# Patient Record
Sex: MALE | Race: WHITE | NOT HISPANIC OR LATINO | Employment: OTHER | URBAN - METROPOLITAN AREA
[De-identification: names, ages, dates, MRNs, and addresses within clinical notes are randomized per-mention and may not be internally consistent; named-entity substitution may affect disease eponyms.]

---

## 2017-02-06 ENCOUNTER — ALLSCRIPTS OFFICE VISIT (OUTPATIENT)
Dept: OTHER | Facility: OTHER | Age: 72
End: 2017-02-06

## 2017-04-12 ENCOUNTER — GENERIC CONVERSION - ENCOUNTER (OUTPATIENT)
Dept: OTHER | Facility: OTHER | Age: 72
End: 2017-04-12

## 2017-05-11 ENCOUNTER — ALLSCRIPTS OFFICE VISIT (OUTPATIENT)
Dept: OTHER | Facility: OTHER | Age: 72
End: 2017-05-11

## 2017-05-11 DIAGNOSIS — R97.20 ELEVATED PROSTATE SPECIFIC ANTIGEN (PSA): ICD-10-CM

## 2017-05-19 RX ORDER — ASPIRIN 81 MG/1
81 TABLET ORAL DAILY
COMMUNITY

## 2017-05-19 RX ORDER — NITROGLYCERIN 0.4 MG/1
0.4 TABLET SUBLINGUAL
COMMUNITY
End: 2021-09-21 | Stop reason: SDUPTHER

## 2017-05-19 RX ORDER — AMLODIPINE BESYLATE 10 MG/1
10 TABLET ORAL EVERY EVENING
COMMUNITY

## 2017-05-19 RX ORDER — METOPROLOL TARTRATE 50 MG/1
50 TABLET, FILM COATED ORAL 2 TIMES DAILY
COMMUNITY

## 2017-05-19 RX ORDER — MULTIVITAMIN
1 CAPSULE ORAL DAILY
COMMUNITY

## 2017-05-19 RX ORDER — ATORVASTATIN CALCIUM 80 MG/1
80 TABLET, FILM COATED ORAL DAILY
COMMUNITY
End: 2021-04-19 | Stop reason: SDUPTHER

## 2017-05-22 ENCOUNTER — ANESTHESIA (OUTPATIENT)
Dept: GASTROENTEROLOGY | Facility: HOSPITAL | Age: 72
End: 2017-05-22
Payer: COMMERCIAL

## 2017-05-22 ENCOUNTER — ANESTHESIA EVENT (OUTPATIENT)
Dept: GASTROENTEROLOGY | Facility: HOSPITAL | Age: 72
End: 2017-05-22
Payer: COMMERCIAL

## 2017-05-22 ENCOUNTER — HOSPITAL ENCOUNTER (OUTPATIENT)
Facility: HOSPITAL | Age: 72
Setting detail: OUTPATIENT SURGERY
Discharge: HOME | End: 2017-05-22
Attending: INTERNAL MEDICINE | Admitting: INTERNAL MEDICINE
Payer: COMMERCIAL

## 2017-05-22 ENCOUNTER — GENERIC CONVERSION - ENCOUNTER (OUTPATIENT)
Dept: OTHER | Facility: OTHER | Age: 72
End: 2017-05-22

## 2017-05-22 VITALS
SYSTOLIC BLOOD PRESSURE: 101 MMHG | HEIGHT: 68 IN | DIASTOLIC BLOOD PRESSURE: 63 MMHG | HEART RATE: 54 BPM | RESPIRATION RATE: 18 BRPM | OXYGEN SATURATION: 95 % | WEIGHT: 170 LBS | BODY MASS INDEX: 25.76 KG/M2 | TEMPERATURE: 97.6 F

## 2017-05-22 DIAGNOSIS — Z12.11 ENCOUNTER FOR SCREENING FOR MALIGNANT NEOPLASM OF COLON: ICD-10-CM

## 2017-05-22 PROCEDURE — 88305 TISSUE EXAM BY PATHOLOGIST: CPT | Performed by: INTERNAL MEDICINE

## 2017-05-22 RX ORDER — PROPOFOL 10 MG/ML
INJECTION, EMULSION INTRAVENOUS AS NEEDED
Status: DISCONTINUED | OUTPATIENT
Start: 2017-05-22 | End: 2017-05-22 | Stop reason: SURG

## 2017-05-22 RX ORDER — LIDOCAINE HYDROCHLORIDE 10 MG/ML
INJECTION, SOLUTION INFILTRATION; PERINEURAL AS NEEDED
Status: DISCONTINUED | OUTPATIENT
Start: 2017-05-22 | End: 2017-05-22 | Stop reason: SURG

## 2017-05-22 RX ORDER — PROPOFOL 10 MG/ML
INJECTION, EMULSION INTRAVENOUS CONTINUOUS PRN
Status: DISCONTINUED | OUTPATIENT
Start: 2017-05-22 | End: 2017-05-22 | Stop reason: SURG

## 2017-05-22 RX ORDER — SODIUM CHLORIDE 9 MG/ML
100 INJECTION, SOLUTION INTRAVENOUS CONTINUOUS
Status: DISCONTINUED | OUTPATIENT
Start: 2017-05-22 | End: 2017-05-22 | Stop reason: HOSPADM

## 2017-05-22 RX ADMIN — SODIUM CHLORIDE 100 ML/HR: 0.9 INJECTION, SOLUTION INTRAVENOUS at 12:51

## 2017-05-22 RX ADMIN — PROPOFOL 50 MG: 10 INJECTION, EMULSION INTRAVENOUS at 13:07

## 2017-05-22 RX ADMIN — PROPOFOL 50 MG: 10 INJECTION, EMULSION INTRAVENOUS at 13:02

## 2017-05-22 RX ADMIN — PROPOFOL 160 MCG/KG/MIN: 10 INJECTION, EMULSION INTRAVENOUS at 12:56

## 2017-05-22 RX ADMIN — PROPOFOL 100 MG: 10 INJECTION, EMULSION INTRAVENOUS at 12:56

## 2017-05-22 RX ADMIN — LIDOCAINE HYDROCHLORIDE 50 MG: 10 INJECTION, SOLUTION INFILTRATION; PERINEURAL at 12:56

## 2017-06-02 ENCOUNTER — GENERIC CONVERSION - ENCOUNTER (OUTPATIENT)
Dept: OTHER | Facility: OTHER | Age: 72
End: 2017-06-02

## 2017-06-15 ENCOUNTER — ALLSCRIPTS OFFICE VISIT (OUTPATIENT)
Dept: OTHER | Facility: OTHER | Age: 72
End: 2017-06-15

## 2017-06-15 DIAGNOSIS — R97.20 ELEVATED PROSTATE SPECIFIC ANTIGEN (PSA): ICD-10-CM

## 2017-08-01 ENCOUNTER — ALLSCRIPTS OFFICE VISIT (OUTPATIENT)
Dept: OTHER | Facility: OTHER | Age: 72
End: 2017-08-01

## 2017-08-04 ENCOUNTER — LAB CONVERSION - ENCOUNTER (OUTPATIENT)
Dept: OTHER | Facility: OTHER | Age: 72
End: 2017-08-04

## 2017-08-04 LAB
A/G RATIO (HISTORICAL): 1.6 (CALC) (ref 1–2.5)
ALBUMIN SERPL BCP-MCNC: 4.2 G/DL (ref 3.6–5.1)
ALP SERPL-CCNC: 141 U/L (ref 40–115)
ALT SERPL W P-5'-P-CCNC: 22 U/L (ref 9–46)
AST SERPL W P-5'-P-CCNC: 20 U/L (ref 10–35)
BASOPHILS # BLD AUTO: 0.5 %
BASOPHILS # BLD AUTO: 73 CELLS/UL (ref 0–200)
BILIRUB SERPL-MCNC: 1.1 MG/DL (ref 0.2–1.2)
BUN SERPL-MCNC: 12 MG/DL (ref 7–25)
BUN/CREA RATIO (HISTORICAL): ABNORMAL (CALC) (ref 6–22)
CALCIUM SERPL-MCNC: 9.4 MG/DL (ref 8.6–10.3)
CHLORIDE SERPL-SCNC: 105 MMOL/L (ref 98–110)
CHOLEST SERPL-MCNC: 113 MG/DL (ref 125–200)
CHOLEST/HDLC SERPL: 4.5 (CALC)
CO2 SERPL-SCNC: 27 MMOL/L (ref 20–31)
CREAT SERPL-MCNC: 1.18 MG/DL (ref 0.7–1.18)
DEPRECATED RDW RBC AUTO: 14.7 % (ref 11–15)
EGFR AFRICAN AMERICAN (HISTORICAL): 72 ML/MIN/1.73M2
EGFR-AMERICAN CALC (HISTORICAL): 62 ML/MIN/1.73M2
EOSINOPHIL # BLD AUTO: 4.7 %
EOSINOPHIL # BLD AUTO: 682 CELLS/UL (ref 15–500)
GAMMA GLOBULIN (HISTORICAL): 2.6 G/DL (CALC) (ref 1.9–3.7)
GLUCOSE (HISTORICAL): 105 MG/DL (ref 65–99)
HBA1C MFR BLD HPLC: 5.2 % OF TOTAL HGB
HCT VFR BLD AUTO: 55.7 % (ref 38.5–50)
HDLC SERPL-MCNC: 25 MG/DL
HGB BLD-MCNC: 18.9 G/DL (ref 13.2–17.1)
LDL CHOLESTEROL (HISTORICAL): 67 MG/DL (CALC)
LYMPHOCYTES # BLD AUTO: 15 %
LYMPHOCYTES # BLD AUTO: 2175 CELLS/UL (ref 850–3900)
MCH RBC QN AUTO: 31.9 PG (ref 27–33)
MCHC RBC AUTO-ENTMCNC: 33.9 G/DL (ref 32–36)
MCV RBC AUTO: 94.1 FL (ref 80–100)
MONOCYTES # BLD AUTO: 1015 CELLS/UL (ref 200–950)
MONOCYTES (HISTORICAL): 7 %
NEUTROPHILS # BLD AUTO: 72.8 %
NEUTROPHILS # BLD AUTO: ABNORMAL CELLS/UL (ref 1500–7800)
NON-HDL-CHOL (CHOL-HDL) (HISTORICAL): 88 MG/DL (CALC)
PLATELET # BLD AUTO: 227 THOUSAND/UL (ref 140–400)
PMV BLD AUTO: 10.6 FL (ref 7.5–12.5)
POTASSIUM SERPL-SCNC: 3.8 MMOL/L (ref 3.5–5.3)
RBC # BLD AUTO: 5.92 MILLION/UL (ref 4.2–5.8)
SODIUM SERPL-SCNC: 141 MMOL/L (ref 135–146)
TOTAL PROTEIN (HISTORICAL): 6.8 G/DL (ref 6.1–8.1)
TRIGL SERPL-MCNC: 104 MG/DL
TSH SERPL DL<=0.05 MIU/L-ACNC: 0.84 MIU/L (ref 0.4–4.5)
WBC # BLD AUTO: 14.5 THOUSAND/UL (ref 3.8–10.8)

## 2017-08-07 ENCOUNTER — ALLSCRIPTS OFFICE VISIT (OUTPATIENT)
Dept: OTHER | Facility: OTHER | Age: 72
End: 2017-08-07

## 2017-08-09 ENCOUNTER — ALLSCRIPTS OFFICE VISIT (OUTPATIENT)
Dept: OTHER | Facility: OTHER | Age: 72
End: 2017-08-09

## 2017-08-09 ENCOUNTER — TRANSCRIBE ORDERS (OUTPATIENT)
Dept: ADMINISTRATIVE | Facility: HOSPITAL | Age: 72
End: 2017-08-09

## 2017-08-09 ENCOUNTER — APPOINTMENT (OUTPATIENT)
Dept: LAB | Facility: MEDICAL CENTER | Age: 72
End: 2017-08-09
Payer: COMMERCIAL

## 2017-08-09 DIAGNOSIS — Z72.0 TOBACCO USE: ICD-10-CM

## 2017-08-09 DIAGNOSIS — L02.214 CUTANEOUS ABSCESS OF GROIN: ICD-10-CM

## 2017-08-09 DIAGNOSIS — L02.214 ABSCESS OF GROIN: Primary | ICD-10-CM

## 2017-08-09 LAB
ANION GAP SERPL CALCULATED.3IONS-SCNC: 5 MMOL/L (ref 4–13)
BUN SERPL-MCNC: 12 MG/DL (ref 5–25)
CALCIUM SERPL-MCNC: 9.3 MG/DL (ref 8.3–10.1)
CHLORIDE SERPL-SCNC: 104 MMOL/L (ref 100–108)
CO2 SERPL-SCNC: 31 MMOL/L (ref 21–32)
CREAT SERPL-MCNC: 1.21 MG/DL (ref 0.6–1.3)
GFR SERPL CREATININE-BSD FRML MDRD: 60 ML/MIN/1.73SQ M
GLUCOSE P FAST SERPL-MCNC: 63 MG/DL (ref 65–99)
POTASSIUM SERPL-SCNC: 3.7 MMOL/L (ref 3.5–5.3)
SODIUM SERPL-SCNC: 140 MMOL/L (ref 136–145)

## 2017-08-09 PROCEDURE — 36415 COLL VENOUS BLD VENIPUNCTURE: CPT

## 2017-08-09 PROCEDURE — 80048 BASIC METABOLIC PNL TOTAL CA: CPT

## 2017-08-10 ENCOUNTER — HOSPITAL ENCOUNTER (OUTPATIENT)
Dept: RADIOLOGY | Facility: HOSPITAL | Age: 72
Discharge: HOME | End: 2017-08-10
Payer: COMMERCIAL

## 2017-08-10 DIAGNOSIS — L02.214 CUTANEOUS ABSCESS OF GROIN: ICD-10-CM

## 2017-08-10 PROCEDURE — 74177 CT ABD & PELVIS W/CONTRAST: CPT

## 2017-08-10 RX ADMIN — IOHEXOL 100 ML: 350 INJECTION, SOLUTION INTRAVENOUS at 10:47

## 2017-08-11 ENCOUNTER — ALLSCRIPTS OFFICE VISIT (OUTPATIENT)
Dept: OTHER | Facility: OTHER | Age: 72
End: 2017-08-11

## 2017-08-24 ENCOUNTER — ALLSCRIPTS OFFICE VISIT (OUTPATIENT)
Dept: OTHER | Facility: OTHER | Age: 72
End: 2017-08-24

## 2017-08-25 ENCOUNTER — ALLSCRIPTS OFFICE VISIT (OUTPATIENT)
Dept: OTHER | Facility: OTHER | Age: 72
End: 2017-08-25

## 2017-09-20 ENCOUNTER — ALLSCRIPTS OFFICE VISIT (OUTPATIENT)
Dept: OTHER | Facility: OTHER | Age: 72
End: 2017-09-20

## 2017-09-20 ENCOUNTER — TRANSCRIBE ORDERS (OUTPATIENT)
Dept: ADMINISTRATIVE | Facility: HOSPITAL | Age: 72
End: 2017-09-20

## 2017-09-20 DIAGNOSIS — D72.829 ELEVATED WHITE BLOOD CELL COUNT: ICD-10-CM

## 2017-09-20 DIAGNOSIS — R05.9 COUGH: ICD-10-CM

## 2017-09-20 DIAGNOSIS — R05.9 COUGH: Primary | ICD-10-CM

## 2017-09-20 DIAGNOSIS — R19.7 DIARRHEA: ICD-10-CM

## 2017-09-20 DIAGNOSIS — D75.1 SECONDARY POLYCYTHEMIA: ICD-10-CM

## 2017-09-20 DIAGNOSIS — F17.290 NICOTINE DEPENDENCE, OTHER TOBACCO PRODUCT, UNCOMPLICATED: ICD-10-CM

## 2017-10-05 ENCOUNTER — HOSPITAL ENCOUNTER (OUTPATIENT)
Dept: CT IMAGING | Facility: HOSPITAL | Age: 72
Discharge: HOME | End: 2017-10-05
Attending: INTERNAL MEDICINE
Payer: COMMERCIAL

## 2017-10-05 DIAGNOSIS — R05.9 COUGH: ICD-10-CM

## 2017-10-05 DIAGNOSIS — D75.1 SECONDARY POLYCYTHEMIA: ICD-10-CM

## 2017-10-05 DIAGNOSIS — F17.290 NICOTINE DEPENDENCE, OTHER TOBACCO PRODUCT, UNCOMPLICATED: ICD-10-CM

## 2017-10-05 DIAGNOSIS — D72.829 ELEVATED WHITE BLOOD CELL COUNT: ICD-10-CM

## 2017-10-05 PROCEDURE — 71250 CT THORAX DX C-: CPT

## 2017-10-12 ENCOUNTER — GENERIC CONVERSION - ENCOUNTER (OUTPATIENT)
Dept: OTHER | Facility: OTHER | Age: 72
End: 2017-10-12

## 2017-10-20 ENCOUNTER — GENERIC CONVERSION - ENCOUNTER (OUTPATIENT)
Dept: OTHER | Facility: OTHER | Age: 72
End: 2017-10-20

## 2017-10-23 DIAGNOSIS — D72.829 ELEVATED WHITE BLOOD CELL COUNT: ICD-10-CM

## 2017-10-23 DIAGNOSIS — D45 POLYCYTHEMIA VERA (HCC): ICD-10-CM

## 2017-10-25 ENCOUNTER — HOSPITAL ENCOUNTER (OUTPATIENT)
Dept: INFUSION CENTER | Facility: CLINIC | Age: 72
Discharge: HOME | End: 2017-10-25
Payer: COMMERCIAL

## 2017-10-25 VITALS
DIASTOLIC BLOOD PRESSURE: 85 MMHG | TEMPERATURE: 96.4 F | HEART RATE: 50 BPM | SYSTOLIC BLOOD PRESSURE: 126 MMHG | RESPIRATION RATE: 16 BRPM

## 2017-10-25 PROCEDURE — 99195 PHLEBOTOMY: CPT

## 2017-10-25 RX ORDER — AMOXICILLIN 500 MG
1200 CAPSULE ORAL 2 TIMES DAILY
COMMUNITY

## 2017-10-25 NOTE — PROGRESS NOTES
Pt here for first phlebotomy  400ml of blood taken from right AC  patient tolerated well   vss stable post phlebotomy  patient observed for 15 minutes post phlebotomy  Pt instructed not to smoke, drink alcohol, or do strenuous lifting/exercise  Instructed to drink lots of water  Discharged with AVS in stable condition

## 2017-11-14 ENCOUNTER — LAB CONVERSION - ENCOUNTER (OUTPATIENT)
Dept: OTHER | Facility: OTHER | Age: 72
End: 2017-11-14

## 2017-11-14 LAB
A/G RATIO (HISTORICAL): 1.5 (CALC) (ref 1–2.5)
ALBUMIN SERPL BCP-MCNC: 4.3 G/DL (ref 3.6–5.1)
ALP SERPL-CCNC: 155 U/L (ref 40–115)
ALT SERPL W P-5'-P-CCNC: 23 U/L (ref 9–46)
AST SERPL W P-5'-P-CCNC: 23 U/L (ref 10–35)
BASOPHILS # BLD AUTO: 0.5 %
BASOPHILS # BLD AUTO: 69 CELLS/UL (ref 0–200)
BILIRUB SERPL-MCNC: 1.1 MG/DL (ref 0.2–1.2)
BUN SERPL-MCNC: 16 MG/DL (ref 7–25)
BUN/CREA RATIO (HISTORICAL): ABNORMAL (CALC) (ref 6–22)
CALCIUM SERPL-MCNC: 9.5 MG/DL (ref 8.6–10.3)
CHLORIDE SERPL-SCNC: 104 MMOL/L (ref 98–110)
CO2 SERPL-SCNC: 32 MMOL/L (ref 20–31)
CREAT SERPL-MCNC: 1.14 MG/DL (ref 0.7–1.18)
DEPRECATED RDW RBC AUTO: 13.9 % (ref 11–15)
EGFR AFRICAN AMERICAN (HISTORICAL): 75 ML/MIN/1.73M2
EGFR-AMERICAN CALC (HISTORICAL): 64 ML/MIN/1.73M2
EOSINOPHIL # BLD AUTO: 6.5 %
EOSINOPHIL # BLD AUTO: 891 CELLS/UL (ref 15–500)
GAMMA GLOBULIN (HISTORICAL): 2.8 G/DL (CALC) (ref 1.9–3.7)
GLUCOSE (HISTORICAL): 85 MG/DL (ref 65–99)
HBA1C MFR BLD HPLC: 4.9 % OF TOTAL HGB
HCT VFR BLD AUTO: 56.3 % (ref 38.5–50)
HGB BLD-MCNC: 19.1 G/DL (ref 13.2–17.1)
LYMPHOCYTES # BLD AUTO: 12.2 %
LYMPHOCYTES # BLD AUTO: 1671 CELLS/UL (ref 850–3900)
MCH RBC QN AUTO: 31.8 PG (ref 27–33)
MCHC RBC AUTO-ENTMCNC: 33.9 G/DL (ref 32–36)
MCV RBC AUTO: 93.8 FL (ref 80–100)
MONOCYTES # BLD AUTO: 904 CELLS/UL (ref 200–950)
MONOCYTES (HISTORICAL): 6.6 %
NEUTROPHILS # BLD AUTO: 74.2 %
NEUTROPHILS # BLD AUTO: ABNORMAL CELLS/UL (ref 1500–7800)
PLATELET # BLD AUTO: 218 THOUSAND/UL (ref 140–400)
PMV BLD AUTO: 10.9 FL (ref 7.5–12.5)
POTASSIUM SERPL-SCNC: 4.2 MMOL/L (ref 3.5–5.3)
RBC # BLD AUTO: 6 MILLION/UL (ref 4.2–5.8)
SODIUM SERPL-SCNC: 142 MMOL/L (ref 135–146)
TOTAL PROTEIN (HISTORICAL): 7.1 G/DL (ref 6.1–8.1)
WBC # BLD AUTO: 13.7 THOUSAND/UL (ref 3.8–10.8)

## 2017-11-22 ENCOUNTER — HOSPITAL ENCOUNTER (OUTPATIENT)
Dept: INFUSION CENTER | Facility: CLINIC | Age: 72
Discharge: HOME | End: 2017-11-22
Payer: COMMERCIAL

## 2017-11-22 VITALS
RESPIRATION RATE: 18 BRPM | TEMPERATURE: 96.5 F | DIASTOLIC BLOOD PRESSURE: 90 MMHG | SYSTOLIC BLOOD PRESSURE: 145 MMHG | HEART RATE: 55 BPM

## 2017-11-22 PROCEDURE — 99195 PHLEBOTOMY: CPT

## 2017-11-22 NOTE — PROGRESS NOTES
Patient's Hematocrit from 11/13 is 56 3, double checked with Chandrakant Belle RN; patient met parameters for phlebotomy today  Patient tolerated 400mL phlebotomy drawn from [place] [facility] without complications  Patient drank water and was monitored for 15 minutes post procedure  Patient's vitals remained stable and patient felt fine prior to d/c  Patient informed to stay hydrated today and refrain from heavy lifting and strenuous activity  Patient provided with AVS and aware of future appointments

## 2017-11-24 ENCOUNTER — ALLSCRIPTS OFFICE VISIT (OUTPATIENT)
Dept: OTHER | Facility: OTHER | Age: 72
End: 2017-11-24

## 2017-11-25 NOTE — PROGRESS NOTES
Assessment    1  Benign essential hypertension (401 1) (I10)   2  Hyperlipidemia (272 4) (E78 5)   3  Prediabetes (790 29) (R73 03)   4  Current tobacco use (305 1) (Z72 0)   5  Elevated hemoglobin (282 7) (D58 2)   6  Polycythemia vera (238 4) (D45)   7  Elevated prostate specific antigen (PSA) (790 93) (R97 20)    Plan  Benign essential hypertension, Blood chemistry abnormality, SocHx: Current tobaccouse, Hyperlipidemia, Polycythemia vera    · (1) COMPREHENSIVE METABOLIC PANEL; Status:Hold For - Exact Date; Requestedfor:After 35XKS7373;    · (1) HEMOGLOBIN A1C; Status:Hold For - Exact Date; Requested for:After [id number];    · (1) LIPID PANEL FASTING W DIRECT LDL REFLEX; Status:Hold For - Exact Date; Requested for:After 55MMZ7807;    · (1) TSH WITH FT4 REFLEX; Status:Hold For - Exact Date; Requested for:[id number]; Discussion/Summary    --polycythemia vera: Most recent hemoglobin 19 1  Consultations from a hematologist were reviewed with patient today  Patient has decided on periodically therapeutic phlebotomy  Continue follow-up with specialist as directeduse: Patient still smokes approximately 1/2 pack per day  He was counseled again today  CT of the lungs from October 5th was negative  Recommend repeat again in 1 year  I advised patient to consider a spirometry test  Patient declines today  He denies any shortness of breath  Status post PCI  Patient without chest pain or shortness of breath  Letter from cardiologist (Dr Sherry Corea) from October 5th was reviewed today  Stress test was performed  I do not have the results of this, but will obtain for patient (who still has not received a call from his cardiologist regarding this)  Patient is considering changing to a new cardiologist  I offered to refer him to a Waylon Yu cardiologist, patient states he will consider and let me knowdoing well on atorvastatin 80 mg daily   This is being followed by his cardiologistcontrolled on amlodipine 10 mg dailydiabetes: Blood sugar 85, A1c 4 9%  Will continue to followof elevated PSA: Patient has follow-up appointment with his urologist, Dr Errol Serrano, on December 21st   continues to refuse flu shot, Pneumovax, and Zostavax  refuses colonoscopymonths (Medicare wellness), fasting blood work prior (Rx given for [facility])  Possible side effects of new medications were reviewed with the patient/guardian today  The treatment plan was reviewed with the patient/guardian  The patient/guardian understands and agrees with the treatment plan      Chief Complaint  pt here for his 3 month check up   Patient is here today for follow up of chronic conditions described in HPI  History of Present Illness  Patient presents for recheck of chronic medical problems today  He was recently diagnosed with polycythemia vera, and has started therapeutic phlebotomy  Patient is still also seeing Urology for elevated PSA  Otherwise patient is doing well  Still smokes approximately 1/2 pack per day  He is sleeping better since starting melatonin, but does not take this regularly  Doing well on cholesterol medication, atorvastatin 80  Doing well on blood pressure medication, amlodipine  Patient had labs done on November 13th      Review of Systems   Constitutional: as noted in HPI  Cardiovascular: No complaints of slow heart rate, no fast heart rate, no chest pain, no palpitations, no leg claudication, no lower extremity  Respiratory: No complaints of shortness of breath, no wheezing, no cough, no SOB on exertion, no orthopnea or PND  Gastrointestinal: No complaints of abdominal pain, no constipation, no nausea or vomiting, no diarrhea or bloody stools  Genitourinary: No complaints of dysuria, no incontinence, no hesitancy, no nocturia, no genital lesion, no testicular pain  Active Problems    1  Abscess of left groin (682 2) (L02 214)   2  Benign essential hypertension (401 1) (I10)   3  Benign paroxysmal positional vertigo (386 11) (H81 10)   4  Blood chemistry abnormality (790 6) (R79 9)   5  Cervical radiculopathy (723 4) (M54 12)   6  Chronic cough (786 2) (R05)   7  Coronary artery disease (414 00) (I25 10)   8  Current tobacco use (305 1) (Z72 0)   9  Eczema (692 9) (L30 9)   10  Elevated hemoglobin (282 7) (D58 2)   11  Elevated prostate specific antigen (PSA) (790 93) (R97 20)   12  Erythrocytosis (289 0) (D75 1)   13  Hyperlipidemia (272 4) (E78 5)   14  Insomnia (780 52) (G47 00)   15  Leukocytosis (288 60) (D72 829)   16  Loose bowel movements (787 91) (R19 7)   17  Myelodysplastic syndrome (238 75) (D46 9)   18  History of Pain in joint of left shoulder (719 41) (M25 512)   19  Polycythemia vera (238 4) (D45)   20  Prediabetes (790 29) (R73 03)    Past Medical History    1  Benign essential hypertension (401 1) (I10)   2  Benign paroxysmal positional vertigo (386 11) (H81 10)   3  Cervical radiculopathy (723 4) (M54 12)   4  Coronary artery disease (414 00) (I25 10)   5  Eczema (692 9) (L30 9)   6  Elevated prostate specific antigen (PSA) (790 93) (R97 20)   7  Hyperlipidemia (272 4) (E78 5)   8  History of Nasal congestion (478 19) (R09 81)   9  History of Need for pneumococcal vaccination (V03 82) (Z23)   10  No pertinent past medical history   11  History of Pain in joint of left shoulder (719 41) (M25 512)   12  History of Post-nasal drip (784 91) (R09 82)   13  History of Prostate cancer screening (V76 44) (Z12 5)   14  History of Screening for colon cancer (V76 51) (Z12 11)   15  History of Screening for prostate cancer (V76 44) (Z12 5)   16  History of Tick bite, infected (919 5,E906 4) (W57 XXXA)    The active problems and past medical history were reviewed and updated today  Surgical History  1  History of Needle Biopsy Of Prostate    Family History  Mother    1  No pertinent family history  Father    2   No pertinent family history    Social History     · Cigar smoker (305 1) (F17 290)   · Current tobacco use (305 1) (Z72 0)   · Uses safety equipment  The social history was reviewed and updated today  The social history was reviewed and is unchanged  Current Meds   1  AmLODIPine Besylate 10 MG Oral Tablet; Therapy: 98Jmc4154 to Recorded   2  Aspirin 81 MG TABS; TAKE 1 TABLET DAILY; Therapy: 23MTA8315 to (Evaluate:21Oct2012); Last Rx:22Rok8748 Ordered   3  Atorvastatin Calcium 80 MG Oral Tablet; Therapy: 88Ozo1135 to Recorded   4  Fish Oil CAPS Recorded   5  Metoprolol Tartrate 50 MG Oral Tablet; TAKE 1 TABLET DAILY; Therapy: 12MVL4224 to 0496 27 16 26)  Requested for: 72Jmx6430; Last Rx:95Reh1059 Ordered   6  Multi Vitamin Mens Oral Tablet; Therapy: (Recorded:26Jun2014) to Recorded   7  Nitrostat 0 4 MG Sublingual Tablet Sublingual; PLACE 1 TABLET UNDER THE TONGUE EVERY 5 MINUTES UP TO 3 DOSES AS NEEDED FOR CHEST PAIN; Therapy: 95Xym8141 to (Evaluate:16Oct2012)  Requested for: 57Jdm9665; Last Rx:32Qel3621 Ordered    The medication list was reviewed and updated today  Allergies  1  No Known Drug Allergies    Vitals  Vital Signs    Recorded: 24Nov2017 08:43AM   Temperature 96 9 F, Tympanic   Heart Rate 55   Pulse Quality Normal   Respiration Quality Normal   Respiration 18   Systolic 640, LUE, Sitting   Diastolic 80, LUE, Sitting   Height 5 ft 6 3 in   Weight 168 lb 9 oz   BMI Calculated 26 96   BSA Calculated 1 87   O2 Saturation 99   Pain Scale 0       Physical Exam   Constitutional  General appearance: No acute distress, well appearing and well nourished  Pulmonary  Respiratory effort: No increased work of breathing or signs of respiratory distress  Auscultation of lungs: Clear to auscultation, equal breath sounds bilaterally, no wheezes, no rales, no rhonci  Cardiovascular  Palpation of heart: Normal PMI, no thrills  Auscultation of heart: Normal rate and rhythm, normal S1 and S2, without murmurs     Examination of extremities for edema and/or varicosities: Normal    Carotid pulses: Normal    Lymphatic  Palpation of lymph nodes in neck: No lymphadenopathy  Psychiatric  Orientation to person, place and time: Normal    Mood and affect: Normal          Health Management  History of Need for pneumococcal vaccination   Pneumo (Pneumovax); every 10 years; Last Approx 95TJS7301; Next Due: ; Active  History of Screening for colon cancer   COLONOSCOPY (GI, SURG); every 3 years; Last 94WKR5427; Next Due: 15PVM5901; Active  History of Screening for prostate cancer   (1) PSA (SCREEN) (Dx V76 44 Screen for Prostate Cancer); every 1 year; Last 33TXK0883; NextDue: 61FEY9867; Active  Health Maintenance   Medicare Annual Wellness Visit; every 1 year; Next Due: 73ZSR0274; Overdue    Future Appointments    Date/Time Provider Specialty Site   01/19/2018 01:40 PM FLORA Canela  Hematology Oncology [facility]   12/21/2017 08:00 AM FL[facility]A Soliz  [facility]       Signatures   Electronically signed by :  General Kapil DO; Nov 24 2017  9:24AM EST                       (Author)

## 2017-12-18 DIAGNOSIS — D45 POLYCYTHEMIA VERA (HCC): ICD-10-CM

## 2017-12-18 DIAGNOSIS — R97.20 ELEVATED PROSTATE SPECIFIC ANTIGEN (PSA): ICD-10-CM

## 2017-12-18 DIAGNOSIS — Z12.5 ENCOUNTER FOR SCREENING FOR MALIGNANT NEOPLASM OF PROSTATE: ICD-10-CM

## 2017-12-18 DIAGNOSIS — D72.829 ELEVATED WHITE BLOOD CELL COUNT: ICD-10-CM

## 2017-12-20 ENCOUNTER — HOSPITAL ENCOUNTER (OUTPATIENT)
Dept: INFUSION CENTER | Facility: CLINIC | Age: 72
Discharge: HOME | End: 2017-12-22
Payer: COMMERCIAL

## 2017-12-20 VITALS
RESPIRATION RATE: 18 BRPM | SYSTOLIC BLOOD PRESSURE: 126 MMHG | TEMPERATURE: 97.6 F | DIASTOLIC BLOOD PRESSURE: 72 MMHG | HEART RATE: 76 BPM

## 2017-12-20 PROCEDURE — 99195 PHLEBOTOMY: CPT

## 2017-12-20 NOTE — PROGRESS NOTES
Pt arrived to infusion center for therapeutic phlebotomy  Parameters checked with [facility] Colon RN  Hematocrit 54 6 on 12/13/17  Pt given water to drive pre and post-phlebotomy  400mL blood removed from [facility] without complications (began 5228 and ended 0842)  VSS and pt discharged in stable condition  Pt aware of next appointment  AVS provided

## 2017-12-21 ENCOUNTER — ALLSCRIPTS OFFICE VISIT (OUTPATIENT)
Dept: OTHER | Facility: OTHER | Age: 72
End: 2017-12-21

## 2017-12-22 NOTE — PROGRESS NOTES
Assessment   1  Elevated prostate specific antigen (PSA) (790 93) (R97 20)    Plan    Elevated prostate specific antigen (PSA), Prostate cancer screening    · (1) PSA (SCREEN) (Dx V76 44 Screen for Prostate Cancer); Status:Active; Requested    for:35Cng0244;    Perform:[facility]; WWW:77IUI0997; Ordered;For:Elevated prostate specific antigen (PSA), Prostate cancer screening; Ordered By:Lisbeth Vega Slight; Follow-up visit in 1 year Evaluation and Treatment  Follow-up  Status: Hold For - Scheduling  Requested for: 25Afu3822     Ordered; For: Elevated prostate specific antigen (PSA); Ordered By: Veronica Campbell  Performed:   Due: 83ETS2709       Discussion/Summary   Discussion Summary:    PSA remains stable  Recommend continued annual evaluation  We discussed behavior modification for urinary symptoms  He should cut back on smoking obviously, as well as his soda intake and significantly increase his water intake  This should provide significant relief  He may also consider treatment for enlarged prostate although is not interested at this time  his changing his insurance and unclear that we are participating with his plan  He may need to find a new urologist     Patient's Capacity to Self-Care: Patient is able to Self-Care  Chief Complaint   Chief Complaint Free Text Note Form: Patient presents with elevated PSA = 10 4 (12/13/17)      History of Present Illness   HPI: Routine follow-up for prostate cancer screening  Patient chronically elevated PSA  Status post 2-prostate biopsies  Current PSA 10 4, stable from previous two evaluations  He has mild urinary complaints but are not terribly bothersome  He continues to smoke and drinks a lot of soda  He does not drink any water  AUA symptom score 13  Review of Systems   Complete-Male Urology:      Constitutional: No fever or chills, feels well, no tiredness, no recent weight gain or weight loss        Respiratory: No complaints of shortness of breath, no wheezing, no cough, no SOB on exertion, no orthopnea or PND  Cardiovascular: No complaints of slow heart rate, no fast heart rate, no chest pain, no palpitations, no leg claudication, no lower extremity  Gastrointestinal: No complaints of abdominal pain, no constipation, no nausea or vomiting, no diarrhea or bloody stools  Genitourinary: Empty sensation-- (occasionally no ),-- feelings of urinary urgency-- and-- stream quality fair, but-- No complaints of dysuria, no incontinence, no hesitancy, no nocturia, no genital lesion, no testicular pain,-- no dysuria,-- no urinary hesitancy,-- no hematuria-- and-- no incontinence--       The patient presents with complaints of nocturia (1-2 times per night )  Musculoskeletal: No complaints of arthralgia, no myalgias, no joint swelling or stiffness, no limb pain or swelling  Integumentary: No complaints of skin rash or skin lesions, no itching, no skin wound, no dry skin  Hematologic/Lymphatic: No complaints of swollen glands, no swollen glands in the neck, does not bleed easily, no easy bruising  Neurological: No compliants of headache, no confusion, no convulsions, no numbness or tingling, no dizziness or fainting, no limb weakness, no difficulty walking  Active Problems   1  Abscess of left groin (682 2) (L02 214)   2  Benign essential hypertension (401 1) (I10)   3  Benign paroxysmal positional vertigo (386 11) (H81 10)   4  Blood chemistry abnormality (790 6) (R79 9)   5  Cervical radiculopathy (723 4) (M54 12)   6  Chronic cough (786 2) (R05)   7  Coronary artery disease (414 00) (I25 10)   8  Current tobacco use (305 1) (Z72 0)   9  Eczema (692 9) (L30 9)   10  Elevated hemoglobin (282 7) (D58 2)   11  Elevated prostate specific antigen (PSA) (790 93) (R97 20)   12  Erythrocytosis (289 0) (D75 1)   13  Hyperlipidemia (272 4) (E78 5)   14  Insomnia (780 52) (G47 00)   15   Leukocytosis (288 60) (D72 829) 16  Loose bowel movements (787 91) (R19 7)   17  Myelodysplastic syndrome (238 75) (D46 9)   18  History of Pain in joint of left shoulder (719 41) (M25 512)   19  Polycythemia vera (238 4) (D45)   20  Prediabetes (790 29) (R73 03)    Past Medical History   1  Benign essential hypertension (401 1) (I10)   2  Benign paroxysmal positional vertigo (386 11) (H81 10)   3  Cervical radiculopathy (723 4) (M54 12)   4  Coronary artery disease (414 00) (I25 10)   5  Eczema (692 9) (L30 9)   6  Elevated prostate specific antigen (PSA) (790 93) (R97 20)   7  Hyperlipidemia (272 4) (E78 5)   8  History of Nasal congestion (478 19) (R09 81)   9  History of Need for pneumococcal vaccination (V03 82) (Z23)   10  No pertinent past medical history   11  History of Pain in joint of left shoulder (719 41) (M25 512)   12  History of Post-nasal drip (784 91) (R09 82)   13  History of Prostate cancer screening (V76 44) (Z12 5)   14  History of Screening for colon cancer (V76 51) (Z12 11)   15  History of Screening for prostate cancer (V76 44) (Z12 5)   16  History of Tick bite, infected (919 5,E906 4) (W57 XXXA)    Surgical History   1  History of Needle Biopsy Of Prostate    Family History   Mother    1  No pertinent family history  Father    2  No pertinent family history    Social History    · Always uses seat belt   · Cigar smoker (305 1) (F17 290)   · Current tobacco use (305 1) (Z72 0)   · Feels safe at home   · Uses safety equipment    Current Meds    1  AmLODIPine Besylate 10 MG Oral Tablet; Therapy: 16Apr2015 to Recorded   2  Aspirin 81 MG TABS; TAKE 1 TABLET DAILY; Therapy: 49DNM2777 to (Evaluate:21Oct2012); Last Rx:21Sep2012 Ordered   3  Atorvastatin Calcium 80 MG Oral Tablet; Therapy: 49Ron5593 to Recorded   4  Fish Oil CAPS Recorded   5  Metoprolol Tartrate 50 MG Oral Tablet; TAKE 1 TABLET DAILY; Therapy: 68KRQ7744 to [id number])  Requested for: 21Sep2012; Last     Rx:21Sep2012 Ordered   6   Multi Vitamin Mens Oral Tablet; Therapy: (Recorded:61Skw2440) to Recorded   7  Nitrostat 0 4 MG Sublingual Tablet Sublingual; PLACE 1 TABLET UNDER THE TONGUE     EVERY 5 MINUTES UP TO 3 DOSES AS NEEDED FOR CHEST PAIN;     Therapy: 21Sep2012 to (Evaluate:16Oct2012)  Requested for: 21Sep2012; Last     Rx:21Sep2012 Ordered    Allergies   1  No Known Drug Allergies    Vitals   Vital Signs    Recorded: 50Anc7352 08:00AM   Heart Rate 62   Systolic 383   Diastolic 84   Height 5 ft 8 in   Weight 169 lb    BMI Calculated 25 7   BSA Calculated 1 9     Physical Exam        Constitutional      General appearance: No acute distress, well appearing and well nourished  Abdomen      Abdomen: Non-tender, no masses  Genitourinary      Digital rectal exam of prostate: Normal size, no masses  -- 60+ g prostate, smooth, no obvious nodule  Musculoskeletal      Gait and station: Normal        Results/Data   AUA Symptom Score 60Oqw7325 08:04AM User, Ahs      Test Name Result Flag Reference   AUA Symptom Score (for prostate disease) 13     Incomplete emptying: Not at all (0)     Frequency: Less than half the time (2)     Intermittency: More than half the time (4)     Urgency: Almost always (5)     Weak-stream: Not at all (0)     Straining: Not at all (0)     Nocturia: Less than half the time (2)   AUA Symptom Score (for prostate disease) - Quality of Life Due to Urinary Symptoms Mixed     AUA Symptom Score (for prostate disease) - Score Category Moderate          Future Appointments      Date/Time Provider Specialty Site   01/19/2018 01:40 PM FLORA Harris  Hematology Oncology [facility]   05/30/2018 08:45 AM Gigi Gray [facility] [address]   12/27/2018 08:30 AM FLORA Em   [facility]     Signatures    Electronically signed by : FLORA Trujillo ; Dec 21 2017 10:00AM EST                       (Author)

## 2018-01-12 VITALS
OXYGEN SATURATION: 98 % | HEART RATE: 53 BPM | DIASTOLIC BLOOD PRESSURE: 82 MMHG | SYSTOLIC BLOOD PRESSURE: 122 MMHG | WEIGHT: 154.5 LBS | BODY MASS INDEX: 23.42 KG/M2 | HEIGHT: 68 IN | RESPIRATION RATE: 16 BRPM | TEMPERATURE: 97 F

## 2018-01-12 NOTE — RESULT NOTES
Verified Results  (1) TISSUE EXAM [id number] 01:04PM Pineda Morillo     Test Name Result Flag Reference   LAB AP CASE REPORT (Report)     Surgical Pathology Report             Case: [id number]                   Authorizing Provider: Sharri Chery DO    Collected:      05/22/2017 1304        Ordering Location:   [address] [place] [address]   Received:      05/23/2017 [facility]                               Pathologist:      Jonathan Glaser MD                             Specimens:  A) - Polyp, Colorectal, descending colon polyp #1-cold bx                       B) - Polyp, Colorectal, descending colon polyp #2-cold bx   LAB AP FINAL DIAGNOSIS (Report)     A  Colon, descending polyp #1, biopsy:  - Tubular adenoma, negative for high-grade dysplasia  B  Colon, descending polyp #2, biopsy:  - Tubular adenoma, negative for high-grade dysplasia  Electronically signed by Jonathan Glaser MD on 5/25/2017 at 12:10 PM   LAB AP SURGICAL ADDITIONAL INFORMATION (Report)     These tests were developed and their performance characteristics   determined by [facility] or [facility]  They may not be cleared or approved by the [place]  Food and   Drug Administration  The FDA has determined that such clearance or   approval is not necessary  These tests are used for clinical purposes  They should not be regarded as investigational or for research  This   laboratory has been approved by [place] [id number], designated as a high-complexity   laboratory and is qualified to perform these tests  Interpretation performed at [facility], [address] [place] [address]   LAB AP GROSS DESCRIPTION (Report)     A  The specimen is received in formalin, labeled with the patient's name   and hospital number, and is designated descending colon polyp #1, are   two irregularly shaped fragments of tan-brown soft tissue each measuring   0 2 cm in greatest dimension   Entirely submitted  One cassette  B  The specimen is received in formalin, labeled with the patient's name   and hospital number, and is designated descending colon polyp #2, is a   tan and glistening polypoid structure measuring 0 7 cm in greatest   dimension  Entirely submitted  One cassette  Note: The estimated total formalin fixation time based upon information   provided by the submitting clinician and the standard processing schedule   is 37 25 hours        RLR   LAB AP CLINICAL INFORMATION Polyp x2     Polyp x2

## 2018-01-13 VITALS
OXYGEN SATURATION: 98 % | DIASTOLIC BLOOD PRESSURE: 80 MMHG | SYSTOLIC BLOOD PRESSURE: 108 MMHG | WEIGHT: 165.19 LBS | BODY MASS INDEX: 25.04 KG/M2 | TEMPERATURE: 97.3 F | HEART RATE: 55 BPM | HEIGHT: 68 IN | RESPIRATION RATE: 16 BRPM

## 2018-01-13 VITALS
WEIGHT: 168.44 LBS | SYSTOLIC BLOOD PRESSURE: 146 MMHG | OXYGEN SATURATION: 96 % | BODY MASS INDEX: 25.53 KG/M2 | HEIGHT: 68 IN | RESPIRATION RATE: 16 BRPM | HEART RATE: 62 BPM | TEMPERATURE: 97.2 F | DIASTOLIC BLOOD PRESSURE: 92 MMHG

## 2018-01-13 VITALS
DIASTOLIC BLOOD PRESSURE: 80 MMHG | WEIGHT: 166 LBS | SYSTOLIC BLOOD PRESSURE: 128 MMHG | HEART RATE: 58 BPM | BODY MASS INDEX: 25.24 KG/M2

## 2018-01-13 VITALS
DIASTOLIC BLOOD PRESSURE: 90 MMHG | HEART RATE: 56 BPM | BODY MASS INDEX: 25.65 KG/M2 | HEIGHT: 68 IN | SYSTOLIC BLOOD PRESSURE: 136 MMHG | WEIGHT: 169.25 LBS

## 2018-01-14 VITALS
RESPIRATION RATE: 18 BRPM | SYSTOLIC BLOOD PRESSURE: 130 MMHG | DIASTOLIC BLOOD PRESSURE: 80 MMHG | OXYGEN SATURATION: 99 % | HEART RATE: 55 BPM | BODY MASS INDEX: 27.09 KG/M2 | WEIGHT: 168.56 LBS | TEMPERATURE: 96.9 F | HEIGHT: 66 IN

## 2018-01-14 VITALS
RESPIRATION RATE: 18 BRPM | OXYGEN SATURATION: 99 % | WEIGHT: 172 LBS | DIASTOLIC BLOOD PRESSURE: 68 MMHG | SYSTOLIC BLOOD PRESSURE: 106 MMHG | HEIGHT: 68 IN | HEART RATE: 56 BPM | TEMPERATURE: 97.4 F | BODY MASS INDEX: 26.07 KG/M2

## 2018-01-14 VITALS
TEMPERATURE: 97.5 F | BODY MASS INDEX: 25.05 KG/M2 | HEART RATE: 55 BPM | HEIGHT: 68 IN | OXYGEN SATURATION: 97 % | SYSTOLIC BLOOD PRESSURE: 112 MMHG | WEIGHT: 165.25 LBS | DIASTOLIC BLOOD PRESSURE: 82 MMHG | RESPIRATION RATE: 20 BRPM

## 2018-01-14 VITALS
BODY MASS INDEX: 25.39 KG/M2 | DIASTOLIC BLOOD PRESSURE: 80 MMHG | HEART RATE: 58 BPM | WEIGHT: 167 LBS | SYSTOLIC BLOOD PRESSURE: 120 MMHG

## 2018-01-14 VITALS
DIASTOLIC BLOOD PRESSURE: 84 MMHG | BODY MASS INDEX: 25.65 KG/M2 | WEIGHT: 169.25 LBS | SYSTOLIC BLOOD PRESSURE: 122 MMHG | HEIGHT: 68 IN | HEART RATE: 60 BPM

## 2018-01-16 NOTE — MISCELLANEOUS
Dear Dr Dorita Miles,    The polyps I removed from Mr Haroon Simon colon were precancerous  Based on this, I recommend a repeat colonoscopy in 3 years        Sincerely,        Maury Kimball DO      Electronically signed by:Josseline Zhou DO  Jun 2 2017  3:00PM EST

## 2018-01-17 ENCOUNTER — HOSPITAL ENCOUNTER (OUTPATIENT)
Dept: INFUSION CENTER | Facility: CLINIC | Age: 73
Discharge: HOME | End: 2018-01-17
Payer: COMMERCIAL

## 2018-01-17 VITALS
DIASTOLIC BLOOD PRESSURE: 79 MMHG | TEMPERATURE: 97.3 F | HEART RATE: 57 BPM | RESPIRATION RATE: 20 BRPM | SYSTOLIC BLOOD PRESSURE: 114 MMHG

## 2018-01-17 PROCEDURE — 99195 PHLEBOTOMY: CPT

## 2018-01-17 NOTE — PROGRESS NOTES
Therap  Phlebotomy performed for 450 ml of blood via the right antecubital vein  Pt tolerated procedure without incident  Pt observed for 15 minutes post phlebotomy

## 2018-01-17 NOTE — PLAN OF CARE
Problem: Potential for Falls  Goal: Patient will remain free of falls  INTERVENTIONS:  - Assess patient frequently for physical needs  -  Identify cognitive and physical deficits and behaviors that affect risk of falls    -  [place] fall precautions as indicated by assessment   - Educate patient/family on patient safety including physical limitations  - Instruct patient to call for assistance with activity based on assessment  - Modify environment to reduce risk of injury  - Consider OT/PT consult to assist with strengthening/mobility   Outcome: Progressing

## 2018-01-19 ENCOUNTER — ALLSCRIPTS OFFICE VISIT (OUTPATIENT)
Dept: OTHER | Facility: OTHER | Age: 73
End: 2018-01-19

## 2018-01-22 VITALS
HEIGHT: 68 IN | WEIGHT: 169 LBS | BODY MASS INDEX: 25.61 KG/M2 | HEART RATE: 62 BPM | SYSTOLIC BLOOD PRESSURE: 140 MMHG | DIASTOLIC BLOOD PRESSURE: 84 MMHG

## 2018-01-22 VITALS
BODY MASS INDEX: 25.16 KG/M2 | OXYGEN SATURATION: 98 % | HEIGHT: 68 IN | DIASTOLIC BLOOD PRESSURE: 80 MMHG | WEIGHT: 166 LBS | SYSTOLIC BLOOD PRESSURE: 114 MMHG | TEMPERATURE: 97 F | HEART RATE: 57 BPM | RESPIRATION RATE: 18 BRPM

## 2018-01-22 NOTE — PROGRESS NOTES
Assessment   1  Polycythemia vera (238 4) (D45)   2  Leukocytosis (288 60) (D72 829)    Plan   Leukocytosis, Polycythemia vera    · (1) CBC/PLT/DIFF; Status:Active; Requested for:2018; Perform:[facility]; [id number]; For:Leukocytosis, Polycythemia vera; Ordered By:Ayo Valentin;   · (1) CBC/PLT/DIFF; Status:Active; Requested for:2018; Perform:[facility]; [id number]; For:Leukocytosis, Polycythemia vera; Ordered By:Ayo Valentin;   · (1) CBC/PLT/DIFF; Status:Active; Requested for:2018; Perform:[facility]; [id number]; For:Leukocytosis, Polycythemia vera; Ordered By:Ayo Valentin;   · (1) CBC/PLT/DIFF; Status:Active; Requested for:2018; Perform:[facility]; [id number];[id number]; For:Leukocytosis, Polycythemia vera; Ordered By:Ayo Valentin;   · (1) COMPREHENSIVE METABOLIC PANEL; Status:Active; Requested for:2018; Perform:[facility]; [id number]:[id number];VEDNRIZ; For:Leukocytosis, Polycythemia vera; Ordered By:Vishnu Valentin;   · (1) LD (LDH); Status:Active; Requested for:2018; Perform:[facility]; [id number]:[id number]; For:Leukocytosis, Polycythemia vera; Ordered By:Ayo Valentin;   · Follow-up visit in 4 Months Evaluation and Treatment  Follow-up  Status: Complete     Done: 24IBH7185 08:40AM   Ordered; For: Leukocytosis, Polycythemia vera; Ordered By: Jarrett Skelton Performed:  Due: 16CTA3652; Last Updated By: Ron Diez; 2018 3:19:28 PM    Discussion/Summary   Discussion Summary:    Joe Zepeda was noted to have erythrocytosis and mild leukocytosis on laboratory testing August 3, 2017 i e  hemoglobin 18 9 and hematocrit 55 7% and WBC 14 5  In retrospect he has had erythrocytosis and mild leukocytosis dating back to at least May 1, 2013 i e  hemoglobin 19 0 and hematocrit 58 1% and WBC 12 2   The diagnosis of polycythemia vera is confirmed based upon the DEBORAH-2 V617F mutation  Furthermore, there is lack of elevated erythropoietin level which is not consistent with a secondary erythrocytosis  Accordingly, therapeutic phlebotomy every 4 weeks was started on October 25, 2017, 4 sessions thus far, and is to be continued every 4 weeks with the goal of maintaining hematocrit less than 45% to reduce risk of stroke and other thrombotic events  The patient is in agreement with therapeutic phlebotomy  In addition based upon elevated WBC and age > 72 hydroxyurea is recommended  The purpose, means administration potential risks of hydroxyurea were reviewed  The patient prefers to avoid taking another medication at this time  patient is aware seek medical attention for persistent or progressive cough, headache, pain or swelling in the legs, chest pain, shortness of breath, excessive fatigue, or if other new problems arise  Otherwise, I plan to see him again in 4 months  Counseling Documentation With Imm: The patient was counseled regarding diagnostic results,-- instructions for management,-- impressions,-- risks and benefits of treatment options  total time of encounter was 25 minutes-- and-- 12 5 minutes was spent counseling  Chief Complaint   Chief Complaint Free Text Note Form: Perry Prince was seen in follow up today in regards to erythrocytosis  History of Present Illness   HPI: He has been feeling well  He has undergone therapeutic phlebotomy x4 most recently on January 17, 2018  He notes chronic cough with thin mucus production multiple times per day  He denies dyspnea with walking at a moderate pace and is able to walk up one flight of steps without difficulty  He has been taking aspirin 81 mg daily      Previous Therapy:    2 sessile polyps in the descending colon were removed by cold biopsy polypectomy on colonoscopy May 22, 2017, consistent with tubular adenoma    Interval History:  medical history: Coronary artery disease post 2 coronary stents  Hypertension  Hyperlipidemia  Prostate enlargement  Left groin abscess early in 2017    surgical history: None  history: Mother  at age 80 with dementia  Father  at 68 with cancer  2 sisters  with cancer  4 other sisters are in good health  His brother has undergone open-heart surgery and cardiac stenting  He has 2 daughters one has thyroid disorder he other is in good health  history: He is  and lives with his wife  He has smoked one half pack per day of cigarettes for 55 years  He is not an alcohol drinker  He retired 73 as a   Review of Systems   Complete-Male:      Constitutional: He has been feeling well  No chills or sweats  Eyes: no eyesight problems  ENT: no nosebleeds-- and-- no hearing loss  Cardiovascular: no chest pain-- and-- no extremity edema  Respiratory: He has chronic cough with thin mucus production  , but-- no shortness of breath during exertion  Gastrointestinal: no abdominal pain-- and-- no constipation  Musculoskeletal: He has chronic posterior neck pain  He denies back pain or joint pain otherwise  Neurological: no headache-- and-- no difficulty walking  Hematologic/Lymphatic: no tendency for easy bruising  Active Problems   1  Abscess of left groin (682 2) (L02 214)   2  Benign essential hypertension (401 1) (I10)   3  Benign paroxysmal positional vertigo (386 11) (H81 10)   4  Blood chemistry abnormality (790 6) (R79 9)   5  Cervical radiculopathy (723 4) (M54 12)   6  Chronic cough (786 2) (R05)   7  Coronary artery disease (414 00) (I25 10)   8  Current tobacco use (305 1) (Z72 0)   9  Eczema (692 9) (L30 9)   10  Elevated hemoglobin (282 7) (D58 2)   11  Elevated prostate specific antigen (PSA) (790 93) (R97 20)   12  Erythrocytosis (289 0) (D75 1)   13  Hyperlipidemia (272 4) (E78 5)   14  Insomnia (780 52) (G47 00)   15  Leukocytosis (288 60) (D72 829)   16   Loose bowel movements (787 91) (R19 7)   17  Myelodysplastic syndrome (238 75) (D46 9)   18  History of Pain in joint of left shoulder (719 41) (M25 512)   19  Polycythemia vera (238 4) (D45)   20  Prediabetes (790 29) (R73 03)   21  Prostate cancer screening (V76 44) (Z12 5)    Past Medical History   1  Benign essential hypertension (401 1) (I10)   2  Benign paroxysmal positional vertigo (386 11) (H81 10)   3  Cervical radiculopathy (723 4) (M54 12)   4  Coronary artery disease (414 00) (I25 10)   5  Eczema (692 9) (L30 9)   6  Elevated prostate specific antigen (PSA) (790 93) (R97 20)   7  Hyperlipidemia (272 4) (E78 5)   8  History of Nasal congestion (478 19) (R09 81)   9  History of Need for pneumococcal vaccination (V03 82) (Z23)   10  No pertinent past medical history   11  History of Pain in joint of left shoulder (719 41) (M25 512)   12  History of Post-nasal drip (784 91) (R09 82)   13  History of Prostate cancer screening (V76 44) (Z12 5)   14  History of Screening for colon cancer (V76 51) (Z12 11)   15  History of Screening for prostate cancer (V76 44) (Z12 5)   16  History of Tick bite, infected (919 5,E906 4) (W57 XXXA)    Surgical History   1  History of Needle Biopsy Of Prostate    Family History   Mother    1  No pertinent family history  Father    2  No pertinent family history    Social History    · Always uses seat belt   · Cigar smoker (305 1) (F17 290)   · Current tobacco use (305 1) (Z72 0)   · Feels safe at home   · Uses safety equipment    Current Meds    1  AmLODIPine Besylate 10 MG Oral Tablet; Therapy: 43Awy1093 to Recorded   2  Aspirin 81 MG TABS; TAKE 1 TABLET DAILY; Therapy: 69IKF7202 to (Evaluate:21Oct2012); Last Rx:33Vqp1883 Ordered   3  Atorvastatin Calcium 80 MG Oral Tablet; Therapy: 16Hvp8073 to Recorded   4  Fish Oil CAPS Recorded   5  Metoprolol Tartrate 50 MG Oral Tablet; TAKE 1 TABLET DAILY;      Therapy: 92XTU5796 to 30-17-42-66)  Requested for: 06UGD1036; Matthew Cedeno Ordered   6  Multi Vitamin Mens Oral Tablet; Therapy: (Recorded:26Jun2014) to Recorded   7  Nitrostat 0 4 MG Sublingual Tablet Sublingual; PLACE 1 TABLET UNDER THE TONGUE     EVERY 5 MINUTES UP TO 3 DOSES AS NEEDED FOR CHEST PAIN;     Therapy: 21Sep2012 to (Evaluate:16Oct2012)  Requested for: 21Sep2012; Last     Rx:21Sep2012 Ordered    Allergies   1  No Known Drug Allergies    Physical Exam        Constitutional He appears well  Eyes EOMI  Ears, Nose, Mouth, and Throat Oropharynx is clear  Pulmonary      Auscultation of lungs: Clear to auscultation, equal breath sounds bilaterally, no wheezes, no rales, no rhonci  Cardiovascular Heart has regular rate and rhythm  -- No lower extremity edema  Abdomen      Abdomen: Non-tender, no masses  Liver and spleen: No hepatomegaly or splenomegaly  Lymphatic      Palpation of lymph nodes in neck: No lymphadenopathy  -- No axillary or inguinal lymphadenopathy  Musculoskeletal      Gait and station: Normal        Skin No ecchymoses  Neurologic Neurological is grossly intact  Psychiatric      Orientation to person, place and time: Normal        Mood and affect: Normal            ECOG 0       Results/Data   Results Form:    Results      Radiology CT scan of the abdomen and pelvis with contrast from August 10, 2017: There is a subcentimeter hypodense nodule in the dome of the right hepatic lobe too small to characterize, spleen and pancreas are unremarkable, there is bilateral thickening of the adrenal limbs without suspicious mass, there is exophytic left kidney lateral cortical 11 mm cyst, nonspecific hazy density within the root of the mesentery without adenopathy, subcutaneous inflammatory changes along the left base of the penis and inguinal crease, markedly enlarged prostate gland  of the chest, high-resolution from October 5, 2017: There is no dominant pulmonary nodule or mass, mediastinum and hilar are unremarkable  Lab Results From June 7, 2017: PSA is 10 4  September 22, 2017: JAK2 V617F mutation is detected, uric acid 3 4, erythropoietin is 18 0 (2 6-18 5)  December 13, 2017: PSA is 10 4 January 10, 2018: Creatinine is 1 28, alk phos 152 (), AST 24, ALT 25,  (120-250), WBC 15 7, hemoglobin 18 1, hematocrit 52 4 %, platelets 616  Health Management   History of Need for pneumococcal vaccination   Pneumo (Pneumovax); every 10 years; Last Approx 26WAA7577; Next Due: ; Active  History of Screening for colon cancer   COLONOSCOPY (GI, SURG); every 3 years; Last 03VQF7982; Next Due: 93FYR5816; Active  History of Screening for prostate cancer   (1) PSA (SCREEN) (Dx V76 44 Screen for Prostate Cancer); every 1 year; Last 49VVA5693; Next    Due: 17JMN2407; Active  Health Maintenance   Medicare Annual Wellness Visit; every 1 year; Next Due: 67RDD1433; Overdue    Future Appointments      Date/Time Provider Specialty Site   05/30/2018 08:45 AM Madalyn Franz   12/27/2018 08:30 AM FLORA Wesley   U[facility]  [facility]     Signatures    Electronically signed by : FLORA Church ; Jan 21 2018 10:37AM EST                       (Author)

## 2018-02-07 DIAGNOSIS — D45 POLYCYTHEMIA VERA (HCC): ICD-10-CM

## 2018-02-07 DIAGNOSIS — D72.829 ELEVATED WHITE BLOOD CELL COUNT: ICD-10-CM

## 2018-02-09 LAB
BASOPHILS # BLD AUTO: 81 CELLS/UL (ref 0–200)
BASOPHILS NFR BLD AUTO: 0.5 %
EOSINOPHIL # BLD AUTO: 1085 CELLS/UL (ref 15–500)
EOSINOPHIL NFR BLD AUTO: 6.7 %
ERYTHROCYTE [DISTWIDTH] IN BLOOD BY AUTOMATED COUNT: 13.8 % (ref 11–15)
HCT VFR BLD AUTO: 54.2 % (ref 38.5–50)
HGB BLD-MCNC: 18.9 G/DL (ref 13.2–17.1)
LYMPHOCYTES # BLD AUTO: 2219 CELLS/UL (ref 850–3900)
LYMPHOCYTES NFR BLD AUTO: 13.7 %
MCH RBC QN AUTO: 32.7 PG (ref 27–33)
MCHC RBC AUTO-ENTMCNC: 34.9 G/DL (ref 32–36)
MCV RBC AUTO: 93.8 FL (ref 80–100)
MONOCYTES # BLD AUTO: 1118 CELLS/UL (ref 200–950)
MONOCYTES NFR BLD AUTO: 6.9 %
NEUTROPHILS # BLD AUTO: ABNORMAL CELLS/UL (ref 1500–7800)
NEUTROPHILS NFR BLD AUTO: 72.2 %
PLATELET # BLD AUTO: 226 THOUSAND/UL (ref 140–400)
PMV BLD REES-ECKER: 10.7 FL (ref 7.5–12.5)
RBC # BLD AUTO: 5.78 MILLION/UL (ref 4.2–5.8)
WBC # BLD AUTO: 16.2 THOUSAND/UL (ref 3.8–10.8)

## 2018-02-14 ENCOUNTER — HOSPITAL ENCOUNTER (OUTPATIENT)
Dept: INFUSION CENTER | Facility: CLINIC | Age: 73
Discharge: HOME | End: 2018-02-14
Payer: COMMERCIAL

## 2018-02-14 VITALS
DIASTOLIC BLOOD PRESSURE: 83 MMHG | SYSTOLIC BLOOD PRESSURE: 119 MMHG | HEART RATE: 56 BPM | RESPIRATION RATE: 18 BRPM | TEMPERATURE: 96.8 F

## 2018-02-14 PROCEDURE — 99195 PHLEBOTOMY: CPT

## 2018-02-14 NOTE — PROGRESS NOTES
Pt arrived to infusion center for therapeutic phlebotomy  Hematocrit 54 2% on 2/8/18  Blood work parameters checked with Loi Irene RN  500mL blood taken from right [facility] without complications  VSS pre and post-procedure  Pt given water to drink pre and post procedure per order  Pt discharged in stable condition and is aware of next appointment  Reminded pt to have blood work drawn prior to next appointment  AVS provided

## 2018-02-22 ENCOUNTER — OFFICE VISIT (OUTPATIENT)
Dept: FAMILY MEDICINE CLINIC | Facility: CLINIC | Age: 73
End: 2018-02-22
Payer: COMMERCIAL

## 2018-02-22 VITALS
HEIGHT: 68 IN | RESPIRATION RATE: 16 BRPM | OXYGEN SATURATION: 98 % | BODY MASS INDEX: 25.46 KG/M2 | SYSTOLIC BLOOD PRESSURE: 102 MMHG | DIASTOLIC BLOOD PRESSURE: 62 MMHG | TEMPERATURE: 100.3 F | HEART RATE: 61 BPM | WEIGHT: 168 LBS

## 2018-02-22 DIAGNOSIS — K29.70 GASTRITIS WITHOUT BLEEDING, UNSPECIFIED CHRONICITY, UNSPECIFIED GASTRITIS TYPE: Primary | ICD-10-CM

## 2018-02-22 PROBLEM — D45 POLYCYTHEMIA VERA (HCC): Status: ACTIVE | Noted: 2017-10-20

## 2018-02-22 PROBLEM — R73.03 PREDIABETES: Status: ACTIVE | Noted: 2017-08-24

## 2018-02-22 PROCEDURE — 99213 OFFICE O/P EST LOW 20 MIN: CPT | Performed by: FAMILY MEDICINE

## 2018-02-22 PROCEDURE — 96372 THER/PROPH/DIAG INJ SC/IM: CPT | Performed by: FAMILY MEDICINE

## 2018-02-22 RX ORDER — PROMETHAZINE HYDROCHLORIDE 50 MG/ML
50 INJECTION, SOLUTION INTRAMUSCULAR; INTRAVENOUS ONCE
Status: COMPLETED | OUTPATIENT
Start: 2018-02-22 | End: 2018-02-22

## 2018-02-22 RX ADMIN — PROMETHAZINE HYDROCHLORIDE 50 MG: 50 INJECTION, SOLUTION INTRAMUSCULAR; INTRAVENOUS at 14:26

## 2018-02-22 NOTE — ASSESSMENT & PLAN NOTE
Samples given of Nexium 40 mg x 5 days  Will give promethazine 50 mg IM today    Patient instructed to drink plenty of fluids and call if symptoms do not continue to improve

## 2018-02-22 NOTE — PROGRESS NOTES
Assessment/Plan:    Gastritis  Samples given of Nexium 40 mg x 5 days  Will give promethazine 50 mg IM today  Patient instructed to drink plenty of fluids and call if symptoms do not continue to improve       Diagnoses and all orders for this visit:    Gastritis without bleeding, unspecified chronicity, unspecified gastritis type  -     promethazine (PHENERGAN) injection 50 mg; Inject 1 mL (50 mg total) into the shoulder, thigh, or buttocks once           Subjective:      Patient ID: Izzy Jacobsen is a 71 y o  male  5 day hx of headache, upset stomach, cough (occ  Prod)  Nasal congestion  tmax 100 3 pt did not get a flu shot this season  Pt is a smoker  The following portions of the patient's history were reviewed and updated as appropriate: allergies, current medications, past family history, past medical history, past social history, past surgical history and problem list     Review of Systems   Constitutional: Negative for chills and fever  HENT: Positive for congestion and postnasal drip  Respiratory: Positive for cough  Negative for shortness of breath  Cardiovascular: Negative  Gastrointestinal: Positive for nausea  Objective:      /62 (BP Location: Right arm, Patient Position: Sitting, Cuff Size: Standard)   Pulse 61   Temp 100 3 °F (37 9 °C) (Tympanic)   Resp 16   Ht 5' 7 91" (1 725 m)   Wt 76 2 kg (168 lb)   SpO2 98%   BMI 25 61 kg/m²          Physical Exam   Constitutional: He appears well-developed and well-nourished  HENT:   Turbinates inflamed   Neck: Normal range of motion  Neck supple  Pulmonary/Chest: Effort normal and breath sounds normal    Abdominal: Soft   Bowel sounds are normal

## 2018-03-06 ENCOUNTER — OFFICE VISIT (OUTPATIENT)
Dept: FAMILY MEDICINE CLINIC | Facility: CLINIC | Age: 73
End: 2018-03-06
Payer: COMMERCIAL

## 2018-03-06 VITALS
WEIGHT: 167.9 LBS | HEIGHT: 68 IN | SYSTOLIC BLOOD PRESSURE: 116 MMHG | BODY MASS INDEX: 25.45 KG/M2 | RESPIRATION RATE: 17 BRPM | HEART RATE: 69 BPM | TEMPERATURE: 98.6 F | DIASTOLIC BLOOD PRESSURE: 68 MMHG | OXYGEN SATURATION: 97 %

## 2018-03-06 DIAGNOSIS — J20.9 ACUTE BRONCHITIS, UNSPECIFIED ORGANISM: Primary | ICD-10-CM

## 2018-03-06 PROCEDURE — 99214 OFFICE O/P EST MOD 30 MIN: CPT | Performed by: FAMILY MEDICINE

## 2018-03-06 RX ORDER — LEVOFLOXACIN 500 MG/1
500 TABLET, FILM COATED ORAL EVERY 24 HOURS
Qty: 7 TABLET | Refills: 0 | Status: SHIPPED | OUTPATIENT
Start: 2018-03-06 | End: 2018-03-13

## 2018-03-06 NOTE — PROGRESS NOTES
Assessment/Plan:   1  Acute bronchitis, unspecified organism  Start supportive care  Maintain hydration  Take over-the-counter Mucinex for symptom relief  He was given a sample of Breo to use for the next 2 weeks  Start treatment with Levaquin 5 milligrams daily for 7 days  He was instructed on the importance of quitting his tobacco use  He states that he will continue to try  - levofloxacin (LEVAQUIN) 500 mg tablet; Take 1 tablet (500 mg total) by mouth every 24 hours for 7 days  Dispense: 7 tablet; Refill: 0     Diagnoses and all orders for this visit:    Acute bronchitis, unspecified organism  -     levofloxacin (LEVAQUIN) 500 mg tablet; Take 1 tablet (500 mg total) by mouth every 24 hours for 7 days          Subjective:  Chief Complaint   Patient presents with    Follow-up     2wk f/u sinus sxs      Patient ID: Sheridan Cain is a 71 y o  male  URI    This is a new problem  The current episode started 1 to 4 weeks ago  The problem has been unchanged  There has been no fever  Associated symptoms include congestion, coughing, rhinorrhea, sinus pain and wheezing  Pertinent negatives include no headaches, joint pain or sore throat  He has tried decongestant for the symptoms  The treatment provided no relief  Review of Systems   HENT: Positive for congestion, rhinorrhea and sinus pain  Negative for sore throat  Respiratory: Positive for cough and wheezing  Musculoskeletal: Negative for joint pain  Neurological: Negative for headaches  The following portions of the patient's history were reviewed and updated as appropriate : past family history, past medical history, past social history and past surgical history    Objective:  Vitals:    03/06/18 1345   BP: 116/68   BP Location: Left arm   Patient Position: Sitting   Cuff Size: Large   Pulse: 69   Resp: 17   Temp: 98 6 °F (37 °C)   TempSrc: Tympanic   SpO2: 97%   Weight: 76 2 kg (167 lb 14 4 oz)   Height: 5' 8" (1 727 m)      Physical Exam Constitutional: He is oriented to person, place, and time  He appears well-developed and well-nourished  HENT:   Head: Normocephalic and atraumatic  Nose: Nose normal    Mouth/Throat: No oropharyngeal exudate  Eyes: Pupils are equal, round, and reactive to light  Right eye exhibits no discharge  Left eye exhibits no discharge  Neck: Normal range of motion  Neck supple  No tracheal deviation present  Cardiovascular: Normal rate, regular rhythm and intact distal pulses  Exam reveals no gallop and no friction rub  No murmur heard  Pulses:       Dorsalis pedis pulses are 2+ on the right side, and 2+ on the left side  Posterior tibial pulses are 2+ on the right side, and 2+ on the left side  Pulmonary/Chest: Effort normal and breath sounds normal  No respiratory distress  He has no wheezes  He has no rales  Abdominal: Soft  Bowel sounds are normal  He exhibits no distension  There is no tenderness  There is no rebound and no guarding  Musculoskeletal: Normal range of motion  He exhibits no edema  Lymphadenopathy:        Head (right side): No submental and no submandibular adenopathy present  Head (left side): No submental and no submandibular adenopathy present  He has no cervical adenopathy  Right cervical: No superficial cervical, no deep cervical and no posterior cervical adenopathy present  Left cervical: No superficial cervical, no deep cervical and no posterior cervical adenopathy present  Neurological: He is alert and oriented to person, place, and time  No cranial nerve deficit or sensory deficit  Skin: Skin is warm, dry and intact  Psychiatric: His speech is normal and behavior is normal  Judgment normal  His mood appears not anxious  Cognition and memory are normal  He does not exhibit a depressed mood  Vitals reviewed

## 2018-03-14 ENCOUNTER — HOSPITAL ENCOUNTER (OUTPATIENT)
Dept: INFUSION CENTER | Facility: CLINIC | Age: 73
Discharge: HOME | End: 2018-03-14
Payer: COMMERCIAL

## 2018-03-14 VITALS
SYSTOLIC BLOOD PRESSURE: 125 MMHG | HEART RATE: 54 BPM | DIASTOLIC BLOOD PRESSURE: 85 MMHG | RESPIRATION RATE: 20 BRPM | TEMPERATURE: 97.6 F

## 2018-03-14 PROCEDURE — 99195 PHLEBOTOMY: CPT

## 2018-03-14 NOTE — PROGRESS NOTES
Therapuetic phlebotomy performed via right antecubital vein without incident  450 ml removed  Pt tolerated procedure and will be observed for 10-15 minutes post phlebotomy  Pt was given water to drink and presently denies complaints  Hematocrit = 48 5 which was double checked by Carmen Nathan RN  Pt to return in 4 weeks    Pt provided with AVS

## 2018-03-14 NOTE — PLAN OF CARE
Problem: Potential for Falls  Goal: Patient will remain free of falls  INTERVENTIONS:  - Assess patient frequently for physical needs  -  Identify cognitive and physical deficits and behaviors that affect risk of falls    -  [place] fall precautions as indicated by assessment   - Educate patient/family on patient safety including physical limitations  - Instruct patient to call for assistance with activity based on assessment  - Modify environment to reduce risk of injury  - Consider OT/PT consult to assist with strengthening/mobility   Outcome: Progressing

## 2018-04-04 DIAGNOSIS — D72.829 ELEVATED WHITE BLOOD CELL COUNT: ICD-10-CM

## 2018-04-04 DIAGNOSIS — D45 POLYCYTHEMIA VERA (HCC): ICD-10-CM

## 2018-04-05 LAB
BASOPHILS # BLD AUTO: 60 CELLS/UL (ref 0–200)
BASOPHILS NFR BLD AUTO: 0.5 %
EOSINOPHIL # BLD AUTO: 1080 CELLS/UL (ref 15–500)
EOSINOPHIL NFR BLD AUTO: 9 %
ERYTHROCYTE [DISTWIDTH] IN BLOOD BY AUTOMATED COUNT: 13.8 % (ref 11–15)
HCT VFR BLD AUTO: 48.7 % (ref 38.5–50)
HGB BLD-MCNC: 16.1 G/DL (ref 13.2–17.1)
LYMPHOCYTES # BLD AUTO: 2328 CELLS/UL (ref 850–3900)
LYMPHOCYTES NFR BLD AUTO: 19.4 %
MCH RBC QN AUTO: 29.2 PG (ref 27–33)
MCHC RBC AUTO-ENTMCNC: 33.1 G/DL (ref 32–36)
MCV RBC AUTO: 88.2 FL (ref 80–100)
MONOCYTES # BLD AUTO: 792 CELLS/UL (ref 200–950)
MONOCYTES NFR BLD AUTO: 6.6 %
NEUTROPHILS # BLD AUTO: 7740 CELLS/UL (ref 1500–7800)
NEUTROPHILS NFR BLD AUTO: 64.5 %
PLATELET # BLD AUTO: 261 THOUSAND/UL (ref 140–400)
PMV BLD REES-ECKER: 10.9 FL (ref 7.5–12.5)
RBC # BLD AUTO: 5.52 MILLION/UL (ref 4.2–5.8)
WBC # BLD AUTO: 12 THOUSAND/UL (ref 3.8–10.8)

## 2018-04-11 ENCOUNTER — HOSPITAL ENCOUNTER (OUTPATIENT)
Dept: INFUSION CENTER | Facility: CLINIC | Age: 73
Discharge: HOME | End: 2018-04-11
Payer: COMMERCIAL

## 2018-04-11 VITALS
SYSTOLIC BLOOD PRESSURE: 137 MMHG | HEART RATE: 52 BPM | TEMPERATURE: 97.1 F | RESPIRATION RATE: 20 BRPM | DIASTOLIC BLOOD PRESSURE: 81 MMHG

## 2018-04-11 PROCEDURE — 99195 PHLEBOTOMY: CPT

## 2018-04-11 NOTE — PROGRESS NOTES
Therapeutic phlebotomy was performed via the right antecubital vein for 450 ml without difficulty  Hematocrit = 48 7  Parameters double checked with Reba Bentley RN  Pt was given water pre and post and vital signs remained stable  AVS was provided to pt

## 2018-05-01 LAB — LDH SERPL-CCNC: 170 U/L (ref 120–250)

## 2018-05-02 LAB
ALBUMIN SERPL-MCNC: 4.2 G/DL (ref 3.6–5.1)
ALBUMIN/GLOB SERPL: 1.5 (CALC) (ref 1–2.5)
ALP SERPL-CCNC: 149 U/L (ref 40–115)
ALT SERPL-CCNC: 23 U/L (ref 9–46)
AST SERPL-CCNC: 23 U/L (ref 10–35)
BILIRUB SERPL-MCNC: 0.7 MG/DL (ref 0.2–1.2)
BUN SERPL-MCNC: 19 MG/DL (ref 7–25)
BUN/CREAT SERPL: 15 (CALC) (ref 6–22)
CALCIUM SERPL-MCNC: 8.9 MG/DL (ref 8.6–10.3)
CHLORIDE SERPL-SCNC: 108 MMOL/L (ref 98–110)
CHOLEST SERPL-MCNC: 115 MG/DL
CHOLEST/HDLC SERPL: 4.6 (CALC)
CO2 SERPL-SCNC: 26 MMOL/L (ref 20–31)
CREAT SERPL-MCNC: 1.26 MG/DL (ref 0.7–1.18)
GLOBULIN SER CALC-MCNC: 2.8 G/DL (CALC) (ref 1.9–3.7)
GLUCOSE SERPL-MCNC: 93 MG/DL (ref 65–99)
HBA1C MFR BLD: 5 % OF TOTAL HGB
HDLC SERPL-MCNC: 25 MG/DL
LDLC SERPL CALC-MCNC: 73 MG/DL (CALC)
NONHDLC SERPL-MCNC: 90 MG/DL (CALC)
POTASSIUM SERPL-SCNC: 4 MMOL/L (ref 3.5–5.3)
PROT SERPL-MCNC: 7 G/DL (ref 6.1–8.1)
SL AMB EGFR AFRICAN AMERICAN: 66 ML/MIN/1.73M2
SL AMB EGFR NON AFRICAN AMERICAN: 57 ML/MIN/1.73M2
SODIUM SERPL-SCNC: 141 MMOL/L (ref 135–146)
TRIGL SERPL-MCNC: 86 MG/DL
TSH SERPL-ACNC: 1.41 MIU/L (ref 0.4–4.5)

## 2018-05-07 DIAGNOSIS — D45 POLYCYTHEMIA VERA (HCC): ICD-10-CM

## 2018-05-07 DIAGNOSIS — D75.1 POLYCYTHEMIA: Primary | ICD-10-CM

## 2018-05-09 ENCOUNTER — HOSPITAL ENCOUNTER (OUTPATIENT)
Dept: INFUSION CENTER | Facility: CLINIC | Age: 73
Discharge: HOME | End: 2018-05-09
Payer: COMMERCIAL

## 2018-05-09 ENCOUNTER — APPOINTMENT (OUTPATIENT)
Dept: LAB | Facility: CLINIC | Age: 73
End: 2018-05-09
Payer: COMMERCIAL

## 2018-05-09 VITALS
RESPIRATION RATE: 18 BRPM | TEMPERATURE: 98 F | DIASTOLIC BLOOD PRESSURE: 69 MMHG | HEART RATE: 51 BPM | SYSTOLIC BLOOD PRESSURE: 129 MMHG

## 2018-05-09 DIAGNOSIS — D45 POLYCYTHEMIA VERA (HCC): ICD-10-CM

## 2018-05-09 DIAGNOSIS — D72.829 ELEVATED WHITE BLOOD CELL COUNT: ICD-10-CM

## 2018-05-09 LAB
ERYTHROCYTE [DISTWIDTH] IN BLOOD BY AUTOMATED COUNT: 16.1 % (ref 11.6–15.1)
GRANULOCYTES NFR BLD AUTO: 78.2 % (ref 47–80)
GRANULOCYTES NFR BLD: 10.1 THOUSAND/ΜL (ref 1.85–7.82)
HCT VFR BLD AUTO: 50 % (ref 36.5–49.3)
HGB BLD-MCNC: 15.6 G/DL (ref 12–17)
LYMPHOCYTES # BLD AUTO: 2.3 THOUSANDS/ΜL (ref 0.6–4.47)
LYMPHOCYTES NFR BLD AUTO: 18 % (ref 14–44)
MCH RBC QN AUTO: 26.7 PG (ref 26.8–34.3)
MCHC RBC AUTO-ENTMCNC: 31.2 G/DL (ref 31.4–37.4)
MCV RBC AUTO: 86 FL (ref 82–98)
MONOCYTES # BLD AUTO: 0.5 THOUSAND/ΜL (ref 0.17–1.22)
MONOCYTES NFR BLD AUTO: 4 % (ref 4–12)
PLATELET # BLD AUTO: 240 THOUSANDS/UL (ref 149–390)
PMV BLD AUTO: 8.4 FL (ref 8.9–12.7)
RBC # BLD AUTO: 5.83 MILLION/UL (ref 3.88–5.62)
WBC # BLD AUTO: 12.9 THOUSAND/UL (ref 4.31–10.16)
WBC NRBC COR # BLD: 12.9 THOUSAND/UL (ref 4.31–10.16)

## 2018-05-09 PROCEDURE — 85025 COMPLETE CBC W/AUTO DIFF WBC: CPT

## 2018-05-09 PROCEDURE — 36415 COLL VENOUS BLD VENIPUNCTURE: CPT

## 2018-05-09 PROCEDURE — 99195 PHLEBOTOMY: CPT

## 2018-05-09 NOTE — PLAN OF CARE
Problem: Potential for Falls  Goal: Patient will remain free of falls  INTERVENTIONS:  - Assess patient frequently for physical needs  -  Identify cognitive and physical deficits and behaviors that affect risk of falls    -  [place] fall precautions as indicated by assessment   - Educate patient/family on patient safety including physical limitations  - Instruct patient to call for assistance with activity based on assessment  - Modify environment to reduce risk of injury  - Consider OT/PT consult to assist with strengthening/mobility   Outcome: Progressing

## 2018-05-09 NOTE — PROGRESS NOTES
Therapeutic phlebotomy was performed via the right antecubital vein for 400 ml without difficulty  Hematocrit =50 0  Parameters double checked with Jaison Del Valle RN  Pt was given water pre and post and vital signs remained stable  AVS was provided to pt

## 2018-05-18 ENCOUNTER — OFFICE VISIT (OUTPATIENT)
Dept: HEMATOLOGY ONCOLOGY | Facility: CLINIC | Age: 73
End: 2018-05-18
Payer: COMMERCIAL

## 2018-05-18 VITALS
TEMPERATURE: 97.4 F | OXYGEN SATURATION: 99 % | BODY MASS INDEX: 25.91 KG/M2 | HEART RATE: 51 BPM | RESPIRATION RATE: 16 BRPM | WEIGHT: 171 LBS | HEIGHT: 68 IN | SYSTOLIC BLOOD PRESSURE: 110 MMHG | DIASTOLIC BLOOD PRESSURE: 70 MMHG

## 2018-05-18 DIAGNOSIS — D45 POLYCYTHEMIA VERA (HCC): Primary | ICD-10-CM

## 2018-05-18 PROCEDURE — 99214 OFFICE O/P EST MOD 30 MIN: CPT | Performed by: INTERNAL MEDICINE

## 2018-05-18 NOTE — PATIENT INSTRUCTIONS
The patient is aware seek medical attention for persistent or progressive cough, headache, pain or swelling in the legs, chest pain, shortness of breath, back pain, excessive fatigue, or if other new problems arise

## 2018-05-18 NOTE — PROGRESS NOTES
May 18, 2018    Kim Hernandez     He has been feeling well  He has undergone therapeutic phlebotomy x8 most recently on May 9, 2018  He notes chronic cough with thin mucus production multiple times per day  He denies dyspnea with walking at a moderate pace and is able to walk up one flight of steps without difficulty  He has been taking aspirin 81 mg daily  ROS:  He denies chills or sweats  His appetite has been good  No abdominal pain  Bowel habits have been formed and regular  He has chronic posterior neck pain  He has had new onset of low back pain this past month after splitting wood  He denies headache  He denies easy bruising  Hematology/Oncology History:    1  Polycythemia vera with DEBORAH-2 V617F mutation and mild leukocytosis  At presentation on  August 3, 2017 hemoglobin 18 9 and hematocrit 55 7%  (In retrospect he has had erythrocytosis and mild leukocytosis dating back to at least May 1, 2013 i e  hemoglobin 19 0 and hematocrit 58 1% and WBC 12 2) Therapeutic phlebotomy initiated October 25, 2017, 8 units thus far, most recently on May 9, 2018    2  Two sessile polyps in the descending colon were removed by cold biopsy polypectomy on colonoscopy May 22, 2017, consistent with tubular adenoma      Physical Examination:    Blood pressure 110/70, pulse (!) 51, temperature (!) 97 4 °F (36 3 °C), resp  rate 16, height 5' 8" (1 727 m), weight 77 6 kg (171 lb), SpO2 99 %  Body surface area is 1 91 meters squared  He appears well  EOMI  Oropharynx clear  No lymphadenopathy of the neck or axilla  Lungs are clear bilaterally  Heart has regular rate and rhythm  No hepatic or splenic enlargement  No inguinal lymphadenopathy  No lower extremity edema  Gait and station are normal  Neurological is grossly intact  Oriented x3, normal mood and affect  ECOG 0    Laboratory:    From May 1, 2018:  TSH 1 41, creatinine 1 26, bili 0 7, alk-phos 149, AST 23, ALT 23,   From May 9, 2018:   WBC 12 90, hemoglobin 15 6, hematocrit 50 0 percent, platelets 232  Assessment/Plan:    Erythrocytosis continues to be suboptimally controlled  Therapeutic phlebotomy every 4 weeks is to be continued with the goal of maintaining hematocrit less than 45% to reduce risk of stroke and other thrombotic events  The patient is in agreement with therapeutic phlebotomy  In addition based upon elevated WBC and age > 72 hydroxyurea is recommended  The purpose, means administration potential risks of hydroxyurea were reviewed  The patient prefers to avoid taking another medication at this time  The patient is aware seek medical attention for persistent or progressive cough, headache, pain or swelling in the legs, chest pain, shortness of breath, back pain, excessive fatigue, or if other new problems arise Otherwise, I plan to see him again in 6 months

## 2018-05-30 ENCOUNTER — OFFICE VISIT (OUTPATIENT)
Dept: FAMILY MEDICINE CLINIC | Facility: CLINIC | Age: 73
End: 2018-05-30
Payer: COMMERCIAL

## 2018-05-30 VITALS
OXYGEN SATURATION: 97 % | TEMPERATURE: 97.2 F | HEIGHT: 68 IN | RESPIRATION RATE: 17 BRPM | WEIGHT: 170.5 LBS | HEART RATE: 60 BPM | DIASTOLIC BLOOD PRESSURE: 70 MMHG | SYSTOLIC BLOOD PRESSURE: 110 MMHG | BODY MASS INDEX: 25.84 KG/M2

## 2018-05-30 DIAGNOSIS — G89.29 CHRONIC LEFT SHOULDER PAIN: ICD-10-CM

## 2018-05-30 DIAGNOSIS — I25.10 CORONARY ARTERY DISEASE WITHOUT ANGINA PECTORIS, UNSPECIFIED VESSEL OR LESION TYPE, UNSPECIFIED WHETHER NATIVE OR TRANSPLANTED HEART: ICD-10-CM

## 2018-05-30 DIAGNOSIS — M25.512 CHRONIC LEFT SHOULDER PAIN: ICD-10-CM

## 2018-05-30 DIAGNOSIS — Z72.0 TOBACCO ABUSE: ICD-10-CM

## 2018-05-30 DIAGNOSIS — D45 POLYCYTHEMIA VERA (HCC): ICD-10-CM

## 2018-05-30 DIAGNOSIS — R73.03 PREDIABETES: ICD-10-CM

## 2018-05-30 DIAGNOSIS — E78.2 MIXED HYPERLIPIDEMIA: ICD-10-CM

## 2018-05-30 DIAGNOSIS — I10 BENIGN ESSENTIAL HYPERTENSION: ICD-10-CM

## 2018-05-30 DIAGNOSIS — R97.20 ELEVATED PROSTATE SPECIFIC ANTIGEN (PSA): ICD-10-CM

## 2018-05-30 DIAGNOSIS — Z00.00 MEDICARE ANNUAL WELLNESS VISIT, INITIAL: Primary | ICD-10-CM

## 2018-05-30 PROCEDURE — 99214 OFFICE O/P EST MOD 30 MIN: CPT | Performed by: FAMILY MEDICINE

## 2018-05-30 PROCEDURE — G0438 PPPS, INITIAL VISIT: HCPCS | Performed by: FAMILY MEDICINE

## 2018-05-30 NOTE — ASSESSMENT & PLAN NOTE
Patient with ongoing left scapular /shoulder/ neck pain  This is been present for years    Will refer to Dr Martin Barrera for evaluation

## 2018-05-30 NOTE — PROGRESS NOTES
Assessment/Plan:    Benign essential hypertension   Controlled on amlodipine 10 mg daily and metoprolol 25 b I d  Coronary artery disease   Status post PCI  Patient is doing well without chest pain or shortness of breath  He still sees cardiologist regularly, currently Dr Anabel Avila  Patient had recent echocardiogram done  Results still pending  Continue follow-up with Cardiology as directed    Elevated prostate specific antigen (PSA)   Being followed by Urology, Dr Ebony Tilley   Patient was recently told that he may need to find a new urologist due to insurance issues  Hyperlipidemia    Cholesterol 115/75  Doing well on atorvastatin 80 mg daily  Polycythemia vera ([facility] [address] )    Stable with monthly phlebotomy  Continue regular follow-up with hematologist, Dr Kaleigh Kinney    Prediabetes   A1c 5 0%  Continue reduced carb diet  Will continue to follow    Tobacco abuse   Patient still smoking 1/2 pack per day  Counseled again  Discussed low radiation CT scan of the lungs  Patient declines at this time    Chronic left shoulder pain   Patient with ongoing left scapular /shoulder/ neck pain  This is been present for years  Will refer to Dr Kiah Wolfe for evaluation       Diagnoses and all orders for this visit:    Medicare annual wellness visit, initial    Polycythemia vera ([facility] [address] )    Coronary artery disease without angina pectoris, unspecified vessel or lesion type, unspecified whether native or transplanted heart    Prediabetes  -     Comprehensive metabolic panel; Future  -     HEMOGLOBIN A1C W/ EAG ESTIMATION; Future    Benign essential hypertension    Mixed hyperlipidemia  -     Lipid Panel with Direct LDL reflex; Future    Elevated prostate specific antigen (PSA)    Chronic left shoulder pain  -     Ambulatory referral to Orthopedic Surgery; Future    Tobacco abuse       6 MONTHS, FASTING BLOOD WORK AT [facility]    Subjective:      Patient ID: Ankur Ibarra is a 71 y o  male      Pt presents for recheck of chronic medical probs  Pt c/o ongoing left shoulder / scapular pain  This is been present for years  He had been seen by an orthopedic specialist years ago for his right shoulder  Otherwise patient feels well  Still sees hematologist for polycythemia  Still sees cardiologist regularly for CA D  He is compliant with all prescribed medications without side effects    Patient had labs drawn a few weeks ago at [facility]        The following portions of the patient's history were reviewed and updated as appropriate: allergies, current medications, past family history, past medical history, past social history, past surgical history and problem list     Review of Systems      Objective:      /70 (BP Location: Left arm, Patient Position: Sitting, Cuff Size: Large)   Pulse 60   Temp (!) 97 2 °F (36 2 °C) (Tympanic)   Resp 17   Ht 5' 8" (1 727 m)   Wt 77 3 kg (170 lb 8 oz)   SpO2 97%   BMI 25 92 kg/m²          Physical Exam

## 2018-05-30 NOTE — ASSESSMENT & PLAN NOTE
Being followed by Urology, Dr Yokasta Bullard   Patient was recently told that he may need to find a new urologist due to insurance issues

## 2018-05-30 NOTE — ASSESSMENT & PLAN NOTE
Patient still smoking 1/2 pack per day  Counseled again  Discussed low radiation CT scan of the lungs    Patient declines at this time

## 2018-05-30 NOTE — ASSESSMENT & PLAN NOTE
Status post PCI  Patient is doing well without chest pain or shortness of breath  He still sees cardiologist regularly, currently Dr Emelia Cuadra  Patient had recent echocardiogram done  Results still pending    Continue follow-up with Cardiology as directed

## 2018-05-30 NOTE — PROGRESS NOTES
HPI:  Ino Silver is a 71 y o  male here for his Initial Wellness Visit  Patient Active Problem List   Diagnosis    Gastritis    Benign essential hypertension    Blood chemistry abnormality    Cervical radiculopathy    Coronary artery disease    Elevated prostate specific antigen (PSA)    Hyperlipidemia    Polycythemia vera ([address] )    Prediabetes     Past Medical History:   Diagnosis Date    CAD (coronary artery disease)     Hyperlipidemia     Hypertension      Past Surgical History:   Procedure Laterality Date    APPENDECTOMY      VT COLONOSCOPY FLX DX W/COLLJ SPEC WHEN PFRMD N/A 5/22/2017    Procedure: COLONOSCOPY;  Surgeon: Rolando Jackson DO;  Location: [facility]; Service: Gastroenterology     History reviewed  No pertinent family history  History   Smoking Status    Current Every Day Smoker    Packs/day: 0 50   Smokeless Tobacco    Never Used     History   Alcohol Use No      History   Drug Use No     /70 (BP Location: Left arm, Patient Position: Sitting, Cuff Size: Large)   Pulse 60   Temp (!) 97 2 °F (36 2 °C) (Tympanic)   Resp 17   Ht 5' 8" (1 727 m)   Wt 77 3 kg (170 lb 8 oz)   SpO2 97%   BMI 25 92 kg/m²       Current Outpatient Prescriptions   Medication Sig Dispense Refill    amLODIPine (NORVASC) 10 mg tablet Take 10 mg by mouth daily      aspirin (ECOTRIN LOW STRENGTH) 81 mg EC tablet Take 81 mg by mouth daily      atorvastatin (LIPITOR) 80 mg tablet Take 80 mg by mouth daily      metoprolol tartrate (LOPRESSOR) 25 mg tablet Take 25 mg by mouth 2 (two) times a day      Multiple Vitamin (MULTIVITAMIN) capsule Take 1 capsule by mouth daily      nitroglycerin (NITROSTAT) 0 4 mg SL tablet Place 0 4 mg under the tongue every 5 (five) minutes as needed for chest pain      Omega-3 Fatty Acids (FISH OIL) 1,000 mg Take 1,200 mg by mouth daily         No current facility-administered medications for this visit        No Known Allergies  Immunization History Administered Date(s) Administered    Pneumococcal Polysaccharide PPV23 1945       Patient Care Team:  Carey Tabares DO as PCP - General  MD Hernesto Duckworth DO Darus Breaker, MD Julie Dus, DO    Medicare Screening Tests and Risk Assessments:  AWV Clinical     ISAR:   Previous hospitalizations?:  No       Once in a Lifetime Medicare Screening:       Medicare Screening Tests and Risk Assessment:   AAA Risk Assessment     Tobacco use (males only): Yes   Age over 72 (males only):  Yes    Osteoporosis Risk Assessment     Female:  No   :  Yes :  No   Age over 48:  Yes Low body weight (<127lbs):  No   Tobacco use:   Yes Alcohol use:  No   Low calcium diet:  No PMHX of fractures:  No   FHX of fractures:  No    HIV Risk Assessment    None indicated:  Yes        Drug and Alcohol Use:   Tobacco use    Cigarettes:  current smoker    Tobacco use duration    Years:  64    Packs per day:  2    Tobacco Cessation Readiness    Alcohol use    Alcohol use:  never    Alcohol Treatment Readiness   Illicit Drug Use    Drug use:  never        Diet & Exercise:   Diet   How many servings a day of the following:   Fruits and Vegetables:  1-2    Whole Grains:  0    Coffee:  0, 1 Tea:  0   Exercise    Do you currently exercise?:  currently not exercising       Cognitive Impairment Screening:   Depression screening preformed:  Yes Depression screen score:  1   Depression screening results:  negative for symptoms   Cognitive Impairment Screening    Do you have difficulty learning or retaining new information?:  No Do you have difficulty handling new tasks?:  No   Do you have difficulty with reasoning?:  No Do you have difficulty with spatial ability and orientation?:  No   Do you have difficulty with language?:  No Do you have difficulty with behavior?:  No       Functional Ability/Level of Safety:   Hearing    Hearing difficulties:  No Bilateral:  normal   Left:  normal Right:  normal Hearing aid:  No    Hearing Impairment Assessment    Hearing status:  No impairment   Current Activities    Help needed with the folllowing:    Using the phone:  No Transportation:  No   Shopping:  Yes Preparing Meals:  No   Doing Housework:  Yes Doing Laundry:  No   Managing Medications:  No Managing Money:  No   ADL    Fall Risk   Have you fallen in the last 12 months?:  No Are you unsteady on your feet?:  No   Injury History       Home Safety:   Do you feel unsteady when walking?:  No    Home Safety Risk Factors   Unfamilar with surroundings:  No Uneven floors:  No   Stairs or handrail saftey risk:  No Loose rugs:  Yes   Household clutter:  Yes Poor household lighting:  No   No grab bars in bathroom:  No Further evaluation needed:  No       Advanced Directives:   Advanced Directives    Living Will:  No    Patient's End of Life Decisions        Urinary Incontinence:       Glaucoma:            Provider Screening     Preventative Screening/Counseling:   Cardiovascular Screening/Counseling:   (Labs Q5 years, EKG optional one-time)   General:  Risks and Benefits Discussed           Diabetes Screening/Counseling:   (2 tests/year if Pre-Diabetes or 1 test/year if no Diabetes)   General:  Risks and Benefits Discussed           Colorectal Cancer Screening/Counseling:   (FOBT Q1 yr; Flex Sig Q4 yrs or Q10 yrs after Screening Colonoscopy; Screening Colonoscpy Q2 yrs High Risk or Q10 yrs Low Risk; Barium Enema Q2 yrs High Risk or Q4 yrs Low Risk)   General:  Risks and Benefits Discussed           Prostate Cancer Screening/Counseling:   (Annual)    General:  Risks and Benefits Discussed          Breast Cancer Screening/Counseling:   (Baseline Age 28 - 43; Annual Age 36+)         Cervical Cancer Screening/Counseling:   (Annual for High Risk or Childbearing Age with Abnormal Pap in Last 3 yrs;  Every 2 all others)         Osteoporosis Screening/Counseling:   (Every 2 Yrs if at risk or more if medically necessary)   General: Risks and Benefits Discussed           AAA Screening/Counseling:   (Once per Lifetime with risk factors)     Age over 72 (males only):  Yes Tobacco use (males only):  Yes   General:  Risks and Benefits Discussed           Glaucoma Screening/Counseling:   (Annual)   General:  Risks and Benefits Discussed          HIV Screening/Counseling:   (Voluntary; Once annually for high risk OR 3 times for Pregnancy at diagnosis of IUP; 3rd trimester; and at Labor   General:  Risks and Benefits Discussed           Hepatitis C Screening:             Immunizations:   Influenza (annual):  Risks & Benefits Discussed   Pneumococcal (Once in a Lifetime):  Risks & Benefits Discussed   Zostavax (Medicare D Coverage, Pt >70 yo):  Risks & Benefits Discussed   TD (Non-Medicare Wellness  Visit required):  Risks & Benefits Discussed       Other Preventative Couseling (Non-Medicare Wellness Visit Required):   nutrition counseling performed       Referrals (Non-Medicare Wellness Visit Required):       Medical Equipment/Suppliers:           No exam data present    Physical Exam :  Physical Exam   Constitutional: He is oriented to person, place, and time  He appears well-developed and well-nourished  HENT:   Head: Normocephalic and atraumatic  Eyes: Pupils are equal, round, and reactive to light  Neck: Neck supple  Cardiovascular: Normal rate, regular rhythm, normal heart sounds and intact distal pulses  Pulmonary/Chest: Effort normal and breath sounds normal    Abdominal: Soft  Bowel sounds are normal    Neurological: He is alert and oriented to person, place, and time  Psychiatric: He has a normal mood and affect   His behavior is normal  Judgment and thought content normal        Reviewed Updated [facility] Prior Wellness Visits:   Last Medicare wellness visit information was reviewed, patient interviewed , no change since last AWVyes  Last Medicare wellness visit information was reviewed, patient interviewed and updates made to the record today yes    Assessment and Plan:  1  Medicare annual wellness visit, initial         Health Maintenance Due   Topic Date Due    Hepatitis C Screening  1945    SLP PLAN OF CARE  1945    DTaP,Tdap,and Td Vaccines (1 - Tdap) 09/18/1967    ABDOMINAL AORTIC ANEURYSM (AAA) SCREEN  09/18/2011    GLAUCOMA SCREENING 67+ YR  09/18/2013    initial medical wellness visit  Overall patient is doing well  He failed his eye exam we discussed the results today  Depression screen and fall risk were performed today    Discussed age appropriate vaccinations, colonoscopy, and PSA screening

## 2018-06-05 LAB
BASOPHILS # BLD AUTO: 87 CELLS/UL (ref 0–200)
BASOPHILS NFR BLD AUTO: 0.6 %
EOSINOPHIL # BLD AUTO: 1247 CELLS/UL (ref 15–500)
EOSINOPHIL NFR BLD AUTO: 8.6 %
ERYTHROCYTE [DISTWIDTH] IN BLOOD BY AUTOMATED COUNT: 15.1 % (ref 11–15)
HCT VFR BLD AUTO: 49 % (ref 38.5–50)
HGB BLD-MCNC: 15.4 G/DL (ref 13.2–17.1)
LYMPHOCYTES # BLD AUTO: 2132 CELLS/UL (ref 850–3900)
LYMPHOCYTES NFR BLD AUTO: 14.7 %
MCH RBC QN AUTO: 26.1 PG (ref 27–33)
MCHC RBC AUTO-ENTMCNC: 31.4 G/DL (ref 32–36)
MCV RBC AUTO: 82.9 FL (ref 80–100)
MONOCYTES # BLD AUTO: 986 CELLS/UL (ref 200–950)
MONOCYTES NFR BLD AUTO: 6.8 %
NEUTROPHILS # BLD AUTO: ABNORMAL CELLS/UL (ref 1500–7800)
NEUTROPHILS NFR BLD AUTO: 69.3 %
PLATELET # BLD AUTO: 265 THOUSAND/UL (ref 140–400)
PMV BLD REES-ECKER: 11.1 FL (ref 7.5–12.5)
RBC # BLD AUTO: 5.91 MILLION/UL (ref 4.2–5.8)
WBC # BLD AUTO: 14.5 THOUSAND/UL (ref 3.8–10.8)

## 2018-06-06 ENCOUNTER — HOSPITAL ENCOUNTER (OUTPATIENT)
Dept: INFUSION CENTER | Facility: CLINIC | Age: 73
Discharge: HOME | End: 2018-06-06
Payer: COMMERCIAL

## 2018-06-06 VITALS
TEMPERATURE: 96.1 F | SYSTOLIC BLOOD PRESSURE: 136 MMHG | HEART RATE: 49 BPM | DIASTOLIC BLOOD PRESSURE: 81 MMHG | RESPIRATION RATE: 18 BRPM

## 2018-06-06 PROCEDURE — 99195 PHLEBOTOMY: CPT

## 2018-06-06 NOTE — PLAN OF CARE
Problem: Potential for Falls  Goal: Patient will remain free of falls  INTERVENTIONS:  - Assess patient frequently for physical needs  -  Identify cognitive and physical deficits and behaviors that affect risk of falls    -  [place]rea fall precautions as indicated by assessment   - Educate patient/family on patient safety including physical limitations  - Instruct patient to call for assistance with activity based on assessment  - Modify environment to reduce risk of injury  - Consider OT/PT consult to assist with strengthening/mobility   Outcome: Progressing

## 2018-06-06 NOTE — PROGRESS NOTES
Therapeutic phlebotomy was performed via the right antecubital vein for 400 ml without difficulty  Started 2165, ended [phone]    Hematocrit =49   Parameters double checked with Federica Phelps RN   Pt was given water pre and post and vital signs remained stable   AVS was provided to pt

## 2018-06-26 ENCOUNTER — APPOINTMENT (OUTPATIENT)
Dept: LAB | Facility: CLINIC | Age: 73
End: 2018-06-26
Payer: COMMERCIAL

## 2018-07-10 ENCOUNTER — APPOINTMENT (OUTPATIENT)
Dept: RADIOLOGY | Facility: CLINIC | Age: 73
End: 2018-07-10
Payer: COMMERCIAL

## 2018-07-10 ENCOUNTER — OFFICE VISIT (OUTPATIENT)
Dept: OBGYN CLINIC | Facility: MEDICAL CENTER | Age: 73
End: 2018-07-10
Payer: COMMERCIAL

## 2018-07-10 VITALS
HEART RATE: 50 BPM | BODY MASS INDEX: 25.76 KG/M2 | WEIGHT: 170 LBS | SYSTOLIC BLOOD PRESSURE: 134 MMHG | HEIGHT: 68 IN | DIASTOLIC BLOOD PRESSURE: 73 MMHG

## 2018-07-10 DIAGNOSIS — M25.512 CHRONIC LEFT SHOULDER PAIN: ICD-10-CM

## 2018-07-10 DIAGNOSIS — G89.29 CHRONIC LEFT SHOULDER PAIN: ICD-10-CM

## 2018-07-10 PROCEDURE — 99204 OFFICE O/P NEW MOD 45 MIN: CPT | Performed by: ORTHOPAEDIC SURGERY

## 2018-07-10 PROCEDURE — 73030 X-RAY EXAM OF SHOULDER: CPT

## 2018-07-10 NOTE — PROGRESS NOTES
Orthopaedic Surgery - Office Note  Larissa Alexandre (4 y o  male)   : 1945   MRN: [id number]  Encounter Date: 7/10/2018    Chief Complaint   Patient presents with    Left Shoulder - Pain       Assessment / Plan  With left shoulder pain and benefit and trapezial trigger point    · Begin outpatient PT for left shoulder   · Weight bearing as tolerated  · Follow up in 4-6 weeks for repeat exam      History of Present Illness  Larissa Alexandre is a 71 y o  male who presents with insidious atraumatic onset of posterior left shoulder pain  Pain is localized to the posterior aspect shoulder occasionally radiates to the back of his neck  This occurred over the past 9 years this exacerbated by lying on the left side and with overhead activity and relieved by rest   Denies any weakness or catching and locking in shoulder no numbness and tingling    Review of Systems  Pertinent items are noted in HPI  All other systems were reviewed and are negative  Physical Exam  /73   Pulse (!) 50   Ht 5' 8" (1 727 m)   Wt 77 1 kg (170 lb)   BMI 25 85 kg/m²   Cons: Appears well  No apparent distress  Psych: Alert  Oriented x3  Mood and affect normal   Eyes: PERRLA, EOMI  Resp: Normal effort  No audible wheezing or stridor  CV: Palpable pulse  No discernable arrhythmia  No LE edema  Lymph:  No palpable cervical, axillary, or inguinal lymphadenopathy  Skin: Warm  No palpable masses  No visible lesions  Neuro: Normal muscle tone  Normal and symmetric DTR's  Left Shoulder Exam  Alignment / Posture:  Normal shoulder posture  Inspection:  No swelling  No edema  No erythema  No ecchymosis  No muscle atrophy  Palpation:  Point tenderness at Trapezius muscle  ROM:  Shoulder   Shoulder ER 60  Shoulder IR T10  Strength:  Supraspinatus 5-/5  Infraspinatus 5/5  Subscapularis 5/5  Teres minor 5/5  Stability:  No objective shoulder instability  Tests: (+) Neer  (-) Drop arm  (-) Hornblower sign  (-) Belly press  (-) Hoven  Neurovascular:  Sensation intact in Ax/R/M/U nerve distributions  Palpable radial pulse  Studies Reviewed  XR of left shoulder - shows mild glenohumeral arthritis  No fracture    Procedures  No procedures today  Medical, Surgical, Family, and Social History  The patient's medical history, family history, and social history, were reviewed and updated as appropriate  Past Medical History:   Diagnosis Date    CAD (coronary artery disease)     Last Assessed:  11/4/13    Elevated prostate specific antigen (PSA)     Last Assessed:  12/21/17    Hyperlipidemia     Last Assessed:  11/24/17    Hypertension     Benign essential, Last Assessed:  11/24/17       Past Surgical History:   Procedure Laterality Date    APPENDECTOMY      MA COLONOSCOPY FLX DX W/COLLJ SPEC WHEN PFRMD N/A 5/22/2017    Procedure: COLONOSCOPY;  Surgeon: Ramon Lowry DO;  Location: [facility]; Service: Gastroenterology    PROSTATE BIOPSY         Family History   Problem Relation Age of Onset    No Known Problems Mother        Social History     Occupational History    Not on file       Social History Main Topics    Smoking status: Current Every Day Smoker     Packs/day: 0 50     Types: Cigars    Smokeless tobacco: Never Used    Alcohol use No    Drug use: No    Sexual activity: Not on file       No Known Allergies      Current Outpatient Prescriptions:     amLODIPine (NORVASC) 10 mg tablet, Take 10 mg by mouth daily, Disp: , Rfl:     aspirin (ECOTRIN LOW STRENGTH) 81 mg EC tablet, Take 81 mg by mouth daily, Disp: , Rfl:     atorvastatin (LIPITOR) 80 mg tablet, Take 80 mg by mouth daily, Disp: , Rfl:     metoprolol tartrate (LOPRESSOR) 25 mg tablet, Take 25 mg by mouth 2 (two) times a day, Disp: , Rfl:     Multiple Vitamin (MULTIVITAMIN) capsule, Take 1 capsule by mouth daily, Disp: , Rfl:     nitroglycerin (NITROSTAT) 0 4 mg SL tablet, Place 0 4 mg under the tongue every 5 (five) minutes as needed for chest pain, Disp: , Rfl:     Omega-3 Fatty Acids (FISH OIL) 1,000 mg, Take 1,200 mg by mouth daily  , Disp: , Rfl:       Faina Jorgensen MD    Scribe Attestation    I,:    am acting as a scribe while in the presence of the attending physician :        I,:    personally performed the services described in this documentation    as scribed in my presence :

## 2018-07-11 ENCOUNTER — HOSPITAL ENCOUNTER (OUTPATIENT)
Dept: INFUSION CENTER | Facility: CLINIC | Age: 73
Discharge: HOME | End: 2018-07-11
Payer: COMMERCIAL

## 2018-07-11 VITALS
SYSTOLIC BLOOD PRESSURE: 125 MMHG | DIASTOLIC BLOOD PRESSURE: 77 MMHG | HEART RATE: 50 BPM | RESPIRATION RATE: 16 BRPM | TEMPERATURE: 97.7 F

## 2018-07-11 PROCEDURE — 99195 PHLEBOTOMY: CPT

## 2018-07-11 NOTE — PROGRESS NOTES
Pt  Denies new symptoms or concerns  Hct 47 2  Confirmed by Kristine Knowles RN  Phlebotomized 400ml of blood as ordered and per parameters beginning at 0825 and completed at 0835  Will monitor pt  Over next 30 minutes

## 2018-07-11 NOTE — PLAN OF CARE
Problem: PAIN - ADULT  Goal: Verbalizes/displays adequate comfort level or baseline comfort level  Interventions:  - Encourage patient to monitor pain and request assistance  - Assess pain using appropriate pain scale  - Administer analgesics based on type and severity of pain and evaluate response  - Implement non-pharmacological measures as appropriate and evaluate response  - Consider cultural and social influences on pain and pain management  - Notify physician/advanced practitioner if interventions unsuccessful or patient reports new pain  Outcome: Progressing      Problem: INFECTION - ADULT  Goal: Absence or prevention of progression during hospitalization  INTERVENTIONS:  - Assess and monitor for signs and symptoms of infection  - Monitor lab/diagnostic results  - Monitor all insertion sites, i e  indwelling lines, tubes, and drains  - Monitor endotracheal (as able) and nasal secretions for changes in amount and color  - [place] appropriate cooling/warming therapies per order  - Administer medications as ordered  - Instruct and encourage patient and family to use good hand hygiene technique  - Identify and instruct in appropriate isolation precautions for identified infection/condition  Outcome: Progressing    Goal: Absence of fever/infection during neutropenic period  INTERVENTIONS:  - Monitor WBC  - Implement neutropenic guidelines  Outcome: Progressing      Problem: Knowledge Deficit  Goal: Patient/family/caregiver demonstrates understanding of disease process, treatment plan, medications, and discharge instructions  Complete learning assessment and assess knowledge base    Interventions:  - Provide teaching at level of understanding  - Provide teaching via preferred learning methods  Outcome: Progressing

## 2018-07-11 NOTE — PROGRESS NOTES
Pt  Stable without dizziness at this time  /77  Pt  Discharged without adverse event  Future appointment reviewed   Declined AVS

## 2018-07-18 ENCOUNTER — EVALUATION (OUTPATIENT)
Dept: PHYSICAL THERAPY | Facility: CLINIC | Age: 73
End: 2018-07-18
Payer: COMMERCIAL

## 2018-07-18 DIAGNOSIS — G89.29 CHRONIC LEFT SHOULDER PAIN: Primary | ICD-10-CM

## 2018-07-18 DIAGNOSIS — M25.512 CHRONIC LEFT SHOULDER PAIN: Primary | ICD-10-CM

## 2018-07-18 PROCEDURE — G8991 OTHER PT/OT GOAL STATUS: HCPCS

## 2018-07-18 PROCEDURE — G8990 OTHER PT/OT CURRENT STATUS: HCPCS

## 2018-07-18 PROCEDURE — 97161 PT EVAL LOW COMPLEX 20 MIN: CPT

## 2018-07-18 NOTE — PROGRESS NOTES
PT Evaluation     Today's date: 2018  Patient name: Silas Oseguera  :   MRN: [id number]  Referring provider: Gene Mata MD  Dx:   Encounter Diagnosis     ICD-10-CM    1  Chronic left shoulder pain M25 512     G89 29                 Assessment  Impairments: abnormal or restricted ROM, impaired physical strength, lacks appropriate home exercise program and pain with function  Functional limitations: difficulty completing household chores  Assessment details: Silas Oseguera is a 71 y o  male presenting to PT with pain, decreased shoulder range of motion, decreased strength, and decreased tolerance to activity secondary to impingement and associated tendonitis/bursitis  Patient would benefit from skilled PT services to address these impairments and to maximize function in order to improve quality of life  Thank you for the referral   Understanding of Dx/Px/POC: good   Prognosis: good    Goals  STG  1) L shoulder ROM will improve 50% in 3 weeks  2) L shoulder strength will improve 1/2 grade in 3 weeks  LTG  1) Patient is independent in HEP within 6 weeks  2) Patient completes overhead activity with 0-1/10 pain within 6 weeks  Plan  Patient would benefit from: skilled physical therapy  Planned modality interventions: cryotherapy  Planned therapy interventions: joint mobilization, manual therapy, home exercise program, therapeutic exercise, therapeutic activities, stretching, strengthening, postural training, patient education and neuromuscular re-education  Frequency: 1x week  Duration in weeks: 6  Treatment plan discussed with: patient        Subjective Evaluation    History of Present Illness  Mechanism of injury: Patient presents with L posterior shoulder pain which radiates up toward his neck which occasionally gives him headaches  He has a history of arthroscopic surgery in the R shoulder after a fall about 8-9 years ago    He states the L shoulder has been giving him pain for "the last couple years" and continues to get worse  Regarding his headaches, he feels them in occipital region and they come on "every so often"  Quality of life: good    Pain  Current pain rating: 3  At best pain rating: 3  At worst pain ratin  Quality: dull ache and radiating  Relieving factors: medications and rest  Aggravating factors: overhead activity and lifting (carrying anything on L side)  Progression: worsening    Patient Goals  Patient goals for therapy: decreased pain          Objective     Postural Observations  Seated posture: fair  Standing posture: fair  Correction of posture: makes symptoms better        Palpation   Left   No palpable tenderness to the posterior deltoid  Tenderness of the infraspinatus, levator scapulae, teres minor and upper trapezius  Tenderness     Left Shoulder   Tenderness in the [facility] joint, infraspinatus tendon, medial scapula and scapular spine  No tenderness in the acromion  Cervical/Thoracic Screen   Cervical range of motion within normal limits  Thoracic range of motion within normal limits    Neurological Testing     Additional Neurological Details  Normal UE neuro screen    Active Range of Motion   Left Shoulder   Flexion: WFL  Extension: WFL  Abduction: WFL and with pain  External rotation BTH: C7 with pain  Internal rotation BTB: L2 with pain    Passive Range of Motion   Left Shoulder   External rotation 90°: 75 degrees   Internal rotation 90°: 25 degrees with pain    Joint Play   Left Shoulder  Joints within functional limits are the anterior capsule  Hypomobile in the posterior capsule and inferior capsule      Strength/Myotome Testing     Left Shoulder     Planes of Motion   Flexion: 4-   Extension: 4-   Abduction: 3+   External rotation at 0°: 3+   Internal rotation at 0°: 4-   Horizontal abduction: 3+     Isolated Muscles   Biceps: 4-   Infraspinatus: 3+   Lower trapezius: 3+   Middle trapezius: 3+   Rhomboids: 3+   Subscapularis: 4-   Supraspinatus: 3+   Triceps: 4- Tests     Left Shoulder   Positive empty can and lift-off  Negative crossover, Hawkin's and painful arc         Precautions: coronary artery disease, HTN, hypercholesterolemia    Daily Treatment Diary       Manuals             PROM adduction, IR/ER             GHJ AP/Inf mobs                                       Exercise Diary              UBE             Pulleys             Shoulder isometrics             Wall ball circles             Scapular retractions             IR strap stretch             TB rows/ext             TB robbers             TB lawn mowers             Serratus punch             Prone rows/ext             Prone Y             Prone T                                                                                                                                  Modalities

## 2018-07-25 ENCOUNTER — OFFICE VISIT (OUTPATIENT)
Dept: PHYSICAL THERAPY | Facility: CLINIC | Age: 73
End: 2018-07-25
Payer: COMMERCIAL

## 2018-07-25 DIAGNOSIS — M25.512 CHRONIC LEFT SHOULDER PAIN: Primary | ICD-10-CM

## 2018-07-25 DIAGNOSIS — G89.29 CHRONIC LEFT SHOULDER PAIN: Primary | ICD-10-CM

## 2018-07-25 PROCEDURE — 97140 MANUAL THERAPY 1/> REGIONS: CPT

## 2018-07-25 PROCEDURE — 97112 NEUROMUSCULAR REEDUCATION: CPT

## 2018-07-25 PROCEDURE — 97110 THERAPEUTIC EXERCISES: CPT

## 2018-07-25 NOTE — PROGRESS NOTES
Daily Note     Today's date: 2018  Patient name: Ankur Sprain  :   MRN: [id number]  Referring provider: Nabil Scott MD  Dx:   Encounter Diagnosis     ICD-10-CM    1  Chronic left shoulder pain M25 512     G89 29                   Subjective: Patient had no new symptoms to report in L shoulder  Objective: See treatment diary below  Updated home program       Assessment: Fatigued with progressions of scapular strengthening / stabilization program, requiring cueing to ensure proper form  PROM used to maximize functional motion in ER / IR, having no reported increase in symptoms at end ranges  Educated on use of CP at home if needed to reduce muscle soreness  Plan: Continue per plan of care  Progress treatment as tolerated           Precautions: coronary artery disease, HTN, hypercholesterolemia    Daily Treatment Diary   Manuals             PROM adduction, IR/ER EV            GHJ AP/Inf mobs NV                                      Exercise Diary              UBE fwd / bwd 3 min ea            Pulleys 5" hold, 5 min            Shoulder isometrics 5"x  10 ea            Wall ball circles cw/ccwx20 ea            Scapular retractions 3"x20            IR strap stretch 10"x5            TB rows/ext Green 2x10 ea            TB robbers NV            TB lawn mowers NV            Serratus punch 5"x10            Prone rows/ext 5"x10 ea            Prone Y NV            Prone T NV                                                                                                                                 Modalities                                         HEP: scap retractions, IR strap stretch, TB ext / rows (green TB)

## 2018-08-01 ENCOUNTER — OFFICE VISIT (OUTPATIENT)
Dept: PHYSICAL THERAPY | Facility: CLINIC | Age: 73
End: 2018-08-01
Payer: COMMERCIAL

## 2018-08-01 DIAGNOSIS — M25.512 CHRONIC LEFT SHOULDER PAIN: Primary | ICD-10-CM

## 2018-08-01 DIAGNOSIS — G89.29 CHRONIC LEFT SHOULDER PAIN: Primary | ICD-10-CM

## 2018-08-01 PROCEDURE — 97140 MANUAL THERAPY 1/> REGIONS: CPT

## 2018-08-01 PROCEDURE — 97112 NEUROMUSCULAR REEDUCATION: CPT

## 2018-08-01 PROCEDURE — 97110 THERAPEUTIC EXERCISES: CPT

## 2018-08-01 NOTE — PROGRESS NOTES
Daily Note     Today's date: 2018  Patient name: Jennifer Reed  :   MRN: [id number]  Referring provider: Cecille Palacios MD  Dx:   Encounter Diagnosis     ICD-10-CM    1  Chronic left shoulder pain M25 512     G89 29                   Subjective: Patient reported symptoms in L shoulder have been "on and off"  Objective: See treatment diary below  Progressed shoulder and scapular strengthening as documented below  Updated home program       Assessment: Provided with cueing to reduce UT compensations due to scapular weakness  He noted having some discomfort during completion of program  PROM demonstrated functional mobility into OH range, tightness into end range IR  Plan: Continue per plan of care  Progress treatment as tolerated           Precautions: coronary artery disease, HTN, hypercholesterolemia    Daily Treatment Diary   Manuals            PROM adduction, IR/ER EV EV           GHJ AP/Inf mobs NV NV                                     Exercise Diary              UBE fwd / bwd 3 min ea 3 min ea           Pulleys 5" hold, 5 min 5" hold,   5 min           Shoulder isometrics 5"x  10 ea 5"x10 ea           Wall ball circles cw/ccw  x20 ea cw/ccw x20 ea           Scapular retractions 3"x20 HEP           IR strap stretch 10"x5 10"x10           TB rows/ext Green 2x10 ea Green 2x15 ea           TB robbers NV [place] x10           TB lawn mowers NV [place] x10           Serratus punch 5"x10 5"x10           Prone rows/ext 5"x10 ea x10 ea           Prone Y NV x10           Prone T NV x10                                                                                                                                Modalities                                         HEP: scap retractions, IR strap stretch, TB ext / rows (green TB), prone ext / rows, prone Y and T

## 2018-08-03 ENCOUNTER — APPOINTMENT (OUTPATIENT)
Dept: LAB | Facility: CLINIC | Age: 73
End: 2018-08-03
Payer: COMMERCIAL

## 2018-08-07 ENCOUNTER — TELEPHONE (OUTPATIENT)
Dept: HEMATOLOGY ONCOLOGY | Facility: CLINIC | Age: 73
End: 2018-08-07

## 2018-08-08 ENCOUNTER — OFFICE VISIT (OUTPATIENT)
Dept: PHYSICAL THERAPY | Facility: CLINIC | Age: 73
End: 2018-08-08
Payer: COMMERCIAL

## 2018-08-08 ENCOUNTER — HOSPITAL ENCOUNTER (OUTPATIENT)
Dept: INFUSION CENTER | Facility: CLINIC | Age: 73
Discharge: HOME | End: 2018-08-08

## 2018-08-08 DIAGNOSIS — G89.29 CHRONIC LEFT SHOULDER PAIN: Primary | ICD-10-CM

## 2018-08-08 DIAGNOSIS — M25.512 CHRONIC LEFT SHOULDER PAIN: Primary | ICD-10-CM

## 2018-08-08 PROCEDURE — 97140 MANUAL THERAPY 1/> REGIONS: CPT

## 2018-08-08 PROCEDURE — 97110 THERAPEUTIC EXERCISES: CPT

## 2018-08-08 NOTE — PROGRESS NOTES
Daily Note     Today's date: 2018  Patient name: Nataliya Rodriguez  : [birth year]  MRN: [id number]  Referring provider: Romelia Weber MD  Dx:   Encounter Diagnosis     ICD-10-CM    1  Chronic left shoulder pain M25 512     G89 29                   Subjective: Patient reported he continues to have "good and bad days", stating occasional compliance with home program       Objective: See treatment diary below      Assessment: Displays continued scapular compensations with strengthening program, indicating continued weakness  Improved tolerance to PROM, responding well to addition of joint mobilizations  Plan: Continue per plan of care  Progress treatment as tolerated           Precautions: coronary artery disease, HTN, hypercholesterolemia    Daily Treatment Diary   Manuals           PROM adduction, IR/ER EV EV EV          GHJ AP/Inf mobs NV NV AN                                    Exercise Diary              UBE fwd / bwd 3 min ea 3 min ea 3 min ea          Pulleys 5" hold, 5 min 5" hold,   5 min 5" hold, 5 min          Shoulder isometrics 5"x  10 ea 5"x10 ea 5"x10 ea          Wall ball circles cw/ccw  x20 ea cw/ccw x20 ea cw/ccw x20 ea          Scapular retractions 3"x20 HEP --          IR strap stretch 10"x5 10"x10 10"x10          TB rows/ext Green 2x10 ea Green 2x15 ea Green 2x15 ea          TB robbers NV Pink x10 Green 2x10          TB lawn mowers NV Pink x10 Green 2x10          Serratus punch 5"x10 5"x10 5"x10          Prone rows/ext 5"x10 ea x10 ea x10 ea          Prone Y NV x10 x10          Prone T NV x10 x10                                                                                                                               Modalities                                         HEP: scap retractions, IR strap stretch, TB ext / rows (green TB), prone ext / rows, prone Y and T

## 2018-08-15 ENCOUNTER — OFFICE VISIT (OUTPATIENT)
Dept: PHYSICAL THERAPY | Facility: CLINIC | Age: 73
End: 2018-08-15
Payer: COMMERCIAL

## 2018-08-15 DIAGNOSIS — G89.29 CHRONIC LEFT SHOULDER PAIN: Primary | ICD-10-CM

## 2018-08-15 DIAGNOSIS — M25.512 CHRONIC LEFT SHOULDER PAIN: Primary | ICD-10-CM

## 2018-08-15 PROCEDURE — 97110 THERAPEUTIC EXERCISES: CPT

## 2018-08-15 PROCEDURE — 97140 MANUAL THERAPY 1/> REGIONS: CPT

## 2018-08-15 PROCEDURE — G8991 OTHER PT/OT GOAL STATUS: HCPCS

## 2018-08-15 PROCEDURE — G8990 OTHER PT/OT CURRENT STATUS: HCPCS

## 2018-08-15 NOTE — PROGRESS NOTES
PT Re-Evaluation     Today's date: 8/15/2018  Patient name: Amanwalma Sprain  :   MRN: [id number]  Referring provider: Nabil Scott MD  Dx:   Encounter Diagnosis     ICD-10-CM    1  Chronic left shoulder pain M25 512     G89 29                 Assessment  Impairments: abnormal or restricted ROM, impaired physical strength, lacks appropriate home exercise program and pain with function  Functional limitations: difficulty completing household chores  Assessment details: Elwyn Sprain has been compliant with attending PT and HEP since initial [place] Leti has made improvements in objective data since IE but is still limited compared to normal  Sukhdeep layne with above listed impairments and would benefit from additional skilled PT to address these deficits to return to PLOF  Understanding of Dx/Px/POC: good   Prognosis: good    Goals  STG  1) L shoulder ROM will improve 50% in 3 weeks  -Met  2) L shoulder strength will improve 1/2 grade in 3 weeks  -Met  LTG  1) Patient is independent in HEP within 6 weeks  2) Patient completes overhead activity with 0-1/10 pain within 6 weeks  Plan  Patient would benefit from: skilled physical therapy  Planned modality interventions: cryotherapy  Planned therapy interventions: joint mobilization, manual therapy, home exercise program, therapeutic exercise, therapeutic activities, stretching, strengthening, postural training, patient education and neuromuscular re-education  Frequency: 1x week  Duration in weeks: 3  Treatment plan discussed with: patient        Subjective Evaluation    History of Present Illness  Mechanism of injury: Patient reports pain as 6/10 this morning, with discomfort into L UT toward his neck   He denies a headache this morning, and states the pain gets worse when rotating his head to the L   Quality of life: good    Pain  Current pain rating: 3  At best pain ratin  At worst pain ratin  Quality: dull ache and radiating  Relieving factors: medications and rest  Aggravating factors: overhead activity and lifting (carrying anything on L side)  Progression: improved    Patient Goals  Patient goals for therapy: decreased pain          Objective     Postural Observations  Seated posture: fair  Standing posture: fair  Correction of posture: makes symptoms better        Palpation   Left   No palpable tenderness to the infraspinatus, posterior deltoid and teres minor  Tenderness of the levator scapulae and upper trapezius  Tenderness     Left Shoulder   Tenderness in the [facility] joint and infraspinatus tendon  No tenderness in the acromion, medial scapula and scapular spine  Cervical/Thoracic Screen   Cervical range of motion within normal limits  Thoracic range of motion within normal limits    Neurological Testing     Additional Neurological Details  Normal UE neuro screen    Active Range of Motion   Left Shoulder   Flexion: WFL  Extension: WFL  Abduction: WFL and with pain  External rotation BTH: T3   Internal rotation BTB: T9 with pain    Passive Range of Motion   Left Shoulder   External rotation 90°: WFL  Internal rotation 90°: 35 degrees with pain    Joint Play   Left Shoulder  Joints within functional limits are the anterior capsule  Hypomobile in the posterior capsule and inferior capsule  Strength/Myotome Testing     Left Shoulder     Planes of Motion   Flexion: 4+   Extension: 4   Abduction: 4   External rotation at 0°: 4   Internal rotation at 0°: 4+   Horizontal abduction: 4-     Isolated Muscles   Biceps: 4+   Infraspinatus: 4-   Lower trapezius: 4-   Middle trapezius: 4-   Rhomboids: 4-   Subscapularis: 4+   Supraspinatus: 4-   Triceps: 4     Tests     Left Shoulder   Positive empty can  Negative crossover, Hawkin's and painful arc       Precautions: coronary artery disease, HTN, hypercholesterolemia     Daily Treatment Diary   Manuals  7/25  8/1  8/8  8/15               PROM adduction, IR/ER EV EV EV AN               GHJ AP/Inf mobs NV NV AN AN                                                               Exercise Diary                        UBE fwd / bwd 3 min ea 3 min ea 3 min ea 3 min ea               Pulleys 5" hold, 5 min 5" hold,   5 min 5" hold, 5 min 10" hold, 5 min               Shoulder isometrics 5"x  10 ea 5"x10 ea 5"x10 ea 5"x10 ea               Wall ball circles cw/ccw  x20 ea cw/ccw x20 ea cw/ccw x20 ea 2x20 each               Scapular retractions 3"x20 HEP --                IR strap stretch 10"x5 10"x10 10"x10 10"x10               TB rows/ext Green 2x10 ea Green 2x15 ea Green 2x15 ea GTB 2x15               TB robbers NV Pink x10 Green 2x10 GTB 2x10               TB lawn mowers NV Pink x10 Green 2x10 GTB 2x10               Serratus punch 5"x10 5"x10 5"x10 3"x30               Prone rows/ext 5"x10 ea x10 ea x10 ea NP               Prone Y NV x10 x10 NP               Prone T NV x10 x10 NP               S/L ER       10                                                                                                                                                                                                               Modalities

## 2018-08-22 ENCOUNTER — OFFICE VISIT (OUTPATIENT)
Dept: PHYSICAL THERAPY | Facility: CLINIC | Age: 73
End: 2018-08-22
Payer: COMMERCIAL

## 2018-08-22 DIAGNOSIS — M25.512 CHRONIC LEFT SHOULDER PAIN: Primary | ICD-10-CM

## 2018-08-22 DIAGNOSIS — G89.29 CHRONIC LEFT SHOULDER PAIN: Primary | ICD-10-CM

## 2018-08-22 PROCEDURE — 97110 THERAPEUTIC EXERCISES: CPT

## 2018-08-22 PROCEDURE — 97140 MANUAL THERAPY 1/> REGIONS: CPT

## 2018-08-22 NOTE — PROGRESS NOTES
Daily Note     Today's date: 2018  Patient name: Bella Roach  :   MRN: [id number]  Referring provider: Jb Ha MD  Dx:   Encounter Diagnosis     ICD-10-CM    1  Chronic left shoulder pain M25 512     G89 29                 Subjective: Patient reports shoulder is "feeling pretty good" this morning, however notes some days it feels better than others  Next follow up with MD is on   Objective: See treatment diary below  Added UT stretch to HEP  Assessment: Tolerated treatment well  Patient with L UT tightness/tenderness this session, which was addressed with stretching/PROM and reported improved pain level at end of sesion  Patient demonstrated fatigue post treatment and exhibited good technique with therapeutic exercises      Plan: Continue per plan of care  Progress treatment as tolerated        Precautions: coronary artery disease, HTN, hypercholesterolemia     Daily Treatment Diary   Manuals  7/25  8/1  8/8  8/15  8/22             PROM adduction, IR/ER EV EV EV AN AN             GHJ AP/Inf mobs NV NV AN AN AN                                                             Exercise Diary                        UBE fwd / bwd 3 min ea 3 min ea 3 min ea 3 min ea 3 min ea             Pulleys 5" hold, 5 min 5" hold,   5 min 5" hold, 5 min 10" hold, 5 min 5 min, 10" hold             Shoulder isometrics 5"x  10 ea 5"x10 ea 5"x10 ea 5"x10 ea 5"x10 ea             Wall ball circles cw/ccw  x20 ea cw/ccw x20 ea cw/ccw x20 ea 2x20 each 2x20 each             Scapular retractions 3"x20 HEP --                IR strap stretch 10"x5 10"x10 10"x10 10"x10 10"x10             TB rows/ext Green 2x10 ea Green 2x15 ea Green 2x15 ea GTB 2x15 GTB 2x15             TB robbers NV Pink x10 Green 2x10 GTB 2x10 GTB 2x10             TB lawn mowers NV Pink x10 Green 2x10 GTB 2x10 GTB 2x10             Serratus punch 5"x10 5"x10 5"x10 3"x30 1# 2x10             Prone rows/ext 5"x10 ea x10 ea x10 ea NP NP           Prone Y NV x10 x10 NP NP             Prone T NV x10 x10 NP NP             S/L ER       10 15x             UT stretch         30"x3                                                                                                                                                                                     Modalities

## 2018-08-24 ENCOUNTER — OFFICE VISIT (OUTPATIENT)
Dept: OBGYN CLINIC | Facility: MEDICAL CENTER | Age: 73
End: 2018-08-24
Payer: COMMERCIAL

## 2018-08-24 ENCOUNTER — APPOINTMENT (OUTPATIENT)
Dept: RADIOLOGY | Facility: CLINIC | Age: 73
End: 2018-08-24
Payer: COMMERCIAL

## 2018-08-24 VITALS
BODY MASS INDEX: 26.52 KG/M2 | WEIGHT: 175 LBS | HEART RATE: 48 BPM | SYSTOLIC BLOOD PRESSURE: 122 MMHG | HEIGHT: 68 IN | DIASTOLIC BLOOD PRESSURE: 74 MMHG

## 2018-08-24 DIAGNOSIS — M54.2 NECK PAIN: ICD-10-CM

## 2018-08-24 DIAGNOSIS — M54.12 CERVICAL RADICULOPATHY: ICD-10-CM

## 2018-08-24 DIAGNOSIS — M54.2 NECK PAIN: Primary | ICD-10-CM

## 2018-08-24 PROCEDURE — 99213 OFFICE O/P EST LOW 20 MIN: CPT | Performed by: ORTHOPAEDIC SURGERY

## 2018-08-24 PROCEDURE — 72040 X-RAY EXAM NECK SPINE 2-3 VW: CPT

## 2018-08-24 NOTE — PROGRESS NOTES
Orthopaedic Surgery - Office Note  Phill Abel (15 y o  male)   : 1945   MRN: [id number]  Encounter Date: 2018    Chief Complaint   Patient presents with    Left Shoulder - Follow-up       Assessment / Plan  Cervical radiculopathy    · Continue with range-of-motion exercises for his cervical spine and traps  He can also continue with his shoulder strengthening and scapular strengthening exercises  · At this time we did refer him to pain management for an opinion on his cervical spine, as this is where his pain seems to be localized to at this time and into his traps  · Continue with activity as tolerated  · Continue with ice and analgesics as needed  · Follow up as needed with us at this time  History of Present Illness  Phill Abel is a 71 y o  male follow-up for left shoulder and trapezius pain  Pain is localized to the posterior aspect shoulder/traps area occasionally radiates to the back of his neck  This has been occurring over the past 9 years  He has been doing physical therapy since last visit and feels he has progressed slightly but is still very bothered by this pain  Denies any weakness or catching and locking in shoulder no numbness and tingling    Review of Systems  Pertinent items are noted in HPI  All other systems were reviewed and are negative  Physical Exam  /74   Pulse (!) 48   Ht 5' 8" (1 727 m)   Wt 79 4 kg (175 lb)   BMI 26 61 kg/m²   Cons: Appears well  No apparent distress  Psych: Alert  Oriented x3  Mood and affect normal   Eyes: PERRLA, EOMI  Resp: Normal effort  No audible wheezing or stridor  CV: Palpable pulse  No discernable arrhythmia  No LE edema  Lymph:  No palpable cervical, axillary, or inguinal lymphadenopathy  Skin: Warm  No palpable masses  No visible lesions  Neuro: Normal muscle tone  Normal and symmetric DTR's  Left Shoulder Exam  Alignment / Posture:  Normal shoulder posture  Inspection:  No swelling  No edema   No erythema  No ecchymosis  No muscle atrophy  Palpation:  Point tenderness at Trapezius muscle  ROM:  Shoulder   Shoulder ER 60  Shoulder IR T10  Strength:  Supraspinatus Five/5  Infraspinatus 5/5  Subscapularis 5/5  Teres minor 5/5  Stability:  No objective shoulder instability  Tests: (+) Spurling  (-) Drop arm  (-) Hornblower sign  (-) Belly press  (-) [place]  Neurovascular:  Sensation intact in Ax/R/M/U nerve distributions  Palpable radial pulse  Studies Reviewed  XR of left shoulder - shows mild glenohumeral arthritis  No fracture   X-ray of the cervical spine from 08/24/2018-shows the multiple levels of degenerative disc disease most prominent at C5-C6 and C6-C7  Also multiple level of degeneration at the facet joints  Procedures  No procedures today  Medical, Surgical, Family, and Social History  The patient's medical history, family history, and social history, were reviewed and updated as appropriate  Past Medical History:   Diagnosis Date    CAD (coronary artery disease)     Last Assessed:  11/4/13    Elevated prostate specific antigen (PSA)     Last Assessed:  12/21/17    Hyperlipidemia     Last Assessed:  11/24/17    Hypertension     Benign essential, Last Assessed:  11/24/17       Past Surgical History:   Procedure Laterality Date    APPENDECTOMY      WI COLONOSCOPY FLX DX W/COLLJ SPEC WHEN PFRMD N/A 5/22/2017    Procedure: COLONOSCOPY;  Surgeon: Gilma Carlisle DO;  Location: [facility]; Service: Gastroenterology    PROSTATE BIOPSY         Family History   Problem Relation Age of Onset    No Known Problems Mother        Social History     Occupational History    Not on file       Social History Main Topics    Smoking status: Current Every Day Smoker     Packs/day: 0 50     Types: Cigars    Smokeless tobacco: Never Used    Alcohol use No    Drug use: No    Sexual activity: Not on file       No Known Allergies      Current Outpatient Prescriptions:     amLODIPine (NORVASC) 10 mg tablet, Take 10 mg by mouth daily, Disp: , Rfl:     aspirin (ECOTRIN LOW STRENGTH) 81 mg EC tablet, Take 81 mg by mouth daily, Disp: , Rfl:     atorvastatin (LIPITOR) 80 mg tablet, Take 80 mg by mouth daily, Disp: , Rfl:     metoprolol tartrate (LOPRESSOR) 25 mg tablet, Take 25 mg by mouth 2 (two) times a day, Disp: , Rfl:     Multiple Vitamin (MULTIVITAMIN) capsule, Take 1 capsule by mouth daily, Disp: , Rfl:     nitroglycerin (NITROSTAT) 0 4 mg SL tablet, Place 0 4 mg under the tongue every 5 (five) minutes as needed for chest pain, Disp: , Rfl:     Omega-3 Fatty Acids (FISH OIL) 1,000 mg, Take 1,200 mg by mouth daily  , Disp: , Rfl:       [facility]    Scribe Attestation    I,:    am acting as a scribe while in the presence of the attending physician :        I,:    personally performed the services described in this documentation    as scribed in my presence :

## 2018-09-05 ENCOUNTER — APPOINTMENT (OUTPATIENT)
Dept: LAB | Facility: CLINIC | Age: 73
End: 2018-09-05
Payer: COMMERCIAL

## 2018-09-05 ENCOUNTER — HOSPITAL ENCOUNTER (OUTPATIENT)
Dept: INFUSION CENTER | Facility: CLINIC | Age: 73
Discharge: HOME | End: 2018-09-05
Payer: COMMERCIAL

## 2018-09-05 VITALS
HEART RATE: 47 BPM | RESPIRATION RATE: 16 BRPM | SYSTOLIC BLOOD PRESSURE: 124 MMHG | DIASTOLIC BLOOD PRESSURE: 72 MMHG | TEMPERATURE: 98.4 F

## 2018-09-05 PROCEDURE — 99195 PHLEBOTOMY: CPT

## 2018-09-05 NOTE — PROGRESS NOTES
Hematocrit is 49 7 today, meets parameter for order, double checked with Suzie Ceballos RN  Patient tolerated full phlebotomy from right ac  Patient was provided with water, monitored for 30 minutes post procedure, vitals remained stable  Patient provided with AVS and left unit in stable condition

## 2018-09-27 ENCOUNTER — CONSULT (OUTPATIENT)
Dept: PAIN MEDICINE | Facility: MEDICAL CENTER | Age: 73
End: 2018-09-27
Payer: COMMERCIAL

## 2018-09-27 VITALS
WEIGHT: 174 LBS | HEART RATE: 52 BPM | HEIGHT: 68 IN | SYSTOLIC BLOOD PRESSURE: 112 MMHG | RESPIRATION RATE: 20 BRPM | BODY MASS INDEX: 26.37 KG/M2 | DIASTOLIC BLOOD PRESSURE: 70 MMHG

## 2018-09-27 DIAGNOSIS — M54.2 NECK PAIN: ICD-10-CM

## 2018-09-27 DIAGNOSIS — M47.812 SPONDYLOSIS OF CERVICAL REGION WITHOUT MYELOPATHY OR RADICULOPATHY: Primary | ICD-10-CM

## 2018-09-27 PROCEDURE — 99204 OFFICE O/P NEW MOD 45 MIN: CPT | Performed by: PHYSICAL MEDICINE & REHABILITATION

## 2018-09-27 NOTE — PROGRESS NOTES
Pt c/o neck and shoulder pain    Assessment:  1  Spondylosis of cervical region without myelopathy or radiculopathy    2  Neck pain        Plan:  1  We will schedule the patient for left C4-6 medial branch nerve blocks with intention of moving forward towards radiofrequency ablation if there is an appropriate diagnostic response  The initial blocks will be performed with 2% lidocaine and if an appropriate response is obtained upon review of the patient's pain diary, a confirmatory block will be scheduled with 0 5% bupivacaine  In the office today, we reviewed the nature of facet joint pathology in depth using a spine model  We discussed the approach we would use for the injections and provided literature for home review  The patient understands the risks associated with the procedure including bleeding, infection, tissue injury, and allergic reaction and provided verbal informed consent in the office today  2   If no relief would consider cervical spine MRI    My impressions and treatment recommendations were discussed in detail with the patient who verbalized understanding and had no further questions  Discharge instructions were provided  I personally saw and examined the patient and I agree with the above discussed plan of care  Orders Placed This Encounter   Procedures    FL spine and pain procedure     Standing Status:   Future     Standing Expiration Date:   9/27/2022     Order Specific Question:   Reason for Exam:     Answer:   (L) C4-6 MBB#1     Order Specific Question:   Anticoagulant hold needed? Answer:   no     No orders of the defined types were placed in this encounter  History of Present Illness:    Celestino Doe is a 68 y o  male Sent from Dr Dianne Sarkar for consultation regarding chronic axial neck pain  The patient has been experiencing the symptoms for about 2 years without any inciting event or trauma    He describes moderate to severe intensity pain rated as an 8/10 which is constant without any typical pattern  This is characterized as a shooting type pain in the left side of his neck towards the top of the shoulder  This is in a C4-5 and C5-6 facet referral pattern  He is unable to determine any significant aggravating or alleviating factors  Diagnostic studies include x-ray of the cervical spine  This does reveal some degenerative changes throughout the neck  He has not had any relief with current treatments  He did try physical therapy for the shoulder  He does smoke  I have personally reviewed and/or updated the patient's past medical history, past surgical history, family history, social history, current medications, allergies, and vital signs today  Review of Systems:    Review of Systems   Constitutional: Negative for fever and unexpected weight change  HENT: Negative for trouble swallowing  Eyes: Negative for visual disturbance  Respiratory: Negative for shortness of breath and wheezing  Cardiovascular: Negative for chest pain and palpitations  Gastrointestinal: Negative for constipation, diarrhea, nausea and vomiting  Endocrine: Negative for cold intolerance, heat intolerance and polydipsia  Genitourinary: Positive for difficulty urinating  Negative for frequency  Musculoskeletal: Negative for arthralgias, gait problem, joint swelling and myalgias  Skin: Negative for rash  Neurological: Negative for dizziness, seizures, syncope, weakness and headaches  Hematological: Bruises/bleeds easily  Psychiatric/Behavioral: Negative for dysphoric mood  All other systems reviewed and are negative        Patient Active Problem List   Diagnosis    Gastritis    Benign essential hypertension    Cervical radiculopathy    Coronary artery disease    Elevated prostate specific antigen (PSA)    Hyperlipidemia    Polycythemia vera ([facility] [address] )    Prediabetes    Tobacco abuse    Chronic left shoulder pain       Past Medical History: Diagnosis Date    CAD (coronary artery disease)     Last Assessed:  11/4/13    Elevated prostate specific antigen (PSA)     Last Assessed:  12/21/17    Hyperlipidemia     Last Assessed:  11/24/17    Hypertension     Benign essential, Last Assessed:  11/24/17       Past Surgical History:   Procedure Laterality Date    APPENDECTOMY      IN COLONOSCOPY FLX DX W/COLLJ SPEC WHEN PFRMD N/A 5/22/2017    Procedure: COLONOSCOPY;  Surgeon: Marbella Harrington DO;  [facility]; Service: Gastroenterology    PROSTATE BIOPSY         Family History   Problem Relation Age of Onset    No Known Problems Mother        Social History     Occupational History    Not on file  Social History Main Topics    Smoking status: Current Every Day Smoker     Packs/day: 0 50     Types: Cigars    Smokeless tobacco: Never Used    Alcohol use No    Drug use: No    Sexual activity: Not on file       Current Outpatient Prescriptions on File Prior to Visit   Medication Sig    amLODIPine (NORVASC) 10 mg tablet Take 10 mg by mouth daily    aspirin (ECOTRIN LOW STRENGTH) 81 mg EC tablet Take 81 mg by mouth daily    atorvastatin (LIPITOR) 80 mg tablet Take 80 mg by mouth daily    metoprolol tartrate (LOPRESSOR) 25 mg tablet Take 25 mg by mouth 2 (two) times a day    Multiple Vitamin (MULTIVITAMIN) capsule Take 1 capsule by mouth daily    nitroglycerin (NITROSTAT) 0 4 mg SL tablet Place 0 4 mg under the tongue every 5 (five) minutes as needed for chest pain    Omega-3 Fatty Acids (FISH OIL) 1,000 mg Take 1,200 mg by mouth daily       No current facility-administered medications on file prior to visit  No Known Allergies    Physical Exam:    /70   Pulse (!) 52   Resp 20   Ht 5' 8" (1 727 m)   Wt 78 9 kg (174 lb)   BMI 26 46 kg/m²       CERVICAL  General: Well-developed, well-nourished individual in no acute distress  Mental: Appropriate mood and affect   Grossly oriented with coherent speech and thought processing   Neuro:  Cranial nerves: Cranial nerve function is grossly intact bilaterally   Strength: Bilateral upper extremity strength is normal and symmetric   No atrophy or tone abnormalities noted   Reflexes: Bilateral upper extremity muscle stretch reflexes are physiologic and symmetric   No Robbins sign   Sensation: No loss of sensation is noted   Foraminal Compression Maneuvers:  Spurling sign is absent   Gait:  Gait/gross motor: Gait is normal  Station is normal      Musculoskeletal:  Palpation: Inspection and palpation of the spine and extremities are unremarkable   Spine: Normal pain-free range of motion except for cervical extension and rotation to the left which reproduces his neck pain  No gross axial skeletal deformities   Skin: Skin inspection grossly negative for erythema, breakdown, or concerning lesions in affected area   Lymph: No lymphadenopathy is appreciated in the involved extremity   Vessels: No lower extremity edema   Lungs: Breathing is comfortable and regular  No dyspnea noted during examination   Eyes: Visual field grossly intact to confrontation  No redness appreciated  ENT: No craniofacial deformities or asymmetry  No neck masses appreciated            Imaging      CERVICAL SPINE     INDICATION:   M54 2: Cervicalgia      COMPARISON:  None     VIEWS:  XR SPINE CERVICAL 2 OR 3 VW INJURY   Images: 5     FINDINGS:     No evidence of fracture or subluxation       There is mild to moderate multilevel degenerative disc disease with disc space loss and spondylosis most extensive at C6-C7       The prevertebral soft tissues are within normal limits        The lung apices are intact      IMPRESSION:     No acute osseous abnormality      Degenerative changes as above         Workstation performed: [id number]

## 2018-09-27 NOTE — LETTER
September 27, 2018     Maddison Noble PA-C  [place] [address] [facility]    Patient: Eduardo Orozco   YOB: 1945   Date of Visit: 9/27/2018       Dear Dr Juan Philip:    Thank you for referring Chayo Page to me for evaluation  Below are my notes for this consultation  If you have questions, please do not hesitate to call me  I look forward to following your patient along with you  Sincerely,        Carlos Manuel Vora DO        CC: MD Eufemia Baxter, DO Carlos Manuel Vora DO  9/27/2018  8:42 AM  Sign at close encounter  Pt c/o neck and shoulder pain    Assessment:  1  Spondylosis of cervical region without myelopathy or radiculopathy    2  Neck pain        Plan:  1  We will schedule the patient for left C4-6 medial branch nerve blocks with intention of moving forward towards radiofrequency ablation if there is an appropriate diagnostic response  The initial blocks will be performed with 2% lidocaine and if an appropriate response is obtained upon review of the patient's pain diary, a confirmatory block will be scheduled with 0 5% bupivacaine  In the office today, we reviewed the nature of facet joint pathology in depth using a spine model  We discussed the approach we would use for the injections and provided literature for home review  The patient understands the risks associated with the procedure including bleeding, infection, tissue injury, and allergic reaction and provided verbal informed consent in the office today  2   If no relief would consider cervical spine MRI    My impressions and treatment recommendations were discussed in detail with the patient who verbalized understanding and had no further questions  Discharge instructions were provided  I personally saw and examined the patient and I agree with the above discussed plan of care      Orders Placed This Encounter   Procedures    FL spine and pain procedure     Standing Status:   Future     Standing Expiration Date:   9/27/2022     Order Specific Question:   Reason for Exam:     Answer:   (L) C4-6 MBB#1     Order Specific Question:   Anticoagulant hold needed? Answer:   no     No orders of the defined types were placed in this encounter  History of Present Illness:    Lubna Byrne is a 68 y o  male Sent from Dr Claudetta Jersey for consultation regarding chronic axial neck pain  The patient has been experiencing the symptoms for about 2 years without any inciting event or trauma  He describes moderate to severe intensity pain rated as an 8/10 which is constant without any typical pattern  This is characterized as a shooting type pain in the left side of his neck towards the top of the shoulder  This is in a C4-5 and C5-6 facet referral pattern  He is unable to determine any significant aggravating or alleviating factors  Diagnostic studies include x-ray of the cervical spine  This does reveal some degenerative changes throughout the neck  He has not had any relief with current treatments  He did try physical therapy for the shoulder  He does smoke  I have personally reviewed and/or updated the patient's past medical history, past surgical history, family history, social history, current medications, allergies, and vital signs today  Review of Systems:    Review of Systems   Constitutional: Negative for fever and unexpected weight change  HENT: Negative for trouble swallowing  Eyes: Negative for visual disturbance  Respiratory: Negative for shortness of breath and wheezing  Cardiovascular: Negative for chest pain and palpitations  Gastrointestinal: Negative for constipation, diarrhea, nausea and vomiting  Endocrine: Negative for cold intolerance, heat intolerance and polydipsia  Genitourinary: Positive for difficulty urinating  Negative for frequency  Musculoskeletal: Negative for arthralgias, gait problem, joint swelling and myalgias  Skin: Negative for rash  Neurological: Negative for dizziness, seizures, syncope, weakness and headaches  Hematological: Bruises/bleeds easily  Psychiatric/Behavioral: Negative for dysphoric mood  All other systems reviewed and are negative  Patient Active Problem List   Diagnosis    Gastritis    Benign essential hypertension    Cervical radiculopathy    Coronary artery disease    Elevated prostate specific antigen (PSA)    Hyperlipidemia    [facility]cythemia vera ([facility] [address] )    Prediabetes    Tobacco abuse    Chronic left shoulder pain       Past Medical History:   Diagnosis Date    CAD (coronary artery disease)     Last Assessed:  11/4/13    Elevated prostate specific antigen (PSA)     Last Assessed:  12/21/17    Hyperlipidemia     Last Assessed:  11/24/17    Hypertension     Benign essential, Last Assessed:  11/24/17       Past Surgical History:   Procedure Laterality Date    APPENDECTOMY      [place] COLONOSCOPY FLX DX W/COLLJ SPEC WHEN PFRMD N/A 5/22/2017    Procedure: COLONOSCOPY;  Surgeon: Silas Carbone DO;  [facility]: [facility]; Service: Gastroenterology    PROSTATE BIOPSY         Family History   Problem Relation Age of Onset    No Known Problems Mother        Social History     Occupational History    Not on file       Social History Main Topics    Smoking status: Current Every Day Smoker     Packs/day: 0 50     Types: Cigars    Smokeless tobacco: Never Used    Alcohol use No    Drug use: No    Sexual activity: Not on file       Current Outpatient Prescriptions on File Prior to Visit   Medication Sig    amLODIPine (NORVASC) 10 mg tablet Take 10 mg by mouth daily    aspirin (ECOTRIN LOW STRENGTH) 81 mg EC tablet Take 81 mg by mouth daily    atorvastatin (LIPITOR) 80 mg tablet Take 80 mg by mouth daily    metoprolol tartrate (LOPRESSOR) 25 mg tablet Take 25 mg by mouth 2 (two) times a day    Multiple Vitamin (MULTIVITAMIN) capsule Take 1 capsule by mouth daily    nitroglycerin (NITROSTAT) 0 4 mg SL tablet Place 0 4 mg under the tongue every 5 (five) minutes as needed for chest pain    Omega-3 Fatty Acids (FISH OIL) 1,000 mg Take 1,200 mg by mouth daily       No current facility-administered medications on file prior to visit  No Known Allergies    Physical Exam:    /70   Pulse (!) 52   Resp 20   Ht 5' 8" (1 727 m)   Wt 78 9 kg (174 lb)   BMI 26 46 kg/m²        CERVICAL  General: Well-developed, well-nourished individual in no acute distress  Mental: Appropriate mood and affect  Grossly oriented with coherent speech and thought processing   Neuro:  Cranial nerves: Cranial nerve function is grossly intact bilaterally   Strength: Bilateral upper extremity strength is normal and symmetric   No atrophy or tone abnormalities noted   Reflexes: Bilateral upper extremity muscle stretch reflexes are physiologic and symmetric   No Robbins sign   Sensation: No loss of sensation is noted   Foraminal Compression Maneuvers:  Spurling sign is absent   Gait:  Gait/gross motor: Gait is normal  Station is normal      Musculoskeletal:  Palpation: Inspection and palpation of the spine and extremities are unremarkable   Spine: Normal pain-free range of motion except for cervical extension and rotation to the left which reproduces his neck pain  No gross axial skeletal deformities   Skin: Skin inspection grossly negative for erythema, breakdown, or concerning lesions in affected area   Lymph: No lymphadenopathy is appreciated in the involved extremity   Vessels: No lower extremity edema   Lungs: Breathing is comfortable and regular  No dyspnea noted during examination   Eyes: Visual field grossly intact to confrontation  No redness appreciated  ENT: No craniofacial deformities or asymmetry  No neck masses appreciated            Imaging      CERVICAL SPINE     INDICATION:   M54 2: Cervicalgia      COMPARISON:  None     VIEWS:  XR SPINE CERVICAL 2 OR 3 VW INJURY   Images: 5     FINDINGS:     No evidence of fracture or subluxation       There is mild to moderate multilevel degenerative disc disease with disc space loss and spondylosis most extensive at C6-C7       The prevertebral soft tissues are within normal limits        The lung apices are intact      IMPRESSION:     No acute osseous abnormality      Degenerative changes as above         Workstation performed: [id number]

## 2018-10-03 ENCOUNTER — HOSPITAL ENCOUNTER (OUTPATIENT)
Dept: INFUSION CENTER | Facility: CLINIC | Age: 73
Discharge: HOME | End: 2018-10-03
Payer: COMMERCIAL

## 2018-10-03 ENCOUNTER — APPOINTMENT (OUTPATIENT)
Dept: LAB | Facility: CLINIC | Age: 73
End: 2018-10-03
Payer: COMMERCIAL

## 2018-10-03 VITALS
HEART RATE: 50 BPM | SYSTOLIC BLOOD PRESSURE: 135 MMHG | RESPIRATION RATE: 18 BRPM | TEMPERATURE: 97.5 F | DIASTOLIC BLOOD PRESSURE: 82 MMHG

## 2018-10-03 DIAGNOSIS — D45 CHRONIC ERYTHREMIA IN REMISSION (HCC): Primary | ICD-10-CM

## 2018-10-03 LAB
BASOPHILS # BLD AUTO: 0.05 THOUSANDS/ΜL (ref 0–0.1)
BASOPHILS NFR BLD AUTO: 0 % (ref 0–1)
EOSINOPHIL # BLD AUTO: 0.88 THOUSAND/ΜL (ref 0–0.61)
EOSINOPHIL NFR BLD AUTO: 7 % (ref 0–6)
ERYTHROCYTE [DISTWIDTH] IN BLOOD BY AUTOMATED COUNT: 19.2 % (ref 11.6–15.1)
HCT VFR BLD AUTO: 46 % (ref 36.5–49.3)
HGB BLD-MCNC: 14.1 G/DL (ref 12–17)
IMM GRANULOCYTES # BLD AUTO: 0.06 THOUSAND/UL (ref 0–0.2)
IMM GRANULOCYTES NFR BLD AUTO: 1 % (ref 0–2)
LYMPHOCYTES # BLD AUTO: 1.62 THOUSANDS/ΜL (ref 0.6–4.47)
LYMPHOCYTES NFR BLD AUTO: 13 % (ref 14–44)
MCH RBC QN AUTO: 24.1 PG (ref 26.8–34.3)
MCHC RBC AUTO-ENTMCNC: 30.7 G/DL (ref 31.4–37.4)
MCV RBC AUTO: 79 FL (ref 82–98)
MONOCYTES # BLD AUTO: 0.84 THOUSAND/ΜL (ref 0.17–1.22)
MONOCYTES NFR BLD AUTO: 7 % (ref 4–12)
NEUTROPHILS # BLD AUTO: 9.03 THOUSANDS/ΜL (ref 1.85–7.62)
NEUTS SEG NFR BLD AUTO: 72 % (ref 43–75)
NRBC BLD AUTO-RTO: 0 /100 WBCS
PLATELET # BLD AUTO: 262 THOUSANDS/UL (ref 149–390)
PMV BLD AUTO: 10.4 FL (ref 8.9–12.7)
RBC # BLD AUTO: 5.86 MILLION/UL (ref 3.88–5.62)
WBC # BLD AUTO: 12.48 THOUSAND/UL (ref 4.31–10.16)

## 2018-10-03 PROCEDURE — 85025 COMPLETE CBC W/AUTO DIFF WBC: CPT

## 2018-10-03 PROCEDURE — 36415 COLL VENOUS BLD VENIPUNCTURE: CPT

## 2018-10-03 NOTE — PROGRESS NOTES
CBC machine is down [facility], has things he needs to get done at home and is unable to come back later today  Patient reports he will skip this month and return for his next month's appointment  America Raman RN made aware and will inform Dr Christen Toure

## 2018-10-10 ENCOUNTER — HOSPITAL ENCOUNTER (OUTPATIENT)
Dept: RADIOLOGY | Facility: MEDICAL CENTER | Age: 73
Discharge: HOME | End: 2018-10-10
Admitting: PHYSICAL MEDICINE & REHABILITATION
Payer: COMMERCIAL

## 2018-10-10 VITALS
RESPIRATION RATE: 20 BRPM | TEMPERATURE: 97.8 F | OXYGEN SATURATION: 99 % | HEART RATE: 55 BPM | DIASTOLIC BLOOD PRESSURE: 78 MMHG | SYSTOLIC BLOOD PRESSURE: 130 MMHG

## 2018-10-10 DIAGNOSIS — M54.2 NECK PAIN: ICD-10-CM

## 2018-10-10 DIAGNOSIS — M47.812 SPONDYLOSIS OF CERVICAL REGION WITHOUT MYELOPATHY OR RADICULOPATHY: ICD-10-CM

## 2018-10-10 PROCEDURE — 64490 INJ PARAVERT F JNT C/T 1 LEV: CPT | Performed by: PHYSICAL MEDICINE & REHABILITATION

## 2018-10-10 PROCEDURE — 64491 INJ PARAVERT F JNT C/T 2 LEV: CPT | Performed by: PHYSICAL MEDICINE & REHABILITATION

## 2018-10-10 RX ADMIN — IOHEXOL 0.75 ML: 300 INJECTION, SOLUTION INTRAVENOUS at 10:02

## 2018-10-10 RX ADMIN — Medication 1.5 ML: at 10:02

## 2018-10-10 NOTE — DISCHARGE INSTRUCTIONS

## 2018-10-10 NOTE — H&P
History of Present Illness: The patient is a 68 y o  male who presents with complaints of  Left-sided neck pain    Patient Active Problem List   Diagnosis    Gastritis    Benign essential hypertension    Cervical radiculopathy    Coronary artery disease    Elevated prostate specific antigen (PSA)    Hyperlipidemia    Polycythemia vera ([address] )    Prediabetes    Tobacco abuse    Chronic left shoulder pain       Past Medical History:   Diagnosis Date    CAD (coronary artery disease)     Last Assessed:  11/4/13    Elevated prostate specific antigen (PSA)     Last Assessed:  12/21/17    Hyperlipidemia     Last Assessed:  11/24/17    Hypertension     Benign essential, Last Assessed:  11/24/17       Past Surgical History:   Procedure Laterality Date    APPENDECTOMY      [place] COLONOSCOPY FLX DX W/COLLJ SPEC WHEN PFRMD N/A 5/22/2017    Procedure: COLONOSCOPY;  Surgeon: Vicente Sloan DO;  [facility];   Service: Gastroenterology    PROSTATE BIOPSY           Current Outpatient Prescriptions:     amLODIPine (NORVASC) 10 mg tablet, Take 10 mg by mouth daily, Disp: , Rfl:     aspirin (ECOTRIN LOW STRENGTH) 81 mg EC tablet, Take 81 mg by mouth daily, Disp: , Rfl:     atorvastatin (LIPITOR) 80 mg tablet, Take 80 mg by mouth daily, Disp: , Rfl:     metoprolol tartrate (LOPRESSOR) 25 mg tablet, Take 50 mg by mouth 2 (two) times a day  , Disp: , Rfl:     Multiple Vitamin (MULTIVITAMIN) capsule, Take 1 capsule by mouth daily, Disp: , Rfl:     nitroglycerin (NITROSTAT) 0 4 mg SL tablet, Place 0 4 mg under the tongue every 5 (five) minutes as needed for chest pain, Disp: , Rfl:     Omega-3 Fatty Acids (FISH OIL) 1,000 mg, Take 1,200 mg by mouth daily  , Disp: , Rfl:     No Known Allergies    Physical Exam:   Vitals:    10/10/18 0944   BP: 119/74   Pulse: 57   Resp: 20   Temp: 97 8 °F (36 6 °C)   SpO2: 97%     General: Awake, Alert, Oriented x 3, Mood and affect appropriate  Respiratory: Respirations even and unlabored  Cardiovascular: Peripheral pulses intact; no edema  Musculoskeletal Exam:  Left-sided neck pain    ASA Score:  2  Patient/Chart Verification  Patient ID Verified: Verbal  ID Band Applied: No  Consents Confirmed: Procedural, To be obtained in the Pre-Procedure area  H&P( within 30 days) Verified: To be obtained in the Pre-Procedure area  Interval H&P(within 24 hr) Complete (required for Outpatients and Surgery Admit only): To be obtained in the Pre-Procedure area  Allergies Reviewed: Yes  Anticoag/NSAID held?: NA  Currently on antibiotics?: No    Assessment:   1  Neck pain    2   Spondylosis of cervical region without myelopathy or radiculopathy        Plan: (L) C4-6 MBB#1

## 2018-10-13 ENCOUNTER — TRANSCRIBE ORDERS (OUTPATIENT)
Dept: PAIN MEDICINE | Facility: MEDICAL CENTER | Age: 73
End: 2018-10-13

## 2018-10-13 DIAGNOSIS — M54.2 NECK PAIN: Primary | ICD-10-CM

## 2018-10-13 DIAGNOSIS — M47.812 SPONDYLOSIS OF CERVICAL REGION WITHOUT MYELOPATHY OR RADICULOPATHY: ICD-10-CM

## 2018-10-31 ENCOUNTER — HOSPITAL ENCOUNTER (OUTPATIENT)
Dept: INFUSION CENTER | Facility: CLINIC | Age: 73
Discharge: HOME | End: 2018-10-31
Payer: COMMERCIAL

## 2018-10-31 VITALS
SYSTOLIC BLOOD PRESSURE: 135 MMHG | HEART RATE: 52 BPM | DIASTOLIC BLOOD PRESSURE: 81 MMHG | TEMPERATURE: 98.1 F | RESPIRATION RATE: 18 BRPM

## 2018-10-31 DIAGNOSIS — D45 POLYCYTHEMIA VERA (HCC): ICD-10-CM

## 2018-10-31 LAB
ANISOCYTOSIS BLD QL SMEAR: PRESENT
BASOPHILS # BLD AUTO: 0 THOUSAND/UL (ref 0–0.1)
BASOPHILS NFR MAR MANUAL: 0 % (ref 0–1)
EOSINOPHIL # BLD AUTO: 0.66 THOUSAND/UL (ref 0–0.61)
EOSINOPHIL NFR BLD MANUAL: 4 % (ref 0–6)
ERYTHROCYTE [DISTWIDTH] IN BLOOD BY AUTOMATED COUNT: 19.7 % (ref 11.6–15.1)
HCT VFR BLD AUTO: 43.7 % (ref 36.5–49.3)
HGB BLD-MCNC: 14.5 G/DL (ref 12–17)
LYMPHOCYTES # BLD AUTO: 1.48 THOUSAND/UL (ref 0.6–4.47)
LYMPHOCYTES # BLD AUTO: 9 % (ref 14–44)
MCH RBC QN AUTO: 25.4 PG (ref 26.8–34.3)
MCHC RBC AUTO-ENTMCNC: 33.1 G/DL (ref 31.4–37.4)
MCV RBC AUTO: 77 FL (ref 82–98)
MONOCYTES # BLD AUTO: 0.49 THOUSAND/UL (ref 0–1.22)
MONOCYTES NFR BLD AUTO: 3 % (ref 4–12)
NEUTS BAND NFR BLD MANUAL: 0 % (ref 0–8)
NEUTS SEG # BLD: 13.78 THOUSAND/UL (ref 1.81–6.82)
NEUTS SEG NFR BLD AUTO: 84 % (ref 43–75)
OVALOCYTES BLD QL SMEAR: PRESENT
PLATELET # BLD AUTO: 279 THOUSANDS/UL (ref 149–390)
PLATELET BLD QL SMEAR: ADEQUATE
PMV BLD AUTO: 8.3 FL (ref 8.9–12.7)
RBC # BLD AUTO: 5.69 MILLION/UL (ref 3.88–5.62)
TOTAL CELLS COUNTED SPEC: 100
WBC # BLD AUTO: 16.4 THOUSAND/UL (ref 4.31–10.16)
WBC NRBC COR # BLD: 16.4 THOUSAND/UL (ref 4.31–10.16)

## 2018-10-31 PROCEDURE — 85027 COMPLETE CBC AUTOMATED: CPT

## 2018-10-31 PROCEDURE — 99195 PHLEBOTOMY: CPT

## 2018-10-31 PROCEDURE — 85007 BL SMEAR W/DIFF WBC COUNT: CPT

## 2018-10-31 NOTE — PROGRESS NOTES
Patient's Hematocrit today is 43 7, does not require phlebotomy per order parameters, double checked with Humaira Harp, RN  Patient provided with AVS and left unit in stable condition

## 2018-11-07 LAB — HBA1C MFR BLD HPLC: 5.9 %

## 2018-11-13 ENCOUNTER — RX ONLY (RX ONLY)
Age: 73
End: 2018-11-13

## 2018-11-13 ENCOUNTER — DOCTOR'S OFFICE (OUTPATIENT)
Dept: URBAN - METROPOLITAN AREA CLINIC 136 | Facility: CLINIC | Age: 73
Setting detail: OPHTHALMOLOGY
End: 2018-11-13
Payer: COMMERCIAL

## 2018-11-13 ENCOUNTER — TELEPHONE (OUTPATIENT)
Dept: FAMILY MEDICINE CLINIC | Facility: CLINIC | Age: 73
End: 2018-11-13

## 2018-11-13 DIAGNOSIS — H25.13: ICD-10-CM

## 2018-11-13 DIAGNOSIS — H43.812: ICD-10-CM

## 2018-11-13 PROCEDURE — 99202 OFFICE O/P NEW SF 15 MIN: CPT | Performed by: OPTOMETRIST

## 2018-11-13 ASSESSMENT — REFRACTION_MANIFEST
OD_VA3: 20/
OD_VA3: 20/
OD_VA2: 20/
OD_VA1: 20/
OS_VA3: 20/
OS_VA2: 20/
OS_VA3: 20/
OU_VA: 20/
OS_VA2: 20/
OU_VA: 20/
OS_VA1: 20/
OD_VA1: 20/
OS_VA1: 20/
OD_VA2: 20/

## 2018-11-13 ASSESSMENT — VISUAL ACUITY
OS_BCVA: 20/40-1
OD_BCVA: 20/40-1

## 2018-11-13 ASSESSMENT — REFRACTION_AUTOREFRACTION
OS_CYLINDER: -0.75
OS_AXIS: 67
OD_AXIS: 141
OD_CYLINDER: -0.50
OD_SPHERE: +1.25
OS_SPHERE: +1.00

## 2018-11-13 ASSESSMENT — REFRACTION_CURRENTRX
OD_OVR_VA: 20/
OS_OVR_VA: 20/
OD_OVR_VA: 20/
OS_OVR_VA: 20/
OS_OVR_VA: 20/
OD_OVR_VA: 20/

## 2018-11-13 ASSESSMENT — CONFRONTATIONAL VISUAL FIELD TEST (CVF)
OS_FINDINGS: FULL
OD_FINDINGS: FULL

## 2018-11-13 ASSESSMENT — SPHEQUIV_DERIVED
OS_SPHEQUIV: 0.625
OD_SPHEQUIV: 1

## 2018-11-14 ENCOUNTER — HOSPITAL ENCOUNTER (OUTPATIENT)
Dept: RADIOLOGY | Facility: MEDICAL CENTER | Age: 73
Discharge: HOME | End: 2018-11-14
Admitting: PHYSICAL MEDICINE & REHABILITATION
Payer: COMMERCIAL

## 2018-11-14 ENCOUNTER — OFFICE VISIT (OUTPATIENT)
Dept: FAMILY MEDICINE CLINIC | Facility: CLINIC | Age: 73
End: 2018-11-14
Payer: COMMERCIAL

## 2018-11-14 VITALS
BODY MASS INDEX: 27.08 KG/M2 | DIASTOLIC BLOOD PRESSURE: 80 MMHG | RESPIRATION RATE: 18 BRPM | WEIGHT: 178.1 LBS | TEMPERATURE: 97.6 F | SYSTOLIC BLOOD PRESSURE: 122 MMHG | OXYGEN SATURATION: 98 % | HEART RATE: 53 BPM

## 2018-11-14 VITALS
HEART RATE: 51 BPM | TEMPERATURE: 97.5 F | RESPIRATION RATE: 18 BRPM | SYSTOLIC BLOOD PRESSURE: 137 MMHG | OXYGEN SATURATION: 98 % | DIASTOLIC BLOOD PRESSURE: 85 MMHG

## 2018-11-14 DIAGNOSIS — Z72.0 TOBACCO ABUSE: Primary | ICD-10-CM

## 2018-11-14 DIAGNOSIS — R97.20 ELEVATED PROSTATE SPECIFIC ANTIGEN (PSA): ICD-10-CM

## 2018-11-14 DIAGNOSIS — M54.2 NECK PAIN: ICD-10-CM

## 2018-11-14 DIAGNOSIS — I25.10 CORONARY ARTERY DISEASE WITHOUT ANGINA PECTORIS, UNSPECIFIED VESSEL OR LESION TYPE, UNSPECIFIED WHETHER NATIVE OR TRANSPLANTED HEART: ICD-10-CM

## 2018-11-14 DIAGNOSIS — I10 BENIGN ESSENTIAL HYPERTENSION: ICD-10-CM

## 2018-11-14 DIAGNOSIS — Z12.5 PROSTATE CANCER SCREENING: ICD-10-CM

## 2018-11-14 DIAGNOSIS — E78.2 MIXED HYPERLIPIDEMIA: ICD-10-CM

## 2018-11-14 DIAGNOSIS — R73.03 PREDIABETES: ICD-10-CM

## 2018-11-14 DIAGNOSIS — M47.812 SPONDYLOSIS OF CERVICAL REGION WITHOUT MYELOPATHY OR RADICULOPATHY: ICD-10-CM

## 2018-11-14 DIAGNOSIS — D45 POLYCYTHEMIA VERA (HCC): ICD-10-CM

## 2018-11-14 PROCEDURE — 64491 INJ PARAVERT F JNT C/T 2 LEV: CPT | Performed by: PHYSICAL MEDICINE & REHABILITATION

## 2018-11-14 PROCEDURE — 99214 OFFICE O/P EST MOD 30 MIN: CPT | Performed by: FAMILY MEDICINE

## 2018-11-14 PROCEDURE — 64490 INJ PARAVERT F JNT C/T 1 LEV: CPT | Performed by: PHYSICAL MEDICINE & REHABILITATION

## 2018-11-14 RX ORDER — BUPIVACAINE HYDROCHLORIDE 5 MG/ML
10 INJECTION, SOLUTION EPIDURAL; INTRACAUDAL ONCE
Status: COMPLETED | OUTPATIENT
Start: 2018-11-14 | End: 2018-11-14

## 2018-11-14 RX ADMIN — BUPIVACAINE HYDROCHLORIDE 1.5 ML: 5 INJECTION, SOLUTION EPIDURAL; INTRACAUDAL at 15:24

## 2018-11-14 NOTE — ASSESSMENT & PLAN NOTE
Patient still smoking approximately 1/2 pack per day  Counseled again today    Rx given for repeat low radiation CT scan of the chest

## 2018-11-14 NOTE — ASSESSMENT & PLAN NOTE
This was being followed by his urologist Dr Marcy Conroy   Patient recently told me he needed to find a new urologist due to insurance reasons    Will check PSA prior to next appointment and decide on further referral at that time

## 2018-11-14 NOTE — ASSESSMENT & PLAN NOTE
Status post PCI  Patient denies any chest pain shortness of breath    Continue regular follow-up with his cardiologist, Dr Eleanor Johns

## 2018-11-14 NOTE — PROGRESS NOTES
Assessment/Plan:    Benign essential hypertension    Reasonably controlled on amlodipine 10 and metoprolol 25 b i d  Coronary artery disease   Status post PCI  Patient denies any chest pain shortness of breath  Continue regular follow-up with his cardiologist, Dr Cassandra Byers    Elevated prostate specific antigen (PSA)     This was being followed by his urologist Dr Sean Jorgensen   Patient recently told me he needed to find a new urologist due to insurance reasons  Will check PSA prior to next appointment and decide on further referral at that time    Hyperlipidemia   Cholesterol 111/63  Doing well on atorvastatin 80    Polycythemia vera (HCC)   Stable hemoglobin and hematocrit  Patient with regular phlebotomy  Continue regular follow-up with his  hematologist    Prediabetes   Blood sugar 89  Continue reduced carb diet  Will continue to monitor    Tobacco abuse   Patient still smoking approximately 1/2 pack per day  Counseled again today  Rx given for repeat low radiation CT scan of the chest     patient continues to refuse all age appropriate vaccinations   6 months, fasting blood work prior at [facility] [place]      Subjective:      Patient ID: Suzie Levy is a 68 y o  male  Recheck of chronic medical problems today  Patient had labs drawn at [facility] labs on November 7th  Doing well on blood pressure medications, amlodipine and metoprolol  Doing well on atorvastatin for high cholesterol  Still sees hematologist regularly for his polycythemia  Patient is still smoking  The following portions of the patient's history were reviewed and updated as appropriate: allergies, current medications, past family history, past medical history, past social history, past surgical history and problem list     Review of Systems   Respiratory: Negative  Cardiovascular: Negative  Gastrointestinal: Negative  Genitourinary: Negative            Objective:      /80 (BP Location: Left arm, Patient Position: Sitting, Cuff Size: Large)   Pulse (!) 53   Temp 97 6 °F (36 4 °C) (Tympanic)   Resp 18   Wt 80 8 kg (178 lb 1 6 oz)   SpO2 98%   BMI 27 08 kg/m²          Physical Exam   Cardiovascular: Normal rate, regular rhythm, normal heart sounds and intact distal pulses  Carotids: no bruits  Ext: no edema   Pulmonary/Chest: Effort normal  No respiratory distress  He has no wheezes  He has no rales  Psychiatric: He has a normal mood and affect   His behavior is normal  Thought content normal

## 2018-11-14 NOTE — DISCHARGE INSTRUCTIONS

## 2018-11-14 NOTE — H&P
History of Present Illness: The patient is a 68 y o  male who presents with complaints of  Left-sided neck pain    Patient Active Problem List   Diagnosis    Gastritis    Benign essential hypertension    Cervical radiculopathy    Coronary artery disease    Elevated prostate specific antigen (PSA)    Hyperlipidemia    Polycythemia vera ([address] )    Prediabetes    Tobacco abuse    Chronic left shoulder pain    Neck pain    Spondylosis of cervical region without myelopathy or radiculopathy       Past Medical History:   Diagnosis Date    CAD (coronary artery disease)     Last Assessed:  11/4/13    Elevated prostate specific antigen (PSA)     Last Assessed:  12/21/17    Hyperlipidemia     Last Assessed:  11/24/17    Hypertension     Benign essential, Last Assessed:  11/24/17       Past Surgical History:   Procedure Laterality Date    APPENDECTOMY      MT COLONOSCOPY FLX DX W/COLLJ SPEC WHEN PFRMD N/A 5/22/2017    Procedure: COLONOSCOPY;  Surgeon: Sanjay Membreno DO;  Location: [facility];   Service: Gastroenterology    PROSTATE BIOPSY           Current Outpatient Prescriptions:     amLODIPine (NORVASC) 10 mg tablet, Take 10 mg by mouth daily, Disp: , Rfl:     aspirin (ECOTRIN LOW STRENGTH) 81 mg EC tablet, Take 81 mg by mouth daily, Disp: , Rfl:     atorvastatin (LIPITOR) 80 mg tablet, Take 80 mg by mouth daily, Disp: , Rfl:     metoprolol tartrate (LOPRESSOR) 25 mg tablet, Take 50 mg by mouth 2 (two) times a day  , Disp: , Rfl:     Multiple Vitamin (MULTIVITAMIN) capsule, Take 1 capsule by mouth daily, Disp: , Rfl:     nitroglycerin (NITROSTAT) 0 4 mg SL tablet, Place 0 4 mg under the tongue every 5 (five) minutes as needed for chest pain, Disp: , Rfl:     Omega-3 Fatty Acids (FISH OIL) 1200 MG CAPS, Take 1,200 mg by mouth 2 (two) times a day  , Disp: , Rfl:     Current Facility-Administered Medications:     bupivacaine (PF) (MARCAINE) 0 5 % injection 10 mL, 10 mL, Injection, Once, Adis Diggs DO    No Known Allergies    Physical Exam:   Vitals:    11/14/18 1504   BP: 148/87   Pulse: (!) 52   Resp: 20   Temp: 97 5 °F (36 4 °C)   SpO2: 99%     General: Awake, Alert, Oriented x 3, Mood and affect appropriate  Respiratory: Respirations even and unlabored  Cardiovascular: Peripheral pulses intact; no edema  Musculoskeletal Exam:  Left-sided neck pain    ASA Score:  2  Patient/Chart Verification  Patient ID Verified: Verbal  Consents Confirmed: Procedural, To be obtained in the Pre-Procedure area  H&P( within 30 days) Verified: To be obtained in the Pre-Procedure area  Interval H&P(within 24 hr) Complete (required for Outpatients and Surgery Admit only): To be obtained in the Pre-Procedure area  Allergies Reviewed: Yes  Anticoag/NSAID held?: No  Currently on antibiotics?: No  Pregnancy denied?: NA    Assessment:   1  Neck pain    2  Spondylosis of cervical region without myelopathy or radiculopathy        Plan: neck pain    left C4-6 medial branch block #2

## 2018-11-14 NOTE — ASSESSMENT & PLAN NOTE
Stable hemoglobin and hematocrit  Patient with regular phlebotomy    Continue regular follow-up with his  hematologist

## 2018-11-21 ENCOUNTER — APPOINTMENT (OUTPATIENT)
Dept: LAB | Facility: CLINIC | Age: 73
End: 2018-11-21
Payer: COMMERCIAL

## 2018-11-21 DIAGNOSIS — D72.829 ELEVATED WHITE BLOOD CELL COUNT: ICD-10-CM

## 2018-11-21 DIAGNOSIS — D45 POLYCYTHEMIA VERA (HCC): ICD-10-CM

## 2018-11-21 LAB
ALBUMIN SERPL BCP-MCNC: 3.9 G/DL (ref 3.5–5.7)
ALP SERPL-CCNC: 146 U/L (ref 46–116)
ALT SERPL W P-5'-P-CCNC: 29 U/L (ref 12–78)
ANION GAP SERPL CALCULATED.3IONS-SCNC: 9 MMOL/L (ref 4–13)
AST SERPL W P-5'-P-CCNC: 40 U/L (ref 5–45)
BILIRUB SERPL-MCNC: 0.8 MG/DL (ref 0.2–1)
BUN SERPL-MCNC: 14 MG/DL (ref 5–25)
CALCIUM SERPL-MCNC: 9.6 MG/DL (ref 8.3–10.1)
CHLORIDE SERPL-SCNC: 106 MMOL/L (ref 98–108)
CO2 SERPL-SCNC: 28 MMOL/L (ref 21–32)
CREAT SERPL-MCNC: 1.4 MG/DL (ref 0.6–1.3)
GFR SERPL CREATININE-BSD FRML MDRD: 50 ML/MIN/1.73SQ M
GLUCOSE P FAST SERPL-MCNC: 104 MG/DL (ref 65–99)
LDH SERPL-CCNC: 228 U/L (ref 81–234)
POTASSIUM SERPL-SCNC: 4.3 MMOL/L (ref 3.5–5.3)
PROT SERPL-MCNC: 7.9 G/DL (ref 6.4–8.2)
SODIUM SERPL-SCNC: 143 MMOL/L (ref 136–145)

## 2018-11-21 PROCEDURE — 36415 COLL VENOUS BLD VENIPUNCTURE: CPT

## 2018-11-21 PROCEDURE — 83615 LACTATE (LD) (LDH) ENZYME: CPT

## 2018-11-21 PROCEDURE — 80053 COMPREHEN METABOLIC PANEL: CPT

## 2018-11-23 ENCOUNTER — OFFICE VISIT (OUTPATIENT)
Dept: HEMATOLOGY ONCOLOGY | Facility: CLINIC | Age: 73
End: 2018-11-23
Payer: COMMERCIAL

## 2018-11-23 VITALS
SYSTOLIC BLOOD PRESSURE: 124 MMHG | OXYGEN SATURATION: 98 % | WEIGHT: 179 LBS | RESPIRATION RATE: 18 BRPM | HEART RATE: 49 BPM | DIASTOLIC BLOOD PRESSURE: 76 MMHG | HEIGHT: 68 IN | TEMPERATURE: 97.3 F | BODY MASS INDEX: 27.13 KG/M2

## 2018-11-23 DIAGNOSIS — D45 POLYCYTHEMIA VERA (HCC): Primary | ICD-10-CM

## 2018-11-23 DIAGNOSIS — R97.20 ELEVATED PROSTATE SPECIFIC ANTIGEN (PSA): ICD-10-CM

## 2018-11-23 PROCEDURE — 4040F PNEUMOC VAC/ADMIN/RCVD: CPT | Performed by: INTERNAL MEDICINE

## 2018-11-23 PROCEDURE — 99214 OFFICE O/P EST MOD 30 MIN: CPT | Performed by: INTERNAL MEDICINE

## 2018-11-23 NOTE — PROGRESS NOTES
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 and mild leukocytosis  At presentation on  August 3, 2017 hemoglobin 18 9 and hematocrit 55 7%  (In retrospect he has had erythrocytosis and mild leukocytosis dating back to at least May 1, 2013 i e  hemoglobin 19 0 and hematocrit 58 1% and WBC 12 2) Therapeutic phlebotomy initiated October 25, 2017, 11 units thus far, most recently on October 3, 2018     2  Two sessile polyps in the descending colon were removed by cold biopsy polypectomy on colonoscopy May 22, 2017, consistent with tubular adenoma      Review of systems:      Constitutional:  He has been feeling well, he denies chills or sweats  HEENT:  He denies nose bleeds  He denies oral cavity or throat soreness  Cardiovascular:  He denies chest pain, there has been no edema  Respiratory:  See HPI  GI:  His appetite has been good  No abdominal pain  Bowel habits have been formed and regular  :  He denies urinary frequency  Musculoskeletal:   See HPI  He denies back pain or joint pain otherwise  Skin:  He denies rash  Neurologic:  He denies headache or paresthesias  He denies difficulty walking  Psychiatric:  He denies emotional problems  Hematologic:  He denies easy bruising  Physical Examination:    Blood pressure 124/76, pulse (!) 49, temperature (!) 97 3 °F (36 3 °C), resp  rate 18, height 5' 7 5" (1 715 m), weight 81 2 kg (179 lb), SpO2 98 %  Body surface area is 1 94 meters squared      He appears well    EOMI  Oropharynx clear  No lymphadenopathy of the neck  No axillary lymphadenopathy  Lungs are clear bilaterally  Heart has regular rate and rhythm  No hepatic or splenic enlargement  No inguinal lymphadenopathy  No lower extremity edema  Gait and station are normal    Neurological is grossly intact  Oriented x3, normal mood and affect      ECOG 0     Laboratory:    From October 31, 2018:  WBC is 16 40, hemoglobin 14 5, hematocrit 43 7% with MCV 77, platelets 259, on WBC differential neutrophils 84%, lymphs 9%, monos 3%, eos 4%    From November 21, 2018:  Creatinine is 1 40, AST 40, ALT 29, alk phos 146 (), bili 0 8,  ()  Assessment/Plan:    Erythrocytosis related to polycythemia vera is now optimally controlled  Therapeutic phlebotomy every 8 weeks is to be continued with the goal of maintaining hematocrit less than 45% to reduce risk of stroke and other thrombotic events  The patient is in agreement with therapeutic phlebotomy  In addition based upon elevated WBC and age > 68 hydroxyurea is recommended     Again, the patient prefers to avoid taking another medication at this time        He is aware seek medical attention for persistent or progressive cough, headache, pain or swelling in the legs, chest pain, shortness of breath, back pain, excessive fatigue, or if other new problems arise Otherwise, I plan to see him again in 6 months

## 2018-11-28 ENCOUNTER — HOSPITAL ENCOUNTER (OUTPATIENT)
Dept: INFUSION CENTER | Facility: CLINIC | Age: 73
Discharge: HOME | End: 2018-11-28
Payer: COMMERCIAL

## 2018-11-28 ENCOUNTER — APPOINTMENT (OUTPATIENT)
Dept: LAB | Facility: CLINIC | Age: 73
End: 2018-11-28
Payer: COMMERCIAL

## 2018-11-28 ENCOUNTER — TELEPHONE (OUTPATIENT)
Dept: RADIOLOGY | Facility: MEDICAL CENTER | Age: 73
End: 2018-11-28

## 2018-11-28 VITALS
DIASTOLIC BLOOD PRESSURE: 79 MMHG | HEART RATE: 55 BPM | TEMPERATURE: 97.4 F | SYSTOLIC BLOOD PRESSURE: 128 MMHG | RESPIRATION RATE: 18 BRPM

## 2018-11-28 LAB
ANISOCYTOSIS BLD QL SMEAR: PRESENT
BASOPHILS # BLD AUTO: 0 THOUSAND/UL (ref 0–0.1)
BASOPHILS NFR MAR MANUAL: 0 % (ref 0–1)
EOSINOPHIL # BLD AUTO: 0.81 THOUSAND/UL (ref 0–0.61)
EOSINOPHIL NFR BLD MANUAL: 7 % (ref 0–6)
ERYTHROCYTE [DISTWIDTH] IN BLOOD BY AUTOMATED COUNT: 19.7 % (ref 11.6–15.1)
HCT VFR BLD AUTO: 47 % (ref 36.5–49.3)
HGB BLD-MCNC: 15.4 G/DL (ref 12–17)
LG PLATELETS BLD QL SMEAR: PRESENT
LYMPHOCYTES # BLD AUTO: 1.5 THOUSAND/UL (ref 0.6–4.47)
LYMPHOCYTES # BLD AUTO: 13 % (ref 14–44)
MCH RBC QN AUTO: 25.5 PG (ref 26.8–34.3)
MCHC RBC AUTO-ENTMCNC: 32.8 G/DL (ref 31.4–37.4)
MCV RBC AUTO: 78 FL (ref 82–98)
MONOCYTES # BLD AUTO: 0.69 THOUSAND/UL (ref 0–1.22)
MONOCYTES NFR BLD AUTO: 6 % (ref 4–12)
NEUTS BAND NFR BLD MANUAL: 0 % (ref 0–8)
NEUTS SEG # BLD: 8.51 THOUSAND/UL (ref 1.81–6.82)
NEUTS SEG NFR BLD AUTO: 74 % (ref 43–75)
OVALOCYTES BLD QL SMEAR: PRESENT
PLATELET # BLD AUTO: 261 THOUSANDS/UL (ref 149–390)
PLATELET BLD QL SMEAR: ADEQUATE
PMV BLD AUTO: 8.1 FL (ref 8.9–12.7)
RBC # BLD AUTO: 6.04 MILLION/UL (ref 3.88–5.62)
TOTAL CELLS COUNTED SPEC: 100
WBC # BLD AUTO: 11.5 THOUSAND/UL (ref 4.31–10.16)
WBC NRBC COR # BLD: 11.5 THOUSAND/UL (ref 4.31–10.16)

## 2018-11-28 PROCEDURE — 99195 PHLEBOTOMY: CPT

## 2018-11-28 PROCEDURE — 36415 COLL VENOUS BLD VENIPUNCTURE: CPT | Performed by: INTERNAL MEDICINE

## 2018-11-28 PROCEDURE — 85007 BL SMEAR W/DIFF WBC COUNT: CPT | Performed by: INTERNAL MEDICINE

## 2018-11-28 PROCEDURE — 85027 COMPLETE CBC AUTOMATED: CPT | Performed by: INTERNAL MEDICINE

## 2018-11-28 NOTE — PROGRESS NOTES
Pt here for therapuetic phlebotomy  Hematocrit 47  Double checked with Ramana Mcclendon RN  Phlebotomy performed via the right antecubital vein without difficulty  450 ml removed during phlebotomy  Pt was given water to drink  Pt to be observed 15 minutes post phlebotomy

## 2018-11-28 NOTE — PROGRESS NOTES
Pt states he is to come for phlebotomy every 8 weeks which does match the office note written by Dr Adia Bond on 11/23/18

## 2018-12-12 ENCOUNTER — HOSPITAL ENCOUNTER (OUTPATIENT)
Dept: CT IMAGING | Facility: HOSPITAL | Age: 73
Discharge: HOME | End: 2018-12-12
Payer: COMMERCIAL

## 2018-12-12 DIAGNOSIS — Z72.0 TOBACCO ABUSE: ICD-10-CM

## 2018-12-18 NOTE — TELEPHONE ENCOUNTER
Left message #2 for patient to call me back directly to schedule 
Left message #3 for patient to call me back directly with his decision either to schedule or postpone 
Left message for patient to call me back directly to schedule 
Pain diary reviewed by Dr Farrukh Bonds; proceed with RFA and f/u; I will call and coordinate         Left C4-6 RFA
Patient returned call and scheduled:     Left C4-6 RFA on 2/6  6w f/u with Reggie Cabrera on 3/20    Reviewed instructions: , NPO 1 hour prior, loose-fitting/comfortable clothing, if ill/fever/infx/abx to call and reschedule  No need to hold any meds prior  Patient stated verbal understanding 
Strong peripheral pulses

## 2018-12-27 ENCOUNTER — TRANSCRIBE ORDERS (OUTPATIENT)
Dept: ADMINISTRATIVE | Facility: HOSPITAL | Age: 73
End: 2018-12-27

## 2018-12-27 ENCOUNTER — APPOINTMENT (OUTPATIENT)
Dept: LAB | Facility: MEDICAL CENTER | Age: 73
End: 2018-12-27
Payer: COMMERCIAL

## 2018-12-27 DIAGNOSIS — R73.03 PREDIABETES: ICD-10-CM

## 2018-12-27 DIAGNOSIS — R97.20 ELEVATED PSA: Primary | ICD-10-CM

## 2018-12-27 DIAGNOSIS — D72.829 ELEVATED WHITE BLOOD CELL COUNT: ICD-10-CM

## 2018-12-27 DIAGNOSIS — D45 POLYCYTHEMIA VERA (HCC): ICD-10-CM

## 2018-12-27 DIAGNOSIS — R97.20 ELEVATED PSA: ICD-10-CM

## 2018-12-27 LAB
ALBUMIN SERPL BCP-MCNC: 3.6 G/DL (ref 3.5–5)
ALP SERPL-CCNC: 163 U/L (ref 46–116)
ALT SERPL W P-5'-P-CCNC: 37 U/L (ref 12–78)
ANION GAP SERPL CALCULATED.3IONS-SCNC: 6 MMOL/L (ref 4–13)
AST SERPL W P-5'-P-CCNC: 25 U/L (ref 5–45)
BASOPHILS # BLD AUTO: 0.08 THOUSANDS/ΜL (ref 0–0.1)
BASOPHILS NFR BLD AUTO: 1 % (ref 0–1)
BILIRUB SERPL-MCNC: 0.56 MG/DL (ref 0.2–1)
BUN SERPL-MCNC: 17 MG/DL (ref 5–25)
CALCIUM SERPL-MCNC: 9 MG/DL (ref 8.3–10.1)
CHLORIDE SERPL-SCNC: 107 MMOL/L (ref 100–108)
CO2 SERPL-SCNC: 28 MMOL/L (ref 21–32)
CREAT SERPL-MCNC: 1.32 MG/DL (ref 0.6–1.3)
EOSINOPHIL # BLD AUTO: 1.02 THOUSAND/ΜL (ref 0–0.61)
EOSINOPHIL NFR BLD AUTO: 8 % (ref 0–6)
ERYTHROCYTE [DISTWIDTH] IN BLOOD BY AUTOMATED COUNT: 19.9 % (ref 11.6–15.1)
EST. AVERAGE GLUCOSE BLD GHB EST-MCNC: 126 MG/DL
GFR SERPL CREATININE-BSD FRML MDRD: 54 ML/MIN/1.73SQ M
GLUCOSE SERPL-MCNC: 108 MG/DL (ref 65–140)
HBA1C MFR BLD: 6 % (ref 4.2–6.3)
HCT VFR BLD AUTO: 53 % (ref 36.5–49.3)
HGB BLD-MCNC: 15.6 G/DL (ref 12–17)
IMM GRANULOCYTES # BLD AUTO: 0.04 THOUSAND/UL (ref 0–0.2)
IMM GRANULOCYTES NFR BLD AUTO: 0 % (ref 0–2)
LDH SERPL-CCNC: 179 U/L (ref 81–234)
LYMPHOCYTES # BLD AUTO: 2.06 THOUSANDS/ΜL (ref 0.6–4.47)
LYMPHOCYTES NFR BLD AUTO: 16 % (ref 14–44)
MCH RBC QN AUTO: 23.4 PG (ref 26.8–34.3)
MCHC RBC AUTO-ENTMCNC: 29.4 G/DL (ref 31.4–37.4)
MCV RBC AUTO: 80 FL (ref 82–98)
MONOCYTES # BLD AUTO: 0.76 THOUSAND/ΜL (ref 0.17–1.22)
MONOCYTES NFR BLD AUTO: 6 % (ref 4–12)
NEUTROPHILS # BLD AUTO: 9.18 THOUSANDS/ΜL (ref 1.85–7.62)
NEUTS SEG NFR BLD AUTO: 69 % (ref 43–75)
NRBC BLD AUTO-RTO: 0 /100 WBCS
PLATELET # BLD AUTO: 302 THOUSANDS/UL (ref 149–390)
PMV BLD AUTO: 11 FL (ref 8.9–12.7)
POTASSIUM SERPL-SCNC: 4.4 MMOL/L (ref 3.5–5.3)
PROT SERPL-MCNC: 7.7 G/DL (ref 6.4–8.2)
PSA SERPL-MCNC: 12.5 NG/ML (ref 0–4)
RBC # BLD AUTO: 6.66 MILLION/UL (ref 3.88–5.62)
SODIUM SERPL-SCNC: 141 MMOL/L (ref 136–145)
WBC # BLD AUTO: 13.14 THOUSAND/UL (ref 4.31–10.16)

## 2018-12-27 PROCEDURE — 80053 COMPREHEN METABOLIC PANEL: CPT

## 2018-12-27 PROCEDURE — 36415 COLL VENOUS BLD VENIPUNCTURE: CPT

## 2018-12-27 PROCEDURE — 85025 COMPLETE CBC W/AUTO DIFF WBC: CPT

## 2018-12-27 PROCEDURE — 83615 LACTATE (LD) (LDH) ENZYME: CPT

## 2018-12-27 PROCEDURE — 83036 HEMOGLOBIN GLYCOSYLATED A1C: CPT

## 2018-12-27 PROCEDURE — 84153 ASSAY OF PSA TOTAL: CPT

## 2018-12-28 ENCOUNTER — OPTICAL OFFICE (OUTPATIENT)
Dept: URBAN - METROPOLITAN AREA CLINIC 143 | Facility: CLINIC | Age: 73
Setting detail: OPHTHALMOLOGY
End: 2018-12-28

## 2018-12-28 DIAGNOSIS — H52.223: ICD-10-CM

## 2018-12-28 PROCEDURE — V2203 LENS SPHCYL BIFOCAL 4.00D/.1: HCPCS | Performed by: OPHTHALMOLOGY

## 2018-12-28 PROCEDURE — V2020 VISION SVCS FRAMES PURCHASES: HCPCS | Performed by: OPHTHALMOLOGY

## 2019-01-02 ENCOUNTER — TELEPHONE (OUTPATIENT)
Dept: HEMATOLOGY ONCOLOGY | Facility: CLINIC | Age: 74
End: 2019-01-02

## 2019-01-02 NOTE — TELEPHONE ENCOUNTER
Per Dr Monique Blanc notes on 12 28 18-Labs dated 12 27 18-Hct=53%  Dr Monique Blanc would like pt 's therapeutic phlebotomy moved up to early January  Pt scheduled on 1 23 19 at this time  Called pt and his spouse-  Aware of above information  Earlier call to [facility] to reschedule the dates through June 7, 2019 when pt is due to see Dr Monique Blanc for his follow up office visit  All dates noted in [address] reviewed w/spouse/also reviewed dates to have lab work done prior to therapeutic phlebotomy  Spouse verbalizes understanding information above  To call if any questions at any time

## 2019-01-03 ENCOUNTER — OFFICE VISIT (OUTPATIENT)
Dept: UROLOGY | Facility: CLINIC | Age: 74
End: 2019-01-03
Payer: COMMERCIAL

## 2019-01-03 VITALS
HEIGHT: 68 IN | WEIGHT: 180.4 LBS | DIASTOLIC BLOOD PRESSURE: 84 MMHG | HEART RATE: 52 BPM | SYSTOLIC BLOOD PRESSURE: 130 MMHG | BODY MASS INDEX: 27.34 KG/M2

## 2019-01-03 DIAGNOSIS — N40.1 BENIGN PROSTATIC HYPERPLASIA WITH URINARY FREQUENCY: ICD-10-CM

## 2019-01-03 DIAGNOSIS — R97.20 ELEVATED PROSTATE SPECIFIC ANTIGEN (PSA): Primary | ICD-10-CM

## 2019-01-03 DIAGNOSIS — R35.0 BENIGN PROSTATIC HYPERPLASIA WITH URINARY FREQUENCY: ICD-10-CM

## 2019-01-03 PROCEDURE — 99214 OFFICE O/P EST MOD 30 MIN: CPT | Performed by: UROLOGY

## 2019-01-03 RX ORDER — TAMSULOSIN HYDROCHLORIDE 0.4 MG/1
0.4 CAPSULE ORAL
Qty: 30 CAPSULE | Refills: 11 | Status: SHIPPED | OUTPATIENT
Start: 2019-01-03 | End: 2020-01-13

## 2019-01-03 NOTE — LETTER
January 3, 2019     DO [address]  [address] [facility]    Patient: Pedro Shown   YOB: 1945   Date of Visit: 1/3/2019       Dear Dr Audrey Cano:    Thank you for referring Perry Prince to me for evaluation  Below are my notes for this consultation  If you have questions, please do not hesitate to call me  I look forward to following your patient along with you  Sincerely,        Glenna Brown MD        CC: No Recipients  Glenna Brown MD  1/3/2019  8:54 AM  Sign at close encounter  1/3/2019    Pedro Shown  [birth year]  [id number]        Assessment  Elevated PSA  Enlarged prostate with urinary symptoms    Plan  We discussed treatment with tamsulosin  After discussion, he would like to try this  Prescription was sent to his pharmacy  He will follow up in the next 4 to 6 weeks for re-evaluation and will check postvoid residual   He will also require follow-up PSA in 6 months  All questions answered and he agrees with the plan  History of Present Illness  Ana Maria Harp is a 68 y o  male with history of elevated PSA up to 11 7  He is status post 2 negative prostate biopsies  His PSA has stabilized around 10, current PSA 12 5  He does complain of urinary symptoms which seems to be worse  AUA symptom score 16  Previously was not interested in treatment, however he is now open to this option  AUA SYMPTOM SCORE      Most Recent Value   AUA SYMPTOM SCORE   How often have you had a sensation of not emptying your bladder completely after you finished urinating? 3   How often have you had to urinate again less than two hours after you finished urinating? 3   How often have you found you stopped and started again several times when you urinate? 3   How often have you found it difficult to postpone urination? 4   How often have you had a weak urinary stream?  3   How often have you had to push or strain to begin urination?   0   How many times did you most typically get up to urinate from the time you went to bed at night until the time you got up in the morning?  0   Quality of Life: If you were to spend the rest of your life with your urinary condition just the way it is now, how would you feel about that?  4   AUA SYMPTOM SCORE  16          Review of Systems  Review of Systems   Constitutional: Negative  HENT: Negative  Respiratory: Negative  Cardiovascular: Negative  Gastrointestinal: Negative  Genitourinary:        As per HPI   Musculoskeletal: Negative  Skin: Negative  Neurological: Negative  Hematological: Negative  Past Medical History  Past Medical History:   Diagnosis Date    CAD (coronary artery disease)     Last Assessed:  11/4/13    Elevated prostate specific antigen (PSA)     Last Assessed:  12/21/17    Hyperlipidemia     Last Assessed:  11/24/17    Hypertension     Benign essential, Last Assessed:  11/24/17       Past Social History  Past Surgical History:   Procedure Laterality Date    APPENDECTOMY      CO COLONOSCOPY FLX DX W/COLLJ SPEC WHEN PFRMD N/A 5/22/2017    Procedure: COLONOSCOPY;  Surgeon: Klaus Shah DO;  Location: [facility]; Service: Gastroenterology    PROSTATE BIOPSY         Past Family History  Family History   Problem Relation Age of Onset    No Known Problems Mother        Past Social history  Social History     Social History    Marital status: /Civil Union     Spouse name: N/A    Number of children: N/A    Years of education: N/A     Occupational History    Not on file       Social History Main Topics    Smoking status: Current Every Day Smoker     Packs/day: 0 50     Types: Cigars, Cigarettes    Smokeless tobacco: Never Used      Comment: 8-7 cigarettes a day    Alcohol use No    Drug use: No    Sexual activity: Not on file     Other Topics Concern    Not on file     Social History Narrative    Always uses seatbelt    Feels safe at home    Uses safety equipment History   Smoking Status    Current Every Day Smoker    Packs/day: 0 50    Types: Cigars, Cigarettes   Smokeless Tobacco    Never Used     Comment: 8-7 cigarettes a day       Current Medications  Current Outpatient Prescriptions   Medication Sig Dispense Refill    amLODIPine (NORVASC) 10 mg tablet Take 10 mg by mouth daily      aspirin (ECOTRIN LOW STRENGTH) 81 mg EC tablet Take 81 mg by mouth daily      atorvastatin (LIPITOR) 80 mg tablet Take 80 mg by mouth daily      metoprolol tartrate (LOPRESSOR) 25 mg tablet Take 50 mg by mouth 2 (two) times a day        Multiple Vitamin (MULTIVITAMIN) capsule Take 1 capsule by mouth daily      nitroglycerin (NITROSTAT) 0 4 mg SL tablet Place 0 4 mg under the tongue every 5 (five) minutes as needed for chest pain      Omega-3 Fatty Acids (FISH OIL) 1200 MG CAPS Take 1,200 mg by mouth 2 (two) times a day        tamsulosin (FLOMAX) 0 4 mg Take 1 capsule (0 4 mg total) by mouth daily with dinner for 30 days 30 capsule 11     No current facility-administered medications for this visit  Allergies  No Known Allergies    Past Medical History, Social History, Family History, medications and allergies were reviewed  Vitals  Vitals:    01/03/19 0832   BP: 130/84   BP Location: Left arm   Patient Position: Sitting   Cuff Size: Adult   Pulse: (!) 52   Weight: 81 8 kg (180 lb 6 4 oz)   Height: 5' 8" (1 727 m)       Physical Exam  Physical Exam   Constitutional: He is oriented to person, place, and time  He appears well-developed and well-nourished  Cardiovascular: Normal rate  Pulmonary/Chest: Effort normal    Abdominal: Soft  Genitourinary:   Genitourinary Comments: Prostate about 50 g, firm   Musculoskeletal: Normal range of motion  Neurological: He is alert and oriented to person, place, and time  Skin: Skin is warm, dry and intact  Psychiatric: He has a normal mood and affect  Vitals reviewed          Results  Lab Results   Component Value Date PSA 12 5 (H) 12/27/2018     Lab Results   Component Value Date    CALCIUM 9 0 12/27/2018     11/13/2017    K 4 4 12/27/2018    CO2 28 12/27/2018     12/27/2018    BUN 17 12/27/2018    CREATININE 1 32 (H) 12/27/2018     Lab Results   Component Value Date    WBC 13 14 (H) 12/27/2018    HGB 15 6 12/27/2018    HCT 53 0 (H) 12/27/2018    MCV 80 (L) 12/27/2018     12/27/2018

## 2019-01-03 NOTE — PROGRESS NOTES
1/3/2019    Mariano Davidson  [birth year]  [id number]        Assessment  Elevated PSA  Enlarged prostate with urinary symptoms    Plan  We discussed treatment with tamsulosin  After discussion, he would like to try this  Prescription was sent to his pharmacy  He will follow up in the next 4 to 6 weeks for re-evaluation and will check postvoid residual   He will also require follow-up PSA in 6 months  All questions answered and he agrees with the plan  History of Present Illness  Luis Antonio Maza is a 68 y o  male with history of elevated PSA up to 11 7  He is status post 2 negative prostate biopsies  His PSA has stabilized around 10, current PSA 12 5  He does complain of urinary symptoms which seems to be worse  AUA symptom score 16  Previously was not interested in treatment, however he is now open to this option  AUA SYMPTOM SCORE      Most Recent Value   AUA SYMPTOM SCORE   How often have you had a sensation of not emptying your bladder completely after you finished urinating? 3   How often have you had to urinate again less than two hours after you finished urinating? 3   How often have you found you stopped and started again several times when you urinate? 3   How often have you found it difficult to postpone urination? 4   How often have you had a weak urinary stream?  3   How often have you had to push or strain to begin urination? 0   How many times did you most typically get up to urinate from the time you went to bed at night until the time you got up in the morning?  0   Quality of Life: If you were to spend the rest of your life with your urinary condition just the way it is now, how would you feel about that?  4   AUA SYMPTOM SCORE  16          Review of Systems  Review of Systems   Constitutional: Negative  HENT: Negative  Respiratory: Negative  Cardiovascular: Negative  Gastrointestinal: Negative  Genitourinary:        As per HPI   Musculoskeletal: Negative      Skin: Negative  Neurological: Negative  Hematological: Negative  Past Medical History  Past Medical History:   Diagnosis Date    CAD (coronary artery disease)     Last Assessed:  11/4/13    Elevated prostate specific antigen (PSA)     Last Assessed:  12/21/17    Hyperlipidemia     Last Assessed:  11/24/17    Hypertension     Benign essential, Last Assessed:  11/24/17       Past Social History  Past Surgical History:   Procedure Laterality Date    APPENDECTOMY      MO COLONOSCOPY FLX DX W/COLLJ SPEC WHEN PFRMD N/A 5/22/2017    Procedure: COLONOSCOPY;  Surgeon: Vicente Sloan DO;  Location: [facility]; Service: Gastroenterology    PROSTATE BIOPSY         Past Family History  Family History   Problem Relation Age of Onset    No Known Problems Mother        Past Social history  Social History     Social History    Marital status: /Civil Union     Spouse name: N/A    Number of children: N/A    Years of education: N/A     Occupational History    Not on file       Social History Main Topics    Smoking status: Current Every Day Smoker     Packs/day: 0 50     Types: Cigars, Cigarettes    Smokeless tobacco: Never Used      Comment: 8-7 cigarettes a day    Alcohol use No    Drug use: No    Sexual activity: Not on file     Other Topics Concern    Not on file     Social History Narrative    Always uses seatbelt    Feels safe at home    Uses safety equipment     History   Smoking Status    Current Every Day Smoker    Packs/day: 0 50    Types: Cigars, Cigarettes   Smokeless Tobacco    Never Used     Comment: 8-7 cigarettes a day       Current Medications  Current Outpatient Prescriptions   Medication Sig Dispense Refill    amLODIPine (NORVASC) 10 mg tablet Take 10 mg by mouth daily      aspirin (ECOTRIN LOW STRENGTH) 81 mg EC tablet Take 81 mg by mouth daily      atorvastatin (LIPITOR) 80 mg tablet Take 80 mg by mouth daily      metoprolol tartrate (LOPRESSOR) 25 mg tablet Take 50 mg by mouth 2 (two) times a day        Multiple Vitamin (MULTIVITAMIN) capsule Take 1 capsule by mouth daily      nitroglycerin (NITROSTAT) 0 4 mg SL tablet Place 0 4 mg under the tongue every 5 (five) minutes as needed for chest pain      Omega-3 Fatty Acids (FISH OIL) 1200 MG CAPS Take 1,200 mg by mouth 2 (two) times a day        tamsulosin (FLOMAX) 0 4 mg Take 1 capsule (0 4 mg total) by mouth daily with dinner for 30 days 30 capsule 11     No current facility-administered medications for this visit  Allergies  No Known Allergies    Past Medical History, Social History, Family History, medications and allergies were reviewed  Vitals  Vitals:    01/03/19 0832   BP: 130/84   BP Location: Left arm   Patient Position: Sitting   Cuff Size: Adult   Pulse: (!) 52   Weight: 81 8 kg (180 lb 6 4 oz)   Height: 5' 8" (1 727 m)       Physical Exam  Physical Exam   Constitutional: He is oriented to person, place, and time  He appears well-developed and well-nourished  Cardiovascular: Normal rate  Pulmonary/Chest: Effort normal    Abdominal: Soft  Genitourinary:   Genitourinary Comments: Prostate about 50 g, firm   Musculoskeletal: Normal range of motion  Neurological: He is alert and oriented to person, place, and time  Skin: Skin is warm, dry and intact  Psychiatric: He has a normal mood and affect  Vitals reviewed          Results  Lab Results   Component Value Date    PSA 12 5 (H) 12/27/2018     Lab Results   Component Value Date    CALCIUM 9 0 12/27/2018     11/13/2017    K 4 4 12/27/2018    CO2 28 12/27/2018     12/27/2018    BUN 17 12/27/2018    CREATININE 1 32 (H) 12/27/2018     Lab Results   Component Value Date    WBC 13 14 (H) 12/27/2018    HGB 15 6 12/27/2018    HCT 53 0 (H) 12/27/2018    MCV 80 (L) 12/27/2018     12/27/2018

## 2019-01-07 ENCOUNTER — APPOINTMENT (OUTPATIENT)
Dept: LAB | Facility: CLINIC | Age: 74
End: 2019-01-07
Payer: COMMERCIAL

## 2019-01-07 ENCOUNTER — HOSPITAL ENCOUNTER (OUTPATIENT)
Dept: INFUSION CENTER | Facility: CLINIC | Age: 74
Discharge: HOME | End: 2019-01-07
Payer: COMMERCIAL

## 2019-01-07 VITALS
TEMPERATURE: 97.8 F | SYSTOLIC BLOOD PRESSURE: 143 MMHG | DIASTOLIC BLOOD PRESSURE: 80 MMHG | RESPIRATION RATE: 18 BRPM | HEART RATE: 60 BPM

## 2019-01-07 DIAGNOSIS — D45 POLYCYTHEMIA VERA (HCC): ICD-10-CM

## 2019-01-07 LAB
BASOPHILS # BLD AUTO: 0.03 THOUSANDS/ΜL (ref 0–0.1)
BASOPHILS NFR BLD AUTO: 0 % (ref 0–1)
EOSINOPHIL # BLD AUTO: 0.86 THOUSAND/ΜL (ref 0–0.61)
EOSINOPHIL NFR BLD AUTO: 7 % (ref 0–6)
ERYTHROCYTE [DISTWIDTH] IN BLOOD BY AUTOMATED COUNT: 20.4 % (ref 11.6–15.1)
HCT VFR BLD AUTO: 46 % (ref 36.5–49.3)
HGB BLD-MCNC: 14.5 G/DL (ref 12–17)
LYMPHOCYTES # BLD AUTO: 2.24 THOUSANDS/ΜL (ref 0.6–4.47)
LYMPHOCYTES NFR BLD AUTO: 19 % (ref 14–44)
MCH RBC QN AUTO: 24.4 PG (ref 26.8–34.3)
MCHC RBC AUTO-ENTMCNC: 31.5 G/DL (ref 31.4–37.4)
MCV RBC AUTO: 77 FL (ref 82–98)
MONOCYTES # BLD AUTO: 0.98 THOUSAND/ΜL (ref 0.17–1.22)
MONOCYTES NFR BLD AUTO: 8 % (ref 4–12)
NEUTROPHILS # BLD AUTO: 7.87 THOUSANDS/ΜL (ref 1.85–7.62)
NEUTS SEG NFR BLD AUTO: 66 % (ref 43–75)
PLATELET # BLD AUTO: 208 THOUSANDS/UL (ref 149–390)
PMV BLD AUTO: 10.3 FL (ref 8.9–12.7)
RBC # BLD AUTO: 5.94 MILLION/UL (ref 3.88–5.62)
WBC # BLD AUTO: 11.98 THOUSAND/UL (ref 4.31–10.16)

## 2019-01-07 PROCEDURE — 85025 COMPLETE CBC W/AUTO DIFF WBC: CPT

## 2019-01-07 PROCEDURE — 99195 PHLEBOTOMY: CPT

## 2019-01-07 PROCEDURE — 36415 COLL VENOUS BLD VENIPUNCTURE: CPT

## 2019-01-07 NOTE — PROGRESS NOTES
Pt arrived for therapeutic phlebotomy today  Hematocrit from today = 46 0  This value was checked with Kaitlin Hernández 421, RN  Phlebotomy was performed via the right antecubital vein without difficulty  400 ml was removed  Pt was observed for 15 minutes post phlebotomy  Vital signs remained stable  Pt was provided with AVS and is aware of future appt

## 2019-01-23 ENCOUNTER — OFFICE VISIT (OUTPATIENT)
Dept: FAMILY MEDICINE CLINIC | Facility: CLINIC | Age: 74
End: 2019-01-23
Payer: COMMERCIAL

## 2019-01-23 VITALS
BODY MASS INDEX: 28.09 KG/M2 | HEART RATE: 64 BPM | HEIGHT: 67 IN | SYSTOLIC BLOOD PRESSURE: 120 MMHG | DIASTOLIC BLOOD PRESSURE: 70 MMHG | OXYGEN SATURATION: 97 % | WEIGHT: 179 LBS | TEMPERATURE: 98 F | RESPIRATION RATE: 16 BRPM

## 2019-01-23 DIAGNOSIS — J01.90 ACUTE SINUSITIS, RECURRENCE NOT SPECIFIED, UNSPECIFIED LOCATION: Primary | ICD-10-CM

## 2019-01-23 PROCEDURE — 3008F BODY MASS INDEX DOCD: CPT | Performed by: NURSE PRACTITIONER

## 2019-01-23 PROCEDURE — 99213 OFFICE O/P EST LOW 20 MIN: CPT | Performed by: NURSE PRACTITIONER

## 2019-01-23 PROCEDURE — 1160F RVW MEDS BY RX/DR IN RCRD: CPT | Performed by: NURSE PRACTITIONER

## 2019-01-23 RX ORDER — AZITHROMYCIN 250 MG/1
TABLET, FILM COATED ORAL
Qty: 6 TABLET | Refills: 0 | Status: SHIPPED | OUTPATIENT
Start: 2019-01-23 | End: 2019-01-27

## 2019-01-23 NOTE — PROGRESS NOTES
[facility]    ASSESSMENT AND PLAN     1  Acute sinusitis, recurrence not specified, unspecified location  43-year-old male presents today with signs and symptoms suggestive of sinusitis  Physical assessment is as documented below  Rx given for course of azithromycin  Symptom management reviewed: Increased rest, increase fluids, may continue to take OTC Coricidin, Tylenol as needed, warm salt water gargles  Patient should be re-evaluated should his symptoms persist and/or worsen  - azithromycin (ZITHROMAX) 250 mg tablet; Take 2 tablets today then 1 tablet daily x 4 days  Dispense: 6 tablet; Refill: 0            SUBJECTIVE       Patient ID: Jose Hall is a 68 y o  male  Chief Complaint   Patient presents with    Cold Like Symptoms     cough,congested x 2 weeks       HISTORY OF PRESENT ILLNESS    Patient presents today with an almost 2 week history of sinus pain, pressure and congestion  Feels his chest has been tightening over the last couple days  He does have a productive cough at times  He has taken OTC cough medicine and Tylenol  The following portions of the patient's history were reviewed and updated as appropriate: allergies, current medications, past medical history and problem list     REVIEW OF SYSTEMS  Review of Systems   Constitutional: Negative  HENT: Positive for congestion, postnasal drip, rhinorrhea, sinus pain, sinus pressure and sore throat  Negative for ear pain  Respiratory: Positive for cough and chest tightness  Negative for shortness of breath and wheezing  Cardiovascular: Negative  Gastrointestinal: Negative  Neurological: Negative  Psychiatric/Behavioral: Negative          OBJECTIVE      VITAL SIGNS  /70 (BP Location: Left arm, Patient Position: Sitting, Cuff Size: Adult)   Pulse 64   Temp 98 °F (36 7 °C) (Tympanic)   Resp 16   Ht 5' 6 53" (1 69 m)   Wt 81 2 kg (179 lb)   SpO2 97%   BMI 28 43 kg/m²       PHYSICAL EXAMINATION Physical Exam   Constitutional: He is oriented to person, place, and time  Vital signs are normal  He appears well-developed and well-nourished  HENT:   Head: Normocephalic  Right Ear: Hearing, tympanic membrane, external ear and ear canal normal  No middle ear effusion  Left Ear: Hearing, tympanic membrane, external ear and ear canal normal   No middle ear effusion  Nose: Mucosal edema present  Right sinus exhibits maxillary sinus tenderness  Right sinus exhibits no frontal sinus tenderness  Left sinus exhibits maxillary sinus tenderness  Left sinus exhibits no frontal sinus tenderness  Mouth/Throat: Mucous membranes are dry  Oropharyngeal exudate and posterior oropharyngeal erythema present  Eyes: Right eye exhibits no discharge  Left eye exhibits no discharge  Cardiovascular: Normal rate and regular rhythm  Pulmonary/Chest: Effort normal and breath sounds normal  No respiratory distress  He has no decreased breath sounds  He has no wheezes  He has no rhonchi  He has no rales  Musculoskeletal: Normal range of motion  Lymphadenopathy:        Head (right side): No submental, no submandibular, no tonsillar, no preauricular, no posterior auricular and no occipital adenopathy present  Head (left side): No submental, no submandibular, no tonsillar, no preauricular, no posterior auricular and no occipital adenopathy present  He has no cervical adenopathy  Neurological: He is alert and oriented to person, place, and time  Skin: Skin is warm, dry and intact  Psychiatric: He has a normal mood and affect  His speech is normal and behavior is normal  Judgment and thought content normal  Cognition and memory are normal    Nursing note and vitals reviewed

## 2019-02-04 ENCOUNTER — APPOINTMENT (OUTPATIENT)
Dept: LAB | Facility: CLINIC | Age: 74
End: 2019-02-04
Payer: COMMERCIAL

## 2019-02-04 ENCOUNTER — HOSPITAL ENCOUNTER (OUTPATIENT)
Dept: INFUSION CENTER | Facility: CLINIC | Age: 74
Discharge: HOME | End: 2019-02-04
Payer: COMMERCIAL

## 2019-02-04 DIAGNOSIS — D45 POLYCYTHEMIA VERA (HCC): ICD-10-CM

## 2019-02-04 LAB
BASOPHILS # BLD AUTO: 0.03 THOUSANDS/ΜL (ref 0–0.1)
BASOPHILS NFR BLD AUTO: 0 % (ref 0–1)
EOSINOPHIL # BLD AUTO: 0.62 THOUSAND/ΜL (ref 0–0.61)
EOSINOPHIL NFR BLD AUTO: 5 % (ref 0–6)
ERYTHROCYTE [DISTWIDTH] IN BLOOD BY AUTOMATED COUNT: 19.3 % (ref 11.6–15.1)
HCT VFR BLD AUTO: 43.8 % (ref 36.5–49.3)
HGB BLD-MCNC: 13.5 G/DL (ref 12–17)
LYMPHOCYTES # BLD AUTO: 2.25 THOUSANDS/ΜL (ref 0.6–4.47)
LYMPHOCYTES NFR BLD AUTO: 19 % (ref 14–44)
MCH RBC QN AUTO: 23.5 PG (ref 26.8–34.3)
MCHC RBC AUTO-ENTMCNC: 30.8 G/DL (ref 31.4–37.4)
MCV RBC AUTO: 76 FL (ref 82–98)
MONOCYTES # BLD AUTO: 0.88 THOUSAND/ΜL (ref 0.17–1.22)
MONOCYTES NFR BLD AUTO: 8 % (ref 4–12)
NEUTROPHILS # BLD AUTO: 7.99 THOUSANDS/ΜL (ref 1.85–7.62)
NEUTS SEG NFR BLD AUTO: 68 % (ref 43–75)
PLATELET # BLD AUTO: 240 THOUSANDS/UL (ref 149–390)
PMV BLD AUTO: 10.3 FL (ref 8.9–12.7)
RBC # BLD AUTO: 5.74 MILLION/UL (ref 3.88–5.62)
WBC # BLD AUTO: 11.77 THOUSAND/UL (ref 4.31–10.16)

## 2019-02-04 PROCEDURE — 36415 COLL VENOUS BLD VENIPUNCTURE: CPT

## 2019-02-04 PROCEDURE — 85025 COMPLETE CBC W/AUTO DIFF WBC: CPT

## 2019-02-05 PROBLEM — N13.8 BPH WITH OBSTRUCTION/LOWER URINARY TRACT SYMPTOMS: Status: ACTIVE | Noted: 2019-02-05

## 2019-02-05 PROBLEM — N40.1 BPH WITH OBSTRUCTION/LOWER URINARY TRACT SYMPTOMS: Status: ACTIVE | Noted: 2019-02-05

## 2019-02-05 NOTE — PROGRESS NOTES
2/7/2019      Chief Complaint   Patient presents with    Benign Prostatic Hypertrophy     5 week f/u    Elevated PSA       Assessment and Plan    68 y o  male managed by Dr Kingsley Humphreys    1  BPH  - PVR 76mL   - continue tamsulosin 0 4mg nightly   - the patient is still bothered by urinary frequency so we discussed adding oxybutynin to his tamsulosin but he defers at this time    2  Elevated PSA status post 2 negative biopsies (2010 and 2012)  - PSA 12 5 (12/2018), 10 4 (12/13/17), 10 4 (6/7/2017), 10 7 (5/2017), 9 6 (4/28/16), 8 7 (4/24/15), 8 7 (4/2014), 11 7 (5 2013)  - due 7/2019      History of Present Illness  Daylin Lopez is a 68 y o  male here for follow up evaluation of BPH and an Elevated PSA status post 2 negative biopsies (2010 and 2012)  Was started on tamsulosin secondary to lower urinary tract symptoms that were bothersome to the patient  He states that this has caused some mild improvement in his lower urinary tract symptoms  These in his AUA symptom score listed below  He is emptying his bladder well with a postvoid residual of 76 mL  His PSA is stable at 2 5 and he is due in July 2019 for a PSA test       Review of Systems   Constitutional: Negative for activity change, chills and fever  Gastrointestinal: Negative for abdominal distention and abdominal pain  Musculoskeletal: Negative for back pain and gait problem  Psychiatric/Behavioral: Negative for behavioral problems and confusion  Urinary Incontinence Screening      Most Recent Value   Urinary Incontinence   Urinary Incontinence? No   Incomplete emptying? Yes   Urinary frequency? Yes   Urinary urgency? Yes   Urinary hesitancy? Yes   Dysuria (painful difficult urination)? No   Nocturia (waking up to use the bathroom)? Yes [twice per night]   Straining (having to push to go)?   Yes   Weak stream?  Yes   Intermittent stream?  Yes          AUA SYMPTOM SCORE      Most Recent Value   AUA SYMPTOM SCORE   How often have you had a sensation of not emptying your bladder completely after you finished urinating? 2   How often have you had to urinate again less than two hours after you finished urinating? 2   How often have you found you stopped and started again several times when you urinate? 2   How often have you found it difficult to postpone urination? 2   How often have you had a weak urinary stream?  2   How often have you had to push or strain to begin urination? 1   How many times did you most typically get up to urinate from the time you went to bed at night until the time you got up in the morning? 2   Quality of Life: If you were to spend the rest of your life with your urinary condition just the way it is now, how would you feel about that?  3   AUA SYMPTOM SCORE  13          Past Medical History  Past Medical History:   Diagnosis Date    CAD (coronary artery disease)     Last Assessed:  11/4/13    Elevated prostate specific antigen (PSA)     Last Assessed:  12/21/17    Hyperlipidemia     Last Assessed:  11/24/17    Hypertension     Benign essential, Last Assessed:  11/24/17       Past Social History  Past Surgical History:   Procedure Laterality Date    APPENDECTOMY      HI COLONOSCOPY FLX DX W/COLLJ SPEC WHEN PFRMD N/A 5/22/2017    Procedure: COLONOSCOPY;  Surgeon: Tracy Reed DO;  [facility]: [facility]; Service: Gastroenterology    PROSTATE BIOPSY       History   Smoking Status    Current Every Day Smoker    Packs/day: 0 50    Types: Cigars, Cigarettes   Smokeless Tobacco    Never Used     Comment: 8-7 cigarettes a day       Past Family History  Family History   Problem Relation Age of Onset    No Known Problems Mother        Past Social history  Social History     Social History    Marital status: /Civil Union     Spouse name: N/A    Number of children: N/A    Years of education: N/A     Occupational History    Not on file       Social History Main Topics    Smoking status: Current Every Day Smoker     Packs/day: 0 50     Types: Cigars, Cigarettes    Smokeless tobacco: Never Used      Comment: 8-7 cigarettes a day    Alcohol use No    Drug use: No    Sexual activity: Not on file     Other Topics Concern    Not on file     Social History Narrative    Always uses seatbelt    Feels safe at home    Uses safety equipment       Current Medications  Current Outpatient Prescriptions   Medication Sig Dispense Refill    amLODIPine (NORVASC) 10 mg tablet Take 10 mg by mouth daily      aspirin (ECOTRIN LOW STRENGTH) 81 mg EC tablet Take 81 mg by mouth daily      atorvastatin (LIPITOR) 80 mg tablet Take 80 mg by mouth daily      metoprolol tartrate (LOPRESSOR) 25 mg tablet Take 50 mg by mouth 2 (two) times a day        Multiple Vitamin (MULTIVITAMIN) capsule Take 1 capsule by mouth daily      nitroglycerin (NITROSTAT) 0 4 mg SL tablet Place 0 4 mg under the tongue every 5 (five) minutes as needed for chest pain      Omega-3 Fatty Acids (FISH OIL) 1200 MG CAPS Take 1,200 mg by mouth 2 (two) times a day        tamsulosin (FLOMAX) 0 4 mg Take 1 capsule (0 4 mg total) by mouth daily with dinner for 30 days 30 capsule 11     No current facility-administered medications for this visit  Allergies  No Known Allergies      The following portions of the patient's history were reviewed and updated as appropriate: allergies, current medications, past medical history, past social history, past surgical history and problem list       Vitals  Vitals:    02/07/19 0815   BP: 126/82   BP Location: Left arm   Patient Position: Sitting   Cuff Size: Adult   Pulse: 58   Weight: 82 6 kg (182 lb)   Height: 5' 6 53" (1 69 m)       Physical Exam  Constitutional   General appearance: Patient is seated and in no acute distress, well appearing and well nourished  Head and Face   Head and face: Normal     Eyes   Conjunctiva and lids: No erythema, swelling or discharge      Ears, Nose, Mouth, and Throat   Hearing: Normal  Pulmonary   Respiratory effort: No increased work of breathing or signs of respiratory distress  Cardiovascular   Examination of extremities for edema and/or varicosities: Normal     Abdomen   Abdomen: Non-tender, no masses  Musculoskeletal   Gait and station: Normal     Skin   Skin and subcutaneous tissue: Warm, dry, and intact  No visible lesions or rashes    Psychiatric   Judgment and insight: Normal  Recent and remote memory:  Normal  Mood and affect: Normal      Results  No results found for this or any previous visit (from the past 1 hour(s)) ]  Lab Results   Component Value Date    PSA 12 5 (H) 12/27/2018     Lab Results   Component Value Date    CALCIUM 9 0 12/27/2018     11/13/2017    K 4 4 12/27/2018    CO2 28 12/27/2018     12/27/2018    BUN 17 12/27/2018    CREATININE 1 32 (H) 12/27/2018     Lab Results   Component Value Date    WBC 11 77 (H) 02/04/2019    HGB 13 5 02/04/2019    HCT 43 8 02/04/2019    MCV 76 (L) 02/04/2019     02/04/2019       Orders  Orders Placed This Encounter   Procedures    POCT Measure PVR

## 2019-02-06 ENCOUNTER — HOSPITAL ENCOUNTER (OUTPATIENT)
Dept: RADIOLOGY | Facility: MEDICAL CENTER | Age: 74
Discharge: HOME | End: 2019-02-06
Payer: COMMERCIAL

## 2019-02-06 ENCOUNTER — TELEPHONE (OUTPATIENT)
Dept: RADIOLOGY | Facility: MEDICAL CENTER | Age: 74
End: 2019-02-06

## 2019-02-06 VITALS
RESPIRATION RATE: 20 BRPM | SYSTOLIC BLOOD PRESSURE: 145 MMHG | OXYGEN SATURATION: 99 % | TEMPERATURE: 97.5 F | DIASTOLIC BLOOD PRESSURE: 84 MMHG | HEART RATE: 54 BPM

## 2019-02-06 DIAGNOSIS — M47.812 CERVICAL SPONDYLOSIS WITHOUT MYELOPATHY: ICD-10-CM

## 2019-02-06 DIAGNOSIS — M54.2 NECK PAIN: ICD-10-CM

## 2019-02-06 PROCEDURE — 64633 DESTROY CERV/THOR FACET JNT: CPT | Performed by: PHYSICAL MEDICINE & REHABILITATION

## 2019-02-06 PROCEDURE — 64634 DESTROY C/TH FACET JNT ADDL: CPT | Performed by: PHYSICAL MEDICINE & REHABILITATION

## 2019-02-06 RX ORDER — LIDOCAINE HYDROCHLORIDE 10 MG/ML
5 INJECTION, SOLUTION EPIDURAL; INFILTRATION; INTRACAUDAL; PERINEURAL ONCE
Status: COMPLETED | OUTPATIENT
Start: 2019-02-06 | End: 2019-02-06

## 2019-02-06 RX ADMIN — LIDOCAINE HYDROCHLORIDE 2 ML: 10 INJECTION, SOLUTION EPIDURAL; INFILTRATION; INTRACAUDAL; PERINEURAL at 15:23

## 2019-02-06 RX ADMIN — Medication 4 ML: at 15:30

## 2019-02-06 NOTE — TELEPHONE ENCOUNTER
To be called on 2/7/19    S/P Left C4-6 RFA with JE on 2/6/19  Patient is scheduled with AO on 3/20/19 for 6 weeks f/u post cervical RFA

## 2019-02-06 NOTE — H&P
History of Present Illness: The patient is a 68 y o  male who presents with complaints of left neck pain    Patient Active Problem List   Diagnosis    Gastritis    Benign essential hypertension    Cervical radiculopathy    Coronary artery disease    Elevated prostate specific antigen (PSA)    Hyperlipidemia    Polycythemia vera ([address] )    Prediabetes    Tobacco abuse    Chronic left shoulder pain    Neck pain    Spondylosis of cervical region without myelopathy or radiculopathy    BPH with obstruction/lower urinary tract symptoms       Past Medical History:   Diagnosis Date    CAD (coronary artery disease)     Last Assessed:  11/4/13    Elevated prostate specific antigen (PSA)     Last Assessed:  12/21/17    Hyperlipidemia     Last Assessed:  11/24/17    Hypertension     Benign essential, Last Assessed:  11/24/17       Past Surgical History:   Procedure Laterality Date    APPENDECTOMY      VT COLONOSCOPY FLX DX W/COLLJ SPEC WHEN PFRMD N/A 5/22/2017    Procedure: COLONOSCOPY;  Surgeon: Telly Marquez DO;  Location: [facility];   Service: Gastroenterology    PROSTATE BIOPSY           Current Outpatient Prescriptions:     amLODIPine (NORVASC) 10 mg tablet, Take 10 mg by mouth daily, Disp: , Rfl:     aspirin (ECOTRIN LOW STRENGTH) 81 mg EC tablet, Take 81 mg by mouth daily, Disp: , Rfl:     atorvastatin (LIPITOR) 80 mg tablet, Take 80 mg by mouth daily, Disp: , Rfl:     metoprolol tartrate (LOPRESSOR) 25 mg tablet, Take 50 mg by mouth 2 (two) times a day  , Disp: , Rfl:     Multiple Vitamin (MULTIVITAMIN) capsule, Take 1 capsule by mouth daily, Disp: , Rfl:     nitroglycerin (NITROSTAT) 0 4 mg SL tablet, Place 0 4 mg under the tongue every 5 (five) minutes as needed for chest pain, Disp: , Rfl:     Omega-3 Fatty Acids (FISH OIL) 1200 MG CAPS, Take 1,200 mg by mouth 2 (two) times a day  , Disp: , Rfl:     tamsulosin (FLOMAX) 0 4 mg, Take 1 capsule (0 4 mg total) by mouth daily with dinner for 30 days, Disp: 30 capsule, Rfl: 11    Current Facility-Administered Medications:     lidocaine (PF) (XYLOCAINE-MPF) 1 % injection 5 mL, 5 mL, Infiltration, Once, Kathy Pott, DO    lidocaine (PF) (XYLOCAINE-MPF) 2 % injection 4 mL, 4 mL, Perineural, Once, Kathy Pott, DO    No Known Allergies    Physical Exam: There were no vitals filed for this visit  General: Awake, Alert, Oriented x 3, Mood and affect appropriate  Respiratory: Respirations even and unlabored  Cardiovascular: Peripheral pulses intact; no edema  Musculoskeletal Exam:  Left neck pain    ASA Score:  3  Patient/Chart Verification  Patient ID Verified: Verbal  ID Band Applied: No  Consents Confirmed: Procedural, To be obtained in the Pre-Procedure area  H&P( within 30 days) Verified: To be obtained in the Pre-Procedure area  Allergies Reviewed: Yes  Anticoag/NSAID held?: NA  Currently on antibiotics?: No    Assessment:   1  Cervical spondylosis without myelopathy    2   Neck pain        Plan: LT C4-6 RFA

## 2019-02-06 NOTE — DISCHARGE INSTR - LAB

## 2019-02-07 ENCOUNTER — OFFICE VISIT (OUTPATIENT)
Dept: UROLOGY | Facility: CLINIC | Age: 74
End: 2019-02-07
Payer: COMMERCIAL

## 2019-02-07 VITALS
DIASTOLIC BLOOD PRESSURE: 82 MMHG | BODY MASS INDEX: 28.56 KG/M2 | SYSTOLIC BLOOD PRESSURE: 126 MMHG | WEIGHT: 182 LBS | HEIGHT: 67 IN | HEART RATE: 58 BPM

## 2019-02-07 DIAGNOSIS — N13.8 BPH WITH OBSTRUCTION/LOWER URINARY TRACT SYMPTOMS: Primary | ICD-10-CM

## 2019-02-07 DIAGNOSIS — R97.20 ELEVATED PROSTATE SPECIFIC ANTIGEN (PSA): ICD-10-CM

## 2019-02-07 DIAGNOSIS — N40.1 BPH WITH OBSTRUCTION/LOWER URINARY TRACT SYMPTOMS: Primary | ICD-10-CM

## 2019-02-07 LAB — POST-VOID RESIDUAL VOLUME, ML POC: 76 ML

## 2019-02-07 PROCEDURE — 51798 US URINE CAPACITY MEASURE: CPT | Performed by: PHYSICIAN ASSISTANT

## 2019-02-07 PROCEDURE — 99213 OFFICE O/P EST LOW 20 MIN: CPT | Performed by: PHYSICIAN ASSISTANT

## 2019-02-07 NOTE — TELEPHONE ENCOUNTER
-GORGE-    RN s/w pt, per pt he did well over night, has a "litle numbness" but no other complaints  Per pt bandaids are off and no s/s of infection  Pt denied sunburn like effect  Pt aware of f/u appt and also aware that it takes 4-6 weeks for lesions to fully form and for full pain effect

## 2019-02-11 ENCOUNTER — TELEPHONE (OUTPATIENT)
Dept: HEMATOLOGY ONCOLOGY | Facility: HOSPITAL | Age: 74
End: 2019-02-11

## 2019-02-11 NOTE — TELEPHONE ENCOUNTER
Called pt-post Dr Frantz Skinner review of 2 4 19 labs-Hct=43 8%  Pt had a therapeutic phlebotomy on 1 23 19  Repeat labs on 3 4 19/call the office for results-will decide on 3 6 19 phlebotomy based on the 3 4 19 result  Pt  Robbi Kinston understanding the information above

## 2019-03-04 ENCOUNTER — APPOINTMENT (OUTPATIENT)
Dept: LAB | Facility: CLINIC | Age: 74
End: 2019-03-04
Payer: COMMERCIAL

## 2019-03-04 DIAGNOSIS — D45 POLYCYTHEMIA VERA (HCC): ICD-10-CM

## 2019-03-04 LAB
BASOPHILS # BLD AUTO: 0.03 THOUSANDS/ΜL (ref 0–0.1)
BASOPHILS NFR BLD AUTO: 0 % (ref 0–1)
EOSINOPHIL # BLD AUTO: 0.74 THOUSAND/ΜL (ref 0–0.61)
EOSINOPHIL NFR BLD AUTO: 6 % (ref 0–6)
ERYTHROCYTE [DISTWIDTH] IN BLOOD BY AUTOMATED COUNT: 20.4 % (ref 11.6–15.1)
HCT VFR BLD AUTO: 49.4 % (ref 36.5–49.3)
HGB BLD-MCNC: 15.3 G/DL (ref 12–17)
LYMPHOCYTES # BLD AUTO: 2.54 THOUSANDS/ΜL (ref 0.6–4.47)
LYMPHOCYTES NFR BLD AUTO: 20 % (ref 14–44)
MCH RBC QN AUTO: 23.6 PG (ref 26.8–34.3)
MCHC RBC AUTO-ENTMCNC: 31 G/DL (ref 31.4–37.4)
MCV RBC AUTO: 76 FL (ref 82–98)
MONOCYTES # BLD AUTO: 0.96 THOUSAND/ΜL (ref 0.17–1.22)
MONOCYTES NFR BLD AUTO: 8 % (ref 4–12)
NEUTROPHILS # BLD AUTO: 8.54 THOUSANDS/ΜL (ref 1.85–7.62)
NEUTS SEG NFR BLD AUTO: 66 % (ref 43–75)
PLATELET # BLD AUTO: 232 THOUSANDS/UL (ref 149–390)
PMV BLD AUTO: 9.6 FL (ref 8.9–12.7)
RBC # BLD AUTO: 6.47 MILLION/UL (ref 3.88–5.62)
WBC # BLD AUTO: 12.81 THOUSAND/UL (ref 4.31–10.16)

## 2019-03-04 PROCEDURE — 36415 COLL VENOUS BLD VENIPUNCTURE: CPT

## 2019-03-04 PROCEDURE — 85025 COMPLETE CBC W/AUTO DIFF WBC: CPT

## 2019-03-05 ENCOUNTER — TELEPHONE (OUTPATIENT)
Dept: HEMATOLOGY ONCOLOGY | Facility: CLINIC | Age: 74
End: 2019-03-05

## 2019-03-05 NOTE — TELEPHONE ENCOUNTER
Patient is requesting his bloodwork results done yest in the infusion to see if he goes there tomorrow?

## 2019-03-05 NOTE — TELEPHONE ENCOUNTER
Called pt-Labs 3 4 19-HCT= 49 4/yes-pt does need therapeutic phlebotomy tomorrow per orders  Pt  Sarah Castañeda understanding information above

## 2019-03-06 ENCOUNTER — HOSPITAL ENCOUNTER (OUTPATIENT)
Dept: INFUSION CENTER | Facility: CLINIC | Age: 74
Discharge: HOME | End: 2019-03-06
Payer: COMMERCIAL

## 2019-03-06 VITALS
HEART RATE: 57 BPM | DIASTOLIC BLOOD PRESSURE: 75 MMHG | TEMPERATURE: 97.6 F | SYSTOLIC BLOOD PRESSURE: 123 MMHG | RESPIRATION RATE: 16 BRPM

## 2019-03-06 PROCEDURE — 99195 PHLEBOTOMY: CPT

## 2019-03-06 NOTE — PROGRESS NOTES
Patient's HCT 49 4 on 3/4/19  Parameters on orders verified by second RN, Rob Sellers  Therapeutic phlebotomy effective for 400 ml of blood from RAC  Pt tolerated well  Pt had water to drink and offered no complaints at discharge  Pt is aware to return in 4 weeks for repeat CBC and possible phlebotomy

## 2019-03-20 ENCOUNTER — OFFICE VISIT (OUTPATIENT)
Dept: PAIN MEDICINE | Facility: MEDICAL CENTER | Age: 74
End: 2019-03-20
Payer: COMMERCIAL

## 2019-03-20 VITALS
HEIGHT: 68 IN | WEIGHT: 180.6 LBS | SYSTOLIC BLOOD PRESSURE: 122 MMHG | DIASTOLIC BLOOD PRESSURE: 60 MMHG | HEART RATE: 50 BPM | BODY MASS INDEX: 27.37 KG/M2

## 2019-03-20 DIAGNOSIS — M54.2 NECK PAIN: ICD-10-CM

## 2019-03-20 DIAGNOSIS — M47.812 CERVICAL SPONDYLOSIS WITHOUT MYELOPATHY: Primary | ICD-10-CM

## 2019-03-20 PROCEDURE — 99213 OFFICE O/P EST LOW 20 MIN: CPT | Performed by: NURSE PRACTITIONER

## 2019-03-20 NOTE — PROGRESS NOTES
Assessment  1  Cervical spondylosis without myelopathy    2  Neck pain        Plan  At this time, I did suggest that the patient use extra-strength Tylenol 1000 milligrams 3 times a day as needed for his neck pain  I also suggested that the patient trial a muscle relaxer 1 tablet at bedtime to try and decrease his myofascial pain symptoms that could be attributing to his headaches  The patient reports that he has enough pills to take at this time and does not wish to trial     Follow up on an as-needed basis        [place] Prescription Drug Monitoring Program report was reviewed and was appropriate         My impressions and treatment recommendations were discussed in detail with the patient who verbalized understanding and had no further questions  Discharge instructions were provided  I personally saw and examined the patient and I agree with the above discussed plan of care  No orders of the defined types were placed in this encounter  No orders of the defined types were placed in this encounter  History of Present Illness    Mariano Davidson is a 68 y o  male presents for follow-up related to his left-sided neck pain  The patient did not complete his intake paperwork he reports that he did not have his glasses I did try to sit incomplete this paperwork with the patient  He reports that this is getting extremely frustrating that he has to complete paperwork at each office visit  I did explain to the patient that that is important information that we do need to know at each office visit  He reports that after this visit he will most likely be discontinuing his care here due to having a fill out paperwork  He is status post a left C4-6 radiofrequency ablation with Dr Gilbert Coates on February 6, 2019  He reports that he did receive less than 50 percent relief at this point  He does get intermittent left-sided neck pain and headaches that is bothersome throughout the entirety of the day    He describes his pain as dull, and aching  Patient denies any radiating arm pain  I have personally reviewed and/or updated the patient's past medical history, past surgical history, family history, social history, current medications, allergies, and vital signs today  Review of Systems   Respiratory: Negative for shortness of breath  Cardiovascular: Negative for chest pain  Gastrointestinal: Negative for constipation, diarrhea, nausea and vomiting  Musculoskeletal: Negative for arthralgias, gait problem, joint swelling and myalgias  Skin: Negative for rash  Neurological: Negative for dizziness, seizures and weakness  All other systems reviewed and are negative  Patient Active Problem List   Diagnosis    Gastritis    Benign essential hypertension    Cervical radiculopathy    Coronary artery disease    Elevated prostate specific antigen (PSA)    Hyperlipidemia    [address]mia [address]a [address] [address]    Prediabetes    Tobacco abuse    Chronic left shoulder pain    Neck pain    Cervical spondylosis without myelopathy    BPH with obstruction/lower urinary tract symptoms       Past Medical History:   Diagnosis Date    CAD (coronary artery disease)     Last Assessed:  11/4/13    Elevated prostate specific antigen (PSA)     Last Assessed:  12/21/17    Hyperlipidemia     Last Assessed:  11/24/17    Hypertension     Benign essential, Last Assessed:  11/24/17       Past Surgical History:   Procedure Laterality Date    APPENDECTOMY      OR COLONOSCOPY FLX DX W/COLLJ SPEC WHEN PFRMD N/A 5/22/2017    Procedure: COLONOSCOPY;  Surgeon: Alma Mast DO;  [facility];   Service: Gastroenterology    PROSTATE BIOPSY         Family History   Problem Relation Age of Onset    No Known Problems Mother        Social History     Occupational History    Not on file   Tobacco Use    Smoking status: Current Every Day Smoker     Packs/day: 0 50     Types: Cigars, Cigarettes    Smokeless tobacco: Never Used    Tobacco comment: 8-7 cigarettes a day   Substance and Sexual Activity    Alcohol use: No    Drug use: No    Sexual activity: Not on file       Current Outpatient Medications on File Prior to Visit   Medication Sig    amLODIPine (NORVASC) 10 mg tablet Take 10 mg by mouth daily    aspirin (ECOTRIN LOW STRENGTH) 81 mg EC tablet Take 81 mg by mouth daily    atorvastatin (LIPITOR) 80 mg tablet Take 80 mg by mouth daily    metoprolol tartrate (LOPRESSOR) 25 mg tablet Take 50 mg by mouth 2 (two) times a day      Multiple Vitamin (MULTIVITAMIN) capsule Take 1 capsule by mouth daily    nitroglycerin (NITROSTAT) 0 4 mg SL tablet Place 0 4 mg under the tongue every 5 (five) minutes as needed for chest pain    Omega-3 Fatty Acids (FISH OIL) 1200 MG CAPS Take 1,200 mg by mouth 2 (two) times a day      tamsulosin (FLOMAX) 0 4 mg Take 1 capsule (0 4 mg total) by mouth daily with dinner for 30 days     No current facility-administered medications on file prior to visit  No Known Allergies    Physical Exam    /60   Pulse (!) 50   Ht 5' 8" (1 727 m)   Wt 81 9 kg (180 lb 9 6 oz)   BMI 27 46 kg/m²     Constitutional: normal, well developed, well nourished, alert, in no distress and non-toxic and no overt pain behavior    Eyes: anicteric  HEENT: grossly intact  Neck: supple, symmetric, trachea midline and no masses   Pulmonary:even and unlabored  Cardiovascular:No edema or pitting edema present  Skin:Normal without rashes or lesions and well hydrated  Psychiatric:Mood and affect appropriate  Neurologic:Cranial Nerves II-XII grossly intact  Musculoskeletal:normal    Imaging

## 2019-04-03 ENCOUNTER — TRANSCRIBE ORDERS (OUTPATIENT)
Dept: LAB | Facility: CLINIC | Age: 74
End: 2019-04-03

## 2019-04-03 ENCOUNTER — HOSPITAL ENCOUNTER (OUTPATIENT)
Dept: INFUSION CENTER | Facility: CLINIC | Age: 74
Discharge: HOME | End: 2019-04-03
Payer: COMMERCIAL

## 2019-04-03 ENCOUNTER — APPOINTMENT (OUTPATIENT)
Dept: LAB | Facility: CLINIC | Age: 74
End: 2019-04-03
Payer: COMMERCIAL

## 2019-04-03 DIAGNOSIS — D45 POLYCYTHEMIA VERA (HCC): ICD-10-CM

## 2019-04-03 DIAGNOSIS — D45 POLYCYTHEMIA VERA (HCC): Primary | ICD-10-CM

## 2019-04-03 LAB
BASOPHILS # BLD AUTO: 0.03 THOUSANDS/ΜL (ref 0–0.1)
BASOPHILS NFR BLD AUTO: 0 % (ref 0–1)
EOSINOPHIL # BLD AUTO: 0.76 THOUSAND/ΜL (ref 0–0.61)
EOSINOPHIL NFR BLD AUTO: 6 % (ref 0–6)
ERYTHROCYTE [DISTWIDTH] IN BLOOD BY AUTOMATED COUNT: 20.2 % (ref 11.6–15.1)
HCT VFR BLD AUTO: 44.4 % (ref 36.5–49.3)
HGB BLD-MCNC: 14.1 G/DL (ref 12–17)
LYMPHOCYTES # BLD AUTO: 1.63 THOUSANDS/ΜL (ref 0.6–4.47)
LYMPHOCYTES NFR BLD AUTO: 14 % (ref 14–44)
MCH RBC QN AUTO: 23.7 PG (ref 26.8–34.3)
MCHC RBC AUTO-ENTMCNC: 31.8 G/DL (ref 31.4–37.4)
MCV RBC AUTO: 75 FL (ref 82–98)
MONOCYTES # BLD AUTO: 0.92 THOUSAND/ΜL (ref 0.17–1.22)
MONOCYTES NFR BLD AUTO: 8 % (ref 4–12)
NEUTROPHILS # BLD AUTO: 8.52 THOUSANDS/ΜL (ref 1.85–7.62)
NEUTS SEG NFR BLD AUTO: 72 % (ref 43–75)
PLATELET # BLD AUTO: 216 THOUSANDS/UL (ref 149–390)
PMV BLD AUTO: 10.5 FL (ref 8.9–12.7)
RBC # BLD AUTO: 5.95 MILLION/UL (ref 3.88–5.62)
WBC # BLD AUTO: 11.86 THOUSAND/UL (ref 4.31–10.16)

## 2019-04-03 PROCEDURE — 36415 COLL VENOUS BLD VENIPUNCTURE: CPT

## 2019-04-03 PROCEDURE — 85025 COMPLETE CBC W/AUTO DIFF WBC: CPT

## 2019-04-03 NOTE — PROGRESS NOTES
Hgb 44 4 on CBC resulted today  Patient does not need therapeutic phlebotomy based on parameters on current orders  I will follow up with Lisa Ramirez RN with Dr Howard Kellogg to confirm frequency of treatment so patient is advised of any changes  Pt is aware of all future appointments

## 2019-04-05 ENCOUNTER — TELEPHONE (OUTPATIENT)
Dept: HEMATOLOGY ONCOLOGY | Facility: CLINIC | Age: 74
End: 2019-04-05

## 2019-04-10 ENCOUNTER — APPOINTMENT (OUTPATIENT)
Dept: RADIOLOGY | Facility: MEDICAL CENTER | Age: 74
End: 2019-04-10
Payer: COMMERCIAL

## 2019-04-10 ENCOUNTER — TRANSCRIBE ORDERS (OUTPATIENT)
Dept: ADMINISTRATIVE | Facility: HOSPITAL | Age: 74
End: 2019-04-10

## 2019-04-10 DIAGNOSIS — R06.00 DYSPNEA, UNSPECIFIED TYPE: ICD-10-CM

## 2019-04-10 DIAGNOSIS — F17.209 NICOTINE DEPENDENCE WITH NICOTINE-INDUCED DISORDER, UNSPECIFIED NICOTINE PRODUCT TYPE: ICD-10-CM

## 2019-04-10 DIAGNOSIS — R06.00 DYSPNEA, UNSPECIFIED TYPE: Primary | ICD-10-CM

## 2019-04-10 PROCEDURE — 71046 X-RAY EXAM CHEST 2 VIEWS: CPT

## 2019-05-04 DIAGNOSIS — D45 POLYCYTHEMIA VERA (HCC): Primary | ICD-10-CM

## 2019-05-09 LAB — HBA1C MFR BLD HPLC: 6.1 %

## 2019-05-16 ENCOUNTER — OFFICE VISIT (OUTPATIENT)
Dept: FAMILY MEDICINE CLINIC | Facility: CLINIC | Age: 74
End: 2019-05-16
Payer: COMMERCIAL

## 2019-05-16 VITALS
TEMPERATURE: 97.1 F | HEART RATE: 48 BPM | DIASTOLIC BLOOD PRESSURE: 80 MMHG | OXYGEN SATURATION: 97 % | SYSTOLIC BLOOD PRESSURE: 116 MMHG | RESPIRATION RATE: 16 BRPM | BODY MASS INDEX: 25.98 KG/M2 | WEIGHT: 175.38 LBS | HEIGHT: 69 IN

## 2019-05-16 DIAGNOSIS — R97.20 ELEVATED PROSTATE SPECIFIC ANTIGEN (PSA): ICD-10-CM

## 2019-05-16 DIAGNOSIS — E78.2 MIXED HYPERLIPIDEMIA: ICD-10-CM

## 2019-05-16 DIAGNOSIS — R73.03 PREDIABETES: ICD-10-CM

## 2019-05-16 DIAGNOSIS — Z72.0 TOBACCO ABUSE: ICD-10-CM

## 2019-05-16 DIAGNOSIS — I10 BENIGN ESSENTIAL HYPERTENSION: Primary | ICD-10-CM

## 2019-05-16 DIAGNOSIS — I25.10 CORONARY ARTERY DISEASE WITHOUT ANGINA PECTORIS, UNSPECIFIED VESSEL OR LESION TYPE, UNSPECIFIED WHETHER NATIVE OR TRANSPLANTED HEART: ICD-10-CM

## 2019-05-16 DIAGNOSIS — D45 POLYCYTHEMIA VERA (HCC): ICD-10-CM

## 2019-05-16 PROCEDURE — 99214 OFFICE O/P EST MOD 30 MIN: CPT | Performed by: FAMILY MEDICINE

## 2019-05-16 PROCEDURE — 3074F SYST BP LT 130 MM HG: CPT | Performed by: FAMILY MEDICINE

## 2019-05-16 PROCEDURE — 3079F DIAST BP 80-89 MM HG: CPT | Performed by: FAMILY MEDICINE

## 2019-05-29 ENCOUNTER — APPOINTMENT (OUTPATIENT)
Dept: LAB | Facility: CLINIC | Age: 74
End: 2019-05-29
Payer: COMMERCIAL

## 2019-05-29 DIAGNOSIS — D45 POLYCYTHEMIA VERA (HCC): ICD-10-CM

## 2019-05-29 DIAGNOSIS — R97.20 ELEVATED PROSTATE SPECIFIC ANTIGEN (PSA): ICD-10-CM

## 2019-05-29 LAB
ALBUMIN SERPL BCP-MCNC: 3.6 G/DL (ref 3.5–5.7)
ALP SERPL-CCNC: 135 U/L (ref 46–116)
ALT SERPL W P-5'-P-CCNC: 31 U/L (ref 12–78)
ANION GAP SERPL CALCULATED.3IONS-SCNC: 9 MMOL/L (ref 4–13)
AST SERPL W P-5'-P-CCNC: 35 U/L (ref 5–45)
BASOPHILS # BLD AUTO: 0.03 THOUSANDS/ΜL (ref 0–0.1)
BASOPHILS NFR BLD AUTO: 0 % (ref 0–1)
BILIRUB SERPL-MCNC: 0.8 MG/DL (ref 0.2–1)
BUN SERPL-MCNC: 17 MG/DL (ref 5–25)
CALCIUM SERPL-MCNC: 8.9 MG/DL (ref 8.3–10.1)
CHLORIDE SERPL-SCNC: 110 MMOL/L (ref 98–108)
CO2 SERPL-SCNC: 27 MMOL/L (ref 21–32)
CREAT SERPL-MCNC: 1.2 MG/DL (ref 0.6–1.3)
EOSINOPHIL # BLD AUTO: 0.82 THOUSAND/ΜL (ref 0–0.61)
EOSINOPHIL NFR BLD AUTO: 6 % (ref 0–6)
ERYTHROCYTE [DISTWIDTH] IN BLOOD BY AUTOMATED COUNT: 21.9 % (ref 11.6–15.1)
GFR SERPL CREATININE-BSD FRML MDRD: 60 ML/MIN/1.73SQ M
GLUCOSE SERPL-MCNC: 107 MG/DL (ref 65–140)
HCT VFR BLD AUTO: 47.5 % (ref 36.5–49.3)
HGB BLD-MCNC: 15.7 G/DL (ref 12–17)
LDH SERPL-CCNC: 211 U/L (ref 81–234)
LYMPHOCYTES # BLD AUTO: 1.86 THOUSANDS/ΜL (ref 0.6–4.47)
LYMPHOCYTES NFR BLD AUTO: 14 % (ref 14–44)
MCH RBC QN AUTO: 24.3 PG (ref 26.8–34.3)
MCHC RBC AUTO-ENTMCNC: 33.1 G/DL (ref 31.4–37.4)
MCV RBC AUTO: 74 FL (ref 82–98)
MONOCYTES # BLD AUTO: 1.03 THOUSAND/ΜL (ref 0.17–1.22)
MONOCYTES NFR BLD AUTO: 8 % (ref 4–12)
NEUTROPHILS # BLD AUTO: 9.6 THOUSANDS/ΜL (ref 1.85–7.62)
NEUTS SEG NFR BLD AUTO: 72 % (ref 43–75)
PLATELET # BLD AUTO: 228 THOUSANDS/UL (ref 149–390)
PMV BLD AUTO: 10.2 FL (ref 8.9–12.7)
POTASSIUM SERPL-SCNC: 4 MMOL/L (ref 3.5–5.3)
PROT SERPL-MCNC: 7.3 G/DL (ref 6.4–8.2)
PSA SERPL-MCNC: 14 NG/ML (ref 0–4)
RBC # BLD AUTO: 6.45 MILLION/UL (ref 3.88–5.62)
SODIUM SERPL-SCNC: 146 MMOL/L (ref 136–145)
WBC # BLD AUTO: 13.34 THOUSAND/UL (ref 4.31–10.16)

## 2019-05-29 PROCEDURE — 36415 COLL VENOUS BLD VENIPUNCTURE: CPT

## 2019-05-29 PROCEDURE — 85025 COMPLETE CBC W/AUTO DIFF WBC: CPT

## 2019-05-29 PROCEDURE — 83615 LACTATE (LD) (LDH) ENZYME: CPT

## 2019-05-29 PROCEDURE — G0103 PSA SCREENING: HCPCS

## 2019-05-29 PROCEDURE — 80053 COMPREHEN METABOLIC PANEL: CPT

## 2019-06-07 ENCOUNTER — OFFICE VISIT (OUTPATIENT)
Dept: HEMATOLOGY ONCOLOGY | Facility: CLINIC | Age: 74
End: 2019-06-07
Payer: COMMERCIAL

## 2019-06-07 ENCOUNTER — HOSPITAL ENCOUNTER (OUTPATIENT)
Dept: INFUSION CENTER | Facility: CLINIC | Age: 74
Discharge: HOME | End: 2019-06-07
Payer: COMMERCIAL

## 2019-06-07 VITALS
DIASTOLIC BLOOD PRESSURE: 72 MMHG | RESPIRATION RATE: 18 BRPM | SYSTOLIC BLOOD PRESSURE: 111 MMHG | TEMPERATURE: 96.7 F | HEART RATE: 50 BPM

## 2019-06-07 VITALS
HEART RATE: 46 BPM | DIASTOLIC BLOOD PRESSURE: 68 MMHG | SYSTOLIC BLOOD PRESSURE: 122 MMHG | HEIGHT: 68 IN | OXYGEN SATURATION: 96 % | RESPIRATION RATE: 18 BRPM | TEMPERATURE: 97.1 F | WEIGHT: 177 LBS | BODY MASS INDEX: 26.83 KG/M2

## 2019-06-07 DIAGNOSIS — D45 POLYCYTHEMIA VERA (HCC): Primary | ICD-10-CM

## 2019-06-07 PROCEDURE — 99195 PHLEBOTOMY: CPT

## 2019-06-07 PROCEDURE — 99214 OFFICE O/P EST MOD 30 MIN: CPT | Performed by: INTERNAL MEDICINE

## 2019-06-07 NOTE — PROGRESS NOTES
Patient arrived to the infusion center following office visit with Dr Deyvi Ludwig  HCT 47 5 on 5/29  Parameters on orders reviewed by second RN Stephani Brown  Patient tolerated phlebotomy effective for 500 ml of blood from RAC  Pt offers no complaints  Pt is aware of all future appointments   Refused AVS

## 2019-07-15 ENCOUNTER — APPOINTMENT (OUTPATIENT)
Dept: LAB | Facility: MEDICAL CENTER | Age: 74
End: 2019-07-15
Payer: COMMERCIAL

## 2019-07-15 DIAGNOSIS — Z12.5 PROSTATE CANCER SCREENING: ICD-10-CM

## 2019-07-15 LAB
BASOPHILS # BLD AUTO: 0.07 THOUSANDS/ΜL (ref 0–0.1)
BASOPHILS NFR BLD AUTO: 1 % (ref 0–1)
EOSINOPHIL # BLD AUTO: 0.85 THOUSAND/ΜL (ref 0–0.61)
EOSINOPHIL NFR BLD AUTO: 7 % (ref 0–6)
ERYTHROCYTE [DISTWIDTH] IN BLOOD BY AUTOMATED COUNT: 20.4 % (ref 11.6–15.1)
HCT VFR BLD AUTO: 50.6 % (ref 36.5–49.3)
HGB BLD-MCNC: 14.9 G/DL (ref 12–17)
IMM GRANULOCYTES # BLD AUTO: 0.04 THOUSAND/UL (ref 0–0.2)
IMM GRANULOCYTES NFR BLD AUTO: 0 % (ref 0–2)
LYMPHOCYTES # BLD AUTO: 1.81 THOUSANDS/ΜL (ref 0.6–4.47)
LYMPHOCYTES NFR BLD AUTO: 16 % (ref 14–44)
MCH RBC QN AUTO: 23.4 PG (ref 26.8–34.3)
MCHC RBC AUTO-ENTMCNC: 29.4 G/DL (ref 31.4–37.4)
MCV RBC AUTO: 79 FL (ref 82–98)
MONOCYTES # BLD AUTO: 0.94 THOUSAND/ΜL (ref 0.17–1.22)
MONOCYTES NFR BLD AUTO: 8 % (ref 4–12)
NEUTROPHILS # BLD AUTO: 7.97 THOUSANDS/ΜL (ref 1.85–7.62)
NEUTS SEG NFR BLD AUTO: 68 % (ref 43–75)
NRBC BLD AUTO-RTO: 0 /100 WBCS
PLATELET # BLD AUTO: 256 THOUSANDS/UL (ref 149–390)
PMV BLD AUTO: 11.4 FL (ref 8.9–12.7)
PSA SERPL-MCNC: 14.3 NG/ML (ref 0–4)
PSA SERPL-MCNC: 15.1 NG/ML (ref 0–4)
RBC # BLD AUTO: 6.38 MILLION/UL (ref 3.88–5.62)
WBC # BLD AUTO: 11.68 THOUSAND/UL (ref 4.31–10.16)

## 2019-07-15 PROCEDURE — 85025 COMPLETE CBC W/AUTO DIFF WBC: CPT | Performed by: INTERNAL MEDICINE

## 2019-07-15 PROCEDURE — 84153 ASSAY OF PSA TOTAL: CPT

## 2019-07-15 PROCEDURE — 36415 COLL VENOUS BLD VENIPUNCTURE: CPT | Performed by: INTERNAL MEDICINE

## 2019-07-15 PROCEDURE — G0103 PSA SCREENING: HCPCS

## 2019-07-18 ENCOUNTER — TELEPHONE (OUTPATIENT)
Dept: FAMILY MEDICINE CLINIC | Facility: CLINIC | Age: 74
End: 2019-07-18

## 2019-07-18 NOTE — TELEPHONE ENCOUNTER
----- Message from Keiko Valles DO sent at 7/15/2019  5:51 PM EDT -----  Call patient, his PSA level is still elevated at 15 1  Has he been following up with Urology?

## 2019-07-18 NOTE — TELEPHONE ENCOUNTER
[facility] with details (consent in [employer]) Requested pt to give office a call back to confirm that he is seeing Urology

## 2019-07-24 NOTE — PROGRESS NOTES
7/25/2019      Chief Complaint   Patient presents with    Elevated PSA       Assessment and Plan    68 y o  male managed by Dr Gabriel Zhu    1  BPH  - continue tamsulosin 0 4mg nightly      2  Elevated PSA status post 2 negative biopsies (2010 and 2012)  - PSA 15 1 (7/15/19), 12 5 (12/2018), 10 4 (12/13/17), 10 4 (6/7/2017), 10 7 (5/2017), 9 6 (4/28/16), 8 7 (4/24/15), 8 7 (4/2014), 11 7 (5 2013)  - HARINI 1/3/19 with no nodules   - discussed with the patient that his PSA continues to rise and is now higher than it has ever been in the past, because of this we discussed a multiparametric MRI versus continued observation, the patient opts for observation  - will repeat his PSA in 3 months, if he continues to rise will then obtain a multiparametric MRI, returns back to his baseline will continue observation    FU 3 months with PSA prior       History of Present Illness  Phill Abel is a 68 y o  male here for follow up evaluation of BPH and an elevated PSA status post 2 negative biopsies (2010 and 2012)  the patient presents today doing well from a lower urinary tract standpoint  He does continue on tamsulosin 0 4 mg nightly  His most recent PSA is 15 1 which was obtained 7/15/2019  PSA trend is as follows: 12 5 (12/2018), 10 4 (12/13/17), 10 4 (6/7/2017), 10 7 (5/2017), 9 6 (4/28/16), 8 7 (4/24/15), 8 7 (4/2014), 11 7 (5/2013)  Review of Systems   Constitutional: Negative for activity change, chills and fever  Gastrointestinal: Negative for abdominal distention and abdominal pain  Musculoskeletal: Negative for back pain and gait problem  Psychiatric/Behavioral: Negative for behavioral problems and confusion  Urinary Incontinence Screening      Most Recent Value   Urinary Incontinence   Urinary Incontinence? No   Incomplete emptying? No   Urinary frequency? Yes   Urinary urgency? Yes   Urinary hesitancy? Yes   Dysuria (painful difficult urination)?   No   Nocturia (waking up to use the bathroom)? No   Straining (having to push to go)? No   Weak stream?  No   Intermittent stream?  No   Post void dribbling? No          Past Medical History  Past Medical History:   Diagnosis Date    CAD (coronary artery disease)     Last Assessed:  11/4/13    Elevated prostate specific antigen (PSA)     Last Assessed:  12/21/17    Hyperlipidemia     Last Assessed:  11/24/17    Hypertension     Benign essential, Last Assessed:  11/24/17       Past Social History  Past Surgical History:   Procedure Laterality Date    APPENDECTOMY      VT COLONOSCOPY FLX DX W/COLLJ SPEC WHEN PFRMD N/A 5/22/2017    Procedure: COLONOSCOPY;  Surgeon: Lito Pike DO;  Location: [facility];   Service: Gastroenterology    PROSTATE BIOPSY       Social History     Tobacco Use   Smoking Status Current Every Day Smoker    Packs/day: 0 50    Types: Cigars, Cigarettes   Smokeless Tobacco Never Used   Tobacco Comment    8-7 cigarettes a day       Past Family History  Family History   Problem Relation Age of Onset    No Known Problems Mother        Past Social history  Social History     Socioeconomic History    Marital status: /Civil Union     Spouse name: Not on file    Number of children: Not on file    Years of education: Not on file    Highest education level: Not on file   Occupational History    Not on file   Social Needs    Financial resource strain: Not on file    Food insecurity:     Worry: Not on file     Inability: Not on file    Transportation needs:     Medical: Not on file     Non-medical: Not on file   Tobacco Use    Smoking status: Current Every Day Smoker     Packs/day: 0 50     Types: Cigars, Cigarettes    Smokeless tobacco: Never Used    Tobacco comment: 8-7 cigarettes a day   Substance and Sexual Activity    Alcohol use: No    Drug use: No    Sexual activity: Not on file   Lifestyle    Physical activity:     Days per week: Not on file     Minutes per session: Not on file    Stress: Not on file   Relationships    Social connections:     Talks on phone: Not on file     Gets together: Not on file     Attends Sabianist service: Not on file     Active member of club or organization: Not on file     Attends meetings of clubs or organizations: Not on file     Relationship status: Not on file    Intimate partner violence:     Fear of current or ex partner: Not on file     Emotionally abused: Not on file     Physically abused: Not on file     Forced sexual activity: Not on file   Other Topics Concern    Not on file   Social History Narrative    Always uses seatbelt    Feels safe at home    Uses safety equipment       Current Medications  Current Outpatient Medications   Medication Sig Dispense Refill    amLODIPine (NORVASC) 10 mg tablet Take 10 mg by mouth daily      aspirin (ECOTRIN LOW STRENGTH) 81 mg EC tablet Take 81 mg by mouth daily      atorvastatin (LIPITOR) 80 mg tablet Take 80 mg by mouth daily      metoprolol tartrate (LOPRESSOR) 25 mg tablet Take 50 mg by mouth 2 (two) times a day        Multiple Vitamin (MULTIVITAMIN) capsule Take 1 capsule by mouth daily      nitroglycerin (NITROSTAT) 0 4 mg SL tablet Place 0 4 mg under the tongue every 5 (five) minutes as needed for chest pain      Omega-3 Fatty Acids (FISH OIL) 1200 MG CAPS Take 1,200 mg by mouth 2 (two) times a day        tamsulosin (FLOMAX) 0 4 mg Take 1 capsule (0 4 mg total) by mouth daily with dinner for 30 days 30 capsule 11     No current facility-administered medications for this visit          Allergies  No Known Allergies      The following portions of the patient's history were reviewed and updated as appropriate: allergies, current medications, past medical history, past social history, past surgical history and problem list       Vitals  Vitals:    07/25/19 0826   BP: 110/62   Pulse: (!) 48   Weight: 80 3 kg (177 lb)   Height: 5' 8" (1 727 m)       Physical Exam  Constitutional   General appearance: Patient is seated and in no acute distress, well appearing and well nourished  Head and Face   Head and face: Normal     Eyes   Conjunctiva and lids: No erythema, swelling or discharge  Ears, Nose, Mouth, and Throat   Hearing: Normal     Pulmonary   Respiratory effort: No increased work of breathing or signs of respiratory distress  Cardiovascular   Examination of extremities for edema and/or varicosities: Normal     Abdomen   Abdomen: Non-tender, no masses  Musculoskeletal   Gait and station: Normal     Skin   Skin and subcutaneous tissue: Warm, dry, and intact  No visible lesions or rashes    Psychiatric   Judgment and insight: Normal  Recent and remote memory:  Normal  Mood and affect: Normal      Results  No results found for this or any previous visit (from the past 1 hour(s)) ]  Lab Results   Component Value Date    PSA 14 3 (H) 07/15/2019    PSA 15 1 (H) 07/15/2019    PSA 14 0 (H) 05/29/2019     Lab Results   Component Value Date    CALCIUM 8 9 05/29/2019     11/13/2017    K 4 0 05/29/2019    CO2 27 05/29/2019     (H) 05/29/2019    BUN 17 05/29/2019    CREATININE 1 20 05/29/2019     Lab Results   Component Value Date    WBC 11 68 (H) 07/15/2019    HGB 14 9 07/15/2019    HCT 50 6 (H) 07/15/2019    MCV 79 (L) 07/15/2019     07/15/2019       Orders  Orders Placed This Encounter   Procedures    PSA Total, Diagnostic     Standing Status:   Future     Standing Expiration Date:   7/25/2020

## 2019-07-25 ENCOUNTER — OFFICE VISIT (OUTPATIENT)
Dept: UROLOGY | Facility: CLINIC | Age: 74
End: 2019-07-25
Payer: COMMERCIAL

## 2019-07-25 VITALS
HEIGHT: 68 IN | DIASTOLIC BLOOD PRESSURE: 62 MMHG | SYSTOLIC BLOOD PRESSURE: 110 MMHG | WEIGHT: 177 LBS | BODY MASS INDEX: 26.83 KG/M2 | HEART RATE: 48 BPM

## 2019-07-25 DIAGNOSIS — R97.20 ELEVATED PSA: ICD-10-CM

## 2019-07-25 DIAGNOSIS — N13.8 BPH WITH OBSTRUCTION/LOWER URINARY TRACT SYMPTOMS: Primary | ICD-10-CM

## 2019-07-25 DIAGNOSIS — N40.1 BPH WITH OBSTRUCTION/LOWER URINARY TRACT SYMPTOMS: Primary | ICD-10-CM

## 2019-07-25 PROCEDURE — 99213 OFFICE O/P EST LOW 20 MIN: CPT | Performed by: PHYSICIAN ASSISTANT

## 2019-08-02 ENCOUNTER — HOSPITAL ENCOUNTER (OUTPATIENT)
Dept: INFUSION CENTER | Facility: CLINIC | Age: 74
Discharge: HOME | End: 2019-08-02
Payer: COMMERCIAL

## 2019-08-02 DIAGNOSIS — D45 POLYCYTHEMIA VERA (HCC): ICD-10-CM

## 2019-08-02 NOTE — PROGRESS NOTES
Patient had CBC drawn today in the outpatient lab  Hct  43 4 so therapeutic phlebotomy is not required today  Pt is aware of all future appointments

## 2019-08-05 ENCOUNTER — TELEPHONE (OUTPATIENT)
Dept: HEMATOLOGY ONCOLOGY | Facility: HOSPITAL | Age: 74
End: 2019-08-05

## 2019-08-05 NOTE — TELEPHONE ENCOUNTER
Called and left message for pt to call our office back  Per Dr Pilar Millan, pt does not require phlebotomy (Hct = 43 42) and pt is to recheck his CBC in 8 weeks

## 2019-09-23 ENCOUNTER — DOCTOR'S OFFICE (OUTPATIENT)
Dept: URBAN - METROPOLITAN AREA CLINIC 136 | Facility: CLINIC | Age: 74
Setting detail: OPHTHALMOLOGY
End: 2019-09-23
Payer: MEDICARE

## 2019-09-23 DIAGNOSIS — H43.813: ICD-10-CM

## 2019-09-23 DIAGNOSIS — H25.13: ICD-10-CM

## 2019-09-23 PROCEDURE — 92014 COMPRE OPH EXAM EST PT 1/>: CPT | Performed by: OPHTHALMOLOGY

## 2019-09-23 PROCEDURE — 92134 CPTRZ OPH DX IMG PST SGM RTA: CPT | Performed by: OPHTHALMOLOGY

## 2019-09-23 ASSESSMENT — SPHEQUIV_DERIVED
OS_SPHEQUIV: 0.625
OD_SPHEQUIV: 1

## 2019-09-23 ASSESSMENT — REFRACTION_MANIFEST
OU_VA: 20/
OS_VA2: 20/
OS_VA3: 20/
OD_VA1: 20/
OS_VA2: 20/
OD_VA1: 20/
OU_VA: 20/
OD_VA3: 20/
OS_VA3: 20/
OD_VA2: 20/
OD_VA3: 20/
OD_VA2: 20/
OS_VA1: 20/
OS_VA1: 20/

## 2019-09-23 ASSESSMENT — REFRACTION_CURRENTRX
OD_OVR_VA: 20/
OS_OVR_VA: 20/
OS_OVR_VA: 20/
OD_OVR_VA: 20/
OD_OVR_VA: 20/
OS_OVR_VA: 20/

## 2019-09-23 ASSESSMENT — REFRACTION_AUTOREFRACTION
OD_AXIS: 141
OD_CYLINDER: -0.50
OD_SPHERE: +1.25
OS_AXIS: 67
OS_CYLINDER: -0.75
OS_SPHERE: +1.00

## 2019-09-23 ASSESSMENT — VISUAL ACUITY
OD_BCVA: 20/25-2
OS_BCVA: 20/30-2

## 2019-09-23 ASSESSMENT — CONFRONTATIONAL VISUAL FIELD TEST (CVF)
OS_FINDINGS: FULL
OD_FINDINGS: FULL

## 2019-09-27 ENCOUNTER — APPOINTMENT (OUTPATIENT)
Dept: LAB | Facility: CLINIC | Age: 74
End: 2019-09-27
Payer: COMMERCIAL

## 2019-09-27 ENCOUNTER — HOSPITAL ENCOUNTER (OUTPATIENT)
Dept: INFUSION CENTER | Facility: CLINIC | Age: 74
Discharge: HOME | End: 2019-09-27
Payer: COMMERCIAL

## 2019-09-27 VITALS
DIASTOLIC BLOOD PRESSURE: 71 MMHG | TEMPERATURE: 96.9 F | RESPIRATION RATE: 18 BRPM | SYSTOLIC BLOOD PRESSURE: 109 MMHG | HEART RATE: 53 BPM

## 2019-09-27 DIAGNOSIS — D45 POLYCYTHEMIA VERA (HCC): ICD-10-CM

## 2019-09-27 DIAGNOSIS — D45 POLYCYTHEMIA VERA (HCC): Primary | ICD-10-CM

## 2019-09-27 LAB
BASOPHILS # BLD AUTO: 0.04 THOUSANDS/ΜL (ref 0–0.1)
BASOPHILS NFR BLD AUTO: 0 % (ref 0–1)
EOSINOPHIL # BLD AUTO: 1.05 THOUSAND/ΜL (ref 0–0.61)
EOSINOPHIL NFR BLD AUTO: 8 % (ref 0–6)
ERYTHROCYTE [DISTWIDTH] IN BLOOD BY AUTOMATED COUNT: 21.9 % (ref 11.6–15.1)
HCT VFR BLD AUTO: 50.3 % (ref 36.5–49.3)
HGB BLD-MCNC: 16.2 G/DL (ref 12–17)
LYMPHOCYTES # BLD AUTO: 2.11 THOUSANDS/ΜL (ref 0.6–4.47)
LYMPHOCYTES NFR BLD AUTO: 15 % (ref 14–44)
MCH RBC QN AUTO: 25.2 PG (ref 26.8–34.3)
MCHC RBC AUTO-ENTMCNC: 32.2 G/DL (ref 31.4–37.4)
MCV RBC AUTO: 78 FL (ref 82–98)
MONOCYTES # BLD AUTO: 0.82 THOUSAND/ΜL (ref 0.17–1.22)
MONOCYTES NFR BLD AUTO: 6 % (ref 4–12)
NEUTROPHILS # BLD AUTO: 9.65 THOUSANDS/ΜL (ref 1.85–7.62)
NEUTS SEG NFR BLD AUTO: 71 % (ref 43–75)
PLATELET # BLD AUTO: 228 THOUSANDS/UL (ref 149–390)
PMV BLD AUTO: 10.1 FL (ref 8.9–12.7)
RBC # BLD AUTO: 6.42 MILLION/UL (ref 3.88–5.62)
WBC # BLD AUTO: 13.67 THOUSAND/UL (ref 4.31–10.16)

## 2019-09-27 PROCEDURE — 99195 PHLEBOTOMY: CPT

## 2019-09-27 PROCEDURE — 85025 COMPLETE CBC W/AUTO DIFF WBC: CPT

## 2019-09-27 PROCEDURE — 36415 COLL VENOUS BLD VENIPUNCTURE: CPT

## 2019-09-27 NOTE — PROGRESS NOTES
Patient arrived to the infusion center for therapeutic phlebotomy  Hct 50 3 today  Parameters on orders confirmed by second RN, Sana Stephenson  Pt tolerated therapeutic phlebotomy effective for 500 ml of blood from RAC  Pt had orange juice to drink  Vitals WNL  Pt is aware of all future appointments   Refused AVS

## 2019-10-28 ENCOUNTER — APPOINTMENT (OUTPATIENT)
Dept: LAB | Facility: MEDICAL CENTER | Age: 74
End: 2019-10-28
Payer: COMMERCIAL

## 2019-10-28 DIAGNOSIS — R97.20 ELEVATED PSA: ICD-10-CM

## 2019-10-28 DIAGNOSIS — D45 POLYCYTHEMIA VERA (HCC): ICD-10-CM

## 2019-10-28 DIAGNOSIS — R97.20 ELEVATED PROSTATE SPECIFIC ANTIGEN (PSA): ICD-10-CM

## 2019-10-28 DIAGNOSIS — R73.03 PREDIABETES: ICD-10-CM

## 2019-10-28 LAB
ALBUMIN SERPL BCP-MCNC: 3.8 G/DL (ref 3.5–5)
ALP SERPL-CCNC: 152 U/L (ref 46–116)
ALT SERPL W P-5'-P-CCNC: 34 U/L (ref 12–78)
ANION GAP SERPL CALCULATED.3IONS-SCNC: 5 MMOL/L (ref 4–13)
AST SERPL W P-5'-P-CCNC: 21 U/L (ref 5–45)
BILIRUB SERPL-MCNC: 0.63 MG/DL (ref 0.2–1)
BUN SERPL-MCNC: 19 MG/DL (ref 5–25)
CALCIUM SERPL-MCNC: 8.9 MG/DL (ref 8.3–10.1)
CHLORIDE SERPL-SCNC: 107 MMOL/L (ref 100–108)
CO2 SERPL-SCNC: 28 MMOL/L (ref 21–32)
CREAT SERPL-MCNC: 1.36 MG/DL (ref 0.6–1.3)
EST. AVERAGE GLUCOSE BLD GHB EST-MCNC: 117 MG/DL
GFR SERPL CREATININE-BSD FRML MDRD: 51 ML/MIN/1.73SQ M
GLUCOSE P FAST SERPL-MCNC: 90 MG/DL (ref 65–99)
HBA1C MFR BLD: 5.7 % (ref 4.2–6.3)
POTASSIUM SERPL-SCNC: 3.8 MMOL/L (ref 3.5–5.3)
PROT SERPL-MCNC: 7.7 G/DL (ref 6.4–8.2)
PSA SERPL-MCNC: 14 NG/ML (ref 0–4)
PSA SERPL-MCNC: 14.1 NG/ML (ref 0–4)
SODIUM SERPL-SCNC: 140 MMOL/L (ref 136–145)

## 2019-10-28 PROCEDURE — G0103 PSA SCREENING: HCPCS

## 2019-10-28 PROCEDURE — 80053 COMPREHEN METABOLIC PANEL: CPT

## 2019-10-28 PROCEDURE — 84153 ASSAY OF PSA TOTAL: CPT

## 2019-10-28 PROCEDURE — 83036 HEMOGLOBIN GLYCOSYLATED A1C: CPT

## 2019-10-31 NOTE — PROGRESS NOTES
11/4/2019      Chief Complaint   Patient presents with    Benign Prostatic Hypertrophy    Elevated PSA       Assessment and Plan    69 y o  male managed by Dr Damon Taylor    1  BPH  - continue tamsulosin 0 4mg nightly   - the patient has some bothersome lower urinary tract symptoms, these will be addressed after his workup for his elevated PSA     2  Elevated PSA status post 2 negative biopsies (2010 and 2012)  - PSA 14 (10/28/19), 15 1 (7/15/19), 12 5 (12/2018), 10 4 (12/13/17), 10 4 (6/7/2017), 10 7 (5/2017), 9 6 (4/28/16), 8 7 (4/24/15), 8 7 (4/2014), 11 7 (5 2013)  - HARINI 1/3/19 with no nodules   - discussed with the patient that his PSA has not come down to his baseline, discussed mpMRI versus observation and the patient is agreeable to MRI    Obtain MRI and FU with MD to discuss      History of Present Illness  Levi Vicente is a 69 y o  male here for follow up evaluation of BPH and elevated PSA  The patient has undergone 2-biopsies for his elevated PSA  These occurred in 2010 and 2012  The patient's PSA had been stable until July 2019  His PSA had nicole from 12 5 to 15 1  Repeat PSA 3 months later has come down to 14  Otherwise, the patient presents today with some lower urinary tract symptoms  These are listed below  He does continue on Flomax 0 4 mg nightly  Otherwise, he is doing well  Review of Systems   Constitutional: Negative for activity change, chills and fever  Gastrointestinal: Negative for abdominal distention and abdominal pain  Musculoskeletal: Negative for back pain and gait problem  Psychiatric/Behavioral: Negative for behavioral problems and confusion  Urinary Incontinence Screening      Most Recent Value   Urinary Incontinence   Urinary Incontinence? No   Incomplete emptying? Yes   Urinary frequency? No   Urinary urgency? Yes   Urinary hesitancy? Yes   Dysuria (painful difficult urination)? No   Nocturia (waking up to use the bathroom)?   No   Straining (having to push to go)? No   Weak stream?  Yes   Intermittent stream?  Yes   Post void dribbling? No          Past Medical History  Past Medical History:   Diagnosis Date    CAD (coronary artery disease)     Last Assessed:  11/4/13    Elevated prostate specific antigen (PSA)     Last Assessed:  12/21/17    Hyperlipidemia     Last Assessed:  11/24/17    Hypertension     Benign essential, Last Assessed:  11/24/17       Past Social History  Past Surgical History:   Procedure Laterality Date    APPENDECTOMY      MA COLONOSCOPY FLX DX W/COLLJ SPEC WHEN PFRMD N/A 5/22/2017    Procedure: COLONOSCOPY;  Surgeon: Hudson Woo DO;  Location: [facility];   Service: Gastroenterology    PROSTATE BIOPSY       Social History     Tobacco Use   Smoking Status Current Every Day Smoker    Packs/day: 0 50    Types: Cigars, Cigarettes   Smokeless Tobacco Never Used   Tobacco Comment    8-7 cigarettes a day       Past Family History  Family History   Problem Relation Age of Onset    No Known Problems Mother        Past Social history  Social History     Socioeconomic History    Marital status: /Civil Union     Spouse name: Not on file    Number of children: Not on file    Years of education: Not on file    Highest education level: Not on file   Occupational History    Not on file   Social Needs    Financial resource strain: Not on file    Food insecurity:     Worry: Not on file     Inability: Not on file    Transportation needs:     Medical: Not on file     Non-medical: Not on file   Tobacco Use    Smoking status: Current Every Day Smoker     Packs/day: 0 50     Types: Cigars, Cigarettes    Smokeless tobacco: Never Used    Tobacco comment: 8-7 cigarettes a day   Substance and Sexual Activity    Alcohol use: No    Drug use: No    Sexual activity: Not on file   Lifestyle    Physical activity:     Days per week: Not on file     Minutes per session: Not on file    Stress: Not on file   Relationships    Social connections:     Talks on phone: Not on file     Gets together: Not on file     Attends Sikhism service: Not on file     Active member of club or organization: Not on file     Attends meetings of clubs or organizations: Not on file     Relationship status: Not on file    Intimate partner violence:     Fear of current or ex partner: Not on file     Emotionally abused: Not on file     Physically abused: Not on file     Forced sexual activity: Not on file   Other Topics Concern    Not on file   Social History Narrative    Always uses seatbelt    Feels safe at home    Uses safety equipment       Current Medications  Current Outpatient Medications   Medication Sig Dispense Refill    amLODIPine (NORVASC) 10 mg tablet Take 10 mg by mouth daily      aspirin (ECOTRIN LOW STRENGTH) 81 mg EC tablet Take 81 mg by mouth daily      atorvastatin (LIPITOR) 80 mg tablet Take 80 mg by mouth daily      metoprolol tartrate (LOPRESSOR) 50 mg tablet Take 50 mg by mouth 2 (two) times a day       Multiple Vitamin (MULTIVITAMIN) capsule Take 1 capsule by mouth daily      nitroglycerin (NITROSTAT) 0 4 mg SL tablet Place 0 4 mg under the tongue every 5 (five) minutes as needed for chest pain      Omega-3 Fatty Acids (FISH OIL) 1200 MG CAPS Take 1,200 mg by mouth 2 (two) times a day        tamsulosin (FLOMAX) 0 4 mg Take 1 capsule (0 4 mg total) by mouth daily with dinner for 30 days 30 capsule 11     No current facility-administered medications for this visit          Allergies  No Known Allergies      The following portions of the patient's history were reviewed and updated as appropriate: allergies, current medications, past medical history, past social history, past surgical history and problem list       Vitals  Vitals:    11/04/19 0851   BP: 124/70   Pulse: 71   Weight: 80 3 kg (177 lb)   Height: 5' 8" (1 727 m)           Physical Exam  Constitutional   General appearance: Patient is seated and in no acute distress, well appearing and well nourished  Head and Face   Head and face: Normal     Eyes   Conjunctiva and lids: No erythema, swelling or discharge  Ears, Nose, Mouth, and Throat   Hearing: Normal     Pulmonary   Respiratory effort: No increased work of breathing or signs of respiratory distress  Cardiovascular   Examination of extremities for edema and/or varicosities: Normal     Abdomen   Abdomen: Non-tender, no masses  Musculoskeletal   Gait and station: normal   Skin   Skin and subcutaneous tissue: Warm, dry, and intact  No visible lesions or rashes  Psychiatric   Judgment and insight: Normal  Recent and remote memory:  Normal  Mood and affect: Normal      Results  No results found for this or any previous visit (from the past 1 hour(s)) ]  Lab Results   Component Value Date    PSA 14 0 (H) 10/28/2019    PSA 14 1 (H) 10/28/2019    PSA 14 3 (H) 07/15/2019    PSA 15 1 (H) 07/15/2019     Lab Results   Component Value Date    CALCIUM 8 9 10/28/2019     11/13/2017    K 3 8 10/28/2019    CO2 28 10/28/2019     10/28/2019    BUN 19 10/28/2019    CREATININE 1 36 (H) 10/28/2019     Lab Results   Component Value Date    WBC 11 84 (H) 10/28/2019    HGB 15 2 10/28/2019    HCT 50 3 (H) 10/28/2019    MCV 81 (L) 10/28/2019     10/28/2019       Orders  Orders Placed This Encounter   Procedures    MRI prostate multiparametric wo w contrast     Standing Status:   Future     Standing Expiration Date:   11/4/2023     Scheduling Instructions: There is no preparation for this test  Please leave your jewelry and valuables at home, wedding rings are the exception  Please bring your physician order, insurance cards, a form of photo ID and a list of your medications with you  Arrive 15 minutes prior to your appointment time in order to       register  Please bring any prior CT or MRI studies of this area that were not performed at a [facility]              To schedule this appointment, please contact Central Scheduling at [phone]  Order Specific Question:   Reason for Exam:     Answer:   elevated PSA s/p 2 negative biopsies     Order Specific Question:   What is the patient's sedation requirement? Answer:   Unknown    Basic metabolic panel     This is a patient instruction: Patient fasting for 8 hours or longer recommended       Standing Status:   Future     Standing Expiration Date:   11/4/2020

## 2019-11-04 ENCOUNTER — OFFICE VISIT (OUTPATIENT)
Dept: UROLOGY | Facility: CLINIC | Age: 74
End: 2019-11-04
Payer: COMMERCIAL

## 2019-11-04 ENCOUNTER — TELEPHONE (OUTPATIENT)
Dept: UROLOGY | Facility: CLINIC | Age: 74
End: 2019-11-04

## 2019-11-04 VITALS
WEIGHT: 177 LBS | SYSTOLIC BLOOD PRESSURE: 124 MMHG | HEART RATE: 71 BPM | HEIGHT: 68 IN | BODY MASS INDEX: 26.83 KG/M2 | DIASTOLIC BLOOD PRESSURE: 70 MMHG

## 2019-11-04 DIAGNOSIS — R97.20 ELEVATED PSA: Primary | ICD-10-CM

## 2019-11-04 DIAGNOSIS — N40.1 BPH WITH OBSTRUCTION/LOWER URINARY TRACT SYMPTOMS: ICD-10-CM

## 2019-11-04 DIAGNOSIS — N13.8 BPH WITH OBSTRUCTION/LOWER URINARY TRACT SYMPTOMS: ICD-10-CM

## 2019-11-04 PROCEDURE — 99213 OFFICE O/P EST LOW 20 MIN: CPT | Performed by: PHYSICIAN ASSISTANT

## 2019-11-04 NOTE — TELEPHONE ENCOUNTER
Patient is scheduled for Prostate Mri at [facility] 11/13/19  Patient will require a MD follow up with Dr Radha Payne   Patient prefers the [facility] location  Please advise appropriate appointment spot  Thank you

## 2019-11-04 NOTE — TELEPHONE ENCOUNTER
Left message for patient to give office a call back  Tentatively scheduled patient 11/21/19 11:45am with Dr Radha Payne at [facility]

## 2019-11-13 ENCOUNTER — HOSPITAL ENCOUNTER (OUTPATIENT)
Dept: RADIOLOGY | Age: 74
Discharge: HOME | End: 2019-11-13
Payer: COMMERCIAL

## 2019-11-13 DIAGNOSIS — R97.20 ELEVATED PSA: ICD-10-CM

## 2019-11-13 PROCEDURE — 72197 MRI PELVIS W/O & W/DYE: CPT

## 2019-11-13 PROCEDURE — 76377 3D RENDER W/INTRP POSTPROCES: CPT

## 2019-11-13 PROCEDURE — A9585 GADOBUTROL INJECTION: HCPCS | Performed by: PHYSICIAN ASSISTANT

## 2019-11-13 RX ADMIN — GADOBUTROL 8 ML: 604.72 INJECTION INTRAVENOUS at 09:50

## 2019-11-21 ENCOUNTER — TELEPHONE (OUTPATIENT)
Dept: UROLOGY | Facility: CLINIC | Age: 74
End: 2019-11-21

## 2019-11-21 DIAGNOSIS — R97.20 ELEVATED PSA: Primary | ICD-10-CM

## 2019-11-21 NOTE — TELEPHONE ENCOUNTER
I cancelled patient today with Dr Reina Sebastian   Follow up is scheduled with Dr Reina Sebastian 6/2/20 at 9:30  I left detailed message for patient with appointment information and also mailed appointment card

## 2019-11-21 NOTE — TELEPHONE ENCOUNTER
Per Dr Hermes Blanco called the patient to discuss prostate MRI  MRI with very low risk, [facility]-Rads 2  Can cancel his appointment today and re-schedule for 6 months with a PSA prior  Patient aware, please schedule

## 2019-11-22 ENCOUNTER — APPOINTMENT (OUTPATIENT)
Dept: LAB | Facility: CLINIC | Age: 74
End: 2019-11-22
Payer: COMMERCIAL

## 2019-11-22 ENCOUNTER — HOSPITAL ENCOUNTER (OUTPATIENT)
Dept: INFUSION CENTER | Facility: CLINIC | Age: 74
Discharge: HOME | End: 2019-11-22
Payer: COMMERCIAL

## 2019-11-22 VITALS
DIASTOLIC BLOOD PRESSURE: 78 MMHG | TEMPERATURE: 97.4 F | SYSTOLIC BLOOD PRESSURE: 118 MMHG | RESPIRATION RATE: 18 BRPM | HEART RATE: 55 BPM

## 2019-11-22 DIAGNOSIS — D45 POLYCYTHEMIA VERA (HCC): Primary | ICD-10-CM

## 2019-11-22 PROCEDURE — 99195 PHLEBOTOMY: CPT

## 2019-11-22 NOTE — PLAN OF CARE
Problem: Potential for Falls  Goal: Patient will remain free of falls  Description  INTERVENTIONS:  - Assess patient frequently for physical needs  -  Identify cognitive and physical deficits and behaviors that affect risk of falls    -  [place] fall precautions as indicated by assessment   - Educate patient/family on patient safety including physical limitations  - Instruct patient to call for assistance with activity based on assessment  - Modify environment to reduce risk of injury  - Consider OT/PT consult to assist with strengthening/mobility  Outcome: Progressing

## 2019-11-22 NOTE — PLAN OF CARE
Problem: Potential for Falls  Goal: Patient will remain free of falls  Description  INTERVENTIONS:  - Assess patient frequently for physical needs  -  Identify cognitive and physical deficits and behaviors that affect risk of falls    -  [place] fall precautions as indicated by assessment   - Educate patient/family on patient safety including physical limitations  - Instruct patient to call for assistance with activity based on assessment  - Modify environment to reduce risk of injury  - Consider OT/PT consult to assist with strengthening/mobility  11/22/2019 1024 by Jim Parker RN  Outcome: Progressing  11/22/2019 1019 by Jim Parker RN  Outcome: Progressing

## 2019-11-22 NOTE — PROGRESS NOTES
Pt here for therapeutic phlebotomy  Hematocrit = 49 6 which was double checked with Dayana Gomez RN  500 ml was removed via the right antecubital vein without difficulty  Pressure dressing applied  Pt observed for 15 minutes post phlebotomy    Pt declined AVS but is aware of his appt with Dr Ismael Sahni on Dec  6   At that time he will have the office make more phlebotomy appts for him

## 2019-12-02 ENCOUNTER — TELEPHONE (OUTPATIENT)
Dept: HEMATOLOGY ONCOLOGY | Facility: CLINIC | Age: 74
End: 2019-12-02

## 2019-12-02 NOTE — TELEPHONE ENCOUNTER
Patient called stating that he would like to know if he can use the blood work completed 11/22 for his upcoming 12/6 with Dr Diana Sensing  Best call back [phone]

## 2019-12-06 ENCOUNTER — OFFICE VISIT (OUTPATIENT)
Dept: HEMATOLOGY ONCOLOGY | Facility: CLINIC | Age: 74
End: 2019-12-06
Payer: COMMERCIAL

## 2019-12-06 VITALS
BODY MASS INDEX: 26.83 KG/M2 | SYSTOLIC BLOOD PRESSURE: 112 MMHG | DIASTOLIC BLOOD PRESSURE: 80 MMHG | HEART RATE: 54 BPM | OXYGEN SATURATION: 97 % | WEIGHT: 177 LBS | HEIGHT: 68 IN | TEMPERATURE: 97.1 F | RESPIRATION RATE: 16 BRPM

## 2019-12-06 DIAGNOSIS — D45 POLYCYTHEMIA VERA (HCC): Primary | ICD-10-CM

## 2019-12-06 PROCEDURE — 99214 OFFICE O/P EST MOD 30 MIN: CPT | Performed by: INTERNAL MEDICINE

## 2019-12-06 NOTE — PROGRESS NOTES
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   At presentation on  August 3, 2017 hemoglobin 18 9 and hematocrit 55 7%   (In retrospect he has had erythrocytosis and mild leukocytosis dating back to at least May 1, 2013 i e  hemoglobin 19 0 and hematocrit 58 1% and WBC 12 2) Therapeutic phlebotomy initiated October 25, 2017, 17 units thus far, most recently on  November 22, 2019       2   Two sessile polyps in the descending colon were removed by cold biopsy polypectomy on colonoscopy May 22, 2017, consistent with tubular adenoma     Review of systems:      Constitutional: Gilmar Zimmerman has been feeling well, he denies chills or sweats     HEENT:  He denies nose bleeds   He denies oral cavity or throat soreness  Cardiovascular:  He denies chest pain, there has been no edema  Respiratory:  She HPI  He denies cough  GI:  His appetite has been good   No abdominal pain  Bowel habits have been formed and regular     :  See HPI  He denies urinary frequency     Musculoskeletal:   See HPI   He denies back pain or joint pain otherwise  Skin:  He denies rash  Neurologic:  He denies headache or paresthesias    He denies difficulty walking      Psychiatric:  He denies emotional problems     Hematologic:  He denies easy bruising  Physical Examination:    Blood pressure 112/80, pulse (!) 54, temperature (!) 97 1 °F (36 2 °C), temperature source Tympanic, resp  rate 16, height 5' 8" (1 727 m), weight 80 3 kg (177 lb), SpO2 97 %  Body surface area is 1 94 meters squared  He appears well     EOMI   Oropharynx clear   No lymphadenopathy of the neck  No axillary lymphadenopathy     Lungs are clear bilaterally  Heart has regular rate and rhythm  No hepatic or splenic enlargement   No inguinal lymphadenopathy     No lower extremity edema  Gait and station are normal    Neurological is grossly intact     Oriented x3, normal mood and affect      ECOG 0    Laboratory:    From October 28, 2019:  PSA is 14 0, creatinine 1 36, calcium 8 9, AST 21, ALT 34, alk-phos 152 (), bili 0 63    From November 22, 2019:  WBC is 13 17, hemoglobin 15 4, hematocrit 49 6%, platelets 300, on WBC differential neutrophils 69%, lymphs 15%, monos 8%, eos 8%     Body plethysmographic lung volumes from August 7, 2019: This patient has a normal pulmonary function test    Stress echocardiogram was done on July 10, 2019:  Negative stress echo to level of exercise achieved    MRI of the prostate, multiparametric on November 13, 2019:  Clinically significant prostate cancer is unlikely to be present, no extraprostatic tumor, seminal vesicle invasion, pelvic lymphadenopathy, or pelvic osseous metastatic disease    Assessment/Plan:    Erythrocytosis related to polycythemia vera is fairly well controlled  The patient is in agreement with therapeutic phlebotomy and aspirin 81 mg daily Therapeutic phlebotomy every 8 weeks is to be continued with the goal of maintaining hematocrit less than 45% to reduce risk of stroke and other thrombotic events   In addition, based upon elevated WBC and age > 68, hydroxyurea is recommended   However, the patient prefers to avoid taking another medication at this time        He is aware seek medical attention for recurrent cough, progressive shortness of breath, headache, pain or swelling in the legs, chest pain, back pain, excessive fatigue, or if other new problems arise Otherwise, I plan to see him again in 6 months       Today's office visit required 25 minutes, over 50% of the time was utilized to review diagnostic tests, impressions and recommendations, and for preparation of the therapeutic phlebotomy therapy plan

## 2019-12-06 NOTE — PATIENT INSTRUCTIONS
He is aware seek medical attention for recurrent cough, progressive shortness of breath, headache, pain or swelling in the legs, chest pain, back pain, excessive fatigue, or if other new problems arise

## 2019-12-18 ENCOUNTER — OFFICE VISIT (OUTPATIENT)
Dept: FAMILY MEDICINE CLINIC | Facility: CLINIC | Age: 74
End: 2019-12-18
Payer: COMMERCIAL

## 2019-12-18 VITALS
RESPIRATION RATE: 17 BRPM | WEIGHT: 179 LBS | HEART RATE: 66 BPM | DIASTOLIC BLOOD PRESSURE: 80 MMHG | OXYGEN SATURATION: 98 % | BODY MASS INDEX: 27.13 KG/M2 | HEIGHT: 68 IN | TEMPERATURE: 98 F | SYSTOLIC BLOOD PRESSURE: 120 MMHG

## 2019-12-18 DIAGNOSIS — E78.2 MIXED HYPERLIPIDEMIA: ICD-10-CM

## 2019-12-18 DIAGNOSIS — Z00.00 ENCOUNTER FOR MEDICARE ANNUAL WELLNESS EXAM: ICD-10-CM

## 2019-12-18 DIAGNOSIS — Z72.0 TOBACCO ABUSE: ICD-10-CM

## 2019-12-18 DIAGNOSIS — R73.03 PREDIABETES: ICD-10-CM

## 2019-12-18 DIAGNOSIS — D45 POLYCYTHEMIA VERA (HCC): ICD-10-CM

## 2019-12-18 DIAGNOSIS — I10 BENIGN ESSENTIAL HYPERTENSION: Primary | ICD-10-CM

## 2019-12-18 DIAGNOSIS — R97.20 ELEVATED PSA: ICD-10-CM

## 2019-12-18 DIAGNOSIS — I25.10 CORONARY ARTERY DISEASE WITHOUT ANGINA PECTORIS, UNSPECIFIED VESSEL OR LESION TYPE, UNSPECIFIED WHETHER NATIVE OR TRANSPLANTED HEART: ICD-10-CM

## 2019-12-18 PROCEDURE — 1101F PT FALLS ASSESS-DOCD LE1/YR: CPT | Performed by: FAMILY MEDICINE

## 2019-12-18 PROCEDURE — 3079F DIAST BP 80-89 MM HG: CPT | Performed by: FAMILY MEDICINE

## 2019-12-18 PROCEDURE — 99214 OFFICE O/P EST MOD 30 MIN: CPT | Performed by: FAMILY MEDICINE

## 2019-12-18 PROCEDURE — 3074F SYST BP LT 130 MM HG: CPT | Performed by: FAMILY MEDICINE

## 2019-12-18 PROCEDURE — G0439 PPPS, SUBSEQ VISIT: HCPCS | Performed by: FAMILY MEDICINE

## 2019-12-18 PROCEDURE — 4004F PT TOBACCO SCREEN RCVD TLK: CPT | Performed by: FAMILY MEDICINE

## 2019-12-18 PROCEDURE — 1170F FXNL STATUS ASSESSED: CPT | Performed by: FAMILY MEDICINE

## 2019-12-18 PROCEDURE — 3725F SCREEN DEPRESSION PERFORMED: CPT | Performed by: FAMILY MEDICINE

## 2019-12-18 PROCEDURE — 1125F AMNT PAIN NOTED PAIN PRSNT: CPT | Performed by: FAMILY MEDICINE

## 2019-12-18 NOTE — PROGRESS NOTES
[facility]      NAME: Tia Escalante  AGE: 69 y o  SEX: male  : 1945   MRN: [id number]    DATE: 2019  TIME: 12:05 PM    Assessment and Plan     Problem List Items Addressed This Visit     Benign essential hypertension - Primary      Reasonably controlled on amlodipine 10 and metoprolol 25 b i d  Relevant Orders    TSH, 3rd generation with Free T4 reflex    Coronary artery disease      Status post PCI  Denies any chest pain or shortness of breath  Continue regular follow         Elevated PSA      Being followed by Urology         Hyperlipidemia      Doing well on atorvastatin 80 mg daily  Will recheck labs prior to next appointment in          Relevant Orders    Lipid Panel with Direct LDL reflex    Polycythemia vera (HCC)      Stable polycythemia  Patient is still getting monthly phlebotomy  Continue regular follow-up with nadeen hernandez, Dr Stephenie Temple         Prediabetes      Last A1c 6 1%  Continue reduced carb diet exercise  Will repeat labs prior to next appointment in          Relevant Orders    Comprehensive metabolic panel    Hemoglobin A1C    Tobacco abuse      Patient is still smoking 1/2 pack per day  Counseled again  Last CT of lungs was 2018  Patient also had a chest x-ray earlier this year which was negative  Will repeat low radiation CT scan in near future         Relevant Orders    CT lung screening program      Other Visit Diagnoses     BMI 27 0-27 9,adult        Encounter for Medicare annual wellness exam                  Return to office in: AWV today,   Recheck 6 months, fasting blood work prior at [address] lab     patient refuses all age appropriate vaccinations    Chief Complaint     Chief Complaint   Patient presents with    Follow-up     6 months check up    Medicare Wellness Visit       History of Present Illness      Patient presents for recheck chronic medical problems today    Overall is feeling well without complaints  Still sees hematologist regularly for polycythemia  Still sees urologist regularly for elevated PSA  Doing well on atorvastatin for cholesterol  Doing well on amlodipine and metoprolol for hypertension  The following portions of the patient's history were reviewed and updated as appropriate: allergies, current medications, past family history, past medical history, past social history, past surgical history and problem list     Review of Systems   Review of Systems   Respiratory: Negative  Cardiovascular: Negative  Gastrointestinal: Negative  Genitourinary: Negative  Active Problem List     Patient Active Problem List   Diagnosis    Gastritis    Benign essential hypertension    Cervical radiculopathy    Coronary artery disease    Elevated PSA    Hyperlipidemia    Polycythemia vera ([address] )    Prediabetes    Tobacco abuse    Chronic left shoulder pain    Neck pain    Cervical spondylosis without myelopathy    BPH with obstruction/lower urinary tract symptoms       Objective   /80 (BP Location: Left arm, Patient Position: Sitting, Cuff Size: Adult)   Pulse 66   Temp 98 °F (36 7 °C) (Tympanic)   Resp 17   Ht 5' 7 72" (1 72 m)   Wt 81 2 kg (179 lb)   SpO2 98%   BMI 27 44 kg/m²     Physical Exam   Cardiovascular: Normal rate, regular rhythm, normal heart sounds and intact distal pulses  Carotids: no bruits  Ext: no edema   Pulmonary/Chest: Effort normal  No respiratory distress  He has no wheezes  He has no rales  Psychiatric: He has a normal mood and affect   His behavior is normal  Thought content normal        Pertinent Laboratory/Diagnostic Studies:    Lab results    Current Medications     Current Outpatient Medications:     amLODIPine (NORVASC) 10 mg tablet, Take 10 mg by mouth daily, Disp: , Rfl:     aspirin (ECOTRIN LOW STRENGTH) 81 mg EC tablet, Take 81 mg by mouth daily, Disp: , Rfl:     atorvastatin (LIPITOR) 80 mg tablet, Take 80 mg by mouth daily, Disp: , Rfl:     metoprolol tartrate (LOPRESSOR) 50 mg tablet, Take 50 mg by mouth 2 (two) times a day , Disp: , Rfl:     Multiple Vitamin (MULTIVITAMIN) capsule, Take 1 capsule by mouth daily, Disp: , Rfl:     nitroglycerin (NITROSTAT) 0 4 mg SL tablet, Place 0 4 mg under the tongue every 5 (five) minutes as needed for chest pain, Disp: , Rfl:     Omega-3 Fatty Acids (FISH OIL) 1200 MG CAPS, Take 1,200 mg by mouth 2 (two) times a day  , Disp: , Rfl:     tamsulosin (FLOMAX) 0 4 mg, Take 1 capsule (0 4 mg total) by mouth daily with dinner for 30 days, Disp: 30 capsule, Rfl: 11    Health Maintenance     Health Maintenance   Topic Date Due    Hepatitis C Screening  1945    SLP PLAN OF CARE  1945    BMI: Followup Plan  09/18/1964    Pneumococcal Vaccine: 65+ Years (1 of 2 - PCV13) 09/18/2011    PT PLAN OF CARE  09/14/2018    Medicare Annual Wellness Visit (AWV)  05/30/2019    Influenza Vaccine  01/23/2020 (Originally 7/1/2019)    DTaP,Tdap,and Td Vaccines (1 - Tdap) 05/16/2020 (Originally 9/18/1957)    CRC Screening: Colonoscopy  05/22/2020    Fall Risk  12/18/2020    Depression Screening PHQ  12/18/2020    BMI: Adult  12/18/2020    Pneumococcal Vaccine: Pediatrics (0 to 5 Years) and At-Risk Patients (6 to 59 Years)  Aged Out    HIB Vaccine  Aged Out    Hepatitis B Vaccine  Aged Out    IPV Vaccine  Aged Out    Hepatitis A Vaccine  Aged Out    Meningococcal ACWY Vaccine  Aged Out    HPV Vaccine  Aged [employer] History   Administered Date(s) Administered    Pneumococcal Polysaccharide PPV23 1945       Charlotte Fu DO  [facility]  BMI Counseling: Body mass index is 27 44 kg/m²  The BMI is above normal  Nutrition recommendations include reducing portion sizes

## 2019-12-18 NOTE — PROGRESS NOTES
Assessment and Plan:    Medicare wellness visit  Patient does not have a living will or healthcare power of   He was given information so we can obtain these documents  Depression screen and fall risk were negative  Patient refuses flu shot  Patient refuses other age appropriate vaccinations  Current with colonoscopy  In PSA  Problem List Items Addressed This Visit     Benign essential hypertension - Primary    Coronary artery disease    Elevated PSA    Hyperlipidemia    Polycythemia vera ([facility] [address] )    Prediabetes    Tobacco abuse           Preventive health issues were discussed with patient, and age appropriate screening tests were ordered as noted in patient's After Visit Summary  Personalized health advice and appropriate referrals for health education or preventive services given if needed, as noted in patient's After Visit Summary  History of Present Illness:     Patient presents for Welcome to Medicare visit       Patient Care Team:  Rafy Juárez DO as PCP - General  MD Dora Shen DO Gaylord Lund, MD Trixie Connors, DO Trixie Connors, DO as Endoscopist     Review of Systems:     Review of Systems   Problem List:     Patient Active Problem List   Diagnosis    Gastritis    Benign essential hypertension    Cervical radiculopathy    Coronary artery disease    Elevated PSA    Hyperlipidemia    Polycythemia vera ([facility] [address] )    Prediabetes    Tobacco abuse    Chronic left shoulder pain    Neck pain    Cervical spondylosis without myelopathy    BPH with obstruction/lower urinary tract symptoms      Past Medical and Surgical History:     Past Medical History:   Diagnosis Date    CAD (coronary artery disease)     Last Assessed:  11/4/13    Elevated prostate specific antigen (PSA)     Last Assessed:  12/21/17    Hyperlipidemia     Last Assessed:  11/24/17    Hypertension     Benign essential, Last Assessed:  11/24/17     Past Surgical History:   Procedure Laterality Date    APPENDECTOMY      [place] COLONOSCOPY FLX DX W/COLLJ SPEC WHEN PFRMD N/A 5/22/2017    Procedure: COLONOSCOPY;  Surgeon: Chance Sesay DO;  Location: [facility];   Service: Gastroenterology    PROSTATE BIOPSY        Family History:     Family History   Problem Relation Age of Onset    No Known Problems Mother       Social History:     Social History     Socioeconomic History    Marital status: /Civil Union     Spouse name: None    Number of children: None    Years of education: None    Highest education level: None   Occupational History    None   Social Needs    Financial resource strain: None    Food insecurity:     Worry: None     Inability: None    Transportation needs:     Medical: None     Non-medical: None   Tobacco Use    Smoking status: Current Every Day Smoker     Packs/day: 0 50     Types: Cigars, Cigarettes    Smokeless tobacco: Never Used    Tobacco comment: 8-7 cigarettes a day   Substance and Sexual Activity    Alcohol use: No    Drug use: No    Sexual activity: None   Lifestyle    Physical activity:     Days per week: None     Minutes per session: None    Stress: None   Relationships    Social connections:     Talks on phone: None     Gets together: None     Attends Baptism service: None     Active member of club or organization: None     Attends meetings of clubs or organizations: None     Relationship status: None    Intimate partner violence:     Fear of current or ex partner: None     Emotionally abused: None     Physically abused: None     Forced sexual activity: None   Other Topics Concern    None   Social History Narrative    Always uses seatbelt    Feels safe at home    Uses safety equipment      Medications and Allergies:     Current Outpatient Medications   Medication Sig Dispense Refill    amLODIPine (NORVASC) 10 mg tablet Take 10 mg by mouth daily      aspirin (ECOTRIN LOW STRENGTH) 81 mg EC tablet Take 81 mg by mouth daily      atorvastatin (LIPITOR) 80 mg tablet Take 80 mg by mouth daily      metoprolol tartrate (LOPRESSOR) 50 mg tablet Take 50 mg by mouth 2 (two) times a day       Multiple Vitamin (MULTIVITAMIN) capsule Take 1 capsule by mouth daily      nitroglycerin (NITROSTAT) 0 4 mg SL tablet Place 0 4 mg under the tongue every 5 (five) minutes as needed for chest pain      Omega-3 Fatty Acids (FISH OIL) 1200 MG CAPS Take 1,200 mg by mouth 2 (two) times a day        tamsulosin (FLOMAX) 0 4 mg Take 1 capsule (0 4 mg total) by mouth daily with dinner for 30 days 30 capsule 11     No current facility-administered medications for this visit  No Known Allergies   Immunizations:     Immunization History   Administered Date(s) Administered    Pneumococcal Polysaccharide PPV23 1945      Health Maintenance:         Topic Date Due    Hepatitis C Screening  1945    CRC Screening: Colonoscopy  05/22/2020         Topic Date Due    Pneumococcal Vaccine: 65+ Years (1 of 2 - PCV13) 09/18/2011      Medicare Screening Tests and Risk Assessments:     Shelly Stallings is here for his Subsequent Wellness visit  Health Risk Assessment:   Patient rates overall health as good  Patient feels that their physical health rating is same  Eyesight was rated as same  Hearing was rated as same  Patient feels that their emotional and mental health rating is same  Pain experienced in the last 7 days has been none  Patient states that he has experienced no weight loss or gain in last 6 months  Depression Screening:   PHQ-2 Score: 0      Fall Risk Screening: In the past year, patient has experienced: no history of falling in past year      Home Safety:  Patient does not have trouble with stairs inside or outside of their home  Patient has working smoke alarms and has working carbon monoxide detector  Home safety hazards include: none  Nutrition:   Current diet is Regular       Medications:   Patient is currently taking over-the-counter supplements  OTC medications include: see medication list  Patient is able to manage medications  Activities of Daily Living (ADLs)/Instrumental Activities of Daily Living (IADLs):   Walk and transfer into and out of bed and chair?: Yes  Dress and groom yourself?: Yes    Bathe or shower yourself?: Yes    Feed yourself? Yes  Do your laundry/housekeeping?: Yes  Manage your money, pay your bills and track your expenses?: Yes  Make your own meals?: Yes    Do your own shopping?: Yes    Previous Hospitalizations:   Any hospitalizations or ED visits within the last 12 months?: No      Advance Care Planning:   Living will: No    Durable POA for healthcare:  Yes    Advanced directive: Yes    Advanced directive counseling given: Yes    Five wishes given: Yes    End of Life Decisions reviewed with patient: Yes    Provider agrees with end of life decisions: Yes      Cognitive Screening:   Provider or family/friend/caregiver concerned regarding cognition?: No    PREVENTIVE SCREENINGS      Cardiovascular Screening:    General: Screening Not Indicated and History Lipid Disorder      Diabetes Screening:     General: Screening Current      Colorectal Cancer Screening:     General: Screening Current      Prostate Cancer Screening:    General: Screening Current      Osteoporosis Screening:    General: Risks and Benefits Discussed      Abdominal Aortic Aneurysm (AAA) Screening:    Risk factors include: age between 73-69 yo and tobacco use        Lung Cancer Screening:     General: Risks and Benefits Discussed      Hepatitis C Screening:    General: Risks and Benefits Discussed    No exam data present     Physical Exam:     /80 (BP Location: Left arm, Patient Position: Sitting, Cuff Size: Adult)   Pulse 66   Temp 98 °F (36 7 °C) (Tympanic)   Resp 17   Ht 5' 7 72" (1 72 m)   Wt 81 2 kg (179 lb)   SpO2 98%   BMI 27 44 kg/m²     Physical Exam    Hemalatha Norris, DO

## 2019-12-18 NOTE — ASSESSMENT & PLAN NOTE
Stable polycythemia  Patient is still getting monthly phlebotomy    Continue regular follow-up with nadeen hernandez, Dr Stephenie Temple

## 2019-12-18 NOTE — PATIENT INSTRUCTIONS
Medicare Preventive Visit Patient Instructions  Thank you for completing your Welcome to Medicare Visit or Medicare Annual Wellness Visit today  Your next wellness visit will be due in one year (12/18/2020)  The screening/preventive services that you may require over the next 5-10 years are detailed below  Some tests may not apply to you based off risk factors and/or age  Screening tests ordered at today's visit but not completed yet may show as past due  Also, please note that scanned in results may not display below  Preventive Screenings:  Service Recommendations Previous Testing/Comments   Colorectal Cancer Screening  · Colonoscopy    · Fecal Occult Blood Test (FOBT)/Fecal Immunochemical Test (FIT)  · Fecal DNA/Cologuard Test  · Flexible Sigmoidoscopy Age: 54-65 years old   Colonoscopy: every 10 years (May be performed more frequently if at higher risk)  OR  FOBT/FIT: every 1 year  OR  Cologuard: every 3 years  OR  Sigmoidoscopy: every 5 years  Screening may be recommended earlier than age 48 if at higher risk for colorectal cancer  Also, an individualized decision between you and your healthcare provider will decide whether screening between the ages of 74-80 would be appropriate   Colonoscopy: 05/22/2017  FOBT/FIT: Not on file  Cologuard: Not on file  Sigmoidoscopy: Not on file    Screening Current     Prostate Cancer Screening Individualized decision between patient and health care provider in men between ages of 53-78   Medicare will cover every 12 months beginning on the day after your 50th birthday PSA: 14 0 ng/mL     Screening Current     Hepatitis C Screening Once for adults born between [facility]  More frequently in patients at high risk for Hepatitis C Hep C Antibody: Not on file       Diabetes Screening 1-2 times per year if you're at risk for diabetes or have pre-diabetes Fasting glucose: 90 mg/dL   A1C: 5 7 %    Screening Current   Cholesterol Screening Once every 5 years if you don't have a lipid disorder  May order more often based on risk factors  Lipid panel: 05/01/2018    Screening Not Indicated  History Lipid Disorder      Other Preventive Screenings Covered by Medicare:  1  Abdominal Aortic Aneurysm (AAA) Screening: covered once if your at risk  You're considered to be at risk if you have a family history of AAA or a male between the age of 73-68 who smoking at least 100 cigarettes in your lifetime  2  Lung Cancer Screening: covers low dose CT scan once per year if you meet all of the following conditions: (1) Age 50-69; (2) No signs or symptoms of lung cancer; (3) Current smoker or have quit smoking within the last 15 years; (4) You have a tobacco smoking history of at least 30 pack years (packs per day x number of years you smoked); (5) You get a written order from a healthcare provider  3  Glaucoma Screening: covered annually if you're considered high risk: (1) You have diabetes OR (2) Family history of glaucoma OR (3)  aged [address] and older OR (3)  American aged 72 and older  3  Osteoporosis Screening: covered every 2 years if you meet one of the following conditions: (1) Have a vertebral abnormality; (2) On glucocorticoid therapy for more than 3 months; (3) Have primary hyperparathyroidism; (4) On osteoporosis medications and need to assess response to drug therapy  5  HIV Screening: covered annually if you're between the age of 12-76  Also covered annually if you are younger than 13 and older than 72 with risk factors for HIV infection  For pregnant patients, it is covered up to 3 times per pregnancy      Immunizations:  Immunization Recommendations   Influenza Vaccine Annual influenza vaccination during flu season is recommended for all persons aged >= 6 months who do not have contraindications   Pneumococcal Vaccine (Prevnar and Pneumovax)  * Prevnar = PCV13  * Pneumovax = PPSV23 Adults 25-60 years old: 1-3 doses may be recommended based on certain risk factors  Adults 72 years old: Prevnar (PCV13) vaccine recommended followed by Pneumovax (PPSV23) vaccine  If already received PPSV23 since turning 65, then PCV13 recommended at least one year after PPSV23 dose  Hepatitis B Vaccine 3 dose series if at intermediate or high risk (ex: diabetes, end stage renal disease, liver disease)   Tetanus (Td) Vaccine - COST NOT COVERED BY MEDICARE PART B Following completion of primary series, a booster dose should be given every 10 years to maintain immunity against tetanus  Td may also be given as tetanus wound prophylaxis  Tdap Vaccine - COST NOT COVERED BY MEDICARE PART B Recommended at least once for all adults  For pregnant patients, recommended with each pregnancy  Shingles Vaccine (Shingrix) - COST NOT COVERED BY MEDICARE PART B  2 shot series recommended in those aged 48 and above     Health Maintenance Due:      Topic Date Due    Hepatitis C Screening  1945    CRC Screening: Colonoscopy  05/22/2020     Immunizations Due:      Topic Date Due    Pneumococcal Vaccine: 65+ Years (1 of 2 - PCV13) 09/18/2011     Advance Directives   What are advance directives? Advance directives are legal documents that state your wishes and plans for medical care  These plans are made ahead of time in case you lose your ability to make decisions for yourself  Advance directives can apply to any medical decision, such as the treatments you want, and if you want to donate organs  What are the types of advance directives? There are many types of advance directives, and each state has rules about how to use them  You may choose a combination of any of the following:  · Living will: This is a written record of the treatment you want  You can also choose which treatments you do not want, which to limit, and which to stop at a certain time  This includes surgery, medicine, IV fluid, and tube feedings  · Durable power of  for healthcare [facility]):   This is a written record that states who you want to make healthcare choices for you when you are unable to make them for yourself  This person, called a proxy, is usually a family member or a friend  You may choose more than 1 proxy  · Do not resuscitate (DNR) order:  A DNR order is used in case your heart stops beating or you stop breathing  It is a request not to have certain forms of treatment, such as CPR  A DNR order may be included in other types of advance directives  · Medical directive: This covers the care that you want if you are in a coma, near death, or unable to make decisions for yourself  You can list the treatments you want for each condition  Treatment may include pain medicine, surgery, blood transfusions, dialysis, IV or tube feedings, and a ventilator (breathing machine)  · Values history: This document has questions about your views, beliefs, and how you feel and think about life  This information can help others choose the care that you would choose  Why are advance directives important? An advance directive helps you control your care  Although spoken wishes may be used, it is better to have your wishes written down  Spoken wishes can be misunderstood, or not followed  Treatments may be given even if you do not want them  An advance directive may make it easier for your family to make difficult choices about your care  Cigarette Smoking and Your Health   Risks to your health if you smoke:  Nicotine and other chemicals found in tobacco damage every cell in your body  Even if you are a light smoker, you have an increased risk for cancer, heart disease, and lung disease  If you are pregnant or have diabetes, smoking increases your risk for complications  Benefits to your health if you stop smoking:   · You decrease respiratory symptoms such as coughing, wheezing, and shortness of breath  · You reduce your risk for cancers of the lung, mouth, throat, kidney, bladder, pancreas, stomach, and cervix   If you already have cancer, you increase the benefits of chemotherapy  You also reduce your risk for cancer returning or a second cancer from developing  · You reduce your risk for heart disease, blood clots, heart attack, and stroke  · You reduce your risk for lung infections, and diseases such as pneumonia, asthma, chronic bronchitis, and emphysema  · Your circulation improves  More oxygen can be delivered to your body  If you have diabetes, you lower your risk for complications, such as kidney, artery, and eye diseases  You also lower your risk for nerve damage  Nerve damage can lead to amputations, poor vision, and blindness  · You improve your body's ability to heal and to fight infections  For more information and support to stop smoking:   · [employer]  Phone: 4- 470 - [phone]  Web Address: [employer]  Weight Management   Why it is important to manage your weight:  Being overweight increases your risk of health conditions such as heart disease, high blood pressure, type 2 diabetes, and certain types of cancer  It can also increase your risk for osteoarthritis, sleep apnea, and other respiratory problems  Aim for a slow, steady weight loss  Even a small amount of weight loss can lower your risk of health problems  How to lose weight safely:  A safe and healthy way to lose weight is to eat fewer calories and get regular exercise  You can lose up about 1 pound a week by decreasing the number of calories you eat by 500 calories each day  Healthy meal plan for weight management:  A healthy meal plan includes a variety of foods, contains fewer calories, and helps you stay healthy  A healthy meal plan includes the following:  · Eat whole-grain foods more often  A healthy meal plan should contain fiber  Fiber is the part of grains, fruits, and vegetables that is not broken down by your body  Whole-grain foods are healthy and provide extra fiber in your diet   Some examples of whole-grain foods are whole-wheat breads and pastas, oatmeal, brown rice, and bulgur  · Eat a variety of vegetables every day  Include dark, leafy greens such as spinach, kale, desirae greens, and mustard greens  Eat yellow and orange vegetables such as carrots, sweet potatoes, and winter squash  · Eat a variety of fruits every day  Choose fresh or canned fruit (canned in its own juice or light syrup) instead of juice  Fruit juice has very little or no fiber  · Eat low-fat dairy foods  Drink fat-free (skim) milk or 1% milk  Eat fat-free yogurt and low-fat cottage cheese  Try low-fat cheeses such as mozzarella and other reduced-fat cheeses  · Choose meat and other protein foods that are low in fat  Choose beans or other legumes such as split peas or lentils  Choose fish, skinless poultry (chicken or turkey), or lean cuts of red meat (beef or pork)  Before you cook meat or poultry, cut off any visible fat  · Use less fat and oil  Try baking foods instead of frying them  Add less fat, such as margarine, sour cream, regular salad dressing and mayonnaise to foods  Eat fewer high-fat foods  Some examples of high-fat foods include french fries, doughnuts, ice cream, and cakes  · Eat fewer sweets  Limit foods and drinks that are high in sugar  This includes candy, cookies, regular soda, and sweetened drinks  Exercise:  Exercise at least 30 minutes per day on most days of the week  Some examples of exercise include walking, biking, dancing, and swimming  You can also fit in more physical activity by taking the stairs instead of the elevator or parking farther away from stores  Ask your healthcare provider about the best exercise plan for you  © Copyright [employer] 2018 Information is for End User's use only and may not be sold, redistributed or otherwise used for commercial purposes   All illustrations and images included in CareNotes® are the copyrighted property of [employer] D A M , Inc  or Loco Barboza [facility] AMBULATORY CARE:   Obesity  is when your body mass index (BMI) is greater than 30  Your healthcare provider will use your height and weight to measure your BMI  The risks of obesity include  many health problems, such as injuries or physical disability  You may need tests to check for the following:  · Diabetes     · High blood pressure or high cholesterol     · Heart disease     · Gallbladder or liver disease     · Cancer of the colon, breast, prostate, liver, or kidney     · Sleep apnea     · Arthritis or gout  Seek care immediately if:   · You have a severe headache, confusion, or difficulty speaking  · You have weakness on one side of your body  · You have chest pain, sweating, or shortness of breath  Contact your healthcare provider if:   · You have symptoms of gallbladder or liver disease, such as pain in your upper abdomen  · You have knee or hip pain and discomfort while walking  · You have symptoms of diabetes, such as intense hunger and thirst, and frequent urination  · You have symptoms of sleep apnea, such as snoring or daytime sleepiness  · You have questions or concerns about your condition or care  Treatment for obesity  focuses on helping you lose weight to improve your health  Even a small decrease in BMI can reduce the risk for many health problems  Your healthcare provider will help you set a weight-loss goal   · Lifestyle changes  are the first step in treating obesity  These include making healthy food choices and getting regular physical activity  Your healthcare provider may suggest a weight-loss program that involves coaching, education, and therapy  · Medicine  may help you lose weight when it is used with a healthy diet and physical activity  · Surgery  can help you lose weight if you are very obese and have other health problems  There are several types of weight-loss surgery  Ask your healthcare provider for more information    Be successful losing weight:   · Set small, realistic goals  An example of a small goal is to walk for 20 minutes 5 days a week  Anther goal is to lose 5% of your body weight  · Tell friends, family members, and coworkers about your goals  and ask for their support  Ask a friend to lose weight with you, or join a weight-loss support group  · Identify foods or triggers that may cause you to overeat , and find ways to avoid them  Remove tempting high-calorie foods from your home and workplace  Place a bowl of fresh fruit on your kitchen counter  If stress causes you to eat, then find other ways to cope with stress  · Keep a diary to track what you eat and drink  Also write down how many minutes of physical activity you do each day  Weigh yourself once a week and record it in your diary  Eating changes: You will need to eat 500 to 1,000 fewer calories each day than you currently eat to lose 1 to 2 pounds a week  The following changes will help you cut calories:  · Eat smaller portions  Use small plates, no larger than 9 inches in diameter  Fill your plate half full of fruits and vegetables  Measure your food using measuring cups until you know what a serving size looks like  · Eat 3 meals and 1 or 2 snacks each day  Plan your meals in advance  [place] and eat at home most of the time  Eat slowly  · Eat fruits and vegetables at every meal   They are low in calories and high in fiber, which makes you feel full  Do not add butter, margarine, or cream sauce to vegetables  Use herbs to season steamed vegetables  · Eat less fat and fewer fried foods  Eat more baked or grilled chicken and fish  These protein sources are lower in calories and fat than red meat  Limit fast food  Dress your salads with olive oil and vinegar instead of bottled dressing  · Limit the amount of sugar you eat  Do not drink sugary beverages  Limit alcohol  Activity changes:  Physical activity is good for your body in many ways   It helps you burn calories and build strong muscles  It decreases stress and depression, and improves your mood  It can also help you sleep better  Talk to your healthcare provider before you begin an exercise program   · Exercise for at least 30 minutes 5 days a week  Start slowly  Set aside time each day for physical activity that you enjoy and that is convenient for you  It is best to do both weight training and an activity that increases your heart rate, such as walking, bicycling, or swimming  · Find ways to be more active  Do yard work and housecleaning  Walk up the stairs instead of using elevators  Spend your leisure time going to events that require walking, such as outdoor festivals or fairs  This extra physical activity can help you lose weight and keep it off  Follow up with your healthcare provider as directed: You may need to meet with a dietitian  Write down your questions so you remember to ask them during your visits  © 2017 [address] Information is for End User's use only and may not be sold, redistributed or otherwise used for commercial purposes  All illustrations and images included in CareNotes® are the copyrighted property of [employer] M [employer] Inc  or Hayden Scott  The above information is an  only  It is not intended as medical advice for individual conditions or treatments  Talk to your doctor, nurse or pharmacist before following any medical regimen to see if it is safe and effective for you

## 2019-12-18 NOTE — ASSESSMENT & PLAN NOTE
Patient is still smoking 1/2 pack per day  Counseled again  Last CT of lungs was December 2018  Patient also had a chest x-ray earlier this year which was negative    Will repeat low radiation CT scan in near future

## 2019-12-18 NOTE — ASSESSMENT & PLAN NOTE
Last A1c 6 1%  Continue reduced carb diet exercise    Will repeat labs prior to next appointment in June

## 2020-01-12 DIAGNOSIS — N40.1 BENIGN PROSTATIC HYPERPLASIA WITH URINARY FREQUENCY: ICD-10-CM

## 2020-01-12 DIAGNOSIS — R35.0 BENIGN PROSTATIC HYPERPLASIA WITH URINARY FREQUENCY: ICD-10-CM

## 2020-01-13 RX ORDER — TAMSULOSIN HYDROCHLORIDE 0.4 MG/1
0.4 CAPSULE ORAL
Qty: 90 CAPSULE | Refills: 0 | Status: SHIPPED | OUTPATIENT
Start: 2020-01-13 | End: 2020-04-13

## 2020-01-17 ENCOUNTER — HOSPITAL ENCOUNTER (OUTPATIENT)
Dept: INFUSION CENTER | Facility: CLINIC | Age: 75
Discharge: HOME | End: 2020-01-17
Payer: COMMERCIAL

## 2020-01-17 VITALS
SYSTOLIC BLOOD PRESSURE: 112 MMHG | HEART RATE: 48 BPM | DIASTOLIC BLOOD PRESSURE: 72 MMHG | RESPIRATION RATE: 16 BRPM | TEMPERATURE: 97 F

## 2020-01-17 DIAGNOSIS — D45 POLYCYTHEMIA VERA (HCC): Primary | ICD-10-CM

## 2020-01-17 LAB
ALBUMIN SERPL BCP-MCNC: 3.7 G/DL (ref 3.5–5.7)
ALP SERPL-CCNC: 129 U/L (ref 46–116)
ALT SERPL W P-5'-P-CCNC: 27 U/L (ref 12–78)
ANION GAP SERPL CALCULATED.3IONS-SCNC: 9 MMOL/L (ref 4–13)
AST SERPL W P-5'-P-CCNC: 32 U/L (ref 5–45)
BASOPHILS # BLD AUTO: 0.03 THOUSANDS/ΜL (ref 0–0.1)
BASOPHILS NFR BLD AUTO: 0 % (ref 0–1)
BILIRUB SERPL-MCNC: 0.7 MG/DL (ref 0.2–1)
BUN SERPL-MCNC: 14 MG/DL (ref 5–25)
CALCIUM SERPL-MCNC: 9.7 MG/DL (ref 8.3–10.1)
CHLORIDE SERPL-SCNC: 106 MMOL/L (ref 98–108)
CO2 SERPL-SCNC: 28 MMOL/L (ref 21–32)
CREAT SERPL-MCNC: 1.4 MG/DL (ref 0.6–1.3)
EOSINOPHIL # BLD AUTO: 0.67 THOUSAND/ΜL (ref 0–0.61)
EOSINOPHIL NFR BLD AUTO: 6 % (ref 0–6)
ERYTHROCYTE [DISTWIDTH] IN BLOOD BY AUTOMATED COUNT: 20.7 % (ref 11.6–15.1)
GFR SERPL CREATININE-BSD FRML MDRD: 49 ML/MIN/1.73SQ M
GLUCOSE SERPL-MCNC: 111 MG/DL (ref 65–140)
HCT VFR BLD AUTO: 48.7 % (ref 36.5–49.3)
HGB BLD-MCNC: 14.7 G/DL (ref 12–17)
LDH SERPL-CCNC: 163 U/L (ref 81–234)
LYMPHOCYTES # BLD AUTO: 1.25 THOUSANDS/ΜL (ref 0.6–4.47)
LYMPHOCYTES NFR BLD AUTO: 11 % (ref 14–44)
MCH RBC QN AUTO: 22.9 PG (ref 26.8–34.3)
MCHC RBC AUTO-ENTMCNC: 30.2 G/DL (ref 31.4–37.4)
MCV RBC AUTO: 76 FL (ref 82–98)
MONOCYTES # BLD AUTO: 0.86 THOUSAND/ΜL (ref 0.17–1.22)
MONOCYTES NFR BLD AUTO: 8 % (ref 4–12)
NEUTROPHILS # BLD AUTO: 8.46 THOUSANDS/ΜL (ref 1.85–7.62)
NEUTS SEG NFR BLD AUTO: 75 % (ref 43–75)
PLATELET # BLD AUTO: 214 THOUSANDS/UL (ref 149–390)
PMV BLD AUTO: 9.6 FL (ref 8.9–12.7)
POTASSIUM SERPL-SCNC: 4.2 MMOL/L (ref 3.5–5.3)
PROT SERPL-MCNC: 7.4 G/DL (ref 6.4–8.2)
RBC # BLD AUTO: 6.43 MILLION/UL (ref 3.88–5.62)
SODIUM SERPL-SCNC: 143 MMOL/L (ref 136–145)
WBC # BLD AUTO: 11.27 THOUSAND/UL (ref 4.31–10.16)

## 2020-01-17 PROCEDURE — 99195 PHLEBOTOMY: CPT

## 2020-01-17 PROCEDURE — 80053 COMPREHEN METABOLIC PANEL: CPT

## 2020-01-17 PROCEDURE — 83615 LACTATE (LD) (LDH) ENZYME: CPT

## 2020-01-17 PROCEDURE — 85025 COMPLETE CBC W/AUTO DIFF WBC: CPT | Performed by: INTERNAL MEDICINE

## 2020-01-17 NOTE — PROGRESS NOTES
Patient arrived on unit for therapeutic phlebotomy  CBC/CMP/LD drawn as ordered  Hct- 48 7, above parameter on order  Reviewed with Todd Campoverde RN prior to performing phlebotomy  Patient tolerated phlebotomy and was successful for 500ml  Remained about 20 minutes for observation  No discomforts/issues  Aware of next appointment   AVS given to patient

## 2020-01-23 ENCOUNTER — DOCTOR'S OFFICE (OUTPATIENT)
Dept: URBAN - METROPOLITAN AREA CLINIC 136 | Facility: CLINIC | Age: 75
Setting detail: OPHTHALMOLOGY
End: 2020-01-23
Payer: COMMERCIAL

## 2020-01-23 DIAGNOSIS — H52.4: ICD-10-CM

## 2020-01-23 PROCEDURE — 92015 DETERMINE REFRACTIVE STATE: CPT | Performed by: OPTOMETRIST

## 2020-01-23 ASSESSMENT — REFRACTION_MANIFEST
OD_VA3: 20/
OS_VA1: 20/25
OS_CYLINDER: -0.50
OD_ADD: +2.50
OD_CYLINDER: -0.50
OS_VA3: 20/
OS_VA2: 20/
OD_AXIS: 135
OD_VA2: 20/
OD_VA1: 20/30
OD_SPHERE: +0.50
OS_SPHERE: PLANO
OS_ADD: +2.50
OS_AXIS: 100
OU_VA: 20/25

## 2020-01-23 ASSESSMENT — REFRACTION_CURRENTRX
OS_ADD: +2.50
OD_SPHERE: +1.00
OD_CYLINDER: -0.50
OD_OVR_VA: 20/
OS_AXIS: 100
OD_ADD: +2.50
OD_VPRISM_DIRECTION: BF
OS_VPRISM_DIRECTION: BF
OD_AXIS: 135
OS_SPHERE: +0.50
OS_CYLINDER: -0.50
OS_OVR_VA: 20/

## 2020-01-23 ASSESSMENT — REFRACTION_AUTOREFRACTION
OD_CYLINDER: -1.50
OS_CYLINDER: -1.00
OD_AXIS: 097
OD_SPHERE: +2.25
OS_SPHERE: +0.75
OS_AXIS: 101

## 2020-01-23 ASSESSMENT — VISUAL ACUITY
OD_BCVA: 20/20
OS_BCVA: 20/30+2

## 2020-01-23 ASSESSMENT — SPHEQUIV_DERIVED
OD_SPHEQUIV: 1.5
OS_SPHEQUIV: 0.25
OD_SPHEQUIV: 0.25

## 2020-01-24 ENCOUNTER — HOSPITAL ENCOUNTER (OUTPATIENT)
Dept: CT IMAGING | Facility: HOSPITAL | Age: 75
Discharge: HOME | End: 2020-01-24
Payer: COMMERCIAL

## 2020-01-24 DIAGNOSIS — Z72.0 TOBACCO ABUSE: ICD-10-CM

## 2020-01-24 PROCEDURE — G0297 LDCT FOR LUNG CA SCREEN: HCPCS

## 2020-03-13 ENCOUNTER — HOSPITAL ENCOUNTER (OUTPATIENT)
Dept: INFUSION CENTER | Facility: CLINIC | Age: 75
Discharge: HOME | End: 2020-03-13
Payer: COMMERCIAL

## 2020-03-13 ENCOUNTER — TRANSCRIBE ORDERS (OUTPATIENT)
Dept: LAB | Facility: CLINIC | Age: 75
End: 2020-03-13

## 2020-03-13 ENCOUNTER — APPOINTMENT (OUTPATIENT)
Dept: LAB | Facility: CLINIC | Age: 75
End: 2020-03-13
Payer: COMMERCIAL

## 2020-03-13 VITALS
HEART RATE: 50 BPM | SYSTOLIC BLOOD PRESSURE: 111 MMHG | RESPIRATION RATE: 18 BRPM | TEMPERATURE: 96.9 F | DIASTOLIC BLOOD PRESSURE: 73 MMHG

## 2020-03-13 DIAGNOSIS — D45 CHRONIC ERYTHREMIA IN REMISSION (HCC): ICD-10-CM

## 2020-03-13 DIAGNOSIS — D45 CHRONIC ERYTHREMIA IN REMISSION (HCC): Primary | ICD-10-CM

## 2020-03-13 DIAGNOSIS — D45 POLYCYTHEMIA VERA (HCC): Primary | ICD-10-CM

## 2020-03-13 LAB
ANISOCYTOSIS BLD QL SMEAR: PRESENT
BASOPHILS # BLD MANUAL: 0 THOUSAND/UL (ref 0–0.1)
BASOPHILS NFR MAR MANUAL: 0 % (ref 0–1)
EOSINOPHIL # BLD MANUAL: 0.9 THOUSAND/UL (ref 0–0.4)
EOSINOPHIL NFR BLD MANUAL: 8 % (ref 0–6)
ERYTHROCYTE [DISTWIDTH] IN BLOOD BY AUTOMATED COUNT: 21.1 % (ref 11.6–15.1)
HCT VFR BLD AUTO: 47.2 % (ref 36.5–49.3)
HGB BLD-MCNC: 14 G/DL (ref 12–17)
LG PLATELETS BLD QL SMEAR: PRESENT
LYMPHOCYTES # BLD AUTO: 1.58 THOUSAND/UL (ref 0.6–4.47)
LYMPHOCYTES # BLD AUTO: 14 % (ref 14–44)
MCH RBC QN AUTO: 22 PG (ref 26.8–34.3)
MCHC RBC AUTO-ENTMCNC: 29.7 G/DL (ref 31.4–37.4)
MCV RBC AUTO: 74 FL (ref 82–98)
MONOCYTES # BLD AUTO: 0.79 THOUSAND/UL (ref 0–1.22)
MONOCYTES NFR BLD: 7 % (ref 4–12)
NEUTROPHILS # BLD MANUAL: 8.02 THOUSAND/UL (ref 1.85–7.62)
NEUTS BAND NFR BLD MANUAL: 1 % (ref 0–8)
NEUTS SEG NFR BLD AUTO: 70 % (ref 43–75)
OVALOCYTES BLD QL SMEAR: PRESENT
PLATELET # BLD AUTO: 232 THOUSANDS/UL (ref 149–390)
PLATELET BLD QL SMEAR: ADEQUATE
PMV BLD AUTO: 10 FL (ref 8.9–12.7)
RBC # BLD AUTO: 6.35 MILLION/UL (ref 3.88–5.62)
TOTAL CELLS COUNTED SPEC: 100
WBC # BLD AUTO: 11.3 THOUSAND/UL (ref 4.31–10.16)

## 2020-03-13 PROCEDURE — 36415 COLL VENOUS BLD VENIPUNCTURE: CPT

## 2020-03-13 PROCEDURE — 85027 COMPLETE CBC AUTOMATED: CPT

## 2020-03-13 PROCEDURE — 85007 BL SMEAR W/DIFF WBC COUNT: CPT

## 2020-03-13 PROCEDURE — 99195 PHLEBOTOMY: CPT

## 2020-03-13 NOTE — PROGRESS NOTES
Pt here for therapeutic phlebotomy  Hematocrit 47 2, parameter double checked with Henry Side, RN  Phlebotomy performed for 500 ml via the right antecubital vein without difficulty  Pt was observed for 15 minutes post phlebotomy  Pt will go to the office after leaving infusion center today to make his future appts    Pt declined AVS

## 2020-04-11 DIAGNOSIS — N40.1 BENIGN PROSTATIC HYPERPLASIA WITH URINARY FREQUENCY: ICD-10-CM

## 2020-04-11 DIAGNOSIS — R35.0 BENIGN PROSTATIC HYPERPLASIA WITH URINARY FREQUENCY: ICD-10-CM

## 2020-04-13 RX ORDER — TAMSULOSIN HYDROCHLORIDE 0.4 MG/1
0.4 CAPSULE ORAL
Qty: 90 CAPSULE | Refills: 0 | Status: SHIPPED | OUTPATIENT
Start: 2020-04-13 | End: 2020-11-05

## 2020-04-27 ENCOUNTER — APPOINTMENT (OUTPATIENT)
Dept: LAB | Facility: CLINIC | Age: 75
End: 2020-04-27
Payer: COMMERCIAL

## 2020-04-27 ENCOUNTER — APPOINTMENT (OUTPATIENT)
Dept: LAB | Facility: MEDICAL CENTER | Age: 75
End: 2020-04-27
Payer: COMMERCIAL

## 2020-04-27 DIAGNOSIS — D45 POLYCYTHEMIA VERA (HCC): ICD-10-CM

## 2020-04-27 LAB
BASOPHILS # BLD AUTO: 0.08 THOUSANDS/ΜL (ref 0–0.1)
BASOPHILS NFR BLD AUTO: 1 % (ref 0–1)
EOSINOPHIL # BLD AUTO: 0.86 THOUSAND/ΜL (ref 0–0.61)
EOSINOPHIL NFR BLD AUTO: 7 % (ref 0–6)
ERYTHROCYTE [DISTWIDTH] IN BLOOD BY AUTOMATED COUNT: 20.5 % (ref 11.6–15.1)
HCT VFR BLD AUTO: 48.3 % (ref 36.5–49.3)
HGB BLD-MCNC: 13.8 G/DL (ref 12–17)
IMM GRANULOCYTES # BLD AUTO: 0.05 THOUSAND/UL (ref 0–0.2)
IMM GRANULOCYTES NFR BLD AUTO: 0 % (ref 0–2)
LYMPHOCYTES # BLD AUTO: 2.16 THOUSANDS/ΜL (ref 0.6–4.47)
LYMPHOCYTES NFR BLD AUTO: 16 % (ref 14–44)
MCH RBC QN AUTO: 21.3 PG (ref 26.8–34.3)
MCHC RBC AUTO-ENTMCNC: 28.6 G/DL (ref 31.4–37.4)
MCV RBC AUTO: 75 FL (ref 82–98)
MONOCYTES # BLD AUTO: 0.84 THOUSAND/ΜL (ref 0.17–1.22)
MONOCYTES NFR BLD AUTO: 6 % (ref 4–12)
NEUTROPHILS # BLD AUTO: 9.17 THOUSANDS/ΜL (ref 1.85–7.62)
NEUTS SEG NFR BLD AUTO: 70 % (ref 43–75)
NRBC BLD AUTO-RTO: 0 /100 WBCS
PLATELET # BLD AUTO: 259 THOUSANDS/UL (ref 149–390)
PMV BLD AUTO: 10.7 FL (ref 8.9–12.7)
RBC # BLD AUTO: 6.48 MILLION/UL (ref 3.88–5.62)
WBC # BLD AUTO: 13.16 THOUSAND/UL (ref 4.31–10.16)

## 2020-04-27 PROCEDURE — 36415 COLL VENOUS BLD VENIPUNCTURE: CPT

## 2020-04-27 PROCEDURE — 85025 COMPLETE CBC W/AUTO DIFF WBC: CPT

## 2020-04-29 ENCOUNTER — TELEPHONE (OUTPATIENT)
Dept: HEMATOLOGY ONCOLOGY | Facility: CLINIC | Age: 75
End: 2020-04-29

## 2020-04-30 ENCOUNTER — OFFICE VISIT (OUTPATIENT)
Dept: HEMATOLOGY ONCOLOGY | Facility: CLINIC | Age: 75
End: 2020-04-30
Payer: COMMERCIAL

## 2020-04-30 DIAGNOSIS — D45 POLYCYTHEMIA VERA (HCC): Primary | ICD-10-CM

## 2020-04-30 PROCEDURE — 4004F PT TOBACCO SCREEN RCVD TLK: CPT | Performed by: INTERNAL MEDICINE

## 2020-04-30 PROCEDURE — 3074F SYST BP LT 130 MM HG: CPT | Performed by: INTERNAL MEDICINE

## 2020-04-30 PROCEDURE — 3078F DIAST BP <80 MM HG: CPT | Performed by: INTERNAL MEDICINE

## 2020-04-30 PROCEDURE — 4040F PNEUMOC VAC/ADMIN/RCVD: CPT | Performed by: INTERNAL MEDICINE

## 2020-04-30 PROCEDURE — 99214 OFFICE O/P EST MOD 30 MIN: CPT | Performed by: INTERNAL MEDICINE

## 2020-05-08 ENCOUNTER — HOSPITAL ENCOUNTER (OUTPATIENT)
Dept: INFUSION CENTER | Facility: CLINIC | Age: 75
Discharge: HOME | End: 2020-05-08
Payer: COMMERCIAL

## 2020-05-08 VITALS
RESPIRATION RATE: 16 BRPM | HEART RATE: 53 BPM | SYSTOLIC BLOOD PRESSURE: 110 MMHG | DIASTOLIC BLOOD PRESSURE: 70 MMHG | TEMPERATURE: 97.4 F

## 2020-05-08 DIAGNOSIS — D45 POLYCYTHEMIA VERA (HCC): Primary | ICD-10-CM

## 2020-05-08 PROCEDURE — 99195 PHLEBOTOMY: CPT

## 2020-05-22 ENCOUNTER — APPOINTMENT (OUTPATIENT)
Dept: LAB | Facility: MEDICAL CENTER | Age: 75
End: 2020-05-22
Payer: COMMERCIAL

## 2020-05-22 DIAGNOSIS — R73.03 PREDIABETES: ICD-10-CM

## 2020-05-22 DIAGNOSIS — D45 POLYCYTHEMIA VERA (HCC): ICD-10-CM

## 2020-05-22 DIAGNOSIS — I10 BENIGN ESSENTIAL HYPERTENSION: ICD-10-CM

## 2020-05-22 DIAGNOSIS — E78.2 MIXED HYPERLIPIDEMIA: ICD-10-CM

## 2020-05-22 DIAGNOSIS — R97.20 ELEVATED PSA: ICD-10-CM

## 2020-05-22 LAB
ALBUMIN SERPL BCP-MCNC: 3.6 G/DL (ref 3.5–5)
ALP SERPL-CCNC: 141 U/L (ref 46–116)
ALT SERPL W P-5'-P-CCNC: 25 U/L (ref 12–78)
ANION GAP SERPL CALCULATED.3IONS-SCNC: 6 MMOL/L (ref 4–13)
AST SERPL W P-5'-P-CCNC: 16 U/L (ref 5–45)
BASOPHILS # BLD AUTO: 0.06 THOUSANDS/ΜL (ref 0–0.1)
BASOPHILS NFR BLD AUTO: 1 % (ref 0–1)
BILIRUB SERPL-MCNC: 0.55 MG/DL (ref 0.2–1)
BUN SERPL-MCNC: 19 MG/DL (ref 5–25)
CALCIUM SERPL-MCNC: 8.9 MG/DL (ref 8.3–10.1)
CHLORIDE SERPL-SCNC: 110 MMOL/L (ref 100–108)
CHOLEST SERPL-MCNC: 102 MG/DL (ref 50–200)
CO2 SERPL-SCNC: 23 MMOL/L (ref 21–32)
CREAT SERPL-MCNC: 1.3 MG/DL (ref 0.6–1.3)
EOSINOPHIL # BLD AUTO: 0.72 THOUSAND/ΜL (ref 0–0.61)
EOSINOPHIL NFR BLD AUTO: 6 % (ref 0–6)
ERYTHROCYTE [DISTWIDTH] IN BLOOD BY AUTOMATED COUNT: 21.2 % (ref 11.6–15.1)
EST. AVERAGE GLUCOSE BLD GHB EST-MCNC: 114 MG/DL
FERRITIN SERPL-MCNC: 9 NG/ML (ref 8–388)
GFR SERPL CREATININE-BSD FRML MDRD: 54 ML/MIN/1.73SQ M
GLUCOSE P FAST SERPL-MCNC: 98 MG/DL (ref 65–99)
HBA1C MFR BLD: 5.6 %
HCT VFR BLD AUTO: 43.6 % (ref 36.5–49.3)
HDLC SERPL-MCNC: 24 MG/DL
HGB BLD-MCNC: 12.4 G/DL (ref 12–17)
IMM GRANULOCYTES # BLD AUTO: 0.04 THOUSAND/UL (ref 0–0.2)
IMM GRANULOCYTES NFR BLD AUTO: 0 % (ref 0–2)
LDLC SERPL CALC-MCNC: 57 MG/DL (ref 0–100)
LYMPHOCYTES # BLD AUTO: 1.67 THOUSANDS/ΜL (ref 0.6–4.47)
LYMPHOCYTES NFR BLD AUTO: 13 % (ref 14–44)
MCH RBC QN AUTO: 21.4 PG (ref 26.8–34.3)
MCHC RBC AUTO-ENTMCNC: 28.4 G/DL (ref 31.4–37.4)
MCV RBC AUTO: 75 FL (ref 82–98)
MONOCYTES # BLD AUTO: 0.91 THOUSAND/ΜL (ref 0.17–1.22)
MONOCYTES NFR BLD AUTO: 7 % (ref 4–12)
NEUTROPHILS # BLD AUTO: 9.51 THOUSANDS/ΜL (ref 1.85–7.62)
NEUTS SEG NFR BLD AUTO: 73 % (ref 43–75)
NRBC BLD AUTO-RTO: 0 /100 WBCS
PLATELET # BLD AUTO: 251 THOUSANDS/UL (ref 149–390)
PMV BLD AUTO: 10.8 FL (ref 8.9–12.7)
POTASSIUM SERPL-SCNC: 4 MMOL/L (ref 3.5–5.3)
PROT SERPL-MCNC: 7.4 G/DL (ref 6.4–8.2)
PSA SERPL-MCNC: 15 NG/ML (ref 0–4)
RBC # BLD AUTO: 5.8 MILLION/UL (ref 3.88–5.62)
SODIUM SERPL-SCNC: 139 MMOL/L (ref 136–145)
TRIGL SERPL-MCNC: 104 MG/DL
TSH SERPL DL<=0.05 MIU/L-ACNC: 2.27 UIU/ML (ref 0.36–3.74)
WBC # BLD AUTO: 12.91 THOUSAND/UL (ref 4.31–10.16)

## 2020-05-22 PROCEDURE — 80053 COMPREHEN METABOLIC PANEL: CPT

## 2020-05-22 PROCEDURE — 84153 ASSAY OF PSA TOTAL: CPT

## 2020-05-22 PROCEDURE — 84443 ASSAY THYROID STIM HORMONE: CPT

## 2020-05-22 PROCEDURE — 83036 HEMOGLOBIN GLYCOSYLATED A1C: CPT

## 2020-05-22 PROCEDURE — 36415 COLL VENOUS BLD VENIPUNCTURE: CPT | Performed by: INTERNAL MEDICINE

## 2020-05-22 PROCEDURE — 82728 ASSAY OF FERRITIN: CPT

## 2020-05-22 PROCEDURE — 85025 COMPLETE CBC W/AUTO DIFF WBC: CPT | Performed by: INTERNAL MEDICINE

## 2020-05-22 PROCEDURE — 80061 LIPID PANEL: CPT

## 2020-06-02 ENCOUNTER — TELEMEDICINE (OUTPATIENT)
Dept: UROLOGY | Facility: CLINIC | Age: 75
End: 2020-06-02
Payer: COMMERCIAL

## 2020-06-02 DIAGNOSIS — R97.20 ELEVATED PSA: Primary | ICD-10-CM

## 2020-06-02 PROCEDURE — 99213 OFFICE O/P EST LOW 20 MIN: CPT | Performed by: UROLOGY

## 2020-06-08 ENCOUNTER — TELEPHONE (OUTPATIENT)
Dept: FAMILY MEDICINE CLINIC | Facility: CLINIC | Age: 75
End: 2020-06-08

## 2020-06-08 ENCOUNTER — OFFICE VISIT (OUTPATIENT)
Dept: FAMILY MEDICINE CLINIC | Facility: CLINIC | Age: 75
End: 2020-06-08
Payer: COMMERCIAL

## 2020-06-08 VITALS
DIASTOLIC BLOOD PRESSURE: 70 MMHG | BODY MASS INDEX: 27.58 KG/M2 | TEMPERATURE: 97 F | OXYGEN SATURATION: 95 % | HEIGHT: 68 IN | SYSTOLIC BLOOD PRESSURE: 110 MMHG | HEART RATE: 98 BPM | RESPIRATION RATE: 16 BRPM | WEIGHT: 182 LBS

## 2020-06-08 DIAGNOSIS — R10.9 RIGHT FLANK PAIN: ICD-10-CM

## 2020-06-08 DIAGNOSIS — S39.012A STRAIN OF LUMBAR REGION, INITIAL ENCOUNTER: Primary | ICD-10-CM

## 2020-06-08 LAB
SL AMB  POCT GLUCOSE, UA: NORMAL
SL AMB LEUKOCYTE ESTERASE,UA: NORMAL
SL AMB POCT BILIRUBIN,UA: NORMAL
SL AMB POCT BLOOD,UA: NORMAL
SL AMB POCT CLARITY,UA: CLEAR
SL AMB POCT COLOR,UA: YELLOW
SL AMB POCT KETONES,UA: NORMAL
SL AMB POCT NITRITE,UA: NORMAL
SL AMB POCT PH,UA: 5.5
SL AMB POCT SPECIFIC GRAVITY,UA: 1.02
SL AMB POCT URINE PROTEIN: NORMAL
SL AMB POCT UROBILINOGEN: 0.2

## 2020-06-08 PROCEDURE — 81003 URINALYSIS AUTO W/O SCOPE: CPT | Performed by: FAMILY MEDICINE

## 2020-06-08 PROCEDURE — 99214 OFFICE O/P EST MOD 30 MIN: CPT | Performed by: FAMILY MEDICINE

## 2020-06-08 PROCEDURE — 1160F RVW MEDS BY RX/DR IN RCRD: CPT | Performed by: FAMILY MEDICINE

## 2020-06-08 PROCEDURE — 4040F PNEUMOC VAC/ADMIN/RCVD: CPT | Performed by: FAMILY MEDICINE

## 2020-06-08 PROCEDURE — 3074F SYST BP LT 130 MM HG: CPT | Performed by: FAMILY MEDICINE

## 2020-06-08 PROCEDURE — 3078F DIAST BP <80 MM HG: CPT | Performed by: FAMILY MEDICINE

## 2020-06-08 PROCEDURE — 3008F BODY MASS INDEX DOCD: CPT | Performed by: FAMILY MEDICINE

## 2020-06-08 PROCEDURE — 4004F PT TOBACCO SCREEN RCVD TLK: CPT | Performed by: FAMILY MEDICINE

## 2020-06-08 RX ORDER — METHOCARBAMOL 750 MG/1
TABLET, FILM COATED ORAL
Qty: 20 TABLET | Refills: 1 | Status: SHIPPED | OUTPATIENT
Start: 2020-06-08 | End: 2020-10-05 | Stop reason: ALTCHOICE

## 2020-06-18 ENCOUNTER — OFFICE VISIT (OUTPATIENT)
Dept: FAMILY MEDICINE CLINIC | Facility: CLINIC | Age: 75
End: 2020-06-18
Payer: COMMERCIAL

## 2020-06-18 VITALS
BODY MASS INDEX: 27.28 KG/M2 | WEIGHT: 180 LBS | TEMPERATURE: 97 F | OXYGEN SATURATION: 98 % | SYSTOLIC BLOOD PRESSURE: 110 MMHG | HEIGHT: 68 IN | RESPIRATION RATE: 18 BRPM | DIASTOLIC BLOOD PRESSURE: 70 MMHG | HEART RATE: 60 BPM

## 2020-06-18 DIAGNOSIS — R73.03 PREDIABETES: ICD-10-CM

## 2020-06-18 DIAGNOSIS — E78.2 MIXED HYPERLIPIDEMIA: ICD-10-CM

## 2020-06-18 DIAGNOSIS — I10 BENIGN ESSENTIAL HYPERTENSION: Primary | ICD-10-CM

## 2020-06-18 DIAGNOSIS — I25.10 CORONARY ARTERY DISEASE WITHOUT ANGINA PECTORIS, UNSPECIFIED VESSEL OR LESION TYPE, UNSPECIFIED WHETHER NATIVE OR TRANSPLANTED HEART: ICD-10-CM

## 2020-06-18 DIAGNOSIS — D45 POLYCYTHEMIA VERA (HCC): ICD-10-CM

## 2020-06-18 DIAGNOSIS — R97.20 ELEVATED PSA: ICD-10-CM

## 2020-06-18 DIAGNOSIS — Z72.0 TOBACCO ABUSE: ICD-10-CM

## 2020-06-18 PROCEDURE — 3074F SYST BP LT 130 MM HG: CPT | Performed by: FAMILY MEDICINE

## 2020-06-18 PROCEDURE — 99214 OFFICE O/P EST MOD 30 MIN: CPT | Performed by: FAMILY MEDICINE

## 2020-06-18 PROCEDURE — 3008F BODY MASS INDEX DOCD: CPT | Performed by: FAMILY MEDICINE

## 2020-06-18 PROCEDURE — 4004F PT TOBACCO SCREEN RCVD TLK: CPT | Performed by: FAMILY MEDICINE

## 2020-06-18 PROCEDURE — 1160F RVW MEDS BY RX/DR IN RCRD: CPT | Performed by: FAMILY MEDICINE

## 2020-06-18 PROCEDURE — 4040F PNEUMOC VAC/ADMIN/RCVD: CPT | Performed by: FAMILY MEDICINE

## 2020-06-18 PROCEDURE — 3078F DIAST BP <80 MM HG: CPT | Performed by: FAMILY MEDICINE

## 2020-07-31 ENCOUNTER — APPOINTMENT (OUTPATIENT)
Dept: LAB | Facility: CLINIC | Age: 75
End: 2020-07-31
Payer: COMMERCIAL

## 2020-07-31 ENCOUNTER — HOSPITAL ENCOUNTER (OUTPATIENT)
Dept: INFUSION CENTER | Facility: CLINIC | Age: 75
Discharge: HOME | End: 2020-07-31
Payer: COMMERCIAL

## 2020-07-31 VITALS
HEART RATE: 52 BPM | DIASTOLIC BLOOD PRESSURE: 70 MMHG | RESPIRATION RATE: 16 BRPM | SYSTOLIC BLOOD PRESSURE: 110 MMHG | TEMPERATURE: 96.6 F

## 2020-07-31 DIAGNOSIS — D45 POLYCYTHEMIA VERA (HCC): Primary | ICD-10-CM

## 2020-07-31 PROCEDURE — 99195 PHLEBOTOMY: CPT

## 2020-07-31 NOTE — PROGRESS NOTES
Patient arrived to the infusion center for therapeutic phlebotomy without complaint  Vitals WNL  Hct 49 3 today within parameters for treatment and confirmed by [place]  Phlebotomy effective for 500 ml of blood from right AC  When phlebotomy complete pt c/o of feeling "light headed and dizzy"  Pt given orange juice to drink and symptoms resolved within 10 minutes  Pt advised to drink plenty of water throughout the day  Verbalized understanding  Vitals WNL at discharge

## 2020-08-12 ENCOUNTER — DOCTOR'S OFFICE (OUTPATIENT)
Dept: URBAN - METROPOLITAN AREA CLINIC 136 | Facility: CLINIC | Age: 75
Setting detail: OPHTHALMOLOGY
End: 2020-08-12
Payer: COMMERCIAL

## 2020-08-12 DIAGNOSIS — H43.813: ICD-10-CM

## 2020-08-12 DIAGNOSIS — H25.13: ICD-10-CM

## 2020-08-12 DIAGNOSIS — H35.342: ICD-10-CM

## 2020-08-12 PROCEDURE — 92134 CPTRZ OPH DX IMG PST SGM RTA: CPT | Performed by: OPHTHALMOLOGY

## 2020-08-12 PROCEDURE — 92201 OPSCPY EXTND RTA DRAW UNI/BI: CPT | Performed by: OPHTHALMOLOGY

## 2020-08-12 PROCEDURE — 92014 COMPRE OPH EXAM EST PT 1/>: CPT | Performed by: OPHTHALMOLOGY

## 2020-08-12 ASSESSMENT — REFRACTION_MANIFEST
OS_CYLINDER: -0.50
OS_SPHERE: PLANO
OD_SPHERE: +0.50
OS_VA1: 20/25
OS_ADD: +2.50
OD_CYLINDER: -0.50
OD_VA1: 20/30
OS_AXIS: 100
OU_VA: 20/25
OD_ADD: +2.50
OD_AXIS: 135

## 2020-08-12 ASSESSMENT — REFRACTION_AUTOREFRACTION
OS_SPHERE: +0.75
OD_AXIS: 097
OS_CYLINDER: -1.00
OS_AXIS: 101
OD_SPHERE: +2.25
OD_CYLINDER: -1.50

## 2020-08-12 ASSESSMENT — REFRACTION_CURRENTRX
OS_SPHERE: +0.50
OD_VPRISM_DIRECTION: BF
OS_ADD: +2.50
OD_OVR_VA: 20/
OD_AXIS: 135
OS_CYLINDER: -0.50
OD_SPHERE: +1.00
OS_OVR_VA: 20/
OS_VPRISM_DIRECTION: BF
OD_ADD: +2.50
OS_AXIS: 100
OD_CYLINDER: -0.50

## 2020-08-12 ASSESSMENT — CONFRONTATIONAL VISUAL FIELD TEST (CVF)
OD_FINDINGS: FULL
OS_FINDINGS: FULL

## 2020-08-12 ASSESSMENT — SPHEQUIV_DERIVED
OD_SPHEQUIV: 1.5
OD_SPHEQUIV: 0.25
OS_SPHEQUIV: 0.25

## 2020-08-12 ASSESSMENT — VISUAL ACUITY
OD_BCVA: 20/25-2
OS_BCVA: 20/30+1

## 2020-08-31 ENCOUNTER — DOCTOR'S OFFICE (OUTPATIENT)
Dept: URBAN - METROPOLITAN AREA CLINIC 136 | Facility: CLINIC | Age: 75
Setting detail: OPHTHALMOLOGY
End: 2020-08-31
Payer: COMMERCIAL

## 2020-08-31 DIAGNOSIS — H43.813: ICD-10-CM

## 2020-08-31 DIAGNOSIS — H35.342: ICD-10-CM

## 2020-08-31 DIAGNOSIS — H25.013: ICD-10-CM

## 2020-08-31 PROCEDURE — 92014 COMPRE OPH EXAM EST PT 1/>: CPT | Performed by: OPHTHALMOLOGY

## 2020-08-31 ASSESSMENT — REFRACTION_MANIFEST
OD_VA1: 20/30
OD_CYLINDER: -0.50
OD_SPHERE: +0.50
OS_ADD: +2.50
OS_VA1: 20/25
OD_AXIS: 135
OS_AXIS: 100
OU_VA: 20/25
OS_CYLINDER: -0.50
OD_ADD: +2.50
OS_SPHERE: PLANO

## 2020-08-31 ASSESSMENT — CONFRONTATIONAL VISUAL FIELD TEST (CVF)
OS_FINDINGS: FULL
OD_FINDINGS: FULL

## 2020-08-31 ASSESSMENT — REFRACTION_AUTOREFRACTION
OD_AXIS: 145
OS_SPHERE: +0.50
OD_CYLINDER: -1.25
OD_SPHERE: +1.75
OS_AXIS: 103
OS_CYLINDER: -1.25

## 2020-08-31 ASSESSMENT — VISUAL ACUITY
OS_BCVA: 20/50+1
OD_BCVA: 20/40+1

## 2020-08-31 ASSESSMENT — SPHEQUIV_DERIVED
OD_SPHEQUIV: 0.25
OD_SPHEQUIV: 1.125
OS_SPHEQUIV: -0.125

## 2020-08-31 ASSESSMENT — REFRACTION_CURRENTRX
OS_ADD: +2.50
OD_VPRISM_DIRECTION: BF
OD_ADD: +2.50
OS_CYLINDER: -0.50
OD_SPHERE: +1.00
OS_AXIS: 100
OS_SPHERE: +0.50
OD_OVR_VA: 20/
OS_VPRISM_DIRECTION: BF
OD_CYLINDER: -0.50
OS_OVR_VA: 20/
OD_AXIS: 135

## 2020-09-25 ENCOUNTER — DOCTOR'S OFFICE (OUTPATIENT)
Dept: URBAN - METROPOLITAN AREA CLINIC 136 | Facility: CLINIC | Age: 75
Setting detail: OPHTHALMOLOGY
End: 2020-09-25
Payer: COMMERCIAL

## 2020-09-25 DIAGNOSIS — H25.011: ICD-10-CM

## 2020-09-25 PROCEDURE — 92136 OPHTHALMIC BIOMETRY: CPT | Performed by: OPHTHALMOLOGY

## 2020-10-05 ENCOUNTER — OFFICE VISIT (OUTPATIENT)
Dept: FAMILY MEDICINE CLINIC | Facility: CLINIC | Age: 75
End: 2020-10-05
Payer: COMMERCIAL

## 2020-10-05 ENCOUNTER — APPOINTMENT (OUTPATIENT)
Dept: LAB | Facility: CLINIC | Age: 75
End: 2020-10-05
Payer: COMMERCIAL

## 2020-10-05 VITALS
HEART RATE: 55 BPM | DIASTOLIC BLOOD PRESSURE: 76 MMHG | BODY MASS INDEX: 28.12 KG/M2 | TEMPERATURE: 97.3 F | WEIGHT: 179.2 LBS | HEIGHT: 67 IN | SYSTOLIC BLOOD PRESSURE: 120 MMHG | OXYGEN SATURATION: 98 %

## 2020-10-05 DIAGNOSIS — D45 POLYCYTHEMIA VERA (HCC): ICD-10-CM

## 2020-10-05 DIAGNOSIS — Z01.818 PRE-OPERATIVE CLEARANCE: Primary | ICD-10-CM

## 2020-10-05 DIAGNOSIS — R73.03 PREDIABETES: ICD-10-CM

## 2020-10-05 DIAGNOSIS — H26.9 CATARACT OF BOTH EYES, UNSPECIFIED CATARACT TYPE: ICD-10-CM

## 2020-10-05 DIAGNOSIS — Z01.818 PRE-OPERATIVE CLEARANCE: ICD-10-CM

## 2020-10-05 LAB
ALBUMIN SERPL BCP-MCNC: 3.9 G/DL (ref 3.5–5)
ALP SERPL-CCNC: 154 U/L (ref 46–116)
ALT SERPL W P-5'-P-CCNC: 31 U/L (ref 12–78)
ANION GAP SERPL CALCULATED.3IONS-SCNC: 1 MMOL/L (ref 4–13)
AST SERPL W P-5'-P-CCNC: 20 U/L (ref 5–45)
BASOPHILS # BLD AUTO: 0.05 THOUSANDS/ΜL (ref 0–0.1)
BASOPHILS NFR BLD AUTO: 0 % (ref 0–1)
BILIRUB SERPL-MCNC: 0.64 MG/DL (ref 0.2–1)
BUN SERPL-MCNC: 16 MG/DL (ref 5–25)
CALCIUM SERPL-MCNC: 9.2 MG/DL (ref 8.3–10.1)
CHLORIDE SERPL-SCNC: 109 MMOL/L (ref 100–108)
CO2 SERPL-SCNC: 30 MMOL/L (ref 21–32)
CREAT SERPL-MCNC: 1.47 MG/DL (ref 0.6–1.3)
EOSINOPHIL # BLD AUTO: 0.95 THOUSAND/ΜL (ref 0–0.61)
EOSINOPHIL NFR BLD AUTO: 7 % (ref 0–6)
ERYTHROCYTE [DISTWIDTH] IN BLOOD BY AUTOMATED COUNT: 21.2 % (ref 11.6–15.1)
EST. AVERAGE GLUCOSE BLD GHB EST-MCNC: 114 MG/DL
GFR SERPL CREATININE-BSD FRML MDRD: 46 ML/MIN/1.73SQ M
GLUCOSE P FAST SERPL-MCNC: 81 MG/DL (ref 65–99)
HBA1C MFR BLD: 5.6 %
HCT VFR BLD AUTO: 50.2 % (ref 36.5–49.3)
HGB BLD-MCNC: 14.1 G/DL (ref 12–17)
IMM GRANULOCYTES # BLD AUTO: 0.07 THOUSAND/UL (ref 0–0.2)
IMM GRANULOCYTES NFR BLD AUTO: 1 % (ref 0–2)
LYMPHOCYTES # BLD AUTO: 1.79 THOUSANDS/ΜL (ref 0.6–4.47)
LYMPHOCYTES NFR BLD AUTO: 13 % (ref 14–44)
MCH RBC QN AUTO: 21.6 PG (ref 26.8–34.3)
MCHC RBC AUTO-ENTMCNC: 28.1 G/DL (ref 31.4–37.4)
MCV RBC AUTO: 77 FL (ref 82–98)
MONOCYTES # BLD AUTO: 1.12 THOUSAND/ΜL (ref 0.17–1.22)
MONOCYTES NFR BLD AUTO: 8 % (ref 4–12)
NEUTROPHILS # BLD AUTO: 9.86 THOUSANDS/ΜL (ref 1.85–7.62)
NEUTS SEG NFR BLD AUTO: 71 % (ref 43–75)
NRBC BLD AUTO-RTO: 0 /100 WBCS
PLATELET # BLD AUTO: 269 THOUSANDS/UL (ref 149–390)
PMV BLD AUTO: 10.9 FL (ref 8.9–12.7)
POTASSIUM SERPL-SCNC: 4.2 MMOL/L (ref 3.5–5.3)
PROT SERPL-MCNC: 7.8 G/DL (ref 6.4–8.2)
RBC # BLD AUTO: 6.54 MILLION/UL (ref 3.88–5.62)
SODIUM SERPL-SCNC: 140 MMOL/L (ref 136–145)
WBC # BLD AUTO: 13.84 THOUSAND/UL (ref 4.31–10.16)

## 2020-10-05 PROCEDURE — 80053 COMPREHEN METABOLIC PANEL: CPT

## 2020-10-05 PROCEDURE — 85025 COMPLETE CBC W/AUTO DIFF WBC: CPT

## 2020-10-05 PROCEDURE — 83036 HEMOGLOBIN GLYCOSYLATED A1C: CPT

## 2020-10-05 PROCEDURE — 99213 OFFICE O/P EST LOW 20 MIN: CPT | Performed by: NURSE PRACTITIONER

## 2020-10-05 PROCEDURE — 36415 COLL VENOUS BLD VENIPUNCTURE: CPT

## 2020-10-13 ENCOUNTER — AMBUL SURGICAL CARE (OUTPATIENT)
Dept: URBAN - METROPOLITAN AREA SURGERY 32 | Facility: SURGERY | Age: 75
Setting detail: OPHTHALMOLOGY
End: 2020-10-13
Payer: COMMERCIAL

## 2020-10-13 DIAGNOSIS — H25.041: ICD-10-CM

## 2020-10-13 DIAGNOSIS — H25.11: ICD-10-CM

## 2020-10-13 DIAGNOSIS — H25.011: ICD-10-CM

## 2020-10-13 PROCEDURE — 66984 XCAPSL CTRC RMVL W/O ECP: CPT | Performed by: CLINIC/CENTER

## 2020-10-13 PROCEDURE — G8907 PT DOC NO EVENTS ON DISCHARG: HCPCS | Performed by: OPHTHALMOLOGY

## 2020-10-13 PROCEDURE — G8918 PT W/O PREOP ORDER IV AB PRO: HCPCS | Performed by: CLINIC/CENTER

## 2020-10-13 PROCEDURE — G8918 PT W/O PREOP ORDER IV AB PRO: HCPCS | Performed by: OPHTHALMOLOGY

## 2020-10-13 PROCEDURE — 66984 XCAPSL CTRC RMVL W/O ECP: CPT | Performed by: OPHTHALMOLOGY

## 2020-10-13 PROCEDURE — G8907 PT DOC NO EVENTS ON DISCHARG: HCPCS | Performed by: CLINIC/CENTER

## 2020-10-14 ENCOUNTER — DOCTOR'S OFFICE (OUTPATIENT)
Dept: URBAN - METROPOLITAN AREA CLINIC 136 | Facility: CLINIC | Age: 75
Setting detail: OPHTHALMOLOGY
End: 2020-10-14
Payer: COMMERCIAL

## 2020-10-14 ENCOUNTER — RX ONLY (RX ONLY)
Age: 75
End: 2020-10-14

## 2020-10-14 DIAGNOSIS — H25.012: ICD-10-CM

## 2020-10-14 DIAGNOSIS — Z96.1: ICD-10-CM

## 2020-10-14 PROCEDURE — 99024 POSTOP FOLLOW-UP VISIT: CPT | Performed by: OPHTHALMOLOGY

## 2020-10-14 ASSESSMENT — REFRACTION_MANIFEST
OD_CYLINDER: -0.50
OS_SPHERE: PLANO
OU_VA: 20/25
OD_VA1: 20/30
OD_SPHERE: +0.50
OD_AXIS: 135
OS_AXIS: 100
OS_CYLINDER: -0.50
OD_ADD: +2.50
OS_ADD: +2.50
OS_VA1: 20/25

## 2020-10-14 ASSESSMENT — REFRACTION_CURRENTRX
OS_AXIS: 100
OS_SPHERE: +0.50
OD_ADD: +2.50
OD_VPRISM_DIRECTION: BF
OS_OVR_VA: 20/
OS_CYLINDER: -0.50
OD_AXIS: 135
OD_CYLINDER: -0.50
OD_OVR_VA: 20/
OS_VPRISM_DIRECTION: BF
OS_ADD: +2.50
OD_SPHERE: +1.00

## 2020-10-14 ASSESSMENT — REFRACTION_AUTOREFRACTION
OS_CYLINDER: -1.25
OS_AXIS: 103
OS_SPHERE: +0.50
OD_CYLINDER: SPHERE
OD_SPHERE: +0.50

## 2020-10-14 ASSESSMENT — VISUAL ACUITY
OS_BCVA: 20/25
OD_BCVA: 20/40+1

## 2020-10-14 ASSESSMENT — SPHEQUIV_DERIVED
OD_SPHEQUIV: 0.25
OS_SPHEQUIV: -0.125

## 2020-10-21 ENCOUNTER — DOCTOR'S OFFICE (OUTPATIENT)
Dept: URBAN - METROPOLITAN AREA CLINIC 136 | Facility: CLINIC | Age: 75
Setting detail: OPHTHALMOLOGY
End: 2020-10-21
Payer: COMMERCIAL

## 2020-10-21 DIAGNOSIS — H25.012: ICD-10-CM

## 2020-10-21 DIAGNOSIS — Z96.1: ICD-10-CM

## 2020-10-21 PROCEDURE — 99024 POSTOP FOLLOW-UP VISIT: CPT | Performed by: OPHTHALMOLOGY

## 2020-10-21 ASSESSMENT — REFRACTION_CURRENTRX
OS_CYLINDER: -0.50
OD_CYLINDER: -0.50
OD_VPRISM_DIRECTION: BF
OD_ADD: +2.50
OS_SPHERE: +0.50
OD_AXIS: 135
OS_ADD: +2.50
OS_VPRISM_DIRECTION: BF
OD_SPHERE: +1.00
OD_OVR_VA: 20/
OS_AXIS: 100
OS_OVR_VA: 20/

## 2020-10-21 ASSESSMENT — REFRACTION_MANIFEST
OS_VA1: 20/25
OS_SPHERE: PLANO
OU_VA: 20/25
OS_ADD: +2.50
OD_AXIS: 135
OD_SPHERE: +0.50
OS_CYLINDER: -0.50
OD_VA1: 20/30
OD_CYLINDER: -0.50
OS_AXIS: 100
OD_ADD: +2.50

## 2020-10-21 ASSESSMENT — TONOMETRY: OD_IOP_MMHG: 17

## 2020-10-21 ASSESSMENT — REFRACTION_AUTOREFRACTION
OD_SPHERE: +0.50
OD_CYLINDER: SPHERE
OS_SPHERE: +0.50
OS_CYLINDER: -1.25
OS_AXIS: 103

## 2020-10-21 ASSESSMENT — SPHEQUIV_DERIVED
OD_SPHEQUIV: 0.25
OS_SPHEQUIV: -0.125

## 2020-10-21 ASSESSMENT — VISUAL ACUITY
OS_BCVA: 20/25+1
OD_BCVA: 20/40+1

## 2020-10-23 ENCOUNTER — HOSPITAL ENCOUNTER (OUTPATIENT)
Dept: INFUSION CENTER | Facility: CLINIC | Age: 75
Discharge: HOME | End: 2020-10-23
Payer: COMMERCIAL

## 2020-10-23 VITALS
DIASTOLIC BLOOD PRESSURE: 86 MMHG | TEMPERATURE: 98.2 F | HEART RATE: 52 BPM | SYSTOLIC BLOOD PRESSURE: 129 MMHG | RESPIRATION RATE: 18 BRPM

## 2020-10-23 DIAGNOSIS — D45 POLYCYTHEMIA VERA (HCC): Primary | ICD-10-CM

## 2020-10-23 PROCEDURE — 99195 PHLEBOTOMY: CPT

## 2020-10-26 ENCOUNTER — AMBUL SURGICAL CARE (OUTPATIENT)
Dept: URBAN - METROPOLITAN AREA SURGERY 32 | Facility: SURGERY | Age: 75
Setting detail: OPHTHALMOLOGY
End: 2020-10-26
Payer: COMMERCIAL

## 2020-10-26 DIAGNOSIS — H25.012: ICD-10-CM

## 2020-10-26 DIAGNOSIS — H25.12: ICD-10-CM

## 2020-10-26 DIAGNOSIS — H25.042: ICD-10-CM

## 2020-10-26 PROCEDURE — G8918 PT W/O PREOP ORDER IV AB PRO: HCPCS | Performed by: OPHTHALMOLOGY

## 2020-10-26 PROCEDURE — 66984 XCAPSL CTRC RMVL W/O ECP: CPT | Performed by: CLINIC/CENTER

## 2020-10-26 PROCEDURE — G8907 PT DOC NO EVENTS ON DISCHARG: HCPCS | Performed by: CLINIC/CENTER

## 2020-10-26 PROCEDURE — G8907 PT DOC NO EVENTS ON DISCHARG: HCPCS | Performed by: OPHTHALMOLOGY

## 2020-10-26 PROCEDURE — 66984 XCAPSL CTRC RMVL W/O ECP: CPT | Performed by: OPHTHALMOLOGY

## 2020-10-26 PROCEDURE — G8918 PT W/O PREOP ORDER IV AB PRO: HCPCS | Performed by: CLINIC/CENTER

## 2020-10-27 ENCOUNTER — DOCTOR'S OFFICE (OUTPATIENT)
Dept: URBAN - METROPOLITAN AREA CLINIC 136 | Facility: CLINIC | Age: 75
Setting detail: OPHTHALMOLOGY
End: 2020-10-27
Payer: COMMERCIAL

## 2020-10-27 DIAGNOSIS — Z96.1: ICD-10-CM

## 2020-10-27 PROCEDURE — 99024 POSTOP FOLLOW-UP VISIT: CPT | Performed by: OPHTHALMOLOGY

## 2020-10-27 ASSESSMENT — REFRACTION_CURRENTRX
OD_OVR_VA: 20/
OS_SPHERE: +0.50
OD_VPRISM_DIRECTION: BF
OS_OVR_VA: 20/
OD_AXIS: 135
OD_SPHERE: +1.00
OD_ADD: +2.50
OS_ADD: +2.50
OS_AXIS: 100
OS_VPRISM_DIRECTION: BF
OS_CYLINDER: -0.50
OD_CYLINDER: -0.50

## 2020-10-27 ASSESSMENT — REFRACTION_AUTOREFRACTION
OS_SPHERE: +1.00
OS_AXIS: 077
OD_SPHERE: +0.25
OD_CYLINDER: SPHERE
OS_CYLINDER: -0.75

## 2020-10-27 ASSESSMENT — VISUAL ACUITY
OD_BCVA: 20/20
OS_BCVA: 20/20

## 2020-10-27 ASSESSMENT — SPHEQUIV_DERIVED
OS_SPHEQUIV: 0.625
OD_SPHEQUIV: 0.25

## 2020-10-27 ASSESSMENT — TONOMETRY
OS_IOP_MMHG: 20
OD_IOP_MMHG: 19

## 2020-10-27 ASSESSMENT — REFRACTION_MANIFEST
OD_VA1: 20/30
OU_VA: 20/25
OS_CYLINDER: -0.50
OD_AXIS: 135
OD_ADD: +2.50
OS_VA1: 20/25
OD_CYLINDER: -0.50
OS_AXIS: 100
OS_SPHERE: PLANO
OD_SPHERE: +0.50
OS_ADD: +2.50

## 2020-11-05 ENCOUNTER — OFFICE VISIT (OUTPATIENT)
Dept: HEMATOLOGY ONCOLOGY | Facility: CLINIC | Age: 75
End: 2020-11-05
Payer: COMMERCIAL

## 2020-11-05 VITALS
TEMPERATURE: 97.6 F | WEIGHT: 178.2 LBS | DIASTOLIC BLOOD PRESSURE: 78 MMHG | OXYGEN SATURATION: 98 % | SYSTOLIC BLOOD PRESSURE: 110 MMHG | BODY MASS INDEX: 27.97 KG/M2 | RESPIRATION RATE: 18 BRPM | HEART RATE: 50 BPM | HEIGHT: 67 IN

## 2020-11-05 DIAGNOSIS — D45 POLYCYTHEMIA VERA (HCC): Primary | ICD-10-CM

## 2020-11-05 PROCEDURE — 99213 OFFICE O/P EST LOW 20 MIN: CPT | Performed by: PHYSICIAN ASSISTANT

## 2020-11-11 ENCOUNTER — DOCTOR'S OFFICE (OUTPATIENT)
Dept: URBAN - METROPOLITAN AREA CLINIC 136 | Facility: CLINIC | Age: 75
Setting detail: OPHTHALMOLOGY
End: 2020-11-11
Payer: COMMERCIAL

## 2020-11-11 DIAGNOSIS — Z96.1: ICD-10-CM

## 2020-11-11 PROCEDURE — 99024 POSTOP FOLLOW-UP VISIT: CPT | Performed by: OPHTHALMOLOGY

## 2020-11-11 ASSESSMENT — REFRACTION_CURRENTRX
OS_CYLINDER: -0.50
OD_ADD: +2.50
OD_OVR_VA: 20/
OD_AXIS: 135
OS_VPRISM_DIRECTION: BF
OD_VPRISM_DIRECTION: BF
OD_CYLINDER: -0.50
OD_SPHERE: +1.00
OS_OVR_VA: 20/
OS_AXIS: 100
OS_ADD: +2.50
OS_SPHERE: +0.50

## 2020-11-11 ASSESSMENT — SPHEQUIV_DERIVED
OD_SPHEQUIV: 0.25
OD_SPHEQUIV: 0
OS_SPHEQUIV: 0

## 2020-11-11 ASSESSMENT — REFRACTION_MANIFEST
OD_VA1: 20/30
OS_CYLINDER: -0.50
OU_VA: 20/25
OS_VA1: 20/25
OD_SPHERE: +0.50
OD_AXIS: 135
OS_SPHERE: PLANO
OD_CYLINDER: -0.50
OD_ADD: +2.50
OS_ADD: +2.50
OS_AXIS: 100

## 2020-11-11 ASSESSMENT — REFRACTION_AUTOREFRACTION
OS_AXIS: 066
OD_CYLINDER: 0.00
OS_SPHERE: +0.25
OD_AXIS: 000
OS_CYLINDER: -0.50
OD_SPHERE: 0.00

## 2020-11-11 ASSESSMENT — VISUAL ACUITY
OD_BCVA: 20/20
OS_BCVA: 20/25+1

## 2020-11-11 ASSESSMENT — TONOMETRY
OD_IOP_MMHG: 19
OS_IOP_MMHG: 19

## 2020-11-27 ENCOUNTER — LAB (OUTPATIENT)
Dept: LAB | Facility: CLINIC | Age: 75
End: 2020-11-27
Payer: COMMERCIAL

## 2020-11-27 DIAGNOSIS — R97.20 ELEVATED PSA: ICD-10-CM

## 2020-11-27 LAB — PSA SERPL-MCNC: 17.2 NG/ML (ref 0–4)

## 2020-11-27 PROCEDURE — 84153 ASSAY OF PSA TOTAL: CPT

## 2020-12-16 ENCOUNTER — TELEPHONE (OUTPATIENT)
Dept: UROLOGY | Facility: CLINIC | Age: 75
End: 2020-12-16

## 2021-01-11 ENCOUNTER — OFFICE VISIT (OUTPATIENT)
Dept: FAMILY MEDICINE CLINIC | Facility: CLINIC | Age: 76
End: 2021-01-11
Payer: COMMERCIAL

## 2021-01-11 VITALS
HEIGHT: 67 IN | WEIGHT: 180 LBS | OXYGEN SATURATION: 96 % | BODY MASS INDEX: 28.25 KG/M2 | RESPIRATION RATE: 16 BRPM | HEART RATE: 76 BPM | TEMPERATURE: 97 F | DIASTOLIC BLOOD PRESSURE: 80 MMHG | SYSTOLIC BLOOD PRESSURE: 120 MMHG

## 2021-01-11 DIAGNOSIS — Z72.0 TOBACCO ABUSE: ICD-10-CM

## 2021-01-11 DIAGNOSIS — Z00.00 ENCOUNTER FOR ANNUAL WELLNESS EXAM IN MEDICARE PATIENT: Primary | ICD-10-CM

## 2021-01-11 DIAGNOSIS — R73.03 PREDIABETES: ICD-10-CM

## 2021-01-11 DIAGNOSIS — R97.20 ELEVATED PSA: ICD-10-CM

## 2021-01-11 DIAGNOSIS — D45 POLYCYTHEMIA VERA (HCC): ICD-10-CM

## 2021-01-11 DIAGNOSIS — I10 BENIGN ESSENTIAL HYPERTENSION: ICD-10-CM

## 2021-01-11 DIAGNOSIS — I25.10 CORONARY ARTERY DISEASE WITHOUT ANGINA PECTORIS, UNSPECIFIED VESSEL OR LESION TYPE, UNSPECIFIED WHETHER NATIVE OR TRANSPLANTED HEART: ICD-10-CM

## 2021-01-11 DIAGNOSIS — E78.2 MIXED HYPERLIPIDEMIA: ICD-10-CM

## 2021-01-11 PROCEDURE — G0439 PPPS, SUBSEQ VISIT: HCPCS | Performed by: FAMILY MEDICINE

## 2021-01-11 PROCEDURE — 99214 OFFICE O/P EST MOD 30 MIN: CPT | Performed by: FAMILY MEDICINE

## 2021-01-11 NOTE — ASSESSMENT & PLAN NOTE
Status post PCI  Denies any chest pain shortness of breath    Continue regular follow-up with Cardiology

## 2021-01-11 NOTE — PROGRESS NOTES
[facility]      NAME: Thomas Duval  AGE: 77 y o  SEX: male  : 1945   MRN: [id number]    DATE: 2021  TIME: 11:27 AM    Assessment and Plan     Problem List Items Addressed This Visit     Benign essential hypertension      Well controlled on amlodipine 10 and metoprolol 25 b i d  Relevant Orders    TSH, 3rd generation with Free T4 reflex    Coronary artery disease      Status post PCI  Denies any chest pain shortness of breath  Continue regular follow-up with Cardiology         Elevated PSA      History of elevated PSA  Patient is status post 2 biopsies and negative MRI  Most recent PSA from 2020 was 17 2 ( previous 15)  Continue regular follow-up with his urologist, Dr Karol Torres         Hyperlipidemia      Doing well on atorvastatin 80 mg daily         Relevant Orders    Lipid Panel with Direct LDL reflex    Polycythemia vera ([facility] [address] )      Stable with periodic phlebotomy  Stop smoking  Continue regular follow-up hematologist         Relevant Orders    CBC and differential    Prediabetes       Last A1c 5 6%  Continue reduced carb diet exercise  Will continue to monitor         Relevant Orders    Comprehensive metabolic panel    Hemoglobin A1C    Tobacco abuse      Still smoking approximately 1/2 pack per day  Counseled again  Most recent CT chest was 2020 which showed small nodules  Will recheck scan in near future (order placed)  Relevant Orders    CT lung screening program      Other Visit Diagnoses     Encounter for annual wellness exam in Medicare patient    -  Primary              Return to office in: AWV  Today  Recheck 6 months, yearly fasting blood work prior    Chief Complaint     Chief Complaint   Patient presents with    Follow-up     6 months    Medicare Wellness Visit       History of Present Illness      Patient presents for recheck chronic medical problems today  Overall is feeling well without complaints    Still smoking approximately 1/2 pack per day  Doing well on metoprolol and amlodipine for blood pressure  Still sees cardiologist regularly for CAD  Still sees urologist regularly for elevated PSA  Doing well on atorvastatin for hyperlipidemia  Still being followed by Hematology for  polycythemia      The following portions of the patient's history were reviewed and updated as appropriate: allergies, current medications, past family history, past medical history, past social history, past surgical history and problem list     Review of Systems   Review of Systems   Respiratory: Negative  Cardiovascular: Negative  Gastrointestinal: Negative  Genitourinary: Negative  Active Problem List     Patient Active Problem List   Diagnosis    Gastritis    Benign essential hypertension    Cervical radiculopathy    Coronary artery disease    Elevated PSA    Hyperlipidemia    Polycythemia vera ([address] )    Prediabetes    Tobacco abuse    Chronic left shoulder pain    Neck pain    Cervical spondylosis without myelopathy    BPH with obstruction/lower urinary tract symptoms    Strain of lumbar region       Objective   /80 (BP Location: Left arm, Patient Position: Sitting, Cuff Size: Adult)   Pulse 76   Temp (!) 97 °F (36 1 °C) (Tympanic)   Resp 16   Ht 5' 6 93" (1 7 m)   Wt 81 6 kg (180 lb)   SpO2 96%   BMI 28 25 kg/m²     Physical Exam  Cardiovascular:      Rate and Rhythm: Normal rate and regular rhythm  Heart sounds: Normal heart sounds  Comments: Carotids: no bruits  Ext: no edema  Pulmonary:      Effort: Pulmonary effort is normal  No respiratory distress  Breath sounds: No wheezing or rales  Psychiatric:         Behavior: Behavior normal          Thought Content:  Thought content normal          Pertinent Laboratory/Diagnostic Studies:   last labs reviewed    Current Medications     Current Outpatient Medications:     amLODIPine (NORVASC) 10 mg tablet, Take 10 mg by mouth daily, Disp: , Rfl:     aspirin (ECOTRIN LOW STRENGTH) 81 mg EC tablet, Take 81 mg by mouth daily, Disp: , Rfl:     atorvastatin (LIPITOR) 80 mg tablet, Take 80 mg by mouth daily, Disp: , Rfl:     metoprolol tartrate (LOPRESSOR) 50 mg tablet, Take 50 mg by mouth 2 (two) times a day , Disp: , Rfl:     Multiple Vitamin (MULTIVITAMIN) capsule, Take 1 capsule by mouth daily, Disp: , Rfl:     nitroglycerin (NITROSTAT) 0 4 mg SL tablet, Place 0 4 mg under the tongue every 5 (five) minutes as needed for chest pain, Disp: , Rfl:     Omega-3 Fatty Acids (FISH OIL) 1200 MG CAPS, Take 1,200 mg by mouth 2 (two) times a day  , Disp: , Rfl:     Health Maintenance     Health Maintenance   Topic Date Due    Hepatitis C Screening  1945    SLP PLAN OF CARE  1945    DTaP,Tdap,and Td Vaccines (1 - Tdap) 09/18/1967    Pneumococcal Vaccine: 65+ Years (1 of 1 - PPSV23) 09/18/2011    PT PLAN OF CARE  09/14/2018    Colonoscopy Surveillance  05/22/2020    Influenza Vaccine (1) 09/01/2020    Medicare Annual Wellness Visit (AWV)  12/18/2020    BMI: Followup Plan  12/18/2020    Fall Risk  01/11/2022    Depression Screening PHQ  01/11/2022    BMI: Adult  01/11/2022    Colorectal Cancer Screening  05/22/2027    HIB Vaccine  Aged Out    Hepatitis B Vaccine  Aged Out    IPV Vaccine  Aged Out    Hepatitis A Vaccine  Aged Out    Meningococcal ACWY Vaccine  Aged Out    HPV Vaccine  Aged Out     Immunization History   Administered Date(s) Administered    Pneumococcal Polysaccharide PPV23 1945       Neeta Redding DO  [facility]

## 2021-01-11 NOTE — ASSESSMENT & PLAN NOTE
History of elevated PSA  Patient is status post 2 biopsies and negative MRI  Most recent PSA from 11/27/2020 was 17 2 ( previous 15)    Continue regular follow-up with his urologist, Dr Menard Million

## 2021-01-11 NOTE — ASSESSMENT & PLAN NOTE
Still smoking approximately 1/2 pack per day  Counseled again  Most recent CT chest was 1/24/2020 which showed small nodules  Will recheck scan in near future (order placed)

## 2021-01-11 NOTE — PROGRESS NOTES
Assessment and Plan:    MEDICARE WELLNESS VISIT TODAY  PATIENT DOES NOT HAVE A LIVING WILL HEALTHCARE POWER OF   DEPRESSION SCREEN FALL RISK IN URINARY INCONTINENCE SCREENS ARE NEGATIVE  PATIENT REFUSES FLU SHOT      Problem List Items Addressed This Visit     Benign essential hypertension    Coronary artery disease    Elevated PSA    Hyperlipidemia    Polycythemia vera ([address] )    Prediabetes    Tobacco abuse      Other Visit Diagnoses     Encounter for annual wellness exam in Medicare patient    -  Primary           Preventive health issues were discussed with patient, and age appropriate screening tests were ordered as noted in patient's After Visit Summary  Personalized health advice and appropriate referrals for health education or preventive services given if needed, as noted in patient's After Visit Summary  History of Present Illness:     Patient presents for Welcome to Medicare visit       Patient Care Team:  Fina Horne DO as PCP - General  MD Mira Fleming DO Doy San, MD Nana Caro, DO Nana Caro, DO as Endoscopist     Review of Systems:     Review of Systems   Problem List:     Patient Active Problem List   Diagnosis    Gastritis    Benign essential hypertension    Cervical radiculopathy    Coronary artery disease    Elevated PSA    Hyperlipidemia    Polycythemia vera ([address] )    Prediabetes    Tobacco abuse    Chronic left shoulder pain    Neck pain    Cervical spondylosis without myelopathy    BPH with obstruction/lower urinary tract symptoms    Strain of lumbar region      Past Medical and Surgical History:     Past Medical History:   Diagnosis Date    CAD (coronary artery disease)     Last Assessed:  11/4/13    Elevated prostate specific antigen (PSA)     Last Assessed:  12/21/17    Hyperlipidemia     Last Assessed:  11/24/17    Hypertension     Benign essential, Last Assessed:  11/24/17     Past Surgical History:   Procedure Laterality Date    APPENDECTOMY      VT COLONOSCOPY FLX DX W/COLLJ SPEC WHEN PFRMD N/A 5/22/2017    Procedure: COLONOSCOPY;  Surgeon: Madalyn Shannon DO;  Location: [facility];   Service: Gastroenterology    PROSTATE BIOPSY        Family History:     Family History   Problem Relation Age of Onset    No Known Problems Mother       Social History:        Social History     Socioeconomic History    Marital status: /Civil Union     Spouse name: None    Number of children: None    Years of education: None    Highest education level: None   Occupational History    None   Social Needs    Financial resource strain: None    Food insecurity     Worry: None     Inability: None    Transportation needs     Medical: None     Non-medical: None   Tobacco Use    Smoking status: Current Every Day Smoker     Packs/day: 0 50     Types: Cigars, Cigarettes    Smokeless tobacco: Never Used   Substance and Sexual Activity    Alcohol use: No    Drug use: No    Sexual activity: None   Lifestyle    Physical activity     Days per week: None     Minutes per session: None    Stress: None   Relationships    Social connections     Talks on phone: None     Gets together: None     Attends Confucianist service: None     Active member of club or organization: None     Attends meetings of clubs or organizations: None     Relationship status: None    Intimate partner violence     Fear of current or ex partner: None     Emotionally abused: None     Physically abused: None     Forced sexual activity: None   Other Topics Concern    None   Social History Narrative    Always uses seatbelt    Feels safe at home    Uses safety equipment      Medications and Allergies:     Current Outpatient Medications   Medication Sig Dispense Refill    amLODIPine (NORVASC) 10 mg tablet Take 10 mg by mouth daily      aspirin (ECOTRIN LOW STRENGTH) 81 mg EC tablet Take 81 mg by mouth daily      atorvastatin (LIPITOR) 80 mg tablet Take 80 mg by mouth daily      metoprolol tartrate (LOPRESSOR) 50 mg tablet Take 50 mg by mouth 2 (two) times a day       Multiple Vitamin (MULTIVITAMIN) capsule Take 1 capsule by mouth daily      nitroglycerin (NITROSTAT) 0 4 mg SL tablet Place 0 4 mg under the tongue every 5 (five) minutes as needed for chest pain      Omega-3 Fatty Acids (FISH OIL) 1200 MG CAPS Take 1,200 mg by mouth 2 (two) times a day         No current facility-administered medications for this visit  No Known Allergies   Immunizations:     Immunization History   Administered Date(s) Administered    Pneumococcal Polysaccharide PPV23 1945      Health Maintenance:         Topic Date Due    Hepatitis C Screening  1945    Colonoscopy Surveillance  05/22/2020    Colorectal Cancer Screening  05/22/2027         Topic Date Due    DTaP,Tdap,and Td Vaccines (1 - Tdap) 09/18/1967    Pneumococcal Vaccine: 65+ Years (1 of 1 - PPSV23) 09/18/2011    Influenza Vaccine (1) 09/01/2020      Medicare Screening Tests and Risk Assessments:     Chip Platt is here for his Subsequent Wellness visit  Last Medicare Wellness visit information reviewed, patient interviewed and updates made to the record today  Health Risk Assessment:   Patient rates overall health as good  Patient feels that their physical health rating is same  Eyesight was rated as same  Hearing was rated as same  Patient feels that their emotional and mental health rating is same  Pain experienced in the last 7 days has been some  Patient's pain rating has been 5/10  Patient states that he has experienced no weight loss or gain in last 6 months  Depression Screening:   PHQ-2 Score: 0      Fall Risk Screening: In the past year, patient has experienced: no history of falling in past year      Home Safety:  Patient does not have trouble with stairs inside or outside of their home  Patient has working smoke alarms and has working carbon monoxide detector  Home safety hazards include: none  Nutrition:   Current diet is Regular  Medications:   Patient is currently taking over-the-counter supplements  OTC medications include: see medication list  Patient is able to manage medications  Activities of Daily Living (ADLs)/Instrumental Activities of Daily Living (IADLs):   Walk and transfer into and out of bed and chair?: Yes  Dress and groom yourself?: Yes    Bathe or shower yourself?: Yes    Feed yourself? Yes  Do your laundry/housekeeping?: Yes  Manage your money, pay your bills and track your expenses?: Yes  Make your own meals?: Yes    Do your own shopping?: Yes    Previous Hospitalizations:   Any hospitalizations or ED visits within the last 12 months?: No      Advance Care Planning:   Living will: No    Durable POA for healthcare:  Yes    Advanced directive: No    Advanced directive counseling given: Yes    Five wishes given: Yes    End of Life Decisions reviewed with patient: Yes    Provider agrees with end of life decisions: Yes      Cognitive Screening:   Provider or family/friend/caregiver concerned regarding cognition?: No    PREVENTIVE SCREENINGS      Cardiovascular Screening:    General: Screening Not Indicated and History Lipid Disorder      Diabetes Screening:     General: Screening Current      Colorectal Cancer Screening:     General: Screening Current      Prostate Cancer Screening:    General: Screening Current      Osteoporosis Screening:    General: Risks and Benefits Discussed      Abdominal Aortic Aneurysm (AAA) Screening:    Risk factors include: age between 73-67 yo and tobacco use        Lung Cancer Screening:     General: Screening Not Indicated      Hepatitis C Screening:    General: Risks and Benefits Discussed    No exam data present     Physical Exam:     /80 (BP Location: Left arm, Patient Position: Sitting, Cuff Size: Adult)   Pulse 76   Temp (!) 97 °F (36 1 °C) (Tympanic)   Resp 16   Ht 5' 6 93" (1 7 m)   Wt 81 6 kg (180 lb)   SpO2 96%   BMI 28 25 kg/m²     Physical Exam     Anna Shin DO

## 2021-01-11 NOTE — PATIENT INSTRUCTIONS
Medicare Preventive Visit Patient Instructions  Thank you for completing your Welcome to Medicare Visit or Medicare Annual Wellness Visit today  Your next wellness visit will be due in one year (1/11/2022)  The screening/preventive services that you may require over the next 5-10 years are detailed below  Some tests may not apply to you based off risk factors and/or age  Screening tests ordered at today's visit but not completed yet may show as past due  Also, please note that scanned in results may not display below  Preventive Screenings:  Service Recommendations Previous Testing/Comments   Colorectal Cancer Screening  · Colonoscopy    · Fecal Occult Blood Test (FOBT)/Fecal Immunochemical Test (FIT)  · Fecal DNA/Cologuard Test  · Flexible Sigmoidoscopy Age: 54-65 years old   Colonoscopy: every 10 years (May be performed more frequently if at higher risk)  OR  FOBT/FIT: every 1 year  OR  Cologuard: every 3 years  OR  Sigmoidoscopy: every 5 years  Screening may be recommended earlier than age 48 if at higher risk for colorectal cancer  Also, an individualized decision between you and your healthcare provider will decide whether screening between the ages of 74-80 would be appropriate   Colonoscopy: 05/22/2017  FOBT/FIT: Not on file  Cologuard: Not on file  Sigmoidoscopy: Not on file    Screening Current     Prostate Cancer Screening Individualized decision between patient and health care provider in men between ages of 53-78   Medicare will cover every 12 months beginning on the day after your 50th birthday PSA: 17 2 ng/mL     Screening Current     Hepatitis C Screening Once for adults born between 1945 and 1965  More frequently in patients at high risk for Hepatitis C Hep C Antibody: Not on file       Diabetes Screening 1-2 times per year if you're at risk for diabetes or have pre-diabetes Fasting glucose: 81 mg/dL   A1C: 5 6 %    Screening Current   Cholesterol Screening Once every 5 years if you don't have a lipid disorder  May order more often based on risk factors  Lipid panel: 05/22/2020    Screening Not Indicated  History Lipid Disorder      Other Preventive Screenings Covered by Medicare:  1  Abdominal Aortic Aneurysm (AAA) Screening: covered once if your at risk  You're considered to be at risk if you have a family history of AAA or a male between the age of 73-68 who smoking at least 100 cigarettes in your lifetime  2  Lung Cancer Screening: covers low dose CT scan once per year if you meet all of the following conditions: (1) Age 50-69; (2) No signs or symptoms of lung cancer; (3) Current smoker or have quit smoking within the last 15 years; (4) You have a tobacco smoking history of at least 30 pack years (packs per day x number of years you smoked); (5) You get a written order from a healthcare provider  3  Glaucoma Screening: covered annually if you're considered high risk: (1) You have diabetes OR (2) Family history of glaucoma OR (3)  aged 48 and older OR (3)  American aged 72 and older  3  Osteoporosis Screening: covered every 2 years if you meet one of the following conditions: (1) Have a vertebral abnormality; (2) On glucocorticoid therapy for more than 3 months; (3) Have primary hyperparathyroidism; (4) On osteoporosis medications and need to assess response to drug therapy  5  HIV Screening: covered annually if you're between the age of 12-76  Also covered annually if you are younger than 13 and older than 72 with risk factors for HIV infection  For pregnant patients, it is covered up to 3 times per pregnancy      Immunizations:  Immunization Recommendations   Influenza Vaccine Annual influenza vaccination during flu season is recommended for all persons aged >= 6 months who do not have contraindications   Pneumococcal Vaccine (Prevnar and Pneumovax)  * Prevnar = PCV13  * Pneumovax = PPSV23 Adults 25-60 years old: 1-3 doses may be recommended based on certain risk factors  Adults 72 years old: Prevnar (PCV13) vaccine recommended followed by Pneumovax (PPSV23) vaccine  If already received PPSV23 since turning 65, then PCV13 recommended at least one year after PPSV23 dose  Hepatitis B Vaccine 3 dose series if at intermediate or high risk (ex: diabetes, end stage renal disease, liver disease)   Tetanus (Td) Vaccine - COST NOT COVERED BY MEDICARE PART B Following completion of primary series, a booster dose should be given every 10 years to maintain immunity against tetanus  Td may also be given as tetanus wound prophylaxis  Tdap Vaccine - COST NOT COVERED BY MEDICARE PART B Recommended at least once for all adults  For pregnant patients, recommended with each pregnancy  Shingles Vaccine (Shingrix) - COST NOT COVERED BY MEDICARE PART B  2 shot series recommended in those aged 48 and above     Health Maintenance Due:      Topic Date Due    Hepatitis C Screening  1945    Colonoscopy Surveillance  05/22/2020    Colorectal Cancer Screening  05/22/2027     Immunizations Due:      Topic Date Due    DTaP,Tdap,and Td Vaccines (1 - Tdap) 09/18/1967    Pneumococcal Vaccine: 65+ Years (1 of 1 - PPSV23) 09/18/2011    Influenza Vaccine (1) 09/01/2020     Advance Directives   What are advance directives? Advance directives are legal documents that state your wishes and plans for medical care  These plans are made ahead of time in case you lose your ability to make decisions for yourself  Advance directives can apply to any medical decision, such as the treatments you want, and if you want to donate organs  What are the types of advance directives? There are many types of advance directives, and each state has rules about how to use them  You may choose a combination of any of the following:  · Living will: This is a written record of the treatment you want  You can also choose which treatments you do not want, which to limit, and which to stop at a certain time   This includes surgery, medicine, IV fluid, and tube feedings  · Durable power of  for healthcare [facility]): This is a written record that states who you want to make healthcare choices for you when you are unable to make them for yourself  This person, called a proxy, is usually a family member or a friend  You may choose more than 1 proxy  · Do not resuscitate (DNR) order:  A DNR order is used in case your heart stops beating or you stop breathing  It is a request not to have certain forms of treatment, such as CPR  A DNR order may be included in other types of advance directives  · Medical directive: This covers the care that you want if you are in a coma, near death, or unable to make decisions for yourself  You can list the treatments you want for each condition  Treatment may include pain medicine, surgery, blood transfusions, dialysis, IV or tube feedings, and a ventilator (breathing machine)  · Values history: This document has questions about your views, beliefs, and how you feel and think about life  This information can help others choose the care that you would choose  Why are advance directives important? An advance directive helps you control your care  Although spoken wishes may be used, it is better to have your wishes written down  Spoken wishes can be misunderstood, or not followed  Treatments may be given even if you do not want them  An advance directive may make it easier for your family to make difficult choices about your care  Cigarette Smoking and Your Health   Risks to your health if you smoke:  Nicotine and other chemicals found in tobacco damage every cell in your body  Even if you are a light smoker, you have an increased risk for cancer, heart disease, and lung disease  If you are pregnant or have diabetes, smoking increases your risk for complications     Benefits to your health if you stop smoking:   · You decrease respiratory symptoms such as coughing, wheezing, and shortness of breath  · You reduce your risk for cancers of the lung, mouth, throat, kidney, bladder, pancreas, stomach, and cervix  If you already have cancer, you increase the benefits of chemotherapy  You also reduce your risk for cancer returning or a second cancer from developing  · You reduce your risk for heart disease, blood clots, heart attack, and stroke  · You reduce your risk for lung infections, and diseases such as pneumonia, asthma, chronic bronchitis, and emphysema  · Your circulation improves  More oxygen can be delivered to your body  If you have diabetes, you lower your risk for complications, such as kidney, artery, and eye diseases  You also lower your risk for nerve damage  Nerve damage can lead to amputations, poor vision, and blindness  · You improve your body's ability to heal and to fight infections  For more information and support to stop smoking:   · [employer]  Phone: 5- 273 - [phone]  Web Address: FFWD  Weight Management   Why it is important to manage your weight:  Being overweight increases your risk of health conditions such as heart disease, high blood pressure, type 2 diabetes, and certain types of cancer  It can also increase your risk for osteoarthritis, sleep apnea, and other respiratory problems  Aim for a slow, steady weight loss  Even a small amount of weight loss can lower your risk of health problems  How to lose weight safely:  A safe and healthy way to lose weight is to eat fewer calories and get regular exercise  You can lose up about 1 pound a week by decreasing the number of calories you eat by 500 calories each day  Healthy meal plan for weight management:  A healthy meal plan includes a variety of foods, contains fewer calories, and helps you stay healthy  A healthy meal plan includes the following:  · Eat whole-grain foods more often  A healthy meal plan should contain fiber   Fiber is the part of grains, fruits, and vegetables that is not broken down by your body  Whole-grain foods are healthy and provide extra fiber in your diet  Some examples of whole-grain foods are whole-wheat breads and pastas, oatmeal, brown rice, and bulgur  · Eat a variety of vegetables every day  Include dark, leafy greens such as spinach, kale, desirae greens, and mustard greens  Eat yellow and orange vegetables such as carrots, sweet potatoes, and winter squash  · Eat a variety of fruits every day  Choose fresh or canned fruit (canned in its own juice or light syrup) instead of juice  Fruit juice has very little or no fiber  · Eat low-fat dairy foods  Drink fat-free (skim) milk or 1% milk  Eat fat-free yogurt and low-fat cottage cheese  Try low-fat cheeses such as mozzarella and other reduced-fat cheeses  · Choose meat and other protein foods that are low in fat  Choose beans or other legumes such as split peas or lentils  Choose fish, skinless poultry (chicken or turkey), or lean cuts of red meat (beef or pork)  Before you cook meat or poultry, cut off any visible fat  · Use less fat and oil  Try baking foods instead of frying them  Add less fat, such as margarine, sour cream, regular salad dressing and mayonnaise to foods  Eat fewer high-fat foods  Some examples of high-fat foods include french fries, doughnuts, ice cream, and cakes  · Eat fewer sweets  Limit foods and drinks that are high in sugar  This includes candy, cookies, regular soda, and sweetened drinks  Exercise:  Exercise at least 30 minutes per day on most days of the week  Some examples of exercise include walking, biking, dancing, and swimming  You can also fit in more physical activity by taking the stairs instead of the elevator or parking farther away from stores  Ask your healthcare provider about the best exercise plan for you  © Copyright [employer] 2018 Information is for End User's use only and may not be sold, redistributed or otherwise used for commercial purposes  All illustrations and images included in CareNotes® are the copyrighted property of A D A M , Inc  or [employer]oco Roach

## 2021-01-13 ENCOUNTER — DOCTOR'S OFFICE (OUTPATIENT)
Dept: URBAN - METROPOLITAN AREA CLINIC 136 | Facility: CLINIC | Age: 76
Setting detail: OPHTHALMOLOGY
End: 2021-01-13
Payer: COMMERCIAL

## 2021-01-13 DIAGNOSIS — H04.121: ICD-10-CM

## 2021-01-13 DIAGNOSIS — H35.342: ICD-10-CM

## 2021-01-13 DIAGNOSIS — H04.122: ICD-10-CM

## 2021-01-13 DIAGNOSIS — Z96.1: ICD-10-CM

## 2021-01-13 PROCEDURE — 99024 POSTOP FOLLOW-UP VISIT: CPT | Performed by: OPHTHALMOLOGY

## 2021-01-13 ASSESSMENT — REFRACTION_CURRENTRX
OS_VPRISM_DIRECTION: BF
OS_CYLINDER: -0.50
OD_CYLINDER: -0.50
OS_SPHERE: +0.50
OD_SPHERE: +1.00
OD_VPRISM_DIRECTION: BF
OD_ADD: +2.50
OS_OVR_VA: 20/
OS_ADD: +2.50
OS_AXIS: 100
OD_AXIS: 135
OD_OVR_VA: 20/

## 2021-01-13 ASSESSMENT — SPHEQUIV_DERIVED
OD_SPHEQUIV: 0.25
OS_SPHEQUIV: 0.25
OD_SPHEQUIV: -0.25

## 2021-01-13 ASSESSMENT — REFRACTION_MANIFEST
OS_VA1: 20/25
OD_VA1: 20/30
OS_CYLINDER: -0.50
OU_VA: 20/25
OD_SPHERE: +0.50
OS_SPHERE: PLANO
OS_AXIS: 100
OD_ADD: +2.50
OD_CYLINDER: -0.50
OD_AXIS: 135
OS_ADD: +2.50

## 2021-01-13 ASSESSMENT — VISUAL ACUITY
OD_BCVA: 20/20
OS_BCVA: 20/25-2

## 2021-01-13 ASSESSMENT — REFRACTION_AUTOREFRACTION
OS_AXIS: 060
OD_SPHERE: +0.25
OS_CYLINDER: -1.00
OS_SPHERE: +0.75
OD_CYLINDER: -1.00
OD_AXIS: 149

## 2021-01-13 ASSESSMENT — SUPERFICIAL PUNCTATE KERATITIS (SPK)
OS_SPK: T
OD_SPK: T

## 2021-01-13 ASSESSMENT — TONOMETRY
OD_IOP_MMHG: 17
OS_IOP_MMHG: 17

## 2021-01-15 ENCOUNTER — HOSPITAL ENCOUNTER (OUTPATIENT)
Dept: INFUSION CENTER | Facility: CLINIC | Age: 76
Discharge: HOME | End: 2021-01-15
Payer: COMMERCIAL

## 2021-01-15 VITALS — TEMPERATURE: 97.5 F

## 2021-01-15 DIAGNOSIS — D45 POLYCYTHEMIA VERA (HCC): ICD-10-CM

## 2021-01-15 DIAGNOSIS — R73.03 PREDIABETES: ICD-10-CM

## 2021-01-15 LAB
ALBUMIN SERPL BCP-MCNC: 3.8 G/DL (ref 3.5–5)
ALP SERPL-CCNC: 152 U/L (ref 46–116)
ALT SERPL W P-5'-P-CCNC: 36 U/L (ref 12–78)
ANION GAP SERPL CALCULATED.3IONS-SCNC: 9 MMOL/L (ref 4–13)
AST SERPL W P-5'-P-CCNC: 31 U/L (ref 5–45)
BASOPHILS # BLD AUTO: 0.07 THOUSANDS/ΜL (ref 0–0.1)
BASOPHILS NFR BLD AUTO: 1 % (ref 0–1)
BILIRUB SERPL-MCNC: 0.64 MG/DL (ref 0.2–1)
BUN SERPL-MCNC: 18 MG/DL (ref 5–25)
CALCIUM SERPL-MCNC: 8.9 MG/DL (ref 8.3–10.1)
CHLORIDE SERPL-SCNC: 103 MMOL/L (ref 100–108)
CO2 SERPL-SCNC: 26 MMOL/L (ref 21–32)
CREAT SERPL-MCNC: 1.25 MG/DL (ref 0.6–1.3)
EOSINOPHIL # BLD AUTO: 0.65 THOUSAND/ΜL (ref 0–0.61)
EOSINOPHIL NFR BLD AUTO: 5 % (ref 0–6)
ERYTHROCYTE [DISTWIDTH] IN BLOOD BY AUTOMATED COUNT: 21.6 % (ref 11.6–15.1)
GFR SERPL CREATININE-BSD FRML MDRD: 56 ML/MIN/1.73SQ M
GLUCOSE SERPL-MCNC: 94 MG/DL (ref 65–140)
HCT VFR BLD AUTO: 51.3 % (ref 36.5–49.3)
HGB BLD-MCNC: 14.7 G/DL (ref 12–17)
IMM GRANULOCYTES # BLD AUTO: 0.07 THOUSAND/UL (ref 0–0.2)
IMM GRANULOCYTES NFR BLD AUTO: 1 % (ref 0–2)
LYMPHOCYTES # BLD AUTO: 1.48 THOUSANDS/ΜL (ref 0.6–4.47)
LYMPHOCYTES NFR BLD AUTO: 11 % (ref 14–44)
MCH RBC QN AUTO: 22 PG (ref 26.8–34.3)
MCHC RBC AUTO-ENTMCNC: 28.7 G/DL (ref 31.4–37.4)
MCV RBC AUTO: 77 FL (ref 82–98)
MONOCYTES # BLD AUTO: 0.93 THOUSAND/ΜL (ref 0.17–1.22)
MONOCYTES NFR BLD AUTO: 7 % (ref 4–12)
NEUTROPHILS # BLD AUTO: 9.82 THOUSANDS/ΜL (ref 1.85–7.62)
NEUTS SEG NFR BLD AUTO: 75 % (ref 43–75)
NRBC BLD AUTO-RTO: 0 /100 WBCS
PLATELET # BLD AUTO: 253 THOUSANDS/UL (ref 149–390)
PMV BLD AUTO: 10.6 FL (ref 8.9–12.7)
POTASSIUM SERPL-SCNC: 4.1 MMOL/L (ref 3.5–5.3)
PROT SERPL-MCNC: 7.9 G/DL (ref 6.4–8.2)
RBC # BLD AUTO: 6.69 MILLION/UL (ref 3.88–5.62)
SODIUM SERPL-SCNC: 138 MMOL/L (ref 136–145)
WBC # BLD AUTO: 13.02 THOUSAND/UL (ref 4.31–10.16)

## 2021-01-15 PROCEDURE — 85025 COMPLETE CBC W/AUTO DIFF WBC: CPT

## 2021-01-15 PROCEDURE — 80053 COMPREHEN METABOLIC PANEL: CPT

## 2021-01-15 NOTE — PROGRESS NOTES
Patient had arrived to the unit without completing his blood work  There is not lab on Fridays  CBC and CMP was drawn and sent to the hospital  Patient has rescheduled his therapeutic phlebotomy

## 2021-01-20 ENCOUNTER — HOSPITAL ENCOUNTER (OUTPATIENT)
Dept: CT IMAGING | Facility: HOSPITAL | Age: 76
Discharge: HOME | End: 2021-01-20
Payer: COMMERCIAL

## 2021-01-20 DIAGNOSIS — Z72.0 TOBACCO ABUSE: ICD-10-CM

## 2021-01-20 PROCEDURE — 71271 CT THORAX LUNG CANCER SCR C-: CPT

## 2021-01-21 ENCOUNTER — OFFICE VISIT (OUTPATIENT)
Dept: UROLOGY | Facility: CLINIC | Age: 76
End: 2021-01-21
Payer: COMMERCIAL

## 2021-01-21 VITALS
WEIGHT: 180 LBS | SYSTOLIC BLOOD PRESSURE: 116 MMHG | BODY MASS INDEX: 28.93 KG/M2 | HEART RATE: 53 BPM | DIASTOLIC BLOOD PRESSURE: 84 MMHG | HEIGHT: 66 IN

## 2021-01-21 DIAGNOSIS — R97.20 ELEVATED PSA: Primary | ICD-10-CM

## 2021-01-21 PROCEDURE — 99214 OFFICE O/P EST MOD 30 MIN: CPT | Performed by: UROLOGY

## 2021-01-21 NOTE — PROGRESS NOTES
1/21/2021    Pablito Valle  [birth year]  [id number]        Assessment  BPH    Plan  We discussed his recent PSA  This is slightly elevated from previous however remains close to his range  Previous MRI was reviewed at this visit with the patient  We reviewed the images as well  Approximate prostate volume 152 mL  This can certainly explain his elevated baseline PSA  We discussed PSA management and follow-up  Will check another PSA in 6 months to ensure stability  We discussed his urinary symptoms which are quite bothersome to him  We discussed that often I would evaluate with cystoscopy and transrectal ultrasound, however based on the MRI done about 1 year ago, I do not think the studies are necessary  He is not a candidate for uro lift and I would not recommend TURP  In his case I recommend simple prostatectomy  We discussed the robotic/laparoscopic approach as well as the open approach  I tend to favor the open approach which will allow for maximal tissue removal and minimal change in morbidity  After discussion, he is somewhat concerned about this and would like to continue with observation at least in the short-term  He agreed with 6 month follow-up  Will check postvoid residual at that time as well as PSA  History of Present Illness  Alicia Pal is a 77 y o  male with history of significant BPH and elevated PSA  He is status post negative prostate biopsy x2  PSA has hovered around 15  Recent PSA 17 2  He reports significant bother from his urinary symptoms  He has not opted for intervention however  He did not respond to medical management  AUA symptom score 23  AUA SYMPTOM SCORE      Most Recent Value   AUA SYMPTOM SCORE   How often have you had a sensation of not emptying your bladder completely after you finished urinating? 4   How often have you had to urinate again less than two hours after you finished urinating?   4   How often have you found you stopped and started again several times when you urinate? 4   How often have you found it difficult to postpone urination? 2   How often have you had a weak urinary stream?  4   How often have you had to push or strain to begin urination? 2   How many times did you most typically get up to urinate from the time you went to bed at night until the time you got up in the morning? 3   Quality of Life: If you were to spend the rest of your life with your urinary condition just the way it is now, how would you feel about that?  4   AUA SYMPTOM SCORE  23          Review of Systems  Review of Systems   Constitutional: Negative  HENT: Negative  Respiratory: Negative  Cardiovascular: Negative  Gastrointestinal: Negative  Genitourinary:        As per HPI   Musculoskeletal: Negative  Skin: Negative  Neurological: Negative  Hematological: Negative  Past Medical History  Past Medical History:   Diagnosis Date    CAD (coronary artery disease)     Last Assessed:  11/4/13    Elevated prostate specific antigen (PSA)     Last Assessed:  12/21/17    Hyperlipidemia     Last Assessed:  11/24/17    Hypertension     Benign essential, Last Assessed:  11/24/17       Past Social History  Past Surgical History:   Procedure Laterality Date    APPENDECTOMY      MI COLONOSCOPY FLX DX W/COLLJ SPEC WHEN PFRMD N/A 5/22/2017    Procedure: COLONOSCOPY;  Surgeon: Vishal Savage DO;  Location: [facility];   Service: Gastroenterology    PROSTATE BIOPSY         Past Family History  Family History   Problem Relation Age of Onset    No Known Problems Mother        Past Social history  Social History     Socioeconomic History    Marital status: /Civil Union     Spouse name: Not on file    Number of children: Not on file    Years of education: Not on file    Highest education level: Not on file   Occupational History    Not on file   Social Needs    Financial resource strain: Not on file    Food insecurity     Worry: Not on file     Inability: Not on file    Transportation needs     Medical: Not on file     Non-medical: Not on file   Tobacco Use    Smoking status: Current Every Day Smoker     Packs/day: 0 50     Types: Cigars, Cigarettes    Smokeless tobacco: Never Used   Substance and Sexual Activity    Alcohol use: No    Drug use: No    Sexual activity: Not on file   Lifestyle    Physical activity     Days per week: Not on file     Minutes per session: Not on file    Stress: Not on file   Relationships    Social connections     Talks on phone: Not on file     Gets together: Not on file     Attends [employer] service: Not on file     Active member of club or organization: Not on file     Attends meetings of clubs or organizations: Not on file     Relationship status: Not on file    Intimate partner violence     Fear of current or ex partner: Not on file     Emotionally abused: Not on file     Physically abused: Not on file     Forced sexual activity: Not on file   Other Topics Concern    Not on file   Social History Narrative    Always uses seatbelt    Feels safe at home    Uses safety equipment     Social History     Tobacco Use   Smoking Status Current Every Day Smoker    Packs/day: 0 50    Types: Cigars, Cigarettes   Smokeless Tobacco Never Used       Current Medications  Current Outpatient Medications   Medication Sig Dispense Refill    amLODIPine (NORVASC) 10 mg tablet Take 10 mg by mouth daily      aspirin (ECOTRIN LOW STRENGTH) 81 mg EC tablet Take 81 mg by mouth daily      atorvastatin (LIPITOR) 80 mg tablet Take 80 mg by mouth daily      metoprolol tartrate (LOPRESSOR) 50 mg tablet Take 50 mg by mouth 2 (two) times a day       Multiple Vitamin (MULTIVITAMIN) capsule Take 1 capsule by mouth daily      nitroglycerin (NITROSTAT) 0 4 mg SL tablet Place 0 4 mg under the tongue every 5 (five) minutes as needed for chest pain      Omega-3 Fatty Acids (FISH OIL) 1200 MG CAPS Take 1,200 mg by mouth 2 (two) times a day         No current facility-administered medications for this visit  Allergies  No Known Allergies    Past Medical History, Social History, Family History, medications and allergies were reviewed  Vitals  Vitals:    01/21/21 1118   BP: 116/84   BP Location: Left arm   Patient Position: Sitting   Cuff Size: Adult   Pulse: (!) 53   Weight: 81 6 kg (180 lb)   Height: 5' 6" (1 676 m)       Physical Exam  Physical Exam  Vitals signs reviewed  Constitutional:       Appearance: He is well-developed  HENT:      Head: Normocephalic and atraumatic  Eyes:      Conjunctiva/sclera: Conjunctivae normal    Cardiovascular:      Rate and Rhythm: Normal rate  Pulmonary:      Effort: Pulmonary effort is normal    Genitourinary:     Comments: Patient declined examination  Musculoskeletal: Normal range of motion  Skin:     General: Skin is warm and dry  Neurological:      Mental Status: He is alert and oriented to person, place, and time     Psychiatric:         Mood and Affect: Mood normal            Results  Lab Results   Component Value Date    PSA 17 2 (H) 11/27/2020    PSA 15 0 (H) 05/22/2020    PSA 14 0 (H) 10/28/2019     Lab Results   Component Value Date    CALCIUM 8 9 01/15/2021     11/13/2017    K 4 1 01/15/2021    CO2 26 01/15/2021     01/15/2021    BUN 18 01/15/2021    CREATININE 1 25 01/15/2021     Lab Results   Component Value Date    WBC 13 02 (H) 01/15/2021    HGB 14 7 01/15/2021    HCT 51 3 (H) 01/15/2021    MCV 77 (L) 01/15/2021     01/15/2021

## 2021-01-25 ENCOUNTER — DOCTOR'S OFFICE (OUTPATIENT)
Dept: URBAN - METROPOLITAN AREA CLINIC 136 | Facility: CLINIC | Age: 76
Setting detail: OPHTHALMOLOGY
End: 2021-01-25
Payer: COMMERCIAL

## 2021-01-25 DIAGNOSIS — H52.4: ICD-10-CM

## 2021-01-25 PROBLEM — H35.342: Status: ACTIVE | Noted: 2019-09-23

## 2021-01-25 PROBLEM — H43.813 POST VITREOUS DETACHMENT; BOTH EYES: Status: ACTIVE | Noted: 2020-08-31

## 2021-01-25 PROBLEM — H04.121 DRY EYE; RIGHT EYE, LEFT EYE: Status: ACTIVE | Noted: 2021-01-13

## 2021-01-25 PROBLEM — Z96.1: Status: ACTIVE | Noted: 2020-10-14

## 2021-01-25 PROBLEM — H04.122 DRY EYE; RIGHT EYE, LEFT EYE: Status: ACTIVE | Noted: 2021-01-13

## 2021-01-25 PROBLEM — H52.223 ASTIGMATISM, REGULAR; BOTH EYES: Status: ACTIVE | Noted: 2020-01-23

## 2021-01-25 PROCEDURE — 92015 DETERMINE REFRACTIVE STATE: CPT | Performed by: OPTOMETRIST

## 2021-01-25 PROCEDURE — 92012 INTRM OPH EXAM EST PATIENT: CPT | Performed by: OPTOMETRIST

## 2021-01-25 ASSESSMENT — REFRACTION_MANIFEST
OD_ADD: +2.50
OS_VA1: 20/20
OS_CYLINDER: SPH
OD_CYLINDER: SPH
OS_ADD: +2.50
OD_VA1: 20/20
OD_SPHERE: PLANO
OS_SPHERE: PLANO
OU_VA: 20/20

## 2021-01-25 ASSESSMENT — REFRACTION_CURRENTRX
OD_VPRISM_DIRECTION: BF
OS_VPRISM_DIRECTION: BF
OD_OVR_VA: 20/
OS_OVR_VA: 20/
OD_AXIS: 135
OS_AXIS: 100
OD_SPHERE: +1.00
OS_ADD: +2.50
OD_CYLINDER: -0.50
OD_ADD: +2.50
OS_SPHERE: +0.50
OS_CYLINDER: -0.50

## 2021-01-25 ASSESSMENT — REFRACTION_AUTOREFRACTION
OD_SPHERE: PLANO
OS_AXIS: 045
OS_SPHERE: +0.50
OS_CYLINDER: -0.25

## 2021-01-25 ASSESSMENT — VISUAL ACUITY
OD_BCVA: 20/20-2
OS_BCVA: 20/20

## 2021-01-25 ASSESSMENT — SPHEQUIV_DERIVED: OS_SPHEQUIV: 0.375

## 2021-01-27 ENCOUNTER — HOSPITAL ENCOUNTER (OUTPATIENT)
Dept: INFUSION CENTER | Facility: CLINIC | Age: 76
Discharge: HOME | End: 2021-01-27
Payer: COMMERCIAL

## 2021-01-27 ENCOUNTER — TELEPHONE (OUTPATIENT)
Dept: FAMILY MEDICINE CLINIC | Facility: CLINIC | Age: 76
End: 2021-01-27

## 2021-01-27 VITALS
HEART RATE: 54 BPM | SYSTOLIC BLOOD PRESSURE: 133 MMHG | DIASTOLIC BLOOD PRESSURE: 82 MMHG | RESPIRATION RATE: 18 BRPM | TEMPERATURE: 96.6 F

## 2021-01-27 DIAGNOSIS — D45 POLYCYTHEMIA VERA (HCC): Primary | ICD-10-CM

## 2021-01-27 PROCEDURE — 99195 PHLEBOTOMY: CPT

## 2021-01-27 NOTE — PROGRESS NOTES
Patient arrived to the infusion center for therapeutic phlebotomy  Parameters on orders reviewed by 2nd RN, Anurag Mcdonald  Patient tolerated phlebotomy effective for 500 ml of blood from RAC without complications  Pt scheduled to return in 12 weeks   Declined AVS

## 2021-01-27 NOTE — TELEPHONE ENCOUNTER
----- Message from Clement Montana DO sent at 1/26/2021  9:11 PM EST -----  Call pt  His lung CT was stable   Rec: repeat scan in 1 year

## 2021-02-03 ENCOUNTER — TELEMEDICINE (OUTPATIENT)
Dept: FAMILY MEDICINE CLINIC | Facility: CLINIC | Age: 76
End: 2021-02-03
Payer: COMMERCIAL

## 2021-02-03 DIAGNOSIS — Z20.822 EXPOSURE TO COVID-19 VIRUS: Primary | ICD-10-CM

## 2021-02-03 DIAGNOSIS — Z20.822 EXPOSURE TO COVID-19 VIRUS: ICD-10-CM

## 2021-02-03 PROCEDURE — U0003 INFECTIOUS AGENT DETECTION BY NUCLEIC ACID (DNA OR RNA); SEVERE ACUTE RESPIRATORY SYNDROME CORONAVIRUS 2 (SARS-COV-2) (CORONAVIRUS DISEASE [COVID-19]), AMPLIFIED PROBE TECHNIQUE, MAKING USE OF HIGH THROUGHPUT TECHNOLOGIES AS DESCRIBED BY CMS-2020-01-R: HCPCS | Performed by: FAMILY MEDICINE

## 2021-02-03 PROCEDURE — 99441 PR PHYS/QHP TELEPHONE EVALUATION 5-10 MIN: CPT | Performed by: FAMILY MEDICINE

## 2021-02-03 PROCEDURE — U0005 INFEC AGEN DETEC AMPLI PROBE: HCPCS | Performed by: FAMILY MEDICINE

## 2021-02-03 NOTE — PROGRESS NOTES
COVID-19 Virtual Visit     Assessment/Plan:    Problem List Items Addressed This Visit     None      Visit Diagnoses     Exposure to COVID-19 virus    -  Primary    Relevant Orders    Novel Coronavirus (Covid-19),PCR [facility] - Collected at Mobile Vans or [facility] Now         Disposition:     I referred patient to one of our centralized sites for a COVID-19 swab  I have spent 10 minutes directly with the patient  Greater than 50% of this time was spent in counseling/coordination of care regarding: prognosis, risks and benefits of treatment options and instructions for management  Encounter provider Umair Samuel DO    Provider located at [address] [place] [address] [address]  [address] [facility] [address] [place] [address] [phone]    Recent Visits  Date Type Provider Dept   01/27/21 Telephone [facility]   Showing recent visits within past 7 days and meeting all other requirements     Today's Visits  Date Type Provider Dept   02/03/21 Telemedicine DO [facility]   Showing today's visits and meeting all other requirements     Future Appointments  No visits were found meeting these conditions  Showing future appointments within next 150 days and meeting all other requirements        Patient agrees to participate in a virtual check in via telephone or video visit instead of presenting to the office to address urgent/immediate medical needs  Patient is aware this is a billable service  After connecting through Telephone, the patient was identified by name and date of birth  Liv García was informed that this was a telemedicine visit and that the exam was being conducted confidentially over secure lines  My office door was closed  No one else was in the room  Liv García acknowledged consent and understanding of privacy and security of the telemedicine visit   I informed the patient that I have reviewed his record in Epic and presented the opportunity for him to ask any questions regarding the visit today  The patient agreed to participate  It was my intent to perform this visit via video technology but the patient was not able to do a video connection so the visit was completed via audio telephone only  Subjective:   Jasvir Christian is a 77 y o  male who is concerned about COVID-19  Patient's symptoms include fatigue, malaise, nasal congestion and myalgias  Patient denies fever, cough and shortness of breath  Date of symptom onset: 2/1/2021    Exposure:   Contact with a person who is under investigation (PUI) for or who is positive for COVID-19 within the last 14 days?: No    Hospitalized recently for fever and/or lower respiratory symptoms?: No      Currently a healthcare worker that is involved in direct patient care?: No      Works in a special setting where the risk of COVID-19 transmission may be high? (this may include long-term care, correctional and intermediate facilities; homeless shelters; assisted-living facilities and group homes ): No      Resident in a special setting where the risk of COVID-19 transmission may be high? (this may include long-term care, correctional and intermediate facilities; homeless shelters; assisted-living facilities and group homes ): No      No results found for: Karoline Valadez, [address] [place] [address], [address] [place] [address],[address] [address]  Past Medical History:   Diagnosis Date    CAD (coronary artery disease)     Last Assessed:  11/4/13    Elevated prostate specific antigen (PSA)     Last Assessed:  12/21/17    Hyperlipidemia     Last Assessed:  11/24/17    Hypertension     Benign essential, Last Assessed:  11/24/17     Past Surgical History:   Procedure Laterality Date    APPENDECTOMY      WI COLONOSCOPY FLX DX W/COLLJ SPEC WHEN PFRMD N/A 5/22/2017    Procedure: COLONOSCOPY;  Surgeon: Bret Quan DO;  [facility];   Service: Gastroenterology    PROSTATE BIOPSY       Current Outpatient Medications Medication Sig Dispense Refill    amLODIPine (NORVASC) 10 mg tablet Take 10 mg by mouth daily      aspirin (ECOTRIN LOW STRENGTH) 81 mg EC tablet Take 81 mg by mouth daily      atorvastatin (LIPITOR) 80 mg tablet Take 80 mg by mouth daily      metoprolol tartrate (LOPRESSOR) 50 mg tablet Take 50 mg by mouth 2 (two) times a day       Multiple Vitamin (MULTIVITAMIN) capsule Take 1 capsule by mouth daily      nitroglycerin (NITROSTAT) 0 4 mg SL tablet Place 0 4 mg under the tongue every 5 (five) minutes as needed for chest pain      Omega-3 Fatty Acids (FISH OIL) 1200 MG CAPS Take 1,200 mg by mouth 2 (two) times a day         No current facility-administered medications for this visit  No Known Allergies    Review of Systems   Constitutional: Positive for fatigue  Negative for fever  HENT: Positive for congestion  Respiratory: Negative for cough and shortness of breath  Musculoskeletal: Positive for myalgias  Objective: There were no vitals filed for this visit  Physical Exam  VIRTUAL VISIT DISCLAIMER    Thomas Duval acknowledges that he has consented to an online visit or consultation  He understands that the online visit is based solely on information provided by him, and that, in the absence of a face-to-face physical evaluation by the physician, the diagnosis he receives is both limited and provisional in terms of accuracy and completeness  This is not intended to replace a full medical face-to-face evaluation by the physician  Thomas Duval understands and accepts these terms

## 2021-02-04 LAB — SARS-COV-2 RNA RESP QL NAA+PROBE: POSITIVE

## 2021-02-08 ENCOUNTER — TELEMEDICINE (OUTPATIENT)
Dept: FAMILY MEDICINE CLINIC | Facility: CLINIC | Age: 76
End: 2021-02-08
Payer: COMMERCIAL

## 2021-02-08 DIAGNOSIS — U07.1 COVID-19 VIRUS INFECTION: Primary | ICD-10-CM

## 2021-02-08 PROCEDURE — 99213 OFFICE O/P EST LOW 20 MIN: CPT | Performed by: FAMILY MEDICINE

## 2021-02-08 NOTE — PROGRESS NOTES
COVID-19 Virtual Visit     Assessment/Plan:    Problem List Items Addressed This Visit     COVID-19 virus infection - Primary         Disposition:     I recommended continued isolation until at least 24 hours have passed since recovery defined as resolution of fever without the use of fever-reducing medications AND improvement in COVID symptoms AND 10 days have passed since onset of symptoms (or 10 days have passed since date of first positive viral diagnostic test for asymptomatic patients)  Patient began with symptoms on February 1st   COVID positive on February 3rd      Patient complaining of fatigue, malaise, congestion, myalgias  Current temperature 99 4°  Denies any chest pain or shortness of breath  Recommend vitamin-C, vitamin-D, multivitamin, but deep breathing exercises  Due to symptom onset 8 days ago, patient is not a candidate for monoclonal antibody infusion  Call 911 if chest pain or shortness of breath  I have spent 20 minutes directly with the patient  Greater than 50% of this time was spent in counseling/coordination of care regarding: diagnostic results, prognosis, risks and benefits of treatment options, instructions for management, patient and family education, importance of treatment compliance, risk factor reductions and impressions  Encounter provider Clark Carroll DO    Provider located at [address] [facility] [address] [facility] [address] [phone]    Recent Visits  Date Type Provider Dept   02/03/21 Telemedicine Alix Elliott DO [facility]   Showing recent visits within past 7 days and meeting all other requirements     Future Appointments  No visits were found meeting these conditions  Showing future appointments within next 150 days and meeting all other requirements      This virtual check-in was done via [email or web address] and patient was informed that this is a secure, HIPAA-compliant platform   He agrees to proceed  Patient agrees to participate in a virtual check in via telephone or video visit instead of presenting to the office to address urgent/immediate medical needs  Patient is aware this is a billable service  After connecting through [facility], the patient was identified by name and date of birth  Rhina Wise was informed that this was a telemedicine visit and that the exam was being conducted confidentially over secure lines  My office door was closed  No one else was in the room  Rhina Wise acknowledged consent and understanding of privacy and security of the telemedicine visit  I informed the patient that I have reviewed his record in Epic and presented the opportunity for him to ask any questions regarding the visit today  The patient agreed to participate  Subjective:   Rhina Wise is a 77 y o  male who has been screened for COVID-19  Symptom change since last report: unchanged  Patient's symptoms include nasal congestion, cough and myalgias  Patient denies fever, shortness of breath and chest tightness  Darlene Kerr has been staying home and has isolated themselves in his home  He is taking care to not share personal items and is cleaning all surfaces that are touched often, like counters, tabletops, and doorknobs using household cleaning sprays or wipes  He is wearing a mask when he leaves his room       Date of symptom onset: 2/1/2021  Date of positive COVID-19 PCR: 2/3/2021    Lab Results   Component Value Date    SARSCOV2 Positive (A) 02/03/2021     Past Medical History:   Diagnosis Date    CAD (coronary artery disease)     Last Assessed:  11/4/13    Elevated prostate specific antigen (PSA)     Last Assessed:  12/21/17    Hyperlipidemia     Last Assessed:  11/24/17    Hypertension     Benign essential, Last Assessed:  11/24/17     Past Surgical History:   Procedure Laterality Date    APPENDECTOMY      TX COLONOSCOPY FLX DX W/COLLJ SPEC WHEN PFRMD N/A 5/22/2017    Procedure: COLONOSCOPY; Surgeon: Herbert Joiner DO;  [facility]; Service: Gastroenterology    PROSTATE BIOPSY       Current Outpatient Medications   Medication Sig Dispense Refill    amLODIPine (NORVASC) 10 mg tablet Take 10 mg by mouth daily      aspirin (ECOTRIN LOW STRENGTH) 81 mg EC tablet Take 81 mg by mouth daily      atorvastatin (LIPITOR) 80 mg tablet Take 80 mg by mouth daily      metoprolol tartrate (LOPRESSOR) 50 mg tablet Take 50 mg by mouth 2 (two) times a day       Multiple Vitamin (MULTIVITAMIN) capsule Take 1 capsule by mouth daily      nitroglycerin (NITROSTAT) 0 4 mg SL tablet Place 0 4 mg under the tongue every 5 (five) minutes as needed for chest pain      Omega-3 Fatty Acids (FISH OIL) 1200 MG CAPS Take 1,200 mg by mouth 2 (two) times a day         No current facility-administered medications for this visit  No Known Allergies    Review of Systems   Constitutional: Negative for fever  HENT: Positive for congestion  Respiratory: Positive for cough  Negative for chest tightness and shortness of breath  Musculoskeletal: Positive for myalgias  Objective: There were no vitals filed for this visit  Physical Exam  VIRTUAL VISIT DISCLAIMER  It was my intent to perform this visit via video technology but the patient was not able to do a video connection so the visit was completed via audio telephone only  Shane Sanders acknowledges that he has consented to an online visit or consultation  He understands that the online visit is based solely on information provided by him, and that, in the absence of a face-to-face physical evaluation by the physician, the diagnosis he receives is both limited and provisional in terms of accuracy and completeness  This is not intended to replace a full medical face-to-face evaluation by the physician  Shane Sanders understands and accepts these terms

## 2021-02-10 ENCOUNTER — TELEPHONE (OUTPATIENT)
Dept: FAMILY MEDICINE CLINIC | Facility: CLINIC | Age: 76
End: 2021-02-10

## 2021-02-10 NOTE — TELEPHONE ENCOUNTER
Phone call from wife concerned because Santana's oxygen level is running between 88-91  He denies shortness of breath or trouble breathing, but is weak and lightheaded  Please advise

## 2021-02-11 ENCOUNTER — APPOINTMENT (EMERGENCY)
Dept: RADIOLOGY | Facility: HOSPITAL | Age: 76
DRG: 179 | End: 2021-02-11
Payer: COMMERCIAL

## 2021-02-11 ENCOUNTER — HOSPITAL ENCOUNTER (INPATIENT)
Facility: HOSPITAL | Age: 76
LOS: 2 days | Discharge: HOME HEALTH | DRG: 179 | End: 2021-02-13
Attending: EMERGENCY MEDICINE | Admitting: INTERNAL MEDICINE
Payer: COMMERCIAL

## 2021-02-11 DIAGNOSIS — R09.02 HYPOXIA: ICD-10-CM

## 2021-02-11 DIAGNOSIS — Z86.79 HISTORY OF CORONARY ARTERY DISEASE: ICD-10-CM

## 2021-02-11 DIAGNOSIS — U07.1 COVID-19 VIRUS INFECTION: Primary | ICD-10-CM

## 2021-02-11 DIAGNOSIS — Z86.79 HISTORY OF HYPERTENSION: ICD-10-CM

## 2021-02-11 LAB
ABO GROUP BLD: NORMAL
ALBUMIN SERPL BCP-MCNC: 2.6 G/DL (ref 3.5–5)
ALP SERPL-CCNC: 108 U/L (ref 46–116)
ALT SERPL W P-5'-P-CCNC: 48 U/L (ref 12–78)
ANION GAP SERPL CALCULATED.3IONS-SCNC: 7 MMOL/L (ref 4–13)
AST SERPL W P-5'-P-CCNC: 52 U/L (ref 5–45)
ATRIAL RATE: 53 BPM
BASOPHILS # BLD AUTO: 0.02 THOUSANDS/ΜL (ref 0–0.1)
BASOPHILS NFR BLD AUTO: 0 % (ref 0–1)
BILIRUB SERPL-MCNC: 0.82 MG/DL (ref 0.2–1)
BUN SERPL-MCNC: 26 MG/DL (ref 5–25)
CALCIUM ALBUM COR SERPL-MCNC: 9.5 MG/DL (ref 8.3–10.1)
CALCIUM SERPL-MCNC: 8.4 MG/DL (ref 8.3–10.1)
CHLORIDE SERPL-SCNC: 103 MMOL/L (ref 100–108)
CK SERPL-CCNC: 34 U/L (ref 39–308)
CO2 SERPL-SCNC: 26 MMOL/L (ref 21–32)
CREAT SERPL-MCNC: 1.28 MG/DL (ref 0.6–1.3)
CRP SERPL QL: 59.1 MG/L
D DIMER PPP FEU-MCNC: 1.05 UG/ML FEU
EOSINOPHIL # BLD AUTO: 0.17 THOUSAND/ΜL (ref 0–0.61)
EOSINOPHIL NFR BLD AUTO: 2 % (ref 0–6)
ERYTHROCYTE [DISTWIDTH] IN BLOOD BY AUTOMATED COUNT: 23.4 % (ref 11.6–15.1)
FERRITIN SERPL-MCNC: 203 NG/ML (ref 8–388)
GFR SERPL CREATININE-BSD FRML MDRD: 55 ML/MIN/1.73SQ M
GLUCOSE SERPL-MCNC: 104 MG/DL (ref 65–140)
HCT VFR BLD AUTO: 47.1 % (ref 36.5–49.3)
HGB BLD-MCNC: 14.1 G/DL (ref 12–17)
IMM GRANULOCYTES # BLD AUTO: 0.12 THOUSAND/UL (ref 0–0.2)
IMM GRANULOCYTES NFR BLD AUTO: 1 % (ref 0–2)
LYMPHOCYTES # BLD AUTO: 1.12 THOUSANDS/ΜL (ref 0.6–4.47)
LYMPHOCYTES NFR BLD AUTO: 13 % (ref 14–44)
MCH RBC QN AUTO: 22.6 PG (ref 26.8–34.3)
MCHC RBC AUTO-ENTMCNC: 29.9 G/DL (ref 31.4–37.4)
MCV RBC AUTO: 75 FL (ref 82–98)
MONOCYTES # BLD AUTO: 0.69 THOUSAND/ΜL (ref 0.17–1.22)
MONOCYTES NFR BLD AUTO: 8 % (ref 4–12)
NEUTROPHILS # BLD AUTO: 6.72 THOUSANDS/ΜL (ref 1.85–7.62)
NEUTS SEG NFR BLD AUTO: 76 % (ref 43–75)
NRBC BLD AUTO-RTO: 0 /100 WBCS
NT-PROBNP SERPL-MCNC: 155 PG/ML
P AXIS: 76 DEGREES
PLATELET # BLD AUTO: 254 THOUSANDS/UL (ref 149–390)
PMV BLD AUTO: 9.8 FL (ref 8.9–12.7)
POTASSIUM SERPL-SCNC: 3.9 MMOL/L (ref 3.5–5.3)
PR INTERVAL: 140 MS
PROCALCITONIN SERPL-MCNC: 0.06 NG/ML
PROT SERPL-MCNC: 6.8 G/DL (ref 6.4–8.2)
QRS AXIS: -29 DEGREES
QRSD INTERVAL: 150 MS
QT INTERVAL: 474 MS
QTC INTERVAL: 444 MS
RBC # BLD AUTO: 6.25 MILLION/UL (ref 3.88–5.62)
RH BLD: POSITIVE
SODIUM SERPL-SCNC: 136 MMOL/L (ref 136–145)
T WAVE AXIS: 92 DEGREES
TROPONIN I SERPL-MCNC: <0.02 NG/ML
VENTRICULAR RATE: 53 BPM
WBC # BLD AUTO: 8.84 THOUSAND/UL (ref 4.31–10.16)

## 2021-02-11 PROCEDURE — 84145 PROCALCITONIN (PCT): CPT | Performed by: EMERGENCY MEDICINE

## 2021-02-11 PROCEDURE — 93005 ELECTROCARDIOGRAM TRACING: CPT

## 2021-02-11 PROCEDURE — 84484 ASSAY OF TROPONIN QUANT: CPT | Performed by: EMERGENCY MEDICINE

## 2021-02-11 PROCEDURE — 86901 BLOOD TYPING SEROLOGIC RH(D): CPT | Performed by: EMERGENCY MEDICINE

## 2021-02-11 PROCEDURE — 99223 1ST HOSP IP/OBS HIGH 75: CPT | Performed by: INTERNAL MEDICINE

## 2021-02-11 PROCEDURE — 82728 ASSAY OF FERRITIN: CPT | Performed by: EMERGENCY MEDICINE

## 2021-02-11 PROCEDURE — 83880 ASSAY OF NATRIURETIC PEPTIDE: CPT | Performed by: EMERGENCY MEDICINE

## 2021-02-11 PROCEDURE — 36415 COLL VENOUS BLD VENIPUNCTURE: CPT | Performed by: EMERGENCY MEDICINE

## 2021-02-11 PROCEDURE — 86900 BLOOD TYPING SEROLOGIC ABO: CPT | Performed by: EMERGENCY MEDICINE

## 2021-02-11 PROCEDURE — 85025 COMPLETE CBC W/AUTO DIFF WBC: CPT | Performed by: EMERGENCY MEDICINE

## 2021-02-11 PROCEDURE — 82550 ASSAY OF CK (CPK): CPT | Performed by: EMERGENCY MEDICINE

## 2021-02-11 PROCEDURE — 99285 EMERGENCY DEPT VISIT HI MDM: CPT

## 2021-02-11 PROCEDURE — 80053 COMPREHEN METABOLIC PANEL: CPT | Performed by: EMERGENCY MEDICINE

## 2021-02-11 PROCEDURE — XW033E5 INTRODUCTION OF REMDESIVIR ANTI-INFECTIVE INTO PERIPHERAL VEIN, PERCUTANEOUS APPROACH, NEW TECHNOLOGY GROUP 5: ICD-10-PCS | Performed by: INTERNAL MEDICINE

## 2021-02-11 PROCEDURE — 93010 ELECTROCARDIOGRAM REPORT: CPT | Performed by: INTERNAL MEDICINE

## 2021-02-11 PROCEDURE — 85379 FIBRIN DEGRADATION QUANT: CPT | Performed by: EMERGENCY MEDICINE

## 2021-02-11 PROCEDURE — 86140 C-REACTIVE PROTEIN: CPT | Performed by: EMERGENCY MEDICINE

## 2021-02-11 PROCEDURE — 99285 EMERGENCY DEPT VISIT HI MDM: CPT | Performed by: EMERGENCY MEDICINE

## 2021-02-11 PROCEDURE — 71045 X-RAY EXAM CHEST 1 VIEW: CPT

## 2021-02-11 RX ORDER — ATORVASTATIN CALCIUM 80 MG/1
80 TABLET, FILM COATED ORAL EVERY EVENING
Status: DISCONTINUED | OUTPATIENT
Start: 2021-02-11 | End: 2021-02-13 | Stop reason: HOSPADM

## 2021-02-11 RX ORDER — FAMOTIDINE 20 MG/1
20 TABLET, FILM COATED ORAL DAILY
Status: DISCONTINUED | OUTPATIENT
Start: 2021-02-11 | End: 2021-02-13 | Stop reason: HOSPADM

## 2021-02-11 RX ORDER — ASPIRIN 81 MG/1
81 TABLET ORAL DAILY
Status: DISCONTINUED | OUTPATIENT
Start: 2021-02-11 | End: 2021-02-13 | Stop reason: HOSPADM

## 2021-02-11 RX ORDER — ZINC SULFATE 50(220)MG
220 CAPSULE ORAL DAILY
Status: DISCONTINUED | OUTPATIENT
Start: 2021-02-11 | End: 2021-02-13 | Stop reason: HOSPADM

## 2021-02-11 RX ORDER — MELATONIN
2000 DAILY
Status: DISCONTINUED | OUTPATIENT
Start: 2021-02-11 | End: 2021-02-13 | Stop reason: HOSPADM

## 2021-02-11 RX ORDER — METOPROLOL TARTRATE 50 MG/1
50 TABLET, FILM COATED ORAL 2 TIMES DAILY
Status: DISCONTINUED | OUTPATIENT
Start: 2021-02-11 | End: 2021-02-13 | Stop reason: HOSPADM

## 2021-02-11 RX ORDER — MULTIVITAMIN/IRON/FOLIC ACID 18MG-0.4MG
1 TABLET ORAL DAILY
Status: DISCONTINUED | OUTPATIENT
Start: 2021-02-18 | End: 2021-02-13 | Stop reason: HOSPADM

## 2021-02-11 RX ORDER — ASCORBIC ACID 500 MG
1000 TABLET ORAL EVERY 12 HOURS SCHEDULED
Status: DISCONTINUED | OUTPATIENT
Start: 2021-02-11 | End: 2021-02-13 | Stop reason: HOSPADM

## 2021-02-11 RX ORDER — AMLODIPINE BESYLATE 10 MG/1
10 TABLET ORAL DAILY
Status: DISCONTINUED | OUTPATIENT
Start: 2021-02-11 | End: 2021-02-13 | Stop reason: HOSPADM

## 2021-02-11 RX ORDER — ACETAMINOPHEN 325 MG/1
650 TABLET ORAL ONCE
Status: COMPLETED | OUTPATIENT
Start: 2021-02-11 | End: 2021-02-11

## 2021-02-11 RX ORDER — CHOLECALCIFEROL (VITAMIN D3) 125 MCG
500 CAPSULE ORAL EVERY EVENING
COMMUNITY

## 2021-02-11 RX ADMIN — METOPROLOL TARTRATE 50 MG: 50 TABLET, FILM COATED ORAL at 17:07

## 2021-02-11 RX ADMIN — ACETAMINOPHEN 650 MG: 325 TABLET ORAL at 10:50

## 2021-02-11 RX ADMIN — ENOXAPARIN SODIUM 30 MG: 30 INJECTION SUBCUTANEOUS at 20:03

## 2021-02-11 RX ADMIN — OXYCODONE HYDROCHLORIDE AND ACETAMINOPHEN 1000 MG: 500 TABLET ORAL at 20:03

## 2021-02-11 RX ADMIN — REMDESIVIR 200 MG: 100 INJECTION, POWDER, LYOPHILIZED, FOR SOLUTION INTRAVENOUS at 16:25

## 2021-02-11 RX ADMIN — FAMOTIDINE 20 MG: 20 TABLET, FILM COATED ORAL at 17:07

## 2021-02-11 RX ADMIN — ATORVASTATIN CALCIUM 80 MG: 80 TABLET, FILM COATED ORAL at 17:07

## 2021-02-11 RX ADMIN — ZINC SULFATE 220 MG (50 MG) CAPSULE 220 MG: CAPSULE at 17:07

## 2021-02-11 NOTE — H&P
H&P- Carmen Nathan [birth year], 77 y o  male MRN: [id number]    Unit/Bed#: [facility] [address] [place] [address] [id number] Encounter: [id number]    Primary Care Provider: Clement Montana DO   Date and time admitted to hospital: 2/11/2021  9:57 AM        COVID-19 virus infection  Assessment & Plan  ·   Symptomatic with high risk comorbidities including PCV and smoking  ·  he has no hypoxia or  Supplemental oxygen requirement  ·  will start COVID treatment protocol, mild criteria  ·  start remdesivir, vitamin-C,  Zinc and famotidine  ·  continue vitamin-D and high-dose Lipitor  ·  trend inflammatory markers  ·  consult PT due to severe fatigue and weakness    Tobacco abuse  Assessment & Plan  Apply nicotine patch 7 mg daily    Polycythemia vera ([facility] [address] )  Assessment & Plan  S/P recent therapeutic phlebotomy 1/27/21  H/H acceptable  OP phlebotomy as scheduled every 12 weeks    Coronary artery disease  Assessment & Plan    With history of PCI   Continue aspirin, Lipitor and metoprolol    Benign essential hypertension  Assessment & Plan  ·  Continue metoprolol and amlodipine with hold parameters  VTE Prophylaxis: Enoxaparin (Lovenox)     Code Status:  Level 3, discussed with patient  POLST: There is no POLST form on file for this patient (pre-hospital)    Anticipated Length of Stay:  Patient will be admitted on an Inpatient basis with an anticipated length of stay of   At least 2 midnights  Justification for Hospital Stay:  COVID infection    Total Time for Visit, including Counseling / Coordination of Care: 45 minutes  Greater than 50% of this total time spent on direct patient counseling and coordination of care  Chief Complaint:    weak    History of Present Illness:    Carmen Nathan is a 77 y o  male who presents with  Generalized weakness, poor appetite, easy fatigability, and aching all over  He 1st have symptoms of COVID on February 1, 2021  According to his telemedicine visit, he had fatigue, malaise, nasal congestion and myalgias    He was sent for a COVID test that day and this came back positive  He was prescribed vitamin-C, vitamin-D and multivitamins together with self isolation  EMS was alerted to his house due to worsening weakness and diffuse body aches  He off he also  Had occasional cough an on and off subjective fever  He reportedly had an episode of hypoxia yesterday  When EMS arrived he was saturating 89% on room air  While in the emergency department all his levels  In the 96-97% range on room air  Due to his age,comorbidities and ongoing symptoms with unsuccessful outpatient treatmenthe was referred to Internal Medicine for further  Inpatient treatment      Review of Systems:    Review of Systems   Constitutional: Positive for activity change, appetite change and fever  HENT: Positive for congestion  Eyes: Negative  Respiratory: Positive for shortness of breath  Cardiovascular: Negative  Gastrointestinal: Negative  Genitourinary: Negative  Musculoskeletal: Positive for myalgias  Neurological: Positive for weakness  Psychiatric/Behavioral: Negative  All other systems reviewed and are negative  Past Medical and Surgical History:     Past Medical History:   Diagnosis Date    CAD (coronary artery disease)     Last Assessed:  11/4/13    Elevated prostate specific antigen (PSA)     Last Assessed:  12/21/17    Hyperlipidemia     Last Assessed:  11/24/17    Hypertension     Benign essential, Last Assessed:  11/24/17       Past Surgical History:   Procedure Laterality Date    APPENDECTOMY      DE COLONOSCOPY FLX DX W/COLLJ SPEC WHEN PFRMD N/A 5/22/2017    Procedure: COLONOSCOPY;  Surgeon: Rayne Jordan DO;  [facility]; Service: Gastroenterology    PROSTATE BIOPSY         Meds/Allergies:    Prior to Admission medications    Medication Sig Start Date End Date Taking?  Authorizing Provider   amLODIPine (NORVASC) 10 mg tablet Take 10 mg by mouth daily   Yes Historical Provider, MD aspirin (ECOTRIN LOW STRENGTH) 81 mg EC tablet Take 81 mg by mouth daily   Yes Historical Provider, MD   Cholecalciferol (Vitamin D3) 50 MCG (2000 UT) TABS Take 2,000 Units by mouth daily   Yes Historical Provider, MD   metoprolol tartrate (LOPRESSOR) 50 mg tablet Take 50 mg by mouth 2 (two) times a day    Yes Historical Provider, MD   Multiple Vitamin (MULTIVITAMIN) capsule Take 1 capsule by mouth daily   Yes Historical Provider, MD   Omega-3 Fatty Acids (FISH OIL) 1200 MG CAPS Take 1,200 mg by mouth 2 (two) times a day     Yes Historical Provider, MD   atorvastatin (LIPITOR) 80 mg tablet Take 80 mg by mouth daily    Historical Provider, MD   nitroglycerin (NITROSTAT) 0 4 mg SL tablet Place 0 4 mg under the tongue every 5 (five) minutes as needed for chest pain    Historical Provider, MD     I have reviewed home medications with a medical source (PCP, Pharmacy, other)      Allergies: No Known Allergies    Social History:     Marital Status: /Civil Union   Occupation: retired  Patient Pre-hospital Living Situation: lives with wife  Patient Pre-hospital Level of Mobility: independent  Patient Pre-hospital Diet Restrictions: nnoe  Substance Use History:   Social History     Substance and Sexual Activity   Alcohol Use No     Social History     Tobacco Use   Smoking Status Current Every Day Smoker    Packs/day: 0 50    Types: Cigars, Cigarettes   Smokeless Tobacco Never Used     Social History     Substance and Sexual Activity   Drug Use No       Family History:    Family History   Problem Relation Age of Onset    No Known Problems Mother     Heart disease Father        Physical Exam:     Vitals:   Blood Pressure: 122/63 (02/11/21 1351)  Pulse: (!) 51 (02/11/21 1351)  Temperature: 97 6 °F (36 4 °C) (02/11/21 1005)  Temp Source: Oral (02/11/21 1005)  Respirations: 18 (02/11/21 1351)  Height: 6' (182 9 cm) (02/11/21 1451)  Weight - Scale: 80 1 kg (176 lb 9 4 oz) (02/11/21 1005)  SpO2: 97 % (02/11/21 1422)    Physical Exam  Constitutional:       Appearance: He is not ill-appearing or diaphoretic  HENT:      Head: Normocephalic and atraumatic  Nose: No rhinorrhea  Eyes:      General: No scleral icterus  Neck:      Musculoskeletal: Neck supple  Cardiovascular:      Rate and Rhythm: Regular rhythm  Heart sounds: No murmur  No gallop  Pulmonary:      Effort: Pulmonary effort is normal  No respiratory distress  Breath sounds: No wheezing or rales  Abdominal:      General: Abdomen is flat  Palpations: Abdomen is soft  Musculoskeletal:      Right lower leg: No edema  Skin:     General: Skin is warm  Neurological:      Mental Status: He is alert and oriented to person, place, and time  Psychiatric:         Mood and Affect: Mood normal          Behavior: Behavior normal      Additional Data:     Lab Results: I have personally reviewed pertinent reports  Results from last 7 days   Lab Units 02/11/21  1050   WBC Thousand/uL 8 84   HEMOGLOBIN g/dL 14 1   HEMATOCRIT % 47 1   PLATELETS Thousands/uL 254   NEUTROS PCT % 76*   LYMPHS PCT % 13*   MONOS PCT % 8   EOS PCT % 2     Results from last 7 days   Lab Units 02/11/21  1050   SODIUM mmol/L 136   POTASSIUM mmol/L 3 9   CHLORIDE mmol/L 103   CO2 mmol/L 26   BUN mg/dL 26*   CREATININE mg/dL 1 28   ANION GAP mmol/L 7   CALCIUM mg/dL 8 4   ALBUMIN g/dL 2 6*   TOTAL BILIRUBIN mg/dL 0 82   ALK PHOS U/L 108   ALT U/L 48   AST U/L 52*   GLUCOSE RANDOM mg/dL 104                       Imaging: I have personally reviewed pertinent reports  and I have personally reviewed pertinent films in PACS    XR chest 1 view portable   Final Result by Tiarra Espinoza MD (02/11 7948)      Slight interstitial prominence in the lower lung fields    No significant segmental or lobar consolidation or groundglass opacity appreciated                  Workstation performed: [id number]             EKG, Pathology, and Other Studies Reviewed on Admission: · EKG: sinus bradycardia, LBBB    Allscripts / Epic Records Reviewed: Yes     ** Please Note: This note has been constructed using a voice recognition system   **

## 2021-02-11 NOTE — ED PROVIDER NOTES
History  Chief Complaint   Patient presents with    Shortness of Breath     Covid + 10 days ago c/o worsening SOB and low home SpO2 readings in the 80's on RA; EMS report 89% on RA and improved to 98% on 2L/min of O2 via NC; Pt also c/o generalized body aches     77 y o  M w/h/o CAD, HTN, HLD, tobacco abuse p/w hypoxia  Pt is COVID + (as of 2/3/21)  Pt's wife called his PCP yesterday morning reporting pt was hypoxic in the 80s and was instructed to go to the ER  Pt was at 89% RA for EMS and placed on 2L NC with improvement to 98%  Pt reports he feels very "run down," having generalized weakness and diffuse body aches with a productive cough and "on and off" fevers  SOB is worse with exertion  Denies CP, abd pain, N/V/D  History provided by:  Patient   used: No    Shortness of Breath  Timing:  Constant  Progression:  Worsening  Chronicity:  New  Worsened by:  Exertion  Associated symptoms: cough, fever and sputum production    Associated symptoms: no abdominal pain, no chest pain, no diaphoresis, no headaches, no rash, no sore throat and no vomiting        Prior to Admission Medications   Prescriptions Last Dose Informant Patient Reported? Taking?    Cholecalciferol (Vitamin D3) 50 MCG (2000 UT) TABS 2/11/2021 at Unknown time  Yes Yes   Sig: Take 2,000 Units by mouth daily   Multiple Vitamin (MULTIVITAMIN) capsule 2/11/2021 at Unknown time Self Yes Yes   Sig: Take 1 capsule by mouth daily   Omega-3 Fatty Acids (FISH OIL) 1200 MG CAPS 2/11/2021 at Unknown time Self Yes Yes   Sig: Take 1,200 mg by mouth 2 (two) times a day     amLODIPine (NORVASC) 10 mg tablet 2/11/2021 at Unknown time Self Yes Yes   Sig: Take 10 mg by mouth daily   aspirin (ECOTRIN LOW STRENGTH) 81 mg EC tablet 2/11/2021 at Unknown time Self Yes Yes   Sig: Take 81 mg by mouth daily   atorvastatin (LIPITOR) 80 mg tablet  Self Yes No   Sig: Take 80 mg by mouth daily   metoprolol tartrate (LOPRESSOR) 50 mg tablet 2/11/2021 at Unknown time Self Yes Yes   Sig: Take 50 mg by mouth 2 (two) times a day    nitroglycerin (NITROSTAT) 0 4 mg SL tablet Unknown at Unknown time Self Yes No   Sig: Place 0 4 mg under the tongue every 5 (five) minutes as needed for chest pain      Facility-Administered Medications: None       Past Medical History:   Diagnosis Date    CAD (coronary artery disease)     Last Assessed:  11/4/13    Elevated prostate specific antigen (PSA)     Last Assessed:  12/21/17    Hyperlipidemia     Last Assessed:  11/24/17    Hypertension     Benign essential, Last Assessed:  11/24/17       Past Surgical History:   Procedure Laterality Date    APPENDECTOMY      NH COLONOSCOPY FLX DX W/COLLJ SPEC WHEN PFRMD N/A 5/22/2017    Procedure: COLONOSCOPY;  Surgeon: Maira Talley DO;  Location: [facility]; Service: Gastroenterology    PROSTATE BIOPSY         Family History   Problem Relation Age of Onset    No Known Problems Mother     Heart disease Father      I have reviewed and agree with the history as documented  E-Cigarette/Vaping    E-Cigarette Use Never User      E-Cigarette/Vaping Substances     Social History     Tobacco Use    Smoking status: Current Every Day Smoker     Packs/day: 0 50     Types: Cigars, Cigarettes    Smokeless tobacco: Never Used   Substance Use Topics    Alcohol use: No    Drug use: No       Review of Systems   Constitutional: Positive for fatigue and fever  Negative for chills and diaphoresis  HENT: Negative for rhinorrhea, sinus pressure and sore throat  Respiratory: Positive for cough, sputum production and shortness of breath  Cardiovascular: Negative for chest pain  Gastrointestinal: Negative for abdominal pain, diarrhea, nausea and vomiting  Musculoskeletal: Positive for myalgias  Skin: Negative for rash  Neurological: Negative for headaches  All other systems reviewed and are negative  Physical Exam  Physical Exam  Vitals signs and nursing note reviewed  Constitutional:       General: He is not in acute distress  Appearance: He is well-developed  He is not ill-appearing, toxic-appearing or diaphoretic  HENT:      Head: Normocephalic and atraumatic  Eyes:      Pupils: Pupils are equal, round, and reactive to light  Neck:      Musculoskeletal: Normal range of motion and neck supple  Vascular: No JVD  Cardiovascular:      Rate and Rhythm: Regular rhythm  Bradycardia present  Pulses: Normal pulses  Heart sounds: Normal heart sounds, S1 normal and S2 normal  No murmur  No friction rub  Pulmonary:      Effort: Pulmonary effort is normal  No respiratory distress  Breath sounds: Normal breath sounds  No wheezing, rhonchi or rales  Chest:      Chest wall: No tenderness  Abdominal:      General: There is no distension  Palpations: Abdomen is soft  Tenderness: There is no abdominal tenderness  There is no guarding or rebound  Musculoskeletal:      Right lower leg: He exhibits no tenderness  No edema  Left lower leg: He exhibits no tenderness  No edema  Skin:     General: Skin is warm and dry  Coloration: Skin is not pale  Findings: No rash  Neurological:      Mental Status: He is alert and oriented to person, place, and time  Cranial Nerves: No cranial nerve deficit  Sensory: No sensory deficit           Vital Signs  ED Triage Vitals   Temperature Pulse Respirations Blood Pressure SpO2   02/11/21 1005 02/11/21 1005 02/11/21 1005 02/11/21 1005 02/11/21 1005   97 6 °F (36 4 °C) 56 18 124/72 97 %      Temp Source Heart Rate Source Patient Position - Orthostatic VS BP Location FiO2 (%)   02/11/21 1005 02/11/21 1005 02/11/21 1148 02/11/21 1148 --   Oral Monitor Lying Right arm       Pain Score       02/11/21 1005       No Pain           Vitals:    02/11/21 1005 02/11/21 1148 02/11/21 1351   BP: 124/72 108/58 122/63   Pulse: 56 (!) 49 (!) 51   Patient Position - Orthostatic VS:  Lying Lying Visual Acuity  Visual Acuity      Most Recent Value   L Pupil Size (mm)  3   R Pupil Size (mm)  3          ED Medications  Medications   amLODIPine (NORVASC) tablet 10 mg (has no administration in time range)   aspirin (ECOTRIN LOW STRENGTH) EC tablet 81 mg (has no administration in time range)   atorvastatin (LIPITOR) tablet 80 mg (has no administration in time range)   metoprolol tartrate (LOPRESSOR) tablet 50 mg (has no administration in time range)   nicotine (NICODERM CQ) 7 mg/24hr TD 24 hr patch 1 patch (has no administration in time range)   cholecalciferol (VITAMIN D3) tablet 2,000 Units (has no administration in time range)   ascorbic acid (VITAMIN C) tablet 1,000 mg (has no administration in time range)   zinc sulfate (ZINCATE) capsule 220 mg (has no administration in time range)     Followed by   multivitamin-minerals (CENTRUM ADULTS) tablet 1 tablet (has no administration in time range)   famotidine (PEPCID) tablet 20 mg (has no administration in time range)   enoxaparin (LOVENOX) subcutaneous injection 30 mg (has no administration in time range)   remdesivir (Veklury) 200 mg in sodium chloride 0 9 % 250 mL IVPB (has no administration in time range)     Followed by   remdesivir Gerard Asp) 100 mg in sodium chloride 0 9 % 250 mL IVPB (has no administration in time range)   acetaminophen (TYLENOL) tablet 650 mg (650 mg Oral Given 2/11/21 1050)       Diagnostic Studies  Results Reviewed     Procedure Component Value Units Date/Time    D-dimer, quantitative [[id number]]  (Abnormal) Collected: 02/11/21 1050    Lab Status: Final result Specimen: Blood from Arm, Left Updated: 02/11/21 1233     D-Dimer, Quant 1 05 ug/ml FEU     NT-BNP PRO [[id number]]  (Abnormal) Collected: 02/11/21 1050    Lab Status: Final result Specimen: Blood from Arm, Left Updated: 02/11/21 1233     NT-proBNP 155 pg/mL     C-reactive protein [[id number]]  (Abnormal) Collected: 02/11/21 1050    Lab Status: Final result Specimen: Blood from Arm, Left Updated: 02/11/21 1146     CRP 59 1 mg/L     CK (with reflex to MB) [[id number]]  (Abnormal) Collected: 02/11/21 1050    Lab Status: Final result Specimen: Blood from Arm, Left Updated: 02/11/21 1144     Total CK 34 U/L     Troponin I [[id number]]  (Normal) Collected: 02/11/21 1050    Lab Status: Final result Specimen: Blood from Arm, Left Updated: 02/11/21 1123     Troponin I <0 02 ng/mL     Comprehensive metabolic panel [[id number]]  (Abnormal) Collected: 02/11/21 1050    Lab Status: Final result Specimen: Blood from Arm, Left Updated: 02/11/21 1115     Sodium 136 mmol/L      Potassium 3 9 mmol/L      Chloride 103 mmol/L      CO2 26 mmol/L      ANION GAP 7 mmol/L      BUN 26 mg/dL      Creatinine 1 28 mg/dL      Glucose 104 mg/dL      Calcium 8 4 mg/dL      Corrected Calcium 9 5 mg/dL      AST 52 U/L      ALT 48 U/L      Alkaline Phosphatase 108 U/L      Total Protein 6 8 g/dL      Albumin 2 6 g/dL      Total Bilirubin 0 82 mg/dL      eGFR 55 ml/min/1 73sq m     Narrative:      [place] guidelines for Chronic Kidney Disease (CKD):     Stage 1 with normal or high GFR (GFR > 90 mL/min/1 73 square meters)    Stage 2 Mild CKD (GFR = 60-89 mL/min/1 73 square meters)    Stage 3A Moderate CKD (GFR = 45-59 mL/min/1 73 square meters)    Stage 3B Moderate CKD (GFR = 30-44 mL/min/1 73 square meters)    Stage 4 Severe CKD (GFR = 15-29 mL/min/1 73 square meters)    Stage 5 End Stage CKD (GFR <15 mL/min/1 73 square meters)  Note: GFR calculation is accurate only with a steady state creatinine    CBC and differential [[id number]]  (Abnormal) Collected: 02/11/21 1050    Lab Status: Final result Specimen: Blood from Arm, Left Updated: 02/11/21 1057     WBC 8 84 Thousand/uL      RBC 6 25 Million/uL      Hemoglobin 14 1 g/dL      Hematocrit 47 1 %      MCV 75 fL      MCH 22 6 pg      MCHC 29 9 g/dL      RDW 23 4 %      MPV 9 8 fL      Platelets 940 Thousands/uL      nRBC 0 /100 WBCs Neutrophils Relative 76 %      Immat GRANS % 1 %      Lymphocytes Relative 13 %      Monocytes Relative 8 %      Eosinophils Relative 2 %      Basophils Relative 0 %      Neutrophils Absolute 6 72 Thousands/µL      Immature Grans Absolute 0 12 Thousand/uL      Lymphocytes Absolute 1 12 Thousands/µL      Monocytes Absolute 0 69 Thousand/µL      Eosinophils Absolute 0 17 Thousand/µL      Basophils Absolute 0 02 Thousands/µL     Ferritin [[id number]] Collected: 02/11/21 1050    Lab Status: In process Specimen: Blood from Arm, Left Updated: 02/11/21 1054    Procalcitonin with AM Reflex [[id number]] Collected: 02/11/21 1050    Lab Status: In process Specimen: Blood from Arm, Left Updated: 02/11/21 1054                 XR chest 1 view portable    (Results Pending)              Procedures  ECG 12 Lead Documentation Only    Date/Time: 2/11/2021 10:12 AM  Performed by: Lorraine Montgomery DO  Authorized by: Lorraine Montgomery DO     Indications / Diagnosis:  SOB  ECG reviewed by me, the ED Provider: yes    Patient location:  Bedside  Previous ECG:     Previous ECG:  Unavailable  Rate:     ECG rate:  53    ECG rate assessment: bradycardic    Rhythm:     Rhythm: sinus bradycardia    Ectopy:     Ectopy: none    QRS:     QRS axis:  Left    QRS intervals: Wide  Conduction:     Conduction: abnormal      Abnormal conduction: complete LBBB    ST segments:     ST segments:  Normal  T waves:     T waves: normal               ED Course                             SBIRT 20yo+      Most Recent Value   SBIRT (22 yo +)   In order to provide better care to our patients, we are screening all of our patients for alcohol and drug use  Would it be okay to ask you these screening questions? Yes Filed at: 02/11/2021 1008   Initial Alcohol Screen: US AUDIT-C    1  How often do you have a drink containing alcohol?  0 Filed at: 02/11/2021 1008   2  How many drinks containing alcohol do you have on a typical day you are drinking?    0 Filed at: 02/11/2021 1008   3a  Male UNDER 65: How often do you have five or more drinks on one occasion? 0 Filed at: 02/11/2021 1008   3b  FEMALE Any Age, or MALE 65+: How often do you have 4 or more drinks on one occassion? 0 Filed at: 02/11/2021 1008   Audit-C Score  0 Filed at: 02/11/2021 1008   [place]: How many times in the past year have you    Used an illegal drug or used a prescription medication for non-medical reasons? Never Filed at: 02/11/2021 1008                    MDM  Number of Diagnoses or Management Options     Amount and/or Complexity of Data Reviewed  Clinical lab tests: reviewed and ordered  Tests in the radiology section of CPT®: ordered and reviewed  Tests in the medicine section of CPT®: ordered and reviewed        Disposition  Final diagnoses:   COVID-19 virus infection   Hypoxia   History of coronary artery disease   History of hypertension     Time reflects when diagnosis was documented in both MDM as applicable and the Disposition within this note     Time User Action Codes Description Comment    2/11/2021 12:38 PM Felicia Elaine Add [U07 1] COVID-19 virus infection     2/11/2021 12:38 PM Beatris Elaine Add [R09 02] Hypoxia     2/11/2021 12:38 PM Lucy Peña [Z86 79] History of coronary artery disease     2/11/2021 12:40 PM Cony Martinez Add [Z86 79] History of hypertension       ED Disposition     ED Disposition Condition Date/Time Comment    Admit Stable Thu Feb 11, 2021 12:45 PM Case was discussed with Dr Alisa Shepherd and the patient's admission status was agreed to be Admission Status: inpatient status to the service of Dr Alisa Shepherd           Follow-up Information    None         Current Discharge Medication List      CONTINUE these medications which have NOT CHANGED    Details   amLODIPine (NORVASC) 10 mg tablet Take 10 mg by mouth daily      aspirin (ECOTRIN LOW STRENGTH) 81 mg EC tablet Take 81 mg by mouth daily      Cholecalciferol (Vitamin D3) 50 MCG (2000 UT) TABS Take 2,000 Units by mouth daily      metoprolol tartrate (LOPRESSOR) 50 mg tablet Take 50 mg by mouth 2 (two) times a day       Multiple Vitamin (MULTIVITAMIN) capsule Take 1 capsule by mouth daily      Omega-3 Fatty Acids (FISH OIL) 1200 MG CAPS Take 1,200 mg by mouth 2 (two) times a day        atorvastatin (LIPITOR) 80 mg tablet Take 80 mg by mouth daily      nitroglycerin (NITROSTAT) 0 4 mg SL tablet Place 0 4 mg under the tongue every 5 (five) minutes as needed for chest pain           No discharge procedures on file      PDMP Review     None          ED Provider  Electronically Signed by           Meredith Hinson 25, DO  02/11/21 1827

## 2021-02-11 NOTE — PLAN OF CARE
Problem: Potential for Falls  Goal: Patient will remain free of falls  Description: INTERVENTIONS:  - Assess patient frequently for physical needs  -  Identify cognitive and physical deficits and behaviors that affect risk of falls    -  [place] fall precautions as indicated by assessment   - Educate patient/family on patient safety including physical limitations  - Instruct patient to call for assistance with activity based on assessment  - Modify environment to reduce risk of injury  - Consider OT/PT consult to assist with strengthening/mobility  Outcome: Progressing     Problem: PAIN - ADULT  Goal: Verbalizes/displays adequate comfort level or baseline comfort level  Description: Interventions:  - Encourage patient to monitor pain and request assistance  - Assess pain using appropriate pain scale  - Administer analgesics based on type and severity of pain and evaluate response  - Implement non-pharmacological measures as appropriate and evaluate response  - Consider cultural and social influences on pain and pain management  - Notify physician/advanced practitioner if interventions unsuccessful or patient reports new pain  Outcome: Progressing     Problem: INFECTION - ADULT  Goal: Absence or prevention of progression during hospitalization  Description: INTERVENTIONS:  - Assess and monitor for signs and symptoms of infection  - Monitor lab/diagnostic results  - Monitor all insertion sites, i e  indwelling lines, tubes, and drains  - Monitor endotracheal if appropriate and nasal secretions for changes in amount and color  - [place] appropriate cooling/warming therapies per order  - Administer medications as ordered  - Instruct and encourage patient and family to use good hand hygiene technique  - Identify and instruct in appropriate isolation precautions for identified infection/condition  Outcome: Progressing     Problem: SAFETY ADULT  Goal: Patient will remain free of falls  Description: INTERVENTIONS:  - Assess patient frequently for physical needs  -  Identify cognitive and physical deficits and behaviors that affect risk of falls    -  [place] fall precautions as indicated by assessment   - Educate patient/family on patient safety including physical limitations  - Instruct patient to call for assistance with activity based on assessment  - Modify environment to reduce risk of injury  - Consider OT/PT consult to assist with strengthening/mobility  Outcome: Progressing  Goal: Maintain or return to baseline ADL function  Description: INTERVENTIONS:  -  Assess patient's ability to carry out ADLs; assess patient's baseline for ADL function and identify physical deficits which impact ability to perform ADLs (bathing, care of mouth/teeth, toileting, grooming, dressing, etc )  - Assess/evaluate cause of self-care deficits   - Assess range of motion  - Assess patient's mobility; develop plan if impaired  - Assess patient's need for assistive devices and provide as appropriate  - Encourage maximum independence but intervene and supervise when necessary  - Involve family in performance of ADLs  - Assess for home care needs following discharge   - Consider OT consult to assist with ADL evaluation and planning for discharge  - Provide patient education as appropriate  Outcome: Progressing  Goal: Maintain or return mobility status to optimal level  Description: INTERVENTIONS:  - Assess patient's baseline mobility status (ambulation, transfers, stairs, etc )    - Identify cognitive and physical deficits and behaviors that affect mobility  - Identify mobility aids required to assist with transfers and/or ambulation (gait belt, sit-to-stand, lift, walker, cane, etc )  - [place] fall precautions as indicated by assessment  - Record patient progress and toleration of activity level on Mobility SBAR; progress patient to next Phase/Stage  - Instruct patient to call for assistance with activity based on assessment  - Consider rehabilitation consult to assist with strengthening/weightbearing, etc   Outcome: Progressing     Problem: DISCHARGE PLANNING  Goal: Discharge to home or other facility with appropriate resources  Description: INTERVENTIONS:  - Identify barriers to discharge w/patient and caregiver  - Arrange for needed discharge resources and transportation as appropriate  - Identify discharge learning needs (meds, wound care, etc )  - Arrange for interpretive services to assist at discharge as needed  - Refer to Case Management Department for coordinating discharge planning if the patient needs post-hospital services based on physician/advanced practitioner order or complex needs related to functional status, cognitive ability, or social support system  Outcome: Progressing     Problem: Knowledge Deficit  Goal: Patient/family/caregiver demonstrates understanding of disease process, treatment plan, medications, and discharge instructions  Description: Complete learning assessment and assess knowledge base    Interventions:  - Provide teaching at level of understanding  - Provide teaching via preferred learning methods  Outcome: Progressing

## 2021-02-11 NOTE — ASSESSMENT & PLAN NOTE
S/P recent therapeutic phlebotomy 1/27/21  H/H acceptable  OP phlebotomy as scheduled every 12 weeks

## 2021-02-11 NOTE — ASSESSMENT & PLAN NOTE
·   Symptomatic with high risk comorbidities including PCV and smoking  ·  he has no hypoxia or  Supplemental oxygen requirement  ·  will start COVID treatment protocol, mild criteria  ·  start remdesivir, vitamin-C,  Zinc and famotidine  ·  continue vitamin-D and high-dose Lipitor  ·  trend inflammatory markers  ·  consult PT due to severe fatigue and weakness

## 2021-02-12 LAB
ALBUMIN SERPL BCP-MCNC: 2.3 G/DL (ref 3.5–5)
ALP SERPL-CCNC: 97 U/L (ref 46–116)
ALT SERPL W P-5'-P-CCNC: 41 U/L (ref 12–78)
ANION GAP SERPL CALCULATED.3IONS-SCNC: 5 MMOL/L (ref 4–13)
AST SERPL W P-5'-P-CCNC: 43 U/L (ref 5–45)
BASOPHILS # BLD AUTO: 0.02 THOUSANDS/ΜL (ref 0–0.1)
BASOPHILS NFR BLD AUTO: 0 % (ref 0–1)
BILIRUB SERPL-MCNC: 0.73 MG/DL (ref 0.2–1)
BUN SERPL-MCNC: 26 MG/DL (ref 5–25)
CALCIUM ALBUM COR SERPL-MCNC: 9.6 MG/DL (ref 8.3–10.1)
CALCIUM SERPL-MCNC: 8.2 MG/DL (ref 8.3–10.1)
CHLORIDE SERPL-SCNC: 105 MMOL/L (ref 100–108)
CO2 SERPL-SCNC: 28 MMOL/L (ref 21–32)
CREAT SERPL-MCNC: 1.21 MG/DL (ref 0.6–1.3)
CRP SERPL QL: 51.9 MG/L
D DIMER PPP FEU-MCNC: 0.68 UG/ML FEU
EOSINOPHIL # BLD AUTO: 0.19 THOUSAND/ΜL (ref 0–0.61)
EOSINOPHIL NFR BLD AUTO: 2 % (ref 0–6)
ERYTHROCYTE [DISTWIDTH] IN BLOOD BY AUTOMATED COUNT: 23.1 % (ref 11.6–15.1)
FERRITIN SERPL-MCNC: 163 NG/ML (ref 8–388)
GFR SERPL CREATININE-BSD FRML MDRD: 59 ML/MIN/1.73SQ M
GLUCOSE SERPL-MCNC: 90 MG/DL (ref 65–140)
HCT VFR BLD AUTO: 43.6 % (ref 36.5–49.3)
HGB BLD-MCNC: 12.9 G/DL (ref 12–17)
IMM GRANULOCYTES # BLD AUTO: 0.08 THOUSAND/UL (ref 0–0.2)
IMM GRANULOCYTES NFR BLD AUTO: 1 % (ref 0–2)
LYMPHOCYTES # BLD AUTO: 1.03 THOUSANDS/ΜL (ref 0.6–4.47)
LYMPHOCYTES NFR BLD AUTO: 13 % (ref 14–44)
MCH RBC QN AUTO: 22.5 PG (ref 26.8–34.3)
MCHC RBC AUTO-ENTMCNC: 29.6 G/DL (ref 31.4–37.4)
MCV RBC AUTO: 76 FL (ref 82–98)
MONOCYTES # BLD AUTO: 0.61 THOUSAND/ΜL (ref 0.17–1.22)
MONOCYTES NFR BLD AUTO: 8 % (ref 4–12)
NEUTROPHILS # BLD AUTO: 6.2 THOUSANDS/ΜL (ref 1.85–7.62)
NEUTS SEG NFR BLD AUTO: 76 % (ref 43–75)
NRBC BLD AUTO-RTO: 0 /100 WBCS
PLATELET # BLD AUTO: 261 THOUSANDS/UL (ref 149–390)
PMV BLD AUTO: 10.3 FL (ref 8.9–12.7)
POTASSIUM SERPL-SCNC: 4.3 MMOL/L (ref 3.5–5.3)
PROCALCITONIN SERPL-MCNC: 0.05 NG/ML
PROT SERPL-MCNC: 6.3 G/DL (ref 6.4–8.2)
RBC # BLD AUTO: 5.73 MILLION/UL (ref 3.88–5.62)
SODIUM SERPL-SCNC: 138 MMOL/L (ref 136–145)
WBC # BLD AUTO: 8.13 THOUSAND/UL (ref 4.31–10.16)

## 2021-02-12 PROCEDURE — 85025 COMPLETE CBC W/AUTO DIFF WBC: CPT | Performed by: INTERNAL MEDICINE

## 2021-02-12 PROCEDURE — 80053 COMPREHEN METABOLIC PANEL: CPT | Performed by: INTERNAL MEDICINE

## 2021-02-12 PROCEDURE — 86140 C-REACTIVE PROTEIN: CPT | Performed by: INTERNAL MEDICINE

## 2021-02-12 PROCEDURE — 82728 ASSAY OF FERRITIN: CPT | Performed by: INTERNAL MEDICINE

## 2021-02-12 PROCEDURE — 97163 PT EVAL HIGH COMPLEX 45 MIN: CPT

## 2021-02-12 PROCEDURE — 99232 SBSQ HOSP IP/OBS MODERATE 35: CPT | Performed by: INTERNAL MEDICINE

## 2021-02-12 PROCEDURE — 85379 FIBRIN DEGRADATION QUANT: CPT | Performed by: INTERNAL MEDICINE

## 2021-02-12 PROCEDURE — 84145 PROCALCITONIN (PCT): CPT | Performed by: EMERGENCY MEDICINE

## 2021-02-12 RX ADMIN — Medication 2000 UNITS: at 07:36

## 2021-02-12 RX ADMIN — ENOXAPARIN SODIUM 30 MG: 30 INJECTION SUBCUTANEOUS at 20:46

## 2021-02-12 RX ADMIN — ZINC SULFATE 220 MG (50 MG) CAPSULE 220 MG: CAPSULE at 07:35

## 2021-02-12 RX ADMIN — OXYCODONE HYDROCHLORIDE AND ACETAMINOPHEN 1000 MG: 500 TABLET ORAL at 07:35

## 2021-02-12 RX ADMIN — METOPROLOL TARTRATE 50 MG: 50 TABLET, FILM COATED ORAL at 07:35

## 2021-02-12 RX ADMIN — ENOXAPARIN SODIUM 30 MG: 30 INJECTION SUBCUTANEOUS at 07:35

## 2021-02-12 RX ADMIN — OXYCODONE HYDROCHLORIDE AND ACETAMINOPHEN 1000 MG: 500 TABLET ORAL at 20:45

## 2021-02-12 RX ADMIN — METOPROLOL TARTRATE 50 MG: 50 TABLET, FILM COATED ORAL at 17:07

## 2021-02-12 RX ADMIN — REMDESIVIR 100 MG: 100 INJECTION, POWDER, LYOPHILIZED, FOR SOLUTION INTRAVENOUS at 15:10

## 2021-02-12 RX ADMIN — ASPIRIN 81 MG: 81 TABLET, COATED ORAL at 07:35

## 2021-02-12 RX ADMIN — FAMOTIDINE 20 MG: 20 TABLET, FILM COATED ORAL at 07:35

## 2021-02-12 RX ADMIN — ATORVASTATIN CALCIUM 80 MG: 80 TABLET, FILM COATED ORAL at 17:07

## 2021-02-12 RX ADMIN — AMLODIPINE BESYLATE 10 MG: 10 TABLET ORAL at 07:35

## 2021-02-12 NOTE — PLAN OF CARE
Problem: PHYSICAL THERAPY ADULT  Goal: Performs mobility at highest level of function for planned discharge setting  See evaluation for individualized goals  Description: Treatment/Interventions: Functional transfer training, LE strengthening/ROM, Elevations, Therapeutic exercise, Endurance training, Bed mobility, Gait training, Patient/family training, Equipment eval/education, Spoke to nursing, OT  Equipment Recommended: Other (Comment)(TBD SPC vs RW)       See flowsheet documentation for full assessment, interventions and recommendations  Note: Prognosis: Good  Problem List: Decreased strength, Decreased endurance, Impaired balance, Decreased mobility, Impaired hearing  Assessment: Pt is 77 y o  male seen for PT evaluation s/p admit to [facility] [address] on 2/11/2021 w/ COVID-19 virus infection  PT consulted to assess pt's functional mobility and d/c needs  Order placed for PT eval and tx, w/ activity order  Comorbidities affecting pt's physical performance at time of assessment include: COVID +, CAD, HTN, cervical radiculopathy with neck pain, L shoulder pain hx, no pain reported on IE  PTA, pt was independent w/ all functional mobility w/ no AD  Personal factors affecting pt at time of IE include: lives in one roman, one story house, limited home support and inability to perform IADLs  Please find objective findings from PT assessment regarding body systems outlined above with impairments and limitations including weakness, impaired balance, decreased endurance, gait deviations, decreased functional mobility tolerance and fall risk  The following objective measures performed on IE also reveal limitations: Barthel Index: 43/884 and AM-PAC 6-Clicks: 06/85  Pt's clinical presentation is currently unstable/unpredictable seen in pt's presentation  Pt to benefit from continued PT tx to address deficits as defined above and maximize level of functional independent mobility and consistency   From PT/mobility standpoint, recommendation at time of d/c would be Home with HHPT with family support pending progress in order to facilitate return to PLOF  Barriers to Discharge: None(recommend AD SPC vs RW tbd based on progress)     PT Discharge Recommendation: Home with skilled therapy, Return to previous environment with social support     PT - OK to Discharge: Yes    See flowsheet documentation for full assessment

## 2021-02-12 NOTE — ASSESSMENT & PLAN NOTE
·  Symptomatic with high risk comorbidities including PCV and smoking  ·  Day 2 of remdesivir, vitamin-C,  Zinc and famotidine  ·  Continue vitamin D and lipitor   ·  PT eval re: asthenia due to disease

## 2021-02-12 NOTE — PLAN OF CARE
Problem: Potential for Falls  Goal: Patient will remain free of falls  Description: INTERVENTIONS:  - Assess patient frequently for physical needs  -  Identify cognitive and physical deficits and behaviors that affect risk of falls    -  [place] fall precautions as indicated by assessment   - Educate patient/family on patient safety including physical limitations  - Instruct patient to call for assistance with activity based on assessment  - Modify environment to reduce risk of injury  - Consider OT/PT consult to assist with strengthening/mobility  Outcome: Progressing     Problem: PAIN - ADULT  Goal: Verbalizes/displays adequate comfort level or baseline comfort level  Description: Interventions:  - Encourage patient to monitor pain and request assistance  - Assess pain using appropriate pain scale  - Administer analgesics based on type and severity of pain and evaluate response  - Implement non-pharmacological measures as appropriate and evaluate response  - Consider cultural and social influences on pain and pain management  - Notify physician/advanced practitioner if interventions unsuccessful or patient reports new pain  Outcome: Progressing     Problem: INFECTION - ADULT  Goal: Absence or prevention of progression during hospitalization  Description: INTERVENTIONS:  - Assess and monitor for signs and symptoms of infection  - Monitor lab/diagnostic results  - Monitor all insertion sites, i e  indwelling lines, tubes, and drains  - Monitor endotracheal if appropriate and nasal secretions for changes in amount and color  - [place] appropriate cooling/warming therapies per order  - Administer medications as ordered  - Instruct and encourage patient and family to use good hand hygiene technique  - Identify and instruct in appropriate isolation precautions for identified infection/condition  Outcome: Progressing     Problem: SAFETY ADULT  Goal: Patient will remain free of falls  Description: INTERVENTIONS:  - Assess patient frequently for physical needs  -  Identify cognitive and physical deficits and behaviors that affect risk of falls    -  [place] fall precautions as indicated by assessment   - Educate patient/family on patient safety including physical limitations  - Instruct patient to call for assistance with activity based on assessment  - Modify environment to reduce risk of injury  - Consider OT/PT consult to assist with strengthening/mobility  Outcome: Progressing  Goal: Maintain or return to baseline ADL function  Description: INTERVENTIONS:  -  Assess patient's ability to carry out ADLs; assess patient's baseline for ADL function and identify physical deficits which impact ability to perform ADLs (bathing, care of mouth/teeth, toileting, grooming, dressing, etc )  - Assess/evaluate cause of self-care deficits   - Assess range of motion  - Assess patient's mobility; develop plan if impaired  - Assess patient's need for assistive devices and provide as appropriate  - Encourage maximum independence but intervene and supervise when necessary  - Involve family in performance of ADLs  - Assess for home care needs following discharge   - Consider OT consult to assist with ADL evaluation and planning for discharge  - Provide patient education as appropriate  Outcome: Progressing  Goal: Maintain or return mobility status to optimal level  Description: INTERVENTIONS:  - Assess patient's baseline mobility status (ambulation, transfers, stairs, etc )    - Identify cognitive and physical deficits and behaviors that affect mobility  - Identify mobility aids required to assist with transfers and/or ambulation (gait belt, sit-to-stand, lift, walker, cane, etc )  - [place] fall precautions as indicated by assessment  - Record patient progress and toleration of activity level on Mobility SBAR; progress patient to next Phase/Stage  - Instruct patient to call for assistance with activity based on assessment  - Consider rehabilitation consult to assist with strengthening/weightbearing, etc   Outcome: Progressing     Problem: DISCHARGE PLANNING  Goal: Discharge to home or other facility with appropriate resources  Description: INTERVENTIONS:  - Identify barriers to discharge w/patient and caregiver  - Arrange for needed discharge resources and transportation as appropriate  - Identify discharge learning needs (meds, wound care, etc )  - Arrange for interpretive services to assist at discharge as needed  - Refer to Case Management Department for coordinating discharge planning if the patient needs post-hospital services based on physician/advanced practitioner order or complex needs related to functional status, cognitive ability, or social support system  Outcome: Progressing     Problem: Knowledge Deficit  Goal: Patient/family/caregiver demonstrates understanding of disease process, treatment plan, medications, and discharge instructions  Description: Complete learning assessment and assess knowledge base    Interventions:  - Provide teaching at level of understanding  - Provide teaching via preferred learning methods  Outcome: Progressing

## 2021-02-12 NOTE — UTILIZATION REVIEW
Notification of Inpatient Admission/Inpatient Authorization Request   This is a Notification of Inpatient Admission for Roberto 49  Be advised that this patient was admitted to our facility under Inpatient Status  Contact Fariha Zee at [phone] for additional admission information  [facility] DEPT  DEDICATED -[phone]  Patient Name:   Rosa Torre   YOB: 1945       [address]   [address]:   [facility]  Tax ID: [id number]  NPI: [id number] Attending Provider/NPI:  Phone:  Address: Mandie Esposito, Nadira Stevenson [[id number]]  [phone]  Same as the facility   Place of Service Code: 24 Place of Service Name:  [address] [place] [address]   Start Date: 2/11/21 1246 Discharge Date & Time: No discharge date for patient encounter  Type of Admission: Inpatient Status Discharge Disposition   (if discharged): Home/Self Care   Patient Diagnoses: Hypoxia [R09 02]  History of hypertension [Z86 79]  History of coronary artery disease [Z86 79]  Shortness of breath [R06 02]  COVID-19 virus infection [U07 1]     Orders: Admission Orders (From admission, onward)     Ordered        02/11/21 1246  Inpatient Admission  Once                    Assigned Utilization Review Contact: [address] [place] [address] Utilization Review Department  Phone: [phone]; Fax [phone]  Email: [email or web address]   ATTENTION PAYERS: Please call the assigned Utilization  directly with any questions or concerns ALL voicemails in the department are confidential  Send all requests for admission clinical reviews, approved or denied determinations and any other requests to dedicated fax number belonging to the campus where the patient is receiving treatment

## 2021-02-12 NOTE — PHYSICAL THERAPY NOTE
PT Initial Evaluation  Patient's Name: Pablito Valle    Admitting Diagnosis  Hypoxia [R09 02]  History of hypertension [Z86 79]  History of coronary artery disease [Z86 79]  Shortness of breath [R06 02]  COVID-19 virus infection [U07 1]    Problem List  Patient Active Problem List   Diagnosis    Gastritis    Benign essential hypertension    Cervical radiculopathy    Coronary artery disease    Elevated PSA    Hyperlipidemia    Polycythemia vera ([address] )    Prediabetes    Tobacco abuse    Chronic left shoulder pain    Neck pain    Cervical spondylosis without myelopathy    BPH with obstruction/lower urinary tract symptoms    Strain of lumbar region    COVID-19 virus infection       Past Medical History  Past Medical History:   Diagnosis Date    CAD (coronary artery disease)     Last Assessed:  11/4/13    Elevated prostate specific antigen (PSA)     Last Assessed:  12/21/17    Hyperlipidemia     Last Assessed:  11/24/17    Hypertension     Benign essential, Last Assessed:  11/24/17       Past Surgical History  Past Surgical History:   Procedure Laterality Date    APPENDECTOMY      WY COLONOSCOPY FLX DX W/COLLJ SPEC WHEN PFRMD N/A 5/22/2017    Procedure: COLONOSCOPY;  Surgeon: Maira Talley DO;  Location: [facility];   Service: Gastroenterology    PROSTATE BIOPSY            02/12/21 0802   Note Type   Note type Evaluation   Pain Assessment   Pain Assessment Tool 0-10   Pain Score No Pain   Home Living   Type of [address] [place] [address] One level  ([facility] home 1 MING)   Bathroom Shower/Tub Walk-in shower  (and tub; pt  usually uses walkin shower)   Bathroom Toilet Standard   Bathroom Equipment Built-in shower seat  ("I have the shampoo and stuff but could be a seat")   [address]   Prior Function   Level of [place] Independent with ADLs and functional mobility   Lives With Spouse   Receives Help From Family  ("My wife can't help as she has COVID too") ADL Assistance Independent   IADLs Independent   Falls in the last 6 months 0   Comments Pt  drives   Restrictions/Precautions   Weight Bearing Precautions Per Order No   Other Precautions Contact/isolation; Airborne/isolation; Fall Risk   Cognition   Overall Cognitive Status WFL   Arousal/Participation Alert   Orientation Level Oriented X4   Following Commands Follows all commands and directions without difficulty   RLE Assessment   RLE Assessment   (4-/5 to 4/5 MMT)   LLE Assessment   LLE Assessment   (4-/5 to 4/5 MMT)   Coordination   Sensation   (grossly intact)   Bed Mobility   Supine to Sit 6  Modified independent   Additional items Increased time required; Bedrails   Sit to Supine 6  Modified independent   Additional items Increased time required; Bedrails   Transfers   Sit to Stand 5  Supervision   Additional items Assist x 1; Increased time required;Verbal cues   Stand to Sit 5  Supervision   Additional items Assist x 1; Increased time required;Verbal cues   Additional Comments Initially pt  with good balance standing ambulated 14' x 5 reps; s/p first 2 reps pt  with inconsistent landy and mild deviation from linear path with self recovery with close guarding by PT  Pt completed without AD, declined RW use  Educated pt on [facility] use, he stated he has some at home  Obtained one and provided to nurse s/p session for patient  Recommend SPC use in room at minimum due to decreased balance/endurance  SPO2 levels on RA remained >90% with mobility  at rest 97%  No significant s/sx SOB although pt  reported significant fatigue levels  Training with SPC vs RW at provider discretion next session      Ambulation/Elevation   Gait pattern Decreased foot clearance   Gait Assistance   (Supervision to CGA x1 x 2 instances moderate gait deviation)   Additional items Assist x 1   Assistive Device   (no AD)   Distance 14' x 5     Stair Management Assistance Not tested   Balance   Static Sitting Good   Dynamic Sitting Fair +   Static Standing Fair   Dynamic Standing Fair -   Ambulatory Fair -   Endurance Deficit   Endurance Deficit Yes   Endurance Deficit Description fatigue, weakness; no significant s/sx SOB this session   Activity Tolerance   Activity Tolerance Patient limited by fatigue   Nurse Made Aware RN ok to see   Assessment   Prognosis Good   Problem List Decreased strength;Decreased endurance; Impaired balance;Decreased mobility; Impaired hearing   Assessment Pt is 77 y o  male seen for PT evaluation s/p admit to [facility] on 2/11/2021 w/ COVID-19 virus infection  PT consulted to assess pt's functional mobility and d/c needs  Order placed for PT eval and tx, w/ activity order  Comorbidities affecting pt's physical performance at time of assessment include: COVID +, CAD, HTN, cervical radiculopathy with neck pain, L shoulder pain hx, no pain reported on IE  PTA, pt was independent w/ all functional mobility w/ no AD  Personal factors affecting pt at time of IE include: lives in one roman, one story house, limited home support and inability to perform IADLs  Please find objective findings from PT assessment regarding body systems outlined above with impairments and limitations including weakness, impaired balance, decreased endurance, gait deviations, decreased functional mobility tolerance and fall risk  The following objective measures performed on IE also reveal limitations: Barthel Index: 93/507 and AM-PAC 6-Clicks: 26/80  Pt's clinical presentation is currently unstable/unpredictable seen in pt's presentation  Pt to benefit from continued PT tx to address deficits as defined above and maximize level of functional independent mobility and consistency  From PT/mobility standpoint, recommendation at time of d/c would be Home with HHPT with family support pending progress in order to facilitate return to PLOF     Barriers to Discharge None  (recommend AD SPC vs RW tbd based on progress)   Goals   Patient Goals to not feel so tired   STG Expiration Date 02/26/21   Short Term Goal #1 1  Pt will complete bed mobility with Mod I to increase functional mobility  2  Pt will complete sit to stand transfers with Mod I to increase functional mobility  3  Pt will ambulate 150 ft with SPC vs RW with Mod I without LOB or s/sx of SOB 4  Pt will increase B/L LE strength by 1 grade to facilitate improved functional mobility with decreased risk of falls  5  Pt will increase standing balance to fair+ in order to decrease risk of falls  6  Pt  Will tolerate >40 minutes of upright functional mobility to increase activity tolerance  PT Treatment Day 0   Plan   Treatment/Interventions Functional transfer training;LE strengthening/ROM; Elevations; Therapeutic exercise; Endurance training;Bed mobility;Gait training;Patient/family training;Equipment eval/education;Spoke to nursing;OT   PT Frequency   (3-5x/wk)   Recommendation   PT Discharge Recommendation Home with skilled therapy; Return to previous environment with social support   Equipment Recommended Other (Comment)  (TBD SPC vs RW)   PT - OK to Discharge Yes   AM-PAC Basic Mobility Inpatient   Turning in Bed Without Bedrails 4   Lying on Back to Sitting on Edge of Flat Bed 4   Moving Bed to Chair 3   Standing Up From Chair 3   Walk in Room 3   Climb 3-5 Stairs 3  (not observed, based on strength/mobility)   Basic Mobility Inpatient Raw Score 20   Basic Mobility Standardized Score 43 99   Barthel Index   Feeding 10   Bathing 0   Grooming Score 5   Dressing Score 10   Bladder Score 10   Bowels Score 10   Toilet Use Score 5   Transfers (Bed/Chair) Score 10   Mobility (Level Surface) Score 0   Stairs Score 5  (based on mobility)   Barthel Index Score 65         Waqar Rising, PT

## 2021-02-12 NOTE — PROGRESS NOTES
Progress Note - Carmen Nathan [birth year], 77 y o  male MRN: [id number]    Unit/Bed#: [facility] [address] 221-01 Encounter: [id number]    Primary Care Provider: Clement Montana DO   Date and time admitted to hospital: 2021  9:57 AM        * COVID-19 virus infection  Assessment & Plan  ·  Symptomatic with high risk comorbidities including PCV and smoking  ·  Day 2 of remdesivir, vitamin-C,  Zinc and famotidine  ·  Continue vitamin D and lipitor   ·  PT eval re: asthenia due to disease    Tobacco abuse  Assessment & Plan  Apply nicotine patch 7 mg daily    Polycy[address]mia vera ([facility] [address] )  Assessment & Plan  S/P recent therapeutic phlebotomy 21  H/H acceptable  OP phlebotomy as scheduled every 12 weeks    Coronary artery disease  Assessment & Plan    With history of PCI  Stable   Continue aspirin, Lipitor and metoprolol    Benign essential hypertension  Assessment & Plan  ·  Continue metoprolol and amlodipine with hold parameters  VTE Pharmacologic Prophylaxis:   Pharmacologic: Enoxaparin (Lovenox)  Mechanical VTE Prophylaxis in Place: No    Patient Centered Rounds: Discussed with his nurse    Discussions with Specialists or Other Care Team Provider: none    Education and Discussions with Family / Patient: [id number]  @ 425 pm  Number is busy    Time Spent for Care: 20 minutes  More than 50% of total time spent on counseling and coordination of care as described above      Current Length of Stay: 1 day(s)    Current Patient Status: Inpatient   Certification Statement: The patient will continue to require additional inpatient hospital stay due to asthenia due to covid    Discharge Plan: in 24-48 hours    Code Status: Level 3 - DNAR and DNI      Subjective:   Weak, fatigue  Did  Not walk far and became weak and unsteady  Poor appetite   Occasional cough    Objective:     Vitals:   Temp (24hrs), Av 1 °F (36 7 °C), Min:97 8 °F (36 6 °C), Max:98 6 °F (37 °C)    Temp:  [97 8 °F (36 6 °C)-98 6 °F (37 °C)] 98 °F (36 7 °C)  HR:  [56-72] 56  Resp:  [15-18] 16  BP: (115-125)/(64-72) 125/70  SpO2:  [91 %-93 %] 91 %  Body mass index is 23 95 kg/m²  Input and Output Summary (last 24 hours): Intake/Output Summary (Last 24 hours) at 2/12/2021 1625  Last data filed at 2/12/2021 2450  Gross per 24 hour   Intake 740 ml   Output --   Net 740 ml       Physical Exam:     Physical Exam  Constitutional:       Appearance: He is not ill-appearing or diaphoretic  HENT:      Head: Normocephalic and atraumatic  Nose: No rhinorrhea  Eyes:      General: No scleral icterus  Cardiovascular:      Rate and Rhythm: Regular rhythm  Heart sounds: No murmur  No gallop  Pulmonary:      Effort: Pulmonary effort is normal  No respiratory distress  Breath sounds: No wheezing or rales  Abdominal:      General: Abdomen is flat  Palpations: Abdomen is soft  Musculoskeletal:      Right lower leg: No edema  Left lower leg: No edema  Skin:     General: Skin is warm and dry  Neurological:      Mental Status: He is alert and oriented to person, place, and time  Mental status is at baseline  Psychiatric:      Comments: Depressed mood       Labs:    Results from last 7 days   Lab Units 02/12/21  0521   WBC Thousand/uL 8 13   HEMOGLOBIN g/dL 12 9   HEMATOCRIT % 43 6   PLATELETS Thousands/uL 261   NEUTROS PCT % 76*   LYMPHS PCT % 13*   MONOS PCT % 8   EOS PCT % 2     Results from last 7 days   Lab Units 02/12/21  0521   SODIUM mmol/L 138   POTASSIUM mmol/L 4 3   CHLORIDE mmol/L 105   CO2 mmol/L 28   BUN mg/dL 26*   CREATININE mg/dL 1 21   ANION GAP mmol/L 5   CALCIUM mg/dL 8 2*   ALBUMIN g/dL 2 3*   TOTAL BILIRUBIN mg/dL 0 73   ALK PHOS U/L 97   ALT U/L 41   AST U/L 43   GLUCOSE RANDOM mg/dL 90                 Results from last 7 days   Lab Units 02/12/21  0521 02/11/21  1050   PROCALCITONIN ng/ml 0 05 0 06           * I Have Reviewed All Lab Data Listed Above  * Additional Pertinent Lab Tests Reviewed:  All Labs Within Last 24 Hours Reviewed    Imaging:    Imaging Reports Reviewed Today Include: no  Imaging Personally Reviewed by Myself Includes:  no    Recent Cultures (last 7 days):           Last 24 Hours Medication List:   Current Facility-Administered Medications   Medication Dose Route Frequency Provider Last Rate    amLODIPine  10 mg Oral Daily Yimi Lea MD      ascorbic acid  1,000 mg Oral Q12H [facility] Yimi Lea MD      aspirin  81 mg Oral Daily Yimi Lea MD      atorvastatin  80 mg Oral QPM Yimi Lea MD      cholecalciferol  2,000 Units Oral Daily Yimi Lea MD      enoxaparin  30 mg Subcutaneous Q12H [facility] Yimi Lea MD      famotidine  20 mg Oral Daily Yimi Lea MD      metoprolol tartrate  50 mg Oral BID Yimi Lea MD      zinc sulfate  220 mg Oral Daily Yimi Lea MD      Followed by   Jerome Christian ON 2/18/2021] multivitamin-minerals  1 tablet Oral Daily Yimi Lea MD      remdesivir  100 mg Intravenous Q24H Yimi Lea MD          Today, Patient Was Seen By: Yimi Lea MD    ** Please Note: Dictation voice to text software may have been used in the creation of this document   **

## 2021-02-12 NOTE — UTILIZATION REVIEW
Initial Clinical Review    Admission: Date/Time/Statement:   Admission Orders (From admission, onward)     Ordered        02/11/21 1246  Inpatient Admission  Once                   Orders Placed This Encounter   Procedures    Inpatient Admission     Standing Status:   Standing     Number of Occurrences:   1     Order Specific Question:   Level of Care     Answer:   Med Surg [16]     Order Specific Question:   Estimated length of stay     Answer:   More than 2 Midnights     Order Specific Question:   Certification     Answer:   I certify that inpatient services are medically necessary for this patient for a duration of greater than two midnights  See H&P and MD Progress Notes for additional information about the patient's course of treatment  ED Arrival Information     Expected Arrival Acuity Means of Arrival Escorted By Service Admission Type    - 2/11/2021 09:57 Urgent Ambulance [facility] General Medicine Urgent    Arrival Complaint    Shortness of breath        Chief Complaint   Patient presents with    Shortness of Breath     Covid + 10 days ago c/o worsening SOB and low home SpO2 readings in the 80's on RA; EMS report 89% on RA and improved to 98% on 2L/min of O2 via NC; Pt also c/o generalized body aches     Assessment/Plan:  76 yo male presents to ED from home with weakness, diffuse body aches, productive cough, poor appetite, SOB worse w/ exertion and hypoxic in the 80's  Wife called PCP and instructed to go to ED  EMS noted RA sat 89%  PMH of CAD, HTN, HLD, tobacco abuse and initially developed symptoms of COVID on 2/1 - tested positive for COVID-19 on 2/3  He was prescribed vitamin-C, vitamin-D and multivitamins together with self isolation  ED Findings: Elevated D-Dimer, CRP, RA sat @ rest 96%  EKG Sinus angelita  CXR w/o acute findings  Admitted to Inpatient M/S with  COVID-19 virus infection -Symptomatic with high risk comorbidities including PCV and smoking   Plan is for mild tx protocol with remdesivir, Vits c,d,zinc, famotidine & lipitor, trend inflammatory markers, consult PT  Continue aspirin, Lipitor, metoprolol and amlodipine  for CAD and HTN      2/12 Continues on [address] as below   D-Dimer and CRP remain elevated but downtrending    ED Triage Vitals   Temperature Pulse Respirations Blood Pressure SpO2   02/11/21 1005 02/11/21 1005 02/11/21 1005 02/11/21 1005 02/11/21 1005   97 6 °F (36 4 °C) 56 18 124/72 97 %      Temp Source Heart Rate Source Patient Position - Orthostatic VS BP Location FiO2 (%)   02/11/21 1005 02/11/21 1005 02/11/21 1148 02/11/21 1148 --   Oral Monitor Lying Right arm       Pain Score       02/11/21 1005       No Pain          Wt Readings from Last 1 Encounters:   02/11/21 80 1 kg (176 lb 9 4 oz)     Additional Vital Signs:   02/12/21 08:04:24  98 6 °F (37 °C)  72  15  125/72 93 % -- --   02/12/21 0700  --  --  --  -- 93 % -- --   02/11/21 20:12:56  97 8 °F (36 6 °C)  57  18  115/64 91 % None (Room air) --   02/11/21 15:04:56  97 4 °F (36 3 °C)   53Abnormal   16  122/68 94 % -- --   02/11/21 1422  --  --  --  -- 97 % -- --   02/11/21 1351  --  51Abnormal   18  122/63 96 % None (Room air) Lying   02/11/21 1148  --  49Abnormal   18  108/58 96 % None (Room air) Lying       Pertinent Labs/Diagnostic Test Results:   Results from last 7 days   Lab Units 02/12/21  0521 02/11/21  1050   WBC Thousand/uL 8 13 8 84   HEMOGLOBIN g/dL 12 9 14 1   HEMATOCRIT % 43 6 47 1   PLATELETS Thousands/uL 261 254   NEUTROS ABS Thousands/µL 6 20 6 72     Results from last 7 days   Lab Units 02/12/21  0521 02/11/21  1050   SODIUM mmol/L 138 136   POTASSIUM mmol/L 4 3 3 9   CHLORIDE mmol/L 105 103   CO2 mmol/L 28 26   ANION GAP mmol/L 5 7   BUN mg/dL 26* 26*   CREATININE mg/dL 1 21 1 28   EGFR ml/min/1 73sq m 59 55   CALCIUM mg/dL 8 2* 8 4     Results from last 7 days   Lab Units 02/12/21  0521 02/11/21  1050   AST U/L 43 52*   ALT U/L 41 48   ALK PHOS U/L 97 108   TOTAL PROTEIN g/dL 6 3* 6 8 ALBUMIN g/dL 2 3* 2 6*   TOTAL BILIRUBIN mg/dL 0 73 0 82     Results from last 7 days   Lab Units 02/12/21  0521 02/11/21  1050   GLUCOSE RANDOM mg/dL 90 104     Results from last 7 days   Lab Units 02/11/21  1050   CK TOTAL U/L 34*     Results from last 7 days   Lab Units 02/11/21  1050   TROPONIN I ng/mL <0 02     Results from last 7 days   Lab Units 02/12/21  0521 02/11/21  1050   D-DIMER QUANTITATIVE ug/ml FEU 0 68* 1 05*     Results from last 7 days   Lab Units 02/11/21  1050   PROCALCITONIN ng/ml 0 06     Results from last 7 days   Lab Units 02/11/21  1050   NT-PRO BNP pg/mL 155*     Results from last 7 days   Lab Units 02/12/21  0521 02/11/21  1050   FERRITIN ng/mL 163 203     Results from last 7 days   Lab Units 02/12/21  0521 02/11/21  1050   CRP mg/L 51 9* 59 1*     2/11 EKG:  ECG rate:  53    ECG rate assessment: bradycardic       Rhythm: sinus bradycardia      Ectopy: none      QRS axis:  Left    QRS intervals:   Wide    Conduction: abnormal      Abnormal conduction: complete LBBB      ST segments:  Normal    T waves: normal      2/11 CXR: Slight interstitial prominence in the lower lung fields   No significant segmental or lobar consolidation or groundglass opacity appreciated        ED Treatment:   Medication Administration from 02/11/2021 0957 to 02/11/2021 1410       Date/Time Order Dose Route Action     02/11/2021 1050 acetaminophen (TYLENOL) tablet 650 mg 650 mg Oral Given        Past Medical History:   Diagnosis Date    CAD (coronary artery disease)     Last Assessed:  11/4/13    Elevated prostate specific antigen (PSA)     Last Assessed:  12/21/17    Hyperlipidemia     Last Assessed:  11/24/17    Hypertension     Benign essential, Last Assessed:  11/24/17     Present on Admission:   COVID-19 virus infection   Benign essential hypertension   Polycythemia vera ([facility] [address] )   Coronary artery disease   Tobacco abuse      Admitting Diagnosis: Hypoxia [R09 02]  History of hypertension [Z86 79]  History of coronary artery disease [Z86 79]  Shortness of breath [R06 02]  COVID-19 virus infection [U07 1]  Age/Sex: 77 y o  male     Admission Orders:  Scheduled Medications:  amLODIPine, 10 mg, Oral, Daily  ascorbic acid, 1,000 mg, Oral, Q12H TALIB  aspirin, 81 mg, Oral, Daily  atorvastatin, 80 mg, Oral, QPM  cholecalciferol, 2,000 Units, Oral, Daily  enoxaparin, 30 mg, Subcutaneous, Q12H TALIB  famotidine, 20 mg, Oral, Daily  metoprolol tartrate, 50 mg, Oral, BID  zinc sulfate, 220 mg, Oral, Daily    Followed by  Anell Marisa ON 2/18/2021] multivitamin-minerals, 1 tablet, Oral, Daily  remdesivir, 100 mg, Intravenous, Q24H      Continuous IV Infusions: n/a  PRN Meds:    Network Utilization Review Department  ATTENTION: Please call with any questions or concerns to [phone] and carefully listen to the prompts so that you are directed to the right person  All voicemails are confidential   Judi De La Cruz all requests for admission clinical reviews, approved or denied determinations and any other requests to dedicated fax number below belonging to the campus where the patient is receiving treatment   List of dedicated fax numbers for the Facilities:  [address] DENIALS (Administrative/Medical Necessity) [phone]   [address] (Maternity/NICU/Pediatrics) [phone]   [address] Nikunj Esquivel 60[address] (  [facility] [address] [place] [address] Shayan Rosmery Smith 14[address] [place]) [address] [place] [address] [phone]

## 2021-02-13 VITALS
RESPIRATION RATE: 17 BRPM | OXYGEN SATURATION: 91 % | HEIGHT: 72 IN | WEIGHT: 176.59 LBS | TEMPERATURE: 97.6 F | SYSTOLIC BLOOD PRESSURE: 120 MMHG | BODY MASS INDEX: 23.92 KG/M2 | HEART RATE: 54 BPM | DIASTOLIC BLOOD PRESSURE: 69 MMHG

## 2021-02-13 LAB
ALBUMIN SERPL BCP-MCNC: 2.2 G/DL (ref 3.5–5)
ALP SERPL-CCNC: 97 U/L (ref 46–116)
ALT SERPL W P-5'-P-CCNC: 29 U/L (ref 12–78)
ANION GAP SERPL CALCULATED.3IONS-SCNC: 6 MMOL/L (ref 4–13)
AST SERPL W P-5'-P-CCNC: 27 U/L (ref 5–45)
BASOPHILS # BLD AUTO: 0.04 THOUSANDS/ΜL (ref 0–0.1)
BASOPHILS NFR BLD AUTO: 1 % (ref 0–1)
BILIRUB SERPL-MCNC: 0.71 MG/DL (ref 0.2–1)
BUN SERPL-MCNC: 26 MG/DL (ref 5–25)
CALCIUM ALBUM COR SERPL-MCNC: 9.4 MG/DL (ref 8.3–10.1)
CALCIUM SERPL-MCNC: 8 MG/DL (ref 8.3–10.1)
CHLORIDE SERPL-SCNC: 105 MMOL/L (ref 100–108)
CO2 SERPL-SCNC: 28 MMOL/L (ref 21–32)
CREAT SERPL-MCNC: 1.22 MG/DL (ref 0.6–1.3)
EOSINOPHIL # BLD AUTO: 0.1 THOUSAND/ΜL (ref 0–0.61)
EOSINOPHIL NFR BLD AUTO: 1 % (ref 0–6)
ERYTHROCYTE [DISTWIDTH] IN BLOOD BY AUTOMATED COUNT: 23.8 % (ref 11.6–15.1)
GFR SERPL CREATININE-BSD FRML MDRD: 58 ML/MIN/1.73SQ M
GLUCOSE SERPL-MCNC: 78 MG/DL (ref 65–140)
HCT VFR BLD AUTO: 45.1 % (ref 36.5–49.3)
HGB BLD-MCNC: 13.1 G/DL (ref 12–17)
IMM GRANULOCYTES # BLD AUTO: 0.07 THOUSAND/UL (ref 0–0.2)
IMM GRANULOCYTES NFR BLD AUTO: 1 % (ref 0–2)
LYMPHOCYTES # BLD AUTO: 1.35 THOUSANDS/ΜL (ref 0.6–4.47)
LYMPHOCYTES NFR BLD AUTO: 15 % (ref 14–44)
MCH RBC QN AUTO: 22.2 PG (ref 26.8–34.3)
MCHC RBC AUTO-ENTMCNC: 29 G/DL (ref 31.4–37.4)
MCV RBC AUTO: 76 FL (ref 82–98)
MONOCYTES # BLD AUTO: 0.77 THOUSAND/ΜL (ref 0.17–1.22)
MONOCYTES NFR BLD AUTO: 9 % (ref 4–12)
NEUTROPHILS # BLD AUTO: 6.47 THOUSANDS/ΜL (ref 1.85–7.62)
NEUTS SEG NFR BLD AUTO: 73 % (ref 43–75)
NRBC BLD AUTO-RTO: 0 /100 WBCS
PLATELET # BLD AUTO: 297 THOUSANDS/UL (ref 149–390)
PMV BLD AUTO: 9.9 FL (ref 8.9–12.7)
POTASSIUM SERPL-SCNC: 4.5 MMOL/L (ref 3.5–5.3)
PROT SERPL-MCNC: 6.1 G/DL (ref 6.4–8.2)
RBC # BLD AUTO: 5.91 MILLION/UL (ref 3.88–5.62)
SODIUM SERPL-SCNC: 139 MMOL/L (ref 136–145)
WBC # BLD AUTO: 8.8 THOUSAND/UL (ref 4.31–10.16)

## 2021-02-13 PROCEDURE — 99239 HOSP IP/OBS DSCHRG MGMT >30: CPT | Performed by: PHYSICIAN ASSISTANT

## 2021-02-13 PROCEDURE — 85025 COMPLETE CBC W/AUTO DIFF WBC: CPT | Performed by: INTERNAL MEDICINE

## 2021-02-13 PROCEDURE — 80053 COMPREHEN METABOLIC PANEL: CPT | Performed by: INTERNAL MEDICINE

## 2021-02-13 RX ORDER — ZINC SULFATE 50(220)MG
220 CAPSULE ORAL DAILY
Qty: 4 CAPSULE | Refills: 0 | Status: SHIPPED | OUTPATIENT
Start: 2021-02-14 | End: 2021-09-21

## 2021-02-13 RX ADMIN — OXYCODONE HYDROCHLORIDE AND ACETAMINOPHEN 1000 MG: 500 TABLET ORAL at 08:44

## 2021-02-13 RX ADMIN — FAMOTIDINE 20 MG: 20 TABLET, FILM COATED ORAL at 08:44

## 2021-02-13 RX ADMIN — Medication 2000 UNITS: at 08:43

## 2021-02-13 RX ADMIN — ZINC SULFATE 220 MG (50 MG) CAPSULE 220 MG: CAPSULE at 08:44

## 2021-02-13 RX ADMIN — METOPROLOL TARTRATE 50 MG: 50 TABLET, FILM COATED ORAL at 08:44

## 2021-02-13 RX ADMIN — ASPIRIN 81 MG: 81 TABLET, COATED ORAL at 08:44

## 2021-02-13 RX ADMIN — ENOXAPARIN SODIUM 30 MG: 30 INJECTION SUBCUTANEOUS at 08:43

## 2021-02-13 RX ADMIN — AMLODIPINE BESYLATE 10 MG: 10 TABLET ORAL at 08:44

## 2021-02-13 NOTE — PLAN OF CARE
Problem: Potential for Falls  Goal: Patient will remain free of falls  Description: INTERVENTIONS:  - Assess patient frequently for physical needs  -  Identify cognitive and physical deficits and behaviors that affect risk of falls    -  [place] fall precautions as indicated by assessment   - Educate patient/family on patient safety including physical limitations  - Instruct patient to call for assistance with activity based on assessment  - Modify environment to reduce risk of injury  - Consider OT/PT consult to assist with strengthening/mobility  2/13/2021 1102 by Rhea Cerda RN  Outcome: Adequate for Discharge  2/13/2021 0858 by Rhea Cerda RN  Outcome: Progressing     Problem: PAIN - ADULT  Goal: Verbalizes/displays adequate comfort level or baseline comfort level  Description: Interventions:  - Encourage patient to monitor pain and request assistance  - Assess pain using appropriate pain scale  - Administer analgesics based on type and severity of pain and evaluate response  - Implement non-pharmacological measures as appropriate and evaluate response  - Consider cultural and social influences on pain and pain management  - Notify physician/advanced practitioner if interventions unsuccessful or patient reports new pain  2/13/2021 1102 by Rhea Cerda RN  Outcome: Adequate for Discharge  2/13/2021 0858 by Rhea Cerda RN  Outcome: Progressing     Problem: INFECTION - ADULT  Goal: Absence or prevention of progression during hospitalization  Description: INTERVENTIONS:  - Assess and monitor for signs and symptoms of infection  - Monitor lab/diagnostic results  - Monitor all insertion sites, i e  indwelling lines, tubes, and drains  - Monitor endotracheal if appropriate and nasal secretions for changes in amount and color  - [place] appropriate cooling/warming therapies per order  - Administer medications as ordered  - Instruct and encourage patient and family to use good hand hygiene technique  - Identify and instruct in appropriate isolation precautions for identified infection/condition  2/13/2021 1102 by Serena Shah RN  Outcome: Adequate for Discharge  2/13/2021 0858 by Serena Shah RN  Outcome: Progressing     Problem: SAFETY ADULT  Goal: Patient will remain free of falls  Description: INTERVENTIONS:  - Assess patient frequently for physical needs  -  Identify cognitive and physical deficits and behaviors that affect risk of falls    -  [place] fall precautions as indicated by assessment   - Educate patient/family on patient safety including physical limitations  - Instruct patient to call for assistance with activity based on assessment  - Modify environment to reduce risk of injury  - Consider OT/PT consult to assist with strengthening/mobility  2/13/2021 1102 by Serena Shah RN  Outcome: Adequate for Discharge  2/13/2021 0858 by Serena Shah RN  Outcome: Progressing  Goal: Maintain or return to baseline ADL function  Description: INTERVENTIONS:  -  Assess patient's ability to carry out ADLs; assess patient's baseline for ADL function and identify physical deficits which impact ability to perform ADLs (bathing, care of mouth/teeth, toileting, grooming, dressing, etc )  - Assess/evaluate cause of self-care deficits   - Assess range of motion  - Assess patient's mobility; develop plan if impaired  - Assess patient's need for assistive devices and provide as appropriate  - Encourage maximum independence but intervene and supervise when necessary  - Involve family in performance of ADLs  - Assess for home care needs following discharge   - Consider OT consult to assist with ADL evaluation and planning for discharge  - Provide patient education as appropriate  2/13/2021 1102 by Serena Shah RN  Outcome: Adequate for Discharge  2/13/2021 0858 by Serena Shah RN  Outcome: Progressing  Goal: Maintain or return mobility status to optimal level  Description: INTERVENTIONS:  - Assess patient's baseline mobility status (ambulation, transfers, stairs, etc )    - Identify cognitive and physical deficits and behaviors that affect mobility  - Identify mobility aids required to assist with transfers and/or ambulation (gait belt, sit-to-stand, lift, walker, cane, etc )  - [place] fall precautions as indicated by assessment  - Record patient progress and toleration of activity level on Mobility SBAR; progress patient to next Phase/Stage  - Instruct patient to call for assistance with activity based on assessment  - Consider rehabilitation consult to assist with strengthening/weightbearing, etc   2/13/2021 1102 by Rhea Cerda RN  Outcome: Adequate for Discharge  2/13/2021 0858 by Rhea Cerda RN  Outcome: Progressing     Problem: DISCHARGE PLANNING  Goal: Discharge to home or other facility with appropriate resources  Description: INTERVENTIONS:  - Identify barriers to discharge w/patient and caregiver  - Arrange for needed discharge resources and transportation as appropriate  - Identify discharge learning needs (meds, wound care, etc )  - Arrange for interpretive services to assist at discharge as needed  - Refer to Case Management Department for coordinating discharge planning if the patient needs post-hospital services based on physician/advanced practitioner order or complex needs related to functional status, cognitive ability, or social support system  2/13/2021 1102 by Rhea Cerda RN  Outcome: Adequate for Discharge  2/13/2021 0858 by Rhea Cerda RN  Outcome: Progressing     Problem: Knowledge Deficit  Goal: Patient/family/caregiver demonstrates understanding of disease process, treatment plan, medications, and discharge instructions  Description: Complete learning assessment and assess knowledge base    Interventions:  - Provide teaching at level of understanding  - Provide teaching via preferred learning methods  2/13/2021 1102 by Alvarez Gallardo Dedrick Ferro RN  Outcome: Adequate for Discharge  2/13/2021 0858 by Mitchell Booker RN  Outcome: Progressing

## 2021-02-13 NOTE — NURSING NOTE
Pt  D/C home  D/C instructions reviewed with pt, verbalized understanding  Pt  IV removed  Pt  D/C from [employer] monitor  Pt  Sent with paper work, meds from home, and all other personal items  Pt  Escorted to door by RN in w/c

## 2021-02-13 NOTE — SOCIAL WORK
Pt will d/c home today  CM called pt in the his room to discuss d/c planning  Pt lives with wife in a ranch style home with 1 step to enter  Pt's wife also has covid but is doing ok at home  Pt has a SPC at home; no other DME; CM offered to order a roller walker for him through his insurance but he declined this - he states he has access to a roller walker if needed  Pt will need home PT  A post acute care recommendation was made by your care team for [facility]  Discussed [place] of Choice with patient  List of agencies given to patient over the phone  patient aware the list is custom filtered for them by preference  and that [facility] post acute providers are designated  Pt wants [facility]  Uses [facility] in [place]  Pt denied other d/c needs  Pt's daughter will transport home

## 2021-02-13 NOTE — PLAN OF CARE
Problem: Potential for Falls  Goal: Patient will remain free of falls  Description: INTERVENTIONS:  - Assess patient frequently for physical needs  -  Identify cognitive and physical deficits and behaviors that affect risk of falls    -  [place] fall precautions as indicated by assessment   - Educate patient/family on patient safety including physical limitations  - Instruct patient to call for assistance with activity based on assessment  - Modify environment to reduce risk of injury  - Consider OT/PT consult to assist with strengthening/mobility  Outcome: Progressing     Problem: PAIN - ADULT  Goal: Verbalizes/displays adequate comfort level or baseline comfort level  Description: Interventions:  - Encourage patient to monitor pain and request assistance  - Assess pain using appropriate pain scale  - Administer analgesics based on type and severity of pain and evaluate response  - Implement non-pharmacological measures as appropriate and evaluate response  - Consider cultural and social influences on pain and pain management  - Notify physician/advanced practitioner if interventions unsuccessful or patient reports new pain  Outcome: Progressing     Problem: INFECTION - ADULT  Goal: Absence or prevention of progression during hospitalization  Description: INTERVENTIONS:  - Assess and monitor for signs and symptoms of infection  - Monitor lab/diagnostic results  - Monitor all insertion sites, i e  indwelling lines, tubes, and drains  - Monitor endotracheal if appropriate and nasal secretions for changes in amount and color  - [place] appropriate cooling/warming therapies per order  - Administer medications as ordered  - Instruct and encourage patient and family to use good hand hygiene technique  - Identify and instruct in appropriate isolation precautions for identified infection/condition  Outcome: Progressing     Problem: SAFETY ADULT  Goal: Patient will remain free of falls  Description: INTERVENTIONS:  - Assess patient frequently for physical needs  -  Identify cognitive and physical deficits and behaviors that affect risk of falls    -  [place] fall precautions as indicated by assessment   - Educate patient/family on patient safety including physical limitations  - Instruct patient to call for assistance with activity based on assessment  - Modify environment to reduce risk of injury  - Consider OT/PT consult to assist with strengthening/mobility  Outcome: Progressing  Goal: Maintain or return to baseline ADL function  Description: INTERVENTIONS:  -  Assess patient's ability to carry out ADLs; assess patient's baseline for ADL function and identify physical deficits which impact ability to perform ADLs (bathing, care of mouth/teeth, toileting, grooming, dressing, etc )  - Assess/evaluate cause of self-care deficits   - Assess range of motion  - Assess patient's mobility; develop plan if impaired  - Assess patient's need for assistive devices and provide as appropriate  - Encourage maximum independence but intervene and supervise when necessary  - Involve family in performance of ADLs  - Assess for home care needs following discharge   - Consider OT consult to assist with ADL evaluation and planning for discharge  - Provide patient education as appropriate  Outcome: Progressing  Goal: Maintain or return mobility status to optimal level  Description: INTERVENTIONS:  - Assess patient's baseline mobility status (ambulation, transfers, stairs, etc )    - Identify cognitive and physical deficits and behaviors that affect mobility  - Identify mobility aids required to assist with transfers and/or ambulation (gait belt, sit-to-stand, lift, walker, cane, etc )  - [place] fall precautions as indicated by assessment  - Record patient progress and toleration of activity level on Mobility SBAR; progress patient to next Phase/Stage  - Instruct patient to call for assistance with activity based on assessment  - Consider rehabilitation consult to assist with strengthening/weightbearing, etc   Outcome: Progressing     Problem: DISCHARGE PLANNING  Goal: Discharge to home or other facility with appropriate resources  Description: INTERVENTIONS:  - Identify barriers to discharge w/patient and caregiver  - Arrange for needed discharge resources and transportation as appropriate  - Identify discharge learning needs (meds, wound care, etc )  - Arrange for interpretive services to assist at discharge as needed  - Refer to Case Management Department for coordinating discharge planning if the patient needs post-hospital services based on physician/advanced practitioner order or complex needs related to functional status, cognitive ability, or social support system  Outcome: Progressing     Problem: Knowledge Deficit  Goal: Patient/family/caregiver demonstrates understanding of disease process, treatment plan, medications, and discharge instructions  Description: Complete learning assessment and assess knowledge base    Interventions:  - Provide teaching at level of understanding  - Provide teaching via preferred learning methods  Outcome: Progressing

## 2021-02-13 NOTE — DISCHARGE SUMMARY
Discharge Summary - Shruthi Krishnan 77 y o  male MRN: [id number]    Unit/Bed#: [place] [facility] 87 221-01 Encounter: [id number]    Admission Date:   Admission Orders (From admission, onward)     Ordered        02/11/21 1246  Inpatient Admission  Once                     Admitting Diagnosis: Hypoxia [R09 02]  History of hypertension [Z86 79]  History of coronary artery disease [Z86 79]  Shortness of breath [R06 02]  COVID-19 virus infection [U07 1]    HPI: Selina De La Paz is a 77 y o male who presented with generalized weakness, poor appetite, malaise and myalgias  Patient 1st developed symptoms on February 1st which include fatigue, myalgias, URI symptoms where he was seen by his PCP and was sent for COVID test, which came back positive  At that time, patient was started on vitamin-C, vitamin-D, multivitamins as well as instructed to isolate  Patient continued to have worsening weakness as well as myalgias, prompting wife to call EMS  When EMS arrived patient's saturation was 89% on room air patient was transported to the emergency department  On arrival to the emergency department O2 saturation rate 96- 97% on room air  Patient was admitted for unsuccessful outpatient treatment as well as multiple comorbidities including age requiring further monitoring  Procedures Performed:   Orders Placed This Encounter   Procedures    ED ECG Documentation Only       Summary of Hospital Course: On arrival to the ED, patient's saturation was 96-97% on room air  As patient did not require any supplemental O2, he was not started on steroids but due to his comorbidities patient was started on remdesivir as well as vitamins cocktail  His inflammatory markers including D-dimer and CRP were elevated, but currently are downtrending  He was assessed by Physical therapy which cleared him for discharge with home skilled therapy  This morning patient reports feeling better, although continues to have lack of appetite    Patient was counseled on adequate p o  Intake despite acute illness  Significant Findings, Care, Treatment and Services Provided:   CXR- Slight interstitial prominence in the lower lung fields    Complications:  None    Discharge Diagnosis:  COVID    Resolved Problems  Date Reviewed: 2/12/2021    None          Condition at Discharge: fair         Discharge instructions/Information to patient and family:   See after visit summary for information provided to patient and family  Provisions for Follow-Up Care:  See after visit summary for information related to follow-up care and any pertinent home health orders  PCP: Mily Chawla DO    Disposition: See After Visit Summary for discharge disposition information  Planned Readmission: No      Discharge Statement   I spent 30 minutes discharging the patient  This time was spent on the day of discharge  I had direct contact with the patient on the day of discharge  Additional documentation is required if more than 30 minutes were spent on discharge  Discharge Medications:  See after visit summary for reconciled discharge medications provided to patient and family

## 2021-02-13 NOTE — DISCHARGE INSTRUCTIONS

## 2021-02-13 NOTE — PLAN OF CARE
Problem: Potential for Falls  Goal: Patient will remain free of falls  Description: INTERVENTIONS:  - Assess patient frequently for physical needs  -  Identify cognitive and physical deficits and behaviors that affect risk of falls    -  [place] fall precautions as indicated by assessment   - Educate patient/family on patient safety including physical limitations  - Instruct patient to call for assistance with activity based on assessment  - Modify environment to reduce risk of injury  - Consider OT/PT consult to assist with strengthening/mobility  Outcome: Progressing     Problem: PAIN - ADULT  Goal: Verbalizes/displays adequate comfort level or baseline comfort level  Description: Interventions:  - Encourage patient to monitor pain and request assistance  - Assess pain using appropriate pain scale  - Administer analgesics based on type and severity of pain and evaluate response  - Implement non-pharmacological measures as appropriate and evaluate response  - Consider cultural and social influences on pain and pain management  - Notify physician/advanced practitioner if interventions unsuccessful or patient reports new pain  Outcome: Progressing     Problem: INFECTION - ADULT  Goal: Absence or prevention of progression during hospitalization  Description: INTERVENTIONS:  - Assess and monitor for signs and symptoms of infection  - Monitor lab/diagnostic results  - Monitor all insertion sites, i e  indwelling lines, tubes, and drains  - Monitor endotracheal if appropriate and nasal secretions for changes in amount and color  - [place] appropriate cooling/warming therapies per order  - Administer medications as ordered  - Instruct and encourage patient and family to use good hand hygiene technique  - Identify and instruct in appropriate isolation precautions for identified infection/condition  Outcome: Progressing     Problem: SAFETY ADULT  Goal: Patient will remain free of falls  Description: INTERVENTIONS:  - Assess patient frequently for physical needs  -  Identify cognitive and physical deficits and behaviors that affect risk of falls    -  [place] fall precautions as indicated by assessment   - Educate patient/family on patient safety including physical limitations  - Instruct patient to call for assistance with activity based on assessment  - Modify environment to reduce risk of injury  - Consider OT/PT consult to assist with strengthening/mobility  Outcome: Progressing  Goal: Maintain or return to baseline ADL function  Description: INTERVENTIONS:  -  Assess patient's ability to carry out ADLs; assess patient's baseline for ADL function and identify physical deficits which impact ability to perform ADLs (bathing, care of mouth/teeth, toileting, grooming, dressing, etc )  - Assess/evaluate cause of self-care deficits   - Assess range of motion  - Assess patient's mobility; develop plan if impaired  - Assess patient's need for assistive devices and provide as appropriate  - Encourage maximum independence but intervene and supervise when necessary  - Involve family in performance of ADLs  - Assess for home care needs following discharge   - Consider OT consult to assist with ADL evaluation and planning for discharge  - Provide patient education as appropriate  Outcome: Progressing  Goal: Maintain or return mobility status to optimal level  Description: INTERVENTIONS:  - Assess patient's baseline mobility status (ambulation, transfers, stairs, etc )    - Identify cognitive and physical deficits and behaviors that affect mobility  - Identify mobility aids required to assist with transfers and/or ambulation (gait belt, sit-to-stand, lift, walker, cane, etc )  - [place] fall precautions as indicated by assessment  - Record patient progress and toleration of activity level on Mobility SBAR; progress patient to next Phase/Stage  - Instruct patient to call for assistance with activity based on assessment  - Consider rehabilitation consult to assist with strengthening/weightbearing, etc   Outcome: Progressing     Problem: DISCHARGE PLANNING  Goal: Discharge to home or other facility with appropriate resources  Description: INTERVENTIONS:  - Identify barriers to discharge w/patient and caregiver  - Arrange for needed discharge resources and transportation as appropriate  - Identify discharge learning needs (meds, wound care, etc )  - Arrange for interpretive services to assist at discharge as needed  - Refer to Case Management Department for coordinating discharge planning if the patient needs post-hospital services based on physician/advanced practitioner order or complex needs related to functional status, cognitive ability, or social support system  Outcome: Progressing     Problem: Knowledge Deficit  Goal: Patient/family/caregiver demonstrates understanding of disease process, treatment plan, medications, and discharge instructions  Description: Complete learning assessment and assess knowledge base    Interventions:  - Provide teaching at level of understanding  - Provide teaching via preferred learning methods  Outcome: Progressing

## 2021-02-16 ENCOUNTER — TELEPHONE (OUTPATIENT)
Dept: FAMILY MEDICINE CLINIC | Facility: CLINIC | Age: 76
End: 2021-02-16

## 2021-02-16 NOTE — TELEPHONE ENCOUNTER
Pt spouse called requesting we fax over pt's hospital summary to insurance company     Faxed to [phone]

## 2021-02-17 ENCOUNTER — DOCUMENTATION (OUTPATIENT)
Dept: SOCIAL WORK | Facility: HOSPITAL | Age: 76
End: 2021-02-17

## 2021-02-18 NOTE — PROGRESS NOTES
Admission Report at [facility] has admitted your patient to [facility] service with the following disciplines:      PT  This report is informational only, no responses is needed  Primary focus of home health care pulmonary  Patient stated goals of care to get stronger  to be more like myself  to not be so tired  Anticipated visit pattern starting week of 02 21 21   2wk1 and 1wk2  Significant clinical findings SpO2 WNL during and following activity 93 to 95 percent on roomair  Pt reports inc SOB and fatigue following minimal activity and ambulation  Needs follow up physician appointments scheduled PCP Dr Carli Lewis      Thank you for allowing us to participate in the care of your patient        Rell Spencer, PT

## 2021-02-19 ENCOUNTER — TELEPHONE (OUTPATIENT)
Dept: FAMILY MEDICINE CLINIC | Facility: CLINIC | Age: 76
End: 2021-02-19

## 2021-02-19 NOTE — TELEPHONE ENCOUNTER
Pt spouse called again  Magalie Damon never received paperwork  Faxed again      Faxed to [phone]

## 2021-02-19 NOTE — TELEPHONE ENCOUNTER
Scheduled patient for covid follow up in office 3/8  Positive covid on  2/3, discarged from ER 2/11  Quarintine period is over but PT Is having extreme fatigue and isn't able to eat or drink much still  No other symptoms - no fever  I had to override a same day slot to get him in, I didn't know if there was anything you could do to get him in earlier  Wife, Marleny Tam, is also a patient of yours with positive covid and needs note to return to work  Her quarantine period is over 2/21  Would you be able to evaluate her during yesika's apt for return to work note? Or a single slot virtual earlier? Was unsure how you wanted to go about this  Please advise

## 2021-02-24 ENCOUNTER — OFFICE VISIT (OUTPATIENT)
Dept: FAMILY MEDICINE CLINIC | Facility: CLINIC | Age: 76
End: 2021-02-24
Payer: COMMERCIAL

## 2021-02-24 VITALS
WEIGHT: 166 LBS | RESPIRATION RATE: 16 BRPM | HEART RATE: 65 BPM | HEIGHT: 72 IN | BODY MASS INDEX: 22.48 KG/M2 | DIASTOLIC BLOOD PRESSURE: 64 MMHG | TEMPERATURE: 97.2 F | OXYGEN SATURATION: 96 % | SYSTOLIC BLOOD PRESSURE: 100 MMHG

## 2021-02-24 DIAGNOSIS — U07.1 COVID-19 VIRUS INFECTION: Primary | ICD-10-CM

## 2021-02-24 PROCEDURE — 99214 OFFICE O/P EST MOD 30 MIN: CPT | Performed by: FAMILY MEDICINE

## 2021-02-24 NOTE — ASSESSMENT & PLAN NOTE
This is a COVID follow-up for patient who developed symptoms on February 1st of fever, malaise, anosmia  He tested positive on February 3rd  Present to the emergency room on February 11th and spent 3 days in the hospital   Since that time, he still complains of ongoing fatigue and anosmia  Denies any fevers, chills, cough, shortness of breath, chest pain  He is tolerating fluids well and feels his appetite is returning to normal   Lungs are clear today  Pulse ox 96%  Other vitals are stable  Recommend continue OTC vitamin-C, vitamin-D, multivitamin  Continue adequate fluid hydration along with adequate diet  Discussed possibility of long-haul syndrome    He also was advised to call 911 should chest pain or shortness of breath develop

## 2021-02-24 NOTE — PROGRESS NOTES
[facility]      NAME: Rosa Torre  AGE: 77 y o  SEX: male  : 1945   MRN: [id number]    DATE: 2021  TIME: 2:09 PM    Assessment and Plan     Problem List Items Addressed This Visit     COVID-19 virus infection - Primary      This is a COVID follow-up for patient who developed symptoms on  of fever, malaise, anosmia  He tested positive on   Present to the emergency room on  and spent 3 days in the hospital   Since that time, he still complains of ongoing fatigue and anosmia  Denies any fevers, chills, cough, shortness of breath, chest pain  He is tolerating fluids well and feels his appetite is returning to normal   Lungs are clear today  Pulse ox 96%  Other vitals are stable  Recommend continue OTC vitamin-C, vitamin-D, multivitamin  Continue adequate fluid hydration along with adequate diet  Discussed possibility of long-haul syndrome  He also was advised to call 911 should chest pain or shortness of breath develop                     Return to office in: call further probs    Chief Complaint     Chief Complaint   Patient presents with    Follow-up     covid f/u       History of Present Illness       This is a COVID follow-up for patient who developed symptoms on  of fever, malaise, anosmia  He tested positive on   Present to the emergency room on  and spent 3 days in the hospital   Since that time, he still complains of ongoing fatigue and anosmia  Denies any fevers, chills, cough, shortness of breath, chest pain  He is tolerating fluids well and feels his appetite is returning to normal       The following portions of the patient's history were reviewed and updated as appropriate: allergies, current medications, past family history, past medical history, past social history, past surgical history and problem list     Review of Systems   Review of Systems   Constitutional: Positive for fatigue  Negative for fever  Respiratory: Negative  Negative for cough  Cardiovascular: Negative  Gastrointestinal: Negative  Genitourinary: Negative  Active Problem List     Patient Active Problem List   Diagnosis    Gastritis    Benign essential hypertension    Cervical radiculopathy    Coronary artery disease    Elevated PSA    Hyperlipidemia    Polycythemia vera (HCC)    Prediabetes    Tobacco abuse    Chronic left shoulder pain    Neck pain    Cervical spondylosis without myelopathy    BPH with obstruction/lower urinary tract symptoms    Strain of lumbar region    COVID-19 virus infection       Objective   /64 (BP Location: Right arm, Patient Position: Sitting, Cuff Size: Adult)   Pulse 65   Temp (!) 97 2 °F (36 2 °C) (Tympanic)   Resp 16   Ht 5' 11 65" (1 82 m)   Wt 75 3 kg (166 lb)   SpO2 96%   BMI 22 73 kg/m²     Physical Exam  Cardiovascular:      Rate and Rhythm: Normal rate and regular rhythm  Heart sounds: Normal heart sounds  Comments: Carotids: no bruits  Ext: no edema  Pulmonary:      Effort: Pulmonary effort is normal  No respiratory distress  Breath sounds: No wheezing or rales  Psychiatric:         Behavior: Behavior normal          Thought Content:  Thought content normal          Pertinent Laboratory/Diagnostic Studies:  hosp records reviewed    Current Medications     Current Outpatient Medications:     amLODIPine (NORVASC) 10 mg tablet, Take 10 mg by mouth daily, Disp: , Rfl:     aspirin (ECOTRIN LOW STRENGTH) 81 mg EC tablet, Take 81 mg by mouth daily, Disp: , Rfl:     atorvastatin (LIPITOR) 80 mg tablet, Take 80 mg by mouth daily, Disp: , Rfl:     Cholecalciferol (Vitamin D3) 50 MCG (2000 UT) TABS, Take 2,000 Units by mouth daily, Disp: , Rfl:     metoprolol tartrate (LOPRESSOR) 50 mg tablet, Take 50 mg by mouth 2 (two) times a day , Disp: , Rfl:     Multiple Vitamin (MULTIVITAMIN) capsule, Take 1 capsule by mouth daily, Disp: , Rfl:   nitroglycerin (NITROSTAT) 0 4 mg SL tablet, Place 0 4 mg under the tongue every 5 (five) minutes as needed for chest pain, Disp: , Rfl:     Omega-3 Fatty Acids (FISH OIL) 1200 MG CAPS, Take 1,200 mg by mouth 2 (two) times a day  , Disp: , Rfl:     ascorbic acid (VITAMIN C) 1000 MG tablet, Take 1 tablet (1,000 mg total) by mouth every 12 (twelve) hours for 10 doses, Disp: 10 tablet, Rfl: 0    zinc sulfate (ZINCATE) 220 mg capsule, Take 1 capsule (220 mg total) by mouth daily for 4 doses, Disp: 4 capsule, Rfl: 0    Health Maintenance     Health Maintenance   Topic Date Due    Hepatitis C Screening  1945    SLP PLAN OF CARE  1945    COVID-19 Vaccine (1 of 2) 09/18/1962    DTaP,Tdap,and Td Vaccines (1 - Tdap) 09/18/1967    Pneumococcal Vaccine: 65+ Years (1 of 1 - PPSV23) 09/18/2011    PT PLAN OF CARE  09/14/2018    Colonoscopy Surveillance  05/22/2020    Influenza Vaccine (1) 09/01/2020    Fall Risk  01/11/2022    Depression Screening PHQ  01/11/2022    Medicare Annual Wellness Visit (AWV)  01/11/2022    BMI: Adult  02/24/2022    Colorectal Cancer Screening  05/22/2027    HIB Vaccine  Aged Out    Hepatitis B Vaccine  Aged Out    IPV Vaccine  Aged Out    Hepatitis A Vaccine  Aged Out    Meningococcal ACWY Vaccine  Aged Out    HPV Vaccine  Aged Out     Immunization History   Administered Date(s) Administered    Pneumococcal Polysaccharide PPV23 1945       DO [facility]

## 2021-04-19 DIAGNOSIS — I25.10 CORONARY ARTERY DISEASE WITHOUT ANGINA PECTORIS, UNSPECIFIED VESSEL OR LESION TYPE, UNSPECIFIED WHETHER NATIVE OR TRANSPLANTED HEART: Primary | ICD-10-CM

## 2021-04-19 DIAGNOSIS — E78.2 MIXED HYPERLIPIDEMIA: ICD-10-CM

## 2021-04-19 RX ORDER — ATORVASTATIN CALCIUM 80 MG/1
80 TABLET, FILM COATED ORAL DAILY
Qty: 90 TABLET | Refills: 1 | Status: SHIPPED | OUTPATIENT
Start: 2021-04-19 | End: 2021-08-04

## 2021-04-21 ENCOUNTER — HOSPITAL ENCOUNTER (OUTPATIENT)
Dept: INFUSION CENTER | Facility: CLINIC | Age: 76
Discharge: HOME | End: 2021-04-21
Payer: COMMERCIAL

## 2021-04-21 ENCOUNTER — APPOINTMENT (OUTPATIENT)
Dept: LAB | Facility: CLINIC | Age: 76
End: 2021-04-21
Payer: COMMERCIAL

## 2021-04-21 VITALS
HEART RATE: 50 BPM | SYSTOLIC BLOOD PRESSURE: 133 MMHG | DIASTOLIC BLOOD PRESSURE: 79 MMHG | TEMPERATURE: 98 F | RESPIRATION RATE: 18 BRPM

## 2021-04-21 DIAGNOSIS — D45 PV (POLYCYTHEMIA VERA) (HCC): Primary | ICD-10-CM

## 2021-04-21 DIAGNOSIS — D45 PV (POLYCYTHEMIA VERA) (HCC): ICD-10-CM

## 2021-04-21 DIAGNOSIS — D45 POLYCYTHEMIA VERA (HCC): ICD-10-CM

## 2021-04-21 LAB
ANISOCYTOSIS BLD QL SMEAR: PRESENT
BASOPHILS # BLD MANUAL: 0 THOUSAND/UL (ref 0–0.1)
BASOPHILS NFR MAR MANUAL: 0 % (ref 0–1)
EOSINOPHIL # BLD MANUAL: 0.45 THOUSAND/UL (ref 0–0.4)
EOSINOPHIL NFR BLD MANUAL: 4 % (ref 0–6)
ERYTHROCYTE [DISTWIDTH] IN BLOOD BY AUTOMATED COUNT: 21.6 % (ref 11.6–15.1)
FERRITIN SERPL-MCNC: 23 NG/ML (ref 8–388)
HCT VFR BLD AUTO: 46.7 % (ref 36.5–49.3)
HGB BLD-MCNC: 14.9 G/DL (ref 12–17)
LYMPHOCYTES # BLD AUTO: 2.26 THOUSAND/UL (ref 0.6–4.47)
LYMPHOCYTES # BLD AUTO: 20 % (ref 14–44)
MCH RBC QN AUTO: 24.6 PG (ref 26.8–34.3)
MCHC RBC AUTO-ENTMCNC: 31.9 G/DL (ref 31.4–37.4)
MCV RBC AUTO: 77 FL (ref 82–98)
MONOCYTES # BLD AUTO: 0.9 THOUSAND/UL (ref 0–1.22)
MONOCYTES NFR BLD: 8 % (ref 4–12)
NEUTROPHILS # BLD MANUAL: 7.68 THOUSAND/UL (ref 1.85–7.62)
NEUTS BAND NFR BLD MANUAL: 5 % (ref 0–8)
NEUTS SEG NFR BLD AUTO: 63 % (ref 43–75)
OVALOCYTES BLD QL SMEAR: PRESENT
PLATELET # BLD AUTO: 239 THOUSANDS/UL (ref 149–390)
PLATELET BLD QL SMEAR: ADEQUATE
PMV BLD AUTO: 10.2 FL (ref 8.9–12.7)
RBC # BLD AUTO: 6.06 MILLION/UL (ref 3.88–5.62)
TOTAL CELLS COUNTED SPEC: 100
WBC # BLD AUTO: 11.3 THOUSAND/UL (ref 4.31–10.16)

## 2021-04-21 PROCEDURE — 82728 ASSAY OF FERRITIN: CPT

## 2021-04-21 PROCEDURE — 36415 COLL VENOUS BLD VENIPUNCTURE: CPT

## 2021-04-21 PROCEDURE — 85027 COMPLETE CBC AUTOMATED: CPT | Performed by: INTERNAL MEDICINE

## 2021-04-21 PROCEDURE — 85007 BL SMEAR W/DIFF WBC COUNT: CPT | Performed by: INTERNAL MEDICINE

## 2021-04-21 NOTE — PROGRESS NOTES
Pt here for therapeutic phlebotomy  Pt went to lab this morning, but only ferritin was drawn at that time  CBC drawn in infusion center  Pt opted to reschedule and come back on Friday for phlebotomy instead of waiting for CBC results today  Appt made and pt is aware

## 2021-04-23 ENCOUNTER — HOSPITAL ENCOUNTER (OUTPATIENT)
Dept: INFUSION CENTER | Facility: CLINIC | Age: 76
Discharge: HOME | End: 2021-04-23
Payer: COMMERCIAL

## 2021-04-23 VITALS
TEMPERATURE: 97.7 F | SYSTOLIC BLOOD PRESSURE: 123 MMHG | HEART RATE: 59 BPM | DIASTOLIC BLOOD PRESSURE: 80 MMHG | RESPIRATION RATE: 18 BRPM

## 2021-04-23 DIAGNOSIS — D45 POLYCYTHEMIA VERA (HCC): Primary | ICD-10-CM

## 2021-04-23 LAB
FERRITIN SERPL-MCNC: 30 NG/ML (ref 8–388)
IRON SATN MFR SERPL: 10 %
IRON SERPL-MCNC: 41 UG/DL (ref 65–175)
TIBC SERPL-MCNC: 410 UG/DL (ref 250–450)

## 2021-04-23 PROCEDURE — 99195 PHLEBOTOMY: CPT

## 2021-04-23 PROCEDURE — 83550 IRON BINDING TEST: CPT | Performed by: INTERNAL MEDICINE

## 2021-04-23 PROCEDURE — 83540 ASSAY OF IRON: CPT | Performed by: INTERNAL MEDICINE

## 2021-04-23 PROCEDURE — 82728 ASSAY OF FERRITIN: CPT | Performed by: INTERNAL MEDICINE

## 2021-04-23 NOTE — PROGRESS NOTES
Patient here for phlebotomy  All labs reviewed and hct parameters within ordered range  Verified with second RN, D  Colon  Patient tolerated 500 ml removal  Documenting RN did draw Iron Panel as well  AVS printed and next appointment scheduled  Labs needed for appointment written on AVS as well as reviewed with patient, patient expressed understanding  Vitals post phlebotomy stable and patient remained for observation time, left infusion center then in baseline condition

## 2021-06-30 ENCOUNTER — APPOINTMENT (OUTPATIENT)
Dept: LAB | Facility: MEDICAL CENTER | Age: 76
End: 2021-06-30
Payer: COMMERCIAL

## 2021-06-30 DIAGNOSIS — R73.03 PREDIABETES: ICD-10-CM

## 2021-06-30 DIAGNOSIS — E78.2 MIXED HYPERLIPIDEMIA: ICD-10-CM

## 2021-06-30 DIAGNOSIS — I25.10 CVD (CARDIOVASCULAR DISEASE): ICD-10-CM

## 2021-06-30 DIAGNOSIS — R53.83 FATIGUE, UNSPECIFIED TYPE: ICD-10-CM

## 2021-06-30 DIAGNOSIS — I10 BENIGN ESSENTIAL HYPERTENSION: ICD-10-CM

## 2021-06-30 DIAGNOSIS — I10 ESSENTIAL HYPERTENSION, MALIGNANT: ICD-10-CM

## 2021-06-30 DIAGNOSIS — D45 POLYCYTHEMIA VERA (HCC): ICD-10-CM

## 2021-06-30 DIAGNOSIS — E78.6 FAMILIAL LIPOPROTEIN DEFICIENCY: ICD-10-CM

## 2021-06-30 LAB
ALBUMIN SERPL BCP-MCNC: 3.5 G/DL (ref 3.5–5)
ALP SERPL-CCNC: 135 U/L (ref 46–116)
ALT SERPL W P-5'-P-CCNC: 26 U/L (ref 12–78)
ANION GAP SERPL CALCULATED.3IONS-SCNC: 3 MMOL/L (ref 4–13)
AST SERPL W P-5'-P-CCNC: 21 U/L (ref 5–45)
BILIRUB SERPL-MCNC: 0.56 MG/DL (ref 0.2–1)
BUN SERPL-MCNC: 16 MG/DL (ref 5–25)
CALCIUM SERPL-MCNC: 8.8 MG/DL (ref 8.3–10.1)
CHLORIDE SERPL-SCNC: 109 MMOL/L (ref 100–108)
CHOLEST SERPL-MCNC: 99 MG/DL (ref 50–200)
CO2 SERPL-SCNC: 26 MMOL/L (ref 21–32)
CREAT SERPL-MCNC: 1.21 MG/DL (ref 0.6–1.3)
EST. AVERAGE GLUCOSE BLD GHB EST-MCNC: 114 MG/DL
FERRITIN SERPL-MCNC: 18 NG/ML (ref 8–388)
GFR SERPL CREATININE-BSD FRML MDRD: 59 ML/MIN/1.73SQ M
GLUCOSE P FAST SERPL-MCNC: 99 MG/DL (ref 65–99)
HBA1C MFR BLD: 5.6 %
HDLC SERPL-MCNC: 25 MG/DL
IRON SATN MFR SERPL: 6 %
IRON SERPL-MCNC: 23 UG/DL (ref 65–175)
LDLC SERPL CALC-MCNC: 49 MG/DL (ref 0–100)
POTASSIUM SERPL-SCNC: 3.9 MMOL/L (ref 3.5–5.3)
PROT SERPL-MCNC: 7.8 G/DL (ref 6.4–8.2)
SODIUM SERPL-SCNC: 138 MMOL/L (ref 136–145)
TIBC SERPL-MCNC: 364 UG/DL (ref 250–450)
TRIGL SERPL-MCNC: 126 MG/DL
TSH SERPL DL<=0.05 MIU/L-ACNC: 1.24 UIU/ML (ref 0.36–3.74)

## 2021-06-30 PROCEDURE — 80061 LIPID PANEL: CPT

## 2021-06-30 PROCEDURE — 83036 HEMOGLOBIN GLYCOSYLATED A1C: CPT

## 2021-06-30 PROCEDURE — 80053 COMPREHEN METABOLIC PANEL: CPT

## 2021-06-30 PROCEDURE — 84443 ASSAY THYROID STIM HORMONE: CPT

## 2021-06-30 PROCEDURE — 83540 ASSAY OF IRON: CPT

## 2021-06-30 PROCEDURE — 83550 IRON BINDING TEST: CPT

## 2021-06-30 PROCEDURE — 82728 ASSAY OF FERRITIN: CPT

## 2021-07-14 ENCOUNTER — OFFICE VISIT (OUTPATIENT)
Dept: FAMILY MEDICINE CLINIC | Facility: CLINIC | Age: 76
End: 2021-07-14
Payer: COMMERCIAL

## 2021-07-14 VITALS
TEMPERATURE: 97.4 F | WEIGHT: 174 LBS | RESPIRATION RATE: 16 BRPM | SYSTOLIC BLOOD PRESSURE: 116 MMHG | OXYGEN SATURATION: 98 % | DIASTOLIC BLOOD PRESSURE: 74 MMHG | HEART RATE: 56 BPM | BODY MASS INDEX: 23.57 KG/M2 | HEIGHT: 72 IN

## 2021-07-14 DIAGNOSIS — I25.10 CORONARY ARTERY DISEASE WITHOUT ANGINA PECTORIS, UNSPECIFIED VESSEL OR LESION TYPE, UNSPECIFIED WHETHER NATIVE OR TRANSPLANTED HEART: ICD-10-CM

## 2021-07-14 DIAGNOSIS — R73.03 PREDIABETES: ICD-10-CM

## 2021-07-14 DIAGNOSIS — D45 POLYCYTHEMIA VERA (HCC): ICD-10-CM

## 2021-07-14 DIAGNOSIS — R97.20 ELEVATED PSA: ICD-10-CM

## 2021-07-14 DIAGNOSIS — E78.2 MIXED HYPERLIPIDEMIA: ICD-10-CM

## 2021-07-14 DIAGNOSIS — Z12.11 SCREEN FOR COLON CANCER: ICD-10-CM

## 2021-07-14 DIAGNOSIS — I10 BENIGN ESSENTIAL HYPERTENSION: Primary | ICD-10-CM

## 2021-07-14 PROCEDURE — 99214 OFFICE O/P EST MOD 30 MIN: CPT | Performed by: FAMILY MEDICINE

## 2021-07-14 NOTE — ASSESSMENT & PLAN NOTE
Status post PCI  Denies any chest pain shortness of breath    Continue regular follow-up with his cardiologist

## 2021-07-14 NOTE — ASSESSMENT & PLAN NOTE
Stable H&H  Hemoglobin 15 9, hematocrit 53 2    Patient still being followed by hematologist every 8 weeks for phlebotomy

## 2021-07-14 NOTE — ASSESSMENT & PLAN NOTE
History of elevated PSA  Status post 2 biopsies and negative MRI  Most recent PSA 17 2  Continue regular follow-up with Urology ( patient has upcoming appointment on August 3rd)

## 2021-07-14 NOTE — PROGRESS NOTES
[facility]      NAME: Dev Nye  AGE: 77 y o  SEX: male  : 1945   MRN: [id number]    DATE: 2021  TIME: 9:25 AM    Assessment and Plan     Problem List Items Addressed This Visit     Benign essential hypertension - Primary      Blood pressure controlled on amlodipine 10 and metoprolol 25 b i d  Coronary artery disease      Status post PCI  Denies any chest pain shortness of breath  Continue regular follow-up with his cardiologist         Elevated PSA      History of elevated PSA  Status post 2 biopsies and negative MRI  Most recent PSA 17 2  Continue regular follow-up with Urology ( patient has upcoming appointment on )  Hyperlipidemia       Cholesterol from  was 99, LDL 49  Doing well on atorvastatin 80         Polycythemia vera (HCC)      Stable H&H  Hemoglobin 15 9, hematocrit 53 2  Patient still being followed by hematologist every 8 weeks for phlebotomy         Prediabetes      Blood sugar from 1999, A1c 5 6%  Continue diet exercise  Other Visit Diagnoses     Screen for colon cancer        Relevant Orders    Ambulatory referral for colonoscopy        Refuses COVID vaccination  Last colonoscopy 17 (3 polyps)  Pt due for repeat  Order placed      Return to office in:  6 months, yearly labs 2022    Chief Complaint     Chief Complaint   Patient presents with    Follow-up     6 months       History of Present Illness      Patient presents for recheck chronic medical problems today  Overall he is doing well without complaints  Doing well on amlodipine and metoprolol for blood pressure  Doing well on atorvastatin for cholesterol  Still being followed by Hematology for polycythemia  Still being followed by Cardiology for CIDP  Still being followed by urology for elevated PSA    Patient had labs done on       The following portions of the patient's history were reviewed and updated as appropriate: allergies, current medications, past family history, past medical history, past social history, past surgical history and problem list     Review of Systems   Review of Systems   Respiratory: Negative  Cardiovascular: Negative  Gastrointestinal: Negative  Genitourinary: Negative  Active Problem List     Patient Active Problem List   Diagnosis    Gastritis    Benign essential hypertension    Cervical radiculopathy    Coronary artery disease    Elevated PSA    Hyperlipidemia    Polycythemia vera ([facility] [place] [address] )    Prediabetes    Tobacco abuse    Chronic left shoulder pain    Neck pain    Cervical spondylosis without myelopathy    BPH with obstruction/lower urinary tract symptoms    Strain of lumbar region    COVID-19 virus infection       Objective   /74 (BP Location: Left arm, Patient Position: Sitting, Cuff Size: Adult)   Pulse 56   Temp (!) 97 4 °F (36 3 °C) (Tympanic)   Resp 16   Ht 5' 11 65" (1 82 m)   Wt 78 9 kg (174 lb)   SpO2 98%   BMI 23 83 kg/m²     Physical Exam  Cardiovascular:      Rate and Rhythm: Normal rate and regular rhythm  Heart sounds: Normal heart sounds  Comments: Carotids: no bruits  Ext: no edema  Pulmonary:      Effort: Pulmonary effort is normal  No respiratory distress  Breath sounds: No wheezing or rales  Psychiatric:         Behavior: Behavior normal          Thought Content:  Thought content normal          Pertinent Laboratory/Diagnostic Studies:  Labs from June 30th reviewed    Current Medications     Current Outpatient Medications:     amLODIPine (NORVASC) 10 mg tablet, Take 10 mg by mouth daily, Disp: , Rfl:     aspirin (ECOTRIN LOW STRENGTH) 81 mg EC tablet, Take 81 mg by mouth daily, Disp: , Rfl:     atorvastatin (LIPITOR) 80 mg tablet, Take 1 tablet (80 mg total) by mouth daily, Disp: 90 tablet, Rfl: 1    Cholecalciferol (Vitamin D3) 50 MCG (2000 UT) TABS, Take 2,000 Units by mouth daily, Disp: , Rfl:     metoprolol tartrate (LOPRESSOR) 50 mg tablet, Take 50 mg by mouth 2 (two) times a day , Disp: , Rfl:     Multiple Vitamin (MULTIVITAMIN) capsule, Take 1 capsule by mouth daily, Disp: , Rfl:     nitroglycerin (NITROSTAT) 0 4 mg SL tablet, Place 0 4 mg under the tongue every 5 (five) minutes as needed for chest pain, Disp: , Rfl:     Omega-3 Fatty Acids (FISH OIL) 1200 MG CAPS, Take 1,200 mg by mouth 2 (two) times a day  , Disp: , Rfl:     ascorbic acid (VITAMIN C) 1000 MG tablet, Take 1 tablet (1,000 mg total) by mouth every 12 (twelve) hours for 10 doses, Disp: 10 tablet, Rfl: 0    zinc sulfate (ZINCATE) 220 mg capsule, Take 1 capsule (220 mg total) by mouth daily for 4 doses, Disp: 4 capsule, Rfl: 0    Health Maintenance     Health Maintenance   Topic Date Due    Hepatitis C Screening  Never done    SLP PLAN OF CARE  Never done    Pneumococcal Vaccine: 65+ Years (1 of 2 - PPSV23) 09/18/1952    COVID-19 Vaccine (1) Never done    DTaP,Tdap,and Td Vaccines (1 - Tdap) Never done    PT PLAN OF CARE  09/14/2018    Colorectal Cancer Screening  05/22/2020    Influenza Vaccine (1) 09/01/2021    Depression Screening PHQ  01/11/2022    Fall Risk  01/11/2022    Medicare Annual Wellness Visit (AWV)  01/11/2022    BMI: Adult  07/14/2022    HIB Vaccine  Aged Out    Hepatitis B Vaccine  Aged Out    IPV Vaccine  Aged Out    Hepatitis A Vaccine  Aged Out    Meningococcal ACWY Vaccine  Aged Out    HPV Vaccine  Aged Out     Immunization History   Administered Date(s) Administered    Pneumococcal Polysaccharide PPV23 1945       Camilo Brown DO  [facility]

## 2021-07-16 ENCOUNTER — HOSPITAL ENCOUNTER (OUTPATIENT)
Dept: INFUSION CENTER | Facility: CLINIC | Age: 76
Discharge: HOME | End: 2021-07-16

## 2021-07-16 VITALS
RESPIRATION RATE: 16 BRPM | SYSTOLIC BLOOD PRESSURE: 118 MMHG | HEART RATE: 60 BPM | TEMPERATURE: 97.4 F | DIASTOLIC BLOOD PRESSURE: 80 MMHG

## 2021-07-16 DIAGNOSIS — Z12.11 SCREEN FOR COLON CANCER: ICD-10-CM

## 2021-07-16 NOTE — PROGRESS NOTES
Pt  Denied new symptoms or concerns today  Labs reviewed  Hct 53 2  Per phlebotomy parameters ok to proceed  Donah Mcardle RN clarified parameters and orders  500ml of blood removed as ordered  Pt  Tolerated well  Pt  Is scheduled q 12 weeks  Pt  Instructed to call office of Dr Mireille Mercer for lab orders  RN will also notify the office of need as pt  Was a pt  Of Dr Tianna Hall and has been changed to Dr Mireille Mercer at this time

## 2021-07-29 ENCOUNTER — APPOINTMENT (OUTPATIENT)
Dept: LAB | Facility: MEDICAL CENTER | Age: 76
End: 2021-07-29
Payer: COMMERCIAL

## 2021-07-29 DIAGNOSIS — R97.20 ELEVATED PSA: ICD-10-CM

## 2021-07-29 LAB — PSA SERPL-MCNC: 17.7 NG/ML (ref 0–4)

## 2021-07-29 PROCEDURE — 84153 ASSAY OF PSA TOTAL: CPT

## 2021-08-02 NOTE — PROGRESS NOTES
Assessment and plan:     Elevated PSA  ·  PSA 17 7 ( 07/29/2021)  ·  PSA density 0 12  ·  MRI 2019 PI-RADS 2  ·  negative prostate biopsy x2    BPH with obstruction/lower urinary tract symptoms  ·  Prostate volume 152 mL  ·  patient requesting surgery for simple prostatectomy with Dr Tea Hodges  ·  case requested for open suprapubic simple prostatectomy  ·  will need medical clearance by PCP as he had COVID in February 2021       THOMAS Dumont    History of Present Illness     Bella Roach is a 77 y o  Patient of Dr Tea Hodges with history of significant BPH and elevated PSA  He is status post negative prostate biopsy x 2  His PSA has hovered around 15 however his most recent PSA for January 2021 was up to 17 2 , it is currently 16  7  an MRI of the prostate the revealed a prostate volume of 152 mL  Component       PSA, Total   Latest Ref Rng & Units       0 0 - 4 0 ng/mL   12/27/2018       12 5 (H)   5/29/2019       14 0 (H)   7/15/2019      10:30 AM 14 3 (H)   7/15/2019      10:30 AM 15 1 (H)   10/28/2019      8:43 AM 14 1 (H)   10/28/2019      8:46 AM 14 0 (H)   5/22/2020       15 0 (H)   11/27/2020       17 2 (H)   7/29/2021       17 7 (H)       He also noted significant bother from his urinary symptoms  He has not opted for any intervention as of yet  He did not respond to medical management is AUA symptom score has been around 23  It was recommended that he undergo a cystoscopy and transrectal ultrasound however based off of his MRI completed in 2019 he is not a candidate for UroLift  He was recommended that he undergo a simple prostatectomy  The patient opted for continued observation; however, would now like to proceed with surgery      Had covid in February 2021      Laboratory     Lab Results   Component Value Date    BUN 16 06/30/2021    CREATININE 1 21 06/30/2021       No components found for: GFR    Lab Results   Component Value Date    CALCIUM 8 8 06/30/2021     11/13/2017    K 3 9 06/30/2021    CO2 26 06/30/2021     (H) 06/30/2021       Lab Results   Component Value Date    WBC 11 99 (H) 06/30/2021    HGB 15 9 06/30/2021    HCT 53 2 (H) 06/30/2021    MCV 80 (L) 06/30/2021     06/30/2021       Lab Results   Component Value Date    PSA 17 7 (H) 07/29/2021    PSA 17 2 (H) 11/27/2020    PSA 15 0 (H) 05/22/2020       No results found for this or any previous visit (from the past 1 hour(s))  @RESULT(URINEMICROSCOPIC)@    @RESULT(URINECULTURE)@    Radiology     MULTIPARAMETRIC MRI OF THE PROSTATE WITH AND WITHOUT CONTRAST-WITH 3-D POSTPROCESSING  11/13/2019     INDICATION:   R97 20: Elevated prostate specific antigen (PSA)      COMPARISON: None     PSA LEVEL: 14 ng/ml  10/28/2019  PRIOR BIOPSY DATE: 2010 and 2012  BIOPSY RESULTS: Negative      TECHNIQUE: The following pulse sequences were obtained:  Small field-of-view axial T1-weighted and multiplanar T2-weighted images; DWI axial and ADC map; large field of view axial T2 weighted images; T1w in-phase and opposed-phase axials of entire pelvis   and dynamic 3D T1w axial before and during IV contrast injection  Imaging performed on 3 0T MRI      CONTRAST:  Gadobutrol (Gadavist) 8 mL of Gadobutrol injection (SINGLE-DOSE)     TECHNICAL LIMITATIONS: None      FINDINGS:     PROSTATE:     Size (AP x TRV x CC): 6 8 x 6 1 x 7 4 cm = 152 mL  Post-biopsy hemorrhage:  None  Central gland enlargement (BPH): Marked      Focal lesions - No dominant lesion  Heterogeneous, compressed peripheral zone   PI-RADSv2 1 Category 2 - Low (clinically significant cancer unlikely)      Seminal vesicle invasion: Not present      URINARY BLADDER: Unremarkable      LYMPH NODES: No pelvic lymphadenopathy      BONES: No suspicious osseous lesion      Note: Clinically significant cancer is defined on pathology/histology as Tyro score greater than or equal to 7, and/or volume of greater than or equal to 0 5 mL, and/or extraprostatic extension      IMPRESSION:     1  PI-RADSv2 1 Category 2 - Low (clinically significant cancer is unlikely to be present)      2  No extraprostatic tumor, seminal vesicle invasion, pelvic lymphadenopathy, or pelvic osseous metastatic disease      3  Calculated prostate volume of 152 mL      Prostate gland boundaries were segmented using 3D advanced post-processing on an independent [email or web address] system workstation with active physician participation  Review of Systems     Review of Systems   Constitutional: Negative for activity change, appetite change, chills, fatigue, fever and unexpected weight change  HENT: Negative for facial swelling  Eyes: Negative for discharge  Respiratory: Negative  Negative for cough and shortness of breath  Cardiovascular: Negative for chest pain and leg swelling  Gastrointestinal: Negative  Negative for abdominal distention, abdominal pain, constipation, diarrhea, nausea and vomiting  Endocrine: Negative  Genitourinary: Negative  Negative for decreased urine volume, difficulty urinating, dysuria, enuresis, flank pain, frequency, genital sores, hematuria and urgency  Incomplete bladder emptying, nocturia x 3-4   Musculoskeletal: Negative for back pain and myalgias  Skin: Negative for pallor and rash  Allergic/Immunologic: Negative  Negative for immunocompromised state  Neurological: Negative for facial asymmetry and speech difficulty  Psychiatric/Behavioral: Negative for agitation and confusion  Allergies     No Known Allergies    Physical Exam     Physical Exam  Constitutional:       General: He is not in acute distress  Appearance: Normal appearance  He is not ill-appearing, toxic-appearing or diaphoretic  HENT:      Head: Normocephalic and atraumatic  Eyes:      General: No scleral icterus  Cardiovascular:      Rate and Rhythm: Normal rate  Pulmonary:      Effort: Pulmonary effort is normal  No respiratory distress     Abdominal: General: Abdomen is flat  There is no distension  Palpations: Abdomen is soft  Tenderness: There is no abdominal tenderness  Musculoskeletal:         General: No swelling  Cervical back: Normal range of motion  Right lower leg: No edema  Left lower leg: No edema  Skin:     General: Skin is warm and dry  Coloration: Skin is not jaundiced or pale  Findings: No rash  Neurological:      General: No focal deficit present  Mental Status: He is alert and oriented to person, place, and time  Gait: Gait normal    Psychiatric:         Mood and Affect: Mood normal          Behavior: Behavior normal          Thought Content:  Thought content normal          Judgment: Judgment normal          Vital Signs     Vitals:    08/03/21 1425   BP: 124/78   Pulse: 70   Weight: 78 7 kg (173 lb 6 4 oz)   Height: 5' 11" (1 803 m)       Current Medications       Current Outpatient Medications:     amLODIPine (NORVASC) 10 mg tablet, Take 10 mg by mouth daily, Disp: , Rfl:     ascorbic acid (VITAMIN C) 1000 MG tablet, Take 1 tablet (1,000 mg total) by mouth every 12 (twelve) hours for 10 doses, Disp: 10 tablet, Rfl: 0    aspirin (ECOTRIN LOW STRENGTH) 81 mg EC tablet, Take 81 mg by mouth daily, Disp: , Rfl:     atorvastatin (LIPITOR) 80 mg tablet, Take 1 tablet (80 mg total) by mouth daily, Disp: 90 tablet, Rfl: 1    Cholecalciferol (Vitamin D3) 50 MCG (2000 UT) TABS, Take 2,000 Units by mouth daily, Disp: , Rfl:     metoprolol tartrate (LOPRESSOR) 50 mg tablet, Take 50 mg by mouth 2 (two) times a day , Disp: , Rfl:     Multiple Vitamin (MULTIVITAMIN) capsule, Take 1 capsule by mouth daily, Disp: , Rfl:     nitroglycerin (NITROSTAT) 0 4 mg SL tablet, Place 0 4 mg under the tongue every 5 (five) minutes as needed for chest pain, Disp: , Rfl:     Omega-3 Fatty Acids (FISH OIL) 1200 MG CAPS, Take 1,200 mg by mouth 2 (two) times a day  , Disp: , Rfl:     zinc sulfate (ZINCATE) 220 mg capsule, Take 1 capsule (220 mg total) by mouth daily for 4 doses (Patient not taking: Reported on 8/3/2021), Disp: 4 capsule, Rfl: 0    Active Problems     Patient Active Problem List   Diagnosis    Gastritis    Benign essential hypertension    Cervical radiculopathy    Coronary artery disease    Elevated PSA    Hyperlipidemia    Polycythemia vera ([facility] [address] )    Prediabetes    Tobacco abuse    Chronic left shoulder pain    Neck pain    Cervical spondylosis without myelopathy    BPH with obstruction/lower urinary tract symptoms    Strain of lumbar region    COVID-19 virus infection       Past Medical History     Past Medical History:   Diagnosis Date    CAD (coronary artery disease)     Last Assessed:  11/4/13    Elevated prostate specific antigen (PSA)     Last Assessed:  12/21/17    Hyperlipidemia     Last Assessed:  11/24/17    Hypertension     Benign essential, Last Assessed:  11/24/17       Surgical History     Past Surgical History:   Procedure Laterality Date    APPENDECTOMY      CT COLONOSCOPY FLX DX W/COLLJ SPEC WHEN PFRMD N/A 5/22/2017    Procedure: COLONOSCOPY;  Surgeon: Anna Singh DO;  Location: [facility]; Service: Gastroenterology    PROSTATE BIOPSY         Family History     Family History   Problem Relation Age of Onset    No Known Problems Mother     Heart disease Father        Social History     Social History     Social History     Tobacco Use   Smoking Status Current Every Day Smoker    Packs/day: 0 50    Types: Cigars, Cigarettes   Smokeless Tobacco Never Used       Past Surgical History:   Procedure Laterality Date    APPENDECTOMY      CT COLONOSCOPY FLX DX W/COLLJ SPEC WHEN PFRMD N/A 5/22/2017    Procedure: COLONOSCOPY;  Surgeon: Anna Singh DO;  Location: [facility];   Service: Gastroenterology    PROSTATE BIOPSY           The following portions of the patient's history were reviewed and updated as appropriate: allergies, current medications, past family history, past medical history, past social history, past surgical history and problem list    Please note :  Voice dictation software has been used to create this document  There may be inadvertent transcription errors      Michelle Jorge

## 2021-08-03 ENCOUNTER — OFFICE VISIT (OUTPATIENT)
Dept: UROLOGY | Facility: CLINIC | Age: 76
End: 2021-08-03
Payer: COMMERCIAL

## 2021-08-03 VITALS
HEIGHT: 71 IN | DIASTOLIC BLOOD PRESSURE: 78 MMHG | BODY MASS INDEX: 24.27 KG/M2 | WEIGHT: 173.4 LBS | SYSTOLIC BLOOD PRESSURE: 124 MMHG | HEART RATE: 70 BPM

## 2021-08-03 DIAGNOSIS — E78.2 MIXED HYPERLIPIDEMIA: ICD-10-CM

## 2021-08-03 DIAGNOSIS — R97.20 ELEVATED PSA: Primary | ICD-10-CM

## 2021-08-03 DIAGNOSIS — N40.1 BPH WITH OBSTRUCTION/LOWER URINARY TRACT SYMPTOMS: ICD-10-CM

## 2021-08-03 DIAGNOSIS — I25.10 CORONARY ARTERY DISEASE WITHOUT ANGINA PECTORIS, UNSPECIFIED VESSEL OR LESION TYPE, UNSPECIFIED WHETHER NATIVE OR TRANSPLANTED HEART: ICD-10-CM

## 2021-08-03 DIAGNOSIS — N13.8 BPH WITH OBSTRUCTION/LOWER URINARY TRACT SYMPTOMS: ICD-10-CM

## 2021-08-03 LAB — POST-VOID RESIDUAL VOLUME, ML POC: 138 ML

## 2021-08-03 PROCEDURE — 51798 US URINE CAPACITY MEASURE: CPT | Performed by: NURSE PRACTITIONER

## 2021-08-03 PROCEDURE — 99212 OFFICE O/P EST SF 10 MIN: CPT | Performed by: NURSE PRACTITIONER

## 2021-08-03 RX ORDER — CEFAZOLIN SODIUM 1 G/50ML
1000 SOLUTION INTRAVENOUS ONCE
Status: CANCELLED | OUTPATIENT
Start: 2021-10-18 | End: 2021-08-03

## 2021-08-03 NOTE — ASSESSMENT & PLAN NOTE
·  Prostate volume 152 mL  ·  patient requesting surgery for simple prostatectomy with Dr Blake Current  ·  case requested for open suprapubic simple prostatectomy  ·  will need medical clearance by PCP as he had COVID in February 2021

## 2021-08-03 NOTE — PATIENT INSTRUCTIONS
Suprapubic Prostatectomy   WHAT YOU NEED TO KNOW:   Suprapubic prostatectomy is surgery to remove part or all of your prostate gland  Your prostate gland is found below your bladder and surrounds the top of your urethra  Your urethra is a tube that carries urine from your bladder to the outside of your body  You may need suprapubic prostatectomy if you have an enlarged prostate  DISCHARGE INSTRUCTIONS:   Call your local emergency number (911 in the [address] [facility]) for any of the following:   · You feel lightheaded, short of breath, and have chest pain  · You cough up blood  Call your doctor or surgeon if:   · Your leg feels warm, tender, and painful  It may look swollen and red  · Blood soaks through your bandage  · You feel the urge to urinate, but no urine comes out  · You have pain in your lower back or abdomen that does not go away  · Your scrotum becomes swollen  · You have a fever or chills  · Your wound is red, swollen, or draining pus  · You have bright red blood in your urine, or your urine is cloudy and smells bad  · Your urine stream becomes slower than normal, or you are urinating only small amounts  · You have questions or concerns about your condition or care  Medicines:   · Antibiotics  help prevent an infection  · Prescription pain medicine  may be given  Ask your healthcare provider how to take this medicine safely  Some prescription pain medicines contain acetaminophen  Do not take other medicines that contain acetaminophen without talking to your healthcare provider  Too much acetaminophen may cause liver damage  Prescription pain medicine may cause constipation  Ask your healthcare provider how to prevent or treat constipation  · Blood thinners  help prevent blood clots  Clots can cause strokes, heart attacks, and death  The following are general safety guidelines to follow while you are taking a blood thinner:    ?  Watch for bleeding and bruising while you take blood thinners  Watch for bleeding from your gums or nose  Watch for blood in your urine and bowel movements  Use a soft washcloth on your skin, and a soft toothbrush to brush your teeth  This can keep your skin and gums from bleeding  If you shave, use an electric shaver  Do not play contact sports  ? Tell your dentist and other healthcare providers that you take a blood thinner  Wear a bracelet or necklace that says you take this medicine  ? Do not start or stop any other medicines unless your healthcare provider tells you to  Many medicines cannot be used with blood thinners  ? Take your blood thinner exactly as prescribed by your healthcare provider  Do not skip does or take less than prescribed  Tell your provider right away if you forget to take your blood thinner, or if you take too much  ? Warfarin  is a blood thinner that you may need to take  The following are things you should be aware of if you take warfarin:     § Foods and medicines can affect the amount of warfarin in your blood  Do not make major changes to your diet while you take warfarin  Warfarin works best when you eat about the same amount of vitamin K every day  Vitamin K is found in green leafy vegetables and certain other foods  Ask for more information about what to eat when you are taking warfarin  § You will need to see your healthcare provider for follow-up visits when you are on warfarin  You will need regular blood tests  These tests are used to decide how much medicine you need  · Take your medicine as directed  Contact your healthcare provider if you think your medicine is not helping or if you have side effects  Tell him or her if you are allergic to any medicine  Keep a list of the medicines, vitamins, and herbs you take  Include the amounts, and when and why you take them  Bring the list or the pill bottles to follow-up visits  Carry your medicine list with you in case of an emergency      [place] catheter care: Keep the bag below your waist  If the bag is too high, urine will flow back into your bladder  This can cause an infection  Do not pull on the catheter  This may cause pain and bleeding, and the catheter may come out  Do not let the catheter tubing kink or twist  A kink or twist will block the flow of urine  Help decrease urine leakage:  After surgery, you may leak urine and have trouble controlling when you urinate  Ask for more information about the following ways to help decrease urine leakage:  · Avoid caffeine  It can cause problems with bladder control and increase your need to urinate  · Do pelvic floor muscle exercises  They may help improve your bladder control  These exercises are done by tightening and relaxing your pelvic muscles  Ask how to do pelvic floor muscle exercises, and how often to do them  · Limit your liquid intake  Drink smaller amounts of liquid throughout the day  Do not drink before bedtime  Ask if you should decrease the amount of liquid you drink each day  This may help you control your bladder  · Wear a pad or adult diapers  These may help absorb leaking urine and decrease odor  Return to your usual activities as directed:  Rest when you need to while you heal after surgery  Slowly start to do more each day  Return to your daily activities as directed  You may be able to return to your daily activities in 4 to 6 weeks  Follow up with your doctor or surgeon as directed: You may need to return to have your catheter removed  You may also need tests to check if you bladder empties completely when you urinate  Write down your questions so you remember to ask them during your visits  © Copyright [employer] 2021 Information is for End User's use only and may not be sold, redistributed or otherwise used for commercial purposes   All illustrations and images included in CareNotes® are the copyrighted property of [employer]  or Loco Roach  The above information is an  only  It is not intended as medical advice for individual conditions or treatments  Talk to your doctor, nurse or pharmacist before following any medical regimen to see if it is safe and effective for you

## 2021-08-03 NOTE — ASSESSMENT & PLAN NOTE
·  PSA 17 7 ( 07/29/2021)  ·  PSA density 0 12  ·  MRI 2019 PI-RADS 2  ·  negative prostate biopsy x2

## 2021-08-04 RX ORDER — ATORVASTATIN CALCIUM 80 MG/1
TABLET, FILM COATED ORAL
Qty: 90 TABLET | Refills: 1 | Status: SHIPPED | OUTPATIENT
Start: 2021-08-04 | End: 2022-01-03

## 2021-08-18 ENCOUNTER — TELEPHONE (OUTPATIENT)
Dept: UROLOGY | Facility: MEDICAL CENTER | Age: 76
End: 2021-08-18

## 2021-09-21 ENCOUNTER — OFFICE VISIT (OUTPATIENT)
Dept: FAMILY MEDICINE CLINIC | Facility: CLINIC | Age: 76
End: 2021-09-21
Payer: COMMERCIAL

## 2021-09-21 VITALS
SYSTOLIC BLOOD PRESSURE: 118 MMHG | WEIGHT: 175.6 LBS | DIASTOLIC BLOOD PRESSURE: 76 MMHG | OXYGEN SATURATION: 98 % | HEIGHT: 71 IN | HEART RATE: 53 BPM | RESPIRATION RATE: 16 BRPM | TEMPERATURE: 98 F | BODY MASS INDEX: 24.58 KG/M2

## 2021-09-21 DIAGNOSIS — Z01.818 PRE-OPERATIVE CLEARANCE: Primary | ICD-10-CM

## 2021-09-21 DIAGNOSIS — J43.2 CENTRILOBULAR EMPHYSEMA (HCC): ICD-10-CM

## 2021-09-21 DIAGNOSIS — I25.10 CORONARY ARTERY DISEASE WITHOUT ANGINA PECTORIS, UNSPECIFIED VESSEL OR LESION TYPE, UNSPECIFIED WHETHER NATIVE OR TRANSPLANTED HEART: ICD-10-CM

## 2021-09-21 DIAGNOSIS — D45 POLYCYTHEMIA VERA (HCC): ICD-10-CM

## 2021-09-21 DIAGNOSIS — E78.2 MIXED HYPERLIPIDEMIA: ICD-10-CM

## 2021-09-21 DIAGNOSIS — I10 BENIGN ESSENTIAL HYPERTENSION: ICD-10-CM

## 2021-09-21 DIAGNOSIS — R97.20 ELEVATED PSA: ICD-10-CM

## 2021-09-21 PROBLEM — F17.210 CIGARETTE SMOKER: Status: ACTIVE | Noted: 2019-04-25

## 2021-09-21 PROCEDURE — 99213 OFFICE O/P EST LOW 20 MIN: CPT | Performed by: FAMILY MEDICINE

## 2021-09-21 RX ORDER — NITROGLYCERIN 0.4 MG/1
0.4 TABLET SUBLINGUAL
Qty: 10 TABLET
Start: 2021-09-21 | End: 2022-08-08 | Stop reason: SDUPTHER

## 2021-09-21 NOTE — ASSESSMENT & PLAN NOTE
Stable; denies any worsening sx; advised on vaccines and smoking cessation which he declines both at this time

## 2021-09-21 NOTE — ASSESSMENT & PLAN NOTE
S/p PCI; no chest pain or worsening SOB; f/u with cardiology for cardiac clearance given hx; complete PATs for review; c/w current medications

## 2021-09-21 NOTE — ASSESSMENT & PLAN NOTE
Reviewed; advised patient he needs to complete PATs and follow up with cardiology given his CAD history and functional capacity; clearance pending further tests; no acute concerning findings on exam today

## 2021-09-21 NOTE — PROGRESS NOTES
Presurgical Evaluation    Subjective:      Patient ID: Nicole Huntley is a 77 y o  male  Chief Complaint   Patient presents with    Pre-op Exam     10/18/21 prostatectomy        HPI    The following portions of the patient's history were reviewed and updated as appropriate: allergies, current medications, past family history, past medical history, past social history, past surgical history and problem list     Denies significant cough  Feels lungs are back to baseline since COVID19  Denies any recent CP  No coughing, congestion, or sore throat  Has not yet completed his blood work or EKG  Has cardiologist for history of PCI  Procedure date: October 18, 2021    Surgeon:  Dr Jaison Ramirez  Planned procedure:  Prostatectomy  Diagnosis for procedure:  Prostate cancer    Prior anesthesia: Yes   MAC; Complications:  None / Tolerated well    CAD History: CAD   NOTE: Patient should see Cardiology if time available before surgery, and if appropriate  Pulmonary History: None    Renal history: None    Diabetes History:  None     Neurological History: None     On Immunosuppressant meds/biologics: No      Review of Systems   Constitutional: Negative for chills, fatigue and fever  HENT: Negative  Respiratory: Negative for cough and shortness of breath  Cardiovascular: Negative for chest pain  Gastrointestinal: Negative for abdominal pain  Skin: Negative            Current Outpatient Medications   Medication Sig Dispense Refill    amLODIPine (NORVASC) 10 mg tablet Take 10 mg by mouth daily      aspirin (ECOTRIN LOW STRENGTH) 81 mg EC tablet Take 81 mg by mouth daily      atorvastatin (LIPITOR) 80 mg tablet TAKE 1 TABLET BY MOUTH EVERY DAY 90 tablet 1    Cholecalciferol (Vitamin D3) 50 MCG (2000 UT) TABS Take 2,000 Units by mouth daily      metoprolol tartrate (LOPRESSOR) 50 mg tablet Take 50 mg by mouth 2 (two) times a day       Multiple Vitamin (MULTIVITAMIN) capsule Take 1 capsule by mouth daily      Omega-3 Fatty Acids (FISH OIL) 1200 MG CAPS Take 1,200 mg by mouth 2 (two) times a day        ascorbic acid (VITAMIN C) 1000 MG tablet Take 1 tablet (1,000 mg total) by mouth every 12 (twelve) hours for 10 doses 10 tablet 0    nitroglycerin (NITROSTAT) 0 4 mg SL tablet Place 1 tablet (0 4 mg total) under the tongue every 5 (five) minutes as needed for chest pain 10 tablet      No current facility-administered medications for this visit  Allergies on file:   Patient has no known allergies  Patient Active Problem List   Diagnosis    Gastritis    Benign essential hypertension    Cervical radiculopathy    Coronary arteriosclerosis    Elevated hemoglobin (HCC)    Mixed hyperlipidemia    Polycythemia vera ([address] )    Prediabetes    Tobacco user    Chronic left shoulder pain    Neck pain    Cervical spondylosis without myelopathy    BPH with obstruction/lower urinary tract symptoms    Strain of lumbar region    COVID-19 virus infection    Status post percutaneous transluminal coronary angioplasty    Cigarette smoker    Left bundle branch block    Pre-operative clearance        Past Medical History:   Diagnosis Date    CAD (coronary artery disease)     Last Assessed:  11/4/13    Elevated prostate specific antigen (PSA)     Last Assessed:  12/21/17    Hyperlipidemia     Last Assessed:  11/24/17    Hypertension     Benign essential, Last Assessed:  11/24/17       Past Surgical History:   Procedure Laterality Date    APPENDECTOMY      NH COLONOSCOPY FLX DX W/COLLJ SPEC WHEN PFRMD N/A 5/22/2017    Procedure: COLONOSCOPY;  Surgeon: Kimberly Tovar DO;  Location: [facility];   Service: Gastroenterology    PROSTATE BIOPSY         Family History   Problem Relation Age of Onset    No Known Problems Mother     Heart disease Father        Social History     Tobacco Use    Smoking status: Current Every Day Smoker     Packs/day: 0 50     Types: Cigars, Cigarettes    Smokeless tobacco: Never Used Vaping Use    Vaping Use: Never used   Substance Use Topics    Alcohol use: No    Drug use: No       Objective:    Vitals:    09/21/21 0852   BP: 118/76   BP Location: Right arm   Patient Position: Sitting   Cuff Size: Adult   Pulse: (!) 53   Resp: 16   Temp: 98 °F (36 7 °C)   TempSrc: Temporal   SpO2: 98%   Weight: 79 7 kg (175 lb 9 6 oz)   Height: 5' 11" (1 803 m)        Physical Exam  Vitals reviewed  Constitutional:       General: He is not in acute distress  Appearance: Normal appearance  He is not ill-appearing, toxic-appearing or diaphoretic  HENT:      Head: Normocephalic and atraumatic  Right Ear: Tympanic membrane, ear canal and external ear normal       Left Ear: Tympanic membrane, ear canal and external ear normal       Nose: Nose normal       Mouth/Throat:      Pharynx: Oropharynx is clear  Eyes:      General: No scleral icterus  Right eye: No discharge  Left eye: No discharge  Conjunctiva/sclera: Conjunctivae normal    Cardiovascular:      Rate and Rhythm: Normal rate and regular rhythm  Pulses: Normal pulses  Heart sounds: Normal heart sounds  No murmur heard  No gallop  Pulmonary:      Effort: Pulmonary effort is normal  No respiratory distress  Breath sounds: Normal breath sounds  No stridor  No wheezing, rhonchi or rales  Abdominal:      General: Bowel sounds are normal       Palpations: Abdomen is soft  Tenderness: There is no abdominal tenderness  There is no guarding or rebound  Hernia: No hernia is present  Musculoskeletal:      Right lower leg: No edema  Left lower leg: No edema  Lymphadenopathy:      Cervical: No cervical adenopathy  Neurological:      General: No focal deficit present  Mental Status: He is alert and oriented to person, place, and time  Psychiatric:         Mood and Affect: Mood normal          Behavior: Behavior normal          Thought Content:  Thought content normal          Judgment: Judgment normal            Preop labs/testing available and reviewed: no; did not yet complete               EKG no; still needs to complete      Functional capacity: walking 4 mets; some SOB with this with longer distances   Pick the highest level patient can comfortably perform   4 mets or greater for surgery    RCRI  High Risk surgery? 1 Point  CAD History:         1 Point   MI; Positive Stress Test; CP due to Mi;  Nitrate Usage to control Angina; Pathologic Q wave on EKG  CHF Active:         1 Point   Pulm Edema; Paroxysmal Nocturnal Dyspnea;  Bibasilar Rales (crackles);S3; CHF on CXR  Cerebrovascular Disease (TIA or CVA):     1 Point  DM on Insulin:        1 Point  Serum Creat >2 0 mg/dl:       1 Point          Total Points: 2     Scorin: Class I, Very Low Risk (0 4%)     1: Class II, Low risk (0 9%)     2: Class III Moderate (6 6%)     3: Class IV High (>11%)      SHANTA Risk:  GFR:        For PCP:  If GFR>60, Hold ACE/ARB/Diuretic on the day of surgery, and NSAIDS 10 days before  If GFR<45, Consider PRE and POST op Nephrology Consult  If 46 <GFR> 59 : Has Patient had SHANTA in last 6 Months? no   If YES: Preop Nephrology consult   If No:  Stephanie [facility] Nephrology consult  Assessment/Plan:    Patient is medically optimized (cleared) for the planned procedure pending his PATs are within acceptable limits and has clearance by his cardiologist     Further testing/evaluation is required as patient has not completed his PATs      Postop concerns: no    Problem List Items Addressed This Visit        Respiratory    RESOLVED: Centrilobular emphysema ([address] )     Stable; denies any worsening sx; advised on vaccines and smoking cessation which he declines both at this time            Cardiovascular and Mediastinum    Benign essential hypertension     Controlled; c/w current tx         Coronary arteriosclerosis     S/p PCI; no chest pain or worsening SOB; f/u with cardiology for cardiac clearance given hx; complete PATs for review; c/w current medications         Relevant Medications    nitroglycerin (NITROSTAT) 0 4 mg SL tablet       Other    Mixed hyperlipidemia     C/w statin         Polycythemia vera ([address] )     As per hematology         Pre-operative clearance - Primary     Reviewed; advised patient he needs to complete PATs and follow up with cardiology given his CAD history and functional capacity; clearance pending further tests; no acute concerning findings on exam today           Other Visit Diagnoses     Elevated PSA               Diagnoses and all orders for this visit:    Pre-operative clearance    Elevated PSA    Benign essential hypertension    Coronary artery disease without angina pectoris, unspecified vessel or lesion type, unspecified whether native or transplanted heart  -     nitroglycerin (NITROSTAT) 0 4 mg SL tablet; Place 1 tablet (0 4 mg total) under the tongue every 5 (five) minutes as needed for chest pain    Mixed hyperlipidemia    Polycythemia vera ([address] )    Centrilobular emphysema ([address] )             NOTE: Please use the above to review important meds for your specialty, the remainder "per anesthesia Guidelines "    NOTE: Please place an Inbasket message for "[facility]" pool for complicated patients

## 2021-09-23 NOTE — TELEPHONE ENCOUNTER
I left message for patients cardiology office to call me back to schedule medical clearance prior to patients surgery on 10/18

## 2021-09-23 NOTE — TELEPHONE ENCOUNTER
Nelida Montgomery with [facility] called to inform Surgery Coordinator pt was recently seen she forwarded clearance request to Dr Hutton,questions please contact her

## 2021-09-24 ENCOUNTER — ANESTHESIA EVENT (OUTPATIENT)
Dept: PERIOP | Facility: HOSPITAL | Age: 76
DRG: 707 | End: 2021-09-24
Payer: COMMERCIAL

## 2021-09-24 ENCOUNTER — OFFICE VISIT (OUTPATIENT)
Dept: UROLOGY | Facility: CLINIC | Age: 76
End: 2021-09-24
Payer: COMMERCIAL

## 2021-09-24 ENCOUNTER — APPOINTMENT (OUTPATIENT)
Dept: LAB | Facility: MEDICAL CENTER | Age: 76
End: 2021-09-24
Payer: COMMERCIAL

## 2021-09-24 VITALS
WEIGHT: 173 LBS | DIASTOLIC BLOOD PRESSURE: 76 MMHG | BODY MASS INDEX: 24.13 KG/M2 | SYSTOLIC BLOOD PRESSURE: 136 MMHG | HEART RATE: 70 BPM

## 2021-09-24 DIAGNOSIS — N40.1 BPH WITH OBSTRUCTION/LOWER URINARY TRACT SYMPTOMS: Primary | ICD-10-CM

## 2021-09-24 DIAGNOSIS — D45 POLYCYTHEMIA VERA (HCC): ICD-10-CM

## 2021-09-24 DIAGNOSIS — N13.8 BPH WITH OBSTRUCTION/LOWER URINARY TRACT SYMPTOMS: Primary | ICD-10-CM

## 2021-09-24 LAB
BASOPHILS # BLD AUTO: 0.11 THOUSANDS/ΜL (ref 0–0.1)
BASOPHILS NFR BLD AUTO: 1 % (ref 0–1)
EOSINOPHIL # BLD AUTO: 0.87 THOUSAND/ΜL (ref 0–0.61)
EOSINOPHIL NFR BLD AUTO: 6 % (ref 0–6)
ERYTHROCYTE [DISTWIDTH] IN BLOOD BY AUTOMATED COUNT: 20.4 % (ref 11.6–15.1)
FERRITIN SERPL-MCNC: 13 NG/ML (ref 8–388)
HCT VFR BLD AUTO: 52.2 % (ref 36.5–49.3)
HGB BLD-MCNC: 15.7 G/DL (ref 12–17)
IMM GRANULOCYTES # BLD AUTO: 0.07 THOUSAND/UL (ref 0–0.2)
IMM GRANULOCYTES NFR BLD AUTO: 0 % (ref 0–2)
IRON SATN MFR SERPL: 8 % (ref 20–50)
IRON SERPL-MCNC: 33 UG/DL (ref 65–175)
LYMPHOCYTES # BLD AUTO: 2.68 THOUSANDS/ΜL (ref 0.6–4.47)
LYMPHOCYTES NFR BLD AUTO: 17 % (ref 14–44)
MCH RBC QN AUTO: 23.6 PG (ref 26.8–34.3)
MCHC RBC AUTO-ENTMCNC: 30.1 G/DL (ref 31.4–37.4)
MCV RBC AUTO: 79 FL (ref 82–98)
MONOCYTES # BLD AUTO: 1.18 THOUSAND/ΜL (ref 0.17–1.22)
MONOCYTES NFR BLD AUTO: 8 % (ref 4–12)
NEUTROPHILS # BLD AUTO: 10.66 THOUSANDS/ΜL (ref 1.85–7.62)
NEUTS SEG NFR BLD AUTO: 68 % (ref 43–75)
NRBC BLD AUTO-RTO: 0 /100 WBCS
PLATELET # BLD AUTO: 265 THOUSANDS/UL (ref 149–390)
PMV BLD AUTO: 10.9 FL (ref 8.9–12.7)
RBC # BLD AUTO: 6.64 MILLION/UL (ref 3.88–5.62)
TIBC SERPL-MCNC: 412 UG/DL (ref 250–450)
WBC # BLD AUTO: 15.57 THOUSAND/UL (ref 4.31–10.16)

## 2021-09-24 PROCEDURE — 36415 COLL VENOUS BLD VENIPUNCTURE: CPT

## 2021-09-24 PROCEDURE — 82728 ASSAY OF FERRITIN: CPT

## 2021-09-24 PROCEDURE — 83550 IRON BINDING TEST: CPT

## 2021-09-24 PROCEDURE — 83540 ASSAY OF IRON: CPT

## 2021-09-24 PROCEDURE — 99215 OFFICE O/P EST HI 40 MIN: CPT | Performed by: UROLOGY

## 2021-09-24 PROCEDURE — 85025 COMPLETE CBC W/AUTO DIFF WBC: CPT

## 2021-09-24 RX ORDER — FINASTERIDE 5 MG/1
5 TABLET, FILM COATED ORAL DAILY
Qty: 90 TABLET | Refills: 0 | Status: SHIPPED | OUTPATIENT
Start: 2021-09-24 | End: 2021-11-13

## 2021-09-24 NOTE — TELEPHONE ENCOUNTER
Per Dr Rios Matters I called pt to discuss his pre op instructions for his upcoming suprapubic prostatectomy on 10/18  There was no answer so I did leave a voicemail asking pt to call our office back to discuss

## 2021-09-24 NOTE — TELEPHONE ENCOUNTER
I spoke with pt this afternoon and verbally went over all of his pre op testing and prep information with him  Pt is aware of his pre op testing that is needed and when to go for it  I also verbally went over his bowel prep with him so he is aware of what needs to be purchased and when it needs to be started the day prior to surgery  Per Dr Ltety Abel I also informed pt that Finasteride to help shrink the prostate blood vessels prior to surgery and pt verbalized understanding and will wait to hear back from his pharmacy once the medication is ready  Pt is aware that I will call him with a update once Dr Allison Bassett reviews his chart and confirms if he needs to be seen in the office again or if he can be cleared off of his last visit that was on 8/1/21

## 2021-09-24 NOTE — PROGRESS NOTES
9/24/2021    Silas Oseguera  [id number]  [id number]        Assessment  Symptomatic BPH   Elevated PSA    Plan  We discussed his history as well as current concerns  He is ready to proceed with surgical intervention, though he is concerned about recovery and ability to resume activities  He had a long discussion about technique, risks, benefits of simple prostatectomy  We discussed that the robotic approach is less invasive than open, though open can potentially retrieve more tissue, he will have a more rapid convalescence with robotic laparoscopic approach  Typically patients are in the hospital for 1 or 2 nights  Will be discharged home with a catheter for 10 to 14 days  He will need to limit his strenuous activities for 1 month after the procedure  He would like to go hunting 1 month after the procedure  He understands that there may be bleeding or other issues to be concerned with he has to allow for adequate healing  We also discussed his clearance is  He saw his family physician but needs to see Cardiology and have preadmission testing and cardiogram to ensure his risk is reasonable  Additionally, I would like to start him on finasteride which he has not been taking, in order to help with potential procedural bleeding and postoperative hemostasis  All questions answered to his satisfaction and consent was obtained for robotic simple prostatectomy  Total visit time was 40 minutes of which over 50% was spent on counseling  History of Present Illness  Marcos Mota is a 77 y o  male with symptomatic BPH for many years  He has had elevated PSA as well which has been rising  He is status post prostate biopsy x2 which have been negative for malignancy  He has previously been evaluated and prostate volume is greater than 150 mL  We discussed intervention in the past and I explained that uro lift and TURP are not appropriate  He has agreed that he is ready for simple prostatectomy    Returns today to discuss  Review of Systems  Review of Systems   Constitutional: Negative  HENT: Negative  Respiratory: Negative  Cardiovascular: Negative  Gastrointestinal: Negative  Genitourinary:        As per HPI   Musculoskeletal: Negative  Skin: Negative  Neurological: Negative  Hematological: Negative  Past Medical History  Past Medical History:   Diagnosis Date    CAD (coronary artery disease)     Last Assessed:  11/4/13    Elevated prostate specific antigen (PSA)     Last Assessed:  12/21/17    Hyperlipidemia     Last Assessed:  11/24/17    Hypertension     Benign essential, Last Assessed:  11/24/17       Past Social History  Past Surgical History:   Procedure Laterality Date    APPENDECTOMY      [place] COLONOSCOPY FLX DX W/COLLJ SPEC WHEN PFRMD N/A 5/22/2017    Procedure: COLONOSCOPY;  Surgeon: Ashish Delgadillo DO;  Location: [facility];   Service: Gastroenterology    PROSTATE BIOPSY         Past Family History  Family History   Problem Relation Age of Onset    No Known Problems Mother     Heart disease Father        Past Social history  Social History     Socioeconomic History    Marital status: /Civil Union     Spouse name: Not on file    Number of children: Not on file    Years of education: Not on file    Highest education level: Not on file   Occupational History    Not on file   Tobacco Use    Smoking status: Current Every Day Smoker     Packs/day: 0 50     Types: Cigars, Cigarettes    Smokeless tobacco: Never Used   Vaping Use    Vaping Use: Never used   Substance and Sexual Activity    Alcohol use: No    Drug use: No    Sexual activity: Not on file   Other Topics Concern    Not on file   Social History Narrative    Always uses seatbelt    Feels safe at home    Uses safety equipment     Social Determinants of Health     Financial Resource Strain:     Difficulty of Paying Living Expenses:    Food Insecurity:     Worried About Running Out of [place] in the Last Year:    [address] in the Last Year:    Transportation Needs:     Lack of Transportation (Medical):  Lack of Transportation (Non-Medical):    Physical Activity:     Days of Exercise per Week:     Minutes of Exercise per Session:    Stress:     Feeling of Stress :    Social Connections:     Frequency of Communication with Friends and Family:     Frequency of Social Gatherings with Friends and Family:     Attends Taoist Services:     Active Member of Clubs or Organizations:     Attends Club or Organization Meetings:     Marital Status:    Intimate Partner Violence:     Fear of Current or Ex-Partner:     Emotionally Abused:     Physically Abused:     Sexually Abused:      Social History     Tobacco Use   Smoking Status Current Every Day Smoker    Packs/day: 0 50    Types: Cigars, Cigarettes   Smokeless Tobacco Never Used       Current Medications  Current Outpatient Medications   Medication Sig Dispense Refill    amLODIPine (NORVASC) 10 mg tablet Take 10 mg by mouth daily      ascorbic acid (VITAMIN C) 1000 MG tablet Take 1 tablet (1,000 mg total) by mouth every 12 (twelve) hours for 10 doses 10 tablet 0    aspirin (ECOTRIN LOW STRENGTH) 81 mg EC tablet Take 81 mg by mouth daily      atorvastatin (LIPITOR) 80 mg tablet TAKE 1 TABLET BY MOUTH EVERY DAY 90 tablet 1    Cholecalciferol (Vitamin D3) 50 MCG (2000 UT) TABS Take 2,000 Units by mouth daily      metoprolol tartrate (LOPRESSOR) 50 mg tablet Take 50 mg by mouth 2 (two) times a day       Multiple Vitamin (MULTIVITAMIN) capsule Take 1 capsule by mouth daily      nitroglycerin (NITROSTAT) 0 4 mg SL tablet Place 1 tablet (0 4 mg total) under the tongue every 5 (five) minutes as needed for chest pain 10 tablet     Omega-3 Fatty Acids (FISH OIL) 1200 MG CAPS Take 1,200 mg by mouth 2 (two) times a day         No current facility-administered medications for this visit         Allergies  No Known Allergies    Past Medical History, Social History, Family History, medications and allergies were reviewed  Vitals  Vitals:    09/24/21 1020   BP: 136/76   Pulse: 70   Weight: 78 5 kg (173 lb)       Physical Exam  Physical Exam  Vitals reviewed  Constitutional:       Appearance: He is well-developed  HENT:      Head: Normocephalic and atraumatic  Eyes:      Conjunctiva/sclera: Conjunctivae normal    Cardiovascular:      Rate and Rhythm: Normal rate  Pulmonary:      Effort: Pulmonary effort is normal    Abdominal:      Palpations: Abdomen is soft  Genitourinary:     Comments: No CVAT  Musculoskeletal:         General: Normal range of motion  Skin:     General: Skin is warm and dry  Neurological:      Mental Status: He is alert and oriented to person, place, and time     Psychiatric:         Mood and Affect: Mood normal            Results  Lab Results   Component Value Date    PSA 17 7 (H) 07/29/2021    PSA 17 2 (H) 11/27/2020    PSA 15 0 (H) 05/22/2020     Lab Results   Component Value Date    CALCIUM 8 8 06/30/2021     11/13/2017    K 3 9 06/30/2021    CO2 26 06/30/2021     (H) 06/30/2021    BUN 16 06/30/2021    CREATININE 1 21 06/30/2021     Lab Results   Component Value Date    WBC 11 99 (H) 06/30/2021    HGB 15 9 06/30/2021    HCT 53 2 (H) 06/30/2021    MCV 80 (L) 06/30/2021     06/30/2021

## 2021-09-27 NOTE — TELEPHONE ENCOUNTER
I called and [facility] to [facility] and they are still working on the clearance  He said that it should be done soon  He can tell that they are working on it

## 2021-09-28 NOTE — TELEPHONE ENCOUNTER
I received cardiac [facility] from [facility] and they cleared him for Intermediate risk for surgery  Please advise if this is ok   Thank you

## 2021-09-30 ENCOUNTER — TELEPHONE (OUTPATIENT)
Dept: OTHER | Facility: OTHER | Age: 76
End: 2021-09-30

## 2021-09-30 NOTE — TELEPHONE ENCOUNTER
Patient has questions regarding starting medication before his surgery  Patient is requesting a call back for care advice from the office   Patient stated you may leave a message

## 2021-10-01 NOTE — TELEPHONE ENCOUNTER
Patient called today to speak with me regarding medication for his surgery  He asked me when he is suppose to start taking the finasteride  I told him per Dr Darren Zuñiga office note from 9/24 he should start taking it now  He said he will start taking it today  I also reminded him that he is cleared from cardiology and everything is good to proceed with surgery on 10/18

## 2021-10-01 NOTE — TELEPHONE ENCOUNTER
LM for pt to return call with which medication he has a question on so that we can answer his question on it

## 2021-10-04 ENCOUNTER — APPOINTMENT (OUTPATIENT)
Dept: LAB | Facility: MEDICAL CENTER | Age: 76
DRG: 707 | End: 2021-10-04
Payer: COMMERCIAL

## 2021-10-04 ENCOUNTER — CLINICAL SUPPORT (OUTPATIENT)
Dept: URGENT CARE | Facility: MEDICAL CENTER | Age: 76
End: 2021-10-04
Payer: COMMERCIAL

## 2021-10-04 DIAGNOSIS — N13.8 BPH WITH OBSTRUCTION/LOWER URINARY TRACT SYMPTOMS: ICD-10-CM

## 2021-10-04 DIAGNOSIS — N40.1 BPH WITH OBSTRUCTION/LOWER URINARY TRACT SYMPTOMS: ICD-10-CM

## 2021-10-04 LAB
ABO GROUP BLD: NORMAL
ALBUMIN SERPL BCP-MCNC: 3.7 G/DL (ref 3.5–5)
ALP SERPL-CCNC: 142 U/L (ref 46–116)
ALT SERPL W P-5'-P-CCNC: 30 U/L (ref 12–78)
ANION GAP SERPL CALCULATED.3IONS-SCNC: 1 MMOL/L (ref 4–13)
APTT PPP: 29 SECONDS (ref 23–37)
AST SERPL W P-5'-P-CCNC: 18 U/L (ref 5–45)
ATRIAL RATE: 61 BPM
BASOPHILS # BLD AUTO: 0.06 THOUSANDS/ΜL (ref 0–0.1)
BASOPHILS NFR BLD AUTO: 1 % (ref 0–1)
BILIRUB SERPL-MCNC: 0.63 MG/DL (ref 0.2–1)
BLD GP AB SCN SERPL QL: NEGATIVE
BUN SERPL-MCNC: 15 MG/DL (ref 5–25)
CALCIUM SERPL-MCNC: 9.5 MG/DL (ref 8.3–10.1)
CHLORIDE SERPL-SCNC: 109 MMOL/L (ref 100–108)
CO2 SERPL-SCNC: 30 MMOL/L (ref 21–32)
CREAT SERPL-MCNC: 1.33 MG/DL (ref 0.6–1.3)
EOSINOPHIL # BLD AUTO: 0.71 THOUSAND/ΜL (ref 0–0.61)
EOSINOPHIL NFR BLD AUTO: 6 % (ref 0–6)
ERYTHROCYTE [DISTWIDTH] IN BLOOD BY AUTOMATED COUNT: 20.8 % (ref 11.6–15.1)
GFR SERPL CREATININE-BSD FRML MDRD: 52 ML/MIN/1.73SQ M
GLUCOSE P FAST SERPL-MCNC: 102 MG/DL (ref 65–99)
HCT VFR BLD AUTO: 54.3 % (ref 36.5–49.3)
HGB BLD-MCNC: 16.1 G/DL (ref 12–17)
IMM GRANULOCYTES # BLD AUTO: 0.06 THOUSAND/UL (ref 0–0.2)
IMM GRANULOCYTES NFR BLD AUTO: 1 % (ref 0–2)
INR PPP: 1.08 (ref 0.84–1.19)
LYMPHOCYTES # BLD AUTO: 2.22 THOUSANDS/ΜL (ref 0.6–4.47)
LYMPHOCYTES NFR BLD AUTO: 18 % (ref 14–44)
MCH RBC QN AUTO: 23.9 PG (ref 26.8–34.3)
MCHC RBC AUTO-ENTMCNC: 29.7 G/DL (ref 31.4–37.4)
MCV RBC AUTO: 80 FL (ref 82–98)
MONOCYTES # BLD AUTO: 0.85 THOUSAND/ΜL (ref 0.17–1.22)
MONOCYTES NFR BLD AUTO: 7 % (ref 4–12)
NEUTROPHILS # BLD AUTO: 8.18 THOUSANDS/ΜL (ref 1.85–7.62)
NEUTS SEG NFR BLD AUTO: 67 % (ref 43–75)
NRBC BLD AUTO-RTO: 0 /100 WBCS
P AXIS: 67 DEGREES
PLATELET # BLD AUTO: 243 THOUSANDS/UL (ref 149–390)
PMV BLD AUTO: 10.8 FL (ref 8.9–12.7)
POTASSIUM SERPL-SCNC: 4.3 MMOL/L (ref 3.5–5.3)
PR INTERVAL: 160 MS
PROT SERPL-MCNC: 7.5 G/DL (ref 6.4–8.2)
PROTHROMBIN TIME: 13.6 SECONDS (ref 11.6–14.5)
QRS AXIS: -30 DEGREES
QRSD INTERVAL: 148 MS
QT INTERVAL: 456 MS
QTC INTERVAL: 459 MS
RBC # BLD AUTO: 6.75 MILLION/UL (ref 3.88–5.62)
RH BLD: POSITIVE
SODIUM SERPL-SCNC: 140 MMOL/L (ref 136–145)
SPECIMEN EXPIRATION DATE: NORMAL
T WAVE AXIS: 118 DEGREES
VENTRICULAR RATE: 61 BPM
WBC # BLD AUTO: 12.08 THOUSAND/UL (ref 4.31–10.16)

## 2021-10-04 PROCEDURE — 85610 PROTHROMBIN TIME: CPT

## 2021-10-04 PROCEDURE — 36415 COLL VENOUS BLD VENIPUNCTURE: CPT

## 2021-10-04 PROCEDURE — 93005 ELECTROCARDIOGRAM TRACING: CPT

## 2021-10-04 PROCEDURE — 86900 BLOOD TYPING SEROLOGIC ABO: CPT

## 2021-10-04 PROCEDURE — 80053 COMPREHEN METABOLIC PANEL: CPT

## 2021-10-04 PROCEDURE — 85025 COMPLETE CBC W/AUTO DIFF WBC: CPT

## 2021-10-04 PROCEDURE — 86850 RBC ANTIBODY SCREEN: CPT

## 2021-10-04 PROCEDURE — 93010 ELECTROCARDIOGRAM REPORT: CPT

## 2021-10-04 PROCEDURE — 86901 BLOOD TYPING SEROLOGIC RH(D): CPT

## 2021-10-04 PROCEDURE — 87086 URINE CULTURE/COLONY COUNT: CPT

## 2021-10-04 PROCEDURE — 85730 THROMBOPLASTIN TIME PARTIAL: CPT

## 2021-10-05 ENCOUNTER — TELEPHONE (OUTPATIENT)
Dept: FAMILY MEDICINE CLINIC | Facility: CLINIC | Age: 76
End: 2021-10-05

## 2021-10-05 LAB — BACTERIA UR CULT: NORMAL

## 2021-10-06 RX ORDER — ACETAMINOPHEN 500 MG
500 TABLET ORAL EVERY 6 HOURS PRN
COMMUNITY

## 2021-10-07 ENCOUNTER — TELEPHONE (OUTPATIENT)
Dept: OTHER | Facility: OTHER | Age: 76
End: 2021-10-07

## 2021-10-07 ENCOUNTER — TELEPHONE (OUTPATIENT)
Dept: UROLOGY | Facility: MEDICAL CENTER | Age: 76
End: 2021-10-07

## 2021-10-08 ENCOUNTER — HOSPITAL ENCOUNTER (OUTPATIENT)
Dept: INFUSION CENTER | Facility: CLINIC | Age: 76
Discharge: HOME | End: 2021-10-08
Payer: COMMERCIAL

## 2021-10-08 VITALS
DIASTOLIC BLOOD PRESSURE: 79 MMHG | HEART RATE: 45 BPM | SYSTOLIC BLOOD PRESSURE: 136 MMHG | TEMPERATURE: 97.6 F | RESPIRATION RATE: 18 BRPM

## 2021-10-08 DIAGNOSIS — D45 POLYCYTHEMIA VERA (HCC): Primary | ICD-10-CM

## 2021-10-08 PROCEDURE — 99195 PHLEBOTOMY: CPT

## 2021-10-11 PROCEDURE — U0003 INFECTIOUS AGENT DETECTION BY NUCLEIC ACID (DNA OR RNA); SEVERE ACUTE RESPIRATORY SYNDROME CORONAVIRUS 2 (SARS-COV-2) (CORONAVIRUS DISEASE [COVID-19]), AMPLIFIED PROBE TECHNIQUE, MAKING USE OF HIGH THROUGHPUT TECHNOLOGIES AS DESCRIBED BY CMS-2020-01-R: HCPCS | Performed by: UROLOGY

## 2021-10-11 PROCEDURE — U0005 INFEC AGEN DETEC AMPLI PROBE: HCPCS | Performed by: UROLOGY

## 2021-10-18 ENCOUNTER — ANESTHESIA (OUTPATIENT)
Dept: PERIOP | Facility: HOSPITAL | Age: 76
DRG: 707 | End: 2021-10-18
Payer: COMMERCIAL

## 2021-10-18 ENCOUNTER — HOSPITAL ENCOUNTER (INPATIENT)
Facility: HOSPITAL | Age: 76
LOS: 3 days | Discharge: HOME | DRG: 707 | End: 2021-10-21
Attending: UROLOGY | Admitting: UROLOGY
Payer: COMMERCIAL

## 2021-10-18 ENCOUNTER — TELEPHONE (OUTPATIENT)
Dept: UROLOGY | Facility: CLINIC | Age: 76
End: 2021-10-18

## 2021-10-18 DIAGNOSIS — N13.8 BPH WITH OBSTRUCTION/LOWER URINARY TRACT SYMPTOMS: ICD-10-CM

## 2021-10-18 DIAGNOSIS — N40.1 BPH WITH OBSTRUCTION/LOWER URINARY TRACT SYMPTOMS: ICD-10-CM

## 2021-10-18 DIAGNOSIS — N28.9 RENAL INSUFFICIENCY: Primary | ICD-10-CM

## 2021-10-18 LAB
ANION GAP SERPL CALCULATED.3IONS-SCNC: 10 MMOL/L (ref 4–13)
BUN SERPL-MCNC: 19 MG/DL (ref 5–25)
CALCIUM SERPL-MCNC: 8.3 MG/DL (ref 8.3–10.1)
CHLORIDE SERPL-SCNC: 107 MMOL/L (ref 100–108)
CO2 SERPL-SCNC: 25 MMOL/L (ref 21–32)
CREAT SERPL-MCNC: 1.55 MG/DL (ref 0.6–1.3)
ERYTHROCYTE [DISTWIDTH] IN BLOOD BY AUTOMATED COUNT: 19 % (ref 11.6–15.1)
ERYTHROCYTE [DISTWIDTH] IN BLOOD BY AUTOMATED COUNT: 19.6 % (ref 11.6–15.1)
GFR SERPL CREATININE-BSD FRML MDRD: 43 ML/MIN/1.73SQ M
GLUCOSE SERPL-MCNC: 130 MG/DL (ref 65–140)
HCT VFR BLD AUTO: 44.7 % (ref 36.5–49.3)
HCT VFR BLD AUTO: 45 % (ref 36.5–49.3)
HGB BLD-MCNC: 13.5 G/DL (ref 12–17)
HGB BLD-MCNC: 14 G/DL (ref 12–17)
MCH RBC QN AUTO: 23.9 PG (ref 26.8–34.3)
MCH RBC QN AUTO: 24.5 PG (ref 26.8–34.3)
MCHC RBC AUTO-ENTMCNC: 30.2 G/DL (ref 31.4–37.4)
MCHC RBC AUTO-ENTMCNC: 31.1 G/DL (ref 31.4–37.4)
MCV RBC AUTO: 79 FL (ref 82–98)
MCV RBC AUTO: 79 FL (ref 82–98)
PLATELET # BLD AUTO: 224 THOUSANDS/UL (ref 149–390)
PLATELET # BLD AUTO: 235 THOUSANDS/UL (ref 149–390)
PMV BLD AUTO: 10 FL (ref 8.9–12.7)
PMV BLD AUTO: 9.7 FL (ref 8.9–12.7)
POTASSIUM SERPL-SCNC: 4.1 MMOL/L (ref 3.5–5.3)
RBC # BLD AUTO: 5.66 MILLION/UL (ref 3.88–5.62)
RBC # BLD AUTO: 5.71 MILLION/UL (ref 3.88–5.62)
SODIUM SERPL-SCNC: 142 MMOL/L (ref 136–145)
WBC # BLD AUTO: 12.96 THOUSAND/UL (ref 4.31–10.16)
WBC # BLD AUTO: 20.34 THOUSAND/UL (ref 4.31–10.16)

## 2021-10-18 PROCEDURE — 85027 COMPLETE CBC AUTOMATED: CPT | Performed by: ANESTHESIOLOGY

## 2021-10-18 PROCEDURE — 0VT04ZZ RESECTION OF PROSTATE, PERCUTANEOUS ENDOSCOPIC APPROACH: ICD-10-PCS | Performed by: UROLOGY

## 2021-10-18 PROCEDURE — 55821 REMOVAL OF PROSTATE: CPT | Performed by: UROLOGY

## 2021-10-18 PROCEDURE — 80048 BASIC METABOLIC PNL TOTAL CA: CPT | Performed by: UROLOGY

## 2021-10-18 PROCEDURE — 99223 1ST HOSP IP/OBS HIGH 75: CPT | Performed by: INTERNAL MEDICINE

## 2021-10-18 PROCEDURE — 88307 TISSUE EXAM BY PATHOLOGIST: CPT | Performed by: PATHOLOGY

## 2021-10-18 PROCEDURE — 8E0W4CZ ROBOTIC ASSISTED PROCEDURE OF TRUNK REGION, PERCUTANEOUS ENDOSCOPIC APPROACH: ICD-10-PCS | Performed by: UROLOGY

## 2021-10-18 PROCEDURE — 85027 COMPLETE CBC AUTOMATED: CPT | Performed by: UROLOGY

## 2021-10-18 PROCEDURE — NC001 PR NO CHARGE: Performed by: PHYSICIAN ASSISTANT

## 2021-10-18 PROCEDURE — S2900 ROBOTIC SURGICAL SYSTEM: HCPCS | Performed by: UROLOGY

## 2021-10-18 PROCEDURE — C1725 CATH, TRANSLUMIN NON-LASER: HCPCS | Performed by: UROLOGY

## 2021-10-18 RX ORDER — DEXAMETHASONE SODIUM PHOSPHATE 4 MG/ML
INJECTION, SOLUTION INTRA-ARTICULAR; INTRALESIONAL; INTRAMUSCULAR; INTRAVENOUS; SOFT TISSUE AS NEEDED
Status: DISCONTINUED | OUTPATIENT
Start: 2021-10-18 | End: 2021-10-18

## 2021-10-18 RX ORDER — BACITRACIN, NEOMYCIN, POLYMYXIN B 400; 3.5; 5 [USP'U]/G; MG/G; [USP'U]/G
1 OINTMENT TOPICAL 2 TIMES DAILY
Status: DISCONTINUED | OUTPATIENT
Start: 2021-10-18 | End: 2021-10-21 | Stop reason: HOSPADM

## 2021-10-18 RX ORDER — SODIUM CHLORIDE 9 MG/ML
INJECTION, SOLUTION INTRAVENOUS CONTINUOUS PRN
Status: DISCONTINUED | OUTPATIENT
Start: 2021-10-18 | End: 2021-10-18

## 2021-10-18 RX ORDER — AMLODIPINE BESYLATE 10 MG/1
10 TABLET ORAL EVERY EVENING
Status: DISCONTINUED | OUTPATIENT
Start: 2021-10-18 | End: 2021-10-21 | Stop reason: HOSPADM

## 2021-10-18 RX ORDER — HYDROCODONE BITARTRATE AND ACETAMINOPHEN 5; 325 MG/1; MG/1
1 TABLET ORAL EVERY 4 HOURS PRN
Status: DISCONTINUED | OUTPATIENT
Start: 2021-10-18 | End: 2021-10-21 | Stop reason: HOSPADM

## 2021-10-18 RX ORDER — GLYCOPYRROLATE 0.2 MG/ML
INJECTION INTRAMUSCULAR; INTRAVENOUS AS NEEDED
Status: DISCONTINUED | OUTPATIENT
Start: 2021-10-18 | End: 2021-10-18

## 2021-10-18 RX ORDER — METOPROLOL TARTRATE 50 MG/1
50 TABLET, FILM COATED ORAL 2 TIMES DAILY
Status: DISCONTINUED | OUTPATIENT
Start: 2021-10-18 | End: 2021-10-21 | Stop reason: HOSPADM

## 2021-10-18 RX ORDER — MAGNESIUM HYDROXIDE 1200 MG/15ML
LIQUID ORAL AS NEEDED
Status: DISCONTINUED | OUTPATIENT
Start: 2021-10-18 | End: 2021-10-18 | Stop reason: HOSPADM

## 2021-10-18 RX ORDER — MIDAZOLAM HYDROCHLORIDE 2 MG/2ML
INJECTION, SOLUTION INTRAMUSCULAR; INTRAVENOUS AS NEEDED
Status: DISCONTINUED | OUTPATIENT
Start: 2021-10-18 | End: 2021-10-18

## 2021-10-18 RX ORDER — FINASTERIDE 5 MG/1
5 TABLET, FILM COATED ORAL DAILY
Status: DISCONTINUED | OUTPATIENT
Start: 2021-10-18 | End: 2021-10-21 | Stop reason: HOSPADM

## 2021-10-18 RX ORDER — DOCUSATE SODIUM 100 MG/1
100 CAPSULE, LIQUID FILLED ORAL 2 TIMES DAILY
Status: DISCONTINUED | OUTPATIENT
Start: 2021-10-18 | End: 2021-10-21 | Stop reason: HOSPADM

## 2021-10-18 RX ORDER — PROPOFOL 10 MG/ML
INJECTION, EMULSION INTRAVENOUS AS NEEDED
Status: DISCONTINUED | OUTPATIENT
Start: 2021-10-18 | End: 2021-10-18

## 2021-10-18 RX ORDER — SODIUM CHLORIDE, SODIUM LACTATE, POTASSIUM CHLORIDE, CALCIUM CHLORIDE 600; 310; 30; 20 MG/100ML; MG/100ML; MG/100ML; MG/100ML
125 INJECTION, SOLUTION INTRAVENOUS CONTINUOUS
Status: DISCONTINUED | OUTPATIENT
Start: 2021-10-18 | End: 2021-10-18 | Stop reason: HOSPADM

## 2021-10-18 RX ORDER — SODIUM CHLORIDE, SODIUM LACTATE, POTASSIUM CHLORIDE, CALCIUM CHLORIDE 600; 310; 30; 20 MG/100ML; MG/100ML; MG/100ML; MG/100ML
75 INJECTION, SOLUTION INTRAVENOUS CONTINUOUS
Status: DISCONTINUED | OUTPATIENT
Start: 2021-10-18 | End: 2021-10-20 | Stop reason: ALTCHOICE

## 2021-10-18 RX ORDER — ASPIRIN 81 MG/1
81 TABLET ORAL DAILY
Status: DISCONTINUED | OUTPATIENT
Start: 2021-10-18 | End: 2021-10-21 | Stop reason: HOSPADM

## 2021-10-18 RX ORDER — NICOTINE 21 MG/24HR
14 PATCH, TRANSDERMAL 24 HOURS TRANSDERMAL DAILY
Status: DISCONTINUED | OUTPATIENT
Start: 2021-10-18 | End: 2021-10-21 | Stop reason: HOSPADM

## 2021-10-18 RX ORDER — AMLODIPINE BESYLATE 10 MG/1
10 TABLET ORAL EVERY EVENING
Status: DISCONTINUED | OUTPATIENT
Start: 2021-10-18 | End: 2021-10-18

## 2021-10-18 RX ORDER — OXYBUTYNIN CHLORIDE 5 MG/1
5 TABLET ORAL 2 TIMES DAILY
Status: DISCONTINUED | OUTPATIENT
Start: 2021-10-18 | End: 2021-10-21 | Stop reason: HOSPADM

## 2021-10-18 RX ORDER — CALCIUM CARBONATE 200(500)MG
1000 TABLET,CHEWABLE ORAL DAILY PRN
Status: DISCONTINUED | OUTPATIENT
Start: 2021-10-18 | End: 2021-10-21 | Stop reason: HOSPADM

## 2021-10-18 RX ORDER — LIDOCAINE HYDROCHLORIDE 10 MG/ML
0.5 INJECTION, SOLUTION EPIDURAL; INFILTRATION; INTRACAUDAL; PERINEURAL ONCE AS NEEDED
Status: DISCONTINUED | OUTPATIENT
Start: 2021-10-18 | End: 2021-10-18 | Stop reason: HOSPADM

## 2021-10-18 RX ORDER — FENTANYL CITRATE 50 UG/ML
INJECTION, SOLUTION INTRAMUSCULAR; INTRAVENOUS AS NEEDED
Status: DISCONTINUED | OUTPATIENT
Start: 2021-10-18 | End: 2021-10-18

## 2021-10-18 RX ORDER — HYDROMORPHONE HCL/PF 1 MG/ML
SYRINGE (ML) INJECTION AS NEEDED
Status: DISCONTINUED | OUTPATIENT
Start: 2021-10-18 | End: 2021-10-18

## 2021-10-18 RX ORDER — ONDANSETRON 2 MG/ML
4 INJECTION INTRAMUSCULAR; INTRAVENOUS EVERY 6 HOURS PRN
Status: DISCONTINUED | OUTPATIENT
Start: 2021-10-18 | End: 2021-10-21 | Stop reason: HOSPADM

## 2021-10-18 RX ORDER — ATROPA BELLADONNA AND OPIUM 16.2; 3 MG/1; MG/1
1 SUPPOSITORY RECTAL EVERY 6 HOURS PRN
Status: DISCONTINUED | OUTPATIENT
Start: 2021-10-18 | End: 2021-10-21 | Stop reason: HOSPADM

## 2021-10-18 RX ORDER — ROCURONIUM BROMIDE 10 MG/ML
INJECTION, SOLUTION INTRAVENOUS AS NEEDED
Status: DISCONTINUED | OUTPATIENT
Start: 2021-10-18 | End: 2021-10-18

## 2021-10-18 RX ORDER — CEFAZOLIN SODIUM 1 G/50ML
1000 SOLUTION INTRAVENOUS ONCE
Status: COMPLETED | OUTPATIENT
Start: 2021-10-18 | End: 2021-10-18

## 2021-10-18 RX ORDER — ONDANSETRON 2 MG/ML
INJECTION INTRAMUSCULAR; INTRAVENOUS AS NEEDED
Status: DISCONTINUED | OUTPATIENT
Start: 2021-10-18 | End: 2021-10-18

## 2021-10-18 RX ORDER — HYDROMORPHONE HCL/PF 1 MG/ML
0.5 SYRINGE (ML) INJECTION EVERY 2 HOUR PRN
Status: ACTIVE | OUTPATIENT
Start: 2021-10-18 | End: 2021-10-20

## 2021-10-18 RX ORDER — NEOSTIGMINE METHYLSULFATE 1 MG/ML
INJECTION INTRAVENOUS AS NEEDED
Status: DISCONTINUED | OUTPATIENT
Start: 2021-10-18 | End: 2021-10-18

## 2021-10-18 RX ORDER — ONDANSETRON 2 MG/ML
4 INJECTION INTRAMUSCULAR; INTRAVENOUS ONCE AS NEEDED
Status: DISCONTINUED | OUTPATIENT
Start: 2021-10-18 | End: 2021-10-18 | Stop reason: HOSPADM

## 2021-10-18 RX ORDER — FENTANYL CITRATE/PF 50 MCG/ML
25 SYRINGE (ML) INJECTION
Status: DISCONTINUED | OUTPATIENT
Start: 2021-10-18 | End: 2021-10-18 | Stop reason: HOSPADM

## 2021-10-18 RX ORDER — LIDOCAINE HYDROCHLORIDE 20 MG/ML
INJECTION, SOLUTION EPIDURAL; INFILTRATION; INTRACAUDAL; PERINEURAL AS NEEDED
Status: DISCONTINUED | OUTPATIENT
Start: 2021-10-18 | End: 2021-10-18

## 2021-10-18 RX ORDER — MAGNESIUM HYDROXIDE 1200 MG/15ML
3000 LIQUID ORAL CONTINUOUS
Status: DISCONTINUED | OUTPATIENT
Start: 2021-10-18 | End: 2021-10-21 | Stop reason: HOSPADM

## 2021-10-18 RX ADMIN — SODIUM CHLORIDE FOR IRRIGATION 3000 ML: 0.9 SOLUTION IRRIGATION at 14:43

## 2021-10-18 RX ADMIN — SODIUM CHLORIDE, SODIUM LACTATE, POTASSIUM CHLORIDE, AND CALCIUM CHLORIDE: .6; .31; .03; .02 INJECTION, SOLUTION INTRAVENOUS at 12:00

## 2021-10-18 RX ADMIN — FENTANYL CITRATE 50 MCG: 50 INJECTION INTRAMUSCULAR; INTRAVENOUS at 08:14

## 2021-10-18 RX ADMIN — FENTANYL CITRATE 25 MCG: 50 INJECTION INTRAMUSCULAR; INTRAVENOUS at 12:37

## 2021-10-18 RX ADMIN — ROCURONIUM BROMIDE 20 MG: 50 INJECTION, SOLUTION INTRAVENOUS at 09:03

## 2021-10-18 RX ADMIN — SODIUM CHLORIDE, SODIUM LACTATE, POTASSIUM CHLORIDE, AND CALCIUM CHLORIDE: .6; .31; .03; .02 INJECTION, SOLUTION INTRAVENOUS at 11:36

## 2021-10-18 RX ADMIN — DEXAMETHASONE SODIUM PHOSPHATE 8 MG: 4 INJECTION INTRA-ARTICULAR; INTRALESIONAL; INTRAMUSCULAR; INTRAVENOUS; SOFT TISSUE at 07:46

## 2021-10-18 RX ADMIN — CEFAZOLIN SODIUM 1000 MG: 1 SOLUTION INTRAVENOUS at 11:12

## 2021-10-18 RX ADMIN — POLYMYXIN B SULFATE, BACITRACIN ZINC, NEOMYCIN SULFATE 1 LARGE APPLICATION: 5000; 3.5; 4 OINTMENT TOPICAL at 17:56

## 2021-10-18 RX ADMIN — ROCURONIUM BROMIDE 10 MG: 50 INJECTION, SOLUTION INTRAVENOUS at 08:54

## 2021-10-18 RX ADMIN — Medication 14 MG: at 14:53

## 2021-10-18 RX ADMIN — CEFAZOLIN SODIUM 1000 MG: 1 SOLUTION INTRAVENOUS at 07:19

## 2021-10-18 RX ADMIN — METOPROLOL TARTRATE 50 MG: 50 TABLET, FILM COATED ORAL at 17:56

## 2021-10-18 RX ADMIN — DOCUSATE SODIUM 100 MG: 100 CAPSULE ORAL at 17:56

## 2021-10-18 RX ADMIN — PROPOFOL 150 MG: 10 INJECTION, EMULSION INTRAVENOUS at 07:31

## 2021-10-18 RX ADMIN — ROCURONIUM BROMIDE 10 MG: 50 INJECTION, SOLUTION INTRAVENOUS at 08:05

## 2021-10-18 RX ADMIN — FENTANYL CITRATE 50 MCG: 50 INJECTION INTRAMUSCULAR; INTRAVENOUS at 08:06

## 2021-10-18 RX ADMIN — ATROPA BELLADONNA AND OPIUM 1 SUPPOSITORY: 16.2; 3 SUPPOSITORY RECTAL at 12:02

## 2021-10-18 RX ADMIN — FENTANYL CITRATE 50 MCG: 50 INJECTION INTRAMUSCULAR; INTRAVENOUS at 11:14

## 2021-10-18 RX ADMIN — HYDROMORPHONE HYDROCHLORIDE 0.5 MG: 1 INJECTION, SOLUTION INTRAMUSCULAR; INTRAVENOUS; SUBCUTANEOUS at 09:05

## 2021-10-18 RX ADMIN — SODIUM CHLORIDE: 0.9 INJECTION, SOLUTION INTRAVENOUS at 07:41

## 2021-10-18 RX ADMIN — GLYCOPYRROLATE 0.4 MG: 0.2 INJECTION, SOLUTION INTRAMUSCULAR; INTRAVENOUS at 11:34

## 2021-10-18 RX ADMIN — OXYBUTYNIN CHLORIDE 5 MG: 5 TABLET ORAL at 17:56

## 2021-10-18 RX ADMIN — FENTANYL CITRATE 25 MCG: 50 INJECTION INTRAMUSCULAR; INTRAVENOUS at 12:42

## 2021-10-18 RX ADMIN — SODIUM CHLORIDE, SODIUM LACTATE, POTASSIUM CHLORIDE, AND CALCIUM CHLORIDE 75 ML/HR: .6; .31; .03; .02 INJECTION, SOLUTION INTRAVENOUS at 14:46

## 2021-10-18 RX ADMIN — MIDAZOLAM 1 MG: 1 INJECTION INTRAMUSCULAR; INTRAVENOUS at 07:21

## 2021-10-18 RX ADMIN — ASPIRIN 81 MG: 81 TABLET, COATED ORAL at 14:54

## 2021-10-18 RX ADMIN — AMLODIPINE BESYLATE 10 MG: 10 TABLET ORAL at 17:56

## 2021-10-18 RX ADMIN — LIDOCAINE HYDROCHLORIDE 100 MG: 20 INJECTION, SOLUTION EPIDURAL; INFILTRATION; INTRACAUDAL; PERINEURAL at 07:31

## 2021-10-18 RX ADMIN — ROCURONIUM BROMIDE 10 MG: 50 INJECTION, SOLUTION INTRAVENOUS at 10:02

## 2021-10-18 RX ADMIN — ENOXAPARIN SODIUM 40 MG: 40 INJECTION SUBCUTANEOUS at 14:54

## 2021-10-18 RX ADMIN — ONDANSETRON 4 MG: 2 INJECTION INTRAMUSCULAR; INTRAVENOUS at 11:29

## 2021-10-18 RX ADMIN — FENTANYL CITRATE 50 MCG: 50 INJECTION INTRAMUSCULAR; INTRAVENOUS at 10:04

## 2021-10-18 RX ADMIN — FINASTERIDE 5 MG: 5 TABLET, FILM COATED ORAL at 14:53

## 2021-10-18 RX ADMIN — ROCURONIUM BROMIDE 10 MG: 50 INJECTION, SOLUTION INTRAVENOUS at 09:42

## 2021-10-18 RX ADMIN — NEOSTIGMINE METHYLSULFATE 4 MG: 1 INJECTION INTRAVENOUS at 11:34

## 2021-10-18 RX ADMIN — SODIUM CHLORIDE, SODIUM LACTATE, POTASSIUM CHLORIDE, AND CALCIUM CHLORIDE 125 ML/HR: .6; .31; .03; .02 INJECTION, SOLUTION INTRAVENOUS at 05:59

## 2021-10-18 RX ADMIN — FENTANYL CITRATE 100 MCG: 50 INJECTION INTRAMUSCULAR; INTRAVENOUS at 07:31

## 2021-10-18 RX ADMIN — ROCURONIUM BROMIDE 50 MG: 50 INJECTION, SOLUTION INTRAVENOUS at 07:32

## 2021-10-19 LAB
ANION GAP SERPL CALCULATED.3IONS-SCNC: 10 MMOL/L (ref 4–13)
BUN SERPL-MCNC: 11 MG/DL (ref 5–25)
CALCIUM SERPL-MCNC: 7.9 MG/DL (ref 8.3–10.1)
CHLORIDE SERPL-SCNC: 107 MMOL/L (ref 100–108)
CO2 SERPL-SCNC: 23 MMOL/L (ref 21–32)
CREAT SERPL-MCNC: 0.93 MG/DL (ref 0.6–1.3)
ERYTHROCYTE [DISTWIDTH] IN BLOOD BY AUTOMATED COUNT: 18.6 % (ref 11.6–15.1)
GFR SERPL CREATININE-BSD FRML MDRD: 80 ML/MIN/1.73SQ M
GLUCOSE SERPL-MCNC: 95 MG/DL (ref 65–140)
HCT VFR BLD AUTO: 34.8 % (ref 36.5–49.3)
HGB BLD-MCNC: 10.6 G/DL (ref 12–17)
MCH RBC QN AUTO: 24 PG (ref 26.8–34.3)
MCHC RBC AUTO-ENTMCNC: 30.5 G/DL (ref 31.4–37.4)
MCV RBC AUTO: 79 FL (ref 82–98)
PLATELET # BLD AUTO: 197 THOUSANDS/UL (ref 149–390)
PMV BLD AUTO: 10.5 FL (ref 8.9–12.7)
POTASSIUM SERPL-SCNC: 4 MMOL/L (ref 3.5–5.3)
RBC # BLD AUTO: 4.42 MILLION/UL (ref 3.88–5.62)
SODIUM SERPL-SCNC: 140 MMOL/L (ref 136–145)
WBC # BLD AUTO: 15.53 THOUSAND/UL (ref 4.31–10.16)

## 2021-10-19 PROCEDURE — 99024 POSTOP FOLLOW-UP VISIT: CPT | Performed by: UROLOGY

## 2021-10-19 PROCEDURE — 99232 SBSQ HOSP IP/OBS MODERATE 35: CPT | Performed by: INTERNAL MEDICINE

## 2021-10-19 PROCEDURE — 80048 BASIC METABOLIC PNL TOTAL CA: CPT | Performed by: UROLOGY

## 2021-10-19 PROCEDURE — 85027 COMPLETE CBC AUTOMATED: CPT | Performed by: UROLOGY

## 2021-10-19 RX ADMIN — FINASTERIDE 5 MG: 5 TABLET, FILM COATED ORAL at 08:35

## 2021-10-19 RX ADMIN — AMLODIPINE BESYLATE 10 MG: 10 TABLET ORAL at 17:27

## 2021-10-19 RX ADMIN — DOCUSATE SODIUM 100 MG: 100 CAPSULE ORAL at 17:27

## 2021-10-19 RX ADMIN — ASPIRIN 81 MG: 81 TABLET, COATED ORAL at 08:34

## 2021-10-19 RX ADMIN — OXYBUTYNIN CHLORIDE 5 MG: 5 TABLET ORAL at 17:27

## 2021-10-19 RX ADMIN — Medication 14 MG: at 08:35

## 2021-10-19 RX ADMIN — METOPROLOL TARTRATE 50 MG: 50 TABLET, FILM COATED ORAL at 17:27

## 2021-10-19 RX ADMIN — SODIUM CHLORIDE, SODIUM LACTATE, POTASSIUM CHLORIDE, AND CALCIUM CHLORIDE 75 ML/HR: .6; .31; .03; .02 INJECTION, SOLUTION INTRAVENOUS at 17:26

## 2021-10-19 RX ADMIN — ENOXAPARIN SODIUM 40 MG: 40 INJECTION SUBCUTANEOUS at 08:34

## 2021-10-19 RX ADMIN — OXYBUTYNIN CHLORIDE 5 MG: 5 TABLET ORAL at 08:34

## 2021-10-19 RX ADMIN — POLYMYXIN B SULFATE, BACITRACIN ZINC, NEOMYCIN SULFATE 1 LARGE APPLICATION: 5000; 3.5; 4 OINTMENT TOPICAL at 17:28

## 2021-10-19 RX ADMIN — METOPROLOL TARTRATE 50 MG: 50 TABLET, FILM COATED ORAL at 08:35

## 2021-10-19 RX ADMIN — POLYMYXIN B SULFATE, BACITRACIN ZINC, NEOMYCIN SULFATE 1 LARGE APPLICATION: 5000; 3.5; 4 OINTMENT TOPICAL at 08:34

## 2021-10-19 RX ADMIN — DOCUSATE SODIUM 100 MG: 100 CAPSULE ORAL at 08:34

## 2021-10-20 ENCOUNTER — APPOINTMENT (INPATIENT)
Dept: RADIOLOGY | Facility: HOSPITAL | Age: 76
DRG: 707 | End: 2021-10-20
Payer: COMMERCIAL

## 2021-10-20 ENCOUNTER — TELEPHONE (OUTPATIENT)
Dept: NEPHROLOGY | Facility: CLINIC | Age: 76
End: 2021-10-20

## 2021-10-20 LAB
ERYTHROCYTE [DISTWIDTH] IN BLOOD BY AUTOMATED COUNT: 18.9 % (ref 11.6–15.1)
HCT VFR BLD AUTO: 41.4 % (ref 36.5–49.3)
HGB BLD-MCNC: 12.7 G/DL (ref 12–17)
MCH RBC QN AUTO: 23.7 PG (ref 26.8–34.3)
MCHC RBC AUTO-ENTMCNC: 30.7 G/DL (ref 31.4–37.4)
MCV RBC AUTO: 77 FL (ref 82–98)
PLATELET # BLD AUTO: 227 THOUSANDS/UL (ref 149–390)
PMV BLD AUTO: 10.8 FL (ref 8.9–12.7)
RBC # BLD AUTO: 5.36 MILLION/UL (ref 3.88–5.62)
WBC # BLD AUTO: 18.06 THOUSAND/UL (ref 4.31–10.16)

## 2021-10-20 PROCEDURE — 85027 COMPLETE CBC AUTOMATED: CPT | Performed by: UROLOGY

## 2021-10-20 PROCEDURE — 99024 POSTOP FOLLOW-UP VISIT: CPT | Performed by: PHYSICIAN ASSISTANT

## 2021-10-20 PROCEDURE — 71045 X-RAY EXAM CHEST 1 VIEW: CPT

## 2021-10-20 RX ORDER — ALBUTEROL SULFATE 2.5 MG/3ML
2.5 SOLUTION RESPIRATORY (INHALATION) EVERY 6 HOURS PRN
Status: DISCONTINUED | OUTPATIENT
Start: 2021-10-20 | End: 2021-10-21 | Stop reason: HOSPADM

## 2021-10-20 RX ADMIN — ENOXAPARIN SODIUM 40 MG: 40 INJECTION SUBCUTANEOUS at 09:06

## 2021-10-20 RX ADMIN — ASPIRIN 81 MG: 81 TABLET, COATED ORAL at 09:06

## 2021-10-20 RX ADMIN — DOCUSATE SODIUM 100 MG: 100 CAPSULE ORAL at 17:39

## 2021-10-20 RX ADMIN — Medication 14 MG: at 09:07

## 2021-10-20 RX ADMIN — METOPROLOL TARTRATE 50 MG: 50 TABLET, FILM COATED ORAL at 09:06

## 2021-10-20 RX ADMIN — POLYMYXIN B SULFATE, BACITRACIN ZINC, NEOMYCIN SULFATE 1 LARGE APPLICATION: 5000; 3.5; 4 OINTMENT TOPICAL at 09:10

## 2021-10-20 RX ADMIN — DOCUSATE SODIUM 100 MG: 100 CAPSULE ORAL at 09:07

## 2021-10-20 RX ADMIN — OXYBUTYNIN CHLORIDE 5 MG: 5 TABLET ORAL at 17:39

## 2021-10-20 RX ADMIN — AMLODIPINE BESYLATE 10 MG: 10 TABLET ORAL at 17:39

## 2021-10-20 RX ADMIN — POLYMYXIN B SULFATE, BACITRACIN ZINC, NEOMYCIN SULFATE 1 LARGE APPLICATION: 5000; 3.5; 4 OINTMENT TOPICAL at 17:38

## 2021-10-20 RX ADMIN — OXYBUTYNIN CHLORIDE 5 MG: 5 TABLET ORAL at 09:18

## 2021-10-20 RX ADMIN — FINASTERIDE 5 MG: 5 TABLET, FILM COATED ORAL at 09:07

## 2021-10-20 RX ADMIN — HYDROCODONE BITARTRATE AND ACETAMINOPHEN 1 TABLET: 5; 325 TABLET ORAL at 09:06

## 2021-10-20 RX ADMIN — METOPROLOL TARTRATE 50 MG: 50 TABLET, FILM COATED ORAL at 17:39

## 2021-10-20 RX ADMIN — SODIUM CHLORIDE, SODIUM LACTATE, POTASSIUM CHLORIDE, AND CALCIUM CHLORIDE 75 ML/HR: .6; .31; .03; .02 INJECTION, SOLUTION INTRAVENOUS at 09:04

## 2021-10-21 ENCOUNTER — TELEPHONE (OUTPATIENT)
Dept: OTHER | Facility: HOSPITAL | Age: 76
End: 2021-10-21

## 2021-10-21 VITALS
RESPIRATION RATE: 17 BRPM | DIASTOLIC BLOOD PRESSURE: 88 MMHG | BODY MASS INDEX: 26.07 KG/M2 | TEMPERATURE: 98 F | HEIGHT: 68 IN | OXYGEN SATURATION: 93 % | HEART RATE: 67 BPM | SYSTOLIC BLOOD PRESSURE: 152 MMHG | WEIGHT: 172 LBS

## 2021-10-21 LAB
ERYTHROCYTE [DISTWIDTH] IN BLOOD BY AUTOMATED COUNT: 18.9 % (ref 11.6–15.1)
HCT VFR BLD AUTO: 41.9 % (ref 36.5–49.3)
HGB BLD-MCNC: 13.2 G/DL (ref 12–17)
MCH RBC QN AUTO: 24.6 PG (ref 26.8–34.3)
MCHC RBC AUTO-ENTMCNC: 31.5 G/DL (ref 31.4–37.4)
MCV RBC AUTO: 78 FL (ref 82–98)
PLATELET # BLD AUTO: 210 THOUSANDS/UL (ref 149–390)
PMV BLD AUTO: 10 FL (ref 8.9–12.7)
RBC # BLD AUTO: 5.36 MILLION/UL (ref 3.88–5.62)
WBC # BLD AUTO: 14.22 THOUSAND/UL (ref 4.31–10.16)

## 2021-10-21 PROCEDURE — 85027 COMPLETE CBC AUTOMATED: CPT | Performed by: PHYSICIAN ASSISTANT

## 2021-10-21 PROCEDURE — 99024 POSTOP FOLLOW-UP VISIT: CPT | Performed by: PHYSICIAN ASSISTANT

## 2021-10-21 PROCEDURE — NC001 PR NO CHARGE: Performed by: PHYSICIAN ASSISTANT

## 2021-10-21 RX ORDER — HYDROCODONE BITARTRATE AND ACETAMINOPHEN 5; 325 MG/1; MG/1
1 TABLET ORAL EVERY 6 HOURS PRN
Qty: 12 TABLET | Refills: 0 | Status: SHIPPED | OUTPATIENT
Start: 2021-10-21 | End: 2021-10-31

## 2021-10-21 RX ORDER — DOCUSATE SODIUM 100 MG/1
100 CAPSULE, LIQUID FILLED ORAL 2 TIMES DAILY
Qty: 28 CAPSULE | Refills: 0 | Status: SHIPPED | OUTPATIENT
Start: 2021-10-21

## 2021-10-21 RX ORDER — OXYBUTYNIN CHLORIDE 5 MG/1
5 TABLET, EXTENDED RELEASE ORAL DAILY
Qty: 6 TABLET | Refills: 0 | Status: SHIPPED | OUTPATIENT
Start: 2021-10-21 | End: 2022-06-07

## 2021-10-21 RX ADMIN — METOPROLOL TARTRATE 50 MG: 50 TABLET, FILM COATED ORAL at 08:51

## 2021-10-21 RX ADMIN — DOCUSATE SODIUM 100 MG: 100 CAPSULE ORAL at 08:51

## 2021-10-21 RX ADMIN — ASPIRIN 81 MG: 81 TABLET, COATED ORAL at 08:51

## 2021-10-21 RX ADMIN — POLYMYXIN B SULFATE, BACITRACIN ZINC, NEOMYCIN SULFATE 1 LARGE APPLICATION: 5000; 3.5; 4 OINTMENT TOPICAL at 08:53

## 2021-10-21 RX ADMIN — FINASTERIDE 5 MG: 5 TABLET, FILM COATED ORAL at 08:51

## 2021-10-21 RX ADMIN — OXYBUTYNIN CHLORIDE 5 MG: 5 TABLET ORAL at 08:51

## 2021-10-21 RX ADMIN — ENOXAPARIN SODIUM 40 MG: 40 INJECTION SUBCUTANEOUS at 08:49

## 2021-10-22 ENCOUNTER — TELEPHONE (OUTPATIENT)
Dept: OTHER | Facility: OTHER | Age: 76
End: 2021-10-22

## 2021-10-22 ENCOUNTER — TRANSITIONAL CARE MANAGEMENT (OUTPATIENT)
Dept: FAMILY MEDICINE CLINIC | Facility: CLINIC | Age: 76
End: 2021-10-22

## 2021-10-27 ENCOUNTER — HOSPITAL ENCOUNTER (OUTPATIENT)
Dept: RADIOLOGY | Facility: HOSPITAL | Age: 76
Discharge: HOME | End: 2021-10-27
Payer: COMMERCIAL

## 2021-10-27 DIAGNOSIS — N13.8 BPH WITH OBSTRUCTION/LOWER URINARY TRACT SYMPTOMS: ICD-10-CM

## 2021-10-27 DIAGNOSIS — N40.1 BPH WITH OBSTRUCTION/LOWER URINARY TRACT SYMPTOMS: ICD-10-CM

## 2021-10-27 PROCEDURE — 74430 CONTRAST X-RAY BLADDER: CPT

## 2021-10-27 RX ADMIN — IOTHALAMATE MEGLUMINE 175 ML: 172 INJECTION URETERAL at 12:28

## 2021-10-28 ENCOUNTER — TELEPHONE (OUTPATIENT)
Dept: UROLOGY | Facility: AMBULATORY SURGERY CENTER | Age: 76
End: 2021-10-28

## 2021-10-28 ENCOUNTER — NURSE TRIAGE (OUTPATIENT)
Dept: OTHER | Facility: OTHER | Age: 76
End: 2021-10-28

## 2021-10-28 DIAGNOSIS — N40.1 BPH WITH OBSTRUCTION/LOWER URINARY TRACT SYMPTOMS: Primary | ICD-10-CM

## 2021-10-28 DIAGNOSIS — N13.8 BPH WITH OBSTRUCTION/LOWER URINARY TRACT SYMPTOMS: Primary | ICD-10-CM

## 2021-11-02 DIAGNOSIS — E55.9 VITAMIN D DEFICIENCY: ICD-10-CM

## 2021-11-02 DIAGNOSIS — N18.9 CHRONIC KIDNEY DISEASE, UNSPECIFIED CKD STAGE: Primary | ICD-10-CM

## 2021-11-03 ENCOUNTER — OFFICE VISIT (OUTPATIENT)
Dept: FAMILY MEDICINE CLINIC | Facility: CLINIC | Age: 76
End: 2021-11-03
Payer: COMMERCIAL

## 2021-11-03 VITALS
TEMPERATURE: 97.2 F | WEIGHT: 164 LBS | HEART RATE: 56 BPM | SYSTOLIC BLOOD PRESSURE: 120 MMHG | BODY MASS INDEX: 24.86 KG/M2 | DIASTOLIC BLOOD PRESSURE: 80 MMHG | RESPIRATION RATE: 16 BRPM | HEIGHT: 68 IN | OXYGEN SATURATION: 98 %

## 2021-11-03 DIAGNOSIS — C61 PROSTATE CANCER (HCC): Primary | ICD-10-CM

## 2021-11-03 PROCEDURE — 99495 TRANSJ CARE MGMT MOD F2F 14D: CPT | Performed by: FAMILY MEDICINE

## 2021-11-04 ENCOUNTER — HOSPITAL ENCOUNTER (OUTPATIENT)
Dept: CT IMAGING | Facility: HOSPITAL | Age: 76
Discharge: HOME | End: 2021-11-04
Admitting: RADIOLOGY
Payer: COMMERCIAL

## 2021-11-04 ENCOUNTER — OFFICE VISIT (OUTPATIENT)
Dept: UROLOGY | Facility: CLINIC | Age: 76
End: 2021-11-04

## 2021-11-04 VITALS
WEIGHT: 168 LBS | HEIGHT: 68 IN | SYSTOLIC BLOOD PRESSURE: 116 MMHG | DIASTOLIC BLOOD PRESSURE: 68 MMHG | BODY MASS INDEX: 25.46 KG/M2 | HEART RATE: 89 BPM

## 2021-11-04 DIAGNOSIS — N13.8 BPH WITH OBSTRUCTION/LOWER URINARY TRACT SYMPTOMS: Primary | ICD-10-CM

## 2021-11-04 DIAGNOSIS — N40.1 BPH WITH OBSTRUCTION/LOWER URINARY TRACT SYMPTOMS: Primary | ICD-10-CM

## 2021-11-04 DIAGNOSIS — N40.1 BPH WITH OBSTRUCTION/LOWER URINARY TRACT SYMPTOMS: ICD-10-CM

## 2021-11-04 DIAGNOSIS — N13.8 BPH WITH OBSTRUCTION/LOWER URINARY TRACT SYMPTOMS: ICD-10-CM

## 2021-11-04 PROCEDURE — 99024 POSTOP FOLLOW-UP VISIT: CPT | Performed by: UROLOGY

## 2021-11-04 PROCEDURE — 51600 INJECTION FOR BLADDER X-RAY: CPT

## 2021-11-04 PROCEDURE — 72194 CT PELVIS W/O & W/DYE: CPT

## 2021-11-04 PROCEDURE — G1004 CDSM NDSC: HCPCS

## 2021-11-11 ENCOUNTER — OFFICE VISIT (OUTPATIENT)
Dept: HEMATOLOGY ONCOLOGY | Facility: CLINIC | Age: 76
End: 2021-11-11
Payer: COMMERCIAL

## 2021-11-11 ENCOUNTER — APPOINTMENT (OUTPATIENT)
Dept: LAB | Facility: MEDICAL CENTER | Age: 76
End: 2021-11-11
Payer: COMMERCIAL

## 2021-11-11 ENCOUNTER — DOCUMENTATION (OUTPATIENT)
Dept: HEMATOLOGY ONCOLOGY | Facility: CLINIC | Age: 76
End: 2021-11-11

## 2021-11-11 VITALS
DIASTOLIC BLOOD PRESSURE: 78 MMHG | SYSTOLIC BLOOD PRESSURE: 126 MMHG | BODY MASS INDEX: 25.61 KG/M2 | WEIGHT: 169 LBS | TEMPERATURE: 98 F | RESPIRATION RATE: 18 BRPM | HEIGHT: 68 IN

## 2021-11-11 DIAGNOSIS — R71.8 MICROCYTOSIS: ICD-10-CM

## 2021-11-11 DIAGNOSIS — D45 POLYCYTHEMIA VERA (HCC): Primary | ICD-10-CM

## 2021-11-11 DIAGNOSIS — Z51.11 ENCOUNTER FOR ANTINEOPLASTIC CHEMOTHERAPY: ICD-10-CM

## 2021-11-11 DIAGNOSIS — E55.9 VITAMIN D DEFICIENCY: ICD-10-CM

## 2021-11-11 DIAGNOSIS — N18.9 CHRONIC KIDNEY DISEASE, UNSPECIFIED CKD STAGE: ICD-10-CM

## 2021-11-11 DIAGNOSIS — Z15.89 JAK2 GENE MUTATION: ICD-10-CM

## 2021-11-11 DIAGNOSIS — F17.210 CIGARETTE SMOKER: ICD-10-CM

## 2021-11-11 LAB
25(OH)D3 SERPL-MCNC: 52.1 NG/ML (ref 30–100)
ALBUMIN SERPL BCP-MCNC: 3.4 G/DL (ref 3.5–5)
ANION GAP SERPL CALCULATED.3IONS-SCNC: 3 MMOL/L (ref 4–13)
BUN SERPL-MCNC: 21 MG/DL (ref 5–25)
CALCIUM ALBUM COR SERPL-MCNC: 9.6 MG/DL (ref 8.3–10.1)
CALCIUM SERPL-MCNC: 9.1 MG/DL (ref 8.3–10.1)
CHLORIDE SERPL-SCNC: 107 MMOL/L (ref 100–108)
CO2 SERPL-SCNC: 29 MMOL/L (ref 21–32)
CREAT SERPL-MCNC: 1.27 MG/DL (ref 0.6–1.3)
CREAT UR-MCNC: 157 MG/DL
GFR SERPL CREATININE-BSD FRML MDRD: 55 ML/MIN/1.73SQ M
GLUCOSE P FAST SERPL-MCNC: 99 MG/DL (ref 65–99)
MAGNESIUM SERPL-MCNC: 2.5 MG/DL (ref 1.6–2.6)
PHOSPHATE SERPL-MCNC: 2.8 MG/DL (ref 2.3–4.1)
POTASSIUM SERPL-SCNC: 4.2 MMOL/L (ref 3.5–5.3)
PROT UR-MCNC: 130 MG/DL
PROT/CREAT UR: 0.83 MG/G{CREAT} (ref 0–0.1)
PTH-INTACT SERPL-MCNC: 53.1 PG/ML (ref 18.4–80.1)
SODIUM SERPL-SCNC: 139 MMOL/L (ref 136–145)

## 2021-11-11 PROCEDURE — 36415 COLL VENOUS BLD VENIPUNCTURE: CPT

## 2021-11-11 PROCEDURE — 84156 ASSAY OF PROTEIN URINE: CPT

## 2021-11-11 PROCEDURE — 82570 ASSAY OF URINE CREATININE: CPT

## 2021-11-11 PROCEDURE — 82306 VITAMIN D 25 HYDROXY: CPT

## 2021-11-11 PROCEDURE — 80069 RENAL FUNCTION PANEL: CPT

## 2021-11-11 PROCEDURE — 83735 ASSAY OF MAGNESIUM: CPT

## 2021-11-11 PROCEDURE — 99214 OFFICE O/P EST MOD 30 MIN: CPT | Performed by: INTERNAL MEDICINE

## 2021-11-11 PROCEDURE — 83970 ASSAY OF PARATHORMONE: CPT

## 2021-11-11 RX ORDER — HYDROXYUREA 500 MG/1
500 CAPSULE ORAL DAILY
Qty: 90 CAPSULE | Refills: 0 | Status: SHIPPED | OUTPATIENT
Start: 2021-11-11 | End: 2022-01-03

## 2021-11-12 ENCOUNTER — TELEPHONE (OUTPATIENT)
Dept: HEMATOLOGY ONCOLOGY | Facility: CLINIC | Age: 76
End: 2021-11-12

## 2021-11-13 DIAGNOSIS — N13.8 BPH WITH OBSTRUCTION/LOWER URINARY TRACT SYMPTOMS: ICD-10-CM

## 2021-11-13 DIAGNOSIS — N40.1 BPH WITH OBSTRUCTION/LOWER URINARY TRACT SYMPTOMS: ICD-10-CM

## 2021-11-13 RX ORDER — FINASTERIDE 5 MG/1
TABLET, FILM COATED ORAL
Qty: 90 TABLET | Refills: 0 | Status: SHIPPED | OUTPATIENT
Start: 2021-11-13 | End: 2022-01-04

## 2021-11-30 ENCOUNTER — APPOINTMENT (OUTPATIENT)
Dept: LAB | Facility: MEDICAL CENTER | Age: 76
End: 2021-11-30
Payer: COMMERCIAL

## 2021-11-30 DIAGNOSIS — D45 POLYCYTHEMIA VERA (HCC): ICD-10-CM

## 2021-11-30 DIAGNOSIS — Z15.89 JAK2 GENE MUTATION: ICD-10-CM

## 2021-11-30 DIAGNOSIS — Z51.11 ENCOUNTER FOR ANTINEOPLASTIC CHEMOTHERAPY: ICD-10-CM

## 2021-11-30 DIAGNOSIS — R71.8 MICROCYTOSIS: ICD-10-CM

## 2021-11-30 LAB
ALBUMIN SERPL BCP-MCNC: 3.2 G/DL (ref 3.5–5)
ALP SERPL-CCNC: 116 U/L (ref 46–116)
ALT SERPL W P-5'-P-CCNC: 24 U/L (ref 12–78)
ANION GAP SERPL CALCULATED.3IONS-SCNC: 5 MMOL/L (ref 4–13)
AST SERPL W P-5'-P-CCNC: 15 U/L (ref 5–45)
BILIRUB SERPL-MCNC: 0.67 MG/DL (ref 0.2–1)
BUN SERPL-MCNC: 19 MG/DL (ref 5–25)
CALCIUM ALBUM COR SERPL-MCNC: 9.1 MG/DL (ref 8.3–10.1)
CALCIUM SERPL-MCNC: 8.5 MG/DL (ref 8.3–10.1)
CHLORIDE SERPL-SCNC: 110 MMOL/L (ref 100–108)
CO2 SERPL-SCNC: 27 MMOL/L (ref 21–32)
CREAT SERPL-MCNC: 1.56 MG/DL (ref 0.6–1.3)
ERYTHROCYTE [DISTWIDTH] IN BLOOD BY AUTOMATED COUNT: 19.9 % (ref 11.6–15.1)
FERRITIN SERPL-MCNC: 12 NG/ML (ref 8–388)
GFR SERPL CREATININE-BSD FRML MDRD: 43 ML/MIN/1.73SQ M
GLUCOSE SERPL-MCNC: 100 MG/DL (ref 65–140)
HCT VFR BLD AUTO: 43.9 % (ref 36.5–49.3)
HGB BLD-MCNC: 12.8 G/DL (ref 12–17)
IRON SATN MFR SERPL: 12 % (ref 20–50)
IRON SERPL-MCNC: 42 UG/DL (ref 65–175)
MCH RBC QN AUTO: 22.9 PG (ref 26.8–34.3)
MCHC RBC AUTO-ENTMCNC: 29.2 G/DL (ref 31.4–37.4)
MCV RBC AUTO: 78 FL (ref 82–98)
PLATELET # BLD AUTO: 120 THOUSANDS/UL (ref 149–390)
PMV BLD AUTO: 10.1 FL (ref 8.9–12.7)
POTASSIUM SERPL-SCNC: 4.1 MMOL/L (ref 3.5–5.3)
PROT SERPL-MCNC: 6.9 G/DL (ref 6.4–8.2)
RBC # BLD AUTO: 5.6 MILLION/UL (ref 3.88–5.62)
SODIUM SERPL-SCNC: 142 MMOL/L (ref 136–145)
TIBC SERPL-MCNC: 350 UG/DL (ref 250–450)
WBC # BLD AUTO: 9.93 THOUSAND/UL (ref 4.31–10.16)

## 2021-11-30 PROCEDURE — 83550 IRON BINDING TEST: CPT

## 2021-11-30 PROCEDURE — 85027 COMPLETE CBC AUTOMATED: CPT

## 2021-11-30 PROCEDURE — 82728 ASSAY OF FERRITIN: CPT

## 2021-11-30 PROCEDURE — 36415 COLL VENOUS BLD VENIPUNCTURE: CPT

## 2021-11-30 PROCEDURE — 80053 COMPREHEN METABOLIC PANEL: CPT

## 2021-11-30 PROCEDURE — 83540 ASSAY OF IRON: CPT

## 2021-12-06 ENCOUNTER — TELEPHONE (OUTPATIENT)
Dept: UROLOGY | Facility: MEDICAL CENTER | Age: 76
End: 2021-12-06

## 2021-12-06 ENCOUNTER — PROCEDURE VISIT (OUTPATIENT)
Dept: UROLOGY | Facility: CLINIC | Age: 76
End: 2021-12-06
Payer: COMMERCIAL

## 2021-12-06 VITALS
BODY MASS INDEX: 27.31 KG/M2 | WEIGHT: 174 LBS | SYSTOLIC BLOOD PRESSURE: 142 MMHG | DIASTOLIC BLOOD PRESSURE: 80 MMHG | HEIGHT: 67 IN

## 2021-12-06 DIAGNOSIS — N40.1 BPH WITH OBSTRUCTION/LOWER URINARY TRACT SYMPTOMS: Primary | ICD-10-CM

## 2021-12-06 DIAGNOSIS — N13.8 BPH WITH OBSTRUCTION/LOWER URINARY TRACT SYMPTOMS: Primary | ICD-10-CM

## 2021-12-06 LAB — POST-VOID RESIDUAL VOLUME, ML POC: 16 ML

## 2021-12-06 PROCEDURE — 51798 US URINE CAPACITY MEASURE: CPT

## 2021-12-06 PROCEDURE — 99024 POSTOP FOLLOW-UP VISIT: CPT

## 2021-12-10 ENCOUNTER — OFFICE VISIT (OUTPATIENT)
Dept: NEPHROLOGY | Facility: CLINIC | Age: 76
End: 2021-12-10
Payer: COMMERCIAL

## 2021-12-10 VITALS
HEIGHT: 67 IN | RESPIRATION RATE: 16 BRPM | WEIGHT: 168 LBS | HEART RATE: 67 BPM | SYSTOLIC BLOOD PRESSURE: 118 MMHG | DIASTOLIC BLOOD PRESSURE: 68 MMHG | BODY MASS INDEX: 26.37 KG/M2

## 2021-12-10 DIAGNOSIS — R73.03 PREDIABETES: ICD-10-CM

## 2021-12-10 DIAGNOSIS — D45 POLYCYTHEMIA VERA (HCC): ICD-10-CM

## 2021-12-10 DIAGNOSIS — E78.2 MIXED HYPERLIPIDEMIA: ICD-10-CM

## 2021-12-10 DIAGNOSIS — I10 BENIGN ESSENTIAL HYPERTENSION: ICD-10-CM

## 2021-12-10 DIAGNOSIS — N13.8 BPH WITH OBSTRUCTION/LOWER URINARY TRACT SYMPTOMS: ICD-10-CM

## 2021-12-10 DIAGNOSIS — N40.1 BPH WITH OBSTRUCTION/LOWER URINARY TRACT SYMPTOMS: ICD-10-CM

## 2021-12-10 DIAGNOSIS — N18.32 STAGE 3B CHRONIC KIDNEY DISEASE (HCC): Primary | ICD-10-CM

## 2021-12-10 DIAGNOSIS — N28.1 RENAL CYST: ICD-10-CM

## 2021-12-10 PROCEDURE — 99214 OFFICE O/P EST MOD 30 MIN: CPT | Performed by: INTERNAL MEDICINE

## 2021-12-13 ENCOUNTER — OFFICE VISIT (OUTPATIENT)
Dept: HEMATOLOGY ONCOLOGY | Facility: CLINIC | Age: 76
End: 2021-12-13
Payer: COMMERCIAL

## 2021-12-13 VITALS
OXYGEN SATURATION: 99 % | BODY MASS INDEX: 24.89 KG/M2 | SYSTOLIC BLOOD PRESSURE: 116 MMHG | WEIGHT: 164.2 LBS | HEART RATE: 65 BPM | RESPIRATION RATE: 18 BRPM | TEMPERATURE: 97.6 F | DIASTOLIC BLOOD PRESSURE: 58 MMHG | HEIGHT: 68 IN

## 2021-12-13 DIAGNOSIS — Z51.11 ENCOUNTER FOR ANTINEOPLASTIC CHEMOTHERAPY: ICD-10-CM

## 2021-12-13 DIAGNOSIS — Z15.89 JAK2 GENE MUTATION: ICD-10-CM

## 2021-12-13 DIAGNOSIS — R71.8 MICROCYTOSIS: ICD-10-CM

## 2021-12-13 DIAGNOSIS — D45 POLYCYTHEMIA VERA (HCC): Primary | ICD-10-CM

## 2021-12-13 DIAGNOSIS — R79.0 LOW IRON STORES: ICD-10-CM

## 2021-12-13 PROCEDURE — 99214 OFFICE O/P EST MOD 30 MIN: CPT | Performed by: INTERNAL MEDICINE

## 2021-12-13 RX ORDER — SODIUM CHLORIDE 9 MG/ML
20 INJECTION, SOLUTION INTRAVENOUS ONCE
Status: CANCELLED | OUTPATIENT
Start: 2021-12-13

## 2021-12-17 ENCOUNTER — HOSPITAL ENCOUNTER (OUTPATIENT)
Dept: INFUSION CENTER | Facility: CLINIC | Age: 76
Discharge: HOME | End: 2021-12-17
Payer: COMMERCIAL

## 2021-12-17 VITALS
SYSTOLIC BLOOD PRESSURE: 121 MMHG | DIASTOLIC BLOOD PRESSURE: 74 MMHG | RESPIRATION RATE: 16 BRPM | TEMPERATURE: 97.8 F | HEART RATE: 65 BPM

## 2021-12-17 DIAGNOSIS — R71.8 MICROCYTOSIS: Primary | ICD-10-CM

## 2021-12-17 DIAGNOSIS — R79.0 LOW IRON STORES: ICD-10-CM

## 2021-12-17 PROCEDURE — 96374 THER/PROPH/DIAG INJ IV PUSH: CPT

## 2021-12-17 RX ORDER — SODIUM CHLORIDE 9 MG/ML
20 INJECTION, SOLUTION INTRAVENOUS ONCE
Status: COMPLETED | OUTPATIENT
Start: 2021-12-17 | End: 2021-12-17

## 2021-12-17 RX ORDER — SODIUM CHLORIDE 9 MG/ML
20 INJECTION, SOLUTION INTRAVENOUS ONCE
Status: CANCELLED | OUTPATIENT
Start: 2021-12-27

## 2021-12-17 RX ADMIN — FERRIC CARBOXYMALTOSE INJECTION 750 MG: 50 INJECTION, SOLUTION INTRAVENOUS at 14:40

## 2021-12-17 RX ADMIN — SODIUM CHLORIDE 20 ML/HR: 0.9 INJECTION, SOLUTION INTRAVENOUS at 14:34

## 2021-12-27 ENCOUNTER — HOSPITAL ENCOUNTER (OUTPATIENT)
Dept: INFUSION CENTER | Facility: CLINIC | Age: 76
Discharge: HOME | End: 2021-12-27
Payer: COMMERCIAL

## 2021-12-27 VITALS
HEART RATE: 64 BPM | RESPIRATION RATE: 16 BRPM | SYSTOLIC BLOOD PRESSURE: 151 MMHG | DIASTOLIC BLOOD PRESSURE: 79 MMHG | TEMPERATURE: 96.5 F

## 2021-12-27 DIAGNOSIS — R71.8 MICROCYTOSIS: Primary | ICD-10-CM

## 2021-12-27 DIAGNOSIS — R79.0 LOW IRON STORES: ICD-10-CM

## 2021-12-27 PROCEDURE — 96374 THER/PROPH/DIAG INJ IV PUSH: CPT

## 2021-12-27 RX ORDER — SODIUM CHLORIDE 9 MG/ML
20 INJECTION, SOLUTION INTRAVENOUS ONCE
Status: CANCELLED | OUTPATIENT
Start: 2021-12-31

## 2021-12-27 RX ORDER — SODIUM CHLORIDE 9 MG/ML
20 INJECTION, SOLUTION INTRAVENOUS ONCE
Status: COMPLETED | OUTPATIENT
Start: 2021-12-27 | End: 2021-12-27

## 2021-12-27 RX ADMIN — FERRIC CARBOXYMALTOSE INJECTION 750 MG: 50 INJECTION, SOLUTION INTRAVENOUS at 14:04

## 2021-12-27 RX ADMIN — SODIUM CHLORIDE 20 ML/HR: 0.9 INJECTION, SOLUTION INTRAVENOUS at 14:03

## 2022-01-03 DIAGNOSIS — N13.8 BPH WITH OBSTRUCTION/LOWER URINARY TRACT SYMPTOMS: ICD-10-CM

## 2022-01-03 DIAGNOSIS — I25.10 CORONARY ARTERY DISEASE WITHOUT ANGINA PECTORIS, UNSPECIFIED VESSEL OR LESION TYPE, UNSPECIFIED WHETHER NATIVE OR TRANSPLANTED HEART: ICD-10-CM

## 2022-01-03 DIAGNOSIS — E78.2 MIXED HYPERLIPIDEMIA: ICD-10-CM

## 2022-01-03 DIAGNOSIS — N40.1 BPH WITH OBSTRUCTION/LOWER URINARY TRACT SYMPTOMS: ICD-10-CM

## 2022-01-03 RX ORDER — ATORVASTATIN CALCIUM 80 MG/1
80 TABLET, FILM COATED ORAL EVERY EVENING
Qty: 90 TABLET | Refills: 0 | Status: SHIPPED | OUTPATIENT
Start: 2022-01-03 | End: 2022-06-04

## 2022-01-04 RX ORDER — FINASTERIDE 5 MG/1
TABLET, FILM COATED ORAL
Qty: 90 TABLET | Refills: 0 | Status: SHIPPED | OUTPATIENT
Start: 2022-01-04 | End: 2022-05-18

## 2022-01-12 ENCOUNTER — OFFICE VISIT (OUTPATIENT)
Dept: FAMILY MEDICINE CLINIC | Facility: CLINIC | Age: 77
End: 2022-01-12
Payer: COMMERCIAL

## 2022-01-12 VITALS
DIASTOLIC BLOOD PRESSURE: 82 MMHG | HEART RATE: 68 BPM | TEMPERATURE: 97.7 F | SYSTOLIC BLOOD PRESSURE: 122 MMHG | BODY MASS INDEX: 25.46 KG/M2 | OXYGEN SATURATION: 99 % | RESPIRATION RATE: 16 BRPM | WEIGHT: 168 LBS | HEIGHT: 68 IN

## 2022-01-12 DIAGNOSIS — I10 BENIGN ESSENTIAL HYPERTENSION: Primary | ICD-10-CM

## 2022-01-12 DIAGNOSIS — C61 PROSTATE CANCER (HCC): ICD-10-CM

## 2022-01-12 DIAGNOSIS — E78.2 MIXED HYPERLIPIDEMIA: ICD-10-CM

## 2022-01-12 DIAGNOSIS — R73.03 PREDIABETES: ICD-10-CM

## 2022-01-12 DIAGNOSIS — D45 POLYCYTHEMIA VERA (HCC): ICD-10-CM

## 2022-01-12 DIAGNOSIS — N18.32 STAGE 3B CHRONIC KIDNEY DISEASE (HCC): ICD-10-CM

## 2022-01-12 DIAGNOSIS — I25.10 CORONARY ARTERIOSCLEROSIS: ICD-10-CM

## 2022-01-12 PROBLEM — S39.012A STRAIN OF LUMBAR REGION: Status: RESOLVED | Noted: 2020-06-08 | Resolved: 2022-01-12

## 2022-01-12 PROCEDURE — G0439 PPPS, SUBSEQ VISIT: HCPCS | Performed by: FAMILY MEDICINE

## 2022-01-12 PROCEDURE — 99214 OFFICE O/P EST MOD 30 MIN: CPT | Performed by: FAMILY MEDICINE

## 2022-01-12 NOTE — ASSESSMENT & PLAN NOTE
Status post PCI  Patient denies any chest pain shortness of breath    Continue regular follow-up with cardiologist as directed

## 2022-01-12 NOTE — ASSESSMENT & PLAN NOTE
Lab Results   Component Value Date    EGFR 43 11/30/2021    EGFR 55 11/11/2021    EGFR 80 10/19/2021    CREATININE 1 56 (H) 11/30/2021    CREATININE 1 27 11/11/2021    CREATININE 0 93 10/19/2021   Will continue to monitor

## 2022-01-12 NOTE — PROGRESS NOTES
Assessment and Plan:   MEDICARE WELLNESS VISIT    Problem List Items Addressed This Visit     None           Preventive health issues were discussed with patient, and age appropriate screening tests were ordered as noted in patient's After Visit Summary  Personalized health advice and appropriate referrals for health education or preventive services given if needed, as noted in patient's After Visit Summary  History of Present Illness:     Patient presents for Welcome to Medicare visit       Patient Care Team:  Gabrielle Madden DO as PCP - General  MD Steve Atkins DO Charis Dayhoff, MD Salvador Kitchen, DO Salvador Kitchen, DO as Endoscopist     Review of Systems:     Review of Systems   Problem List:     Patient Active Problem List   Diagnosis    Gastritis    Benign essential hypertension    Cervical radiculopathy    Coronary arteriosclerosis    Mixed hyperlipidemia    Polycythemia vera ([facility]    Prediabetes    Tobacco user    Chronic left shoulder pain    Neck pain    Cervical spondylosis without myelopathy    BPH with obstruction/lower urinary tract symptoms    Strain of lumbar region    COVID-19 virus infection    Status post percutaneous transluminal coronary angioplasty    Cigarette smoker    Left bundle branch block    Pre-operative clearance    Prostate cancer ([facility]    JAK2 gene mutation    Encounter for antineoplastic chemotherapy    Microcytosis    Stage 3b chronic kidney disease ([facility] )    Low iron stores      Past Medical and Surgical History:     Past Medical History:   Diagnosis Date    Arthritis     both shoulders    CAD (coronary artery disease)     Last Assessed:  11/4/13    Cigarette smoker     Colon polyp     Elevated prostate specific antigen (PSA)     Last Assessed:  12/21/17    Enlarged prostate     Exercise involving walking     in season hunt's and fish's/ 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"   [facility] Full dentures     but doesnt wear them    History of 2019 novel coronavirus disease (COVID-19) 02/2021    hospital for 2 days but not in ICU/"mainly dehydrated" "also fever/oxygen below 90"    History of coronary artery stent placement     x2 in 2008 or so; sees Sue Vitale-- Dr Rolando Reyes cardio   [facility] (hard of hearing)     no hearing aids yet    Hyperlipidemia     Last Assessed:  11/24/17    Hypertension     Benign essential, Last Assessed:  11/24/17    LBBB (left bundle branch block)     Nocturia     Polycythemia vera (HCC)     (per history in chart)    PVD (peripheral vascular disease) (HCC)     RBC abnormality     pt reports told has high Red blood cells/goes to infusion center regularly next appt 10/8/21 hematologist is Dr Laura Rubio of [facility](had been Dr Cesilia Briceno)   [address] of breath     "if goes up steps gets SOB, had for awhile"    Slow urinary stream     "sometimes feels like bladder isnt all the way empty"    Snores     Wears glasses     reading     Past Surgical History:   Procedure Laterality Date    ANGIOPLASTY      APPENDECTOMY      CATARACT EXTRACTION Bilateral     CORONARY ARTERY BYPASS GRAFT      CT CYSTOGRAM  11/4/2021    ELBOW SURGERY Right     FL CYSTOGRAM  10/27/2021    KNEE ARTHROSCOPY Right     CT COLONOSCOPY FLX DX W/COLLJ SPEC WHEN PFRMD N/A 5/22/2017    Procedure: COLONOSCOPY;  Surgeon: Faith Mcintosh DO;  [facility]: [facility];   Service: Gastroenterology    PROSTATE BIOPSY      PROSTATECTOMY N/A 10/18/2021    Procedure: ROBOTIC SUBTOTAL/SUPRAPUBIC PROSTATECTOMY;  Surgeon: Eduardo Vogel MD;  Location: [facility];  Service: Urology    SHOULDER ARTHROSCOPY Right     TONSILLECTOMY        Family History:     Family History   Problem Relation Age of Onset    No Known Problems Mother     Heart disease Father       Social History:     Social History     Socioeconomic History    Marital status: /Civil Union     Spouse name: None  Number of children: None    Years of education: None    Highest education level: None   Occupational History    None   Tobacco Use    Smoking status: Current Every Day Smoker     Packs/day: 0 50     Years: 35 00     Pack years: 17 50     Types: Cigarettes    Smokeless tobacco: Never Used    Tobacco comment: a little less than 1/2ppd, trying to cut back before surgery last smoked 10/16   Vaping Use    Vaping Use: None   Substance and Sexual Activity    Alcohol use: No    Drug use: No    Sexual activity: Not Currently     Comment: defer   Other Topics Concern    None   Social History Narrative    Always uses seatbelt    Feels safe at home    Uses safety equipment     Social Determinants of Health     Financial Resource Strain: Not on file   Food Insecurity: Not on file   Transportation Needs: Not on file   Physical Activity: Not on file   Stress: Not on file   Social Connections: Not on file   Intimate Partner Violence: Not on file   Housing Stability: Not on file      Medications and Allergies:     Current Outpatient Medications   Medication Sig Dispense Refill    acetaminophen (TYLENOL) 500 mg tablet Take 500 mg by mouth every 6 (six) hours as needed for mild pain      amLODIPine (NORVASC) 10 mg tablet Take 10 mg by mouth every evening       aspirin (ECOTRIN LOW STRENGTH) 81 mg EC tablet Take 81 mg by mouth daily      atorvastatin (LIPITOR) 80 mg tablet Take 1 tablet (80 mg total) by mouth every evening 90 tablet 0    Cholecalciferol (Vitamin D3) 50 MCG (2000 UT) TABS Take 2,000 Units by mouth every evening       docusate sodium (COLACE) 100 mg capsule Take 1 capsule (100 mg total) by mouth 2 (two) times a day 28 capsule 0    finasteride (PROSCAR) 5 mg tablet TAKE 1 TABLET BY MOUTH EVERY DAY 90 tablet 0    hydroxyurea (HYDREA) 500 mg capsule TAKE 1 CAPSULE BY MOUTH DAILY 90 capsule 0    metoprolol tartrate (LOPRESSOR) 50 mg tablet Take 50 mg by mouth 2 (two) times a day       Multiple Vitamin (MULTIVITAMIN) capsule Take 1 capsule by mouth daily      nitroglycerin (NITROSTAT) 0 4 mg SL tablet Place 1 tablet (0 4 mg total) under the tongue every 5 (five) minutes as needed for chest pain 10 tablet     Omega-3 Fatty Acids (FISH OIL) 1200 MG CAPS Take 1,200 mg by mouth 2 (two) times a day        ascorbic acid (VITAMIN C) 1000 MG tablet Take 1 tablet (1,000 mg total) by mouth every 12 (twelve) hours for 10 doses (Patient taking differently: Take 1,000 mg by mouth daily ) 10 tablet 0    oxybutynin (DITROPAN-XL) 5 mg 24 hr tablet Take 1 tablet (5 mg total) by mouth daily (Patient not taking: Reported on 1/12/2022 ) 6 tablet 0     No current facility-administered medications for this visit  Allergies   Allergen Reactions    Other Other (See Comments)     Pt and wife reports took a medication years ago at work cant recall the name or what it was for"     "couldn't talk and cheeks swelled and also right hand"      Immunizations:     Immunization History   Administered Date(s) Administered    Pneumococcal Polysaccharide PPV23 1945      Health Maintenance:         Topic Date Due    Hepatitis C Screening  Never done    Colorectal Cancer Screening  05/22/2020         Topic Date Due    COVID-19 Vaccine (1) Never done    Pneumococcal Vaccine: 65+ Years (1 of 2 - PPSV23) 09/18/1951    DTaP,Tdap,and Td Vaccines (1 - Tdap) Never done    Influenza Vaccine (1) Never done      Medicare Screening Tests and Risk Assessments:     Noelle Maria is here for his Subsequent Wellness visit  Last Medicare Wellness visit information reviewed, patient interviewed and updates made to the record today  Health Risk Assessment:   Patient rates overall health as good  Patient feels that their physical health rating is same  Patient is satisfied with their life  Eyesight was rated as same  Hearing was rated as same  Patient feels that their emotional and mental health rating is same   Patients states they are never, rarely angry  Patient states they are never, rarely unusually tired/fatigued  Pain experienced in the last 7 days has been none  Patient states that he has experienced no weight loss or gain in last 6 months  Depression Screening:   PHQ-2 Score: 0      Fall Risk Screening: In the past year, patient has experienced: no history of falling in past year      Home Safety:  Patient does not have trouble with stairs inside or outside of their home  Patient has working smoke alarms and has working carbon monoxide detector  Home safety hazards include: none  Nutrition:   Current diet is Regular  Medications:   Patient is currently taking over-the-counter supplements  OTC medications include: see medication list  Patient is able to manage medications  Activities of Daily Living (ADLs)/Instrumental Activities of Daily Living (IADLs):   Walk and transfer into and out of bed and chair?: Yes  Dress and groom yourself?: Yes    Bathe or shower yourself?: Yes    Feed yourself? Yes  Do your laundry/housekeeping?: Yes  Manage your money, pay your bills and track your expenses?: Yes  Make your own meals?: Yes    Do your own shopping?: Yes    Previous Hospitalizations:   Any hospitalizations or ED visits within the last 12 months?: No      Advance Care Planning:   Living will: No    Durable POA for healthcare:  Yes    Advanced directive: No    Advanced directive counseling given: Yes    Five wishes given: Yes    End of Life Decisions reviewed with patient: Yes    Provider agrees with end of life decisions: Yes      Cognitive Screening:   Provider or family/friend/caregiver concerned regarding cognition?: No    PREVENTIVE SCREENINGS      Cardiovascular Screening:    General: Screening Not Indicated and History Lipid Disorder      Diabetes Screening:     General: Screening Current      Colorectal Cancer Screening:     General: Risks and Benefits Discussed      Prostate Cancer Screening:    General: History Prostate Cancer and Screening Not Indicated      Osteoporosis Screening:    General: Risks and Benefits Discussed      Abdominal Aortic Aneurysm (AAA) Screening:    Risk factors include: age between 73-67 yo and tobacco use        General: Risks and Benefits Discussed      Lung Cancer Screening:     General: Screening Not Indicated      Hepatitis C Screening:    General: Risks and Benefits Discussed    Screening, Brief Intervention, and Referral to Treatment (SBIRT)    Screening    Typical number of drinks in a week: 0    Single Item Drug Screening:  How often have you used an illegal drug (including marijuana) or a prescription medication for non-medical reasons in the past year? never    Single Item Drug Screen Score: 0  Interpretation: Negative screen for possible drug use disorder    No exam data present     Physical Exam:     /82 (BP Location: Left arm, Patient Position: Sitting, Cuff Size: Adult)   Pulse 68   Temp 97 7 °F (36 5 °C) (Temporal)   Resp 16   Ht 5' 7 72" (1 72 m)   Wt 76 2 kg (168 lb)   SpO2 99%   BMI 25 76 kg/m²     Physical Exam     Guicho Hannah DO

## 2022-01-12 NOTE — PROGRESS NOTES
[facility]      NAME: Mariano Davidson  AGE: 77 y o  SEX: male  : 1945   MRN: [id number]    DATE: 2022  TIME: 10:26 AM    Assessment and Plan     Problem List Items Addressed This Visit     Benign essential hypertension - Primary     Controlled on amlodipine 10 and metoprolol 25 b i d  Relevant Orders    CBC and differential    TSH, 3rd generation with Free T4 reflex    Coronary arteriosclerosis     Status post PCI  Patient denies any chest pain shortness of breath  Continue regular follow-up with cardiologist as directed         Mixed hyperlipidemia     Doing well on atorvastatin 80  Continue low-fat diet and exercise  Will continue to monitor         Relevant Orders    Lipid Panel with Direct LDL reflex    Polycythemia vera (HCC)     Stable H&H  Currently on hydroxyurea  Continue regular follow-up with hematologist         Prediabetes     Stable A1c  Continue reduced carb diet exercise  Will continue to follow         Relevant Orders    Comprehensive metabolic panel    Hemoglobin A1C    Prostate cancer ([facility] [address] )     History of prostate cancer  Status post robotic prostatectomy 2021  Continue follow-up with Urology as directed         Stage 3b chronic kidney disease [facility])     Lab Results   Component Value Date    EGFR 43 2021    EGFR 55 2021    EGFR 80 10/19/2021    CREATININE 1 56 (H) 2021    CREATININE 1 27 2021    CREATININE 0 93 10/19/2021   Will continue to monitor           pt refuses all vaccinations (including COVID)        Return to office in:  6 months, yearly labs prior at [address] Complaint   Patient presents with    Follow-up     6 months    Medicare Wellness Visit       History of Present Illness     Patient presents for recheck of chronic medical problems today  Overall is doing well without complaints        The following portions of the patient's history were reviewed and updated as appropriate: allergies, current medications, past family history, past medical history, past social history, past surgical history and problem list     Review of Systems   Review of Systems   Respiratory: Negative  Cardiovascular: Negative  Gastrointestinal: Negative  Genitourinary: Negative  Active Problem List     Patient Active Problem List   Diagnosis    Gastritis    Benign essential hypertension    Cervical radiculopathy    Coronary arteriosclerosis    Mixed hyperlipidemia    Polycythemia vera ([facility] )    Prediabetes    Tobacco user    Chronic left shoulder pain    Cervical spondylosis without myelopathy    BPH with obstruction/lower urinary tract symptoms    COVID-19 virus infection    Status post percutaneous transluminal coronary angioplasty    Cigarette smoker    Left bundle branch block    Pre-operative clearance    Prostate cancer ([facility] )    JAK2 gene mutation    Encounter for antineoplastic chemotherapy    Microcytosis    Stage 3b chronic kidney disease (HCC)    Low iron stores       Objective   /82 (BP Location: Left arm, Patient Position: Sitting, Cuff Size: Adult)   Pulse 68   Temp 97 7 °F (36 5 °C) (Temporal)   Resp 16   Ht 5' 7 72" (1 72 m)   Wt 76 2 kg (168 lb)   SpO2 99%   BMI 25 76 kg/m²     Physical Exam  Cardiovascular:      Rate and Rhythm: Normal rate and regular rhythm  Heart sounds: Normal heart sounds  Comments: Carotids: no bruits  Ext: no edema  Pulmonary:      Effort: Pulmonary effort is normal  No respiratory distress  Breath sounds: No wheezing or rales  Psychiatric:         Behavior: Behavior normal          Thought Content:  Thought content normal          Pertinent Laboratory/Diagnostic Studies:  Labs reviewed    Current Medications     Current Outpatient Medications:     acetaminophen (TYLENOL) 500 mg tablet, Take 500 mg by mouth every 6 (six) hours as needed for mild pain, Disp: , Rfl:     amLODIPine (NORVASC) 10 mg tablet, Take 10 mg by mouth every evening , Disp: , Rfl:     aspirin (ECOTRIN LOW STRENGTH) 81 mg EC tablet, Take 81 mg by mouth daily, Disp: , Rfl:     atorvastatin (LIPITOR) 80 mg tablet, Take 1 tablet (80 mg total) by mouth every evening, Disp: 90 tablet, Rfl: 0    Cholecalciferol (Vitamin D3) 50 MCG (2000 UT) TABS, Take 2,000 Units by mouth every evening , Disp: , Rfl:     docusate sodium (COLACE) 100 mg capsule, Take 1 capsule (100 mg total) by mouth 2 (two) times a day, Disp: 28 capsule, Rfl: 0    finasteride (PROSCAR) 5 mg tablet, TAKE 1 TABLET BY MOUTH EVERY DAY, Disp: 90 tablet, Rfl: 0    hydroxyurea (HYDREA) 500 mg capsule, TAKE 1 CAPSULE BY MOUTH DAILY, Disp: 90 capsule, Rfl: 0    metoprolol tartrate (LOPRESSOR) 50 mg tablet, Take 50 mg by mouth 2 (two) times a day , Disp: , Rfl:     Multiple Vitamin (MULTIVITAMIN) capsule, Take 1 capsule by mouth daily, Disp: , Rfl:     nitroglycerin (NITROSTAT) 0 4 mg SL tablet, Place 1 tablet (0 4 mg total) under the tongue every 5 (five) minutes as needed for chest pain, Disp: 10 tablet, Rfl:     Omega-3 Fatty Acids (FISH OIL) 1200 MG CAPS, Take 1,200 mg by mouth 2 (two) times a day  , Disp: , Rfl:     ascorbic acid (VITAMIN C) 1000 MG tablet, Take 1 tablet (1,000 mg total) by mouth every 12 (twelve) hours for 10 doses (Patient taking differently: Take 1,000 mg by mouth daily ), Disp: 10 tablet, Rfl: 0    oxybutynin (DITROPAN-XL) 5 mg 24 hr tablet, Take 1 tablet (5 mg total) by mouth daily (Patient not taking: Reported on 1/12/2022 ), Disp: 6 tablet, Rfl: 0    Health Maintenance     Health Maintenance   Topic Date Due    Hepatitis C Screening  Never done    SLP PLAN OF CARE  Never done    COVID-19 Vaccine (1) Never done    Pneumococcal Vaccine: 65+ Years (1 of 2 - PPSV23) 09/18/1951    DTaP,Tdap,and Td Vaccines (1 - Tdap) Never done    PT PLAN OF CARE  09/14/2018    Colorectal Cancer Screening  05/22/2020    BMI: Followup Plan  12/18/2020    Influenza Vaccine (1) Never done    Fall Risk  01/12/2023    Depression Screening  01/12/2023    Medicare Annual Wellness Visit (AWV)  01/12/2023    BMI: Adult  01/12/2023    HIB Vaccine  Aged Out    Hepatitis B Vaccine  Aged Out    IPV Vaccine  Aged Out    Hepatitis A Vaccine  Aged Out    Meningococcal ACWY Vaccine  Aged Out    HPV Vaccine  Aged Out     Immunization History   Administered Date(s) Administered    Pneumococcal Polysaccharide PPV23 1945       Yemi Doll DO  [facility]

## 2022-01-12 NOTE — PATIENT INSTRUCTIONS
Medicare Preventive Visit Patient Instructions  Thank you for completing your Welcome to Medicare Visit or Medicare Annual Wellness Visit today  Your next wellness visit will be due in one year (1/13/2023)  The screening/preventive services that you may require over the next 5-10 years are detailed below  Some tests may not apply to you based off risk factors and/or age  Screening tests ordered at today's visit but not completed yet may show as past due  Also, please note that scanned in results may not display below  Preventive Screenings:  Service Recommendations Previous Testing/Comments   Colorectal Cancer Screening  · Colonoscopy    · Fecal Occult Blood Test (FOBT)/Fecal Immunochemical Test (FIT)  · Fecal DNA/Cologuard Test  · Flexible Sigmoidoscopy Age: 54-65 years old   Colonoscopy: every 10 years (May be performed more frequently if at higher risk)  OR  FOBT/FIT: every 1 year  OR  Cologuard: every 3 years  OR  Sigmoidoscopy: every 5 years  Screening may be recommended earlier than age 48 if at higher risk for colorectal cancer  Also, an individualized decision between you and your healthcare provider will decide whether screening between the ages of 74-80 would be appropriate   Colonoscopy: 05/22/2017  FOBT/FIT: Not on file  Cologuard: Not on file  Sigmoidoscopy: Not on file          Prostate Cancer Screening Individualized decision between patient and health care provider in men between ages of 53-78   Medicare will cover every 12 months beginning on the day after your 50th birthday PSA: 17 7 ng/mL     History Prostate Cancer  Screening Not Indicated     Hepatitis C Screening Once for adults born between 1945 and 1965  More frequently in patients at high risk for Hepatitis C Hep C Antibody: Not on file        Diabetes Screening 1-2 times per year if you're at risk for diabetes or have pre-diabetes Fasting glucose: 99 mg/dL   A1C: 5 6 %    Screening Current   Cholesterol Screening Once every 5 years if you don't have a lipid disorder  May order more often based on risk factors  Lipid panel: 06/30/2021    Screening Not Indicated  History Lipid Disorder      Other Preventive Screenings Covered by Medicare:  1  Abdominal Aortic Aneurysm (AAA) Screening: covered once if your at risk  You're considered to be at risk if you have a family history of AAA or a male between the age of 73-68 who smoking at least 100 cigarettes in your lifetime  2  Lung Cancer Screening: covers low dose CT scan once per year if you meet all of the following conditions: (1) Age 50-69; (2) No signs or symptoms of lung cancer; (3) Current smoker or have quit smoking within the last 15 years; (4) You have a tobacco smoking history of at least 30 pack years (packs per day x number of years you smoked); (5) You get a written order from a healthcare provider  3  Glaucoma Screening: covered annually if you're considered high risk: (1) You have diabetes OR (2) Family history of glaucoma OR (3)  aged 48 and older OR (3)  American aged 72 and older  3  Osteoporosis Screening: covered every 2 years if you meet one of the following conditions: (1) Have a vertebral abnormality; (2) On glucocorticoid therapy for more than 3 months; (3) Have primary hyperparathyroidism; (4) On osteoporosis medications and need to assess response to drug therapy  5  HIV Screening: covered annually if you're between the age of 12-76  Also covered annually if you are younger than 13 and older than 72 with risk factors for HIV infection  For pregnant patients, it is covered up to 3 times per pregnancy      Immunizations:  Immunization Recommendations   Influenza Vaccine Annual influenza vaccination during flu season is recommended for all persons aged >= 6 months who do not have contraindications   Pneumococcal Vaccine (Prevnar and Pneumovax)  * Prevnar = PCV13  * Pneumovax = PPSV23 Adults 25-60 years old: 1-3 doses may be recommended based on certain risk factors  Adults 72 years old: Prevnar (PCV13) vaccine recommended followed by Pneumovax (PPSV23) vaccine  If already received PPSV23 since turning 65, then PCV13 recommended at least one year after PPSV23 dose  Hepatitis B Vaccine 3 dose series if at intermediate or high risk (ex: diabetes, end stage renal disease, liver disease)   Tetanus (Td) Vaccine - COST NOT COVERED BY MEDICARE PART B Following completion of primary series, a booster dose should be given every 10 years to maintain immunity against tetanus  Td may also be given as tetanus wound prophylaxis  Tdap Vaccine - COST NOT COVERED BY MEDICARE PART B Recommended at least once for all adults  For pregnant patients, recommended with each pregnancy  Shingles Vaccine (Shingrix) - COST NOT COVERED BY MEDICARE PART B  2 shot series recommended in those aged 48 and above     Health Maintenance Due:      Topic Date Due    Hepatitis C Screening  Never done    Colorectal Cancer Screening  05/22/2020     Immunizations Due:      Topic Date Due    COVID-19 Vaccine (1) Never done    Pneumococcal Vaccine: 65+ Years (1 of 2 - PPSV23) 09/18/1951    DTaP,Tdap,and Td Vaccines (1 - Tdap) Never done    Influenza Vaccine (1) Never done     Advance Directives   What are advance directives? Advance directives are legal documents that state your wishes and plans for medical care  These plans are made ahead of time in case you lose your ability to make decisions for yourself  Advance directives can apply to any medical decision, such as the treatments you want, and if you want to donate organs  What are the types of advance directives? There are many types of advance directives, and each state has rules about how to use them  You may choose a combination of any of the following:  · Living will: This is a written record of the treatment you want  You can also choose which treatments you do not want, which to limit, and which to stop at a certain time   This includes surgery, medicine, IV fluid, and tube feedings  · Durable power of  for healthcare [facility]): This is a written record that states who you want to make healthcare choices for you when you are unable to make them for yourself  This person, called a proxy, is usually a family member or a friend  You may choose more than 1 proxy  · Do not resuscitate (DNR) order:  A DNR order is used in case your heart stops beating or you stop breathing  It is a request not to have certain forms of treatment, such as CPR  A DNR order may be included in other types of advance directives  · Medical directive: This covers the care that you want if you are in a coma, near death, or unable to make decisions for yourself  You can list the treatments you want for each condition  Treatment may include pain medicine, surgery, blood transfusions, dialysis, IV or tube feedings, and a ventilator (breathing machine)  · Values history: This document has questions about your views, beliefs, and how you feel and think about life  This information can help others choose the care that you would choose  Why are advance directives important? An advance directive helps you control your care  Although spoken wishes may be used, it is better to have your wishes written down  Spoken wishes can be misunderstood, or not followed  Treatments may be given even if you do not want them  An advance directive may make it easier for your family to make difficult choices about your care  Cigarette Smoking and Your Health   Risks to your health if you smoke:  Nicotine and other chemicals found in tobacco damage every cell in your body  Even if you are a light smoker, you have an increased risk for cancer, heart disease, and lung disease  If you are pregnant or have diabetes, smoking increases your risk for complications     Benefits to your health if you stop smoking:   · You decrease respiratory symptoms such as coughing, wheezing, and shortness of breath  · You reduce your risk for cancers of the lung, mouth, throat, kidney, bladder, pancreas, stomach, and cervix  If you already have cancer, you increase the benefits of chemotherapy  You also reduce your risk for cancer returning or a second cancer from developing  · You reduce your risk for heart disease, blood clots, heart attack, and stroke  · You reduce your risk for lung infections, and diseases such as pneumonia, asthma, chronic bronchitis, and emphysema  · Your circulation improves  More oxygen can be delivered to your body  If you have diabetes, you lower your risk for complications, such as kidney, artery, and eye diseases  You also lower your risk for nerve damage  Nerve damage can lead to amputations, poor vision, and blindness  · You improve your body's ability to heal and to fight infections  For more information and support to stop smoking:   · [employer]  Phone: 3- 224 - [phone]  Web Address: [employer]  Weight Management   Why it is important to manage your weight:  Being overweight increases your risk of health conditions such as heart disease, high blood pressure, type 2 diabetes, and certain types of cancer  It can also increase your risk for osteoarthritis, sleep apnea, and other respiratory problems  Aim for a slow, steady weight loss  Even a small amount of weight loss can lower your risk of health problems  How to lose weight safely:  A safe and healthy way to lose weight is to eat fewer calories and get regular exercise  You can lose up about 1 pound a week by decreasing the number of calories you eat by 500 calories each day  Healthy meal plan for weight management:  A healthy meal plan includes a variety of foods, contains fewer calories, and helps you stay healthy  A healthy meal plan includes the following:  · Eat whole-grain foods more often  A healthy meal plan should contain fiber   Fiber is the part of grains, fruits, and vegetables that is not broken down by your body  Whole-grain foods are healthy and provide extra fiber in your diet  Some examples of whole-grain foods are whole-wheat breads and pastas, oatmeal, brown rice, and bulgur  · Eat a variety of vegetables every day  Include dark, leafy greens such as spinach, kale, desirae greens, and mustard greens  Eat yellow and orange vegetables such as carrots, sweet potatoes, and winter squash  · Eat a variety of fruits every day  Choose fresh or canned fruit (canned in its own juice or light syrup) instead of juice  Fruit juice has very little or no fiber  · Eat low-fat dairy foods  Drink fat-free (skim) milk or 1% milk  Eat fat-free yogurt and low-fat cottage cheese  Try low-fat cheeses such as mozzarella and other reduced-fat cheeses  · Choose meat and other protein foods that are low in fat  Choose beans or other legumes such as split peas or lentils  Choose fish, skinless poultry (chicken or turkey), or lean cuts of red meat (beef or pork)  Before you cook meat or poultry, cut off any visible fat  · Use less fat and oil  Try baking foods instead of frying them  Add less fat, such as margarine, sour cream, regular salad dressing and mayonnaise to foods  Eat fewer high-fat foods  Some examples of high-fat foods include french fries, doughnuts, ice cream, and cakes  · Eat fewer sweets  Limit foods and drinks that are high in sugar  This includes candy, cookies, regular soda, and sweetened drinks  Exercise:  Exercise at least 30 minutes per day on most days of the week  Some examples of exercise include walking, biking, dancing, and swimming  You can also fit in more physical activity by taking the stairs instead of the elevator or parking farther away from stores  Ask your healthcare provider about the best exercise plan for you  © Copyright [employer] 2018 Information is for End User's use only and may not be sold, redistributed or otherwise used for commercial purposes  All illustrations and images included in CareNotes® are the copyrighted property of A D A M , Inc  or Loco Roach

## 2022-01-12 NOTE — ASSESSMENT & PLAN NOTE
History of prostate cancer  Status post robotic prostatectomy October 2021   Continue follow-up with Urology as directed

## 2022-03-03 ENCOUNTER — TELEPHONE (OUTPATIENT)
Dept: HEMATOLOGY ONCOLOGY | Facility: CLINIC | Age: 77
End: 2022-03-03

## 2022-03-03 ENCOUNTER — APPOINTMENT (OUTPATIENT)
Dept: LAB | Facility: MEDICAL CENTER | Age: 77
End: 2022-03-03
Payer: COMMERCIAL

## 2022-03-03 DIAGNOSIS — Z51.11 ENCOUNTER FOR ANTINEOPLASTIC CHEMOTHERAPY: ICD-10-CM

## 2022-03-03 DIAGNOSIS — D45 POLYCYTHEMIA VERA (HCC): ICD-10-CM

## 2022-03-03 DIAGNOSIS — R71.8 MICROCYTOSIS: ICD-10-CM

## 2022-03-03 DIAGNOSIS — R79.0 LOW IRON STORES: ICD-10-CM

## 2022-03-03 DIAGNOSIS — Z15.89 JAK2 GENE MUTATION: ICD-10-CM

## 2022-03-03 LAB
ALBUMIN SERPL BCP-MCNC: 3.9 G/DL (ref 3.5–5)
ALP SERPL-CCNC: 140 U/L (ref 46–116)
ALT SERPL W P-5'-P-CCNC: 44 U/L (ref 12–78)
ANION GAP SERPL CALCULATED.3IONS-SCNC: 5 MMOL/L (ref 4–13)
AST SERPL W P-5'-P-CCNC: 28 U/L (ref 5–45)
BASOPHILS # BLD AUTO: 0.06 THOUSANDS/ΜL (ref 0–0.1)
BASOPHILS NFR BLD AUTO: 1 % (ref 0–1)
BILIRUB SERPL-MCNC: 1.04 MG/DL (ref 0.2–1)
BUN SERPL-MCNC: 18 MG/DL (ref 5–25)
CALCIUM SERPL-MCNC: 9.8 MG/DL (ref 8.3–10.1)
CHLORIDE SERPL-SCNC: 107 MMOL/L (ref 100–108)
CO2 SERPL-SCNC: 29 MMOL/L (ref 21–32)
CREAT SERPL-MCNC: 1.31 MG/DL (ref 0.6–1.3)
EOSINOPHIL # BLD AUTO: 0.59 THOUSAND/ΜL (ref 0–0.61)
EOSINOPHIL NFR BLD AUTO: 5 % (ref 0–6)
ERYTHROCYTE [DISTWIDTH] IN BLOOD BY AUTOMATED COUNT: 22.4 % (ref 11.6–15.1)
FERRITIN SERPL-MCNC: 180 NG/ML (ref 8–388)
GFR SERPL CREATININE-BSD FRML MDRD: 52 ML/MIN/1.73SQ M
GLUCOSE SERPL-MCNC: 105 MG/DL (ref 65–140)
HCT VFR BLD AUTO: 55.4 % (ref 36.5–49.3)
HGB BLD-MCNC: 19.8 G/DL (ref 12–17)
IMM GRANULOCYTES # BLD AUTO: 0.05 THOUSAND/UL (ref 0–0.2)
IMM GRANULOCYTES NFR BLD AUTO: 0 % (ref 0–2)
IRON SATN MFR SERPL: 30 % (ref 20–50)
IRON SERPL-MCNC: 83 UG/DL (ref 65–175)
LYMPHOCYTES # BLD AUTO: 2.71 THOUSANDS/ΜL (ref 0.6–4.47)
LYMPHOCYTES NFR BLD AUTO: 21 % (ref 14–44)
MCH RBC QN AUTO: 34.3 PG (ref 26.8–34.3)
MCHC RBC AUTO-ENTMCNC: 35.7 G/DL (ref 31.4–37.4)
MCV RBC AUTO: 96 FL (ref 82–98)
MONOCYTES # BLD AUTO: 0.99 THOUSAND/ΜL (ref 0.17–1.22)
MONOCYTES NFR BLD AUTO: 8 % (ref 4–12)
NEUTROPHILS # BLD AUTO: 8.67 THOUSANDS/ΜL (ref 1.85–7.62)
NEUTS SEG NFR BLD AUTO: 65 % (ref 43–75)
NRBC BLD AUTO-RTO: 0 /100 WBCS
PLATELET # BLD AUTO: 184 THOUSANDS/UL (ref 149–390)
PMV BLD AUTO: 10.6 FL (ref 8.9–12.7)
POTASSIUM SERPL-SCNC: 4.4 MMOL/L (ref 3.5–5.3)
PROT SERPL-MCNC: 7.8 G/DL (ref 6.4–8.2)
RBC # BLD AUTO: 5.78 MILLION/UL (ref 3.88–5.62)
SODIUM SERPL-SCNC: 141 MMOL/L (ref 136–145)
TIBC SERPL-MCNC: 274 UG/DL (ref 250–450)
WBC # BLD AUTO: 13.07 THOUSAND/UL (ref 4.31–10.16)

## 2022-03-03 PROCEDURE — 83540 ASSAY OF IRON: CPT

## 2022-03-03 PROCEDURE — 80053 COMPREHEN METABOLIC PANEL: CPT

## 2022-03-03 PROCEDURE — 83550 IRON BINDING TEST: CPT

## 2022-03-03 PROCEDURE — 82728 ASSAY OF FERRITIN: CPT

## 2022-03-03 PROCEDURE — 85025 COMPLETE CBC W/AUTO DIFF WBC: CPT

## 2022-03-03 PROCEDURE — 36415 COLL VENOUS BLD VENIPUNCTURE: CPT

## 2022-03-03 NOTE — TELEPHONE ENCOUNTER
I attempted to call patient regarding his significant erythrocytosis following iron infusions  I left a message on the machine for him to call back, as I am hoping to discuss a one time therapeutic phlebotomy with him to decrease the risk of CV or thrombotic events given his JAK2+ Polycythemia Vera

## 2022-03-04 ENCOUNTER — TELEPHONE (OUTPATIENT)
Dept: HEMATOLOGY ONCOLOGY | Facility: CLINIC | Age: 77
End: 2022-03-04

## 2022-03-04 DIAGNOSIS — D45 POLYCYTHEMIA VERA (HCC): Primary | ICD-10-CM

## 2022-03-04 NOTE — TELEPHONE ENCOUNTER
I called patient to discuss his significant erythrocytosis from Polycythemia Vera, tobacco abuse, and recent parenteral iron replacement  I reviewed that we recommend 1 time therapeutic phlebotomy of 500cc concurrently with his daily hydroxyurea therapy  I reviewed that we are concerned for increase risk of CV and thrombotic events at this time  I answered all his questions and he voiced understanding  I placed the order for therapeutic phlebotomy and messaged CHAGO Wilkes and MR Bryant and Nilsa to help assist in coordination with the infusion center to get this scheduled ASAP  He is scheduled to follow-up with Dr Silverio Goddard on 3/14

## 2022-03-08 ENCOUNTER — HOSPITAL ENCOUNTER (OUTPATIENT)
Dept: INFUSION CENTER | Facility: CLINIC | Age: 77
Discharge: HOME | End: 2022-03-08
Payer: COMMERCIAL

## 2022-03-08 VITALS
HEART RATE: 55 BPM | TEMPERATURE: 97.8 F | SYSTOLIC BLOOD PRESSURE: 143 MMHG | RESPIRATION RATE: 16 BRPM | DIASTOLIC BLOOD PRESSURE: 88 MMHG

## 2022-03-08 DIAGNOSIS — D45 POLYCYTHEMIA VERA (HCC): Primary | ICD-10-CM

## 2022-03-08 PROCEDURE — 99195 PHLEBOTOMY: CPT

## 2022-03-08 NOTE — PROGRESS NOTES
Pt presents for therapeutic phlebotomy  No new meds or concerns  Pt is not feeling dizzy or light headed at this time  Parameters met, verified by Josee Zamora and Sawyer Vanegas RN  Pt tolerated phlebotomy of 500mL from RAC without adverse event  Pt did not become dizzy or light headed  Future appointments discussed  AVS given

## 2022-03-14 ENCOUNTER — APPOINTMENT (OUTPATIENT)
Dept: LAB | Facility: MEDICAL CENTER | Age: 77
End: 2022-03-14
Payer: COMMERCIAL

## 2022-03-14 ENCOUNTER — OFFICE VISIT (OUTPATIENT)
Dept: HEMATOLOGY ONCOLOGY | Facility: CLINIC | Age: 77
End: 2022-03-14
Payer: COMMERCIAL

## 2022-03-14 VITALS
WEIGHT: 170 LBS | OXYGEN SATURATION: 98 % | RESPIRATION RATE: 16 BRPM | TEMPERATURE: 98.2 F | HEIGHT: 68 IN | HEART RATE: 61 BPM | DIASTOLIC BLOOD PRESSURE: 80 MMHG | SYSTOLIC BLOOD PRESSURE: 122 MMHG | BODY MASS INDEX: 25.76 KG/M2

## 2022-03-14 DIAGNOSIS — F17.210 CIGARETTE SMOKER: ICD-10-CM

## 2022-03-14 DIAGNOSIS — R79.0 LOW IRON STORES: ICD-10-CM

## 2022-03-14 DIAGNOSIS — D45 POLYCYTHEMIA VERA (HCC): Primary | ICD-10-CM

## 2022-03-14 DIAGNOSIS — Z51.11 ENCOUNTER FOR ANTINEOPLASTIC CHEMOTHERAPY: ICD-10-CM

## 2022-03-14 DIAGNOSIS — Z15.89 JAK2 GENE MUTATION: ICD-10-CM

## 2022-03-14 LAB
ERYTHROCYTE [DISTWIDTH] IN BLOOD BY AUTOMATED COUNT: 18.6 % (ref 11.6–15.1)
HCT VFR BLD AUTO: 48 % (ref 36.5–49.3)
HGB BLD-MCNC: 16.9 G/DL (ref 12–17)
MCH RBC QN AUTO: 35.2 PG (ref 26.8–34.3)
MCHC RBC AUTO-ENTMCNC: 35.2 G/DL (ref 31.4–37.4)
MCV RBC AUTO: 100 FL (ref 82–98)
PLATELET # BLD AUTO: 190 THOUSANDS/UL (ref 149–390)
PMV BLD AUTO: 10 FL (ref 8.9–12.7)
RBC # BLD AUTO: 4.8 MILLION/UL (ref 3.88–5.62)
WBC # BLD AUTO: 10.7 THOUSAND/UL (ref 4.31–10.16)

## 2022-03-14 PROCEDURE — 99214 OFFICE O/P EST MOD 30 MIN: CPT | Performed by: INTERNAL MEDICINE

## 2022-03-14 PROCEDURE — 85027 COMPLETE CBC AUTOMATED: CPT | Performed by: INTERNAL MEDICINE

## 2022-03-14 PROCEDURE — 36415 COLL VENOUS BLD VENIPUNCTURE: CPT | Performed by: INTERNAL MEDICINE

## 2022-03-14 RX ORDER — HYDROXYUREA 500 MG/1
500 CAPSULE ORAL 2 TIMES DAILY
Start: 2022-03-14 | End: 2022-04-15 | Stop reason: SDUPTHER

## 2022-03-14 NOTE — PROGRESS NOTES
[facility] - Hematology & Medical Oncology  Outpatient Visit Encounter Note      Sheridan Cain 77 y o  male [id number] Date:  3/14/2022      SUBJECTIVE      Sheridan Cain is a 77 y o  transferring their care from Dr Roel Reina and [facility] S[facility]   They were last seen on 11/5/2020 as an office visit  Patient was being managed for JAK2+ PV  In review of the chart and talking with the patient, diagnosed with this condition after presenting with Hct 55% in August 2017 with documentation stating high levels of Hct for several years before that  Since then, he has been on therapeutic phlebotomy  Denies any f/c/n/v/cp/ap/sob/cough  Denies diarrhea or skin rash  THIS VISIT    Denies any f/c/n/v/cp/ap/sob/cough  Continues to smoke  Taking his medication as recommended  No headache or neuro symptoms  I have reviewed the relevant past medical, surgical, social and family history  I have also reviewed allergies and medications for this patient  Review of Systems  Review of Systems   All other systems reviewed and are negative  OBJECTIVE     Physical Exam  Vitals:    03/14/22 1254   BP: 122/80   BP Location: Left arm   Patient Position: Sitting   Cuff Size: Adult   Pulse: 61   Resp: 16   Temp: 98 2 °F (36 8 °C)   TempSrc: Temporal   SpO2: 98%   Weight: 77 1 kg (170 lb)   Height: 5' 7 72" (1 72 m)       Physical Exam  Vitals reviewed  Constitutional:       General: He is not in acute distress  Appearance: He is not ill-appearing, toxic-appearing or diaphoretic  HENT:      Head: Normocephalic and atraumatic  Eyes:      General: No scleral icterus  Extraocular Movements: Extraocular movements intact  Cardiovascular:      Rate and Rhythm: Normal rate  Pulmonary:      Effort: Pulmonary effort is normal  No respiratory distress  Abdominal:      General: There is no distension  Musculoskeletal:         General: Normal range of motion        Cervical back: Normal range of motion and neck supple  Neurological:      General: No focal deficit present  Mental Status: He is alert and oriented to person, place, and time  Gait: Gait normal           Imaging  Relevant imaging reviewed in chart    Labs  Relevant labs reviewed in chart   ASSESSMENT & PLAN      Diagnosis ICD-10-CM Associated Orders   1  Polycythemia vera (HCC)  D45    2  JAK2 gene mutation  Z15 89    3  Encounter for antineoplastic chemotherapy  Z51 11    4  Low iron stores  R79 0    5  Cigarette smoker  F17 210          77 y o  male with a history of JAK2 positive polycythemia vera since 2017  Until November 2021, he was being managed with therapeutic phlebotomy  The patient states he never had a bone marrow biopsy  He has high risk designation of his JAK2 positive polycythemia vera given his age as well as his ongoing cigarette smoking    · Discussion/Plan/Labs  · Continue with current regimen of Hydrea + ASA with recommendation for increasing Hydera to 500mg BID from current use of 500mg QD  · Recommend weekly CBC with plan for phlebotomy to bring Hct below 45%        Follow Up   1 month      All questions were answered to the patient's satisfaction during this encounter  They appreciated and thanked me for spending time with them  The patient knows the contact information for our office and know to reach out for any relevant concerns related to this encounter  For all other listed problems and medical diagnosis in his chart - they are managed by PCP and/or other specialists, which patient acknowledges  Dr Ju Rubio MD  Hematology & Medical Oncology

## 2022-03-15 ENCOUNTER — TELEPHONE (OUTPATIENT)
Dept: HEMATOLOGY ONCOLOGY | Facility: CLINIC | Age: 77
End: 2022-03-15

## 2022-03-15 DIAGNOSIS — D45 POLYCYTHEMIA VERA (HCC): Primary | ICD-10-CM

## 2022-03-15 NOTE — TELEPHONE ENCOUNTER
Please schedule phlebotomy  Please let me know date/time so I can contact the patient as I need to review his labs with him

## 2022-03-15 NOTE — TELEPHONE ENCOUNTER
Spoke to Rebeca, informed him of his appt due to his HGB and HCT being elevated    Patient voiced understanding of AL infusion appt on Friday 3/18 at 2:30pm

## 2022-03-15 NOTE — TELEPHONE ENCOUNTER
LVM for patient to call back to discuss labs and phlebotomy treatment  Direct teams number provided

## 2022-03-18 ENCOUNTER — HOSPITAL ENCOUNTER (OUTPATIENT)
Dept: INFUSION CENTER | Facility: CLINIC | Age: 77
Discharge: HOME | End: 2022-03-18

## 2022-03-18 VITALS
DIASTOLIC BLOOD PRESSURE: 74 MMHG | HEART RATE: 52 BPM | TEMPERATURE: 97.1 F | SYSTOLIC BLOOD PRESSURE: 148 MMHG | RESPIRATION RATE: 16 BRPM

## 2022-03-18 DIAGNOSIS — D45 POLYCYTHEMIA VERA (HCC): ICD-10-CM

## 2022-03-18 NOTE — PROGRESS NOTES
Pt presents for therapeutic phlebotomy  No parameters, verified by Kavita Patel RN  500mL removed per orders, tolerated well  Pt will follow up with his physician  BERTO harvey

## 2022-03-21 ENCOUNTER — APPOINTMENT (OUTPATIENT)
Dept: LAB | Facility: MEDICAL CENTER | Age: 77
End: 2022-03-21
Payer: COMMERCIAL

## 2022-03-23 ENCOUNTER — TELEPHONE (OUTPATIENT)
Dept: HEMATOLOGY ONCOLOGY | Facility: CLINIC | Age: 77
End: 2022-03-23

## 2022-03-23 NOTE — TELEPHONE ENCOUNTER
Please schedule patient for phlebotomy this Friday,3/25, if possible  If not, next Friday, 4/1 would be okay  Thank you!

## 2022-03-23 NOTE — TELEPHONE ENCOUNTER
Patient called to check whether the latest blood work indicates the need for phlebotomy  Discussed labs with Dr Suad Beckett  Please schedule phlebotomy for patient either this coming Friday 3/25, or next Friday 4/1  I advised the patient know our office will call him back to let him know the date/time

## 2022-03-23 NOTE — TELEPHONE ENCOUNTER
Patient is seen in the [address] [place] [address] office, [employer]  I'm not sure if [place] is closer to [place] or not  I deleted the previous order bc the appt request was missed and placed a new PRN order  Order signed

## 2022-03-28 ENCOUNTER — APPOINTMENT (OUTPATIENT)
Dept: LAB | Facility: MEDICAL CENTER | Age: 77
End: 2022-03-28
Payer: COMMERCIAL

## 2022-04-01 ENCOUNTER — HOSPITAL ENCOUNTER (OUTPATIENT)
Dept: INFUSION CENTER | Facility: CLINIC | Age: 77
Discharge: HOME | End: 2022-04-01
Payer: COMMERCIAL

## 2022-04-01 VITALS
RESPIRATION RATE: 18 BRPM | SYSTOLIC BLOOD PRESSURE: 132 MMHG | DIASTOLIC BLOOD PRESSURE: 80 MMHG | HEART RATE: 64 BPM | TEMPERATURE: 97 F

## 2022-04-01 DIAGNOSIS — D45 POLYCYTHEMIA VERA (HCC): Primary | ICD-10-CM

## 2022-04-01 PROCEDURE — 99195 PHLEBOTOMY: CPT

## 2022-04-01 NOTE — PROGRESS NOTES
Pt here for therapeutic phlebotomy  Parameters met and check with Josephine Doll RN  500mL removed from R AC  Pt observe for 15 minutes, VSS  Pt declined AVS  Does not have future appts, states his daughter checks his labs and will call when he needs an appt

## 2022-04-04 ENCOUNTER — APPOINTMENT (OUTPATIENT)
Dept: LAB | Facility: MEDICAL CENTER | Age: 77
End: 2022-04-04
Payer: COMMERCIAL

## 2022-04-05 ENCOUNTER — TELEPHONE (OUTPATIENT)
Dept: HEMATOLOGY ONCOLOGY | Facility: CLINIC | Age: 77
End: 2022-04-05

## 2022-04-05 NOTE — TELEPHONE ENCOUNTER
Spoke to Rebeca, informed him after discussing with Dr Talon Mares, he will not require phlebotomy  Rebeca voiced understanding

## 2022-04-05 NOTE — TELEPHONE ENCOUNTER
Spoke to Chip Platt, HCT 45 8, informed him I will double check with Dr Shaji An and let him know if he requires phlebotomy as he is just at the borderline of the parameter  He voiced understanding

## 2022-04-05 NOTE — TELEPHONE ENCOUNTER
CALL TRANSFER   Reason for patient call? Patient wants to discuss his lab results  Las were done 4/4  Patient's primary oncologist? Dr Gabrielle Muñoz   RN call was transferred to and time it was transferred? [place] @ 9:03 am   Informed patient that the message will be forwarded to the team and someone will get back to them as soon as possible    Did you relay this information to the patient?  yes

## 2022-04-11 ENCOUNTER — APPOINTMENT (OUTPATIENT)
Dept: LAB | Facility: MEDICAL CENTER | Age: 77
End: 2022-04-11
Payer: COMMERCIAL

## 2022-04-15 ENCOUNTER — TELEPHONE (OUTPATIENT)
Dept: HEMATOLOGY ONCOLOGY | Facility: CLINIC | Age: 77
End: 2022-04-15

## 2022-04-15 ENCOUNTER — OFFICE VISIT (OUTPATIENT)
Dept: HEMATOLOGY ONCOLOGY | Facility: CLINIC | Age: 77
End: 2022-04-15
Payer: COMMERCIAL

## 2022-04-15 VITALS
BODY MASS INDEX: 25.39 KG/M2 | WEIGHT: 167.5 LBS | SYSTOLIC BLOOD PRESSURE: 104 MMHG | OXYGEN SATURATION: 96 % | RESPIRATION RATE: 18 BRPM | TEMPERATURE: 96.9 F | HEIGHT: 68 IN | HEART RATE: 61 BPM | DIASTOLIC BLOOD PRESSURE: 72 MMHG

## 2022-04-15 DIAGNOSIS — Z51.11 ENCOUNTER FOR ANTINEOPLASTIC CHEMOTHERAPY: ICD-10-CM

## 2022-04-15 DIAGNOSIS — F17.210 CIGARETTE SMOKER: ICD-10-CM

## 2022-04-15 DIAGNOSIS — D45 POLYCYTHEMIA VERA (HCC): Primary | ICD-10-CM

## 2022-04-15 DIAGNOSIS — D45 POLYCYTHEMIA VERA (HCC): ICD-10-CM

## 2022-04-15 DIAGNOSIS — Z15.89 JAK2 GENE MUTATION: ICD-10-CM

## 2022-04-15 PROCEDURE — 99214 OFFICE O/P EST MOD 30 MIN: CPT | Performed by: INTERNAL MEDICINE

## 2022-04-15 RX ORDER — HYDROXYUREA 500 MG/1
500 CAPSULE ORAL 2 TIMES DAILY
Qty: 60 CAPSULE | Refills: 3 | Status: SHIPPED | OUTPATIENT
Start: 2022-04-15 | End: 2022-07-12

## 2022-04-15 NOTE — PROGRESS NOTES
[facility] - Hematology & Medical Oncology  Outpatient Visit Encounter Note      Wendy Yancey 77 y o  male [id number] Date:  4/15/2022      SUBJECTIVE      Wendy Yancey is a 77 y o  transferring their care from Dr Suad Lan and [facility]   They were last seen on 11/5/2020 as an office visit  Patient was being managed for JAK2+ PV  In review of the chart and talking with the patient, diagnosed with this condition after presenting with Hct 55% in August 2017 with documentation stating high levels of Hct for several years before that  Since then, he has been on therapeutic phlebotomy  Denies any f/c/n/v/cp/ap/sob/cough  Denies diarrhea or skin rash  THIS VISIT    Denies any f/c/n/v/cp/ap/sob/cough  Continues to smoke  No headache or neuro symptoms  Taking Hydrea  I have reviewed the relevant past medical, surgical, social and family history  I have also reviewed allergies and medications for this patient  Review of Systems  Review of Systems   All other systems reviewed and are negative  OBJECTIVE     Physical Exam  Vitals:    04/15/22 0825   BP: 104/72   BP Location: Left arm   Pulse: 61   Resp: 18   Temp: (!) 96 9 °F (36 1 °C)   TempSrc: Tympanic Core   SpO2: 96%   Weight: 76 kg (167 lb 8 oz)   Height: 5' 7 72" (1 72 m)       Physical Exam  Vitals reviewed  Constitutional:       General: He is not in acute distress  Appearance: He is not toxic-appearing  HENT:      Head: Normocephalic and atraumatic  Eyes:      General: No scleral icterus  Extraocular Movements: Extraocular movements intact  Cardiovascular:      Rate and Rhythm: Normal rate  Pulmonary:      Effort: Pulmonary effort is normal  No respiratory distress  Abdominal:      General: There is no distension  Musculoskeletal:         General: Normal range of motion  Cervical back: Normal range of motion and neck supple     Neurological:      General: No focal deficit present  Mental Status: He is alert and oriented to person, place, and time  Imaging  Relevant imaging reviewed in chart    Labs  Relevant labs reviewed in chart   ASSESSMENT & PLAN      Diagnosis ICD-10-CM Associated Orders   1  Polycythemia vera ([facility] [address] )  D45    2  Encounter for antineoplastic chemotherapy  Z51 11    3  JAK2 gene mutation  Z15 89    4  Cigarette smoker  F17 210          77 y o  male with a history of JAK2 positive polycythemia vera since 2017  Until November 2021, he was being managed with therapeutic phlebotomy  The patient states he never had a bone marrow biopsy  He has high risk designation of his JAK2 positive polycythemia vera given his age as well as his ongoing cigarette smoking    · Discussion/Plan/Labs  · Continue with current regimen of Hydrea + ASA with recommendation for continued Hydera 500mg BID  · Recommend checking CBC q2w  · Recommend phlebotomy given labs from 4/11  · Education about smoking and higher Hct given  Follow Up   1 month      All questions were answered to the patient's satisfaction during this encounter  They appreciated and thanked me for spending time with them  The patient knows the contact information for our office and know to reach out for any relevant concerns related to this encounter  For all other listed problems and medical diagnosis in his chart - they are managed by PCP and/or other specialists, which patient acknowledges  Dr Hermes Ch MD  Hematology & Medical Oncology

## 2022-04-15 NOTE — TELEPHONE ENCOUNTER
While we try to accommodate patient requests, our priority is to schedule treatment according to Doctor's orders and site availability  1  Does the Provider use the intake sheet or checkout note? Checkout  2  What would be a preferred day of the week that would work best for your infusion appointment? Any day but Tuesday   3  Do you prefer mornings or afternoons for your appointments? Morning around 9am   4  We are going to try our best to schedule you at the infusion center closest to your home  In the event that we are unable to what would be your next preferred infusion site or sites? [facility]      5  Do you have transportation to take you to all of your appointments? Yes  6   Would you like the infusion center to draw labs from your port? (disregard if patient doesn't have a port or need labs for infusion appointment) No

## 2022-04-21 ENCOUNTER — HOSPITAL ENCOUNTER (OUTPATIENT)
Dept: INFUSION CENTER | Facility: CLINIC | Age: 77
Discharge: HOME | End: 2022-04-21
Payer: COMMERCIAL

## 2022-04-21 VITALS
TEMPERATURE: 97.4 F | SYSTOLIC BLOOD PRESSURE: 121 MMHG | DIASTOLIC BLOOD PRESSURE: 81 MMHG | HEART RATE: 54 BPM | RESPIRATION RATE: 18 BRPM

## 2022-04-21 DIAGNOSIS — D45 POLYCYTHEMIA VERA (HCC): Primary | ICD-10-CM

## 2022-04-21 PROCEDURE — 99195 PHLEBOTOMY: CPT

## 2022-04-21 NOTE — PROGRESS NOTES
Pt presents for therapeutic phlebotomy  Hct 47 5, order parameters confirmed with [facility]  500mL removed from RAC  Pt observed for 15 minutes, VSS stable  Pt declined AVS  Does not have future appts, states his daughter checks his labs and will call when he needs an appt

## 2022-05-03 ENCOUNTER — APPOINTMENT (OUTPATIENT)
Dept: LAB | Facility: MEDICAL CENTER | Age: 77
End: 2022-05-03
Payer: COMMERCIAL

## 2022-05-12 ENCOUNTER — HOSPITAL ENCOUNTER (OUTPATIENT)
Dept: ULTRASOUND IMAGING | Facility: HOSPITAL | Age: 77
Discharge: HOME | End: 2022-05-12
Attending: INTERNAL MEDICINE
Payer: COMMERCIAL

## 2022-05-12 DIAGNOSIS — E78.2 MIXED HYPERLIPIDEMIA: ICD-10-CM

## 2022-05-12 DIAGNOSIS — I10 BENIGN ESSENTIAL HYPERTENSION: ICD-10-CM

## 2022-05-12 DIAGNOSIS — R73.03 PREDIABETES: ICD-10-CM

## 2022-05-12 DIAGNOSIS — N28.1 RENAL CYST: ICD-10-CM

## 2022-05-12 DIAGNOSIS — N13.8 BPH WITH OBSTRUCTION/LOWER URINARY TRACT SYMPTOMS: ICD-10-CM

## 2022-05-12 DIAGNOSIS — N40.1 BPH WITH OBSTRUCTION/LOWER URINARY TRACT SYMPTOMS: ICD-10-CM

## 2022-05-12 DIAGNOSIS — N18.32 STAGE 3B CHRONIC KIDNEY DISEASE (HCC): ICD-10-CM

## 2022-05-12 PROCEDURE — 76770 US EXAM ABDO BACK WALL COMP: CPT

## 2022-05-18 DIAGNOSIS — N40.1 BPH WITH OBSTRUCTION/LOWER URINARY TRACT SYMPTOMS: ICD-10-CM

## 2022-05-18 DIAGNOSIS — N13.8 BPH WITH OBSTRUCTION/LOWER URINARY TRACT SYMPTOMS: ICD-10-CM

## 2022-05-18 RX ORDER — FINASTERIDE 5 MG/1
TABLET, FILM COATED ORAL
Qty: 90 TABLET | Refills: 0 | Status: SHIPPED | OUTPATIENT
Start: 2022-05-18

## 2022-05-20 ENCOUNTER — APPOINTMENT (OUTPATIENT)
Dept: LAB | Facility: MEDICAL CENTER | Age: 77
End: 2022-05-20
Payer: COMMERCIAL

## 2022-05-20 DIAGNOSIS — N40.1 BPH WITH OBSTRUCTION/LOWER URINARY TRACT SYMPTOMS: ICD-10-CM

## 2022-05-20 DIAGNOSIS — N18.32 STAGE 3B CHRONIC KIDNEY DISEASE (HCC): ICD-10-CM

## 2022-05-20 DIAGNOSIS — N13.8 BPH WITH OBSTRUCTION/LOWER URINARY TRACT SYMPTOMS: ICD-10-CM

## 2022-05-20 DIAGNOSIS — N28.1 RENAL CYST: ICD-10-CM

## 2022-05-20 DIAGNOSIS — I10 BENIGN ESSENTIAL HYPERTENSION: ICD-10-CM

## 2022-05-20 DIAGNOSIS — E78.2 MIXED HYPERLIPIDEMIA: ICD-10-CM

## 2022-05-20 DIAGNOSIS — R73.03 PREDIABETES: ICD-10-CM

## 2022-05-20 LAB
25(OH)D3 SERPL-MCNC: 41.2 NG/ML (ref 30–100)
ALBUMIN SERPL BCP-MCNC: 3.8 G/DL (ref 3.5–5)
AMORPH URATE CRY URNS QL MICRO: ABNORMAL
ANION GAP SERPL CALCULATED.3IONS-SCNC: 2 MMOL/L (ref 4–13)
BACTERIA UR QL AUTO: ABNORMAL /HPF
BILIRUB UR QL STRIP: NEGATIVE
BUN SERPL-MCNC: 20 MG/DL (ref 5–25)
CALCIUM SERPL-MCNC: 9.3 MG/DL (ref 8.3–10.1)
CHLORIDE SERPL-SCNC: 108 MMOL/L (ref 100–108)
CLARITY UR: CLEAR
CO2 SERPL-SCNC: 30 MMOL/L (ref 21–32)
COLOR UR: YELLOW
CREAT SERPL-MCNC: 1.29 MG/DL (ref 0.6–1.3)
CREAT UR-MCNC: 176 MG/DL
GFR SERPL CREATININE-BSD FRML MDRD: 53 ML/MIN/1.73SQ M
GLUCOSE SERPL-MCNC: 106 MG/DL (ref 65–140)
GLUCOSE UR STRIP-MCNC: NEGATIVE MG/DL
HGB UR QL STRIP.AUTO: NEGATIVE
HYALINE CASTS #/AREA URNS LPF: ABNORMAL /LPF
KETONES UR STRIP-MCNC: NEGATIVE MG/DL
LEUKOCYTE ESTERASE UR QL STRIP: ABNORMAL
MAGNESIUM SERPL-MCNC: 2.5 MG/DL (ref 1.6–2.6)
MUCOUS THREADS UR QL AUTO: ABNORMAL
NITRITE UR QL STRIP: NEGATIVE
NON-SQ EPI CELLS URNS QL MICRO: ABNORMAL /HPF
PH UR STRIP.AUTO: 7 [PH]
PHOSPHATE SERPL-MCNC: 2.6 MG/DL (ref 2.3–4.1)
POTASSIUM SERPL-SCNC: 4.2 MMOL/L (ref 3.5–5.3)
PROT UR STRIP-MCNC: ABNORMAL MG/DL
PROT UR-MCNC: 33 MG/DL
PROT/CREAT UR: 0.19 MG/G{CREAT} (ref 0–0.1)
PTH-INTACT SERPL-MCNC: 88.6 PG/ML (ref 18.4–80.1)
RBC #/AREA URNS AUTO: ABNORMAL /HPF
SODIUM SERPL-SCNC: 140 MMOL/L (ref 136–145)
SP GR UR STRIP.AUTO: 1.02 (ref 1–1.03)
UROBILINOGEN UR STRIP-ACNC: <2 MG/DL
WBC #/AREA URNS AUTO: ABNORMAL /HPF

## 2022-05-20 PROCEDURE — 83735 ASSAY OF MAGNESIUM: CPT

## 2022-05-20 PROCEDURE — 84156 ASSAY OF PROTEIN URINE: CPT

## 2022-05-20 PROCEDURE — 82570 ASSAY OF URINE CREATININE: CPT

## 2022-05-20 PROCEDURE — 82306 VITAMIN D 25 HYDROXY: CPT

## 2022-05-20 PROCEDURE — 83970 ASSAY OF PARATHORMONE: CPT

## 2022-05-20 PROCEDURE — 81001 URINALYSIS AUTO W/SCOPE: CPT

## 2022-05-20 PROCEDURE — 80069 RENAL FUNCTION PANEL: CPT

## 2022-05-23 ENCOUNTER — OFFICE VISIT (OUTPATIENT)
Dept: HEMATOLOGY ONCOLOGY | Facility: CLINIC | Age: 77
End: 2022-05-23
Payer: COMMERCIAL

## 2022-05-23 VITALS
DIASTOLIC BLOOD PRESSURE: 64 MMHG | HEART RATE: 60 BPM | BODY MASS INDEX: 24.4 KG/M2 | RESPIRATION RATE: 18 BRPM | WEIGHT: 161 LBS | HEIGHT: 68 IN | SYSTOLIC BLOOD PRESSURE: 102 MMHG | OXYGEN SATURATION: 99 % | TEMPERATURE: 96.9 F

## 2022-05-23 DIAGNOSIS — Z51.11 ENCOUNTER FOR ANTINEOPLASTIC CHEMOTHERAPY: ICD-10-CM

## 2022-05-23 DIAGNOSIS — F17.210 CIGARETTE SMOKER: ICD-10-CM

## 2022-05-23 DIAGNOSIS — Z15.89 JAK2 GENE MUTATION: ICD-10-CM

## 2022-05-23 DIAGNOSIS — R79.0 LOW IRON STORES: Primary | ICD-10-CM

## 2022-05-23 DIAGNOSIS — D45 POLYCYTHEMIA VERA (HCC): ICD-10-CM

## 2022-05-23 PROCEDURE — 99215 OFFICE O/P EST HI 40 MIN: CPT | Performed by: INTERNAL MEDICINE

## 2022-05-23 RX ORDER — FERROUS SULFATE TAB EC 324 MG (65 MG FE EQUIVALENT) 324 (65 FE) MG
324 TABLET DELAYED RESPONSE ORAL
Qty: 60 TABLET | Refills: 0 | Status: SHIPPED | OUTPATIENT
Start: 2022-05-23 | End: 2022-06-16

## 2022-05-23 NOTE — PROGRESS NOTES
[facility] - Hematology & Medical Oncology  Outpatient Visit Encounter Note      Maria D Cabrera 77 y o  male [id number] Date:  5/23/2022      SUBJECTIVE      Maria D Cabrera is a 77 y o  transferring their care from Dr Sobia Brunson and [facility] SADIE   They were last seen on 11/5/2020 as an office visit  Patient was being managed for JAK2+ PV  In review of the chart and talking with the patient, diagnosed with this condition after presenting with Hct 55% in August 2017 with documentation stating high levels of Hct for several years before that  Since then, he has been on therapeutic phlebotomy  Denies any f/c/n/v/cp/ap/sob/cough  Denies diarrhea or skin rash  THIS VISIT    Taking his hydrea and aspirin  No acute complaints  No blood loss anywhere  Denies f/c/n/v/cp/ap/sob  Has episodes of fatigue and dyspnea but not consistent  Still smoking  I have reviewed the relevant past medical, surgical, social and family history  I have also reviewed allergies and medications for this patient  Review of Systems  Review of Systems   All other systems reviewed and are negative  OBJECTIVE     Physical Exam  Vitals:    05/23/22 0822   BP: 102/64   BP Location: Left arm   Pulse: 60   Resp: 18   Temp: (!) 96 9 °F (36 1 °C)   TempSrc: Tympanic   SpO2: 99%   Weight: 73 kg (161 lb)   Height: 5' 7 72" (1 72 m)       Physical Exam  Vitals reviewed  Constitutional:       General: He is not in acute distress  Appearance: He is not toxic-appearing  HENT:      Head: Normocephalic and atraumatic  Eyes:      General: No scleral icterus  Extraocular Movements: Extraocular movements intact  Cardiovascular:      Rate and Rhythm: Normal rate  Pulmonary:      Effort: Pulmonary effort is normal  No respiratory distress  Abdominal:      General: There is no distension  Musculoskeletal:         General: Normal range of motion        Cervical back: Normal range of motion and neck supple  Neurological:      General: No focal deficit present  Mental Status: He is alert and oriented to person, place, and time  Imaging  Relevant imaging reviewed in chart    Labs  Relevant labs reviewed in chart   ASSESSMENT & PLAN      Diagnosis ICD-10-CM Associated Orders   1  Low iron stores  R79 0 ferrous sulfate 324 (65 Fe) mg     Iron Panel (Includes Ferritin, Iron Sat%, Iron, and TIBC)     Vitamin B12     Folate   2  Polycythemia vera ([facility] [address] )  D45 CBC     CBC   3  JAK2 gene mutation  Z15 89 CBC     CBC   4  Encounter for antineoplastic chemotherapy  Z51 11 CBC     CBC   5  Cigarette smoker  F17 210          77 y o  male with a history of JAK2 positive polycythemia vera since 2017  Until November 2021, he was being managed with therapeutic phlebotomy  The patient states he never had a bone marrow biopsy  He has high risk designation of his JAK2 positive polycythemia vera given his age as well as his ongoing cigarette smoking    · Discussion/Plan/Labs  · Continue with current regimen of Hydrea + ASA with recommendation for continued Hydera 500mg BID  · Recommend checking CBC q4-5w  · Recommend he takes iron tablets twice a day  Will send a 1 month supply and if able to tolerate it, then will send more refills  · Recommend checking other labs as ordered before return visit  · Education about smoking and higher Hct given  Follow Up   3 months      All questions were answered to the patient's satisfaction during this encounter  They appreciated and thanked me for spending time with them  The patient knows the contact information for our office and know to reach out for any relevant concerns related to this encounter  For all other listed problems and medical diagnosis in his chart - they are managed by PCP and/or other specialists, which patient acknowledges  Dr Tesha Rojas MD  Hematology & Medical Oncology

## 2022-05-31 ENCOUNTER — TELEPHONE (OUTPATIENT)
Dept: NEPHROLOGY | Facility: CLINIC | Age: 77
End: 2022-05-31

## 2022-05-31 NOTE — TELEPHONE ENCOUNTER
Appointment Confirmation   Person confirmed appointment with  If not patient, name of the person Patient    Date and time of appointment 6/3 9am    Patient acknowledged and will be at appointment? yes    Did you advise the patient that they will need a urine sample if they are a new patient?  N/A    Did you advise the patient to bring their current medications for verification? (including any OTC) No    Additional Information

## 2022-06-03 ENCOUNTER — OFFICE VISIT (OUTPATIENT)
Dept: NEPHROLOGY | Facility: CLINIC | Age: 77
End: 2022-06-03
Payer: COMMERCIAL

## 2022-06-03 VITALS
SYSTOLIC BLOOD PRESSURE: 117 MMHG | HEIGHT: 68 IN | DIASTOLIC BLOOD PRESSURE: 76 MMHG | BODY MASS INDEX: 25.61 KG/M2 | WEIGHT: 169 LBS

## 2022-06-03 DIAGNOSIS — N18.32 STAGE 3B CHRONIC KIDNEY DISEASE (HCC): Primary | ICD-10-CM

## 2022-06-03 DIAGNOSIS — N25.81 SECONDARY HYPERPARATHYROIDISM OF RENAL ORIGIN (HCC): ICD-10-CM

## 2022-06-03 DIAGNOSIS — I25.10 CORONARY ARTERIOSCLEROSIS: ICD-10-CM

## 2022-06-03 DIAGNOSIS — I10 BENIGN ESSENTIAL HYPERTENSION: ICD-10-CM

## 2022-06-03 PROCEDURE — 99214 OFFICE O/P EST MOD 30 MIN: CPT | Performed by: INTERNAL MEDICINE

## 2022-06-03 RX ORDER — CALCITRIOL 0.25 UG/1
0.25 CAPSULE, LIQUID FILLED ORAL 3 TIMES WEEKLY
Qty: 36 CAPSULE | Refills: 4 | Status: SHIPPED | OUTPATIENT
Start: 2022-06-03

## 2022-06-03 NOTE — PROGRESS NOTES
Nephrology Follow up Consultation  Penny Banks 77 y o  male MRN: [id number]            BACKGROUND:  Penny Banks is a 77 y  o male who was referred by Prashanth Cronin DO for evaluation of Follow-up and Chronic Kidney Disease    ASSESSMENT / PLAN:   77 y o   male with pmh of multiple co-morbidities including HTN, pre diabetes, polycythemia vera, BPH, CAD and CKD stage 3 status post recent hospitalization with robotic suprapubic prostatectomy on 10/18/2021 presents to the office to establish care for CKD  CKD stage 3A/B:  -  after review of records In [facility] as well as Care everywhere patient has a baseline creatinine of 1 2-1 5 mg/dL  Most recent labs show a Creatinine of 1 29 mg/dL on 5/20/22  Renal function remains stable at baseline    - likely has underlying CKD secondary to age-related nephron loss plus hypertensive nephrosclerosis plus some component of diabetic kidney disease due to underlying pre diabetes  - CT abdomen from August 2017 with no hydronephrosis exophytic left renal cyst   - check renal ultrasound to evaluate renal extubated cysts prior to next visit  - Acid base and lytes stable  - Clinically the patient appears to be euvolemic  - Recommend to avoid use of NSAIDs, nephrotoxins  Caution advised with regards to exposure to IV contrast dye    - Discussed with the patient in depth his renal status, including the possible etiologies for CKD  - Advised the patient that when is GFR is close to 20mL/min then will start discussing about RRT(renal replacement therapy) options such as renal transplant, peritoneal dialysis and hemodialysis  - Informed the patient about the various options for Renal Replacement therapy  - Discussed with the patient how we need to work together to delay the progression of CKD with optimal BP control based on their age and co-morbidities, optimal BS control with HbA1c of <7% and trying to reduce proteinuria by the use of anti-proteinuric agents     - likely refer over to CKD education/kidney smart at next visit    Hypertension:  - Patient is on Norvasc 10 mg p o  q day, metoprolol 50 mg p o  b i d  Fang Ada - if blood pressure continues to be low discussed with patient we will need to decrease Norvasc dosage  Currently is asymptomatic and doing well  - Goal BP of < 140/90 based on age and comorbidities  - Instructed to follow low sodium (2gm)diet  Hemoglobin:  - Goal Hb of 10-12 g/dL  - Most recent labs suggestive of 16 1gm/dl  - follow-up with Heme-Onc    CKD-MBD(Mineral Bone Disease): - Based on patients CKD stage following is the goal of therapy  - Maintain calcium phosphorus product of < 55   - Stage 3 CKD - Goal Ca 8 5-10 mg/dL , goal Phos 2 7-4 6 mg/dL  , goal iPTH 30-70 pg/mL  - Patient is currently at goal   - decrease to vit D 1000 units a day and start calcitriol 0 25mcg three times a week  - Patients' most recent vitamin D level is 52 1 and intact PTH of 53 1 as of 11/11/2021  - check intact PTH vitamin-D prior to next visit    Proteinuria:  - most recent protein creatinine ratio of 830 mg as of November 2021  - check protein creatinine ratio prior to next visit  - if continues to have increased proteinuria on next visit will likely initiate ACE-inhibitor or ARB therapy if tolerable    - currently on therapy for proteinuria with vitamin-D    Lipids:  - on fish oil,lipitor  - goal LDL less than 70  - management per primary team    Pre DM:  - management as per Primary team  - on no medications diet-controlled    Polycythemia vera:  - follow-up with Heme-Onc  - last seen 05/23/2022 notes reviewed  - on hydroxyurea    BPH:  - Management as per primary team  - follow-up with Urology, last seen 12/06/2021 notes reviewed, next appointment on 06/07/2022  - status post elective robotic suprapubic prostatectomy on 10/18/2021  - status post CT cystogram on to 11/04/2021   - on Proscar    Nutrition:  - Encouraged patient to follow a renal diet comprising of moderate potassium, low phosphorus and protein restriction to 0 8gm/kg  - Will check serum albumin with next blood work  Followup:  - Patient is to follow-up in6 months, with lab work to be performed a few days prior to the next visit  Advised patient to call me in 10 days to review the results if they do not hear back from me, as I may have not received the results  Todd Oneill MD, [facility], 6/3/2022, 10:12 AM             SUBJECTIVE: 77 y o  male presents to the office for routine follow-up feels well has no complaints no recent hospitalization not checking blood pressures at home no NSAID use thankful for the care information that he has gotten today  Has upcoming appointment with Urology  Recently seen by Hematology  No changes  Review of Systems   Constitutional: Negative for chills, fatigue and fever  HENT: Negative for congestion  Respiratory: Negative for cough, shortness of breath and wheezing  Cardiovascular: Negative for leg swelling  Gastrointestinal: Negative for abdominal pain, constipation, diarrhea, nausea and vomiting  Genitourinary: Negative for dysuria  Musculoskeletal: Negative for back pain  Skin: Negative for wound  Neurological: Negative for dizziness and headaches  Psychiatric/Behavioral: Negative for agitation and confusion  All other systems reviewed and are negative        PAST MEDICAL HISTORY:  Past Medical History:   Diagnosis Date    Arthritis     both shoulders    CAD (coronary artery disease)     Last Assessed:  11/4/13    Cigarette smoker     Colon polyp     Elevated prostate specific antigen (PSA)     Last Assessed:  12/21/17    Enlarged prostate     Exercise involving walking     in season hunt's and fish's/ 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"    Full dentures     but doesnt wear them    History of 2019 novel coronavirus disease (COVID-19) 02/2021    hospital for 2 days but not in ICU/"mainly dehydrated" "also fever/oxygen below 90"    History of coronary artery stent placement     x2 in 2008 or so; sees [address]-- Dr Anabel Gray cardio   [facility] (hard of hearing)     no hearing aids yet    Hyperlipidemia     Last Assessed:  11/24/17    Hypertension     Benign essential, Last Assessed:  11/24/17    LBBB (left bundle branch block)     Nocturia     Polycythemia vera (HCC)     (per history in chart)    PVD (peripheral vascular disease) (HCC)     RBC abnormality     pt reports told has high Red blood cells/goes to infusion center regularly next appt 10/8/21 hematologist is Dr Elias Briones of [facility](had been Dr Roseann Chowdhury)   [address] of breath     "if goes up steps gets SOB, had for awhile"    Slow urinary stream     "sometimes feels like bladder isnt all the way empty"    Snores     Wears glasses     reading       PROBLEM LIST    Patient Active Problem List   Diagnosis    Gastritis    Benign essential hypertension    Cervical radiculopathy    Coronary arteriosclerosis    Mixed hyperlipidemia    Polycythemia vera ([address] )    Prediabetes    Tobacco user    Chronic left shoulder pain    Cervical spondylosis without myelopathy    BPH with obstruction/lower urinary tract symptoms    COVID-19 virus infection    Status post percutaneous transluminal coronary angioplasty    Cigarette smoker    Left bundle branch block    Pre-operative clearance    Prostate cancer ([address] )    JAK2 gene mutation    Encounter for antineoplastic chemotherapy    Microcytosis    Stage 3b chronic kidney disease ([address] )    Low iron stores    Secondary hyperparathyroidism of renal origin ([address] )       PAST SURGICAL HISTORY:  Past Surgical History:   Procedure Laterality Date    ANGIOPLASTY      APPENDECTOMY      CATARACT EXTRACTION Bilateral     CORONARY ARTERY BYPASS GRAFT      CT CYSTOGRAM  11/4/2021    ELBOW SURGERY Right     FL CYSTOGRAM  10/27/2021    KNEE ARTHROSCOPY Right     SD COLONOSCOPY FLX DX W/COLLJ [facility] WHEN PFRMD N/A 5/22/2017    Procedure: COLONOSCOPY;  Surgeon: Burgess Jj DO;  Location: [facility]; Service: Gastroenterology    PROSTATE BIOPSY      PROSTATECTOMY N/A 10/18/2021    Procedure: ROBOTIC SUBTOTAL/SUPRAPUBIC PROSTATECTOMY;  Surgeon: Karen Villa MD;  Location: [facility];  Service: Urology    SHOULDER ARTHROSCOPY Right     TONSILLECTOMY         SOCIAL HISTORY :   reports that he has been smoking cigarettes  He has a 17 50 pack-year smoking history  He has never used smokeless tobacco  He reports that he does not drink alcohol and does not use drugs  FAMILY HISTORY:  Family History   Problem Relation Age of Onset    No Known Problems Mother     Heart disease Father        ALLERGIES:  Allergies   Allergen Reactions    Other Other (See Comments)     Pt and wife reports took a medication years ago at work cant recall the name or what it was for"     "couldn't talk and cheeks swelled and also right hand"           PHYSICAL EXAM:  Vitals:    06/03/22 0852   BP: 117/76   Weight: 76 7 kg (169 lb)   Height: 5' 7 72" (1 72 m)     Body mass index is 25 91 kg/m²  Physical Exam  Vitals reviewed  Constitutional:       General: He is not in acute distress  Appearance: Normal appearance  He is normal weight  He is not ill-appearing, toxic-appearing or diaphoretic  HENT:      Head: Normocephalic and atraumatic  Mouth/Throat:      Mouth: Mucous membranes are moist       Pharynx: Oropharynx is clear  No oropharyngeal exudate  Eyes:      General: No scleral icterus  Conjunctiva/sclera: Conjunctivae normal    Cardiovascular:      Rate and Rhythm: Normal rate  Heart sounds: Normal heart sounds  No friction rub  Pulmonary:      Effort: Pulmonary effort is normal  No respiratory distress  Breath sounds: Normal breath sounds  No stridor  No wheezing  Abdominal:      General: There is no distension  Palpations: Abdomen is soft  There is no mass  Tenderness: There is no abdominal tenderness  There is no right CVA tenderness or left CVA tenderness  Musculoskeletal:         General: No swelling  Cervical back: Normal range of motion and neck supple  No rigidity  Skin:     General: Skin is warm  Coloration: Skin is not jaundiced  Neurological:      General: No focal deficit present  Mental Status: He is alert and oriented to person, place, and time  Mental status is at baseline  Psychiatric:         Mood and Affect: Mood normal          Behavior: Behavior normal          LABORATORY DATA:     Results from last 6 Months   Lab Units 05/20/22  0859 05/03/22  0948 04/11/22  0954 03/14/22  1323 03/03/22  0908   WBC Thousand/uL 8 10 9 42 9 10   < > 13 07*   HEMOGLOBIN g/dL 16 1 13 7 16 3   < > 19 8*   HEMATOCRIT % 45 7 40 1 47 5   < > 55 4*   PLATELETS Thousands/uL 90* 101* 120*   < > 184   POTASSIUM mmol/L 4 2  --   --   --  4 4   CHLORIDE mmol/L 108  --   --   --  107   CO2 mmol/L 30  --   --   --  29   BUN mg/dL 20  --   --   --  18   CREATININE mg/dL 1 29  --   --   --  1 31*   CALCIUM mg/dL 9 3  --   --   --  9 8   MAGNESIUM mg/dL 2 5  --   --   --   --    PHOSPHORUS mg/dL 2 6  --   --   --   --    IRON ug/dL  --   --   --   --  83   FERRITIN ng/mL  --   --   --   --  180    < > = values in this interval not displayed          rest all reviewed    RADIOLOGY:  No orders to display     Rest all reviewed        MEDICATIONS:    Current Outpatient Medications:     acetaminophen (TYLENOL) 500 mg tablet, Take 500 mg by mouth every 6 (six) hours as needed for mild pain, Disp: , Rfl:     amLODIPine (NORVASC) 10 mg tablet, Take 10 mg by mouth every evening , Disp: , Rfl:     ascorbic acid (VITAMIN C) 1000 MG tablet, Take 1 tablet (1,000 mg total) by mouth every 12 (twelve) hours for 10 doses (Patient taking differently: Take 1,000 mg by mouth daily), Disp: 10 tablet, Rfl: 0    aspirin (ECOTRIN LOW STRENGTH) 81 mg EC tablet, Take 81 mg by mouth daily, Disp: , Rfl:     atorvastatin (LIPITOR) 80 mg tablet, Take 1 tablet (80 mg total) by mouth every evening, Disp: 90 tablet, Rfl: 0    calcitriol (ROCALTROL) 0 25 mcg capsule, Take 1 capsule (0 25 mcg total) by mouth 3 (three) times a week, Disp: 36 capsule, Rfl: 4    Cholecalciferol (Vitamin D3) 50 MCG (2000 UT) TABS, Take 1,000 Units by mouth every evening , Disp: , Rfl:     docusate sodium (COLACE) 100 mg capsule, Take 1 capsule (100 mg total) by mouth 2 (two) times a day, Disp: 28 capsule, Rfl: 0    ferrous sulfate 324 (65 Fe) mg, Take 1 tablet (324 mg total) by mouth in the morning and 1 tablet (324 mg total) in the evening  Take before meals  , Disp: 60 tablet, Rfl: 0    finasteride (PROSCAR) 5 mg tablet, TAKE 1 TABLET BY MOUTH EVERY DAY, Disp: 90 tablet, Rfl: 0    hydroxyurea (HYDREA) 500 mg capsule, Take 1 capsule (500 mg total) by mouth 2 (two) times a day, Disp: 60 capsule, Rfl: 3    metoprolol tartrate (LOPRESSOR) 50 mg tablet, Take 50 mg by mouth 2 (two) times a day , Disp: , Rfl:     Multiple Vitamin (MULTIVITAMIN) capsule, Take 1 capsule by mouth daily, Disp: , Rfl:     nitroglycerin (NITROSTAT) 0 4 mg SL tablet, Place 1 tablet (0 4 mg total) under the tongue every 5 (five) minutes as needed for chest pain, Disp: 10 tablet, Rfl:     Omega-3 Fatty Acids (FISH OIL) 1200 MG CAPS, Take 1,200 mg by mouth 2 (two) times a day  , Disp: , Rfl:     oxybutynin (DITROPAN-XL) 5 mg 24 hr tablet, Take 1 tablet (5 mg total) by mouth daily, Disp: 6 tablet, Rfl: 0          Portions of the record may have been created with voice recognition software  Occasional wrong word or "sound a like" substitutions may have occurred due to the inherent limitations of voice recognition software  Read the chart carefully and recognize, using context, where substitutions have occurred  If you have any questions, please contact the dictating provider

## 2022-06-04 DIAGNOSIS — E78.2 MIXED HYPERLIPIDEMIA: ICD-10-CM

## 2022-06-04 DIAGNOSIS — I25.10 CORONARY ARTERY DISEASE WITHOUT ANGINA PECTORIS, UNSPECIFIED VESSEL OR LESION TYPE, UNSPECIFIED WHETHER NATIVE OR TRANSPLANTED HEART: ICD-10-CM

## 2022-06-04 RX ORDER — ATORVASTATIN CALCIUM 80 MG/1
TABLET, FILM COATED ORAL
Qty: 90 TABLET | Refills: 0 | Status: SHIPPED | OUTPATIENT
Start: 2022-06-04

## 2022-06-07 ENCOUNTER — OFFICE VISIT (OUTPATIENT)
Dept: UROLOGY | Facility: CLINIC | Age: 77
End: 2022-06-07
Payer: COMMERCIAL

## 2022-06-07 VITALS
HEART RATE: 67 BPM | BODY MASS INDEX: 25.76 KG/M2 | HEIGHT: 68 IN | WEIGHT: 170 LBS | SYSTOLIC BLOOD PRESSURE: 120 MMHG | OXYGEN SATURATION: 98 % | DIASTOLIC BLOOD PRESSURE: 68 MMHG

## 2022-06-07 DIAGNOSIS — N40.1 BPH WITH OBSTRUCTION/LOWER URINARY TRACT SYMPTOMS: Primary | ICD-10-CM

## 2022-06-07 DIAGNOSIS — R97.20 ELEVATED PSA: ICD-10-CM

## 2022-06-07 DIAGNOSIS — N13.8 BPH WITH OBSTRUCTION/LOWER URINARY TRACT SYMPTOMS: Primary | ICD-10-CM

## 2022-06-07 LAB — POST-VOID RESIDUAL VOLUME, ML POC: 86 ML

## 2022-06-07 PROCEDURE — 99214 OFFICE O/P EST MOD 30 MIN: CPT | Performed by: PHYSICIAN ASSISTANT

## 2022-06-07 PROCEDURE — 51798 US URINE CAPACITY MEASURE: CPT | Performed by: PHYSICIAN ASSISTANT

## 2022-06-07 NOTE — PROGRESS NOTES
6/7/2022      Chief Complaint   Patient presents with    Follow-up     Not happy with the outcome after procedure    Nocturia     2 -3 times per night/ weak stream         Assessment and Plan    77 y o  male managed by Dr Edith Gale    1  Benign prostatic hypertrophy    Status post simple prostatectomy for very large gland in October 2021, pathology is negative for malignancy  Persistent obstructive symptoms  I have offered him to return for cystourethroscopy for evaluation of his bladder neck, possible contracture  Examination today shows no obvious meatal or distal urethral stricture  PVR 86 mL  Will be due for first repeat PSA postoperatively this month, expect dramatic reduction after tissue resection  History of Present Illness  Mehrdad Chavarria is a 77 y o  male here for evaluation of six-month follow-up after simple prostatectomy for management of BPH prostate gland nearly 150 g  Longstanding elevated PSA around 17 consistent with his degree of prostatomegaly  In his chart there is diagnosis tab for prostate cancer  This is false he has never had prostate cancer  In fact all pathology from two previous biopsies as well as tissue from his simple prostatectomy October 2021 is 121 g of benign prostate tissue no malignancy  I removed this diagnosis tab from his chart today  Overall he is not thrilled with his results following simple prostatectomy  Still has nocturia 3 times per night and in more recent months has weak and intermittent stream   Feels it takes him a while to empty  PVR today is 86 mL  He has no incontinence or hematuria  No dysuria  Review of Systems   Constitutional: Negative for activity change, appetite change, chills, fever and unexpected weight change  HENT: Negative  Respiratory: Negative  Negative for shortness of breath  Cardiovascular: Negative  Negative for chest pain  Gastrointestinal: Negative for abdominal pain, diarrhea, nausea and vomiting  Endocrine: Negative  Genitourinary: Positive for difficulty urinating and frequency  Negative for decreased urine volume, dysuria, flank pain, hematuria, scrotal swelling, testicular pain and urgency  Musculoskeletal: Negative for back pain and gait problem  Skin: Negative  Allergic/Immunologic: Negative  Neurological: Negative  Hematological: Negative for adenopathy  Does not bruise/bleed easily  AUA SYMPTOM SCORE    Flowsheet Row Most Recent Value   AUA SYMPTOM SCORE    How often have you had a sensation of not emptying your bladder completely after you finished urinating? 3   How often have you had to urinate again less than two hours after you finished urinating? 4   How often have you found you stopped and started again several times when you urinate? 3   How often have you found it difficult to postpone urination? 2   How often have you had a weak urinary stream? 3   How often have you had to push or strain to begin urination? 2   How many times did you most typically get up to urinate from the time you went to bed at night until the time you got up in the morning? 2   Quality of Life: If you were to spend the rest of your life with your urinary condition just the way it is now, how would you feel about that? 3   AUA SYMPTOM SCORE 19           Vitals  Vitals:    06/07/22 1326   BP: 120/68   Pulse: 67   SpO2: 98%   Weight: 77 1 kg (170 lb)   Height: 5' 7 5" (1 715 m)       Physical Exam  Vitals and nursing note reviewed  Constitutional:       General: He is not in acute distress  Appearance: Normal appearance  He is well-developed  He is not diaphoretic  HENT:      Head: Normocephalic and atraumatic  Pulmonary:      Effort: Pulmonary effort is normal       Comments: No cough or audible wheeze  Abdominal:      General: There is no distension  Tenderness: There is no abdominal tenderness  There is no right CVA tenderness or left CVA tenderness     Genitourinary: Penis: Normal        Testes: Normal       Comments: Circumcised penis, normal phallus, orthotopic patent meatus  Testes smooth descended bilaterally into the scrotum nontender with no palpable mass  Musculoskeletal:      Right lower leg: No edema  Left lower leg: No edema  Skin:     General: Skin is warm and dry  Neurological:      Mental Status: He is alert and oriented to person, place, and time        Gait: Gait normal    Psychiatric:         Speech: Speech normal          Behavior: Behavior normal            Past History  Past Medical History:   Diagnosis Date    Arthritis     both shoulders    CAD (coronary artery disease)     Last Assessed:  11/4/13    Cigarette smoker     Colon polyp     Elevated prostate specific antigen (PSA)     Last Assessed:  12/21/17    Enlarged prostate     Exercise involving walking     in season hunt's and fish's/ 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"    Full dentures     but doesnt wear them    History of 2019 novel coronavirus disease (COVID-19) 02/2021    hospital for 2 days but not in ICU/"mainly dehydrated" "also fever/oxygen below 90"    History of coronary artery stent placement     x2 in 2008 or so; seeadis Bravo-- Dr Li Neighbours cardio   [facility] (hard of hearing)     no hearing aids yet    Hyperlipidemia     Last Assessed:  11/24/17    Hypertension     Benign essential, Last Assessed:  11/24/17    LBBB (left bundle branch block)     Nocturia     Polycythemia vera (HCC)     (per history in chart)    PVD (peripheral vascular disease) ([facility] [address] )     RBC abnormality     pt reports told has high Red blood cells/goes to infusion center regularly next appt 10/8/21 hematologist is Dr Vinicio Ann of [address] [place] [address](had been Dr Denton Rizvi)   Joe Arias of breath     "if goes up steps gets SOB, had for awhile"    Slow urinary stream     "sometimes feels like bladder isnt all the way empty"    Snores     Wears glasses     reading     Social History     Socioeconomic History    Marital status: /Civil Union     Spouse name: Not on file    Number of children: Not on file    Years of education: Not on file    Highest education level: Not on file   Occupational History    Not on file   Tobacco Use    Smoking status: Current Every Day Smoker     Packs/day: 0 50     Years: 35 00     Pack years: 17 50     Types: Cigarettes    Smokeless tobacco: Never Used    Tobacco comment: a little less than 1/2ppd, trying to cut back before surgery last smoked 10/16   Vaping Use    Vaping Use: Not on file   Substance and Sexual Activity    Alcohol use: No    Drug use: No    Sexual activity: Not Currently     Comment: defer   Other Topics Concern    Not on file   Social History Narrative    Always uses seatbelt    Feels safe at home    Uses safety equipment     Social Determinants of Health     Financial Resource Strain: Not on file   Food Insecurity: Not on file   Transportation Needs: Not on file   Physical Activity: Not on file   Stress: Not on file   Social Connections: Not on file   Intimate Partner Violence: Not on file   Housing Stability: Not on file     Social History     Tobacco Use   Smoking Status Current Every Day Smoker    Packs/day: 0 50    Years: 35 00    Pack years: 17 50    Types: Cigarettes   Smokeless Tobacco Never Used   Tobacco Comment    a little less than 1/2ppd, trying to cut back before surgery last smoked 10/16     Family History   Problem Relation Age of Onset    No Known Problems Mother     Heart disease Father        The following portions of the patient's history were reviewed and updated as appropriate: allergies, current medications, past medical history, past social history, past surgical history and problem list     Results  No results found for this or any previous visit (from the past 1 hour(s)) ]  Lab Results   Component Value Date    PSA 17 7 (H) 07/29/2021    PSA 17 2 (H) 11/27/2020    PSA 15 0 (H) 05/22/2020    PSA 14 0 (H) 10/28/2019     Lab Results   Component Value Date    CALCIUM 9 3 05/20/2022     11/13/2017    K 4 2 05/20/2022    CO2 30 05/20/2022     05/20/2022    BUN 20 05/20/2022    CREATININE 1 29 05/20/2022     Lab Results   Component Value Date    WBC 8 10 05/20/2022    HGB 16 1 05/20/2022    HCT 45 7 05/20/2022     (H) 05/20/2022    PLT 90 (L) 05/20/2022

## 2022-07-12 DIAGNOSIS — D45 POLYCYTHEMIA VERA (HCC): ICD-10-CM

## 2022-07-12 DIAGNOSIS — F17.210 CIGARETTE SMOKER: ICD-10-CM

## 2022-07-12 DIAGNOSIS — Z51.11 ENCOUNTER FOR ANTINEOPLASTIC CHEMOTHERAPY: ICD-10-CM

## 2022-07-12 DIAGNOSIS — Z15.89 JAK2 GENE MUTATION: ICD-10-CM

## 2022-07-12 RX ORDER — HYDROXYUREA 500 MG/1
CAPSULE ORAL
Qty: 180 CAPSULE | Refills: 1 | Status: SHIPPED | OUTPATIENT
Start: 2022-07-12 | End: 2022-10-14

## 2022-07-27 ENCOUNTER — LAB (OUTPATIENT)
Dept: LAB | Facility: MEDICAL CENTER | Age: 77
End: 2022-07-27
Payer: COMMERCIAL

## 2022-07-28 ENCOUNTER — APPOINTMENT (EMERGENCY)
Dept: RADIOLOGY | Facility: HOSPITAL | Age: 77
End: 2022-07-28
Payer: COMMERCIAL

## 2022-07-28 ENCOUNTER — HOSPITAL ENCOUNTER (INPATIENT)
Facility: HOSPITAL | Age: 77
LOS: 2 days | Discharge: HOME | DRG: 242 | End: 2022-07-30
Attending: STUDENT IN AN ORGANIZED HEALTH CARE EDUCATION/TRAINING PROGRAM | Admitting: STUDENT IN AN ORGANIZED HEALTH CARE EDUCATION/TRAINING PROGRAM
Payer: COMMERCIAL

## 2022-07-28 ENCOUNTER — HOSPITAL ENCOUNTER (EMERGENCY)
Facility: HOSPITAL | Age: 77
End: 2022-07-28
Attending: EMERGENCY MEDICINE
Payer: COMMERCIAL

## 2022-07-28 ENCOUNTER — APPOINTMENT (INPATIENT)
Dept: RADIOLOGY | Facility: HOSPITAL | Age: 77
DRG: 242 | End: 2022-07-28
Payer: COMMERCIAL

## 2022-07-28 VITALS
BODY MASS INDEX: 26.23 KG/M2 | RESPIRATION RATE: 18 BRPM | TEMPERATURE: 98 F | WEIGHT: 170 LBS | SYSTOLIC BLOOD PRESSURE: 142 MMHG | OXYGEN SATURATION: 98 % | HEART RATE: 55 BPM | DIASTOLIC BLOOD PRESSURE: 64 MMHG

## 2022-07-28 DIAGNOSIS — I44.1 AV BLOCK, MOBITZ 2: Primary | ICD-10-CM

## 2022-07-28 DIAGNOSIS — I45.9 HEART BLOCK: Primary | ICD-10-CM

## 2022-07-28 DIAGNOSIS — I44.2 COMPLETE HEART BLOCK (HCC): ICD-10-CM

## 2022-07-28 PROBLEM — N25.81 SECONDARY HYPERPARATHYROIDISM OF RENAL ORIGIN (HCC): Status: RESOLVED | Noted: 2022-06-03 | Resolved: 2022-07-28

## 2022-07-28 LAB
2HR DELTA HS TROPONIN: 1 NG/L
ALBUMIN SERPL BCP-MCNC: 3.5 G/DL (ref 3.5–5)
ALP SERPL-CCNC: 105 U/L (ref 46–116)
ALT SERPL W P-5'-P-CCNC: 20 U/L (ref 12–78)
ANION GAP SERPL CALCULATED.3IONS-SCNC: 11 MMOL/L (ref 4–13)
APTT PPP: 26 SECONDS (ref 23–37)
AST SERPL W P-5'-P-CCNC: 18 U/L (ref 5–45)
BACTERIA UR QL AUTO: ABNORMAL /HPF
BASOPHILS # BLD AUTO: 0.01 THOUSANDS/ΜL (ref 0–0.1)
BASOPHILS NFR BLD AUTO: 0 % (ref 0–1)
BILIRUB SERPL-MCNC: 1.95 MG/DL (ref 0.2–1)
BILIRUB UR QL STRIP: NEGATIVE
BUN SERPL-MCNC: 18 MG/DL (ref 5–25)
CALCIUM SERPL-MCNC: 8.4 MG/DL (ref 8.3–10.1)
CARDIAC TROPONIN I PNL SERPL HS: 11 NG/L
CARDIAC TROPONIN I PNL SERPL HS: 12 NG/L
CHLORIDE SERPL-SCNC: 103 MMOL/L (ref 96–108)
CLARITY UR: CLEAR
CO2 SERPL-SCNC: 24 MMOL/L (ref 21–32)
COLOR UR: YELLOW
CREAT SERPL-MCNC: 1.6 MG/DL (ref 0.6–1.3)
EOSINOPHIL # BLD AUTO: 0.04 THOUSAND/ΜL (ref 0–0.61)
EOSINOPHIL NFR BLD AUTO: 1 % (ref 0–6)
ERYTHROCYTE [DISTWIDTH] IN BLOOD BY AUTOMATED COUNT: 16.4 % (ref 11.6–15.1)
GFR SERPL CREATININE-BSD FRML MDRD: 41 ML/MIN/1.73SQ M
GLUCOSE SERPL-MCNC: 128 MG/DL (ref 65–140)
GLUCOSE UR STRIP-MCNC: NEGATIVE MG/DL
HCT VFR BLD AUTO: 33.1 % (ref 36.5–49.3)
HGB BLD-MCNC: 11.9 G/DL (ref 12–17)
HGB UR QL STRIP.AUTO: NEGATIVE
IMM GRANULOCYTES # BLD AUTO: 0.04 THOUSAND/UL (ref 0–0.2)
IMM GRANULOCYTES NFR BLD AUTO: 1 % (ref 0–2)
INR PPP: 1.1 (ref 0.84–1.19)
KETONES UR STRIP-MCNC: NEGATIVE MG/DL
LEUKOCYTE ESTERASE UR QL STRIP: ABNORMAL
LYMPHOCYTES # BLD AUTO: 1.57 THOUSANDS/ΜL (ref 0.6–4.47)
LYMPHOCYTES NFR BLD AUTO: 22 % (ref 14–44)
MAGNESIUM SERPL-MCNC: 2 MG/DL (ref 1.6–2.6)
MCH RBC QN AUTO: 45.4 PG (ref 26.8–34.3)
MCHC RBC AUTO-ENTMCNC: 36 G/DL (ref 31.4–37.4)
MCV RBC AUTO: 126 FL (ref 82–98)
MONOCYTES # BLD AUTO: 0.43 THOUSAND/ΜL (ref 0.17–1.22)
MONOCYTES NFR BLD AUTO: 6 % (ref 4–12)
NEUTROPHILS # BLD AUTO: 4.98 THOUSANDS/ΜL (ref 1.85–7.62)
NEUTS SEG NFR BLD AUTO: 70 % (ref 43–75)
NITRITE UR QL STRIP: NEGATIVE
NON-SQ EPI CELLS URNS QL MICRO: ABNORMAL /HPF
NRBC BLD AUTO-RTO: 0 /100 WBCS
NT-PROBNP SERPL-MCNC: 177 PG/ML
PH UR STRIP.AUTO: 7 [PH] (ref 4.5–8)
PHOSPHATE SERPL-MCNC: 2 MG/DL (ref 2.3–4.1)
PLATELET # BLD AUTO: 86 THOUSANDS/UL (ref 149–390)
PMV BLD AUTO: 10.8 FL (ref 8.9–12.7)
POTASSIUM SERPL-SCNC: 3.5 MMOL/L (ref 3.5–5.3)
PROT SERPL-MCNC: 6.8 G/DL (ref 6.4–8.4)
PROT UR STRIP-MCNC: NEGATIVE MG/DL
PROTHROMBIN TIME: 14.2 SECONDS (ref 11.6–14.5)
RBC # BLD AUTO: 2.62 MILLION/UL (ref 3.88–5.62)
RBC #/AREA URNS AUTO: ABNORMAL /HPF
SODIUM SERPL-SCNC: 138 MMOL/L (ref 135–147)
SP GR UR STRIP.AUTO: 1.01 (ref 1–1.03)
TSH SERPL DL<=0.05 MIU/L-ACNC: 1.73 UIU/ML (ref 0.45–4.5)
URINE COMMENT: ABNORMAL
UROBILINOGEN UR QL STRIP.AUTO: 0.2 E.U./DL
WBC # BLD AUTO: 7.07 THOUSAND/UL (ref 4.31–10.16)
WBC #/AREA URNS AUTO: ABNORMAL /HPF

## 2022-07-28 PROCEDURE — 99291 CRITICAL CARE FIRST HOUR: CPT | Performed by: EMERGENCY MEDICINE

## 2022-07-28 PROCEDURE — 83880 ASSAY OF NATRIURETIC PEPTIDE: CPT | Performed by: EMERGENCY MEDICINE

## 2022-07-28 PROCEDURE — 99291 CRITICAL CARE FIRST HOUR: CPT | Performed by: PHYSICIAN ASSISTANT

## 2022-07-28 PROCEDURE — 93005 ELECTROCARDIOGRAM TRACING: CPT

## 2022-07-28 PROCEDURE — 80053 COMPREHEN METABOLIC PANEL: CPT | Performed by: EMERGENCY MEDICINE

## 2022-07-28 PROCEDURE — 84100 ASSAY OF PHOSPHORUS: CPT | Performed by: EMERGENCY MEDICINE

## 2022-07-28 PROCEDURE — C1894 INTRO/SHEATH, NON-LASER: HCPCS | Performed by: INTERNAL MEDICINE

## 2022-07-28 PROCEDURE — 84443 ASSAY THYROID STIM HORMONE: CPT | Performed by: EMERGENCY MEDICINE

## 2022-07-28 PROCEDURE — 96365 THER/PROPH/DIAG IV INF INIT: CPT

## 2022-07-28 PROCEDURE — 84484 ASSAY OF TROPONIN QUANT: CPT | Performed by: EMERGENCY MEDICINE

## 2022-07-28 PROCEDURE — 86618 LYME DISEASE ANTIBODY: CPT | Performed by: PHYSICIAN ASSISTANT

## 2022-07-28 PROCEDURE — 33210 INSERT ELECTRD/PM CATH SNGL: CPT | Performed by: INTERNAL MEDICINE

## 2022-07-28 PROCEDURE — 96374 THER/PROPH/DIAG INJ IV PUSH: CPT

## 2022-07-28 PROCEDURE — 5A1223Z PERFORMANCE OF CARDIAC PACING, CONTINUOUS: ICD-10-PCS | Performed by: INTERNAL MEDICINE

## 2022-07-28 PROCEDURE — 81001 URINALYSIS AUTO W/SCOPE: CPT

## 2022-07-28 PROCEDURE — 94640 AIRWAY INHALATION TREATMENT: CPT

## 2022-07-28 PROCEDURE — 85610 PROTHROMBIN TIME: CPT | Performed by: EMERGENCY MEDICINE

## 2022-07-28 PROCEDURE — 85730 THROMBOPLASTIN TIME PARTIAL: CPT | Performed by: EMERGENCY MEDICINE

## 2022-07-28 PROCEDURE — 99285 EMERGENCY DEPT VISIT HI MDM: CPT

## 2022-07-28 PROCEDURE — 85049 AUTOMATED PLATELET COUNT: CPT | Performed by: PHYSICIAN ASSISTANT

## 2022-07-28 PROCEDURE — 83735 ASSAY OF MAGNESIUM: CPT | Performed by: EMERGENCY MEDICINE

## 2022-07-28 PROCEDURE — 99204 OFFICE O/P NEW MOD 45 MIN: CPT | Performed by: INTERNAL MEDICINE

## 2022-07-28 PROCEDURE — 99152 MOD SED SAME PHYS/QHP 5/>YRS: CPT | Performed by: INTERNAL MEDICINE

## 2022-07-28 PROCEDURE — 71045 X-RAY EXAM CHEST 1 VIEW: CPT

## 2022-07-28 PROCEDURE — 36415 COLL VENOUS BLD VENIPUNCTURE: CPT | Performed by: EMERGENCY MEDICINE

## 2022-07-28 PROCEDURE — 85025 COMPLETE CBC W/AUTO DIFF WBC: CPT | Performed by: EMERGENCY MEDICINE

## 2022-07-28 PROCEDURE — C1751 CATH, INF, PER/CENT/MIDLINE: HCPCS | Performed by: INTERNAL MEDICINE

## 2022-07-28 RX ORDER — HEPARIN SODIUM 5000 [USP'U]/ML
5000 INJECTION, SOLUTION INTRAVENOUS; SUBCUTANEOUS EVERY 8 HOURS SCHEDULED
Status: DISCONTINUED | OUTPATIENT
Start: 2022-07-28 | End: 2022-07-30 | Stop reason: HOSPADM

## 2022-07-28 RX ORDER — IPRATROPIUM BROMIDE AND ALBUTEROL SULFATE 2.5; .5 MG/3ML; MG/3ML
3 SOLUTION RESPIRATORY (INHALATION) ONCE
Status: COMPLETED | OUTPATIENT
Start: 2022-07-28 | End: 2022-07-28

## 2022-07-28 RX ORDER — AMLODIPINE BESYLATE 10 MG/1
10 TABLET ORAL EVERY EVENING
Status: DISCONTINUED | OUTPATIENT
Start: 2022-07-28 | End: 2022-07-29

## 2022-07-28 RX ORDER — ACETAMINOPHEN 325 MG/1
650 TABLET ORAL EVERY 6 HOURS PRN
Status: DISCONTINUED | OUTPATIENT
Start: 2022-07-28 | End: 2022-07-30 | Stop reason: HOSPADM

## 2022-07-28 RX ORDER — DOPAMINE HYDROCHLORIDE 160 MG/100ML
1-20 INJECTION, SOLUTION INTRAVENOUS
Status: DISCONTINUED | OUTPATIENT
Start: 2022-07-28 | End: 2022-07-28

## 2022-07-28 RX ORDER — ONDANSETRON 2 MG/ML
4 INJECTION INTRAMUSCULAR; INTRAVENOUS ONCE
Status: COMPLETED | OUTPATIENT
Start: 2022-07-28 | End: 2022-07-28

## 2022-07-28 RX ORDER — FENTANYL CITRATE 50 UG/ML
50 INJECTION, SOLUTION INTRAMUSCULAR; INTRAVENOUS ONCE
Status: DISCONTINUED | OUTPATIENT
Start: 2022-07-28 | End: 2022-07-28 | Stop reason: HOSPADM

## 2022-07-28 RX ORDER — HYDROXYUREA 500 MG/1
500 CAPSULE ORAL 2 TIMES DAILY
Status: DISCONTINUED | OUTPATIENT
Start: 2022-07-28 | End: 2022-07-30 | Stop reason: HOSPADM

## 2022-07-28 RX ORDER — FINASTERIDE 5 MG/1
5 TABLET, FILM COATED ORAL DAILY
Status: DISCONTINUED | OUTPATIENT
Start: 2022-07-29 | End: 2022-07-30 | Stop reason: HOSPADM

## 2022-07-28 RX ORDER — FENTANYL CITRATE 50 UG/ML
INJECTION, SOLUTION INTRAMUSCULAR; INTRAVENOUS AS NEEDED
Status: DISCONTINUED | OUTPATIENT
Start: 2022-07-28 | End: 2022-07-28 | Stop reason: HOSPADM

## 2022-07-28 RX ORDER — ATROPINE SULFATE 0.1 MG/ML
INJECTION INTRAVENOUS
Status: DISPENSED
Start: 2022-07-28 | End: 2022-07-29

## 2022-07-28 RX ORDER — MIDAZOLAM HYDROCHLORIDE 2 MG/2ML
INJECTION, SOLUTION INTRAMUSCULAR; INTRAVENOUS AS NEEDED
Status: DISCONTINUED | OUTPATIENT
Start: 2022-07-28 | End: 2022-07-28 | Stop reason: HOSPADM

## 2022-07-28 RX ORDER — ASPIRIN 81 MG/1
81 TABLET ORAL DAILY
Status: DISCONTINUED | OUTPATIENT
Start: 2022-07-29 | End: 2022-07-30 | Stop reason: HOSPADM

## 2022-07-28 RX ORDER — CALCITRIOL 0.25 UG/1
0.25 CAPSULE, LIQUID FILLED ORAL 3 TIMES WEEKLY
Status: DISCONTINUED | OUTPATIENT
Start: 2022-07-29 | End: 2022-07-30 | Stop reason: HOSPADM

## 2022-07-28 RX ORDER — ATROPINE SULFATE 0.1 MG/ML
1 SYRINGE (ML) INJECTION ONCE
Status: COMPLETED | OUTPATIENT
Start: 2022-07-28 | End: 2022-07-28

## 2022-07-28 RX ORDER — ONDANSETRON 2 MG/ML
4 INJECTION INTRAMUSCULAR; INTRAVENOUS EVERY 6 HOURS PRN
Status: DISCONTINUED | OUTPATIENT
Start: 2022-07-28 | End: 2022-07-30 | Stop reason: HOSPADM

## 2022-07-28 RX ORDER — LIDOCAINE HYDROCHLORIDE 10 MG/ML
INJECTION, SOLUTION EPIDURAL; INFILTRATION; INTRACAUDAL; PERINEURAL AS NEEDED
Status: DISCONTINUED | OUTPATIENT
Start: 2022-07-28 | End: 2022-07-28 | Stop reason: HOSPADM

## 2022-07-28 RX ORDER — DOPAMINE HYDROCHLORIDE 160 MG/100ML
1-20 INJECTION, SOLUTION INTRAVENOUS CONTINUOUS
Status: DISCONTINUED | OUTPATIENT
Start: 2022-07-28 | End: 2022-07-28 | Stop reason: HOSPADM

## 2022-07-28 RX ORDER — ASPIRIN 81 MG/1
324 TABLET, CHEWABLE ORAL ONCE
Status: COMPLETED | OUTPATIENT
Start: 2022-07-28 | End: 2022-07-28

## 2022-07-28 RX ORDER — ATORVASTATIN CALCIUM 80 MG/1
80 TABLET, FILM COATED ORAL EVERY EVENING
Status: DISCONTINUED | OUTPATIENT
Start: 2022-07-28 | End: 2022-07-30 | Stop reason: HOSPADM

## 2022-07-28 RX ORDER — MELATONIN
1000 EVERY EVENING
Status: DISCONTINUED | OUTPATIENT
Start: 2022-07-28 | End: 2022-07-30 | Stop reason: HOSPADM

## 2022-07-28 RX ADMIN — DOPAMINE HYDROCHLORIDE IN DEXTROSE 2 MCG/KG/MIN: 1.6 INJECTION, SOLUTION INTRAVENOUS at 19:06

## 2022-07-28 RX ADMIN — IPRATROPIUM BROMIDE AND ALBUTEROL SULFATE 3 ML: .5; 3 SOLUTION RESPIRATORY (INHALATION) at 17:26

## 2022-07-28 RX ADMIN — HEPARIN SODIUM 5000 UNITS: 5000 INJECTION INTRAVENOUS; SUBCUTANEOUS at 23:08

## 2022-07-28 RX ADMIN — ASPIRIN 81 MG CHEWABLE TABLET 324 MG: 81 TABLET CHEWABLE at 17:26

## 2022-07-28 RX ADMIN — DOPAMINE HYDROCHLORIDE IN DEXTROSE 5 MCG/KG/MIN: 1.6 INJECTION, SOLUTION INTRAVENOUS at 20:45

## 2022-07-28 RX ADMIN — DOPAMINE HYDROCHLORIDE IN DEXTROSE 7 MCG/KG/MIN: 1.6 INJECTION, SOLUTION INTRAVENOUS at 19:38

## 2022-07-28 RX ADMIN — ISOPROTERENOL HYDROCHLORIDE 1 MCG/MIN: 0.2 INJECTION, SOLUTION INTRAMUSCULAR; INTRAVENOUS at 21:28

## 2022-07-28 RX ADMIN — ONDANSETRON 4 MG: 2 INJECTION INTRAMUSCULAR; INTRAVENOUS at 20:02

## 2022-07-28 NOTE — ED PROVIDER NOTES
History  Chief Complaint   Patient presents with    Slow Heart Rate     Per EMS patient was in car sudden onset of SOB, hypotensive in 35Q systolic, HR of 26  EMS admin 5 of atropine  78 y/o M presents for evaluation of multiple complaints  Pt had sudden onset of sob, chest pain, and lightheaededness while sitting in his car  Symptoms were severe  Pt was found to be bradycardic with heart rate in the 20s and hypotensive  Symptoms resolved after 1mg atropine with heart rated in the 40s-60s, and bp in the 180s  No hx of other symtpoms  Pt complains of mild sob  History provided by:  Patient and EMS personnel      Prior to Admission Medications   Prescriptions Last Dose Informant Patient Reported? Taking?    Cholecalciferol (Vitamin D3) 50 MCG (2000 UT) TABS   Yes No   Sig: Take 500 Units by mouth every evening   Multiple Vitamin (MULTIVITAMIN) capsule  Self Yes No   Sig: Take 1 capsule by mouth daily   Omega-3 Fatty Acids (FISH OIL) 1200 MG CAPS  Self Yes No   Sig: Take 1,200 mg by mouth 2 (two) times a day     acetaminophen (TYLENOL) 500 mg tablet  Self Yes No   Sig: Take 500 mg by mouth every 6 (six) hours as needed for mild pain   amLODIPine (NORVASC) 10 mg tablet  Self Yes No   Sig: Take 10 mg by mouth every evening    ascorbic acid (VITAMIN C) 1000 MG tablet   No No   Sig: Take 1 tablet (1,000 mg total) by mouth every 12 (twelve) hours for 10 doses   Patient taking differently: Take 1,000 mg by mouth daily   aspirin (ECOTRIN LOW STRENGTH) 81 mg EC tablet  Self Yes No   Sig: Take 81 mg by mouth daily   atorvastatin (LIPITOR) 80 mg tablet   No No   Sig: TAKE 1 TABLET BY MOUTH EVERY EVENING   calcitriol (ROCALTROL) 0 25 mcg capsule   No No   Sig: Take 1 capsule (0 25 mcg total) by mouth 3 (three) times a week   docusate sodium (COLACE) 100 mg capsule  Self No No   Sig: Take 1 capsule (100 mg total) by mouth 2 (two) times a day   Patient not taking: Reported on 6/7/2022   ferrous sulfate 324 (65 Fe) mg No No   Sig: Take 1 tablet (324 mg total) by mouth 2 (two) times a day before meals   finasteride (PROSCAR) 5 mg tablet   No No   Sig: TAKE 1 TABLET BY MOUTH EVERY DAY   hydroxyurea (HYDREA) 500 mg capsule   No No   Sig: TAKE 1 CAPSULE BY MOUTH TWICE A DAY   metoprolol tartrate (LOPRESSOR) 50 mg tablet  Self Yes No   Sig: Take 50 mg by mouth 2 (two) times a day    nitroglycerin (NITROSTAT) 0 4 mg SL tablet  Self No No   Sig: Place 1 tablet (0 4 mg total) under the tongue every 5 (five) minutes as needed for chest pain      Facility-Administered Medications: None       Past Medical History:   Diagnosis Date    Arthritis     both shoulders    CAD (coronary artery disease)     Last Assessed:  11/4/13    Cigarette smoker     Colon polyp     Elevated prostate specific antigen (PSA)     Last Assessed:  12/21/17    Enlarged prostate     Exercise involving walking     in season hunt's and fish's/ 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"    Full dentures     but doesnt wear them    History of 2019 novel coronavirus disease (COVID-19) 02/2021    hospital for 2 days but not in ICU/"mainly dehydrated" "also fever/oxygen below 90"    History of coronary artery stent placement     x2 in 2008 or so; sees Humberto Beasley-- Dr Tatyana Beal cardio   [facility] (hard of hearing)     no hearing aids yet    Hyperlipidemia     Last Assessed:  11/24/17    Hypertension     Benign essential, Last Assessed:  11/24/17    LBBB (left bundle branch block)     Nocturia     Polycythemia vera (HCC)     (per history in chart)    PVD (peripheral vascular disease) (HCC)     RBC abnormality     pt reports told has high Red blood cells/goes to infusion center regularly next appt 10/8/21 [facility] Dr Ange Tom)   Kathrin Scheuermann of breath     "if goes up steps gets SOB, had for awhile"    Slow urinary stream     "sometimes feels like bladder isnt all the way empty"    Snores     Wears glasses     reading       Past Surgical History:   Procedure Laterality Date    ANGIOPLASTY      APPENDECTOMY      CATARACT EXTRACTION Bilateral     CORONARY ARTERY BYPASS GRAFT      CT CYSTOGRAM  11/4/2021    ELBOW SURGERY Right     FL CYSTOGRAM  10/27/2021    KNEE ARTHROSCOPY Right     ME COLONOSCOPY FLX DX W/COLLJ SPEC WHEN PFRMD N/A 5/22/2017    Procedure: COLONOSCOPY;  Surgeon: Zia Gonzalez DO;  Location: [facility]; Service: Gastroenterology    PROSTATE BIOPSY      PROSTATECTOMY N/A 10/18/2021    Procedure: ROBOTIC SUBTOTAL/SUPRAPUBIC PROSTATECTOMY;  Surgeon: Loreta Terrazas MD;  Location: [facility] OR;  Service: Urology    SHOULDER ARTHROSCOPY Right     TONSILLECTOMY         Family History   Problem Relation Age of Onset    No Known Problems Mother     Heart disease Father      I have reviewed and agree with the history as documented  E-Cigarette/Vaping     E-Cigarette/Vaping Substances    Nicotine No     THC No     CBD No     Flavoring No     Other No     Unknown No      Social History     Tobacco Use    Smoking status: Current Every Day Smoker     Packs/day: 0 50     Years: 35 00     Pack years: 17 50     Types: Cigarettes    Smokeless tobacco: Never Used    Tobacco comment: a little less than 1/2ppd, trying to cut back before surgery last smoked 10/16   Substance Use Topics    Alcohol use: No    Drug use: No       Review of Systems   Constitutional: Positive for diaphoresis  Negative for activity change, appetite change, fatigue and fever  HENT: Negative for congestion, dental problem, ear pain, rhinorrhea and sore throat  Eyes: Negative for pain and redness  Respiratory: Positive for shortness of breath  Negative for chest tightness and wheezing  Cardiovascular: Positive for chest pain  Negative for palpitations  Gastrointestinal: Negative for abdominal pain, blood in stool, constipation, diarrhea, nausea and vomiting  Endocrine: Negative for cold intolerance and heat intolerance  Genitourinary: Negative for dysuria, frequency and hematuria  Musculoskeletal: Negative for arthralgias and myalgias  Skin: Negative for color change, pallor and rash  Neurological: Positive for light-headedness  Negative for weakness and numbness  Hematological: Does not bruise/bleed easily  Psychiatric/Behavioral: Negative for agitation, hallucinations and suicidal ideas  Physical Exam  Physical Exam  Constitutional:       Appearance: Normal appearance  HENT:      Right Ear: Tympanic membrane normal       Left Ear: Tympanic membrane normal       Mouth/Throat:      Pharynx: No oropharyngeal exudate or posterior oropharyngeal erythema  Eyes:      General: No scleral icterus  Right eye: No discharge  Left eye: No discharge  Cardiovascular:      Rate and Rhythm: Normal rate and regular rhythm  Pulses: Normal pulses  Heart sounds: Normal heart sounds  Pulmonary:      Effort: Pulmonary effort is normal       Breath sounds: Wheezing present  Abdominal:      General: There is no distension  Palpations: There is no mass  Tenderness: There is no abdominal tenderness  Hernia: No hernia is present  Musculoskeletal:         General: No deformity or signs of injury  Cervical back: Normal range of motion  No rigidity  Right lower leg: No edema  Left lower leg: No edema  Neurological:      Mental Status: He is alert           Vital Signs  ED Triage Vitals [07/28/22 1722]   Temp Pulse Respirations Blood Pressure SpO2   -- (!) 44 20 (!) 172/77 100 %      Temp src Heart Rate Source Patient Position - Orthostatic VS BP Location FiO2 (%)   -- Monitor Lying Left arm --      Pain Score       --           Vitals:    07/28/22 1815 07/28/22 1830 07/28/22 1915 07/28/22 1930   BP: 149/69 141/65 157/70 142/64   Pulse: (!) 46 (!) 44  (!) 44   Patient Position - Orthostatic VS:             Visual Acuity      ED Medications  Medications   DOPamine (INTROPIN) 400 mg in 250 mL infusion (premix) (7 mcg/kg/min × 77 1 kg Intravenous New Bag 7/28/22 1938)   fentanyl citrate (PF) 100 MCG/2ML 50 mcg (has no administration in time range)   atropine (FOR EMS ONLY) 1 mg/10 mL injection 1 mg (0 mg Does not apply Given to EMS 7/28/22 1724)   ipratropium-albuterol (DUO-NEB) 0 5-2 5 mg/3 mL inhalation solution 3 mL (3 mL Nebulization Given 7/28/22 1726)   aspirin chewable tablet 324 mg (324 mg Oral Given 7/28/22 1726)       Diagnostic Studies  Results Reviewed     Procedure Component Value Units Date/Time    HS Troponin I 2hr [[id number]] Collected: 07/28/22 1932    Lab Status: No result Specimen: Blood from Arm, Left     HS Troponin I 4hr [[id number]]     Lab Status: No result Specimen: Blood     CBC and differential [[id number]]  (Abnormal) Collected: 07/28/22 1727    Lab Status: Final result Specimen: Blood from Arm, Right Updated: 07/28/22 1820     WBC 7 07 Thousand/uL      RBC 2 62 Million/uL      Hemoglobin 11 9 g/dL      Hematocrit 33 1 %       fL      MCH 45 4 pg      MCHC 36 0 g/dL      RDW 16 4 %      MPV 10 8 fL      Platelets 86 Thousands/uL      nRBC 0 /100 WBCs      Neutrophils Relative 70 %      Immat GRANS % 1 %      Lymphocytes Relative 22 %      Monocytes Relative 6 %      Eosinophils Relative 1 %      Basophils Relative 0 %      Neutrophils Absolute 4 98 Thousands/µL      Immature Grans Absolute 0 04 Thousand/uL      Lymphocytes Absolute 1 57 Thousands/µL      Monocytes Absolute 0 43 Thousand/µL      Eosinophils Absolute 0 04 Thousand/µL      Basophils Absolute 0 01 Thousands/µL     Urine Microscopic [[id number]]  (Abnormal) Collected: 07/28/22 1802    Lab Status: Final result Specimen: Urine, Clean Catch Updated: 07/28/22 1815     RBC, UA None Seen /hpf      WBC, UA 0-1 /hpf      Epithelial Cells Occasional /hpf      Bacteria, UA Innumerable /hpf      URINE COMMENT Starch crystals    TSH [[id number]] (Normal) Collected: 07/28/22 1727    Lab Status: Final result Specimen: Blood from Arm, Right Updated: 07/28/22 1808     TSH 3RD GENERATON 1 725 uIU/mL     Narrative:      Patients undergoing fluorescein dye angiography may retain small amounts of fluorescein in the body for 48-72 hours post procedure  Samples containing fluorescein can produce falsely depressed TSH values  If the patient had this procedure,a specimen should be resubmitted post fluorescein clearance        Phosphorus [[id number]]  (Abnormal) Collected: 07/28/22 1727    Lab Status: Final result Specimen: Blood from Arm, Right Updated: 07/28/22 1808     Phosphorus 2 0 mg/dL     Magnesium [[id number]]  (Normal) Collected: 07/28/22 1727    Lab Status: Final result Specimen: Blood from Arm, Right Updated: 07/28/22 1808     Magnesium 2 0 mg/dL     NT-BNP PRO [[id number]]  (Normal) Collected: 07/28/22 1727    Lab Status: Final result Specimen: Blood from Arm, Right Updated: 07/28/22 1808     NT-proBNP 177 pg/mL     HS Troponin 0hr (reflex protocol) [[id number]]  (Normal) Collected: 07/28/22 1727    Lab Status: Final result Specimen: Blood from Arm, Right Updated: 07/28/22 1806     hs TnI 0hr 11 ng/L     Urine Macroscopic, POC [[id number]]  (Abnormal) Collected: 07/28/22 1802    Lab Status: Final result Specimen: Urine Updated: 07/28/22 1804     Color, UA Yellow     Clarity, UA Clear     pH, UA 7 0     Leukocytes, UA Small     Nitrite, UA Negative     Protein, UA Negative mg/dl      Glucose, UA Negative mg/dl      Ketones, UA Negative mg/dl      Urobilinogen, UA 0 2 E U /dl      Bilirubin, UA Negative     Occult Blood, UA Negative     Specific Gravity, UA 1 010    Narrative:      CLINITEK RESULT    Comprehensive metabolic panel [[id number]]  (Abnormal) Collected: 07/28/22 1727    Lab Status: Final result Specimen: Blood from Arm, Right Updated: 07/28/22 1800     Sodium 138 mmol/L      Potassium 3 5 mmol/L      Chloride 103 mmol/L      CO2 24 mmol/L      ANION GAP 11 mmol/L      BUN 18 mg/dL      Creatinine 1 60 mg/dL      Glucose 128 mg/dL      Calcium 8 4 mg/dL      AST 18 U/L      ALT 20 U/L      Alkaline Phosphatase 105 U/L      Total Protein 6 8 g/dL      Albumin 3 5 g/dL      Total Bilirubin 1 95 mg/dL      eGFR 41 ml/min/1 73sq m     Narrative:      [place] guidelines for Chronic Kidney Disease (CKD):     Stage 1 with normal or high GFR (GFR > 90 mL/min/1 73 square meters)    Stage 2 Mild CKD (GFR = 60-89 mL/min/1 73 square meters)    Stage 3A Moderate CKD (GFR = 45-59 mL/min/1 73 square meters)    Stage 3B Moderate CKD (GFR = 30-44 mL/min/1 73 square meters)    Stage 4 Severe CKD (GFR = 15-29 mL/min/1 73 square meters)    Stage 5 End Stage CKD (GFR <15 mL/min/1 73 square meters)  Note: GFR calculation is accurate only with a steady state creatinine    Protime-INR [[id number]]  (Normal) Collected: 07/28/22 1727    Lab Status: Final result Specimen: Blood from Arm, Right Updated: 07/28/22 1756     Protime 14 2 seconds      INR 1 10    APTT [[id number]]  (Normal) Collected: 07/28/22 1727    Lab Status: Final result Specimen: Blood from Arm, Right Updated: 07/28/22 1756     PTT 26 seconds                  XR chest 1 view portable   ED Interpretation by Bob Trent MD (07/28 1759)   Primary reviewed no acute abnormality                 Procedures  ECG 12 Lead Documentation Only    Date/Time: 7/28/2022 5:30 PM  Performed by: Bob Trent MD  Authorized by: Bob Trent MD     ECG reviewed by me, the ED Provider: yes    Patient location:  ED  Rate:     ECG rate:  42    ECG rate assessment: bradycardic    Rhythm:     Rhythm: sinus bradycardia    Ectopy:     Ectopy: none    QRS:     QRS axis:  Normal    QRS intervals:  Normal  Conduction:     Conduction: abnormal      Abnormal conduction: complete LBBB and 2nd degree (Mobitz 2)    ST segments:     ST segments:  Normal  T waves:     T waves: non-specific    CriticalCare Time  Performed by: Ebony Delgadillo MD  Authorized by: Ebony Delgadillo MD     Critical care provider statement:     Critical care time (minutes):  35    Critical care time was exclusive of:  Separately billable procedures and treating other patients and teaching time    Critical care was necessary to treat or prevent imminent or life-threatening deterioration of the following conditions:  Circulatory failure and cardiac failure    Critical care was time spent personally by me on the following activities:  Blood draw for specimens, obtaining history from patient or surrogate, development of treatment plan with patient or surrogate, discussions with consultants, evaluation of patient's response to treatment, examination of patient, interpretation of cardiac output measurements, ordering and performing treatments and interventions, ordering and review of laboratory studies, ordering and review of radiographic studies, re-evaluation of patient's condition and review of old charts  ECG 12 Lead Documentation Only    Date/Time: 7/28/2022 7:45 PM  Performed by: Ebony Delgadillo MD  Authorized by: Ebony Delgadillo MD     ECG reviewed by me, the ED Provider: yes    Patient location:  ED  Rate:     ECG rate:  60    ECG rate assessment: normal    Rhythm:     Rhythm: sinus rhythm    Ectopy:     Ectopy: none    QRS:     QRS axis:  Normal    QRS intervals:  Normal  Conduction:     Conduction: abnormal      Abnormal conduction: complete RBBB and 2nd degree (Mobitz 2)    ST segments:     ST segments:  Non-specific             ED Course                                             MDM  Number of Diagnoses or Management Options  AV block, Mobitz 2  Diagnosis management comments: Bradycardia 2/ 2mobitz 2:1-will do cardiac work up, cards consult, admit          Disposition  Final diagnoses:   AV block, Mobitz 2     Time reflects when diagnosis was documented in both MDM as applicable and the Disposition within this note     Time User Action Codes Description Comment 7/28/2022  5:45 PM Kaur Phillips Add [I44 1] AV block, Laura 2       ED Disposition     ED Disposition   Transfer to Another Facility-In Network    Condition   --    Date/Time   Thu Jul 28, 2022  6:25 PM    Comment   Ivon Reilly should be transferred out to [facility] mauro  MD Documentation    Vj Ralph Most Recent Value   Patient Condition The patient has been stabilized such that within reasonable medical probability, no material deterioration of the patient condition or the condition of the unborn child(mayelin) is likely to result from the transfer   Reason for Transfer Level of Care needed not available at this facility  [EP]   Benefits of Transfer Specialized equipment and/or services available at the receiving facility (Include comment)________________________  [EP]   Risks of Transfer Potential for delay in receiving treatment, Potential deterioration of medical condition, Loss of IV, Possible worsening of condition or death during transfer, Increased discomfort during transfer   Accepting Physician Alonzo Sandy MD [facility]   Provider Certification The possibility of a transport vehicle being unavailable, Unanticipated needs of medical equipment and personnel during transport, Risk of worsening condition, General risk, such as traffic hazards, adverse weather conditions, rough terrain or turbulence, possible failure of equipment (including vehicle or aircraft), or consequences of actions of persons outside the control of the transport personnel      RN Documentation    [address] Alonzo Gracia      Follow-up Information    None         Patient's Medications   Discharge Prescriptions    No medications on file       No discharge procedures on file      PDMP Review     None          ED Provider  Electronically Signed by           Kathleen Amanda MD  07/29/22 9114

## 2022-07-28 NOTE — EMTALA/ACUTE CARE TRANSFER
[facility] [address]  [address] [facility] [address]  Dept: [phone]      EMTALA TRANSFER CONSENT    NAME Kishan Felipe                                         1945                              MRN [id number]    I have been informed of my rights regarding examination, treatment, and transfer   by Dr Chantell August MD    Benefits: Specialized equipment and/or services available at the receiving facility (Include comment)________________________ (EP)    Risks: Potential for delay in receiving treatment, Potential deterioration of medical condition, Loss of IV, Possible worsening of condition or death during transfer, Increased discomfort during transfer      Consent for Transfer:  I acknowledge that my medical condition has been evaluated and explained to me by the emergency department physician or other qualified medical person and/or my attending physician, who has recommended that I be transferred to the service of  Accepting Physician: Dr Rosalind Lees at [address] [facility] [address], [address]  The above potential benefits of such transfer, the potential risks associated with such transfer, and the probable risks of not being transferred have been explained to me, and I fully understand them  The doctor has explained that, in my case, the benefits of transfer outweigh the risks  I agree to be transferred  I authorize the performance of emergency medical procedures and treatments upon me in both transit and upon arrival at the receiving facility  Additionally, I authorize the release of any and all medical records to the receiving facility and request they be transported with me, if possible  I understand that the safest mode of transportation during a medical emergency is an ambulance and that the Hospital advocates the use of this mode of transport   Risks of traveling to the receiving facility by car, including absence of medical control, life sustaining equipment, such as oxygen, and medical personnel has been explained to me and I fully understand them  (DEYANIRA CORRECT BOX BELOW)  [  ]  I consent to the stated transfer and to be transported by ambulance/helicopter  [  ]  I consent to the stated transfer, but refuse transportation by ambulance and accept full responsibility for my transportation by car  I understand the risks of non-ambulance transfers and I exonerate the Hospital and its staff from any deterioration in my condition that results from this refusal     X___________________________________________    DATE  22  TIME________  Signature of patient or legally responsible individual signing on patient behalf           RELATIONSHIP TO PATIENT_________________________          Provider Certification    NAME Sha Abebe                                         1945                              MRN [id number]    A medical screening exam was performed on the above named patient  Based on the examination:    Condition Necessitating Transfer The encounter diagnosis was AV block, Mobitz 2      Patient Condition: The patient has been stabilized such that within reasonable medical probability, no material deterioration of the patient condition or the condition of the unborn child(mayelin) is likely to result from the transfer    Reason for Transfer: Level of Care needed not available at this facility (EP)    Transfer Requirements: [facility]   · Space available and qualified personnel available for treatment as acknowledged by    · Agreed to accept transfer and to provide appropriate medical treatment as acknowledged by       Dr Nicolasa Ch  · Appropriate medical records of the examination and treatment of the patient are provided at the time of transfer   [address], [address] _______  · Transfer will be performed by qualified personnel from    and appropriate transfer equipment as required, including the use of necessary and appropriate life support measures  Provider Certification: I have examined the patient and explained the following risks and benefits of being transferred/refusing transfer to the patient/family:  The possibility of a transport vehicle being unavailable, Unanticipated needs of medical equipment and personnel during transport, Risk of worsening condition, General risk, such as traffic hazards, adverse weather conditions, rough terrain or turbulence, possible failure of equipment (including vehicle or aircraft), or consequences of actions of persons outside the control of the transport personnel      Based on these reasonable risks and benefits to the patient and/or the unborn child(mayelin), and based upon the information available at the time of the patients examination, I certify that the medical benefits reasonably to be expected from the provision of appropriate medical treatments at another medical facility outweigh the increasing risks, if any, to the individuals medical condition, and in the case of labor to the unborn child, from effecting the transfer      X____________________________________________ DATE 07/28/22        TIME_______      ORIGINAL - SEND TO MEDICAL RECORDS   COPY - SEND WITH PATIENT DURING TRANSFER

## 2022-07-28 NOTE — Clinical Note
The leads were placed into the connector and visually verified to be in correct position  Injury current obtained   Both leads connected

## 2022-07-28 NOTE — Clinical Note
The ECG shows a paced rhythm  Pacer inserted  The patient has temporary pacemaker  Pacemaker at 10 mA   ECG rate  = 70 bpm

## 2022-07-28 NOTE — Clinical Note
The leads were placed into the connector and visually verified to be in correct position  Injury current obtained   HIS lead connected

## 2022-07-28 NOTE — CONSULTS
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   Chronic LBBB   CAD s/p NAOMIE-LAD, September 2008  o Nuclear stress test negative for myocardial ischemia, October 2014   Hypertension   Dyslipidemia   Pre diabetes   CKD stage 3   Polycythemia   History of tobacco use    PLAN:  Patient is having high-degree AV block with underlying conduction system disease with known left bundle-branch block  Recommend transfer to [address] [place] [address] for permanent pacemaker placement  Pads on the patient with atropine at bedside  Recommend discussion with EP regarding positive chronotropic agents pending transfer including dopamine  He is currently hemodynamically stable  Avoid AV martin blocking agents  Continue aspirin, high-intensity statin  NPO after midnight for possible procedure    Signed: Liliana Hamm DO, [facility], [facility]      History of Present Illness:  Jose Hall is a 77 y o  male with CAD with drug-eluting stent to the LAD in September 2008, nuclear stress test negative for ischemia in October 2014, hypertension, dyslipidemia, pre diabetes, CKD, polycythemia, chronic LBBB and tobacco use presented with dyspnea  The patient's shortness of breath began the day of admission  It started around the afternoon  He was noticing he had dyspnea on exertion  Denies any significant lightheadedness or dizziness  There was no associated chest pain, pressure, tightness or squeezing  Denies nausea or diaphoresis    Subsequently, the patient presented to [place] [address] for evaluation  In the emergency department, he was noted to have left bundle-branch block which is chronic with superimposed 2:1 AV block with heart rates in the 20-40s  Cardiac enzymes were found to be negative over 2 sets  Creatinine was elevated 1 6 compared to prior creatinine of 1 29  Review of Systems:  Review of Systems   Constitutional: Negative for decreased appetite, fever, weight gain and weight loss  HENT: Negative for congestion and sore throat  Eyes: Negative for visual disturbance  Cardiovascular: Positive for dyspnea on exertion  Negative for chest pain, leg swelling, near-syncope and palpitations  Respiratory: Positive for shortness of breath  Negative for cough  Hematologic/Lymphatic: Negative for bleeding problem  Skin: Negative for rash  Musculoskeletal: Negative for myalgias and neck pain  Gastrointestinal: Negative for abdominal pain and nausea  Neurological: Negative for light-headedness and weakness  Psychiatric/Behavioral: Negative for depression           Past Medical History:   Diagnosis Date    Arthritis     both shoulders    CAD (coronary artery disease)     Last Assessed:  11/4/13    Cigarette smoker     Colon polyp     Elevated prostate specific antigen (PSA)     Last Assessed:  12/21/17    Enlarged prostate     Exercise involving walking     in season hunt's and fish's/ 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"    Full dentures     but doesnt wear them    History of 2019 novel coronavirus disease (COVID-19) 02/2021    hospital for 2 days but not in ICU/"mainly dehydrated" "also fever/oxygen below 90"    History of coronary artery stent placement     x2 in 2008 or so; sees Maia Rivera-- Dr Tana Young cardio   [facility] (hard of hearing)     no hearing aids yet    Hyperlipidemia     Last Assessed:  11/24/17    Hypertension     Benign essential, Last Assessed:  11/24/17    LBBB (left bundle branch block)     Nocturia     Polycythemia vera (HCC)     (per history in chart)    PVD (peripheral vascular disease) (HCC)     RBC abnormality     pt reports told has high Red blood cells/goes to infusion center regularly next appt 10/8/21 hematologist is Dr Mei Dickens of [facility](had been Dr Demi Friedman)   [address] of breath     "if goes up steps gets SOB, had for awhile"    Slow urinary stream     "sometimes feels like bladder isnt all the way empty"    Snores     Wears glasses     reading       Past Surgical History:   Procedure Laterality Date    ANGIOPLASTY      APPENDECTOMY      CATARACT EXTRACTION Bilateral     CORONARY ARTERY BYPASS GRAFT      CT CYSTOGRAM  11/4/2021    ELBOW SURGERY Right     FL CYSTOGRAM  10/27/2021    KNEE ARTHROSCOPY Right     WA COLONOSCOPY FLX DX W/COLLJ SPEC WHEN PFRMD N/A 5/22/2017    Procedure: COLONOSCOPY;  Surgeon: Serjio Thapa DO;  Location: [facility];   Service: Gastroenterology    PROSTATE BIOPSY      PROSTATECTOMY N/A 10/18/2021    Procedure: ROBOTIC SUBTOTAL/SUPRAPUBIC PROSTATECTOMY;  Surgeon: Valentin Geller MD;  Location: [facility];  Service: Urology    SHOULDER ARTHROSCOPY Right     TONSILLECTOMY         Social History     Socioeconomic History    Marital status: /Civil Union     Spouse name: None    Number of children: None    Years of education: None    Highest education level: None   Occupational History    None   Tobacco Use    Smoking status: Current Every Day Smoker     Packs/day: 0 50     Years: 35 00     Pack years: 17 50     Types: Cigarettes    Smokeless tobacco: Never Used    Tobacco comment: a little less than 1/2ppd, trying to cut back before surgery last smoked 10/16   Vaping Use    Vaping Use: None   Substance and Sexual Activity    Alcohol use: No    Drug use: No    Sexual activity: Not Currently     Comment: defer   Other Topics Concern    None   Social History Narrative    Always uses seatbelt    Feels safe at home    Uses safety equipment     Social Determinants of Health     Financial Resource Strain: Not on file   Food Insecurity: Not on file   Transportation Needs: Not on file   Physical Activity: Not on file   Stress: Not on file   Social Connections: Not on file   Intimate Partner Violence: Not on file   Housing Stability: Not on file       Family History   Problem Relation Age of Onset    No Known Problems Mother     Heart disease Father        Allergies   Allergen Reactions    Other Other (See Comments)     Pt and wife reports took a medication years ago at work cant recall the name or what it was for"     "couldn't talk and cheeks swelled and also right hand"       No current facility-administered medications on file prior to encounter       Current Outpatient Medications on File Prior to Encounter   Medication Sig    acetaminophen (TYLENOL) 500 mg tablet Take 500 mg by mouth every 6 (six) hours as needed for mild pain    amLODIPine (NORVASC) 10 mg tablet Take 10 mg by mouth every evening     ascorbic acid (VITAMIN C) 1000 MG tablet Take 1 tablet (1,000 mg total) by mouth every 12 (twelve) hours for 10 doses (Patient taking differently: Take 1,000 mg by mouth daily)    aspirin (ECOTRIN LOW STRENGTH) 81 mg EC tablet Take 81 mg by mouth daily    atorvastatin (LIPITOR) 80 mg tablet TAKE 1 TABLET BY MOUTH EVERY EVENING    calcitriol (ROCALTROL) 0 25 mcg capsule Take 1 capsule (0 25 mcg total) by mouth 3 (three) times a week    Cholecalciferol (Vitamin D3) 50 MCG (2000 UT) TABS Take 500 Units by mouth every evening    docusate sodium (COLACE) 100 mg capsule Take 1 capsule (100 mg total) by mouth 2 (two) times a day (Patient not taking: Reported on 6/7/2022)    ferrous sulfate 324 (65 Fe) mg Take 1 tablet (324 mg total) by mouth 2 (two) times a day before meals    finasteride (PROSCAR) 5 mg tablet TAKE 1 TABLET BY MOUTH EVERY DAY    hydroxyurea (HYDREA) 500 mg capsule TAKE 1 CAPSULE BY MOUTH TWICE A DAY  metoprolol tartrate (LOPRESSOR) 50 mg tablet Take 50 mg by mouth 2 (two) times a day     Multiple Vitamin (MULTIVITAMIN) capsule Take 1 capsule by mouth daily    nitroglycerin (NITROSTAT) 0 4 mg SL tablet Place 1 tablet (0 4 mg total) under the tongue every 5 (five) minutes as needed for chest pain    Omega-3 Fatty Acids (FISH OIL) 1200 MG CAPS Take 1,200 mg by mouth 2 (two) times a day          Current Facility-Administered Medications   Medication Dose Route Frequency Provider Last Rate    DOPamine in dextrose 5%  1-20 mcg/kg/min Intravenous Continuous Emma Kelly MD         DOPamine in dextrose 5%, 1-20 mcg/kg/min        Vitals:    07/28/22 1722 07/28/22 1815 07/28/22 1830 07/28/22 1856   BP: (!) 172/77 149/69 141/65    BP Location: Left arm Left arm Left arm    Pulse: (!) 44 (!) 46 (!) 44    Resp: 20 20 20    SpO2: 100% 95% 95%    Weight:    77 1 kg (170 lb)       I/O last 3 completed shifts: In: 250 [I V :250]  Out: -       Intake/Output Summary (Last 24 hours) at 7/28/2022 1906  Last data filed at 7/28/2022 1733  Gross per 24 hour   Intake 250 ml   Output --   Net 250 ml       Weight change:     PHYSICAL EXAMINATION:  Gen: Awake, Alert, NAD  Head/eyes: AT/NC, pupils equal and round, Anicteric  ENT: mmm  Neck: Supple, No elevated JVP, trachea midline  Resp: CTA bilaterally no w/r/r  CV: RRR +S1, S2, No m/r/g  Abd: Soft, NT/ND + BS  Ext: no LE edema bilaterally  Neuro:  Follows commands, moves all extermities  Psych: Appropriate affect, normal mood, pleasant attitude, non-combative  Skin: warm; no rash, erythema or venous stasis changes on exposed skin    Lab Results:  Results from last 7 days   Lab Units 07/28/22  1727   WBC Thousand/uL 7 07   HEMOGLOBIN g/dL 11 9*   HEMATOCRIT % 33 1*   PLATELETS Thousands/uL 86*     Results from last 7 days   Lab Units 07/28/22  1727   POTASSIUM mmol/L 3 5   CHLORIDE mmol/L 103   CO2 mmol/L 24   BUN mg/dL 18   CREATININE mg/dL 1 60*   CALCIUM mg/dL 8 4   ALK PHOS U/L 105   ALT U/L 20   AST U/L 18     No results found for: TROPONINT      Results from last 7 days   Lab Units 07/28/22  1727   HS TNI 0HR ng/L 11         Results from last 7 days   Lab Units 07/28/22  1727   INR  1 10           No results found for this or any previous visit  No results found for this or any previous visit  No results found for this or any previous visit  No results found for this or any previous visit  CXR: Results for orders placed in visit on 04/10/19    XR chest pa & lateral    Narrative  CHEST    INDICATION:   R06 00: Dyspnea, unspecified  F17 209: Nicotine dependence, unspecified, with unspecified nicotine-induced disorders  COMPARISON:  CT chest 12/12/2018    EXAM PERFORMED/VIEWS:  XR CHEST PA & LATERAL  The frontal view was performed utilizing dual energy radiographic technique  FINDINGS:    Cardiomediastinal silhouette appears unremarkable  The lungs are clear  No pneumothorax or pleural effusion  Osseous structures appear within normal limits for patient age  Impression  No acute cardiopulmonary disease  Workstation performed: [id number]    Results for orders placed during the hospital encounter of 10/18/21    XR chest portable    Narrative  CHEST    INDICATION:   dyspnea/hypoxia  COMPARISON:  Compared to 2/11/2021    EXAM PERFORMED/VIEWS:  XR CHEST PORTABLE      FINDINGS:    Cardiomediastinal silhouette appears unremarkable  Linear density in the left lower lobe of atelectasis/infiltrate  No pneumothorax or pleural effusion  New bilateral lower chest wall subcutaneous emphysema  Lucency suspected within the right hemidiaphragm  Osseous structures appear within normal limits for patient age  Impression  Subcutaneous emphysema in the chest wall bilaterally inferiorly may be related to postop  Left lower lobe patchy atelectasis or infiltrate    Questionable small pneumoperitoneum under the right hemidiaphragm may be related to recent postop   Workstation performed: [id number]      ECG: Sinus rhythm 2:1 AV block, LBBB    This note was completed in part utilizing M-Modal Fluency Direct Software  Grammatical errors, random word insertions, spelling mistakes, and incomplete sentences may be an occasional consequence of this system secondary to software limitations, ambient noise, and hardware issues  If you have any questions or concerns about the content, text, or information contained within the body of this dictation, please contact the provider for clarification

## 2022-07-29 ENCOUNTER — ANESTHESIA (INPATIENT)
Dept: NON INVASIVE DIAGNOSTICS | Facility: HOSPITAL | Age: 77
DRG: 242 | End: 2022-07-29
Payer: COMMERCIAL

## 2022-07-29 ENCOUNTER — APPOINTMENT (INPATIENT)
Dept: RADIOLOGY | Facility: HOSPITAL | Age: 77
DRG: 242 | End: 2022-07-29
Payer: COMMERCIAL

## 2022-07-29 LAB
ANION GAP SERPL CALCULATED.3IONS-SCNC: 5 MMOL/L (ref 4–13)
AORTIC ROOT: 3.3 CM
APICAL FOUR CHAMBER EJECTION FRACTION: 39 %
ATRIAL RATE: 107 BPM
ATRIAL RATE: 113 BPM
ATRIAL RATE: 62 BPM
ATRIAL RATE: 80 BPM
ATRIAL RATE: 85 BPM
ATRIAL RATE: 98 BPM
B BURGDOR IGG+IGM SER-ACNC: <0.2 AI
BUN SERPL-MCNC: 17 MG/DL (ref 5–25)
CA-I BLD-SCNC: 1.11 MMOL/L (ref 1.12–1.32)
CALCIUM SERPL-MCNC: 8.4 MG/DL (ref 8.3–10.1)
CHLORIDE SERPL-SCNC: 113 MMOL/L (ref 96–108)
CO2 SERPL-SCNC: 25 MMOL/L (ref 21–32)
CREAT SERPL-MCNC: 1.23 MG/DL (ref 0.6–1.3)
DOP CALC RVOT AREA: 18.09 CM2
DOP CALC RVOT DIAMETER: 4.8 CM
ERYTHROCYTE [DISTWIDTH] IN BLOOD BY AUTOMATED COUNT: 16.8 % (ref 11.6–15.1)
FRACTIONAL SHORTENING: 19 (ref 28–44)
GFR SERPL CREATININE-BSD FRML MDRD: 57 ML/MIN/1.73SQ M
GLUCOSE SERPL-MCNC: 107 MG/DL (ref 65–140)
GLUCOSE SERPL-MCNC: 96 MG/DL (ref 65–140)
HCT VFR BLD AUTO: 34.4 % (ref 36.5–49.3)
HGB BLD-MCNC: 12.1 G/DL (ref 12–17)
INTERVENTRICULAR SEPTUM IN DIASTOLE (PARASTERNAL SHORT AXIS VIEW): 1.4 CM
INTERVENTRICULAR SEPTUM: 1.4 CM (ref 0.6–1.1)
LAAS-AP2: 13.7 CM2
LAAS-AP4: 20.6 CM2
LEFT ATRIUM SIZE: 3.7 CM
LEFT ATRIUM VOLUME INDEX (MOD BIPLANE): 19.4
LEFT INTERNAL DIMENSION IN SYSTOLE: 3.4 CM (ref 2.1–4)
LEFT VENTRICULAR INTERNAL DIMENSION IN DIASTOLE: 4.2 CM (ref 3.5–6)
LEFT VENTRICULAR POSTERIOR WALL IN END DIASTOLE: 1.4 CM
LEFT VENTRICULAR STROKE VOLUME: 32 ML
LVSV (TEICH): 32 ML
MAGNESIUM SERPL-MCNC: 2.4 MG/DL (ref 1.6–2.6)
MCH RBC QN AUTO: 44.8 PG (ref 26.8–34.3)
MCHC RBC AUTO-ENTMCNC: 35.2 G/DL (ref 31.4–37.4)
MCV RBC AUTO: 127 FL (ref 82–98)
P AXIS: 37 DEGREES
P AXIS: 62 DEGREES
P AXIS: 72 DEGREES
P AXIS: 74 DEGREES
P AXIS: 81 DEGREES
PHOSPHATE SERPL-MCNC: 3.6 MG/DL (ref 2.3–4.1)
PMV BLD AUTO: 10.7 FL (ref 8.9–12.7)
POTASSIUM SERPL-SCNC: 3.9 MMOL/L (ref 3.5–5.3)
QRS AXIS: -15 DEGREES
QRS AXIS: -20 DEGREES
QRS AXIS: 270 DEGREES
QRS AXIS: 40 DEGREES
QRS AXIS: 82 DEGREES
QRS AXIS: 86 DEGREES
QRSD INTERVAL: 130 MS
QRSD INTERVAL: 134 MS
QRSD INTERVAL: 136 MS
QRSD INTERVAL: 150 MS
QRSD INTERVAL: 152 MS
QRSD INTERVAL: 90 MS
QT INTERVAL: 446 MS
QT INTERVAL: 472 MS
QT INTERVAL: 478 MS
QT INTERVAL: 488 MS
QT INTERVAL: 542 MS
QT INTERVAL: 588 MS
QTC INTERVAL: 412 MS
QTC INTERVAL: 448 MS
QTC INTERVAL: 452 MS
QTC INTERVAL: 478 MS
QTC INTERVAL: 481 MS
QTC INTERVAL: 501 MS
RBC # BLD AUTO: 2.7 MILLION/UL (ref 3.88–5.62)
RIGHT ATRIUM AREA SYSTOLE A4C: 20 CM2
RIGHT VENTRICLE ID DIMENSION: 4.5 CM
SL CV LEFT ATRIUM LENGTH A2C: 5.2 CM
SL CV LV EF: 65
SL CV PED ECHO LEFT VENTRICLE DIASTOLIC VOLUME (MOD BIPLANE) 2D: 81 ML
SL CV PED ECHO LEFT VENTRICLE SYSTOLIC VOLUME (MOD BIPLANE) 2D: 48 ML
SODIUM SERPL-SCNC: 143 MMOL/L (ref 135–147)
T WAVE AXIS: -20 DEGREES
T WAVE AXIS: 102 DEGREES
T WAVE AXIS: 27 DEGREES
T WAVE AXIS: 66 DEGREES
T WAVE AXIS: 80 DEGREES
T WAVE AXIS: 81 DEGREES
VENTRICULAR RATE: 35 BPM
VENTRICULAR RATE: 42 BPM
VENTRICULAR RATE: 43 BPM
VENTRICULAR RATE: 60 BPM
VENTRICULAR RATE: 68 BPM
VENTRICULAR RATE: 70 BPM
WBC # BLD AUTO: 7.04 THOUSAND/UL (ref 4.31–10.16)

## 2022-07-29 PROCEDURE — C1898 LEAD, PMKR, OTHER THAN TRANS: HCPCS | Performed by: INTERNAL MEDICINE

## 2022-07-29 PROCEDURE — 93005 ELECTROCARDIOGRAM TRACING: CPT

## 2022-07-29 PROCEDURE — 02H63JZ INSERTION OF PACEMAKER LEAD INTO RIGHT ATRIUM, PERCUTANEOUS APPROACH: ICD-10-PCS | Performed by: INTERNAL MEDICINE

## 2022-07-29 PROCEDURE — 99223 1ST HOSP IP/OBS HIGH 75: CPT | Performed by: INTERNAL MEDICINE

## 2022-07-29 PROCEDURE — 93306 TTE W/DOPPLER COMPLETE: CPT | Performed by: INTERNAL MEDICINE

## 2022-07-29 PROCEDURE — 83735 ASSAY OF MAGNESIUM: CPT | Performed by: PHYSICIAN ASSISTANT

## 2022-07-29 PROCEDURE — 93306 TTE W/DOPPLER COMPLETE: CPT

## 2022-07-29 PROCEDURE — 3E0132A INTRODUCTION OF ANTI-INFECTIVE ENVELOPE INTO SUBCUTANEOUS TISSUE, PERCUTANEOUS APPROACH: ICD-10-PCS | Performed by: INTERNAL MEDICINE

## 2022-07-29 PROCEDURE — 93010 ELECTROCARDIOGRAM REPORT: CPT | Performed by: INTERNAL MEDICINE

## 2022-07-29 PROCEDURE — 33208 INSRT HEART PM ATRIAL & VENT: CPT | Performed by: INTERNAL MEDICINE

## 2022-07-29 PROCEDURE — 0JH606Z INSERTION OF PACEMAKER, DUAL CHAMBER INTO CHEST SUBCUTANEOUS TISSUE AND FASCIA, OPEN APPROACH: ICD-10-PCS | Performed by: INTERNAL MEDICINE

## 2022-07-29 PROCEDURE — C1769 GUIDE WIRE: HCPCS | Performed by: INTERNAL MEDICINE

## 2022-07-29 PROCEDURE — 85027 COMPLETE CBC AUTOMATED: CPT | Performed by: PHYSICIAN ASSISTANT

## 2022-07-29 PROCEDURE — 99233 SBSQ HOSP IP/OBS HIGH 50: CPT | Performed by: STUDENT IN AN ORGANIZED HEALTH CARE EDUCATION/TRAINING PROGRAM

## 2022-07-29 PROCEDURE — 71045 X-RAY EXAM CHEST 1 VIEW: CPT

## 2022-07-29 PROCEDURE — C1785 PMKR, DUAL, RATE-RESP: HCPCS | Performed by: INTERNAL MEDICINE

## 2022-07-29 PROCEDURE — 80048 BASIC METABOLIC PNL TOTAL CA: CPT | Performed by: PHYSICIAN ASSISTANT

## 2022-07-29 PROCEDURE — 84100 ASSAY OF PHOSPHORUS: CPT | Performed by: PHYSICIAN ASSISTANT

## 2022-07-29 PROCEDURE — 82330 ASSAY OF CALCIUM: CPT | Performed by: PHYSICIAN ASSISTANT

## 2022-07-29 PROCEDURE — C1892 INTRO/SHEATH,FIXED,PEEL-AWAY: HCPCS | Performed by: INTERNAL MEDICINE

## 2022-07-29 PROCEDURE — 02HK3JZ INSERTION OF PACEMAKER LEAD INTO RIGHT VENTRICLE, PERCUTANEOUS APPROACH: ICD-10-PCS | Performed by: INTERNAL MEDICINE

## 2022-07-29 PROCEDURE — 82948 REAGENT STRIP/BLOOD GLUCOSE: CPT

## 2022-07-29 DEVICE — LEAD 457453 MRI US BI RCMCRD MVC
Type: IMPLANTABLE DEVICE | Site: HEART | Status: FUNCTIONAL
Brand: CAPSURE SENSE MRI™ SURESCAN™

## 2022-07-29 DEVICE — ENVELOPE CMRM6122 ABSORB MED MR
Type: IMPLANTABLE DEVICE | Site: CHEST | Status: FUNCTIONAL
Brand: TYRX™

## 2022-07-29 DEVICE — IPG W1DR01 AZURE XT DR MRI USA
Type: IMPLANTABLE DEVICE | Site: CHEST | Status: FUNCTIONAL
Brand: AZURE™ XT DR MRI SURESCAN™

## 2022-07-29 RX ORDER — OXYCODONE HYDROCHLORIDE 5 MG/1
2.5 TABLET ORAL EVERY 6 HOURS PRN
Status: DISCONTINUED | OUTPATIENT
Start: 2022-07-29 | End: 2022-07-30 | Stop reason: HOSPADM

## 2022-07-29 RX ORDER — SODIUM CHLORIDE 9 MG/ML
INJECTION, SOLUTION INTRAVENOUS CONTINUOUS PRN
Status: DISCONTINUED | OUTPATIENT
Start: 2022-07-29 | End: 2022-07-29

## 2022-07-29 RX ORDER — FENTANYL CITRATE 50 UG/ML
INJECTION, SOLUTION INTRAMUSCULAR; INTRAVENOUS AS NEEDED
Status: DISCONTINUED | OUTPATIENT
Start: 2022-07-29 | End: 2022-07-29

## 2022-07-29 RX ORDER — ACETAMINOPHEN 325 MG/1
650 TABLET ORAL EVERY 4 HOURS PRN
Status: CANCELLED | OUTPATIENT
Start: 2022-07-29

## 2022-07-29 RX ORDER — CALCIUM GLUCONATE 20 MG/ML
1 INJECTION, SOLUTION INTRAVENOUS ONCE
Status: COMPLETED | OUTPATIENT
Start: 2022-07-29 | End: 2022-07-29

## 2022-07-29 RX ORDER — CEFAZOLIN SODIUM 2 G/50ML
2000 SOLUTION INTRAVENOUS ONCE
Status: COMPLETED | OUTPATIENT
Start: 2022-07-29 | End: 2022-07-29

## 2022-07-29 RX ORDER — POTASSIUM CHLORIDE 20 MEQ/1
40 TABLET, EXTENDED RELEASE ORAL ONCE
Status: COMPLETED | OUTPATIENT
Start: 2022-07-29 | End: 2022-07-29

## 2022-07-29 RX ORDER — LIDOCAINE HYDROCHLORIDE 10 MG/ML
INJECTION, SOLUTION EPIDURAL; INFILTRATION; INTRACAUDAL; PERINEURAL AS NEEDED
Status: DISCONTINUED | OUTPATIENT
Start: 2022-07-29 | End: 2022-07-29 | Stop reason: HOSPADM

## 2022-07-29 RX ORDER — PROPOFOL 10 MG/ML
INJECTION, EMULSION INTRAVENOUS AS NEEDED
Status: DISCONTINUED | OUTPATIENT
Start: 2022-07-29 | End: 2022-07-29

## 2022-07-29 RX ORDER — LIDOCAINE HYDROCHLORIDE 10 MG/ML
INJECTION, SOLUTION EPIDURAL; INFILTRATION; INTRACAUDAL; PERINEURAL AS NEEDED
Status: DISCONTINUED | OUTPATIENT
Start: 2022-07-29 | End: 2022-07-29

## 2022-07-29 RX ORDER — PROPOFOL 10 MG/ML
INJECTION, EMULSION INTRAVENOUS CONTINUOUS PRN
Status: DISCONTINUED | OUTPATIENT
Start: 2022-07-29 | End: 2022-07-29

## 2022-07-29 RX ORDER — GENTAMICIN SULFATE 40 MG/ML
INJECTION, SOLUTION INTRAMUSCULAR; INTRAVENOUS AS NEEDED
Status: DISCONTINUED | OUTPATIENT
Start: 2022-07-29 | End: 2022-07-29 | Stop reason: HOSPADM

## 2022-07-29 RX ORDER — IODIXANOL 320 MG/ML
INJECTION, SOLUTION INTRAVASCULAR AS NEEDED
Status: DISCONTINUED | OUTPATIENT
Start: 2022-07-29 | End: 2022-07-29 | Stop reason: HOSPADM

## 2022-07-29 RX ADMIN — HYDROXYUREA 500 MG: 500 CAPSULE ORAL at 09:18

## 2022-07-29 RX ADMIN — PROPOFOL 40 MG: 10 INJECTION, EMULSION INTRAVENOUS at 14:09

## 2022-07-29 RX ADMIN — ONDANSETRON 4 MG: 2 INJECTION INTRAMUSCULAR; INTRAVENOUS at 14:09

## 2022-07-29 RX ADMIN — CALCITRIOL CAPSULES 0.25 MCG 0.25 MCG: 0.25 CAPSULE ORAL at 09:16

## 2022-07-29 RX ADMIN — Medication 1000 UNITS: at 17:44

## 2022-07-29 RX ADMIN — HEPARIN SODIUM 5000 UNITS: 5000 INJECTION INTRAVENOUS; SUBCUTANEOUS at 20:38

## 2022-07-29 RX ADMIN — CEFAZOLIN SODIUM 2000 MG: 2 SOLUTION INTRAVENOUS at 14:00

## 2022-07-29 RX ADMIN — ATORVASTATIN CALCIUM 80 MG: 80 TABLET, FILM COATED ORAL at 17:44

## 2022-07-29 RX ADMIN — ASPIRIN 81 MG: 81 TABLET, COATED ORAL at 09:16

## 2022-07-29 RX ADMIN — PHENYLEPHRINE HYDROCHLORIDE 20 MCG/MIN: 10 INJECTION INTRAVENOUS at 14:10

## 2022-07-29 RX ADMIN — LIDOCAINE HYDROCHLORIDE 50 MG: 10 INJECTION, SOLUTION EPIDURAL; INFILTRATION; INTRACAUDAL; PERINEURAL at 14:09

## 2022-07-29 RX ADMIN — FENTANYL CITRATE 25 MCG: 50 INJECTION INTRAMUSCULAR; INTRAVENOUS at 16:16

## 2022-07-29 RX ADMIN — FENTANYL CITRATE 25 MCG: 50 INJECTION INTRAMUSCULAR; INTRAVENOUS at 14:29

## 2022-07-29 RX ADMIN — ACETAMINOPHEN 650 MG: 325 TABLET ORAL at 05:13

## 2022-07-29 RX ADMIN — HEPARIN SODIUM 5000 UNITS: 5000 INJECTION INTRAVENOUS; SUBCUTANEOUS at 05:13

## 2022-07-29 RX ADMIN — POTASSIUM CHLORIDE 40 MEQ: 1500 TABLET, EXTENDED RELEASE ORAL at 02:46

## 2022-07-29 RX ADMIN — FINASTERIDE 5 MG: 5 TABLET, FILM COATED ORAL at 09:16

## 2022-07-29 RX ADMIN — CALCIUM GLUCONATE 1 G: 20 INJECTION, SOLUTION INTRAVENOUS at 10:39

## 2022-07-29 RX ADMIN — HYDROXYUREA 500 MG: 500 CAPSULE ORAL at 17:45

## 2022-07-29 RX ADMIN — SODIUM CHLORIDE: 0.9 INJECTION, SOLUTION INTRAVENOUS at 13:12

## 2022-07-29 RX ADMIN — HEPARIN SODIUM 5000 UNITS: 5000 INJECTION INTRAVENOUS; SUBCUTANEOUS at 17:44

## 2022-07-29 RX ADMIN — PROPOFOL 60 MCG/KG/MIN: 10 INJECTION, EMULSION INTRAVENOUS at 14:09

## 2022-07-29 NOTE — ASSESSMENT & PLAN NOTE
Lab Results   Component Value Date    EGFR 41 07/28/2022    EGFR 53 05/20/2022    EGFR 52 03/03/2022    CREATININE 1 60 (H) 07/28/2022    CREATININE 1 29 05/20/2022    CREATININE 1 31 (H) 03/03/2022     · Now with SHANTA on CKD, baseline creat 1 3 currently 1 6  · Suspect pre-renal, possible component of ATN with low flow from symptomatic bradycardia and CHB  · Trend UOP and RFT  · Avoid nephrotoxins

## 2022-07-29 NOTE — PROGRESS NOTES
[address] [facility]  Progress Note Jesus Ruiz [birth year], 77 y o  male MRN: [id number]  Unit/Bed#: [facility] 432-64 Encounter: [id number]  Primary Care Provider: Bjorn Jeronimo DO   Date and time admitted to hospital: 7/28/2022  8:28 PM    * Complete heart block [facility])  Assessment & Plan  · Patient developed acute SOB and dizziness this afternoon while in the car with his grandson, EMS activated  Patient presented to the ED @ [facility] in high grade second degree heart block in the 30s with preserved blood pressure  Started on dopa with improvement in HR to 50s  · Patient was SOB given IV lasix with response, no evidence of CHF on CXR  · Upon arrival to B was noted to be in complete heart block, on 9 5 dopa with no improvement  Attempted isopruel, ultimately taken to the cath lab for TVP    Plan  · Continue R groin TVP  · VVI 79 Ma 10  · Zoll pads in place, atropine in room  · Hold beta blocker  · Lyme pending  · Check ECHO  · Appreciate EP consult  · NPO for possible PPM today    Stage 3b chronic kidney disease [facility])  Assessment & Plan  Lab Results   Component Value Date    EGFR 41 07/28/2022    EGFR 53 05/20/2022    EGFR 52 03/03/2022    CREATININE 1 60 (H) 07/28/2022    CREATININE 1 29 05/20/2022    CREATININE 1 31 (H) 03/03/2022     · Now with SHANTA on CKD, baseline creat 1 3 currently 1 6  · Suspect pre-renal, possible component of ATN with low flow from symptomatic bradycardia and CHB  · Trend UOP and RFT  · Avoid nephrotoxins    CAD (coronary artery disease)  Assessment & Plan  · S/p remote PCI in 2008, patient believes that he has 2 stents, no records to confirm   · Continue PTA ASA and statin   · Hold BB 2/2 CHB as above    Benign essential hypertension  Assessment & Plan  · Continue PTA norvasc  · Hold PTA metoprolol 2/2 CHB as above    Mixed hyperlipidemia  Assessment & Plan  · statin    Polycythemia vera ([facility] [address] )  Assessment & Plan  · Abilio 2 positive polycthemia vera   Follows with   Hilda Wild of heme/onc as an outpatient   · Hct generally above 55%  · Was being managed with therapeutic phlebotomy, now on below therapy  · Continue PTA PO iron, ASA and hydroxyurea  · Careful monitoring of platelets while on above therapy    Prediabetes  Assessment & Plan  · Diet controlled as an outpatient  · Last Hba1c 5 6%  · Carb controlled diet when able here    BPH with obstruction/lower urinary tract symptoms  Assessment & Plan  · S/p simple prostatectomy in 12/2022, negative for malignancy  · Continue PTA proscar    JAK2 gene mutation  Assessment & Plan  ·  Please see plan under polycythemia vera    Tobacco user  Assessment & Plan  · Encourage cessation   · Declines nicotine patch        ----------------------------------------------------------------------------------------  HPI/24hr events: 77 y o  male w/ PMHx polycythemia vera, BPH, CKD 3, pre-diabetes, CAD with remote stenting, and tobacco abuse who now presents with complete heart block  Had R femoral TVP placed in cath lab  Currently V pacing (VVI 79 Ma10), in complete heart block underneath     Patient appropriate for transfer out of the ICU today?: Patient does not meet criteria for referral to the ICU Follow-Up Clinic; referral has not been made  Disposition: Transfer to Med Surg with Telemetry  following PPM implantation   Code Status: Level 1 - Full Code  ---------------------------------------------------------------------------------------  SUBJECTIVE    Review of Systems   Constitutional: Positive for fatigue  Respiratory: Negative  Cardiovascular: Negative  Neurological: Negative for dizziness       Review of systems was reviewed and negative unless stated above in HPI/24-hour events   ---------------------------------------------------------------------------------------  OBJECTIVE    Vitals   Vitals:    07/29/22 0000 07/29/22 0100 07/29/22 0130 07/29/22 0200   BP: 114/61 125/65 104/67 111/87   Pulse: 69 69 69 69   Resp: 19 20 19 20   Temp:       TempSrc:       SpO2: 95% 94% 96% 95%   Weight:       Height:         Temp (24hrs), Av 7 °F (36 5 °C), Min:97 3 °F (36 3 °C), Max:98 °F (36 7 °C)  Current: Temperature: (!) 97 3 °F (36 3 °C)          Respiratory:  SpO2: SpO2: 95 %  Nasal Cannula O2 Flow Rate (L/min): 2 L/min    Invasive/non-invasive ventilation settings   Respiratory  Report   Lab Data (Last 4 hours)    None         O2/Vent Data (Last 4 hours)    None                Physical Exam  HENT:      Head: Normocephalic  Mouth/Throat:      Mouth: Mucous membranes are moist    Eyes:      Pupils: Pupils are equal, round, and reactive to light  Cardiovascular:      Rate and Rhythm: Normal rate and regular rhythm  Pulmonary:      Effort: Pulmonary effort is normal       Breath sounds: Normal breath sounds  Abdominal:      General: There is distension  Tenderness: There is no abdominal tenderness  Musculoskeletal:      Cervical back: Normal range of motion  Right lower leg: No edema  Left lower leg: No edema  Comments: R femoral TVP   Skin:     General: Skin is warm  Neurological:      Mental Status: He is alert               Laboratory and Diagnostics:  Results from last 7 days   Lab Units 22  1727 22  0942   WBC Thousand/uL 7 07 7 27   HEMOGLOBIN g/dL 11 9* 13 3   HEMATOCRIT % 33 1* 37 9   PLATELETS Thousands/uL 86* 101*   NEUTROS PCT % 70  --    MONOS PCT % 6  --      Results from last 7 days   Lab Units 22  1727   SODIUM mmol/L 138   POTASSIUM mmol/L 3 5   CHLORIDE mmol/L 103   CO2 mmol/L 24   ANION GAP mmol/L 11   BUN mg/dL 18   CREATININE mg/dL 1 60*   CALCIUM mg/dL 8 4   GLUCOSE RANDOM mg/dL 128   ALT U/L 20   AST U/L 18   ALK PHOS U/L 105   ALBUMIN g/dL 3 5   TOTAL BILIRUBIN mg/dL 1 95*     Results from last 7 days   Lab Units 22  1727   MAGNESIUM mg/dL 2 0   PHOSPHORUS mg/dL 2 0*      Results from last 7 days   Lab Units 22  1727   INR  1 10   PTT seconds 26 ABG:    VBG:          Micro            Intake and Output  I/O       07/27 0701  07/28 0700 07/28 0701 07/29 0700    P  O   0    I V  (mL/kg)  45 1 (0 6)    Total Intake(mL/kg)  45 1 (0 6)    Urine (mL/kg/hr)  400    Total Output  400    Net  -354 9                Height and Weights   Height: 5' 8" (172 7 cm)  IBW (Ideal Body Weight): 68 4 kg  Body mass index is 25 81 kg/m²  Weight (last 2 days)     Date/Time Weight    07/28/22 2042 77 (169 75)            Nutrition       Diet Orders   (From admission, onward)             Start     Ordered    07/28/22 2034  Diet NPO; Sips with meds  Diet effective now        References:    Nutrtion Support Algorithm Enteral vs  Parenteral   Question Answer Comment   Diet Type NPO    NPO Except: Sips with meds    RD to adjust diet per protocol?  Yes        07/28/22 2034                  Active Medications  Scheduled Meds:  Current Facility-Administered Medications   Medication Dose Route Frequency Provider Last Rate    acetaminophen  650 mg Oral Q6H PRN Shahana Garrett PA-C      aspirin  81 mg Oral Daily Shahana Garrett PA-C      atorvastatin  80 mg Oral QPM Bing Herrera PA-C      atropine           calcitriol  0 25 mcg Oral Once per day on Mon Wed Fri Bing Herrera PA-C      cholecalciferol  1,000 Units Oral QPM Shahana Garrett PA-C      finasteride  5 mg Oral Daily Shahana Garrett PA-C      heparin (porcine)  5,000 Units Subcutaneous [facility] Shahana Garrett PA-C      hydroxyurea  500 mg Oral BID Shahana Garrett PA-C      ondansetron  4 mg Intravenous Q6H PRN Bing Herrera PA-C       Continuous Infusions:     PRN Meds:   acetaminophen, 650 mg, Q6H PRN  ondansetron, 4 mg, Q6H PRN        Invasive Devices Review  Invasive Devices  Report    Peripheral Intravenous Line  Duration           Peripheral IV 07/28/22 Left Antecubital 1 day    Peripheral IV 07/28/22 Right Antecubital <1 day          Line  Duration           Venous Sheath 6 Fr  Right Femoral <1 day          Drain  Duration           Continuous Bladder Irrigation Three-way 283 days                Rationale for remaining devices: venous sheath for TVP  ---------------------------------------------------------------------------------------  Advance Directive and Living Will:      Power of :    POLST:    ---------------------------------------------------------------------------------------  Care Time Delivered:   No Critical Care time spent       Shahana Garrett PA-C      Portions of the record may have been created with voice recognition software  Occasional wrong word or "sound a like" substitutions may have occurred due to the inherent limitations of voice recognition software    Read the chart carefully and recognize, using context, where substitutions have occurred

## 2022-07-29 NOTE — ASSESSMENT & PLAN NOTE
· Abilio 2 positive polycthemia vera   Follows with Dr Laura Rubio of heme/onc as an outpatient   · Hct generally above 55%  · Was being managed with therapeutic phlebotomy, now on below therapy  · Continue PTA PO iron, ASA and hydroxyurea  · Careful monitoring of platelets while on above therapy

## 2022-07-29 NOTE — ASSESSMENT & PLAN NOTE
· Abilio 2 positive polycthemia vera   Follows with Dr Cruz Drop of heme/onc as an outpatient   · Hct generally above 55%  · Was being managed with therapeutic phlebotomy, now on below therapy  · Continue PTA PO iron, ASA and hydroxyurea  · Careful monitoring of platelets while on above therapy

## 2022-07-29 NOTE — PLAN OF CARE
Problem: MOBILITY - ADULT  Goal: Maintain or return to baseline ADL function  Description: INTERVENTIONS:  -  Assess patient's ability to carry out ADLs; assess patient's baseline for ADL function and identify physical deficits which impact ability to perform ADLs (bathing, care of mouth/teeth, toileting, grooming, dressing, etc )  - Assess/evaluate cause of self-care deficits   - Assess range of motion  - Assess patient's mobility; develop plan if impaired  - Assess patient's need for assistive devices and provide as appropriate  - Encourage maximum independence but intervene and supervise when necessary  - Involve family in performance of ADLs  - Assess for home care needs following discharge   - Consider OT consult to assist with ADL evaluation and planning for discharge  - Provide patient education as appropriate  Outcome: Progressing  Goal: Maintains/Returns to pre admission functional level  Description: INTERVENTIONS:  - Perform BMAT or MOVE assessment daily    - Set and communicate daily mobility goal to care team and patient/family/caregiver  - Collaborate with rehabilitation services on mobility goals if consulted  - Perform Range of Motion 3 times a day  - Reposition patient every 2 hours    - Dangle patient 3 times a day  - Stand patient 3 times a day  - Ambulate patient 3 times a day  - Out of bed to chair 3 times a day   - Out of bed for meals 3 times a day  - Out of bed for toileting  - Record patient progress and toleration of activity level   Outcome: Progressing     Problem: Prexisting or High Potential for Compromised Skin Integrity  Goal: Skin integrity is maintained or improved  Description: INTERVENTIONS:  - Identify patients at risk for skin breakdown  - Assess and monitor skin integrity  - Assess and monitor nutrition and hydration status  - Monitor labs   - Assess for incontinence   - Turn and reposition patient  - Assist with mobility/ambulation  - Relieve pressure over bony prominences  - Avoid friction and shearing  - Provide appropriate hygiene as needed including keeping skin clean and dry  - Evaluate need for skin moisturizer/barrier cream  - Collaborate with interdisciplinary team   - Patient/family teaching  - Consider wound care consult   Outcome: Progressing     Problem: Potential for Falls  Goal: Patient will remain free of falls  Description: INTERVENTIONS:  - Educate patient/family on patient safety including physical limitations  - Instruct patient to call for assistance with activity   - Consult OT/PT to assist with strengthening/mobility   - Keep Call bell within reach  - Keep bed low and locked with side rails adjusted as appropriate  - Keep care items and personal belongings within reach  - Initiate and maintain comfort rounds  - Make Fall Risk Sign visible to staff  - Offer Toileting every 2 Hours, in advance of need  - Initiate/Maintain bed alarm  - Obtain necessary fall risk management equipment:   - Apply yellow socks and bracelet for high fall risk patients  - Consider moving patient to room near nurses station  Outcome: Progressing

## 2022-07-29 NOTE — CASE MANAGEMENT
Case Management Assessment    Patient name Mame Tabares  Location [facility] CATH LAB VIR/BE CATH * MRN [id number]  : 1945 Date 2022       Current Admission Date: 2022  Current Admission Diagnosis:Complete heart block [facility])   Patient Active Problem List    Diagnosis Date Noted    Complete heart block ([facility] [address] ) 2022    Low iron stores 2021    Stage 3b chronic kidney disease ([facility] [address] ) 12/10/2021    JAK2 gene mutation 2021    Encounter for antineoplastic chemotherapy 2021    Microcytosis 2021    Pre-operative clearance 2021    COVID-19 virus infection 2021    Cigarette smoker 2019    BPH with obstruction/lower urinary tract symptoms 2019    Cervical spondylosis without myelopathy     Chronic left shoulder pain 2018    Gastritis 2018    Polycythemia vera ([facility] [address] ) 10/20/2017    Prediabetes 2017    Left bundle branch block 2014    Mixed hyperlipidemia 2013    Status post percutaneous transluminal coronary angioplasty 10/03/2013    Tobacco user 10/02/2013    Cervical radiculopathy 2013    Benign essential hypertension 2012    CAD (coronary artery disease) 2012      LOS (days): 1  Geometric Mean LOS (GMLOS) (days): 3 20  Days to GMLOS:2 3     OBJECTIVE:    Risk of Unplanned Readmission Score: 11 47      Current admission status: Inpatient       Preferred Pharmacy:   [id number] [place] - [facility] [address] [facility] [address]  [address]  [address] [facility]  Phone: [phone] Fax: [phone]    [address] [place] [address] - [address] ,  [address] ,  [address] [address]  Phone: [phone] Fax: [phone]    Primary Care Provider: Ruddy Fabry, DO    Primary Insurance: [facility]  Secondary Insurance:     ASSESSMENT:  [address] Proxies    There are no active Health Care Proxies on file         Advance Directives  Does patient have a [address]?: No  Was patient offered paperwork?: Yes (gave paperwork)  Does patient currently have a Health Care decision maker?: Yes, please see Health Care Proxy section  Does patient have Advance Directives?: No  Was patient offered paperwork?: Yes (gave paperwork)  Primary Contact: Yosi Morton    Readmission Root Cause  30 Day Readmission: No    Patient Information  Admitted from[de-identified] Home  Mental Status: Alert  During Assessment patient was accompanied by: Spouse  Assessment information provided by[de-identified] Spouse  Primary Caregiver: Self  Support Systems: Spouse/significant other  [place] of Residence: [employer] do you live in?: Curiously  Home entry access options   Select all that apply : Stairs  Number of steps to enter home : 1  Type of Current Residence: [place]  In the last 12 months, was there a time when you were not able to pay the mortgage or rent on time?: No  In the last 12 months, how many places have you lived?: 1  In the last 12 months, was there a time when you did not have a steady place to sleep or slept in a shelter (including now)?: No  Homeless/housing insecurity resource given?: N/A  Living Arrangements: Lives w/ Spouse/significant other  Is patient a ?: No    Activities of Daily Living Prior to Admission  Functional Status: Independent  Completes ADLs independently?: Yes  Ambulates independently?: Yes  Does patient use assisted devices?: No  Does patient currently own DME?: No  Does patient have a history of Outpatient Therapy (PT/OT)?: No  Does the patient have a history of Short-Term Rehab?: No  Does patient have a history of HHC?: No  [place] currently [place] [id number]?: No    Patient Information Continued  Income Source: Pension/FPC  Does patient have prescription coverage?: Yes  Within the past 12 months, you worried that your food would run out before you got the money to buy more : Never true  Within the past 12 months, the food you bought just didn't last and you didn't have money to get more : Never true  Food insecurity resource given?: N/A  Does patient receive dialysis treatments?: No  Does patient have a history of substance abuse?: No  Does patient have a history of Mental Health Diagnosis?: No      Means of Transportation  Means of Transport to Appts[de-identified] Drives Self  In the past 12 months, has lack of transportation kept you from medical appointments or from getting medications?: No  In the past 12 months, has lack of transportation kept you from meetings, work, or from getting things needed for daily living?: No  Was application for public transport provided?: N/A

## 2022-07-29 NOTE — H&P
[facility]  H&P- Daylin Lopez [birth year], 77 y o  male MRN: [id number]  Unit/Bed#: [facility] 869-08 Encounter: [id number]  Primary Care Provider: Geena Jin DO   Date and time admitted to hospital: 7/28/2022  8:28 PM    * Complete heart block [facility])  Assessment & Plan  · Patient developed acute SOB and dizziness this afternoon while in the car with his grandson, EMS activated  Patient presented to the ED @ [facility] in high grade second degree heart block in the 30s with preserved blood pressure  Started on dopa with improvement in HR to 50s  · Patient was SOB given IV lasix with response, no evidence of CHF on CXR  · Upon arrival to B was noted to be in complete heart block, on 9 5 dopa with no improvement  Attempted isopruel, ultimately taken to the cath lab for TVP    Plan  · To cath lab for TVP  · Zoll pads in place, atropine in room  · Hold beta blocker  · No recent tick bites, will send lyme titer for completeness   · Check ECHO  · Appreciate EP consult    Stage 3b chronic kidney disease [facility])  Assessment & Plan  Lab Results   Component Value Date    EGFR 41 07/28/2022    EGFR 53 05/20/2022    EGFR 52 03/03/2022    CREATININE 1 60 (H) 07/28/2022    CREATININE 1 29 05/20/2022    CREATININE 1 31 (H) 03/03/2022     · Now with SHANTA on CKD, baseline creat 1 3 currently 1 6  · Suspect pre-renal, possible component of ATN with low flow from symptomatic bradycardia and CHB  · Trend UOP and RFT  · Avoid nephrotoxins    CAD (coronary artery disease)  Assessment & Plan  · S/p remote PCI in 2008, patient believes that he has 2 stents, no records to confirm   · Continue PTA ASA and statin   · Hold BB 2/2 CHB as above    Benign essential hypertension  Assessment & Plan  · Continue PTA norvasc  · Hold PTA metoprolol 2/2 CHB as above    Mixed hyperlipidemia  Assessment & Plan  · statin    Polycythemia vera ([facility] [address] )  Assessment & Plan  · Abilio 2 positive polycthemia vera   Follows with Dr Mike Barney of heme/onc as an outpatient   · Hct generally above 55%  · Was being managed with therapeutic phlebotomy, now on below therapy  · Continue PTA PO iron, ASA and hydroxyurea  · Careful monitoring of platelets while on above therapy    Prediabetes  Assessment & Plan  · Diet controlled as an outpatient  · Last Hba1c 5 6%  · Carb controlled diet when able here    BPH with obstruction/lower urinary tract symptoms  Assessment & Plan  · S/p simple prostatectomy in 12/2022, negative for malignancy  · Continue PTA proscar    JAK2 gene mutation  Assessment & Plan  ·  Please see plan under polycythemia vera    Tobacco user  Assessment & Plan  · Encourage cessation   · Declines nicotine patch      -------------------------------------------------------------------------------------------------------------  Chief Complaint: dizziness, SOB    History of Present Illness   HX and PE limited by: nothing   Izzy Jacobsen is a 77 y o  male w/ PMHx polycythemia vera, BPH, CKD 3, pre-diabetes, CAD with remote stenting, and tobacco abuse who now presents with complete heart block  Patient was in his usual state of health when he acutely became SOB and dizzy while in the car with his grandsons  EMS activated and patient taken to the ED @ [facility] where he was found to be in a high grade second degree heart block  Patient was started on dopamine and transferred to [facility] for EP eval  Of note, patient was given 40mg IV lasix in the ED over concern for possible CHF  Upon arrival to [facility] was found to be in complete heart block, on 9 5 dopa with little improvement in heart rate although BP was spared  Patient still with dizziness and SOB, attempted isopruel with no improvement  Decision was made to activate cath lab for placement of TVP  History obtained from chart review and the patient   -------------------------------------------------------------------------------------------------------------  Dispo:  Admit to Critical Care     Code Status: Level 1 - Full Code  --------------------------------------------------------------------------------------------------------------  Review of Systems   Constitutional: Positive for fatigue  Respiratory: Positive for shortness of breath  Gastrointestinal: Negative  Neurological: Positive for dizziness  A 12-point, complete review of systems was reviewed and negative except as stated above     Physical Exam  Constitutional:       Appearance: He is obese  HENT:      Head: Normocephalic  Mouth/Throat:      Mouth: Mucous membranes are moist    Eyes:      Pupils: Pupils are equal, round, and reactive to light  Comments: Circumocular redness/dry skin   Cardiovascular:      Rate and Rhythm: Bradycardia present  Rhythm irregular  Pulmonary:      Effort: Pulmonary effort is normal       Breath sounds: Normal breath sounds  Abdominal:      General: There is distension  Tenderness: There is no abdominal tenderness  Musculoskeletal:      Cervical back: Normal range of motion  Right lower leg: No edema  Left lower leg: No edema  Skin:     General: Skin is warm  Capillary Refill: Capillary refill takes less than 2 seconds  Neurological:      General: No focal deficit present  Mental Status: He is alert        --------------------------------------------------------------------------------------------------------------  Vitals:   Vitals:    07/28/22 2042 07/28/22 2100   BP: 162/71 140/63   BP Location: Left arm    Pulse: 74 (!) 48   Resp: (!) 24 (!) 27   Temp: (!) 97 3 °F (36 3 °C)    TempSrc: Axillary    SpO2: 100% 99%   Weight: 77 kg (169 lb 12 1 oz)    Height: 5' 8" (1 727 m)      Temp  Min: 97 3 °F (36 3 °C)  Max: 98 °F (36 7 °C)  IBW (Ideal Body Weight): 68 4 kg  Height: 5' 8" (172 7 cm)  Body mass index is 25 81 kg/m²      Laboratory and Diagnostics:  Results from last 7 days   Lab Units 07/28/22  1727 07/27/22  0942   WBC Thousand/uL 7 07 7 27   HEMOGLOBIN g/dL 11 9* 13 3 HEMATOCRIT % 33 1* 37 9   PLATELETS Thousands/uL 86* 101*   NEUTROS PCT % 70  --    MONOS PCT % 6  --      Results from last 7 days   Lab Units 07/28/22  1727   SODIUM mmol/L 138   POTASSIUM mmol/L 3 5   CHLORIDE mmol/L 103   CO2 mmol/L 24   ANION GAP mmol/L 11   BUN mg/dL 18   CREATININE mg/dL 1 60*   CALCIUM mg/dL 8 4   GLUCOSE RANDOM mg/dL 128   ALT U/L 20   AST U/L 18   ALK PHOS U/L 105   ALBUMIN g/dL 3 5   TOTAL BILIRUBIN mg/dL 1 95*     Results from last 7 days   Lab Units 07/28/22  1727   MAGNESIUM mg/dL 2 0   PHOSPHORUS mg/dL 2 0*      Results from last 7 days   Lab Units 07/28/22  1727   INR  1 10   PTT seconds 26              ABG:    VBG:          Micro:        EKG: complete heart block  Imaging:  CXR: No acute cardiopulmonary disease    I have personally reviewed pertinent reports     and I have personally reviewed pertinent films in PACS      Historical Information   Past Medical History:   Diagnosis Date    Arthritis     both shoulders    CAD (coronary artery disease)     Last Assessed:  11/4/13    Cigarette smoker     Colon polyp     Elevated prostate specific antigen (PSA)     Last Assessed:  12/21/17    Enlarged prostate     Exercise involving walking     in season hunt's and fish's/ 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"    Full dentures     but doesnt wear them    History of 2019 novel coronavirus disease (COVID-19) 02/2021    hospital for 2 days but not in ICU/"mainly dehydrated" "also fever/oxygen below 90"    History of coronary artery stent placement     x2 in 2008 or so; sees Zuly Dobbins-- Dr Stacey Su cardio   [facility] (hard of hearing)     no hearing aids yet    Hyperlipidemia     Last Assessed:  11/24/17    Hypertension     Benign essential, Last Assessed:  11/24/17    LBBB (left bundle branch block)     Nocturia     Polycythemia vera (HCC)     (per history in chart)    PVD [facility]peripheral vascular disease) ([facility])     RBC abnormality pt reports told has high Red blood cells/goes to infusion center regularly next appt 10/8/21 hematologist is Dr Carroll Durant of [facility](had been Dr Юлия Fitzpatrick)   [address] of breath     "if goes up steps gets SOB, had for awhile"    Slow urinary stream     "sometimes feels like bladder isnt all the way empty"    Snores     Wears glasses     reading     Past Surgical History:   Procedure Laterality Date    ANGIOPLASTY      APPENDECTOMY      CATARACT EXTRACTION Bilateral     CORONARY ARTERY BYPASS GRAFT      CT CYSTOGRAM  11/4/2021    ELBOW SURGERY Right     FL CYSTOGRAM  10/27/2021    KNEE ARTHROSCOPY Right     AK COLONOSCOPY FLX DX W/COLLJ SPEC WHEN PFRMD N/A 5/22/2017    Procedure: COLONOSCOPY;  Surgeon: Klaus Shah DO;  Location: [facility];   Service: Gastroenterology    PROSTATE BIOPSY      PROSTATECTOMY N/A 10/18/2021    Procedure: ROBOTIC SUBTOTAL/SUPRAPUBIC PROSTATECTOMY;  Surgeon: Jae Richardson MD;  Location: [facility];  Service: Urology    SHOULDER ARTHROSCOPY Right     TONSILLECTOMY       Social History   Social History     Substance and Sexual Activity   Alcohol Use No     Social History     Substance and Sexual Activity   Drug Use No     Social History     Tobacco Use   Smoking Status Current Every Day Smoker    Packs/day: 0 50    Years: 35 00    Pack years: 17 50    Types: Cigarettes   Smokeless Tobacco Never Used   Tobacco Comment    a little less than 1/2ppd, trying to cut back before surgery last smoked 10/16     Exercise History: unknwon   Family History:   Family History   Problem Relation Age of Onset    No Known Problems Mother     Heart disease Father      I have reviewed this patient's family history and commented on sigificant items within the HPI      Medications:  Current Facility-Administered Medications   Medication Dose Route Frequency    acetaminophen (TYLENOL) tablet 650 mg  650 mg Oral Q6H PRN    amLODIPine (NORVASC) tablet 10 mg  10 mg Oral QPM    [START ON 7/29/2022] aspirin (ECOTRIN LOW STRENGTH) EC tablet 81 mg  81 mg Oral Daily    atorvastatin (LIPITOR) tablet 80 mg  80 mg Oral QPM    atropine 1 mg/10 mL injection **ADS Override Pull**        [START ON 7/29/2022] calcitriol (ROCALTROL) capsule 0 25 mcg  0 25 mcg Oral Once per day on Mon Wed Fri    cholecalciferol (VITAMIN D3) tablet 1,000 Units  1,000 Units Oral QPM    DOPamine (INTROPIN) 400 mg in 250 mL infusion (premix)  1-20 mcg/kg/min Intravenous Titrated    [START ON 7/29/2022] finasteride (PROSCAR) tablet 5 mg  5 mg Oral Daily    heparin (porcine) subcutaneous injection 5,000 Units  5,000 Units Subcutaneous Q8H [address]    hydroxyurea (HYDREA) capsule 500 mg  500 mg Oral BID    isoproterenol (ISUPREL) 1000 mcg (STANDARD CONCENTRATION) IV in dextrose 5% 250 mL  1 mcg/min Intravenous Continuous     Home medications:  Prior to Admission Medications   Prescriptions Last Dose Informant Patient Reported? Taking?    Cholecalciferol (Vitamin D3) 50 MCG (2000 UT) TABS   Yes No   Sig: Take 500 Units by mouth every evening   Multiple Vitamin (MULTIVITAMIN) capsule  Self Yes No   Sig: Take 1 capsule by mouth daily   Omega-3 Fatty Acids (FISH OIL) 1200 MG CAPS  Self Yes No   Sig: Take 1,200 mg by mouth 2 (two) times a day     acetaminophen (TYLENOL) 500 mg tablet  Self Yes No   Sig: Take 500 mg by mouth every 6 (six) hours as needed for mild pain   amLODIPine (NORVASC) 10 mg tablet  Self Yes No   Sig: Take 10 mg by mouth every evening    ascorbic acid (VITAMIN C) 1000 MG tablet   No No   Sig: Take 1 tablet (1,000 mg total) by mouth every 12 (twelve) hours for 10 doses   Patient taking differently: Take 1,000 mg by mouth daily   aspirin (ECOTRIN LOW STRENGTH) 81 mg EC tablet  Self Yes No   Sig: Take 81 mg by mouth daily   atorvastatin (LIPITOR) 80 mg tablet   No No   Sig: TAKE 1 TABLET BY MOUTH EVERY EVENING   calcitriol (ROCALTROL) 0 25 mcg capsule   No No   Sig: Take 1 capsule (0 25 mcg total) by mouth 3 (three) times a week   docusate sodium (COLACE) 100 mg capsule  Self No No   Sig: Take 1 capsule (100 mg total) by mouth 2 (two) times a day   Patient not taking: Reported on 6/7/2022   ferrous sulfate 324 (65 Fe) mg   No No   Sig: Take 1 tablet (324 mg total) by mouth 2 (two) times a day before meals   finasteride (PROSCAR) 5 mg tablet   No No   Sig: TAKE 1 TABLET BY MOUTH EVERY DAY   hydroxyurea (HYDREA) 500 mg capsule   No No   Sig: TAKE 1 CAPSULE BY MOUTH TWICE A DAY   metoprolol tartrate (LOPRESSOR) 50 mg tablet  Self Yes No   Sig: Take 50 mg by mouth 2 (two) times a day    nitroglycerin (NITROSTAT) 0 4 mg SL tablet  Self No No   Sig: Place 1 tablet (0 4 mg total) under the tongue every 5 (five) minutes as needed for chest pain      Facility-Administered Medications: None     Allergies: Allergies   Allergen Reactions    Other Other (See Comments)     Pt and wife reports took a medication years ago at work cant recall the name or what it was for"     "couldn't talk and cheeks swelled and also right hand"       ------------------------------------------------------------------------------------------------------------  Advance Directive and Living Will:      Power of :    POLST:    ------------------------------------------------------------------------------------------------------------  Anticipated Length of Stay is > 2 midnights    Care Time Delivered:   Upon my evaluation, this patient had a high probability of imminent or life-threatening deterioration due to symptomatic complete heart block , which required my direct attention, intervention, and personal management  I have personally provided 30 minutes (2130 to 2200) of critical care time, exclusive of procedures, teaching, family meetings, and any prior time recorded by providers other than myself  Brian Jarrett PA-C        Portions of the record may have been created with voice recognition software    Occasional wrong word or "sound a like" substitutions may have occurred due to the inherent limitations of voice recognition software    Read the chart carefully and recognize, using context, where substitutions have occurred

## 2022-07-29 NOTE — ASSESSMENT & PLAN NOTE
· Patient developed acute SOB and dizziness this afternoon while in the car with his grandson, EMS activated  Patient presented to the ED @ [facility] in high grade second degree heart block in the 30s with preserved blood pressure  Started on dopa with improvement in HR to 50s  · Patient was SOB given IV lasix with response, no evidence of CHF on CXR  · Upon arrival to [facility] was noted to be in complete heart block, on 9 5 dopa with no improvement   Attempted isopruel, ultimately taken to the cath lab for TVP    Plan  · To cath lab for TVP  · Zoll pads in place, atropine in room  · Hold beta blocker  · No recent tick bites, will send lyme titer for completeness   · Check ECHO  · Appreciate EP consult

## 2022-07-29 NOTE — ASSESSMENT & PLAN NOTE
· Patient developed acute SOB and dizziness this afternoon while in the car with his grandson, EMS activated  Patient presented to the ED @ [facility] in high grade second degree heart block in the 30s with preserved blood pressure  Started on dopa with improvement in HR to 50s  · Patient was SOB given IV lasix with response, no evidence of CHF on CXR  · Upon arrival to [facility] was noted to be in complete heart block, on 9 5 dopa with no improvement   Attempted isopruel, ultimately taken to the cath lab for TVP    Plan  · Continue R groin TVP  · VVI 79 Ma 10  · Zoll pads in place, atropine in room  · Hold beta blocker  · Lyme pending  · Check ECHO  · Appreciate EP consult  · NPO for possible PPM today

## 2022-07-29 NOTE — PLAN OF CARE
Problem: MOBILITY - ADULT  Goal: Maintain or return to baseline ADL function  Description: INTERVENTIONS:  -  Assess patient's ability to carry out ADLs; assess patient's baseline for ADL function and identify physical deficits which impact ability to perform ADLs (bathing, care of mouth/teeth, toileting, grooming, dressing, etc )  - Assess/evaluate cause of self-care deficits   - Assess range of motion  - Assess patient's mobility; develop plan if impaired  - Assess patient's need for assistive devices and provide as appropriate  - Encourage maximum independence but intervene and supervise when necessary  - Involve family in performance of ADLs  - Assess for home care needs following discharge   - Consider OT consult to assist with ADL evaluation and planning for discharge  - Provide patient education as appropriate  Outcome: Progressing  Goal: Maintains/Returns to pre admission functional level  Description: INTERVENTIONS:  - Perform BMAT or MOVE assessment daily    - Set and communicate daily mobility goal to care team and patient/family/caregiver  - Collaborate with rehabilitation services on mobility goals if consulted  - Perform Range of Motion 3 times a day  - Reposition patient every 2 hours    - Dangle patient 3 times a day  - Stand patient 3 times a day  - Ambulate patient 3 times a day  - Out of bed to chair 3 times a day   - Out of bed for meals 3 times a day  - Out of bed for toileting  - Record patient progress and toleration of activity level   Outcome: Progressing     Problem: Prexisting or High Potential for Compromised Skin Integrity  Goal: Skin integrity is maintained or improved  Description: INTERVENTIONS:  - Identify patients at risk for skin breakdown  - Assess and monitor skin integrity  - Assess and monitor nutrition and hydration status  - Monitor labs   - Assess for incontinence   - Turn and reposition patient  - Assist with mobility/ambulation  - Relieve pressure over bony prominences  - Avoid friction and shearing  - Provide appropriate hygiene as needed including keeping skin clean and dry  - Evaluate need for skin moisturizer/barrier cream  - Collaborate with interdisciplinary team   - Patient/family teaching  - Consider wound care consult   Outcome: Progressing     Problem: Potential for Falls  Goal: Patient will remain free of falls  Description: INTERVENTIONS:  - Educate patient/family on patient safety including physical limitations  - Instruct patient to call for assistance with activity   - Consult OT/PT to assist with strengthening/mobility   - Keep Call bell within reach  - Keep bed low and locked with side rails adjusted as appropriate  - Keep care items and personal belongings within reach  - Initiate and maintain comfort rounds  - Make Fall Risk Sign visible to staff  - Offer Toileting every 2 Hours, in advance of need  - Initiate/Maintain fall alarm  - Obtain necessary fall risk management equipment: bed  - Apply yellow socks and bracelet for high fall risk patients  - Consider moving patient to room near nurses station  Outcome: Progressing

## 2022-07-29 NOTE — DISCHARGE INSTRUCTIONS
Please refer to post pacemaker implantation discharge instructions and restrictions and your pacemaker booklet/temporary card  Keep incision dry for one week  Do not use lotions/powders/creams on incision  Leave outer bandage in place for 1 week - it is water proof, and as long as it is fully adhered to your skin you may shower with it  If it appears as though the bandage is coming off and/or there is any communication to the area of device incision, please then keep the whole area dry for the remaining week  After 1 week, please remove by pulling all edges away from the center of the bandage  No overhead reaching/pushing/pulling/lifting greater than 5-10lbs with left arm for six weeks  Please call the office if you notice redness, swelling, bleeding, or drainage from incision or if you develop fevers  AFTER PACEMAKER CARE:    If you have any questions, please call [phone] to speak with a nurse (8:30am-4pm, or [phone] after hours)  For appointments, please call [phone]  WHAT YOU SHOULD KNOW:   A pacemaker is a small, battery-powered device that is placed under your skin in your upper chest area with wires placed through a vein that lead directly into the heart  It helps regulate your heart rate and prevent your heart from beating too slowly  AFTER YOU LEAVE:     Medicines:     Pain medicine: You may need medicine to take away or decrease pain  Learn how to take your medicine  Ask what medicine and how much you should take  Be sure you know how, when, and how often to take it  Usually Over the counter pain medicine is sufficient to control pain (Acetominophen or Ibuprofen) Ask your doctor if you may take these  If this does not control your pain, narcotic pain killers may be prescribed, please call if you need prescription  Do not wait until the pain is severe before you take your medicine  Tell caregivers if your pain does not decrease      Pain medicine can make you dizzy or sleepy  Prevent falls by calling someone when you get out of bed or if you need help  Take your medicine as directed  Call your healthcare provider if you think your medicine is not helping or if you have side effects  Tell him if you are allergic to any medicine  Follow up with your cardiologist after your procedure: You will need a follow-up visit approximately 2 weeks after you leave the hospital  Your cardiologist will check your wound and make sure that your pacemaker is working correctly  Follow the instructions to check your pacemaker: Your cardiologist or primary healthcare provider will check your pacemaker and the battery regularly  He will use a computer to check your pacemaker over the telephone or wireless device which will be given to you  Pacemaker batteries usually last 8 to 10 years  The pacemaker unit will be replaced when the battery gets low  This is a simpler procedure than the original one to implant your pacemaker  Wound care:  Keep your incision dry for one week  Do not use lotions/powders/creams on incision  Leave outer bandage in place for 1 week - it is water proof, and as long as it is fully adhered to your skin you may shower with it  If it appears as though the bandage is coming off and/or there is any communication to the area of device incision, please then keep the whole area dry for the remaining week  After 1 week, please remove by pulling all edges away from the center of the bandage  Please call the office if you notice redness, swelling, bleeding, or drainage from incision or if you develop fevers  Activity:   Arm movement and lifting:  Be careful using the arm on the side of your pacemaker  Do not move your arm for the first 24 hours after your procedure  Do not  lift your arm above your shoulder or lift more than 10 pounds for one month after your procedure   Avoid pushing, pulling, or repetitive arm movements for one month  This helps the leads stay in place and helps your wound heal  Ask your caregiver when you can drive after your procedure  You may move your arm side to side without lifting above your shoulder, and do not need to wear a sling at home  Driving: you are ok to drive 48 hours after pacemaker is implanted   Sports:  Ask your caregiver when it is okay to play tennis, golf, basketball, or any sport that requires you to lift your arms  Do not play full contact sports, such as football, that could damage your pacemaker  Ask your cardiologist or primary healthcare provider how much and what kinds of physical activity are safe for you  Living with a pacemaker:   Tell all caregivers you have a pacemaker: This includes surgeons, radiologists, and medical technicians  You may want to wear a medical alert ID bracelet or necklace that states that you have a pacemaker  Carry your pacemaker ID card: Make sure you receive a pacemaker ID card  Carry it with you at all times  It lists important information about your pacemaker  Show it to airport security if you travel  Avoid electrical interference:  Avoid welding equipment and other equipment with large magnets or electric fields  These things could interfere with how your pacemaker works  Use your cell phone on the ear opposite from your pacemaker  Do not carry your cell phone in your shirt pocket over your chest      Some Pacemakers are MRI safe  Ask you doctor if it is safe to proceed with MRI and let the radiologist and staff know you have a pacemaker  Do not touch the skin around your pacemaker: This can cause damage to the lead wires or move the pacemaker unit from where it should be  Contact your cardiologist or primary healthcare provider if:   The area around your pacemaker has increasing amount of pain after surgery  The pain should improve over first few days after implantation       The skin around your stitches has increasing redness, swelling, or has drainage  This may mean that you have an infection  You have a fever  You have chills, a cough, and feel weak or achy  These are also signs of infection  Your feet or ankles are more swollen than your baseline  Your Heart rate is less than 50 beats per minute     Seek care immediately if:   Your bandage becomes soaked with blood  Your pacemaker is swelling rapidly    Your stitches open up  You feel your heart suddenly beating very slowly or quickly  You become too weak or dizzy to stand, or you pass out  Your arm or leg feels warm, tender, and painful  It may look swollen and red  You have chest pain that does not go away with rest or medicine  You feel lightheaded, short of breath, and have chest pain  You cough up blood  © 2014 [address] is for End User's use only and may not be sold, redistributed or otherwise used for commercial purposes  All illustrations and images included in CareNotes® are the copyrighted property of [employer]  or [employer]  The above information is an  only  It is not intended as medical advice for individual conditions or treatments  Talk to your doctor, nurse or pharmacist before following any medical regimen to see if it is safe and effective for you

## 2022-07-29 NOTE — ANESTHESIA PREPROCEDURE EVALUATION
Procedure:  Cardiac pacer implant (N/A Chest)    CAD s/p EX, new CHB    Relevant Problems   ANESTHESIA (within normal limits)   (-) History of anesthesia complications      CARDIO   (+) Benign essential hypertension   (+) CAD (coronary artery disease)   (+) Complete heart block (HCC)   (+) Left bundle branch block   (+) Mixed hyperlipidemia      /RENAL   (+) BPH with obstruction/lower urinary tract symptoms   (+) Stage 3b chronic kidney disease (HCC)      MUSCULOSKELETAL   (+) Cervical spondylosis without myelopathy      PULMONARY   (+) Cigarette smoker      Other   (+) Polycythemia vera ([address] )   (+) Tobacco user      TTE 2022:    Left Ventricle: Left ventricular cavity size is normal  Wall thickness is mildly increased  There is concentric remodeling  The left ventricular ejection fraction is grossly within normal >55%  Wall motion cannot be accurately assessed    IVS: There is abnormal septal motion consistent with right ventricular pacing    Right Ventricle: Right ventricular cavity size is dilated  Physical Exam    Airway    Mallampati score: II  TM Distance: >3 FB  Neck ROM: full     Dental   No notable dental hx     Cardiovascular      Pulmonary      Other Findings        Anesthesia Plan  ASA Score- 3     Anesthesia Type- IV sedation with anesthesia with ASA Monitors  Additional Monitors:   Airway Plan:           Plan Factors-Exercise tolerance (METS): >4 METS  Chart reviewed  EKG reviewed  Existing labs reviewed  Patient summary reviewed  Induction- intravenous  Postoperative Plan-     Informed Consent- Anesthetic plan and risks discussed with patient  I personally reviewed this patient with the CRNA  Discussed and agreed on the Anesthesia Plan with the CRNA  Gloria Medrano

## 2022-07-29 NOTE — UTILIZATION REVIEW
Initial Clinical Review  - patient presented to the [facility] ED on 7/28/22  Transferred to the [address] [place] [address] for higher level of care  Admission: Date/Time/Statement:   Admission Orders (From admission, onward)     Ordered        07/28/22 2032  Inpatient Admission  Once                      Orders Placed This Encounter   Procedures    Inpatient Admission     Standing Status:   Standing     Number of Occurrences:   1     Order Specific Question:   Level of Care     Answer:   Critical Care [15]     Order Specific Question:   Estimated length of stay     Answer:   More than 2 Midnights     Order Specific Question:   Certification     Answer:   I certify that inpatient services are medically necessary for this patient for a duration of greater than two midnights  See H&P and MD Progress Notes for additional information about the patient's course of treatment  ED Arrival Information     Patient not seen in ED-transferred from [facility] ED to [address] [place] [address] ICU                        [facility] ED Medication Administration from 07/28/2022 0000 to 07/28/2022 2020     Date/Time Order Dose Route Action    07/28/2022 1724 atropine (FOR EMS ONLY) 1 mg/10 mL injection 1 mg 0 mg Does not apply Given to EMS    07/28/2022 1726 ipratropium-albuterol (DUO-NEB) 0 5-2 5 mg/3 mL inhalation solution 3 mL 3 mL Nebulization Given    07/28/2022 1726 aspirin chewable tablet 324 mg 324 mg Oral Given    07/28/2022 2000 DOPamine (INTROPIN) 400 mg in 250 mL infusion (premix) 9 5 mcg/kg/min Intravenous Rate/Dose Change    07/28/2022 1938 DOPamine (INTROPIN) 400 mg in 250 mL infusion (premix) 7 mcg/kg/min Intravenous New Bag    07/28/2022 1927 DOPamine (INTROPIN) 400 mg in 250 mL infusion (premix) 4 5 mcg/kg/min Intravenous Rate/Dose Change    07/28/2022 1906 DOPamine (INTROPIN) 400 mg in 250 mL infusion (premix) 2 mcg/kg/min Intravenous New Bag    07/28/2022 2002 ondansetron (ZOFRAN) injection 4 mg 4 mg Intravenous Given         Initial Presentation: 77 y o  male w/ PMHx of polycythemia vera, BPH, CKD 3, pre-diabetes, CAD with remote stenting, and tobacco abuse who presents as a transfer from the [facility] ED with complete heart block  Patient presented to the ED by EMS on 7/27 with c/o sudden onset of shortness of breath, chest pain and lightheadedness while sitting in his car  He was found to be in a high grade second degree heart block  Patient was started on dopamine and transferred to the [facility] for EP evaluation  Upon arrival to the [facility], patient  was found to be in complete heart block  Patient with dizziness and SOB, attempted isopruel with no improvement  Decision was made to activate cath lab for placement of TVP  PE: Alert  Bradycardia, rhythm irregular  7/28 Plan: Inpatient admission for evaluation and treatment of complete heart block: To cath lab for TVP, Zoll pads in place  Hold beta blocker  Check ECHO, consult Electrophysiology  7/29 Cardiology consult:  Complete Heart Block  Acute onset lightheadedness and dyspnea and found to have bradycardia to 20s by EMS s/p atropine  Patient had 2:1 block bradycardia (rates 40s), that progressed to complete heart block despite dopamine and isoproterenol  Temporary pacing wire placed 7/28 evening  V paced, rate 70, 10mV (threshold tested in cath lab and capture was achieved down to 1 mV)  TTE ordered  NPO for dual chamber PPM  TSH wnl, follow up Lyme titers (low suspicion)  Hold home metoprolol, restart after PPM  CAD  History of angina and s/p NAOMIE to LAD in 2008  He reports no angina at baseline  Troponin not elevated  Continue aspirin and statin  HTN: hypertensive in complete heart block but normotensive now after pacing  Hold amlodipine for now  PE: Alert, bradycardia, rhythm irregular             Admission Vitals [07/28/22 2042]   Temperature Pulse Respirations Blood Pressure SpO2   (!) 97 3 °F (36 3 °C) (!) 44 (!) 24 162/71 100 %      Temp Source Heart Rate Source Patient Position - Orthostatic VS BP Location FiO2 (%)   Axillary Monitor Lying Left arm --      Pain Score       No Pain          Wt Readings from Last 1 Encounters:   07/29/22 76 7 kg (169 lb)     Additional Vital Signs:       Date/Time Temp Pulse Resp BP MAP (mmHg) SpO2 Calculated FIO2 (%)  Nasal Cannula O2 Flow Rate (L/min) O2 Device Patient Position - Orthostatic VS   07/29/22 1000 -- 69 19 123/69 90 95 % -- -- -- --   07/29/22 0900 97 8 °F (36 6 °C) 69 20 128/60 87 96 % 28 2 L/min Nasal cannula Lying   07/29/22 0800 -- 69 19 106/74 86 94 % -- -- -- --   07/29/22 0703 -- 69 -- 131/75 -- -- -- -- -- --   07/29/22 0700 -- 69 19 122/93 104 96 % -- -- -- --   07/29/22 0600 -- 69 19 131/75 94 94 % -- -- -- --   07/29/22 0500 -- 69 20 129/75 92 94 % -- -- -- --   07/29/22 0400 98 1 °F (36 7 °C) 69 22 146/79 89 95 % 28 2 L/min Nasal cannula Lying   07/29/22 0300 -- 69 19 101/66 79 96 % -- -- -- --   07/29/22 0200 -- 69 20 111/87 96 95 % -- -- -- --   07/29/22 0130 -- 69 19 104/67 79 96 % -- -- -- --   07/29/22 0100 -- 69 20 125/65 85 94 % -- -- -- --   07/29/22 0000 -- 69 19 114/61 82 95 % -- -- -- --   07/28/22 2320 -- 69 18 103/61 77 95 % -- -- -- --   07/28/22 22:26:52 -- -- -- -- -- -- 28 2 L/min Nasal cannula --   07/28/22 2200 -- 44 Abnormal  20 121/56 80 95 % -- -- -- --   07/28/22 2100 -- 48 Abnormal  27 Abnormal  140/63 90 99 % -- -- -- --           Pertinent Labs/Diagnostic Test Results:     7/29 Cath Lab report:       The pre- operative diagnosis:  Currently in complete heart block   Known left bundle branch block,  milliseconds   LVEF -55%  CAD, stent to LAD in 2008   Hypertension   Hyperlipidemia   Prediabetes   Chronic kidney disease   Polycythemia   Tobacco abuse        Postoperative diagnosis:  Currently in complete heart block   Known left bundle branch block,  milliseconds   LVEF -55%  CAD, stent to LAD in 2008   Hypertension   Hyperlipidemia   Prediabetes   Chronic kidney disease   Polycythemia   Tobacco abuse       Procedure: Dual chamber PPM  RA lead   RV lead - His lead - with LPF capture          Complications none    Summary of the procedure: The patient came in to the laboratory for dual -chamber device placement  The device has been placed and patient tolerated the procedure well    7/29 ECHO:      Left Ventricle: Left ventricular cavity size is normal  Wall thickness is mildly increased  There is concentric remodeling  The left ventricular ejection fraction is grossly within normal >55%  Wall motion cannot be accurately assessed    IVS: There is abnormal septal motion consistent with right ventricular pacing    Right Ventricle: Right ventricular cavity size is dilated  XR chest portable   Final Result by Favian Raygoza MD (07/29 0830)      No acute cardiopulmonary disease  7/29 EKG:    Ventricular-paced rhythm  Abnormal ECG  When compared with ECG of 28-JUL-2022 21:33, (unconfirmed)  Electronic ventricular pacemaker has replaced Wide QRS rhythm  Vent  rate has increased BY  27 BPM      7/28 Cath lab report:    Temporary pacemaker was inserted and left in place  A pacing catheter was inserted into the right femoral vein  Under fluoroscopic guidance it was advanced into the right ventricle  Secured by: suture  Sensitivity: 1  Rate: 80 (bpm)  Output: 10 (mA)   Threshold: 1 (mA)          7/28 EKG:    Sinus rhythm with A-V dissociation and Wide QRS rhythm with Fusion complexes  Right bundle branch block  Abnormal ECG  When compared with ECG of 28-JUL-2022 21:33, (unconfirmed)  Wide QRS rhythm has replaced Sinus rhythm          Results from last 7 days   Lab Units 07/29/22  0453 07/28/22  1727 07/27/22  0942   WBC Thousand/uL 7 04 7 07 7 27   HEMOGLOBIN g/dL 12 1 11 9* 13 3   HEMATOCRIT % 34 4* 33 1* 37 9   PLATELETS Thousands/uL  --  86* 101*   NEUTROS ABS Thousands/µL  --  4 98  -- Results from last 7 days   Lab Units 07/29/22  0453 07/28/22  1727   SODIUM mmol/L 143 138   POTASSIUM mmol/L 3 9 3 5   CHLORIDE mmol/L 113* 103   CO2 mmol/L 25 24   ANION GAP mmol/L 5 11   BUN mg/dL 17 18   CREATININE mg/dL 1 23 1 60*   EGFR ml/min/1 73sq m 57 41   CALCIUM mg/dL 8 4 8 4   CALCIUM, IONIZED mmol/L 1 11*  --    MAGNESIUM mg/dL 2 4 2 0   PHOSPHORUS mg/dL 3 6 2 0*     Results from last 7 days   Lab Units 07/28/22  1727   AST U/L 18   ALT U/L 20   ALK PHOS U/L 105   TOTAL PROTEIN g/dL 6 8   ALBUMIN g/dL 3 5   TOTAL BILIRUBIN mg/dL 1 95*         Results from last 7 days   Lab Units 07/29/22  0453 07/28/22  1727   GLUCOSE RANDOM mg/dL 107 128         Results from last 7 days   Lab Units 07/28/22  1932 07/28/22  1727   HS TNI 0HR ng/L  --  11   HS TNI 2HR ng/L 12  --    HSTNI D2 ng/L 1  --          Results from last 7 days   Lab Units 07/28/22  1727   PROTIME seconds 14 2   INR  1 10   PTT seconds 26     Results from last 7 days   Lab Units 07/28/22  1727   TSH 3RD GENERATON uIU/mL 1 725         Results from last 7 days   Lab Units 07/28/22  1727   NT-PRO BNP pg/mL 177       Results from last 7 days   Lab Units 07/28/22  1802   CLARITY UA  Clear   COLOR UA  Yellow   SPEC GRAV UA  1 010   PH UA  7 0   GLUCOSE UA mg/dl Negative   KETONES UA mg/dl Negative   BLOOD UA  Negative   PROTEIN UA mg/dl Negative   NITRITE UA  Negative   BILIRUBIN UA  Negative   UROBILINOGEN UA E U /dl 0 2   LEUKOCYTES UA  Small*   WBC UA /hpf 0-1*   RBC UA /hpf None Seen   BACTERIA UA /hpf Innumerable*   EPITHELIAL CELLS WET PREP /hpf Occasional           Past Medical History:   Diagnosis Date    Arthritis     both shoulders    CAD (coronary artery disease)     Last Assessed:  11/4/13    Cigarette smoker     Colon polyp     Elevated prostate specific antigen (PSA)     Last Assessed:  12/21/17    Enlarged prostate     Exercise involving walking     in season hunt's and fish's/ 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"    Full dentures     but doesnt wear them    History of 2019 novel coronavirus disease (COVID-19) 02/2021    hospital for 2 days but not in ICU/"mainly dehydrated" "also fever/oxygen below 90"    History of coronary artery stent placement     x2 in 2008 or so; sees [facility] [place]-- Dr Marco Villarreal cardio   [facility] (hard of hearing)     no hearing aids yet    Hyperlipidemia     Last Assessed:  11/24/17    Hypertension     Benign essential, Last Assessed:  11/24/17    LBBB (left bundle branch block)     Nocturia     Polycythemia vera (HCC)     (per history in chart)    PVD (peripheral vascular disease) ([facility])     RBC abnormality     pt reports told has high Red blood cells/goes to infusion center regularly next appt 10/8/21 hematologist is Dr Elisabet Harding [facility](had been Dr Moraima Brown)   [address] of breath     "if goes up steps gets SOB, had for awhile"    Slow urinary stream     "sometimes feels like bladder isnt all the way empty"    Snores     Wears glasses     reading     Present on Admission:   Benign essential hypertension   Mixed hyperlipidemia   Polycythemia vera ([facility] [address] [facility]   Prediabetes   Tobacco user   BPH with obstruction/lower urinary tract symptoms   Stage 3b chronic kidney disease ([facility] [address] )   (Resolved) Secondary hyperparathyroidism of renal origin ([facility] [address] )      Admitting Diagnosis: AV block, Mobitz 2 [I44 1]  Age/Sex: 77 y o  male       Admission Orders: Cardio-Pulmonary monitoring, NPO, SCD        Scheduled Medications:  aspirin, 81 mg, Oral, Daily  atorvastatin, 80 mg, Oral, QPM  calcitriol, 0 25 mcg, Oral, Once per day on Mon Wed Fri  calcium gluconate, 1 g, Intravenous, Once  cefazolin, 2,000 mg, Intravenous, Once  cholecalciferol, 1,000 Units, Oral, QPM  finasteride, 5 mg, Oral, Daily  heparin (porcine), 5,000 Units, Subcutaneous, Q8H TALIB  hydroxyurea, 500 mg, Oral, BID      Continuous IV Infusions:    DOPamine (INTROPIN) 400 mg in 250 mL infusion (premix)  Rate: 2 9-57 8 mL/hr Dose: 1-20 mcg/kg/min  Weight Dosing Info: 77 1 kg  Freq: Titrated Route: IV  Last Dose: Stopped (07/28/22 2238)  Start: 07/28/22 2045 End: 07/28/22 2253    isoproterenol (ISUPREL) 1000 mcg (STANDARD CONCENTRATION) IV in dextrose 5% 250 mL  Rate: 15 mL/hr Dose: 1 mcg/min  Freq: Continuous Route: IV  Last Dose: Stopped (07/28/22 2239)  Start: 07/28/22 2115 End: 07/28/22 2253     PRN Meds:  acetaminophen, 650 mg, Oral, Q6H PRN  ondansetron, 4 mg, Intravenous, Q6H PRN        IP CONSULT TO CASE MANAGEMENT  IP CONSULT TO CARDIOLOGY    Network Utilization Review Department  ATTENTION: Please call with any questions or concerns to [phone] and carefully listen to the prompts so that you are directed to the right person  All voicemails are confidential   Elke Valenzuela all requests for admission clinical reviews, approved or denied determinations and any other requests to dedicated fax number below belonging to the campus where the patient is receiving treatment   List of dedicated fax numbers for the Facilities:  [address] DENIALS (Administrative/Medical Necessity) [phone]   [address] (Maternity/NICU/Pediatrics) [phone]   [address]  [address] [facility] [place] [address] [id number] [place] [address] [address] [facility] [place] [address] [phone]

## 2022-07-29 NOTE — CONSULTS
Consultation - Electrophysiology - Cardiology  Ivon Reilly 77 y o  male MRN: [id number]  Unit/Bed#: [facility] 382-84 Encounter: [id number]      Inpatient consult to Cardiology  Consult performed by: Cesar Gilbert MD  Consult ordered by: Jc Whitt PA-C        History of Present Illness   Physician Requesting Consult: Fernando Dakins, DO  Reason for Consult / Principal Problem: Bradycardia  Primary cardiologist: Dr Tana Young    Assessment/Plan   Assessment/Plan:  Ivon Reilly is a 77y o  year old male with CAD (drug-eluting stent to the LAD in 2008), nuclear stress test negative for ischemia in October 2014, known LBBB, hypertension, HLD, pre diabetes, CKD, polycythemia, and tobacco use presented with dyspnea and lightheadedness found to have new complete heart block  #Complete Heart Block  Acute onset lightheadedness and dyspnea and found to have bradycardia to 20s by EMS s/p atropine  Patient had 2:1 block bradycardia (rates 40s), that progressed to complete heart block despite dopamine and isoproterenol  Temporary pacing wire placed 7/28 evening    -V paced, rate 70, 10mV (threshold tested in cath lab and capture was achieved down to 1 mV)  -TTE ordered  -NPO midnight for dual chamber PPM   -TSH wnl, fu lyme titers (low suspicion)  -hold home metoprolol, restart after PPM      #CAD  History of angina and s/p NAOMIE to LAD in 2008  He reports no angina at baseline  Troponin not elevated  -continue aspirin and statin    #HTN: hypertensive in complete heart block but normotensive now after pacing  hold amlodipine for now  HPI: Ivon Reilly is a 77y o  year old male with CAD (drug-eluting stent to the LAD in 2008), nuclear stress test negative for ischemia in October 2014, known LBBB, hypertension, HLD, pre diabetes, CKD, polycythemia, and tobacco use presented with dyspnea and lightheadedness  The patient reports he was waiting outside of a long term for his grandson to get released    While he was there he had sudden onset shortness of breath and lightheadedness when he went to stand up  The symptoms were severe and 911 was called  EMS reported his HR was in the 20s when they arrival and atropine was given  He was sent to [facility] ED and on arrival he had 2:1 AV Block with ventricular rate of 44  He was hypertensive in the ED  At [facility] ED:   Labs: wbc 7, hgb 11 9, plts 86   Na 138, K 3 5, Cr 1 6, phos 2 0  Hs Trop 11--> 12  TSH 1 7  Pro     He was started on dopamine infusion at [facility] and transferred to [place] [address] for EP evaluation  On arrival his HR was in the 40s with 2:1 block, occasionally with high grade block (two consecutive non conducted P waves)  Isoproterenol was added to dopamine  Despite isoproterenol and dopamine his HR became more bradycardic and he developed complete heart block  Ventricular escape with RBBB pattern with rates in the 30s  The patient was not lightheaded but felt like he could not catch his breath  He reported no recent tick bites  He took all his medications as prescribed today  The patient was brought to the catheterization lab in a temporary venous pacemaker was placed  Ventricular rate set to 70 beats per minute with 10 milliamps  7/28/22 EKG: AV dissociation   Sinus rhythm with complete heart block     Ventricular escape with RBBB pattern at rate of 43 bpm          Historical Information   Past Medical History:   Diagnosis Date    Arthritis     both shoulders    CAD (coronary artery disease)     Last Assessed:  11/4/13    Cigarette smoker     Colon polyp     Elevated prostate specific antigen (PSA)     Last Assessed:  12/21/17    Enlarged prostate     Exercise involving walking     in season hunt's and fish's/ 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"    Full dentures     but doesnt wear them    History of 2019 novel coronavirus disease (COVID-19) 02/2021    hospital for 2 days but not in ICU/"mainly dehydrated" "also fever/oxygen below 90"    History of coronary artery stent placement     x2 in 2008 or so; sees Maia Rivera-- Dr Tana Young cardio   [facility] (hard of hearing)     no hearing aids yet    Hyperlipidemia     Last Assessed:  11/24/17    Hypertension     Benign essential, Last Assessed:  11/24/17    LBBB (left bundle branch block)     Nocturia     Polycythemia vera (HCC)     (per history in chart)    PVD (peripheral vascular disease) (HCC)     RBC abnormality     pt reports told has high Red blood cells/goes to infusion center regularly next appt 10/8/21 hematologist is Dr Francia Chappell of [facility](had been Dr Beltran Dos Santos)   [address] of breath     "if goes up steps gets SOB, had for awhile"    Slow urinary stream     "sometimes feels like bladder isnt all the way empty"    Snores     Wears glasses     reading     Past Surgical History:   Procedure Laterality Date    ANGIOPLASTY      APPENDECTOMY      CATARACT EXTRACTION Bilateral     CORONARY ARTERY BYPASS GRAFT      CT CYSTOGRAM  11/4/2021    ELBOW SURGERY Right     FL CYSTOGRAM  10/27/2021    KNEE ARTHROSCOPY Right     MI COLONOSCOPY FLX DX W/COLLJ SPEC WHEN PFRMD N/A 5/22/2017    Procedure: COLONOSCOPY;  Surgeon: Opal Mead DO;  [facility]: [facility];   Service: Gastroenterology    PROSTATE BIOPSY      PROSTATECTOMY N/A 10/18/2021    Procedure: ROBOTIC SUBTOTAL/SUPRAPUBIC PROSTATECTOMY;  Surgeon: Clementina Esqueda MD;  Location: [facility] OR;  Service: Urology    SHOULDER ARTHROSCOPY Right     TONSILLECTOMY       Social History     Substance and Sexual Activity   Alcohol Use No     Social History     Substance and Sexual Activity   Drug Use No     Social History     Tobacco Use   Smoking Status Current Every Day Smoker    Packs/day: 0 50    Years: 35 00    Pack years: 17 50    Types: Cigarettes   Smokeless Tobacco Never Used   Tobacco Comment    a little less than 1/2ppd, trying to cut back before surgery last smoked 10/16     Family History: non-contributory    Meds/Allergies   Hospital Medications:   Current Facility-Administered Medications   Medication Dose Route Frequency    acetaminophen (TYLENOL) tablet 650 mg  650 mg Oral Q6H PRN    aspirin (ECOTRIN LOW STRENGTH) EC tablet 81 mg  81 mg Oral Daily    atorvastatin (LIPITOR) tablet 80 mg  80 mg Oral QPM    atropine 1 mg/10 mL injection **ADS Override Pull**        calcitriol (ROCALTROL) capsule 0 25 mcg  0 25 mcg Oral Once per day on Mon Wed Fri    cholecalciferol (VITAMIN D3) tablet 1,000 Units  1,000 Units Oral QPM    finasteride (PROSCAR) tablet 5 mg  5 mg Oral Daily    heparin (porcine) subcutaneous injection 5,000 Units  5,000 Units Subcutaneous Q8H [address]    hydroxyurea (HYDREA) capsule 500 mg  500 mg Oral BID    ondansetron (ZOFRAN) injection 4 mg  4 mg Intravenous Q6H PRN     Home Medications:   Medications Prior to Admission   Medication    acetaminophen (TYLENOL) 500 mg tablet    amLODIPine (NORVASC) 10 mg tablet    ascorbic acid (VITAMIN C) 1000 MG tablet    aspirin (ECOTRIN LOW STRENGTH) 81 mg EC tablet    atorvastatin (LIPITOR) 80 mg tablet    calcitriol (ROCALTROL) 0 25 mcg capsule    Cholecalciferol (Vitamin D3) 50 MCG (2000 UT) TABS    docusate sodium (COLACE) 100 mg capsule    ferrous sulfate 324 (65 Fe) mg    finasteride (PROSCAR) 5 mg tablet    hydroxyurea (HYDREA) 500 mg capsule    metoprolol tartrate (LOPRESSOR) 50 mg tablet    Multiple Vitamin (MULTIVITAMIN) capsule    nitroglycerin (NITROSTAT) 0 4 mg SL tablet    Omega-3 Fatty Acids (FISH OIL) 1200 MG CAPS       Allergies   Allergen Reactions    Other Other (See Comments)     Pt and wife reports took a medication years ago at work cant recall the name or what it was for"     "couldn't talk and cheeks swelled and also right hand"       Objective   Vitals: Blood pressure 114/61, pulse 69, temperature (!) 97 3 °F (36 3 °C), temperature source Axillary, resp  rate 19, height 5' 8" (1 727 m), weight 77 kg (169 lb 12 1 oz), SpO2 95 %  Orthostatic Blood Pressures    Flowsheet Row Most Recent Value   Blood Pressure 114/61 filed at 07/29/2022 0000   Patient Position - Orthostatic VS Lying filed at 07/28/2022 2042            Intake/Output Summary (Last 24 hours) at 7/29/2022 0053  Last data filed at 7/29/2022 0016  Gross per 24 hour   Intake 45 06 ml   Output 300 ml   Net -254 94 ml       Invasive Devices  Report    Peripheral Intravenous Line  Duration           Peripheral IV 07/28/22 Left Antecubital 1 day    Peripheral IV 07/28/22 Right Antecubital <1 day          Line  Duration           Venous Sheath 6 Fr  Right Femoral <1 day          Drain  Duration           Continuous Bladder Irrigation Three-way 283 days                Review of Systems:  Review of Systems   Constitutional: Negative for decreased appetite  HENT: Negative for congestion  Eyes: Negative for blurred vision  Cardiovascular: Positive for dyspnea on exertion and near-syncope  Negative for chest pain, claudication, leg swelling, orthopnea, palpitations and syncope  Respiratory: Positive for shortness of breath  Negative for cough  Endocrine: Negative for cold intolerance  Musculoskeletal: Negative for arthritis  Neurological: Negative for aphonia  Psychiatric/Behavioral: Negative for altered mental status  ROS as noted above, otherwise 12 point review of systems was performed and is negative  Physical Exam:   Physical Exam  Constitutional:       General: He is not in acute distress  HENT:      Head: Normocephalic  Cardiovascular:      Rate and Rhythm: Bradycardia present  Rhythm irregular  Pulses: Normal pulses  Heart sounds: No murmur heard  Pulmonary:      Effort: Pulmonary effort is normal  No respiratory distress  Breath sounds: No wheezing or rales  Abdominal:      General: Abdomen is flat  There is no distension  Tenderness:  There is no abdominal tenderness  Musculoskeletal:         General: No swelling  Skin:     General: Skin is warm and dry  Neurological:      General: No focal deficit present  Mental Status: He is alert  Psychiatric:         Mood and Affect: Mood normal          Lab Results: I have personally reviewed pertinent lab results      Results from last 7 days   Lab Units 07/28/22  1727 07/27/22  0942   WBC Thousand/uL 7 07 7 27   HEMOGLOBIN g/dL 11 9* 13 3   HEMATOCRIT % 33 1* 37 9   PLATELETS Thousands/uL 86* 101*     Results from last 7 days   Lab Units 07/28/22  1727   POTASSIUM mmol/L 3 5   CHLORIDE mmol/L 103   CO2 mmol/L 24   BUN mg/dL 18   CREATININE mg/dL 1 60*   CALCIUM mg/dL 8 4     Results from last 7 days   Lab Units 07/28/22  1727   INR  1 10   PTT seconds 26     Results from last 7 days   Lab Units 07/28/22  1727   MAGNESIUM mg/dL 2 0

## 2022-07-29 NOTE — ASSESSMENT & PLAN NOTE
· S/p remote PCI in 2008, patient believes that he has 2 stents, no records to confirm   · Continue PTA ASA and statin   · Hold BB 2/2 CHB as above

## 2022-07-29 NOTE — ANESTHESIA POSTPROCEDURE EVALUATION
Post-Op Assessment Note    CV Status:  Stable  Pain Score: 0    Pain management: adequate     Mental Status:  Alert and awake   Hydration Status:  Euvolemic   PONV Controlled:  Controlled   Airway Patency:  Patent   Two or more mitigation strategies used for obstructive sleep apnea   Post Op Vitals Reviewed: Yes      Staff: CRNA   Comments: awake, transferred directly back to crit care floor on monitor by RN         There were no known complications for this encounter      BP   104/72   Temp   98 5   Pulse  80   Resp   16   SpO2   93

## 2022-07-29 NOTE — QUICK NOTE
Patient seen and evaluated by Critical Care today and deemed to be appropriate for transfer to Med Surg with Telemetry   Spoke to Dr Charito Nichole from [address] [place] [address] to accept patient transfer  Critical care can be contacted via [employer] with any questions or concerns      Jena Anglin PA-C

## 2022-07-30 ENCOUNTER — APPOINTMENT (INPATIENT)
Dept: RADIOLOGY | Facility: HOSPITAL | Age: 77
DRG: 242 | End: 2022-07-30
Payer: COMMERCIAL

## 2022-07-30 LAB
ANION GAP SERPL CALCULATED.3IONS-SCNC: 3 MMOL/L (ref 4–13)
BASOPHILS # BLD AUTO: 0.01 THOUSANDS/ΜL (ref 0–0.1)
BASOPHILS NFR BLD AUTO: 0 % (ref 0–1)
BUN SERPL-MCNC: 18 MG/DL (ref 5–25)
CA-I BLD-SCNC: 1.11 MMOL/L (ref 1.12–1.32)
CALCIUM SERPL-MCNC: 8.5 MG/DL (ref 8.3–10.1)
CHLORIDE SERPL-SCNC: 114 MMOL/L (ref 96–108)
CO2 SERPL-SCNC: 26 MMOL/L (ref 21–32)
CREAT SERPL-MCNC: 1.19 MG/DL (ref 0.6–1.3)
EOSINOPHIL # BLD AUTO: 0.06 THOUSAND/ΜL (ref 0–0.61)
EOSINOPHIL NFR BLD AUTO: 1 % (ref 0–6)
ERYTHROCYTE [DISTWIDTH] IN BLOOD BY AUTOMATED COUNT: 17 % (ref 11.6–15.1)
FOLATE SERPL-MCNC: >20 NG/ML (ref 3.1–17.5)
GFR SERPL CREATININE-BSD FRML MDRD: 59 ML/MIN/1.73SQ M
GLUCOSE SERPL-MCNC: 102 MG/DL (ref 65–140)
HCT VFR BLD AUTO: 35 % (ref 36.5–49.3)
HGB BLD-MCNC: 12.3 G/DL (ref 12–17)
IMM GRANULOCYTES # BLD AUTO: 0.02 THOUSAND/UL (ref 0–0.2)
IMM GRANULOCYTES NFR BLD AUTO: 0 % (ref 0–2)
LYMPHOCYTES # BLD AUTO: 1.34 THOUSANDS/ΜL (ref 0.6–4.47)
LYMPHOCYTES NFR BLD AUTO: 22 % (ref 14–44)
MAGNESIUM SERPL-MCNC: 2.5 MG/DL (ref 1.6–2.6)
MCH RBC QN AUTO: 45.7 PG (ref 26.8–34.3)
MCHC RBC AUTO-ENTMCNC: 35.1 G/DL (ref 31.4–37.4)
MCV RBC AUTO: 130 FL (ref 82–98)
MONOCYTES # BLD AUTO: 0.44 THOUSAND/ΜL (ref 0.17–1.22)
MONOCYTES NFR BLD AUTO: 7 % (ref 4–12)
NEUTROPHILS # BLD AUTO: 4.33 THOUSANDS/ΜL (ref 1.85–7.62)
NEUTS SEG NFR BLD AUTO: 70 % (ref 43–75)
NRBC BLD AUTO-RTO: 1 /100 WBCS
PLATELET # BLD AUTO: 69 THOUSANDS/UL (ref 149–390)
PMV BLD AUTO: 11 FL (ref 8.9–12.7)
POTASSIUM SERPL-SCNC: 4 MMOL/L (ref 3.5–5.3)
RBC # BLD AUTO: 2.69 MILLION/UL (ref 3.88–5.62)
SODIUM SERPL-SCNC: 143 MMOL/L (ref 135–147)
VIT B12 SERPL-MCNC: 728 PG/ML (ref 100–900)
WBC # BLD AUTO: 6.2 THOUSAND/UL (ref 4.31–10.16)

## 2022-07-30 PROCEDURE — 85025 COMPLETE CBC W/AUTO DIFF WBC: CPT | Performed by: INTERNAL MEDICINE

## 2022-07-30 PROCEDURE — 82330 ASSAY OF CALCIUM: CPT | Performed by: INTERNAL MEDICINE

## 2022-07-30 PROCEDURE — 99024 POSTOP FOLLOW-UP VISIT: CPT | Performed by: PHYSICIAN ASSISTANT

## 2022-07-30 PROCEDURE — 82607 VITAMIN B-12: CPT | Performed by: INTERNAL MEDICINE

## 2022-07-30 PROCEDURE — 71046 X-RAY EXAM CHEST 2 VIEWS: CPT

## 2022-07-30 PROCEDURE — 82746 ASSAY OF FOLIC ACID SERUM: CPT | Performed by: INTERNAL MEDICINE

## 2022-07-30 PROCEDURE — 80048 BASIC METABOLIC PNL TOTAL CA: CPT | Performed by: INTERNAL MEDICINE

## 2022-07-30 PROCEDURE — 99239 HOSP IP/OBS DSCHRG MGMT >30: CPT | Performed by: INTERNAL MEDICINE

## 2022-07-30 PROCEDURE — 83735 ASSAY OF MAGNESIUM: CPT | Performed by: INTERNAL MEDICINE

## 2022-07-30 RX ORDER — METOPROLOL TARTRATE 50 MG/1
50 TABLET, FILM COATED ORAL EVERY 12 HOURS SCHEDULED
Status: DISCONTINUED | OUTPATIENT
Start: 2022-07-30 | End: 2022-07-30 | Stop reason: HOSPADM

## 2022-07-30 RX ADMIN — METOPROLOL TARTRATE 50 MG: 50 TABLET, FILM COATED ORAL at 10:44

## 2022-07-30 RX ADMIN — FINASTERIDE 5 MG: 5 TABLET, FILM COATED ORAL at 08:48

## 2022-07-30 RX ADMIN — ASPIRIN 81 MG: 81 TABLET, COATED ORAL at 08:48

## 2022-07-30 RX ADMIN — HYDROXYUREA 500 MG: 500 CAPSULE ORAL at 08:48

## 2022-07-30 NOTE — ASSESSMENT & PLAN NOTE
Status post dual-chamber ppm placement by Cardiology  Beta-blocker resumed  Cleared by Cardiology for discharge

## 2022-07-30 NOTE — NURSING NOTE
Patient given discharge instructions and reviewed with patient and spouse in room  All parties understanding of the instructions provided to the patient  IV removed  All belongings checked and accounted for       Patient left facility via wheelchair and PCA assist

## 2022-07-30 NOTE — PROGRESS NOTES
Progress Note - Electrophysiology  Reta Dowell 77 y o  male MRN: [id number]  Unit/Bed#: [facility] 502-01 Encounter: [id number]      Assessment/Plan:  1  Complete Heart Block s/p MDT DC PPM implant 7/29/22  2  CAD  s/p NAOMIE to LAD in 2008  3  HTN - resume home Lopressor 50 mg BID      Device instructions and restrictions were discussed with the patient in detail  He will also be provided with written instructions detailing what was discussed  Patient also requires a 2 week device and site check which has already been arranged and is in Epic  In terms of medications, he has been restarted on his home Lopressor 50 mg BID  Patient is stable from an EP standpoint  Subjective/Objective   Subjective: Reta Dowell is POD # 1 Medtronic dual chamber pacemaker implant  His incision is clean, dry, and intact without evidence of hematoma or ecchymosis or signs of infection  Vital signs and labs were reviewed  Assessment of chest x-rays show proper lead placement without evidence of pneumothorax  Device interrogation showed appropriate device function with lead parameters such as sensing, threshold, and impedance being tested  Patient denies chest pain/heaviness/tightness/pressure, palpitations, shortness of breath, orthopnea, lightheadedness, presyncope, syncope or N/V      Telemetry shows AS/ in the 60s        Objective:  Vitals: /80   Pulse 61   Temp (!) 97 3 °F (36 3 °C)   Resp 20   Ht 5' 8" (1 727 m)   Wt 75 2 kg (165 lb 12 6 oz)   SpO2 97%   BMI 25 21 kg/m²     Vitals:    07/29/22 0703 07/30/22 0513   Weight: 76 7 kg (169 lb) 75 2 kg (165 lb 12 6 oz)     Orthostatic Blood Pressures    Flowsheet Row Most Recent Value   Blood Pressure 151/80 filed at 07/30/2022 3865   Patient Position - Orthostatic VS Lying filed at 07/29/2022 1718            Intake/Output Summary (Last 24 hours) at 7/30/2022 1027  Last data filed at 7/30/2022 0852  Gross per 24 hour   Intake 925 84 ml   Output 800 ml   Net 125 84 ml Invasive Devices  Report    Peripheral Intravenous Line  Duration           Peripheral IV 07/28/22 Right Antecubital 1 day    Peripheral IV 07/29/22 Left;Ventral (anterior) Forearm 1 day          Drain  Duration           Continuous Bladder Irrigation Three-way 284 days                          Scheduled Meds:  Current Facility-Administered Medications   Medication Dose Route Frequency Provider Last Rate    acetaminophen  650 mg Oral Q6H PRN Marcelo Zamora PA-C      aspirin  81 mg Oral Daily [place]      atorvastatin  80 mg Oral QPM Marcelo Zamora PA-C      calcitriol  0 25 mcg Oral Once per day on Mon Wed Fri [place] SADIE Fernandez      cholecalciferol  1,000 Units Oral QPM [facility]      finasteride  5 mg Oral Daily [place], [place]      heparin (porcine)  5,000 Units Subcutaneous [place], [place]      hydroxyurea  500 mg Oral BID [place] SADIE Fernandez      ondansetron  4 mg Intravenous Q6H PRN Marcelo Zamora PA-C      oxyCODONE  2 5 mg Oral Q6H PRN Eve Alexander SADIE Fernandez      [facility] Hold] perflutren lipid microsphere  1 mL/min Intravenous Once in imaging [facility] Medic[facility] pratt       Continuous Infusions:   PRN Meds:   acetaminophen    ondansetron    oxyCODONE    [MAR Hold] perflutren lipid microsphere    Review of Systems:  ROS as noted above, otherwise 12 point review of systems was performed and is negative  Physical Exam:   Physical Exam  Vitals reviewed  Constitutional:       General: He is not in acute distress  Appearance: He is not ill-appearing or diaphoretic  HENT:      Head: Normocephalic and atraumatic  Right Ear: External ear normal       Left Ear: External ear normal       Nose: Nose normal    Eyes:      General:         Right eye: No discharge  Left eye: No discharge  Cardiovascular:      Rate and Rhythm: Normal rate and regular rhythm  Heart sounds: No murmur heard  No friction rub     Pulmonary:      Effort: Pulmonary effort is normal       Breath sounds: Normal breath sounds  No wheezing, rhonchi or rales  Chest:       Abdominal:      General: There is no distension  Palpations: Abdomen is soft  Tenderness: There is no abdominal tenderness  Musculoskeletal:         General: No deformity or signs of injury  Cervical back: No rigidity  No muscular tenderness  Right lower leg: No edema  Left lower leg: No edema  Skin:     General: Skin is warm and dry  Capillary Refill: Capillary refill takes less than 2 seconds  Coloration: Skin is not jaundiced or pale  Neurological:      General: No focal deficit present  Mental Status: He is alert and oriented to person, place, and time  Mental status is at baseline  Psychiatric:         Mood and Affect: Mood normal          Behavior: Behavior normal          Thought Content: Thought content normal                    Lab Results: I have personally reviewed pertinent lab results  Results from last 7 days   Lab Units 07/30/22  0511 07/29/22  0453 07/28/22  1727 07/27/22  0942   WBC Thousand/uL 6 20 7 04 7 07 7 27   HEMOGLOBIN g/dL 12 3 12 1 11 9* 13 3   HEMATOCRIT % 35 0* 34 4* 33 1* 37 9   PLATELETS Thousands/uL 69*  --  86* 101*     Results from last 7 days   Lab Units 07/30/22  0511 07/29/22  0453 07/28/22  1727   POTASSIUM mmol/L 4 0 3 9 3 5   CHLORIDE mmol/L 114* 113* 103   CO2 mmol/L 26 25 24   BUN mg/dL 18 17 18   CREATININE mg/dL 1 19 1 23 1 60*   CALCIUM mg/dL 8 5 8 4 8 4     Results from last 7 days   Lab Units 07/28/22  1727   INR  1 10   PTT seconds 26     Results from last 7 days   Lab Units 07/30/22  0511 07/29/22  0453 07/28/22  1727   MAGNESIUM mg/dL 2 5 2 4 2 0       Imaging: I have personally reviewed pertinent films in PACS  ECHO: 7/29/22    Left Ventricle: Left ventricular cavity size is normal  Wall thickness is mildly increased  There is concentric remodeling  The left ventricular ejection fraction is grossly within normal >55%  Wall motion cannot be accurately assessed    IVS: There is abnormal septal motion consistent with right ventricular pacing    Right Ventricle: Right ventricular cavity size is dilated        VTE Pharmacologic Prophylaxis: Heparin  VTE Mechanical Prophylaxis: sequential compression device

## 2022-07-30 NOTE — DISCHARGE SUMMARY
[facility]  Discharge- Daylin Lopez 1/78/1858, 77 y o  male MRN: [id number]  Unit/Bed#: [facility] 502-01 Encounter: [id number]  Primary Care Provider: Geena Jin DO   Date and time admitted to hospital: 7/28/2022  8:28 PM    * Complete heart block [facility])  Assessment & Plan  Status post dual-chamber ppm placement by Cardiology  Beta-blocker resumed  Cleared by Cardiology for discharge    Tobacco user  Assessment & Plan  Tobacco cessation strongly encouraged    Prediabetes  Assessment & Plan  Therapeutic lifestyle modification    Polycythemia vera ([facility] )  Assessment & Plan  Outpatient Hematology input noted  Outpatient follow-up    Mixed hyperlipidemia  Assessment & Plan  Continue statin    CAD (coronary artery disease)  Assessment & Plan  Continue aspirin and statin beta-blocker      Benign essential hypertension  Assessment & Plan  Monitor blood pressure  Outpatient follow-up            Discharge Summary - [facility]    Patient Information: Daylin Lopez 77 y o  male MRN: [id number]  Unit/Bed#: [facility] 502-01 Encounter: [id number]    Discharging Physician / Practitioner: Gerson Huitron MD  PCP: Geena Jin DO  Admission Date: 7/28/2022  Discharge Date: 07/30/22    Disposition:     Home    Reason for Admission:  Dizziness and shortness of breath    Discharge Diagnoses:     Principal Problem:    Complete heart block ([place] [address] )  Active Problems:    Benign essential hypertension    CAD (coronary artery disease)    Mixed hyperlipidemia    Polycythemia vera ([facility] )    Prediabetes    Tobacco user    BPH with obstruction/lower urinary tract symptoms    JAK2 gene mutation    Stage 3b chronic kidney disease ([place] [address] )  Resolved Problems:    Secondary hyperparathyroidism of renal origin [facility])      Consultations During Hospital Stay:  · Cardiology    Procedures Performed:     · Chest x-ray no active lung disease  · 2D echo EF 55%  · Dual-chamber ppm per Cardiology  · Chest x-ray left-sided pacemaker      Hospital Course:     Jose Hall is a 77 y o  male patient who originally presented to the hospital on 7/28/2022 due to dizziness shortness of breath  Patient initially presented to [facility] ED with symptoms of dizziness and shortness of breath  He was noted to high grade second-degree heart block  He was placed on IV dopamine and transferred to [facility] for cardiology evaluation  He was admitted to the ICU, transvenous pacemaker was placed  He was monitored in ICU he is now status post ppm placement by Cardiology  He is symptomatically improved hemodynamically stable  He was ER by Cardiology today and cleared for discharge today  Beta-blocker resumed  His chronic conditions remained stable, no medication changes at discharge  Kindly the chart for details  Condition at Discharge: fair     Discharge Day Visit / Exam:     Subjective:      Comfortably in bed  Reports feeling better  Agreeable to discharge plan    Vitals: Blood Pressure: 150/88 (07/30/22 1041)  Pulse: 69 (07/30/22 1041)  Temperature: (!) 97 3 °F (36 3 °C) (07/30/22 0232)  Temp Source: Oral (07/29/22 1200)  Respirations: 20 (07/30/22 0232)  Height: 5' 8" (172 7 cm) (07/29/22 0703)  Weight - Scale: 75 2 kg (165 lb 12 6 oz) (07/30/22 0513)  SpO2: 98 % (07/30/22 1041)  Exam:   Physical Exam    Comfortably in bed  Neck supple  Lungs emphysematous  Diminished breath sounds bilateral  Heart sounds S1-S2 noted  Abdomen soft  Awake alert obeys simple commands  No pedal edema  No rash    Discharge instructions/Information to patient and family:   See after visit summary for information provided to patient and family        Discharge plan discussed with Cardiology  Discharge plan discussed the patient, updated spouse in detail questions answered  Outpatient follow-up with Cardiology, Hematology, primary care physician    Provisions for Follow-Up Care:  See after visit summary for information related to follow-up care and any pertinent home health orders  Planned Readmission: no     Discharge Statement:  I spent 45 minutes discharging the patient  This time was spent on the day of discharge  I had direct contact with the patient on the day of discharge  Greater than 50% of the total time was spent examining patient, answering all patient questions, arranging and discussing plan of care with patient as well as directly providing post-discharge instructions  Additional time then spent on discharge activities  Discharge Medications:  See after visit summary for reconciled discharge medications provided to patient and family        ** Please Note: This note has been constructed using a voice recognition system **

## 2022-08-01 ENCOUNTER — TELEPHONE (OUTPATIENT)
Dept: NEPHROLOGY | Facility: CLINIC | Age: 77
End: 2022-08-01

## 2022-08-01 ENCOUNTER — TRANSITIONAL CARE MANAGEMENT (OUTPATIENT)
Dept: FAMILY MEDICINE CLINIC | Facility: CLINIC | Age: 77
End: 2022-08-01

## 2022-08-01 VITALS
HEIGHT: 68 IN | WEIGHT: 165.79 LBS | RESPIRATION RATE: 18 BRPM | BODY MASS INDEX: 25.13 KG/M2 | HEART RATE: 97 BPM | SYSTOLIC BLOOD PRESSURE: 137 MMHG | TEMPERATURE: 98.5 F | DIASTOLIC BLOOD PRESSURE: 80 MMHG | OXYGEN SATURATION: 95 %

## 2022-08-01 LAB
ATRIAL RATE: 64 BPM
P AXIS: 42 DEGREES
PR INTERVAL: 158 MS
QRS AXIS: -89 DEGREES
QRSD INTERVAL: 130 MS
QT INTERVAL: 440 MS
QTC INTERVAL: 453 MS
T WAVE AXIS: 80 DEGREES
VENTRICULAR RATE: 64 BPM

## 2022-08-01 PROCEDURE — 93010 ELECTROCARDIOGRAM REPORT: CPT | Performed by: INTERNAL MEDICINE

## 2022-08-01 NOTE — UTILIZATION REVIEW
Notification of Discharge   This is a Notification of Discharge from our facility [address]  Please be advised that this patient has been discharge from our facility  Below you will find the admission and discharge date and time including the patients disposition  UTILIZATION REVIEW CONTACT:  Mardee Flirt  Utilization   Network Utilization Review Department  Phone: [phone] x carefully listen to the prompts  All voicemails are confidential   Email: [email or web address]     PHYSICIAN ADVISORY SERVICES:  FOR DAED-UE-SROJ REVIEW - MEDICAL NECESSITY DENIAL  Phone: [phone]  Fax: [phone]  Email: [email or web address]     PRESENTATION DATE: 7/28/2022  8:28 PM  OBERVATION ADMISSION DATE:   INPATIENT ADMISSION DATE: 7/28/22  8:28 PM   DISCHARGE DATE: 7/30/2022  1:45 PM  DISPOSITION: Home/Self Care Home/Self Care      IMPORTANT INFORMATION:  Send all requests for admission clinical reviews, approved or denied determinations and any other requests to dedicated fax number below belonging to the campus where the patient is receiving treatment   List of dedicated fax numbers:  [address] [place] [address] DENIALS (Administrative/Medical Necessity) [phone]   [address] (Maternity/NICU/Pediatrics) [phone]   [facility] [phone]   [address] [facility] [phone]   [address] [place] [address] [phone]   [address] [place] [address],[address] [phone]   [facility]  [phone]   [address] [place] [address]  [address] [facility] [address] [facility] [phone]

## 2022-08-03 NOTE — TELEPHONE ENCOUNTER
Left message to schedule December follow up appointment with Dr Charla Lincoln in [facility]  This is the 2nd attempt

## 2022-08-08 ENCOUNTER — OFFICE VISIT (OUTPATIENT)
Dept: FAMILY MEDICINE CLINIC | Facility: CLINIC | Age: 77
End: 2022-08-08
Payer: COMMERCIAL

## 2022-08-08 VITALS
SYSTOLIC BLOOD PRESSURE: 110 MMHG | HEART RATE: 62 BPM | WEIGHT: 166 LBS | OXYGEN SATURATION: 99 % | TEMPERATURE: 97.8 F | BODY MASS INDEX: 25.16 KG/M2 | DIASTOLIC BLOOD PRESSURE: 70 MMHG | HEIGHT: 68 IN

## 2022-08-08 DIAGNOSIS — I25.10 CORONARY ARTERY DISEASE WITHOUT ANGINA PECTORIS, UNSPECIFIED VESSEL OR LESION TYPE, UNSPECIFIED WHETHER NATIVE OR TRANSPLANTED HEART: ICD-10-CM

## 2022-08-08 DIAGNOSIS — I45.9 HEART BLOCK: Primary | ICD-10-CM

## 2022-08-08 DIAGNOSIS — Z95.0 HISTORY OF CARDIAC PACEMAKER: ICD-10-CM

## 2022-08-08 PROCEDURE — 99496 TRANSJ CARE MGMT HIGH F2F 7D: CPT | Performed by: FAMILY MEDICINE

## 2022-08-08 RX ORDER — NITROGLYCERIN 0.4 MG/1
0.4 TABLET SUBLINGUAL
Qty: 10 TABLET | Refills: 1 | Status: SHIPPED | OUTPATIENT
Start: 2022-08-08

## 2022-08-08 NOTE — ASSESSMENT & PLAN NOTE
Patient with heart block and symptomatic bradycardia requiring pacemaker placement  Patient is feeling well since discharge home  He does report some general fatigue symptoms  We discussed physical therapy to help with endurance which he refused today  Follow up with cardiology  If fatigue does not improve within 3-4 weeks then he should follow up in the office  If he experiences recurrence of symptoms, go to the ER

## 2022-08-08 NOTE — ASSESSMENT & PLAN NOTE
Continue management per cardiology  Continue aspirin, statin and beta-blocker  Patient instructed to go to the ER if he experiences chest pain  He may take nitro as needed

## 2022-08-08 NOTE — PROGRESS NOTES
Assessment/Plan:    1  Heart block  Assessment & Plan:  Patient with heart block and symptomatic bradycardia requiring pacemaker placement  Patient is feeling well since discharge home  He does report some general fatigue symptoms  We discussed physical therapy to help with endurance which he refused today  Follow up with cardiology  If fatigue does not improve within 3-4 weeks then he should follow up in the office  If he experiences recurrence of symptoms, go to the ER  2  Coronary artery disease without angina pectoris, unspecified vessel or lesion type, unspecified whether native or transplanted heart  Assessment & Plan:  Continue management per cardiology  Continue aspirin, statin and beta-blocker  Patient instructed to go to the ER if he experiences chest pain  He may take nitro as needed  Orders:  -     nitroglycerin (NITROSTAT) 0 4 mg SL tablet; Place 1 tablet (0 4 mg total) under the tongue every 5 (five) minutes as needed for chest pain    3  History of cardiac pacemaker  Assessment & Plan:  S/p pacemaker placement        Subjective:      Patient ID: Mame Tabares is a 77 y o  male  HPI    Patient presenting for TCM after hospital admission with second degree heart block  Patient had symptoms of shortness of breath and chest pain which prompted EMS call  He had bradycardia and was transferred to [facility] where he had a pacemaker placed  He was monitored in ICU and cleared for discharge home on 7/30  Patient states that he has been feeling tired with low energy since being home  He usually is very active and does outdoor work  He denies chest pain, dyspnea, and focal weakness  His daughter is present with him today  Patient has appoitnemnt with cardiology scheduled in several weeks  No other complaints today       The following portions of the patient's history were reviewed and updated as appropriate: allergies, current medications, past family history, past medical history, past social history, past surgical history, and problem list       Current Outpatient Medications:     acetaminophen (TYLENOL) 500 mg tablet, Take 500 mg by mouth every 6 (six) hours as needed for mild pain, Disp: , Rfl:     amLODIPine (NORVASC) 10 mg tablet, Take 10 mg by mouth every evening , Disp: , Rfl:     ascorbic acid (VITAMIN C) 1000 MG tablet, Take 1 tablet (1,000 mg total) by mouth every 12 (twelve) hours for 10 doses (Patient taking differently: Take 1,000 mg by mouth 2 (two) times a day), Disp: 10 tablet, Rfl: 0    aspirin (ECOTRIN LOW STRENGTH) 81 mg EC tablet, Take 81 mg by mouth daily, Disp: , Rfl:     atorvastatin (LIPITOR) 80 mg tablet, TAKE 1 TABLET BY MOUTH EVERY EVENING, Disp: 90 tablet, Rfl: 0    ferrous sulfate 324 (65 Fe) mg, Take 1 tablet (324 mg total) by mouth 2 (two) times a day before meals, Disp: 180 tablet, Rfl: 0    finasteride (PROSCAR) 5 mg tablet, TAKE 1 TABLET BY MOUTH EVERY DAY, Disp: 90 tablet, Rfl: 0    hydroxyurea (HYDREA) 500 mg capsule, TAKE 1 CAPSULE BY MOUTH TWICE A DAY, Disp: 180 capsule, Rfl: 1    metoprolol tartrate (LOPRESSOR) 50 mg tablet, Take 50 mg by mouth 2 (two) times a day , Disp: , Rfl:     Multiple Vitamin (MULTIVITAMIN) capsule, Take 1 capsule by mouth daily, Disp: , Rfl:     nitroglycerin (NITROSTAT) 0 4 mg SL tablet, Place 1 tablet (0 4 mg total) under the tongue every 5 (five) minutes as needed for chest pain, Disp: 10 tablet, Rfl: 1    Omega-3 Fatty Acids (FISH OIL) 1200 MG CAPS, Take 1,200 mg by mouth 2 (two) times a day  , Disp: , Rfl:     calcitriol (ROCALTROL) 0 25 mcg capsule, Take 1 capsule (0 25 mcg total) by mouth 3 (three) times a week (Patient not taking: Reported on 8/8/2022), Disp: 36 capsule, Rfl: 4    Cholecalciferol (Vitamin D3) 50 MCG (2000 UT) TABS, Take 500 Units by mouth every evening (Patient not taking: No sig reported), Disp: , Rfl:     docusate sodium (COLACE) 100 mg capsule, Take 1 capsule (100 mg total) by mouth 2 (two) times a day (Patient not taking: No sig reported), Disp: 28 capsule, Rfl: 0      Review of Systems   Constitutional: Positive for fatigue  Negative for chills and fever  HENT: Negative for ear pain and sore throat  Eyes: Negative for pain and visual disturbance  Respiratory: Negative for cough and shortness of breath  Cardiovascular: Negative for chest pain and palpitations  Gastrointestinal: Negative for abdominal pain and vomiting  Genitourinary: Negative for dysuria and hematuria  Musculoskeletal: Negative for arthralgias and back pain  Skin: Negative for color change and rash  Neurological: Negative for seizures and syncope  All other systems reviewed and are negative  Objective:      /70 (BP Location: Left arm, Patient Position: Sitting, Cuff Size: Standard)   Pulse 62   Temp 97 8 °F (36 6 °C) (Tympanic)   Ht 5' 8" (1 727 m)   Wt 75 3 kg (166 lb)   SpO2 99%   BMI 25 24 kg/m²          Physical Exam  Vitals and nursing note reviewed  Constitutional:       General: He is not in acute distress  Appearance: Normal appearance  HENT:      Head: Normocephalic and atraumatic  Right Ear: External ear normal  There is no impacted cerumen  Left Ear: External ear normal  There is no impacted cerumen  Nose: Nose normal       Mouth/Throat:      Mouth: Mucous membranes are moist       Pharynx: Oropharynx is clear  No oropharyngeal exudate  Eyes:      Extraocular Movements: Extraocular movements intact  Conjunctiva/sclera: Conjunctivae normal    Neck:      Thyroid: No thyromegaly  Vascular: No carotid bruit  Cardiovascular:      Rate and Rhythm: Normal rate and regular rhythm  Heart sounds: Normal heart sounds  No murmur heard  Pulmonary:      Effort: Pulmonary effort is normal  No accessory muscle usage or respiratory distress  Breath sounds: Normal breath sounds and air entry     Chest:       Abdominal:      General: Bowel sounds are normal  There is no distension  Palpations: Abdomen is soft  There is no mass  Tenderness: There is no abdominal tenderness  Musculoskeletal:         General: Normal range of motion  Cervical back: Full passive range of motion without pain and normal range of motion  Right lower leg: No edema  Left lower leg: No edema  Lymphadenopathy:      Cervical: No cervical adenopathy  Skin:     General: Skin is warm and dry  Neurological:      General: No focal deficit present  Mental Status: He is alert and oriented to person, place, and time  Psychiatric:         Mood and Affect: Mood normal          Behavior: Behavior normal          Thought Content: Thought content normal            TCM Call     Date and time call was made  8/2/2022 12:17 PM    Hospital care reviewed  Records reviewed    Patient was hospitialized at  [facility]    Date of Admission  07/27/22    Date of discharge  07/30/22    Diagnosis  Complete heart block    Disposition  Home    Were the patients medications reviewed and updated  Yes    Current Symptoms  Fatigue    Fatigue severity  Mild      TCM Call     Post hospital issues  Reduced activity    Should patient be enrolled in anticoag monitoring? No    Scheduled for follow up?   Yes    Patients specialists  Urologist    Urologist name  Dr Claudia Willis    Did you obtain your prescribed medications  Yes    Do you need help managing your prescriptions or medications  No    Is transportation to your appointment needed  No    I have advised the patient to call PCP with any new or worsening symptoms  Lily Lopez MA    Living Arrangements  Spouse or Significiant other    Are you recieving any outpatient services  No    Are you recieving home care services  No    Are you using any community resources  No    Current waiver services  No    Have you fallen in the last 12 months  No    Interperter language line needed  No

## 2022-08-12 ENCOUNTER — IN-CLINIC DEVICE VISIT (OUTPATIENT)
Dept: CARDIOLOGY CLINIC | Facility: CLINIC | Age: 77
End: 2022-08-12

## 2022-08-12 DIAGNOSIS — Z95.0 PRESENCE OF CARDIAC PACEMAKER: Primary | ICD-10-CM

## 2022-08-12 PROCEDURE — 99024 POSTOP FOLLOW-UP VISIT: CPT | Performed by: INTERNAL MEDICINE

## 2022-08-12 NOTE — PROGRESS NOTES
Results for orders placed or performed in visit on 08/12/22   Cardiac EP device report    Narrative    MDT Rodriguez Rodriguez INTERROGATED IN THE [place] OFFICE  WOUND CHECK: INCISION CLEAN AND DRY WITH EDGES APPROXIMATED; WOUND CARE AND RESTRICTIONS REVIEWED WITH PATIENT  AP: 46 9%  : 98% (>40%~MVP-OFF~AVB)  ALL LEAD PARAMETERS WITHIN NORMAL LIMITS  NO SIGNIFICANT HIGH RATE EPISODES  NO PROGRAMMING CHANGES MADE TO DEVICE PARAMETERS  PACEMAKER FUNCTIONING APPROPRIATELY    [address] [place] [address] Street

## 2022-08-19 ENCOUNTER — TELEPHONE (OUTPATIENT)
Dept: HEMATOLOGY ONCOLOGY | Facility: CLINIC | Age: 77
End: 2022-08-19

## 2022-08-19 NOTE — TELEPHONE ENCOUNTER
Appointment Confirmation (to confirm pre existing appointments - ONLY)  No need to route   Appointment with  Dr Luz Frank   Appointment date & time 9/1 2:40PM   Location [place]   Patient verbilized Understanding  yes

## 2022-08-19 NOTE — TELEPHONE ENCOUNTER
Dr Juancarlos Leija schedule has changed again and a new message was left for Benito Maradiaga explaining that we would need to move his appointment one more time  He was actually moved up 1 week  I left the new date and time along with the office number and a My Chart message

## 2022-08-20 DIAGNOSIS — N13.8 BPH WITH OBSTRUCTION/LOWER URINARY TRACT SYMPTOMS: ICD-10-CM

## 2022-08-20 DIAGNOSIS — N40.1 BPH WITH OBSTRUCTION/LOWER URINARY TRACT SYMPTOMS: ICD-10-CM

## 2022-08-22 ENCOUNTER — PROCEDURE VISIT (OUTPATIENT)
Dept: UROLOGY | Facility: CLINIC | Age: 77
End: 2022-08-22
Payer: COMMERCIAL

## 2022-08-22 ENCOUNTER — APPOINTMENT (OUTPATIENT)
Dept: LAB | Facility: MEDICAL CENTER | Age: 77
End: 2022-08-22
Payer: COMMERCIAL

## 2022-08-22 VITALS
HEART RATE: 70 BPM | SYSTOLIC BLOOD PRESSURE: 120 MMHG | WEIGHT: 165 LBS | HEIGHT: 68 IN | DIASTOLIC BLOOD PRESSURE: 64 MMHG | BODY MASS INDEX: 25.01 KG/M2

## 2022-08-22 DIAGNOSIS — N99.115 POSTPROCEDURAL FOSSA NAVICULARIS URETHRAL STRICTURE: ICD-10-CM

## 2022-08-22 DIAGNOSIS — N40.1 BPH WITH OBSTRUCTION/LOWER URINARY TRACT SYMPTOMS: Primary | ICD-10-CM

## 2022-08-22 DIAGNOSIS — N13.8 BPH WITH OBSTRUCTION/LOWER URINARY TRACT SYMPTOMS: Primary | ICD-10-CM

## 2022-08-22 DIAGNOSIS — R97.20 ELEVATED PSA: ICD-10-CM

## 2022-08-22 DIAGNOSIS — R79.0 LOW IRON STORES: ICD-10-CM

## 2022-08-22 LAB
FERRITIN SERPL-MCNC: 411 NG/ML (ref 8–388)
FOLATE SERPL-MCNC: >20 NG/ML (ref 3.1–17.5)
IRON SATN MFR SERPL: 87 % (ref 20–50)
IRON SERPL-MCNC: 159 UG/DL (ref 65–175)
POST-VOID RESIDUAL VOLUME, ML POC: 11 ML
TIBC SERPL-MCNC: 183 UG/DL (ref 250–450)
VIT B12 SERPL-MCNC: 771 PG/ML (ref 100–900)

## 2022-08-22 PROCEDURE — 82746 ASSAY OF FOLIC ACID SERUM: CPT

## 2022-08-22 PROCEDURE — 83540 ASSAY OF IRON: CPT

## 2022-08-22 PROCEDURE — 82728 ASSAY OF FERRITIN: CPT

## 2022-08-22 PROCEDURE — 36415 COLL VENOUS BLD VENIPUNCTURE: CPT

## 2022-08-22 PROCEDURE — 82607 VITAMIN B-12: CPT

## 2022-08-22 PROCEDURE — 83550 IRON BINDING TEST: CPT

## 2022-08-22 PROCEDURE — 51798 US URINE CAPACITY MEASURE: CPT | Performed by: UROLOGY

## 2022-08-22 PROCEDURE — 52281 CYSTOSCOPY AND TREATMENT: CPT | Performed by: UROLOGY

## 2022-08-22 RX ORDER — FINASTERIDE 5 MG/1
TABLET, FILM COATED ORAL
Qty: 90 TABLET | Refills: 0 | Status: SHIPPED | OUTPATIENT
Start: 2022-08-22

## 2022-08-22 NOTE — LETTER
August 22, 2022     Ruddy Fabry, [address] [facility] [address]  [address]    Patient: Mame Tabares   YOB: 1945   Date of Visit: 8/22/2022       Dear Dr Aly Villegas:    Thank you for referring Lisa Magana to me for evaluation  Below are my notes for this consultation  If you have questions, please do not hesitate to call me  I look forward to following your patient along with you  Sincerely,        Gladys Robertson MD        CC: No Recipients  Gladys Robertson MD  8/22/2022  8:56 AM  Sign when Signing Visit  Patient is status post robotic simple prostatectomy in October 2021  Pathology revealed 121 g benign tissue  He did well postoperatively  However he developed worsening urinary stream and nocturia up to 3 times per night  He is concerned about these symptoms  Returns today for further evaluation  Cystoscopy     Date/Time 8/22/2022 8:53 AM     Performed by  Gladys Robertson MD     Authorized by Gladys Robertson MD          Procedure Details:  Procedure type: dilation of urethral stricture    Additional Procedure Details: Patient was prepped with Betadine  2% lidocaine jelly Uro jet was placed  However was unable to pass this beyond the fossa navicularis  A very tight, firm fossa navicularis stricture was encountered  Despite firm steady pressure with the Uro jet applicator, I was unable to break the stricture  We then opened a cone dilator and used this, again with confirm steady pressure  Eventually the stricture did rupture and dilate  The remainder of the lidocaine jelly was then instilled  The 16 [place] flexible cystoscope was placed  The rest of the urethra was completely normal without stricture  The prostatic fossa is widely patent with excellent postprocedure result  The bladder was difficult to evaluate due to some retained urine, however there is no obvious mucosal abnormality noted  Patient tolerated this procedure well        Plan   We discussed the tight fossa navicularis stricture likely due to Moreno catheter previously  Discussed recommendation for self dilation at home  Cone dilator was given to him  We discussed technique for daily dilation  The stricture is within 2 cm from the tip, and placement of the cone for about 4 cm will ensure that he is dilating the appropriate area  He is comfortable this  Plan follow-up in 6 months for uroflow and postvoid residual assessment  He may call at any time and be seen sooner if needed for recurrent symptoms

## 2022-08-22 NOTE — PROGRESS NOTES
Patient is status post robotic simple prostatectomy in October 2021  Pathology revealed 121 g benign tissue  He did well postoperatively  However he developed worsening urinary stream and nocturia up to 3 times per night  He is concerned about these symptoms  Returns today for further evaluation  Cystoscopy     Date/Time 8/22/2022 8:53 AM     Performed by  Shari Duarte MD     Authorized by Shari Duarte MD          Procedure Details:  Procedure type: dilation of urethral stricture    Additional Procedure Details: Patient was prepped with Betadine  2% lidocaine jelly Uro jet was placed  However was unable to pass this beyond the fossa navicularis  A very tight, firm fossa navicularis stricture was encountered  Despite firm steady pressure with the Uro jet applicator, I was unable to break the stricture  We then opened a cone dilator and used this, again with confirm steady pressure  Eventually the stricture did rupture and dilate  The remainder of the lidocaine jelly was then instilled  The 16 Lebanese flexible cystoscope was placed  The rest of the urethra was completely normal without stricture  The prostatic fossa is widely patent with excellent postprocedure result  The bladder was difficult to evaluate due to some retained urine, however there is no obvious mucosal abnormality noted  Patient tolerated this procedure well  Plan   We discussed the tight fossa navicularis stricture likely due to Moreno catheter previously  Discussed recommendation for self dilation at home  Cone dilator was given to him  We discussed technique for daily dilation  The stricture is within 2 cm from the tip, and placement of the cone for about 4 cm will ensure that he is dilating the appropriate area  He is comfortable this  Plan follow-up in 6 months for uroflow and postvoid residual assessment  He may call at any time and be seen sooner if needed for recurrent symptoms

## 2022-09-01 ENCOUNTER — OFFICE VISIT (OUTPATIENT)
Dept: HEMATOLOGY ONCOLOGY | Facility: CLINIC | Age: 77
End: 2022-09-01
Payer: COMMERCIAL

## 2022-09-01 VITALS
TEMPERATURE: 98.1 F | DIASTOLIC BLOOD PRESSURE: 68 MMHG | HEART RATE: 71 BPM | SYSTOLIC BLOOD PRESSURE: 126 MMHG | WEIGHT: 165 LBS | RESPIRATION RATE: 18 BRPM | OXYGEN SATURATION: 98 % | BODY MASS INDEX: 25.01 KG/M2 | HEIGHT: 68 IN

## 2022-09-01 DIAGNOSIS — Z72.0 TOBACCO USER: ICD-10-CM

## 2022-09-01 DIAGNOSIS — Z15.89 JAK2 GENE MUTATION: ICD-10-CM

## 2022-09-01 DIAGNOSIS — D69.6 THROMBOCYTOPENIA (HCC): ICD-10-CM

## 2022-09-01 DIAGNOSIS — Z51.11 ENCOUNTER FOR ANTINEOPLASTIC CHEMOTHERAPY: ICD-10-CM

## 2022-09-01 DIAGNOSIS — D45 POLYCYTHEMIA VERA (HCC): Primary | ICD-10-CM

## 2022-09-01 PROBLEM — R79.0 LOW IRON STORES: Status: RESOLVED | Noted: 2021-12-13 | Resolved: 2022-09-01

## 2022-09-01 PROBLEM — R71.8 MICROCYTOSIS: Status: RESOLVED | Noted: 2021-11-11 | Resolved: 2022-09-01

## 2022-09-01 PROCEDURE — 99214 OFFICE O/P EST MOD 30 MIN: CPT | Performed by: INTERNAL MEDICINE

## 2022-09-01 NOTE — PROGRESS NOTES
[address] [facility] - Hematology & Medical Oncology  Outpatient Visit Encounter Note      Nancy Mckinnon 77 y o  male [id number] Date:  9/1/2022      SUBJECTIVE      Nancy Mckinnon is a 77 y o  transferring their care from Dr Ernesto Brand and [facility]ADIE   They were last seen on 11/5/2020 as an office visit  Patient was being managed for JAK2+ PV  In review of the chart and talking with the patient, diagnosed with this condition after presenting with Hct 55% in August 2017 with documentation stating high levels of Hct for several years before that  Since then, he has been on therapeutic phlebotomy  Denies any f/c/n/v/cp/ap/sob/cough  Denies diarrhea or skin rash  THIS VISIT    Taking his hydrea and his aspirin  Got a pacemaker placed in July 2022  Still is smoking  No f/c/n/v/cp/ap/sob  I have reviewed the relevant past medical, surgical, social and family history  I have also reviewed allergies and medications for this patient  Review of Systems  Review of Systems   All other systems reviewed and are negative  OBJECTIVE     Physical Exam  Vitals:    09/01/22 1431   BP: 126/68   BP Location: Left arm   Patient Position: Sitting   Cuff Size: Adult   Pulse: 71   Resp: 18   Temp: 98 1 °F (36 7 °C)   TempSrc: Temporal   SpO2: 98%   Weight: 74 8 kg (165 lb)   Height: 5' 8" (1 727 m)       Physical Exam  Vitals reviewed  Constitutional:       General: He is not in acute distress  Appearance: He is not toxic-appearing  HENT:      Head: Normocephalic  Eyes:      General: No scleral icterus  Cardiovascular:      Rate and Rhythm: Normal rate  Pulmonary:      Effort: Pulmonary effort is normal  No respiratory distress  Abdominal:      General: There is no distension  Musculoskeletal:         General: Normal range of motion  Cervical back: Normal range of motion  Neurological:      General: No focal deficit present        Mental Status: He is alert and oriented to person, place, and time  Mental status is at baseline  Imaging  Relevant imaging reviewed in chart    Labs  Relevant labs reviewed in chart   ASSESSMENT & PLAN      Diagnosis ICD-10-CM Associated Orders   1  Polycythemia vera (HCC)  D45 CBC and differential     LD,Blood     CBC and differential     LD,Blood     Iron Panel (Includes Ferritin, Iron Sat%, Iron, and TIBC)   2  Tobacco user  Z72 0 CBC and differential     LD,Blood     CBC and differential     LD,Blood   3  JAK2 gene mutation  Z15 89 CBC and differential     LD,Blood     CBC and differential     LD,Blood   4  Encounter for antineoplastic chemotherapy  Z51 11 CBC and differential     LD,Blood     CBC and differential     LD,Blood   5  Thrombocytopenia (HCC)  D69 6 CBC and differential     LD,Blood     CBC and differential     LD,Blood         77 y o  male with a history of JAK2 positive polycythemia vera since 2017  Until November 2021, he was being managed with therapeutic phlebotomy  The patient states he never had a bone marrow biopsy  He has high risk designation of his JAK2 positive polycythemia vera given his age as well as his ongoing cigarette smoking  · Discussion/Plan/Labs  · Continue with current regimen of Hydrea + ASA with recommendation for continued Hydera 500mg BID  · Recommend checking CBC q4w  · Recommend he stops taking iron tablets now due to recent iron panel  · Education about smoking and higher Hct given  Follow Up  2 months    All questions were answered to the patient's satisfaction during this encounter  They appreciated and thanked me for spending time with them  The patient knows the contact information for our office and know to reach out for any relevant concerns related to this encounter  For all other listed problems and medical diagnosis in his chart - they are managed by PCP and/or other specialists, which patient acknowledges  Dr Tess Merlin Nelly Crystal, MD  Hematology & Medical Oncology

## 2022-09-02 DIAGNOSIS — E78.2 MIXED HYPERLIPIDEMIA: ICD-10-CM

## 2022-09-02 DIAGNOSIS — I25.10 CORONARY ARTERY DISEASE WITHOUT ANGINA PECTORIS, UNSPECIFIED VESSEL OR LESION TYPE, UNSPECIFIED WHETHER NATIVE OR TRANSPLANTED HEART: ICD-10-CM

## 2022-09-03 RX ORDER — ATORVASTATIN CALCIUM 80 MG/1
TABLET, FILM COATED ORAL
Qty: 90 TABLET | Refills: 1 | Status: SHIPPED | OUTPATIENT
Start: 2022-09-03

## 2022-10-04 ENCOUNTER — HOSPITAL ENCOUNTER (EMERGENCY)
Facility: HOSPITAL | Age: 77
Discharge: HOME | End: 2022-10-04
Attending: EMERGENCY MEDICINE
Payer: COMMERCIAL

## 2022-10-04 ENCOUNTER — APPOINTMENT (OUTPATIENT)
Dept: RADIOLOGY | Facility: HOSPITAL | Age: 77
End: 2022-10-04
Payer: COMMERCIAL

## 2022-10-04 VITALS
OXYGEN SATURATION: 98 % | HEIGHT: 68 IN | HEART RATE: 58 BPM | RESPIRATION RATE: 17 BRPM | SYSTOLIC BLOOD PRESSURE: 138 MMHG | DIASTOLIC BLOOD PRESSURE: 69 MMHG | WEIGHT: 171.08 LBS | TEMPERATURE: 97.7 F | BODY MASS INDEX: 25.93 KG/M2

## 2022-10-04 DIAGNOSIS — R06.00 DYSPNEA: ICD-10-CM

## 2022-10-04 DIAGNOSIS — R42 LIGHTHEADEDNESS: Primary | ICD-10-CM

## 2022-10-04 LAB
2HR DELTA HS TROPONIN: 0 NG/L
ALBUMIN SERPL BCP-MCNC: 3.5 G/DL (ref 3.5–5)
ALP SERPL-CCNC: 97 U/L (ref 46–116)
ALT SERPL W P-5'-P-CCNC: 16 U/L (ref 12–78)
ANION GAP SERPL CALCULATED.3IONS-SCNC: 9 MMOL/L (ref 4–13)
AST SERPL W P-5'-P-CCNC: 21 U/L (ref 5–45)
ATRIAL RATE: 59 BPM
ATRIAL RATE: 59 BPM
BASOPHILS # BLD AUTO: 0.01 THOUSANDS/ΜL (ref 0–0.1)
BASOPHILS NFR BLD AUTO: 0 % (ref 0–1)
BILIRUB SERPL-MCNC: 2.34 MG/DL (ref 0.2–1)
BUN SERPL-MCNC: 19 MG/DL (ref 5–25)
CALCIUM SERPL-MCNC: 8.7 MG/DL (ref 8.3–10.1)
CARDIAC TROPONIN I PNL SERPL HS: 6 NG/L
CARDIAC TROPONIN I PNL SERPL HS: 6 NG/L
CHLORIDE SERPL-SCNC: 105 MMOL/L (ref 96–108)
CO2 SERPL-SCNC: 24 MMOL/L (ref 21–32)
CREAT SERPL-MCNC: 1.24 MG/DL (ref 0.6–1.3)
D DIMER PPP FEU-MCNC: 0.37 UG/ML FEU
EOSINOPHIL # BLD AUTO: 0.02 THOUSAND/ΜL (ref 0–0.61)
EOSINOPHIL NFR BLD AUTO: 0 % (ref 0–6)
ERYTHROCYTE [DISTWIDTH] IN BLOOD BY AUTOMATED COUNT: 17 % (ref 11.6–15.1)
FLUAV RNA RESP QL NAA+PROBE: NEGATIVE
FLUBV RNA RESP QL NAA+PROBE: NEGATIVE
GFR SERPL CREATININE-BSD FRML MDRD: 56 ML/MIN/1.73SQ M
GLUCOSE SERPL-MCNC: 95 MG/DL (ref 65–140)
HCT VFR BLD AUTO: 25.7 % (ref 36.5–49.3)
HGB BLD-MCNC: 9.1 G/DL (ref 12–17)
IMM GRANULOCYTES # BLD AUTO: 0.02 THOUSAND/UL (ref 0–0.2)
IMM GRANULOCYTES NFR BLD AUTO: 0 % (ref 0–2)
LYMPHOCYTES # BLD AUTO: 1.03 THOUSANDS/ΜL (ref 0.6–4.47)
LYMPHOCYTES NFR BLD AUTO: 22 % (ref 14–44)
MCH RBC QN AUTO: 46.9 PG (ref 26.8–34.3)
MCHC RBC AUTO-ENTMCNC: 35.4 G/DL (ref 31.4–37.4)
MCV RBC AUTO: 133 FL (ref 82–98)
MONOCYTES # BLD AUTO: 0.26 THOUSAND/ΜL (ref 0.17–1.22)
MONOCYTES NFR BLD AUTO: 6 % (ref 4–12)
NEUTROPHILS # BLD AUTO: 3.43 THOUSANDS/ΜL (ref 1.85–7.62)
NEUTS SEG NFR BLD AUTO: 72 % (ref 43–75)
NRBC BLD AUTO-RTO: 0 /100 WBCS
NT-PROBNP SERPL-MCNC: 188 PG/ML
P AXIS: -49 DEGREES
P AXIS: 74 DEGREES
PLATELET # BLD AUTO: 60 THOUSANDS/UL (ref 149–390)
PMV BLD AUTO: 9.2 FL (ref 8.9–12.7)
POTASSIUM SERPL-SCNC: 3.8 MMOL/L (ref 3.5–5.3)
PR INTERVAL: 160 MS
PR INTERVAL: 168 MS
PROT SERPL-MCNC: 7 G/DL (ref 6.4–8.4)
QRS AXIS: 140 DEGREES
QRS AXIS: 141 DEGREES
QRSD INTERVAL: 134 MS
QRSD INTERVAL: 150 MS
QT INTERVAL: 436 MS
QT INTERVAL: 450 MS
QTC INTERVAL: 431 MS
QTC INTERVAL: 445 MS
RBC # BLD AUTO: 1.94 MILLION/UL (ref 3.88–5.62)
RSV RNA RESP QL NAA+PROBE: NEGATIVE
SARS-COV-2 RNA RESP QL NAA+PROBE: NEGATIVE
SODIUM SERPL-SCNC: 138 MMOL/L (ref 135–147)
T WAVE AXIS: 86 DEGREES
T WAVE AXIS: 92 DEGREES
VENTRICULAR RATE: 59 BPM
VENTRICULAR RATE: 59 BPM
WBC # BLD AUTO: 4.77 THOUSAND/UL (ref 4.31–10.16)

## 2022-10-04 PROCEDURE — 85025 COMPLETE CBC W/AUTO DIFF WBC: CPT

## 2022-10-04 PROCEDURE — 93005 ELECTROCARDIOGRAM TRACING: CPT

## 2022-10-04 PROCEDURE — 99284 EMERGENCY DEPT VISIT MOD MDM: CPT

## 2022-10-04 PROCEDURE — 36415 COLL VENOUS BLD VENIPUNCTURE: CPT

## 2022-10-04 PROCEDURE — 83880 ASSAY OF NATRIURETIC PEPTIDE: CPT

## 2022-10-04 PROCEDURE — 85379 FIBRIN DEGRADATION QUANT: CPT

## 2022-10-04 PROCEDURE — 84484 ASSAY OF TROPONIN QUANT: CPT

## 2022-10-04 PROCEDURE — 99285 EMERGENCY DEPT VISIT HI MDM: CPT | Performed by: EMERGENCY MEDICINE

## 2022-10-04 PROCEDURE — 71045 X-RAY EXAM CHEST 1 VIEW: CPT

## 2022-10-04 PROCEDURE — 80053 COMPREHEN METABOLIC PANEL: CPT

## 2022-10-04 PROCEDURE — 93010 ELECTROCARDIOGRAM REPORT: CPT | Performed by: INTERNAL MEDICINE

## 2022-10-04 PROCEDURE — 0241U HB NFCT DS VIR RESP RNA 4 TRGT: CPT

## 2022-10-04 NOTE — ED PROVIDER NOTES
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 Taking?    Cholecalciferol (Vitamin D3) 50 MCG (2000 UT) TABS Unknown at Unknown time  Yes No   Sig: Take 500 Units by mouth every evening   Multiple Vitamin (MULTIVITAMIN) capsule 10/4/2022 at Unknown time Self Yes Yes   Sig: Take 1 capsule by mouth daily   Omega-3 Fatty Acids (FISH OIL) 1200 MG CAPS 10/4/2022 at Unknown time Self Yes Yes   Sig: Take 1,200 mg by mouth 2 (two) times a day     acetaminophen (TYLENOL) 500 mg tablet Unknown at Unknown time Self Yes No   Sig: Take 500 mg by mouth every 6 (six) hours as needed for mild pain   amLODIPine (NORVASC) 10 mg tablet 10/4/2022 at Unknown time Self Yes Yes   Sig: Take 10 mg by mouth every evening    ascorbic acid (VITAMIN C) 1000 MG tablet   No No   Sig: Take 1 tablet (1,000 mg total) by mouth every 12 (twelve) hours for 10 doses   Patient taking differently: Take 1,000 mg by mouth 2 (two) times a day   aspirin (ECOTRIN LOW STRENGTH) 81 mg EC tablet 10/4/2022 at Unknown time Self Yes Yes   Sig: Take 81 mg by mouth daily   atorvastatin (LIPITOR) 80 mg tablet 10/4/2022 at Unknown time  No Yes   Sig: TAKE 1 TABLET BY MOUTH EVERY DAY IN THE EVENING   calcitriol (ROCALTROL) 0 25 mcg capsule Unknown at Unknown time Self No No   Sig: Take 1 capsule (0 25 mcg total) by mouth 3 (three) times a week   docusate sodium (COLACE) 100 mg capsule Unknown at Unknown time  No No   Sig: Take 1 capsule (100 mg total) by mouth 2 (two) times a day   finasteride (PROSCAR) 5 mg tablet Unknown at Unknown time Self No No   Sig: TAKE 1 TABLET BY MOUTH EVERY DAY   hydroxyurea (HYDREA) 500 mg capsule 10/4/2022 at Unknown time Self No Yes   Sig: TAKE 1 CAPSULE BY MOUTH TWICE A DAY   metoprolol tartrate (LOPRESSOR) 50 mg tablet 10/4/2022 at Unknown time Self Yes Yes   Sig: Take 50 mg by mouth 2 (two) times a day    nitroglycerin (NITROSTAT) 0 4 mg SL tablet Unknown at Unknown time Self No No   Sig: Place 1 tablet (0 4 mg total) under the tongue every 5 (five) minutes as needed for chest pain      Facility-Administered Medications: None       Past Medical History:   Diagnosis Date    Arthritis     both shoulders    CAD (coronary artery disease)     Last Assessed:  11/4/13    Cigarette smoker     Colon polyp     Elevated prostate specific antigen (PSA)     Last Assessed:  12/21/17    Enlarged prostate     Exercise involving walking     in season hunt's and fish's/ 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"    Full dentures     but doesnt wear them    History of 2019 novel coronavirus disease (COVID-19) 02/2021    hospital for 2 days but not in ICU/"mainly dehydrated" "also fever/oxygen below 90"    History of coronary artery stent placement     x2 in 2008 or so; sees Fran Mcintyre-- Dr Carlos Keith cardio   [facility] (hard of hearing)     no hearing aids yet    Hyperlipidemia     Last Assessed:  11/24/17    Hypertension Benign essential, Last Assessed:  11/24/17    LBBB (left bundle branch block)     Nocturia     Polycythemia vera (HCC)     (per history in chart)    PVD (peripheral vascular disease) (HCC)     RBC abnormality     pt reports told has high Red blood cells/goes to infusion center regularly next appt 10/8/21 hematologist is Dr Laura Rubio of [facility](had been Dr Cesilia Briceno)   [address] of breath     "if goes up steps gets SOB, had for awhile"    Slow urinary stream     "sometimes feels like bladder isnt all the way empty"    Snores     Wears glasses     reading       Past Surgical History:   Procedure Laterality Date    ANGIOPLASTY      APPENDECTOMY      CARDIAC CATHETERIZATION N/A 7/28/2022    Procedure: CARDIAC TEMPORARY PACEMAKER;  Surgeon: Romelle Gaucher, MD;  [facility]; Service: Cardiology    CARDIAC ELECTROPHYSIOLOGY PROCEDURE N/A 7/29/2022    Procedure: Cardiac pacer implant;  Surgeon: Ethle Ramirez MD;  [facility]; Service: Cardiology    CATARACT EXTRACTION Bilateral     CORONARY ARTERY BYPASS GRAFT      CT CYSTOGRAM  11/4/2021    ELBOW SURGERY Right     FL CYSTOGRAM  10/27/2021    KNEE ARTHROSCOPY Right     NC COLONOSCOPY FLX DX W/COLLJ SPEC WHEN PFRMD N/A 5/22/2017    Procedure: COLONOSCOPY;  Surgeon: Faith Mcintosh DO;  Location: [facility]; Service: Gastroenterology    PROSTATE BIOPSY      PROSTATECTOMY N/A 10/18/2021    Procedure: ROBOTIC SUBTOTAL/SUPRAPUBIC PROSTATECTOMY;  Surgeon: Audelia Puri MD;  Location: [facility];  Service: Urology    SHOULDER ARTHROSCOPY Right     TONSILLECTOMY         Family History   Problem Relation Age of Onset    No Known Problems Mother     Heart disease Father      I have reviewed and agree with the history as documented      E-Cigarette/Vaping    E-Cigarette Use Never User      E-Cigarette/Vaping Substances    Nicotine No     THC No     CBD No     Flavoring No     Other No     Unknown No      Social History     Tobacco Use    Smoking status: Current Every Day Smoker     Packs/day: 0 50     Years: 35 00     Pack years: 17 50     Types: Cigarettes    Smokeless tobacco: Never Used    Tobacco comment: a little less than 1/2ppd, trying to cut back before surgery last smoked 10/16   Vaping Use    Vaping Use: Never used   Substance Use Topics    Alcohol use: Never    Drug use: No        Review of Systems   Constitutional: Positive for chills  Negative for fever  HENT: Negative for congestion, ear pain, rhinorrhea and sore throat  Eyes: Negative for pain and visual disturbance  Respiratory: Positive for shortness of breath  Negative for cough  Cardiovascular: Negative for chest pain, palpitations and leg swelling  Gastrointestinal: Negative for abdominal pain, blood in stool, constipation, diarrhea, nausea and vomiting  Genitourinary: Negative for difficulty urinating, dysuria, flank pain, frequency, hematuria and urgency  Musculoskeletal: Negative for arthralgias and back pain  Skin: Negative for color change and rash  Neurological: Positive for light-headedness  Negative for seizures, syncope, facial asymmetry, speech difficulty, weakness, numbness and headaches  Hematological:        History of polycythemia vera   All other systems reviewed and are negative        Physical Exam  ED Triage Vitals   Temperature Pulse Respirations Blood Pressure SpO2   10/04/22 0830 10/04/22 0831 10/04/22 0831 10/04/22 0831 10/04/22 0831   97 7 °F (36 5 °C) 60 16 134/65 100 %      Temp Source Heart Rate Source Patient Position - Orthostatic VS BP Location FiO2 (%)   10/04/22 0830 10/04/22 0831 10/04/22 0831 10/04/22 0831 --   Oral Monitor Lying Left arm       Pain Score       --                    Orthostatic Vital Signs  Vitals:    10/04/22 0831 10/04/22 1036 10/04/22 1215   BP: 134/65 131/70 138/69   Pulse: 60 59 58   Patient Position - Orthostatic VS: Lying Lying Lying       Physical Exam  Vitals and nursing note reviewed  Constitutional:       General: He is not in acute distress  Appearance: Normal appearance  He is well-developed  He is not ill-appearing, toxic-appearing or diaphoretic  HENT:      Head: Normocephalic and atraumatic  Eyes:      General:         Right eye: No discharge  Left eye: No discharge  Extraocular Movements: Extraocular movements intact  Conjunctiva/sclera: Conjunctivae normal       Pupils: Pupils are equal, round, and reactive to light  Cardiovascular:      Rate and Rhythm: Normal rate and regular rhythm  Pulses: Normal pulses  Heart sounds: Normal heart sounds  No murmur heard  Pulmonary:      Effort: Pulmonary effort is normal  No respiratory distress  Breath sounds: Normal breath sounds  No stridor  No wheezing, rhonchi or rales  Chest:      Chest wall: No tenderness  Abdominal:      Palpations: Abdomen is soft  Tenderness: There is no abdominal tenderness  There is no guarding or rebound  Musculoskeletal:         General: Normal range of motion  Cervical back: Neck supple  Right lower leg: No edema  Left lower leg: No edema  Skin:     General: Skin is warm and dry  Capillary Refill: Capillary refill takes less than 2 seconds  Neurological:      General: No focal deficit present  Mental Status: He is alert and oriented to person, place, and time  Cranial Nerves: No cranial nerve deficit  Sensory: No sensory deficit  Motor: No weakness        Coordination: Coordination normal          ED Medications  Medications - No data to display    Diagnostic Studies  Results Reviewed     Procedure Component Value Units Date/Time    HS Troponin I 2hr [[id number]]  (Normal) Collected: 10/04/22 1128    Lab Status: Final result Specimen: Blood from Hand, Right Updated: 10/04/22 1156     hs TnI 2hr 6 ng/L      Delta 2hr hsTnI 0 ng/L     CBC and differential [[id number]]  (Abnormal) Collected: 10/04/22 1036    Lab Status: Final result Specimen: Blood from Arm, Right Updated: 10/04/22 1113     WBC 4 77 Thousand/uL      RBC 1 94 Million/uL      Hemoglobin 9 1 g/dL      Hematocrit 25 7 %       fL      MCH 46 9 pg      MCHC 35 4 g/dL      RDW 17 0 %      MPV 9 2 fL      Platelets 60 Thousands/uL      nRBC 0 /100 WBCs      Neutrophils Relative 72 %      Immat GRANS % 0 %      Lymphocytes Relative 22 %      Monocytes Relative 6 %      Eosinophils Relative 0 %      Basophils Relative 0 %      Neutrophils Absolute 3 43 Thousands/µL      Immature Grans Absolute 0 02 Thousand/uL      Lymphocytes Absolute 1 03 Thousands/µL      Monocytes Absolute 0 26 Thousand/µL      Eosinophils Absolute 0 02 Thousand/µL      Basophils Absolute 0 01 Thousands/µL     HS Troponin 0hr (reflex protocol) [[id number]]  (Normal) Collected: 10/04/22 0910    Lab Status: Final result Specimen: Blood from Arm, Right Updated: 10/04/22 1002     hs TnI 0hr 6 ng/L     NT-BNP PRO [[id number]]  (Normal) Collected: 10/04/22 0910    Lab Status: Final result Specimen: Blood from Arm, Right Updated: 10/04/22 0957     NT-proBNP 188 pg/mL     COVID/FLU/RSV [[id number]]  (Normal) Collected: 10/04/22 0910    Lab Status: Final result Specimen: Nares from Nose Updated: 10/04/22 0955     SARS-CoV-2 Negative     INFLUENZA A PCR Negative     INFLUENZA B PCR Negative     RSV PCR Negative    Narrative:      FOR PEDIATRIC PATIENTS - copy/paste COVID Guidelines URL to browser: [email or web address]    SARS-CoV-2 assay is a Nucleic Acid Amplification assay intended for the  qualitative detection of nucleic acid from SARS-CoV-2 in nasopharyngeal  swabs  Results are for the presumptive identification of SARS-CoV-2 RNA  Positive results are indicative of infection with SARS-CoV-2, the virus  causing COVID-19, but do not rule out bacterial infection or co-infection  with other viruses   Laboratories within the [place] and its  territories are required to report all positive results to the appropriate  public health authorities  Negative results do not preclude SARS-CoV-2  infection and should not be used as the sole basis for treatment or other  patient management decisions  Negative results must be combined with  clinical observations, patient history, and epidemiological information  This test has not been FDA cleared or approved  This test has been authorized by FDA under an Emergency Use Authorization  (EUA)  This test is only authorized for the duration of time the  declaration that circumstances exist justifying the authorization of the  emergency use of an in vitro diagnostic tests for detection of SARS-CoV-2  virus and/or diagnosis of COVID-19 infection under section 564(b)(1) of  the Act, 21 U  S C  375OBL-9(B)(3), unless the authorization is terminated  or revoked sooner  The test has been validated but independent review by FDA  and CLIA is pending  Test performed using [employer] GeneXpert: This RT-PCR assay targets N2,  a region unique to SARS-CoV-2  A conserved region in the E-gene was chosen  for pan-Sarbecovirus detection which includes SARS-CoV-2  According to CMS-2020-01-R, this platform meets the definition of high-throughput technology      Comprehensive metabolic panel [[id number]]  (Abnormal) Collected: 10/04/22 0910    Lab Status: Final result Specimen: Blood from Arm, Right Updated: 10/04/22 0950     Sodium 138 mmol/L      Potassium 3 8 mmol/L      Chloride 105 mmol/L      CO2 24 mmol/L      ANION GAP 9 mmol/L      BUN 19 mg/dL      Creatinine 1 24 mg/dL      Glucose 95 mg/dL      Calcium 8 7 mg/dL      AST 21 U/L      ALT 16 U/L      Alkaline Phosphatase 97 U/L      Total Protein 7 0 g/dL      Albumin 3 5 g/dL      Total Bilirubin 2 34 mg/dL      eGFR 56 ml/min/1 73sq m     Narrative:      [place] guidelines for Chronic Kidney Disease (CKD):    Stage 1 with normal or high GFR (GFR > 90 mL/min/1 73 square meters)    Stage 2 Mild CKD (GFR = 60-89 mL/min/1 73 square meters)    Stage 3A Moderate CKD (GFR = 45-59 mL/min/1 73 square meters)    Stage 3B Moderate CKD (GFR = 30-44 mL/min/1 73 square meters)    Stage 4 Severe CKD (GFR = 15-29 mL/min/1 73 square meters)    Stage 5 End Stage CKD (GFR <15 mL/min/1 73 square meters)  Note: GFR calculation is accurate only with a steady state creatinine    D-dimer, quantitative [[id number]]  (Normal) Collected: 10/04/22 0910    Lab Status: Final result Specimen: Blood from Arm, Right Updated: 10/04/22 0930     D-Dimer, Quant 0 37 ug/ml FEU     Narrative: In the evaluation for possible pulmonary embolism, in the appropriate (Well's Score of 4 or less) patient, the age adjusted d-dimer cutoff for this patient can be calculated as:    Age x 0 01 (in ug/mL) for Age-adjusted D-dimer exclusion threshold for a patient over 50 years  XR chest 1 view portable   Final Result by Tulio Roldan MD (10/04 1016)      No acute cardiopulmonary disease  Workstation performed: [id number]               Procedures  ECG 12 Lead Documentation Only    Date/Time: 10/4/2022 8:34 AM  Performed by: Hilda Delgado MD  Authorized by: Hilda Delgado MD     Indications / Diagnosis:  Dyspnea, lightheaded   ECG reviewed by me, the ED Provider: yes    Patient location:  ED  Previous ECG:     Previous ECG:  Compared to current    Comparison ECG info:  7/29/2022  Rate:     ECG rate:  59  Rhythm:     Rhythm: paced    Pacing:     Type of pacing:  Ventricular    ECG 12 Lead Documentation Only    Date/Time: 10/4/2022 11:28 AM  Performed by: Hilda Delgado MD  Authorized by:  Hilda Delgado MD     ECG reviewed by me, the ED Provider: yes    Patient location:  ED  Previous ECG:     Previous ECG:  Compared to current    Comparison ECG info:  10/4/2022 0834   Rate:     ECG rate:  59  Rhythm:     Rhythm: paced    Pacing: Capture:  Complete    Type of pacing:  Ventricular          ED Course  ED Course as of 10/04/22 1519   Tue Oct 04, 2022   4798 Blood Pressure: 134/65   0837 Temperature: 97 7 °F (36 5 °C)   0837 Temp Source: Oral   0837 Pulse: 60   0837 Respirations: 16   0837 SpO2: 100 %   0934 D-Dimer, Quant: 0 37  WNL   1032 Creatinine: 1 24  WNL   1032 NT-proBNP: 188  WNL   1032 hs TnI 0hr: 6  Will delta trop   1032 SARS-COV-2: Negative   1032 INFLU A PCR: Negative   1032 INFLU B PCR: Negative   1032 RSV PCR: Negative   1032 CXR: No acute cardiopulmonary disease  1113 WBC: 4 77  WNL   1114 Hemoglobin(!): 9 1  Was 12 3 two months ago  Patient denies any melena or bright red blood per rectum, denies hematemesis  Discussed option of doing a rectal exam to check for occult blood, patient declined  1114 Platelet Count(!): 60  Stable, was 69 two months ago   1202 Delta 2hr hsTnI: 0   1213 [employer] interrogation report: 1 VT event monitored - called and spoke to [employer] rep, he states the event looks more consistent with SVT rather than VT, and the event occurred on 9/27/2022  No events observed this morning  1334 Ambulated without assistance - had no difficulty  Patient would like to be discharged home  SBIRT 22yo+    Flowsheet Row Most Recent Value   SBIRT (25 yo +)    In order to provide better care to our patients, we are screening all of our patients for alcohol and drug use  Would it be okay to ask you these screening questions? No Filed at: 10/04/2022 7779                [facility] [place]  60-year-old male with past medical history significant for coronary disease status post 2 stents to the LAD in 2008, status post pacemaker for complete heart block in 07/2022, polycythemia vera on hydroxyurea, hypertension, hyperlipidemia presents to the ED via EMS for evaluation of transient episode of dyspnea and lightheadedness since 6:30 a m  this morning    Reports associated chills at the time but no chest pain, diaphoresis or nausea  Symptoms resolved on the way here  Afebrile  Vitals stable  Appears in no acute distress  Benign physical exam   Normal neurological exam   Will evaluate with CBC, CMP, troponin, BNP, D-dimer, COVID/flu/RSV, EKG, chest x-ray  Will reassess periodically  Patient asymptomatic throughout ED course  CMP and BNP within normal limits  Initial troponin was 6, delta trop was 0  COVID/flu/RSV negative  Hemoglobin was 9 1, was 12 3 two months ago  Patient denies any melena or bright red blood per rectum, denies hematemesis  Discussed option of performing a rectal exam to check for occult blood, patient declined  Platelet count a 60, stable from 69 two months ago  Chest x-ray showed no acute cardiopulmonary disease  Pacemaker device was interrogated - see ED course notes for details  Patient ambulated without assistance and had no difficulty  Patient would like to be discharged home  Stable for discharge home  Strict return precautions given  Instructed patient to follow-up with PCP for continued monitoring and further management of his symptoms  Patient agrees with plan of care  Disposition  Final diagnoses:   Lightheadedness   Dyspnea     Time reflects when diagnosis was documented in both MDM as applicable and the Disposition within this note     Time User Action Codes Description Comment    10/4/2022 12:20 PM Nabil LIZARRAGA Add [R42] Lightheadedness     10/4/2022 12:20 PM Nabil LIZARRAGA Add [R06 00] Dyspnea       ED Disposition     ED Disposition   Discharge    Condition   Stable    Date/Time   Tue Oct 4, 2022  1:35 PM    Comment   Hermilo Jacinto discharge to home/self care                 Follow-up Information     Follow up With Specialties Details Why Contact Info    Libra Ordaz DO Family Medicine Schedule an appointment as soon as possible for a visit in 1 week  [address]  [phone]            Discharge Medication List as of 10/4/2022 1:37 PM      CONTINUE these medications which have NOT CHANGED    Details   amLODIPine (NORVASC) 10 mg tablet Take 10 mg by mouth every evening , Historical Med      aspirin (ECOTRIN LOW STRENGTH) 81 mg EC tablet Take 81 mg by mouth daily, Historical Med      atorvastatin (LIPITOR) 80 mg tablet TAKE 1 TABLET BY MOUTH EVERY DAY IN THE EVENING, Normal      hydroxyurea (HYDREA) 500 mg capsule TAKE 1 CAPSULE BY MOUTH TWICE A DAY, Normal      metoprolol tartrate (LOPRESSOR) 50 mg tablet Take 50 mg by mouth 2 (two) times a day , Historical Med      Multiple Vitamin (MULTIVITAMIN) capsule Take 1 capsule by mouth daily, Historical Med      Omega-3 Fatty Acids (FISH OIL) 1200 MG CAPS Take 1,200 mg by mouth 2 (two) times a day  , Historical Med      acetaminophen (TYLENOL) 500 mg tablet Take 500 mg by mouth every 6 (six) hours as needed for mild pain, Historical Med      ascorbic acid (VITAMIN C) 1000 MG tablet Take 1 tablet (1,000 mg total) by mouth every 12 (twelve) hours for 10 doses, Starting Sat 2/13/2021, Until Thu 9/1/2022, Normal      calcitriol (ROCALTROL) 0 25 mcg capsule Take 1 capsule (0 25 mcg total) by mouth 3 (three) times a week, Starting Fri 6/3/2022, Normal      Cholecalciferol (Vitamin D3) 50 MCG (2000 UT) TABS Take 500 Units by mouth every evening, Historical Med      docusate sodium (COLACE) 100 mg capsule Take 1 capsule (100 mg total) by mouth 2 (two) times a day, Starting Thu 10/21/2021, Normal      finasteride (PROSCAR) 5 mg tablet TAKE 1 TABLET BY MOUTH EVERY DAY, Normal      nitroglycerin (NITROSTAT) 0 4 mg SL tablet Place 1 tablet (0 4 mg total) under the tongue every 5 (five) minutes as needed for chest pain, Starting Mon 8/8/2022, Normal           No discharge procedures on file  PDMP Review     None           ED Provider  Attending physically available and evaluated [address] I managed the patient along with the ED Attending      Electronically Signed by         Damaso Klein MD  10/04/22 [address]

## 2022-10-04 NOTE — ED NOTES
Pt ambulated without assistance   Pt has no complaints at this time     Stephie Villegas  10/04/22 1257

## 2022-10-04 NOTE — DISCHARGE INSTRUCTIONS
You were evaluated for transient shortness of breath and lightheadedness  Your labs were within normal limits  There was a drop in the hemoglobin, please follow up with your PCP for further workup  Chest xray was normal  Please follow up with your primary care provider within 1 week for continued monitoring and further management of your symptoms  PLEASE RETURN TO THE NEAREST EMERGENCY ROOM IF YOUR SYMPTOMS WORSEN OR IF YOU DEVELOP CHEST PAIN, ABDOMINAL PAIN, VOMITING, DIZZINESS, FEVER, CHILLS

## 2022-10-04 NOTE — ED ATTENDING ATTESTATION
10/4/2022  ITanika MD, saw and evaluated the patient  I have discussed the patient with the resident/non-physician practitioner and agree with the resident's/non-physician practitioner's findings, Plan of Care, and MDM as documented in the resident's/non-physician practitioner's note, except where noted  All available labs and Radiology studies were reviewed  I was present for key portions of any procedure(s) performed by the resident/non-physician practitioner and I was immediately available to provide assistance  At this point I agree with the current assessment done in the Emergency Department  I have conducted an independent evaluation of this patient a history and physical is as follows:  Patient is a 69 male, hx of CAD, Pacemaker, Polycythemia vera, c/o SOB, lightheadedness, around 6:30 am, currently symptoms have stabilized, no active dizziness or dyspnea, no chest pain, no diaphoresis, no fever, did have some chills, no abd or back pain  On exam, conscious, alert, VSS, Lungs CTA, CVS RRR, Abd soft, NTND, Neuro intact, normal coordination finger-to-nose test, no weakness or paresthesias  Differential diagnosis:  Nonspecific dizziness, due to patient's cardiac history, will get cardiac workup including labs, EKG, get pacemaker check  ED Course     ER workup results were reviewed, troponin x2 checked, no significant abnormality  Pacemaker check was normal     Patient requested to go home, stable for discharge, advise close follow-up with PCP, return to ED for any recurrent or worsening symptoms      Critical Care Time  Procedures

## 2022-10-05 NOTE — PLAN OF CARE
Problem: Knowledge Deficit  Goal: Patient/family/caregiver demonstrates understanding of disease process, treatment plan, medications, and discharge instructions  Description: Complete learning assessment and assess knowledge base    Interventions:  - Provide teaching at level of understanding  - Provide teaching via preferred learning methods  Outcome: Progressing Tech at bedside performing ECG

## 2022-10-12 ENCOUNTER — OFFICE VISIT (OUTPATIENT)
Dept: FAMILY MEDICINE CLINIC | Facility: CLINIC | Age: 77
End: 2022-10-12
Payer: COMMERCIAL

## 2022-10-12 ENCOUNTER — APPOINTMENT (EMERGENCY)
Dept: CT IMAGING | Facility: HOSPITAL | Age: 77
DRG: 812 | End: 2022-10-12
Payer: COMMERCIAL

## 2022-10-12 ENCOUNTER — HOSPITAL ENCOUNTER (INPATIENT)
Facility: HOSPITAL | Age: 77
LOS: 1 days | Discharge: HOME | DRG: 812 | End: 2022-10-14
Attending: EMERGENCY MEDICINE | Admitting: INTERNAL MEDICINE
Payer: COMMERCIAL

## 2022-10-12 ENCOUNTER — APPOINTMENT (OUTPATIENT)
Dept: RADIOLOGY | Facility: HOSPITAL | Age: 77
DRG: 812 | End: 2022-10-12
Payer: COMMERCIAL

## 2022-10-12 VITALS
WEIGHT: 163 LBS | BODY MASS INDEX: 24.71 KG/M2 | HEIGHT: 68 IN | TEMPERATURE: 98.2 F | SYSTOLIC BLOOD PRESSURE: 100 MMHG | OXYGEN SATURATION: 95 % | HEART RATE: 67 BPM | DIASTOLIC BLOOD PRESSURE: 60 MMHG

## 2022-10-12 DIAGNOSIS — R10.33 PERIUMBILICAL ABDOMINAL PAIN: ICD-10-CM

## 2022-10-12 DIAGNOSIS — M25.512 CHRONIC LEFT SHOULDER PAIN: ICD-10-CM

## 2022-10-12 DIAGNOSIS — N40.1 BPH WITH OBSTRUCTION/LOWER URINARY TRACT SYMPTOMS: ICD-10-CM

## 2022-10-12 DIAGNOSIS — N18.32 STAGE 3B CHRONIC KIDNEY DISEASE (HCC): ICD-10-CM

## 2022-10-12 DIAGNOSIS — F17.210 CIGARETTE SMOKER: ICD-10-CM

## 2022-10-12 DIAGNOSIS — I44.2 COMPLETE HEART BLOCK (HCC): ICD-10-CM

## 2022-10-12 DIAGNOSIS — Z95.0 HISTORY OF CARDIAC PACEMAKER: ICD-10-CM

## 2022-10-12 DIAGNOSIS — D64.9 SYMPTOMATIC ANEMIA: Primary | ICD-10-CM

## 2022-10-12 DIAGNOSIS — I44.7 LEFT BUNDLE BRANCH BLOCK: ICD-10-CM

## 2022-10-12 DIAGNOSIS — R73.03 PREDIABETES: ICD-10-CM

## 2022-10-12 DIAGNOSIS — N13.8 BPH WITH OBSTRUCTION/LOWER URINARY TRACT SYMPTOMS: ICD-10-CM

## 2022-10-12 DIAGNOSIS — R06.09 DOE (DYSPNEA ON EXERTION): ICD-10-CM

## 2022-10-12 DIAGNOSIS — Z15.89 JAK2 GENE MUTATION: ICD-10-CM

## 2022-10-12 DIAGNOSIS — D64.9 ANEMIA: Primary | ICD-10-CM

## 2022-10-12 DIAGNOSIS — Z51.11 ENCOUNTER FOR ANTINEOPLASTIC CHEMOTHERAPY: ICD-10-CM

## 2022-10-12 DIAGNOSIS — D64.9 SYMPTOMATIC ANEMIA: ICD-10-CM

## 2022-10-12 DIAGNOSIS — G89.29 CHRONIC LEFT SHOULDER PAIN: ICD-10-CM

## 2022-10-12 DIAGNOSIS — D69.6 THROMBOCYTOPENIA (HCC): ICD-10-CM

## 2022-10-12 DIAGNOSIS — I10 BENIGN ESSENTIAL HYPERTENSION: ICD-10-CM

## 2022-10-12 DIAGNOSIS — E78.2 MIXED HYPERLIPIDEMIA: ICD-10-CM

## 2022-10-12 DIAGNOSIS — Z72.0 TOBACCO USER: ICD-10-CM

## 2022-10-12 DIAGNOSIS — M54.12 CERVICAL RADICULOPATHY: ICD-10-CM

## 2022-10-12 DIAGNOSIS — Z98.61 STATUS POST PERCUTANEOUS TRANSLUMINAL CORONARY ANGIOPLASTY: ICD-10-CM

## 2022-10-12 DIAGNOSIS — D45 POLYCYTHEMIA VERA (HCC): ICD-10-CM

## 2022-10-12 DIAGNOSIS — M47.812 CERVICAL SPONDYLOSIS WITHOUT MYELOPATHY: ICD-10-CM

## 2022-10-12 DIAGNOSIS — I45.9 HEART BLOCK: ICD-10-CM

## 2022-10-12 DIAGNOSIS — U07.1 COVID-19 VIRUS INFECTION: ICD-10-CM

## 2022-10-12 LAB
2HR DELTA HS TROPONIN: 0 NG/L
ABO GROUP BLD: NORMAL
ALBUMIN SERPL BCP-MCNC: 3.6 G/DL (ref 3.5–5)
ALP SERPL-CCNC: 173 U/L (ref 46–116)
ALT SERPL W P-5'-P-CCNC: 162 U/L (ref 12–78)
ANION GAP SERPL CALCULATED.3IONS-SCNC: 11 MMOL/L (ref 4–13)
APTT PPP: 29 SECONDS (ref 23–37)
AST SERPL W P-5'-P-CCNC: 32 U/L (ref 5–45)
ATRIAL RATE: 59 BPM
ATRIAL RATE: 64 BPM
BASOPHILS # BLD AUTO: 0.01 THOUSANDS/ÂΜL (ref 0–0.1)
BASOPHILS NFR BLD AUTO: 0 % (ref 0–1)
BILIRUB SERPL-MCNC: 2.23 MG/DL (ref 0.2–1)
BLD GP AB SCN SERPL QL: NEGATIVE
BUN SERPL-MCNC: 21 MG/DL (ref 5–25)
CALCIUM SERPL-MCNC: 9.3 MG/DL (ref 8.3–10.1)
CARDIAC TROPONIN I PNL SERPL HS: 7 NG/L
CARDIAC TROPONIN I PNL SERPL HS: 7 NG/L
CHLORIDE SERPL-SCNC: 101 MMOL/L (ref 96–108)
CO2 SERPL-SCNC: 24 MMOL/L (ref 21–32)
CREAT SERPL-MCNC: 1.41 MG/DL (ref 0.6–1.3)
EOSINOPHIL # BLD AUTO: 0.02 THOUSAND/ÂΜL (ref 0–0.61)
EOSINOPHIL NFR BLD AUTO: 0 % (ref 0–6)
ERYTHROCYTE [DISTWIDTH] IN BLOOD BY AUTOMATED COUNT: 17.7 % (ref 11.6–15.1)
FERRITIN SERPL-MCNC: 598 NG/ML (ref 8–388)
GFR SERPL CREATININE-BSD FRML MDRD: 48 ML/MIN/1.73SQ M
GLUCOSE SERPL-MCNC: 106 MG/DL (ref 65–140)
HCT VFR BLD AUTO: 24.8 % (ref 36.5–49.3)
HGB BLD-MCNC: 8.7 G/DL (ref 12–17)
IMM GRANULOCYTES # BLD AUTO: 0.05 THOUSAND/UL (ref 0–0.2)
IMM GRANULOCYTES NFR BLD AUTO: 1 % (ref 0–2)
INR PPP: 1.13 (ref 0.84–1.19)
IRON SATN MFR SERPL: 39 % (ref 20–50)
IRON SERPL-MCNC: 75 UG/DL (ref 65–175)
LIPASE SERPL-CCNC: 199 U/L (ref 73–393)
LYMPHOCYTES # BLD AUTO: 0.98 THOUSANDS/ÂΜL (ref 0.6–4.47)
LYMPHOCYTES NFR BLD AUTO: 16 % (ref 14–44)
MAGNESIUM SERPL-MCNC: 2.2 MG/DL (ref 1.6–2.6)
MCH RBC QN AUTO: 46 PG (ref 26.8–34.3)
MCHC RBC AUTO-ENTMCNC: 35.1 G/DL (ref 31.4–37.4)
MCV RBC AUTO: 131 FL (ref 82–98)
MONOCYTES # BLD AUTO: 0.58 THOUSAND/ÂΜL (ref 0.17–1.22)
MONOCYTES NFR BLD AUTO: 9 % (ref 4–12)
NEUTROPHILS # BLD AUTO: 4.62 THOUSANDS/ÂΜL (ref 1.85–7.62)
NEUTS SEG NFR BLD AUTO: 74 % (ref 43–75)
NRBC BLD AUTO-RTO: 0 /100 WBCS
NT-PROBNP SERPL-MCNC: 214 PG/ML
P AXIS: 59 DEGREES
P AXIS: 66 DEGREES
PLATELET # BLD AUTO: 85 THOUSANDS/UL (ref 149–390)
PMV BLD AUTO: 10.1 FL (ref 8.9–12.7)
POTASSIUM SERPL-SCNC: 3.6 MMOL/L (ref 3.5–5.3)
PR INTERVAL: 160 MS
PR INTERVAL: 192 MS
PROT SERPL-MCNC: 7.7 G/DL (ref 6.4–8.4)
PROTHROMBIN TIME: 14.6 SECONDS (ref 11.6–14.5)
QRS AXIS: 151 DEGREES
QRS AXIS: 153 DEGREES
QRSD INTERVAL: 156 MS
QRSD INTERVAL: 158 MS
QT INTERVAL: 452 MS
QT INTERVAL: 456 MS
QTC INTERVAL: 447 MS
QTC INTERVAL: 470 MS
RBC # BLD AUTO: 1.89 MILLION/UL (ref 3.88–5.62)
RETICS # AUTO: ABNORMAL 10*3/UL (ref 14356–105094)
RETICS # CALC: 4.72 % (ref 0.37–1.87)
RH BLD: POSITIVE
SL AMB POCT FECES OCC BLD: NEGATIVE
SODIUM SERPL-SCNC: 136 MMOL/L (ref 135–147)
SPECIMEN EXPIRATION DATE: NORMAL
T WAVE AXIS: 66 DEGREES
T WAVE AXIS: 73 DEGREES
TIBC SERPL-MCNC: 192 UG/DL (ref 250–450)
VENTRICULAR RATE: 59 BPM
VENTRICULAR RATE: 64 BPM
WBC # BLD AUTO: 6.26 THOUSAND/UL (ref 4.31–10.16)

## 2022-10-12 PROCEDURE — 99215 OFFICE O/P EST HI 40 MIN: CPT | Performed by: FAMILY MEDICINE

## 2022-10-12 PROCEDURE — 99285 EMERGENCY DEPT VISIT HI MDM: CPT | Performed by: EMERGENCY MEDICINE

## 2022-10-12 PROCEDURE — 86901 BLOOD TYPING SEROLOGIC RH(D): CPT | Performed by: EMERGENCY MEDICINE

## 2022-10-12 PROCEDURE — 83690 ASSAY OF LIPASE: CPT | Performed by: EMERGENCY MEDICINE

## 2022-10-12 PROCEDURE — 86850 RBC ANTIBODY SCREEN: CPT | Performed by: EMERGENCY MEDICINE

## 2022-10-12 PROCEDURE — NC001 PR NO CHARGE: Performed by: EMERGENCY MEDICINE

## 2022-10-12 PROCEDURE — 85610 PROTHROMBIN TIME: CPT | Performed by: EMERGENCY MEDICINE

## 2022-10-12 PROCEDURE — 80053 COMPREHEN METABOLIC PANEL: CPT | Performed by: EMERGENCY MEDICINE

## 2022-10-12 PROCEDURE — 93010 ELECTROCARDIOGRAM REPORT: CPT | Performed by: INTERNAL MEDICINE

## 2022-10-12 PROCEDURE — C9113 INJ PANTOPRAZOLE SODIUM, VIA: HCPCS | Performed by: EMERGENCY MEDICINE

## 2022-10-12 PROCEDURE — 96361 HYDRATE IV INFUSION ADD-ON: CPT

## 2022-10-12 PROCEDURE — 84484 ASSAY OF TROPONIN QUANT: CPT | Performed by: EMERGENCY MEDICINE

## 2022-10-12 PROCEDURE — 82270 OCCULT BLOOD FECES: CPT | Performed by: FAMILY MEDICINE

## 2022-10-12 PROCEDURE — 99285 EMERGENCY DEPT VISIT HI MDM: CPT

## 2022-10-12 PROCEDURE — 74177 CT ABD & PELVIS W/CONTRAST: CPT

## 2022-10-12 PROCEDURE — 86900 BLOOD TYPING SEROLOGIC ABO: CPT | Performed by: EMERGENCY MEDICINE

## 2022-10-12 PROCEDURE — 82728 ASSAY OF FERRITIN: CPT | Performed by: EMERGENCY MEDICINE

## 2022-10-12 PROCEDURE — 83550 IRON BINDING TEST: CPT | Performed by: EMERGENCY MEDICINE

## 2022-10-12 PROCEDURE — 83880 ASSAY OF NATRIURETIC PEPTIDE: CPT | Performed by: EMERGENCY MEDICINE

## 2022-10-12 PROCEDURE — 36415 COLL VENOUS BLD VENIPUNCTURE: CPT | Performed by: EMERGENCY MEDICINE

## 2022-10-12 PROCEDURE — 83540 ASSAY OF IRON: CPT | Performed by: EMERGENCY MEDICINE

## 2022-10-12 PROCEDURE — 99220 PR INITIAL OBSERVATION CARE/DAY 70 MINUTES: CPT | Performed by: INTERNAL MEDICINE

## 2022-10-12 PROCEDURE — 85730 THROMBOPLASTIN TIME PARTIAL: CPT | Performed by: EMERGENCY MEDICINE

## 2022-10-12 PROCEDURE — 71045 X-RAY EXAM CHEST 1 VIEW: CPT

## 2022-10-12 PROCEDURE — 83735 ASSAY OF MAGNESIUM: CPT | Performed by: EMERGENCY MEDICINE

## 2022-10-12 PROCEDURE — 85025 COMPLETE CBC W/AUTO DIFF WBC: CPT | Performed by: EMERGENCY MEDICINE

## 2022-10-12 PROCEDURE — 85045 AUTOMATED RETICULOCYTE COUNT: CPT | Performed by: EMERGENCY MEDICINE

## 2022-10-12 PROCEDURE — 96365 THER/PROPH/DIAG IV INF INIT: CPT

## 2022-10-12 PROCEDURE — G1004 CDSM NDSC: HCPCS

## 2022-10-12 PROCEDURE — 93005 ELECTROCARDIOGRAM TRACING: CPT

## 2022-10-12 RX ORDER — AMLODIPINE BESYLATE 10 MG/1
10 TABLET ORAL EVERY EVENING
Status: DISCONTINUED | OUTPATIENT
Start: 2022-10-12 | End: 2022-10-14 | Stop reason: HOSPADM

## 2022-10-12 RX ORDER — FINASTERIDE 5 MG/1
5 TABLET, FILM COATED ORAL DAILY
Status: DISCONTINUED | OUTPATIENT
Start: 2022-10-13 | End: 2022-10-14 | Stop reason: HOSPADM

## 2022-10-12 RX ORDER — ONDANSETRON 2 MG/ML
4 INJECTION INTRAMUSCULAR; INTRAVENOUS EVERY 6 HOURS PRN
Status: DISCONTINUED | OUTPATIENT
Start: 2022-10-12 | End: 2022-10-14 | Stop reason: HOSPADM

## 2022-10-12 RX ORDER — METOPROLOL TARTRATE 50 MG/1
50 TABLET, FILM COATED ORAL 2 TIMES DAILY
Status: DISCONTINUED | OUTPATIENT
Start: 2022-10-12 | End: 2022-10-14 | Stop reason: HOSPADM

## 2022-10-12 RX ORDER — ATORVASTATIN CALCIUM 80 MG/1
80 TABLET, FILM COATED ORAL EVERY EVENING
Status: DISCONTINUED | OUTPATIENT
Start: 2022-10-12 | End: 2022-10-14 | Stop reason: HOSPADM

## 2022-10-12 RX ORDER — ACETAMINOPHEN 325 MG/1
650 TABLET ORAL EVERY 6 HOURS PRN
Status: DISCONTINUED | OUTPATIENT
Start: 2022-10-12 | End: 2022-10-14 | Stop reason: HOSPADM

## 2022-10-12 RX ORDER — NITROGLYCERIN 0.4 MG/1
0.4 TABLET SUBLINGUAL
Status: DISCONTINUED | OUTPATIENT
Start: 2022-10-12 | End: 2022-10-14 | Stop reason: HOSPADM

## 2022-10-12 RX ORDER — ACETAMINOPHEN 500 MG
500 TABLET ORAL EVERY 6 HOURS PRN
Status: DISCONTINUED | OUTPATIENT
Start: 2022-10-12 | End: 2022-10-12 | Stop reason: SDUPTHER

## 2022-10-12 RX ORDER — SODIUM CHLORIDE, SODIUM LACTATE, POTASSIUM CHLORIDE, CALCIUM CHLORIDE 600; 310; 30; 20 MG/100ML; MG/100ML; MG/100ML; MG/100ML
125 INJECTION, SOLUTION INTRAVENOUS CONTINUOUS
Status: DISPENSED | OUTPATIENT
Start: 2022-10-12 | End: 2022-10-13

## 2022-10-12 RX ADMIN — ATORVASTATIN CALCIUM 80 MG: 80 TABLET, FILM COATED ORAL at 22:19

## 2022-10-12 RX ADMIN — IOHEXOL 100 ML: 350 INJECTION, SOLUTION INTRAVENOUS at 18:23

## 2022-10-12 RX ADMIN — METOPROLOL TARTRATE 50 MG: 50 TABLET, FILM COATED ORAL at 22:19

## 2022-10-12 RX ADMIN — PANTOPRAZOLE SODIUM 80 MG: 40 INJECTION, POWDER, FOR SOLUTION INTRAVENOUS at 17:55

## 2022-10-12 RX ADMIN — SODIUM CHLORIDE 500 ML: 9 INJECTION, SOLUTION INTRAVENOUS at 17:25

## 2022-10-12 RX ADMIN — SODIUM CHLORIDE, SODIUM LACTATE, POTASSIUM CHLORIDE, AND CALCIUM CHLORIDE 125 ML/HR: .6; .31; .03; .02 INJECTION, SOLUTION INTRAVENOUS at 18:56

## 2022-10-12 RX ADMIN — AMLODIPINE BESYLATE 10 MG: 10 TABLET ORAL at 22:19

## 2022-10-12 NOTE — PROGRESS NOTES
Assessment/Plan:    1  Symptomatic anemia  Assessment & Plan:  Patient with acute anemia seen on recent CBC  Two months ago his Hgb was 12 3 and now 9 1  Patient reports symptoms of dyspnea on exertion, lightheadedness, fatigue, and now abdominal pain with decreased appetite  Stool OB negative, however this does not rule out GI bleed  Considering abdominal pain and symptomatic anemia, I recommend patient go to the ER for evaluation and to rule out GI bleed  Patient and his wife verbalized understanding  His wife agrees to drive him to [facility] ER  Orders:  -     Transfer to other facility  -     POCT hemoccult screening    2  Periumbilical abdominal pain  -     Transfer to other facility  -     POCT hemoccult screening      Subjective:      Patient ID: Usama Harrell is a 69 y o  male  HPI    Patient with past medical history significant for coronary artery disease status post 2 stents to LAD in 2008, status post pacemaker for complete heart block in 07/2022, polycythemia vera on hydroxyurea, hypertension, hyperlipidemia is here with complaint of abdominal pain  He states that his pain started 5 days ago and has been worsening  The pain is epigastric and umbilical with no radiation  Per patient's wife, he has not been eating lately  He denies nausea and vomiting  He states that he has been constipated and can not remember when his last bowel movement was  Patient denies visible blood in stool and hematuria  On 10/4 the patient went to the ED via EMS for evaluation of transient episode of dyspnea and lightheadedness  Patient had workup which showed his Hgb dropped 3 units from 12 3 to 9 1 from the last couple of months  He refused rectal exam in the ER and was sent home  Patient states that he is still feeling unwell and his symptoms are worsening  He feels winded and out of breath by walking a few steps    Recent chest Xray, D-dimer, BNP, and troponin were normal       The following portions of the patient's history were reviewed and updated as appropriate: allergies, current medications, past family history, past medical history, past social history, past surgical history, and problem list       Current Outpatient Medications:   •  acetaminophen (TYLENOL) 500 mg tablet, Take 500 mg by mouth every 6 (six) hours as needed for mild pain, Disp: , Rfl:   •  amLODIPine (NORVASC) 10 mg tablet, Take 10 mg by mouth every evening , Disp: , Rfl:   •  ascorbic acid (VITAMIN C) 1000 MG tablet, Take 1 tablet (1,000 mg total) by mouth every 12 (twelve) hours for 10 doses (Patient taking differently: Take 1,000 mg by mouth 2 (two) times a day), Disp: 10 tablet, Rfl: 0  •  aspirin (ECOTRIN LOW STRENGTH) 81 mg EC tablet, Take 81 mg by mouth daily, Disp: , Rfl:   •  atorvastatin (LIPITOR) 80 mg tablet, TAKE 1 TABLET BY MOUTH EVERY DAY IN THE EVENING, Disp: 90 tablet, Rfl: 1  •  calcitriol (ROCALTROL) 0 25 mcg capsule, Take 1 capsule (0 25 mcg total) by mouth 3 (three) times a week, Disp: 36 capsule, Rfl: 4  •  finasteride (PROSCAR) 5 mg tablet, TAKE 1 TABLET BY MOUTH EVERY DAY, Disp: 90 tablet, Rfl: 0  •  hydroxyurea (HYDREA) 500 mg capsule, TAKE 1 CAPSULE BY MOUTH TWICE A DAY, Disp: 180 capsule, Rfl: 1  •  metoprolol tartrate (LOPRESSOR) 50 mg tablet, Take 50 mg by mouth 2 (two) times a day , Disp: , Rfl:   •  Multiple Vitamin (MULTIVITAMIN) capsule, Take 1 capsule by mouth daily, Disp: , Rfl:   •  nitroglycerin (NITROSTAT) 0 4 mg SL tablet, Place 1 tablet (0 4 mg total) under the tongue every 5 (five) minutes as needed for chest pain, Disp: 10 tablet, Rfl: 1  •  Omega-3 Fatty Acids (FISH OIL) 1200 MG CAPS, Take 1,200 mg by mouth 2 (two) times a day  , Disp: , Rfl:   •  Cholecalciferol (Vitamin D3) 50 MCG (2000 UT) TABS, Take 500 Units by mouth every evening (Patient not taking: Reported on 10/12/2022), Disp: , Rfl:   •  docusate sodium (COLACE) 100 mg capsule, Take 1 capsule (100 mg total) by mouth 2 (two) times a day (Patient not taking: Reported on 10/12/2022), Disp: 28 capsule, Rfl: 0      Review of Systems   Constitutional: Positive for fatigue  Negative for chills and fever  HENT: Negative for ear pain and sore throat  Eyes: Negative for pain and visual disturbance  Respiratory: Negative for cough and shortness of breath  Cardiovascular: Negative for chest pain and palpitations  Gastrointestinal: Positive for abdominal pain and constipation  Negative for abdominal distention, anal bleeding, blood in stool, diarrhea, nausea, rectal pain and vomiting  Genitourinary: Negative for dysuria and hematuria  Musculoskeletal: Negative for arthralgias and back pain  Skin: Negative for color change and rash  Neurological: Positive for light-headedness  Negative for seizures and syncope  All other systems reviewed and are negative  Objective:      /60 (BP Location: Left arm, Patient Position: Sitting, Cuff Size: Standard)   Pulse 67   Temp 98 2 °F (36 8 °C) (Temporal)   Ht 5' 8" (1 727 m)   Wt 73 9 kg (163 lb)   SpO2 95%   BMI 24 78 kg/m²          Physical Exam  Vitals and nursing note reviewed  Constitutional:       General: He is not in acute distress  Appearance: He is well-developed  He is not toxic-appearing  HENT:      Head: Normocephalic and atraumatic  Eyes:      Extraocular Movements: Extraocular movements intact  Pupils: Pupils are equal, round, and reactive to light  Comments: Conjunctival pallor    Neck:      Vascular: No carotid bruit  Cardiovascular:      Rate and Rhythm: Normal rate and regular rhythm  Heart sounds: Normal heart sounds  No murmur heard  Pulmonary:      Effort: Pulmonary effort is normal  No respiratory distress  Breath sounds: Normal breath sounds  No wheezing  Abdominal:      General: Abdomen is flat  Bowel sounds are normal  There is no distension  Palpations: Abdomen is soft  Tenderness:  There is abdominal tenderness in the epigastric area and periumbilical area  There is no guarding  Musculoskeletal:         General: No swelling  Normal range of motion  Cervical back: Normal range of motion  Skin:     General: Skin is warm  Coloration: Skin is pale  Neurological:      General: No focal deficit present  Mental Status: He is alert and oriented to person, place, and time  Mental status is at baseline  Psychiatric:         Mood and Affect: Mood normal          Behavior: Behavior normal          Thought Content:  Thought content normal          Judgment: Judgment normal

## 2022-10-12 NOTE — ED PROVIDER NOTES
Patient is a 69year old male signed out to me by Dr Ronni Banks  Patient with h/o HTN, PCV and + tobacco abuse  Patient with increasing sob and epigastric discomfort for the past several days  Patient was initially seen in the emergency department on the 4th and was discharged home  He did follow-up with his PCP today for worsening symptoms in there was a Hemoccult test which was negative  Patient's last hemoglobin 9 1  Dr Sandoval did have patient side blood consent as well  Patient with decrease in po take over the past week as well as constipation for a week  Patient with ROSEN even short distances  Pending labs and repeat evaluation    6:43 PM     Patient with continued drop hemoglobin 8 7  Creatinine mildly increased  Pending CT read  7:58 PM  Discussed with Dr Gaurav Terrell  We had a detailed discussion of the patient's condition and case,  including need for admission  Accepts to his/her service  Bed request/bridging orders placed  CT updated patient updated  Patient agreeable      Labs Reviewed   CBC AND DIFFERENTIAL - Abnormal       Result Value Ref Range Status    WBC 6 26  4 31 - 10 16 Thousand/uL Final    RBC 1 89 (*) 3 88 - 5 62 Million/uL Final    Hemoglobin 8 7 (*) 12 0 - 17 0 g/dL Final    Hematocrit 24 8 (*) 36 5 - 49 3 % Final     (*) 82 - 98 fL Final    MCH 46 0 (*) 26 8 - 34 3 pg Final    MCHC 35 1  31 4 - 37 4 g/dL Final    RDW 17 7 (*) 11 6 - 15 1 % Final    MPV 10 1  8 9 - 12 7 fL Final    Platelets 85 (*) 963 - 390 Thousands/uL Final    nRBC 0  /100 WBCs Final    Neutrophils Relative 74  43 - 75 % Final    Immat GRANS % 1  0 - 2 % Final    Lymphocytes Relative 16  14 - 44 % Final    Monocytes Relative 9  4 - 12 % Final    Eosinophils Relative 0  0 - 6 % Final    Basophils Relative 0  0 - 1 % Final    Neutrophils Absolute 4 62  1 85 - 7 62 Thousands/µL Final    Immature Grans Absolute 0 05  0 00 - 0 20 Thousand/uL Final    Lymphocytes Absolute 0 98  0 60 - 4 47 Thousands/µL Final Monocytes Absolute 0 58  0 17 - 1 22 Thousand/µL Final    Eosinophils Absolute 0 02  0 00 - 0 61 Thousand/µL Final    Basophils Absolute 0 01  0 00 - 0 10 Thousands/µL Final   PROTIME-INR - Abnormal    Protime 14 6 (*) 11 6 - 14 5 seconds Final    INR 1 13  0 84 - 1 19 Final   COMPREHENSIVE METABOLIC PANEL - Abnormal    Sodium 136  135 - 147 mmol/L Final    Potassium 3 6  3 5 - 5 3 mmol/L Final    Chloride 101  96 - 108 mmol/L Final    CO2 24  21 - 32 mmol/L Final    ANION GAP 11  4 - 13 mmol/L Final    BUN 21  5 - 25 mg/dL Final    Creatinine 1 41 (*) 0 60 - 1 30 mg/dL Final    Comment: Standardized to IDMS reference method    Glucose 106  65 - 140 mg/dL Final    Comment: If the patient is fasting, the ADA then defines impaired fasting glucose as > 100 mg/dL and diabetes as > or equal to 123 mg/dL  Specimen collection should occur prior to Sulfasalazine administration due to the potential for falsely depressed results  Specimen collection should occur prior to Sulfapyridine administration due to the potential for falsely elevated results  Calcium 9 3  8 3 - 10 1 mg/dL Final    AST 32  5 - 45 U/L Final    Comment: Specimen collection should occur prior to Sulfasalazine administration due to the potential for falsely depressed results   (*) 12 - 78 U/L Final    Comment: Specimen collection should occur prior to Sulfasalazine administration due to the potential for falsely depressed results  Alkaline Phosphatase 173 (*) 46 - 116 U/L Final    Total Protein 7 7  6 4 - 8 4 g/dL Final    Albumin 3 6  3 5 - 5 0 g/dL Final    Total Bilirubin 2 23 (*) 0 20 - 1 00 mg/dL Final    Comment: Use of this assay is not recommended for patients undergoing treatment with eltrombopag due to the potential for falsely elevated results      eGFR 48  ml/min/1 73sq m Final    Narrative:     [place] guidelines for Chronic Kidney Disease (CKD):   •  Stage 1 with normal or high GFR (GFR > 90 mL/min/1 73 square meters)  •  Stage 2 Mild CKD (GFR = 60-89 mL/min/1 73 square meters)  •  Stage 3A Moderate CKD (GFR = 45-59 mL/min/1 73 square meters)  •  Stage 3B Moderate CKD (GFR = 30-44 mL/min/1 73 square meters)  •  Stage 4 Severe CKD (GFR = 15-29 mL/min/1 73 square meters)  •  Stage 5 End Stage CKD (GFR <15 mL/min/1 73 square meters)  Note: GFR calculation is accurate only with a steady state creatinine   RETICULOCYTES - Abnormal    Retic Ct Abs 89,200  14,356 - 105,094 Final    Retic Ct Pct 4 72 (*) 0 37 - 1 87 % Final   APTT - Normal    PTT 29  23 - 37 seconds Final    Comment: Therapeutic Heparin Range =  60-90 seconds   MAGNESIUM - Normal    Magnesium 2 2  1 6 - 2 6 mg/dL Final   LIPASE - Normal    Lipase 199  73 - 393 u/L Final   HS TROPONIN I 0HR - Normal    hs TnI 0hr 7  "Refer to ACS Flowchart"- see link ng/L Final    Comment:                                              Initial (time 0) result  If >=50 ng/L, Myocardial injury suggested ;  Type of myocardial injury and treatment strategy  to be determined  If 5-49 ng/L, a delta result at 2 hours and or 4 hours will be needed to further evaluate  If <4 ng/L, and chest pain has been >3 hours since onset, patient may qualify for discharge based on the HEART score in the ED  If <5 ng/L and <3hours since onset of chest pain, a delta result at 2 hours will be needed to further evaluate  HS Troponin 99th Percentile URL of a Health Population=12 ng/L with a 95% Confidence Interval of 8-18 ng/L  Second Troponin (time 2 hours)  If calculated delta >= 20 ng/L,  Myocardial injury suggested ; Type of myocardial injury and treatment strategy to be determined  If 5-49 ng/L and the calculated delta is 5-19 ng/L, consult medical service for evaluation  Continue evaluation for ischemia on ecg and other possible etiology and repeat hs troponin at 4 hours    If delta is <5 ng/L at 2 hours, consider discharge based on risk stratification via the HEART score (if in ED), or LU risk score in IP/Observation  HS Troponin 99th Percentile URL of a Health Population=12 ng/L with a 95% Confidence Interval of 8-18 ng/L  NT-BNP PRO (BRAIN NATRIURETIC PEPTIDE) - Normal    NT-proBNP 214  <450 pg/mL Final   IRON SATURATION   FERRITIN   HS TROPONIN I 2HR   TYPE AND SCREEN    ABO Grouping O   Final    Rh Factor Positive   Final    Antibody Screen Negative   Final    Specimen Expiration Date [id number]   Final   IRON PANEL (INCLUDES FERRITIN,IRON SAT%, IRON, AND TIBC)    Narrative: The following orders were created for panel order Iron Panel (Includes Ferritin, Iron Sat%, Iron, and TIBC)  Procedure                               Abnormality         Status                     ---------                               -----------         ------                     Iron Saturation %[[id number]]                                In process                 Ferritin[[id number]]                                         In process                   Please view results for these tests on the individual orders            Porsha Tan DO  10/12/22 2026

## 2022-10-12 NOTE — ED PROVIDER NOTES
History  Chief Complaint   Patient presents with   • Abnormal Lab     Sent for low hemoglobin  Reporting abdominal pain and SOB  History provided by:  Patient and medical records   used: No    Medical Problem - Major  Location:  Complaining of significant dyspnea on exertion and lightheadedness  Evaluated here for similar on October 4th  Had some degree of anemia with a hemoglobin of 9 1  Has been having some epigastric abdominal pain, no chest pain, nausea or vomiting  Has not had a bowel movement in a week  No history of prior GI bleeding  He does take aspirin  Quality:  He has polycythemia vera and is on hydroxyurea and aspirin  They have taken him off of his iron pills  He has not noted any black or tarry stool  No blood in the urine  Severity:  Severe  Onset quality:  Gradual  Duration: At least 1 week  Timing:  Constant  Progression:  Worsening  Chronicity:  New  Context:  Was seen in the primary care doctor's office today and sent to the ER for evaluation  The stool guaiac was negative  Relieved by:  Nothing  Worsened by:  Unknown  Ineffective treatments:  Taking his usual medications  Associated symptoms: abdominal pain, fatigue and shortness of breath    Associated symptoms: no chest pain, no cough, no diarrhea, no fever, no nausea and no vomiting    Epigastric pain over the last couple of days without radiation  Sharp in nature  Has a cardiac pacer  He is a smoker  Does not drink alcohol  Prior to Admission Medications   Prescriptions Last Dose Informant Patient Reported? Taking?    Cholecalciferol (Vitamin D3) 50 MCG (2000 UT) TABS Not Taking at Unknown time  Yes No   Sig: Take 500 Units by mouth every evening   Patient not taking: No sig reported   Multiple Vitamin (MULTIVITAMIN) capsule  Self Yes Yes   Sig: Take 1 capsule by mouth daily   Omega-3 Fatty Acids (FISH OIL) 1200 MG CAPS  Self Yes Yes   Sig: Take 1,200 mg by mouth 2 (two) times a day acetaminophen (TYLENOL) 500 mg tablet  Self Yes Yes   Sig: Take 500 mg by mouth every 6 (six) hours as needed for mild pain   amLODIPine (NORVASC) 10 mg tablet  Self Yes Yes   Sig: Take 10 mg by mouth every evening    ascorbic acid (VITAMIN C) 1000 MG tablet   No Yes   Sig: Take 1 tablet (1,000 mg total) by mouth every 12 (twelve) hours for 10 doses   Patient taking differently: Take 1,000 mg by mouth 2 (two) times a day   aspirin (ECOTRIN LOW STRENGTH) 81 mg EC tablet  Self Yes Yes   Sig: Take 81 mg by mouth daily   atorvastatin (LIPITOR) 80 mg tablet   No Yes   Sig: TAKE 1 TABLET BY MOUTH EVERY DAY IN THE EVENING   calcitriol (ROCALTROL) 0 25 mcg capsule  Self No Yes   Sig: Take 1 capsule (0 25 mcg total) by mouth 3 (three) times a week   docusate sodium (COLACE) 100 mg capsule Not Taking at Unknown time  No No   Sig: Take 1 capsule (100 mg total) by mouth 2 (two) times a day   Patient not taking: No sig reported   finasteride (PROSCAR) 5 mg tablet  Self No Yes   Sig: TAKE 1 TABLET BY MOUTH EVERY DAY   hydroxyurea (HYDREA) 500 mg capsule  Self No Yes   Sig: TAKE 1 CAPSULE BY MOUTH TWICE A DAY   metoprolol tartrate (LOPRESSOR) 50 mg tablet  Self Yes Yes   Sig: Take 50 mg by mouth 2 (two) times a day    nitroglycerin (NITROSTAT) 0 4 mg SL tablet  Self No Yes   Sig: Place 1 tablet (0 4 mg total) under the tongue every 5 (five) minutes as needed for chest pain      Facility-Administered Medications: None       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"   • Full dentures     but doesnt wear them   • History of 2019 novel coronavirus disease (COVID-19) 02/2021    hospital for 2 days but not in ICU/"mainly dehydrated" "also fever/oxygen below 90"   • History of coronary artery stent placement     x2 in 2008 or so; sees [facility]-- Dr Maggi Knox cardio   • [facility] (hard of hearing)     no hearing aids yet   • Hyperlipidemia     Last Assessed:  11/24/17   • Hypertension     Benign essential, Last Assessed:  11/24/17   • LBBB (left bundle branch block)    • Nocturia    • Polycythemia vera (HCC)     (per history in chart)   • PVD (peripheral vascular disease) ([facility] [address] )    • RBC abnormality     pt reports told has high Red blood cells/goes to infusion center regularly next appt 10/8/21 hematologist is Dr Jose Naranjo of [address] [place] [address](had been Dr Kevin Su)   • Seasickness    • Shortness of breath     "if goes up steps gets SOB, had for awhile"   • Slow urinary stream     "sometimes feels like bladder isnt all the way empty"   • Snores    • Wears glasses     reading       Past Surgical History:   Procedure Laterality Date   • ANGIOPLASTY     • APPENDECTOMY     • CARDIAC CATHETERIZATION N/A 7/28/2022    Procedure: CARDIAC TEMPORARY PACEMAKER;  Surgeon: Mando Sarmiento MD;  [facility]: [facility]; Service: Cardiology   • CARDIAC ELECTROPHYSIOLOGY PROCEDURE N/A 7/29/2022    Procedure: Cardiac pacer implant;  Surgeon: Mallory Wasserman MD;  [facility]: [facility]; Service: Cardiology   • CATARACT EXTRACTION Bilateral    • CORONARY ARTERY BYPASS GRAFT     • CT CYSTOGRAM  11/4/2021   • ELBOW SURGERY Right    • FL CYSTOGRAM  10/27/2021   • KNEE ARTHROSCOPY Right    • PA COLONOSCOPY FLX DX W/COLLJ SPEC WHEN PFRMD N/A 5/22/2017    Procedure: COLONOSCOPY;  Surgeon: Alejandra Chan DO;  Location: [facility];   Service: Gastroenterology   • PROSTATE BIOPSY     • PROSTATECTOMY N/A 10/18/2021    Procedure: ROBOTIC SUBTOTAL/SUPRAPUBIC PROSTATECTOMY;  Surgeon: Zeynep Acevedo MD;  Location: [facility];  Service: Urology   • SHOULDER ARTHROSCOPY Right    • TONSILLECTOMY         Family History   Problem Relation Age of Onset   • No Known Problems Mother    • Heart disease Father      I have reviewed and agree with the history as documented  E-Cigarette/Vaping   • E-Cigarette Use Never User      E-Cigarette/Vaping Substances   • Nicotine No    • THC No    • CBD No    • Flavoring No    • Other No    • Unknown No      Social History     Tobacco Use   • Smoking status: Current Every Day Smoker     Packs/day: 0 50     Years: 35 00     Pack years: 17 50     Types: Cigarettes   • Smokeless tobacco: Never Used   • Tobacco comment: a little less than 1/2ppd, trying to cut back before surgery last smoked 10/16   Vaping Use   • Vaping Use: Never used   Substance Use Topics   • Alcohol use: Never   • Drug use: No       Review of Systems   Constitutional: Positive for activity change, appetite change and fatigue  Negative for chills and fever  Respiratory: Positive for shortness of breath  Negative for cough and chest tightness  Cardiovascular: Negative for chest pain and leg swelling  Gastrointestinal: Positive for abdominal pain  Negative for blood in stool, diarrhea, nausea and vomiting  Genitourinary: Negative for difficulty urinating and dysuria  Musculoskeletal: Negative for gait problem  Skin: Positive for color change and pallor  Neurological: Positive for light-headedness  Negative for weakness and numbness  All other systems reviewed and are negative  Physical Exam  Physical Exam  Vitals and nursing note reviewed  Constitutional:       General: He is not in acute distress  Appearance: Normal appearance  He is well-developed  He is not ill-appearing, toxic-appearing or diaphoretic  HENT:      Head: Normocephalic and atraumatic  Right Ear: Hearing normal  No drainage or swelling  Left Ear: Hearing normal  No drainage or swelling  Eyes:      General: Lids are normal          Right eye: No discharge  Left eye: No discharge        Conjunctiva/sclera: Conjunctivae normal    Neck:      Vascular: No JVD  Trachea: Trachea normal    Cardiovascular:      Rate and Rhythm: Normal rate and regular rhythm  Pulses: Normal pulses  Heart sounds: Normal heart sounds  No murmur heard  No friction rub  No gallop  Pulmonary:      Effort: Pulmonary effort is normal  No respiratory distress  Breath sounds: Normal breath sounds  No stridor  No wheezing or rales  Chest:      Chest wall: No tenderness  Abdominal:      Palpations: Abdomen is soft  Tenderness: There is abdominal tenderness in the epigastric area  There is no guarding or rebound  Musculoskeletal:         General: Normal range of motion  Cervical back: Normal range of motion  Right lower leg: No edema  Left lower leg: No edema  Skin:     General: Skin is warm and dry  Coloration: Skin is pale  Neurological:      General: No focal deficit present  Mental Status: He is alert  GCS: GCS eye subscore is 4  GCS verbal subscore is 5  GCS motor subscore is 6  Sensory: No sensory deficit  Motor: No abnormal muscle tone  Psychiatric:         Mood and Affect: Mood normal          Speech: Speech normal          Behavior: Behavior is cooperative           Vital Signs  ED Triage Vitals [10/12/22 1640]   Temperature Pulse Respirations Blood Pressure SpO2   97 6 °F (36 4 °C) 66 18 119/70 96 %      Temp Source Heart Rate Source Patient Position - Orthostatic VS BP Location FiO2 (%)   Oral Monitor Sitting Right arm --      Pain Score       --           Vitals:    10/12/22 1640   BP: 119/70   Pulse: 66   Patient Position - Orthostatic VS: Sitting         Visual Acuity      ED Medications  Medications   sodium chloride 0 9 % bolus 500 mL (500 mL Intravenous New Bag 10/12/22 1725)   lactated ringers infusion (has no administration in time range)   pantoprazole (PROTONIX) 80 mg in sodium chloride 0 9 % 100 mL IVPB (has no administration in time range) Diagnostic Studies  Results Reviewed     Procedure Component Value Units Date/Time    Reticulocytes [[id number]]  (Abnormal) Collected: 10/12/22 1720    Lab Status: Final result Specimen: Blood from Arm, Right Updated: 10/12/22 1729     Retic Ct Abs 89,200     Retic Ct Pct 4 72 %     Iron Saturation % [[id number]] Collected: 10/12/22 1720    Lab Status: In process Specimen: Blood from Arm, Right Updated: 10/12/22 1726    Ferritin [[id number]] Collected: 10/12/22 1720    Lab Status: In process Specimen: Blood from Arm, Right Updated: 10/12/22 1726    Protime-INR [[id number]] Collected: 10/12/22 1720    Lab Status: In process Specimen: Blood from Arm, Right Updated: 10/12/22 1726    APTT [[id number]] Collected: 10/12/22 1720    Lab Status: In process Specimen: Blood from Arm, Right Updated: 10/12/22 1726    Comprehensive metabolic panel [[id number]] Collected: 10/12/22 1720    Lab Status: In process Specimen: Blood from Arm, Right Updated: 10/12/22 1726    Magnesium [[id number]] Collected: 10/12/22 1720    Lab Status: In process Specimen: Blood from Arm, Right Updated: 10/12/22 1726    Lipase [[id number]] Collected: 10/12/22 1720    Lab Status: In process Specimen: Blood from Arm, Right Updated: 10/12/22 1726    NT-BNP PRO [[id number]] Collected: 10/12/22 1720    Lab Status: In process Specimen: Blood from Arm, Right Updated: 10/12/22 1726    HS Troponin 0hr (reflex protocol) [[id number]] Collected: 10/12/22 1720    Lab Status: In process Specimen: Blood from Arm, Right Updated: 10/12/22 1726    CBC and differential [[id number]] Collected: 10/12/22 1720    Lab Status: In process Specimen: Blood from Arm, Right Updated: 10/12/22 1726                 CT abdomen pelvis with contrast    (Results Pending)              Procedures  Procedures         ED Course                                             MDM  Number of Diagnoses or Management Options  Diagnosis management comments: Concerned about significant anemia    The hemoglobin of 9 1 was over a week ago  Will recheck that value  Per report his Hemoccult was negative and has noticed a dark stool but has not had a bowel movement in over a week  Given the nature of the epigastric pain will start him on Protonix for possible upper GI bleeding as cause of potential anemia  I consented the patient for possible blood transfusion if he meets criteria  EKG shows a paced rhythm  CT ordered  Amount and/or Complexity of Data Reviewed  Clinical lab tests: ordered  Tests in the radiology section of CPT®: ordered  Review and summarize past medical records: yes    Patient Progress  Patient progress: stable      Disposition  Final diagnoses:   None     ED Disposition     None      Follow-up Information    None         Patient's Medications   Discharge Prescriptions    No medications on file       No discharge procedures on file      PDMP Review     None          ED Provider  Electronically Signed by           Sagar Cabrera MD  10/12/22 8165

## 2022-10-13 PROBLEM — N13.8 BPH WITH OBSTRUCTION/LOWER URINARY TRACT SYMPTOMS: Status: RESOLVED | Noted: 2019-02-05 | Resolved: 2022-10-13

## 2022-10-13 PROBLEM — N40.1 BPH WITH OBSTRUCTION/LOWER URINARY TRACT SYMPTOMS: Status: RESOLVED | Noted: 2019-02-05 | Resolved: 2022-10-13

## 2022-10-13 PROBLEM — D64.9 SYMPTOMATIC ANEMIA: Status: RESOLVED | Noted: 2022-10-12 | Resolved: 2022-10-13

## 2022-10-13 PROBLEM — I44.2 COMPLETE HEART BLOCK (HCC): Status: RESOLVED | Noted: 2022-07-28 | Resolved: 2022-10-13

## 2022-10-13 LAB
4HR DELTA HS TROPONIN: 1 NG/L
ALBUMIN SERPL BCP-MCNC: 3 G/DL (ref 3.5–5)
ALP SERPL-CCNC: 140 U/L (ref 46–116)
ALT SERPL W P-5'-P-CCNC: 118 U/L (ref 12–78)
ANION GAP SERPL CALCULATED.3IONS-SCNC: 7 MMOL/L (ref 4–13)
ANISOCYTOSIS BLD QL SMEAR: PRESENT
AST SERPL W P-5'-P-CCNC: 24 U/L (ref 5–45)
BACTERIA UR QL AUTO: ABNORMAL /HPF
BILIRUB DIRECT SERPL-MCNC: 0.72 MG/DL (ref 0–0.2)
BILIRUB SERPL-MCNC: 2.44 MG/DL (ref 0.2–1)
BILIRUB UR QL STRIP: NEGATIVE
BUN SERPL-MCNC: 14 MG/DL (ref 5–25)
CALCIUM ALBUM COR SERPL-MCNC: 9.3 MG/DL (ref 8.3–10.1)
CALCIUM SERPL-MCNC: 8.5 MG/DL (ref 8.3–10.1)
CARDIAC TROPONIN I PNL SERPL HS: 8 NG/L
CHLORIDE SERPL-SCNC: 108 MMOL/L (ref 96–108)
CLARITY UR: CLEAR
CO2 SERPL-SCNC: 26 MMOL/L (ref 21–32)
COLOR UR: YELLOW
CREAT SERPL-MCNC: 0.94 MG/DL (ref 0.6–1.3)
ERYTHROCYTE [DISTWIDTH] IN BLOOD BY AUTOMATED COUNT: 17.3 % (ref 11.6–15.1)
FIBRINOGEN PPP-MCNC: 512 MG/DL (ref 227–495)
GFR SERPL CREATININE-BSD FRML MDRD: 78 ML/MIN/1.73SQ M
GGT SERPL-CCNC: 120 U/L (ref 5–85)
GLUCOSE SERPL-MCNC: 97 MG/DL (ref 65–140)
GLUCOSE UR STRIP-MCNC: NEGATIVE MG/DL
HBV CORE AB SER QL: NORMAL
HBV CORE IGM SER QL: NORMAL
HBV SURFACE AG SER QL: NORMAL
HCT VFR BLD AUTO: 23.7 % (ref 36.5–49.3)
HCV AB SER QL: NORMAL
HGB BLD-MCNC: 8.4 G/DL (ref 12–17)
HGB UR QL STRIP.AUTO: NEGATIVE
KETONES UR STRIP-MCNC: NEGATIVE MG/DL
LDH SERPL-CCNC: 289 U/L (ref 81–234)
LEUKOCYTE ESTERASE UR QL STRIP: ABNORMAL
LYMPHOCYTES NFR BLD: 25 % (ref 14–44)
MACROCYTES BLD QL AUTO: PRESENT
MAGNESIUM SERPL-MCNC: 2.1 MG/DL (ref 1.6–2.6)
MCH RBC QN AUTO: 46.2 PG (ref 26.8–34.3)
MCHC RBC AUTO-ENTMCNC: 35.4 G/DL (ref 31.4–37.4)
MCV RBC AUTO: 130 FL (ref 82–98)
MONOCYTES NFR BLD AUTO: 5 % (ref 4–12)
NEUTS SEG NFR BLD AUTO: 70 % (ref 45–77)
NITRITE UR QL STRIP: POSITIVE
NON-SQ EPI CELLS URNS QL MICRO: ABNORMAL /HPF
NRBC BLD AUTO-RTO: 1 /100 WBCS
OTHER STN SPEC: ABNORMAL
OVALOCYTES BLD QL SMEAR: PRESENT
PH UR STRIP.AUTO: 6.5 [PH]
PHOSPHATE SERPL-MCNC: 3.4 MG/DL (ref 2.3–4.1)
PLATELET # BLD AUTO: 76 THOUSANDS/UL (ref 149–390)
PLATELET BLD QL SMEAR: ABNORMAL
PMV BLD AUTO: 10.7 FL (ref 8.9–12.7)
POLYCHROMASIA BLD QL SMEAR: PRESENT
POTASSIUM SERPL-SCNC: 3.5 MMOL/L (ref 3.5–5.3)
PROT SERPL-MCNC: 6.5 G/DL (ref 6.4–8.4)
PROT UR STRIP-MCNC: NEGATIVE MG/DL
RBC # BLD AUTO: 1.82 MILLION/UL (ref 3.88–5.62)
RBC #/AREA URNS AUTO: ABNORMAL /HPF
SODIUM SERPL-SCNC: 141 MMOL/L (ref 135–147)
SP GR UR STRIP.AUTO: <=1.005 (ref 1–1.03)
TOTAL CELLS COUNTED SPEC: 100
UROBILINOGEN UR QL STRIP.AUTO: 1 E.U./DL
WBC # BLD AUTO: 5.32 THOUSAND/UL (ref 4.31–10.16)
WBC #/AREA URNS AUTO: ABNORMAL /HPF

## 2022-10-13 PROCEDURE — 82977 ASSAY OF GGT: CPT | Performed by: STUDENT IN AN ORGANIZED HEALTH CARE EDUCATION/TRAINING PROGRAM

## 2022-10-13 PROCEDURE — 99233 SBSQ HOSP IP/OBS HIGH 50: CPT | Performed by: INTERNAL MEDICINE

## 2022-10-13 PROCEDURE — 99222 1ST HOSP IP/OBS MODERATE 55: CPT | Performed by: INTERNAL MEDICINE

## 2022-10-13 PROCEDURE — 80053 COMPREHEN METABOLIC PANEL: CPT | Performed by: INTERNAL MEDICINE

## 2022-10-13 PROCEDURE — 86704 HEP B CORE ANTIBODY TOTAL: CPT | Performed by: STUDENT IN AN ORGANIZED HEALTH CARE EDUCATION/TRAINING PROGRAM

## 2022-10-13 PROCEDURE — 85007 BL SMEAR W/DIFF WBC COUNT: CPT | Performed by: STUDENT IN AN ORGANIZED HEALTH CARE EDUCATION/TRAINING PROGRAM

## 2022-10-13 PROCEDURE — 86803 HEPATITIS C AB TEST: CPT | Performed by: STUDENT IN AN ORGANIZED HEALTH CARE EDUCATION/TRAINING PROGRAM

## 2022-10-13 PROCEDURE — 86705 HEP B CORE ANTIBODY IGM: CPT | Performed by: STUDENT IN AN ORGANIZED HEALTH CARE EDUCATION/TRAINING PROGRAM

## 2022-10-13 PROCEDURE — 81001 URINALYSIS AUTO W/SCOPE: CPT | Performed by: INTERNAL MEDICINE

## 2022-10-13 PROCEDURE — 84165 PROTEIN E-PHORESIS SERUM: CPT | Performed by: STUDENT IN AN ORGANIZED HEALTH CARE EDUCATION/TRAINING PROGRAM

## 2022-10-13 PROCEDURE — 85027 COMPLETE CBC AUTOMATED: CPT | Performed by: INTERNAL MEDICINE

## 2022-10-13 PROCEDURE — 83615 LACTATE (LD) (LDH) ENZYME: CPT | Performed by: STUDENT IN AN ORGANIZED HEALTH CARE EDUCATION/TRAINING PROGRAM

## 2022-10-13 PROCEDURE — 82248 BILIRUBIN DIRECT: CPT | Performed by: STUDENT IN AN ORGANIZED HEALTH CARE EDUCATION/TRAINING PROGRAM

## 2022-10-13 PROCEDURE — 83010 ASSAY OF HAPTOGLOBIN QUANT: CPT | Performed by: STUDENT IN AN ORGANIZED HEALTH CARE EDUCATION/TRAINING PROGRAM

## 2022-10-13 PROCEDURE — 85384 FIBRINOGEN ACTIVITY: CPT | Performed by: STUDENT IN AN ORGANIZED HEALTH CARE EDUCATION/TRAINING PROGRAM

## 2022-10-13 PROCEDURE — 84166 PROTEIN E-PHORESIS/URINE/CSF: CPT | Performed by: STUDENT IN AN ORGANIZED HEALTH CARE EDUCATION/TRAINING PROGRAM

## 2022-10-13 PROCEDURE — 83735 ASSAY OF MAGNESIUM: CPT | Performed by: INTERNAL MEDICINE

## 2022-10-13 PROCEDURE — 87340 HEPATITIS B SURFACE AG IA: CPT | Performed by: STUDENT IN AN ORGANIZED HEALTH CARE EDUCATION/TRAINING PROGRAM

## 2022-10-13 PROCEDURE — 84100 ASSAY OF PHOSPHORUS: CPT | Performed by: INTERNAL MEDICINE

## 2022-10-13 RX ORDER — ASPIRIN 81 MG/1
81 TABLET, CHEWABLE ORAL DAILY
Status: DISCONTINUED | OUTPATIENT
Start: 2022-10-14 | End: 2022-10-14 | Stop reason: HOSPADM

## 2022-10-13 RX ORDER — HEPARIN SODIUM 5000 [USP'U]/ML
5000 INJECTION, SOLUTION INTRAVENOUS; SUBCUTANEOUS EVERY 8 HOURS SCHEDULED
Status: DISCONTINUED | OUTPATIENT
Start: 2022-10-13 | End: 2022-10-14 | Stop reason: HOSPADM

## 2022-10-13 RX ADMIN — AMLODIPINE BESYLATE 10 MG: 10 TABLET ORAL at 17:25

## 2022-10-13 RX ADMIN — ATORVASTATIN CALCIUM 80 MG: 80 TABLET, FILM COATED ORAL at 17:25

## 2022-10-13 RX ADMIN — METOPROLOL TARTRATE 50 MG: 50 TABLET, FILM COATED ORAL at 08:20

## 2022-10-13 RX ADMIN — FINASTERIDE 5 MG: 5 TABLET, FILM COATED ORAL at 08:20

## 2022-10-13 RX ADMIN — SODIUM CHLORIDE, SODIUM LACTATE, POTASSIUM CHLORIDE, AND CALCIUM CHLORIDE 125 ML/HR: .6; .31; .03; .02 INJECTION, SOLUTION INTRAVENOUS at 02:11

## 2022-10-13 RX ADMIN — METOPROLOL TARTRATE 50 MG: 50 TABLET, FILM COATED ORAL at 17:25

## 2022-10-13 RX ADMIN — HEPARIN SODIUM 5000 UNITS: 5000 INJECTION INTRAVENOUS; SUBCUTANEOUS at 22:07

## 2022-10-13 NOTE — ASSESSMENT & PLAN NOTE
Lab Results   Component Value Date    EGFR 48 10/12/2022    EGFR 56 10/04/2022    EGFR 59 07/30/2022    CREATININE 1 41 (H) 10/12/2022    CREATININE 1 24 10/04/2022    CREATININE 1 19 07/30/2022     Baseline creatinine approximately 1 2; patient appears near baseline  Avoid nephrotoxic medications and relative hypotension

## 2022-10-13 NOTE — ASSESSMENT & PLAN NOTE
Patient status post PCI in 2008, will hold aspirin on admission  Denies chest pain; troponins within normal limits  EKG shows dual paced rhythm; pacemaker in place

## 2022-10-13 NOTE — PLAN OF CARE
Problem: Nutrition/Hydration-ADULT  Goal: Nutrient/Hydration intake appropriate for improving, restoring or maintaining nutritional needs  Description: Monitor and assess patient's nutrition/hydration status for malnutrition  Collaborate with interdisciplinary team and initiate plan and interventions as ordered  Monitor patient's weight and dietary intake as ordered or per policy  Utilize nutrition screening tool and intervene as necessary  Determine patient's food preferences and provide high-protein, high-caloric foods as appropriate       INTERVENTIONS:  - Monitor oral intake, urinary output, labs, and treatment plans  - Assess nutrition and hydration status and recommend course of action  - Evaluate amount of meals eaten  - Assist patient with eating if necessary   - Allow adequate time for meals  - Recommend/ encourage appropriate diets, oral nutritional supplements, and vitamin/mineral supplements  - Order, calculate, and assess calorie counts as needed  - Recommend, monitor, and adjust tube feedings and TPN/PPN based on assessed needs  - Assess need for intravenous fluids  - Provide specific nutrition/hydration education as appropriate  - Include patient/family/caregiver in decisions related to nutrition  Outcome: Progressing     Problem: SAFETY ADULT  Goal: Patient will remain free of falls  Description: INTERVENTIONS:  - Educate patient/family on patient safety including physical limitations  - Instruct patient to call for assistance with activity   - Consult OT/PT to assist with strengthening/mobility   - Keep Call bell within reach  - Keep bed low and locked with side rails adjusted as appropriate  - Keep care items and personal belongings within reach  - Initiate and maintain comfort rounds  - Make Fall Risk Sign visible to staff  - Offer Toileting every  Hours, in advance of need  - Initiate/Maintain alarm  - Obtain necessary fall risk management equipment:   - Apply yellow socks and bracelet for high fall risk patients  - Consider moving patient to room near nurses station  Outcome: Progressing  Goal: Maintain or return to baseline ADL function  Description: INTERVENTIONS:  -  Assess patient's ability to carry out ADLs; assess patient's baseline for ADL function and identify physical deficits which impact ability to perform ADLs (bathing, care of mouth/teeth, toileting, grooming, dressing, etc )  - Assess/evaluate cause of self-care deficits   - Assess range of motion  - Assess patient's mobility; develop plan if impaired  - Assess patient's need for assistive devices and provide as appropriate  - Encourage maximum independence but intervene and supervise when necessary  - Involve family in performance of ADLs  - Assess for home care needs following discharge   - Consider OT consult to assist with ADL evaluation and planning for discharge  - Provide patient education as appropriate  Outcome: Progressing  Goal: Maintains/Returns to pre admission functional level  Description: INTERVENTIONS:  - Perform BMAT or MOVE assessment daily    - Set and communicate daily mobility goal to care team and patient/family/caregiver  - Collaborate with rehabilitation services on mobility goals if consulted  - Perform Range of Motion  times a day  - Reposition patient every  hours    - Dangle patient  times a day  - Stand patient  times a day  - Ambulate patient  times a day  - Out of bed to chair  times a day   - Out of bed for meal times a day  - Out of bed for toileting  - Record patient progress and toleration of activity level   Outcome: Progressing     Problem: CARDIOVASCULAR - ADULT  Goal: Maintains optimal cardiac output and hemodynamic stability  Description: INTERVENTIONS:  - Monitor I/O, vital signs and rhythm  - Monitor for S/S and trends of decreased cardiac output  - Administer and titrate ordered vasoactive medications to optimize hemodynamic stability  - Assess quality of pulses, skin color and temperature  - Assess for signs of decreased coronary artery perfusion  - Instruct patient to report change in severity of symptoms  Outcome: Progressing  Goal: Absence of cardiac dysrhythmias or at baseline rhythm  Description: INTERVENTIONS:  - Continuous cardiac monitoring, vital signs, obtain 12 lead EKG if ordered  - Administer antiarrhythmic and heart rate control medications as ordered  - Monitor electrolytes and administer replacement therapy as ordered  Outcome: Progressing     Problem: RESPIRATORY - ADULT  Goal: Achieves optimal ventilation and oxygenation  Description: INTERVENTIONS:  - Assess for changes in respiratory status  - Assess for changes in mentation and behavior  - Position to facilitate oxygenation and minimize respiratory effort  - Oxygen administered by appropriate delivery if ordered  - Initiate smoking cessation education as indicated  - Encourage broncho-pulmonary hygiene including cough, deep breathe, Incentive Spirometry  - Assess the need for suctioning and aspirate as needed  - Assess and instruct to report SOB or any respiratory difficulty  - Respiratory Therapy support as indicated  Outcome: Progressing     Problem: GASTROINTESTINAL - ADULT  Goal: Minimal or absence of nausea and/or vomiting  Description: INTERVENTIONS:  - Administer IV fluids if ordered to ensure adequate hydration  - Maintain NPO status until nausea and vomiting are resolved  - Nasogastric tube if ordered  - Administer ordered antiemetic medications as needed  - Provide nonpharmacologic comfort measures as appropriate  - Advance diet as tolerated, if ordered  - Consider nutrition services referral to assist patient with adequate nutrition and appropriate food choices  Outcome: Progressing  Goal: Maintains or returns to baseline bowel function  Description: INTERVENTIONS:  - Assess bowel function  - Encourage oral fluids to ensure adequate hydration  - Administer IV fluids if ordered to ensure adequate hydration  - Administer ordered medications as needed  - Encourage mobilization and activity  - Consider nutritional services referral to assist patient with adequate nutrition and appropriate food choices  Outcome: Progressing     Problem: GENITOURINARY - ADULT  Goal: Maintains or returns to baseline urinary function  Description: INTERVENTIONS:  - Assess urinary function  - Encourage oral fluids to ensure adequate hydration if ordered  - Administer IV fluids as ordered to ensure adequate hydration  - Administer ordered medications as needed  - Offer frequent toileting  - Follow urinary retention protocol if ordered  Outcome: Progressing  Goal: Absence of urinary retention  Description: INTERVENTIONS:  - Assess patient’s ability to void and empty bladder  - Monitor I/O  - Bladder scan as needed  - Discuss with physician/AP medications to alleviate retention as needed  - Discuss catheterization for long term situations as appropriate  Outcome: Progressing     Problem: METABOLIC, FLUID AND ELECTROLYTES - ADULT  Goal: Electrolytes maintained within normal limits  Description: INTERVENTIONS:  - Monitor labs and assess patient for signs and symptoms of electrolyte imbalances  - Administer electrolyte replacement as ordered  - Monitor response to electrolyte replacements, including repeat lab results as appropriate  - Instruct patient on fluid and nutrition as appropriate  Outcome: Progressing  Goal: Fluid balance maintained  Description: INTERVENTIONS:  - Monitor labs   - Monitor I/O and WT  - Instruct patient on fluid and nutrition as appropriate  - Assess for signs & symptoms of volume excess or deficit  Outcome: Progressing  Goal: Glucose maintained within target range  Description: INTERVENTIONS:  - Monitor Blood Glucose as ordered  - Assess for signs and symptoms of hyperglycemia and hypoglycemia  - Administer ordered medications to maintain glucose within target range  - Assess nutritional intake and initiate nutrition service referral as needed  Outcome: Progressing     Problem: HEMATOLOGIC - ADULT  Goal: Maintains hematologic stability  Description: INTERVENTIONS  - Assess for signs and symptoms of bleeding or hemorrhage  - Monitor labs  - Administer supportive blood products/factors as ordered and appropriate  Outcome: Progressing

## 2022-10-13 NOTE — PLAN OF CARE
Problem: Nutrition/Hydration-ADULT  Goal: Nutrient/Hydration intake appropriate for improving, restoring or maintaining nutritional needs  Description: Monitor and assess patient's nutrition/hydration status for malnutrition  Collaborate with interdisciplinary team and initiate plan and interventions as ordered  Monitor patient's weight and dietary intake as ordered or per policy  Utilize nutrition screening tool and intervene as necessary  Determine patient's food preferences and provide high-protein, high-caloric foods as appropriate       INTERVENTIONS:  - Monitor oral intake, urinary output, labs, and treatment plans  - Assess nutrition and hydration status and recommend course of action  - Evaluate amount of meals eaten  - Assist patient with eating if necessary   - Allow adequate time for meals  - Recommend/ encourage appropriate diets, oral nutritional supplements, and vitamin/mineral supplements  - Order, calculate, and assess calorie counts as needed  - Recommend, monitor, and adjust tube feedings and TPN/PPN based on assessed needs  - Assess need for intravenous fluids  - Provide specific nutrition/hydration education as appropriate  - Include patient/family/caregiver in decisions related to nutrition  Outcome: Progressing     Problem: SAFETY ADULT  Goal: Patient will remain free of falls  Description: INTERVENTIONS:  - Educate patient/family on patient safety including physical limitations  - Instruct patient to call for assistance with activity   - Consult OT/PT to assist with strengthening/mobility   - Keep Call bell within reach  - Keep bed low and locked with side rails adjusted as appropriate  - Keep care items and personal belongings within reach  - Initiate and maintain comfort rounds  - Make Fall Risk Sign visible to staff  - Offer Toileting every 2 Hours, in advance of need  - Initiate/Maintain bed alarm  - Obtain necessary fall risk management equipment:   - Apply yellow socks and bracelet for high fall risk patients  - Consider moving patient to room near nurses station  Outcome: Progressing  Goal: Maintain or return to baseline ADL function  Description: INTERVENTIONS:  -  Assess patient's ability to carry out ADLs; assess patient's baseline for ADL function and identify physical deficits which impact ability to perform ADLs (bathing, care of mouth/teeth, toileting, grooming, dressing, etc )  - Assess/evaluate cause of self-care deficits   - Assess range of motion  - Assess patient's mobility; develop plan if impaired  - Assess patient's need for assistive devices and provide as appropriate  - Encourage maximum independence but intervene and supervise when necessary  - Involve family in performance of ADLs  - Assess for home care needs following discharge   - Consider OT consult to assist with ADL evaluation and planning for discharge  - Provide patient education as appropriate  Outcome: Progressing  Goal: Maintains/Returns to pre admission functional level  Description: INTERVENTIONS:  - Perform BMAT or MOVE assessment daily    - Set and communicate daily mobility goal to care team and patient/family/caregiver  - Collaborate with rehabilitation services on mobility goals if consulted  - Perform Range of Motion 3 times a day  - Reposition patient every 2 hours    - Dangle patient 3 times a day  - Stand patient 3 times a day  - Ambulate patient 3 times a day  - Out of bed to chair 3 times a day   - Out of bed for meals 3 times a day  - Out of bed for toileting  - Record patient progress and toleration of activity level   Outcome: Progressing     Problem: CARDIOVASCULAR - ADULT  Goal: Maintains optimal cardiac output and hemodynamic stability  Description: INTERVENTIONS:  - Monitor I/O, vital signs and rhythm  - Monitor for S/S and trends of decreased cardiac output  - Administer and titrate ordered vasoactive medications to optimize hemodynamic stability  - Assess quality of pulses, skin color and temperature  - Assess for signs of decreased coronary artery perfusion  - Instruct patient to report change in severity of symptoms  Outcome: Progressing  Goal: Absence of cardiac dysrhythmias or at baseline rhythm  Description: INTERVENTIONS:  - Continuous cardiac monitoring, vital signs, obtain 12 lead EKG if ordered  - Administer antiarrhythmic and heart rate control medications as ordered  - Monitor electrolytes and administer replacement therapy as ordered  Outcome: Progressing     Problem: RESPIRATORY - ADULT  Goal: Achieves optimal ventilation and oxygenation  Description: INTERVENTIONS:  - Assess for changes in respiratory status  - Assess for changes in mentation and behavior  - Position to facilitate oxygenation and minimize respiratory effort  - Oxygen administered by appropriate delivery if ordered  - Initiate smoking cessation education as indicated  - Encourage broncho-pulmonary hygiene including cough, deep breathe, Incentive Spirometry  - Assess the need for suctioning and aspirate as needed  - Assess and instruct to report SOB or any respiratory difficulty  - Respiratory Therapy support as indicated  Outcome: Progressing     Problem: GASTROINTESTINAL - ADULT  Goal: Minimal or absence of nausea and/or vomiting  Description: INTERVENTIONS:  - Administer IV fluids if ordered to ensure adequate hydration  - Maintain NPO status until nausea and vomiting are resolved  - Nasogastric tube if ordered  - Administer ordered antiemetic medications as needed  - Provide nonpharmacologic comfort measures as appropriate  - Advance diet as tolerated, if ordered  - Consider nutrition services referral to assist patient with adequate nutrition and appropriate food choices  Outcome: Progressing  Goal: Maintains or returns to baseline bowel function  Description: INTERVENTIONS:  - Assess bowel function  - Encourage oral fluids to ensure adequate hydration  - Administer IV fluids if ordered to ensure adequate hydration  - Administer ordered medications as needed  - Encourage mobilization and activity  - Consider nutritional services referral to assist patient with adequate nutrition and appropriate food choices  Outcome: Progressing     Problem: GENITOURINARY - ADULT  Goal: Maintains or returns to baseline urinary function  Description: INTERVENTIONS:  - Assess urinary function  - Encourage oral fluids to ensure adequate hydration if ordered  - Administer IV fluids as ordered to ensure adequate hydration  - Administer ordered medications as needed  - Offer frequent toileting  - Follow urinary retention protocol if ordered  Outcome: Progressing  Goal: Absence of urinary retention  Description: INTERVENTIONS:  - Assess patient’s ability to void and empty bladder  - Monitor I/O  - Bladder scan as needed  - Discuss with physician/AP medications to alleviate retention as needed  - Discuss catheterization for long term situations as appropriate  Outcome: Progressing     Problem: METABOLIC, FLUID AND ELECTROLYTES - ADULT  Goal: Electrolytes maintained within normal limits  Description: INTERVENTIONS:  - Monitor labs and assess patient for signs and symptoms of electrolyte imbalances  - Administer electrolyte replacement as ordered  - Monitor response to electrolyte replacements, including repeat lab results as appropriate  - Instruct patient on fluid and nutrition as appropriate  Outcome: Progressing  Goal: Fluid balance maintained  Description: INTERVENTIONS:  - Monitor labs   - Monitor I/O and WT  - Instruct patient on fluid and nutrition as appropriate  - Assess for signs & symptoms of volume excess or deficit  Outcome: Progressing  Goal: Glucose maintained within target range  Description: INTERVENTIONS:  - Monitor Blood Glucose as ordered  - Assess for signs and symptoms of hyperglycemia and hypoglycemia  - Administer ordered medications to maintain glucose within target range  - Assess nutritional intake and initiate nutrition service referral as needed  Outcome: Progressing     Problem: HEMATOLOGIC - ADULT  Goal: Maintains hematologic stability  Description: INTERVENTIONS  - Assess for signs and symptoms of bleeding or hemorrhage  - Monitor labs  - Administer supportive blood products/factors as ordered and appropriate  Outcome: Progressing

## 2022-10-13 NOTE — ASSESSMENT & PLAN NOTE
· Patient with prior to ED evaluation and evaluation by primary care for abdominal pain and worsening symptoms of shortness of breath and weakness  · As per primary care; FOBT negative; and hemoglobin 2 months ago was 12 3, now 9 1  · In the ED, CT of abdomen and pelvis negative; 10/4 chest x-ray negative for acute findings  · Repeat chest x-ray pending  · Will obtain UA; patient denies bright red blood per rectum, melena, or hematuria  · Repeat hemoglobin today was 8 7; patient with history of polycythemia vera  · Hold aspirin, continue hydroxyurea  · Clear liquid diet; NPO at midnight  · Consult Gastroenterology  · Acute decompensated heart failure considered; however BNP normal, patient appears euvolemic  · Troponins normal  · Platelets slightly decreased; but otherwise labs and vitals within normal limits

## 2022-10-13 NOTE — ASSESSMENT & PLAN NOTE
Lab Results   Component Value Date    EGFR 78 10/13/2022    EGFR 48 10/12/2022    EGFR 56 10/04/2022    CREATININE 0 94 10/13/2022    CREATININE 1 41 (H) 10/12/2022    CREATININE 1 24 10/04/2022     Baseline creatinine approximately 1 2; patient appears near baseline  Avoid nephrotoxic medications and relative hypotension

## 2022-10-13 NOTE — PROGRESS NOTES
[address]  Progress Note - Atiya Shelton [birth year], 69 y o  male MRN: [id number]  Unit/Bed#: E5 -01 Encounter: [id number]  Primary Care Provider: Mariel Bunch DO   Date and time admitted to hospital: 10/12/2022  4:49 PM    * Polycythemia vera ([address] )  Assessment & Plan  No evidence of GI bleed  Does have evidence of significant macrocytosis  Previous hemoglobin reviewed which is in the 10 range  Discontinue hydroxyurea- given concern for myelosuppression  Bone marrow biopsy planned for tomorrow  Recent Labs     10/12/22  1720 10/13/22  0527   HGB 8 7* 8 4*         Stage 3b chronic kidney disease [facility])  Assessment & Plan  Lab Results   Component Value Date    EGFR 78 10/13/2022    EGFR 48 10/12/2022    EGFR 56 10/04/2022    CREATININE 0 94 10/13/2022    CREATININE 1 41 (H) 10/12/2022    CREATININE 1 24 10/04/2022     Baseline creatinine approximately 1 2; patient appears near baseline  Avoid nephrotoxic medications and relative hypotension    Tobacco user  Assessment & Plan  Nicotine replacement declined    Mixed hyperlipidemia  Assessment & Plan  Continue Lipitor    CAD (coronary artery disease)  Assessment & Plan  Patient status post PCI in 2008  No chest pain, resume aspirin    Benign essential hypertension  Assessment & Plan  Continue metoprolol amlodipine as previously prescribed    Symptomatic anemia-resolved as of 10/13/2022  Assessment & Plan  · Patient with prior to ED evaluation and evaluation by primary care for abdominal pain and worsening symptoms of shortness of breath and weakness    · As per primary care; FOBT negative; and hemoglobin 2 months ago was 12 3, now 9 1  · In the ED, CT of abdomen and pelvis negative; 10/4 chest x-ray negative for acute findings  · Repeat chest x-ray pending  · Will obtain UA; patient denies bright red blood per rectum, melena, or hematuria  · Repeat hemoglobin today was 8 7; patient with history of polycythemia vera  · Hold aspirin, continue hydroxyurea  · Clear liquid diet; NPO at midnight  · Consult Gastroenterology  · Acute decompensated heart failure considered; however BNP normal, patient appears euvolemic  · Troponins normal  · Platelets slightly decreased; but otherwise labs and vitals within normal limits  BPH with obstruction/lower urinary tract symptoms-resolved as of 10/13/2022  Assessment & Plan  Continue home medications  Urinary retention protocol; ins and outs          The patient was changed from observation to inpatient secondary to anemia requiring bone marrow biopsy requiring an additional 2 midnights for treatment and management  For the past family and social history and review of systems please see observation H&P which was dated 10/12/2022, and is located in the patient's chart  I have reviewed the information and there have been no changes  [facility] Progress Note  Patient: Izzy Jacobsen 69 y o  male   MRN: [id number]  PCP: Francheska Frankel DO  Unit/Bed#: E5 -01 Encounter: [id number]  Date Of Visit: 10/13/22    Assessment:    Principal Problem:    Polycythemia vera ([facility] [address] )  Active Problems:    Benign essential hypertension    CAD (coronary artery disease)    Mixed hyperlipidemia    Tobacco user    Stage 3b chronic kidney disease ([facility] [address] )      Plan:    · Bone marrow biopsy tomorrow  · Repeat CBC in a m  · Likely home afterwards       VTE Pharmacologic Prophylaxis:   Pharmacologic: Heparin  Mechanical VTE Prophylaxis in Place: Yes    Patient Centered Rounds: I have performed bedside rounds with nursing staff today  Discussions with Specialists or Other Care Team Provider:  Discussed with hematology    Education and Discussions with Family / Patient:  Patient's past contacted at 7:05 p m , no answer    Time Spent for Care: 45 minutes  More than 50% of total time spent on counseling and coordination of care as described above      Current Length of Stay: 0 day(s)    Current Patient Status: Observation Certification Statement: The patient will continue to require additional inpatient hospital stay due to Bone marrow biopsy    Discharge Plan / Estimated Discharge Date:  Tomorrow    Code Status: Level 1 - Full Code      Subjective:   Seen examined, no acute complaints  Wants to go home    A complete and comprehensive 14 point organ system review has been performed and all other systems are negative other than stated above  Objective:     Vitals:   Temp (24hrs), Av 6 °F (36 4 °C), Min:97 1 °F (36 2 °C), Max:98 °F (36 7 °C)    Temp:  [97 1 °F (36 2 °C)-98 °F (36 7 °C)] 98 °F (36 7 °C)  HR:  [60-66] 63  Resp:  [16-18] 16  BP: (105-145)/(59-73) 116/59  SpO2:  [91 %-98 %] 97 %  Body mass index is 24 64 kg/m²  Input and Output Summary (last 24 hours):        Intake/Output Summary (Last 24 hours) at 10/13/2022 1902  Last data filed at 10/13/2022 1401  Gross per 24 hour   Intake --   Output 1650 ml   Net -1650 ml       Physical Exam:     General: well appearing, no acute distress  HEENT: atraumatic, PERRLA, moist mucosa, normal pharynx, normal tonsils and adenoids, normal tongue, no fluid in sinuses  Neck: Trachea midline, no carotid bruit, no masses  Respiratory: normal chest wall expansion, CTA B, no r/r/w, no rubs  Cardiovascular: RRR, no m/r/g, Normal S1 and S2  Abdomen: Soft, non-tender, non-distended, normal bowel sounds in all quadrants, no hepatosplenomegaly, no tympany  Rectal: deferred  Musculoskeletal: normal ROM in upper and lower extremities  Integumentary: warm, dry, and pink, with no rash, purpura, or petechia  Heme/Lymph: no lymphadenopathy, no bruises  Neurological: Cranial Nerves II-XII grossly intact, no tics, normal sensation to pressure and light touch  Psychiatric: cooperative with normal mood, affect, and cognition      Additional Data:     Labs:    Results from last 7 days   Lab Units 10/13/22  0527 10/12/22  1720   WBC Thousand/uL 5 32 6 26   HEMOGLOBIN g/dL 8 4* 8 7*   HEMATOCRIT % 23 7* 24 8*   PLATELETS Thousands/uL 76* 85*   NEUTROS PCT %  --  74   LYMPHS PCT %  --  16   MONOS PCT %  --  9   EOS PCT %  --  0     Results from last 7 days   Lab Units 10/13/22  0527   POTASSIUM mmol/L 3 5   CHLORIDE mmol/L 108   CO2 mmol/L 26   BUN mg/dL 14   CREATININE mg/dL 0 94   CALCIUM mg/dL 8 5   ALK PHOS U/L 140*   ALT U/L 118*   AST U/L 24     Results from last 7 days   Lab Units 10/12/22  1720   INR  1 13       * I Have Reviewed All Lab Data Listed Above  * Additional Pertinent Lab Tests Reviewed: [address] Admission Reviewed    Imaging:    Imaging Reports Reviewed Today Include:  No new imaging  Imaging Personally Reviewed by Myself Includes:  No new imaging    Recent Cultures (last 7 days):           Last 24 Hours Medication List:   Current Facility-Administered Medications   Medication Dose Route Frequency Provider Last Rate   • acetaminophen  650 mg Oral Q6H PRN Raymond Albrecht MD     • amLODIPine  10 mg Oral QPM Raymond Albrecht MD     • [START ON 10/14/2022] aspirin  81 mg Oral Daily Jeferson Armas DO     • atorvastatin  80 mg Oral QPM Raymond Albrecht MD     • finasteride  5 mg Oral Daily Raymond Albrecht MD     • metoprolol tartrate  50 mg Oral BID Raymond Albrecht MD     • nitroglycerin  0 4 mg Sublingual Q5 Min PRN Raymond Albrecht MD     • ondansetron  4 mg Intravenous Q6H PRN Raymond Albrecht MD          Today, Patient Was Seen By: Erik De    ** Please Note: This note was completed in part utilizing [employer] Fluency Direct Software  Grammatical errors, random word insertions, spelling mistakes, and incomplete sentences may be an occasional consequence of this system secondary to software limitations, ambient noise, and hardware issues  If you have any questions or concerns about the content, text, or information contained within the body of this dictation, please contact the provider for clarification   **

## 2022-10-13 NOTE — ASSESSMENT & PLAN NOTE
No evidence of GI bleed  Does have evidence of significant macrocytosis  Previous hemoglobin reviewed which is in the 10 range  Discontinue hydroxyurea- given concern for myelosuppression  Bone marrow biopsy planned for tomorrow  Recent Labs     10/12/22  1720 10/13/22  0527   HGB 8 7* 8 4*

## 2022-10-13 NOTE — TELEMEDICINE
e-Consult (IPC)  - Interventional Radiology  Josseline Maher 69 y o  male MRN: [id number]  Unit/Bed#: E5 -01 Encounter: [id number]          Interventional Radiology has been consulted to evaluate Josseline Maher    We were consulted by heme onc concerning this patient with anemia, thrombocytopenia  IP Consult to IR  Consult performed by: THOMAS Currie  Consult ordered by: Candace Ortega MD        10/13/22    Assessment/Recommendation:     69year old male with history of polycythemia vera, anemia, thrombocytopenia, worsening SOB, and dizziness  JAK2 + Patient has never had a bone marrow biopsy in the past  Hemoglobin was 8 7 on admission (12 3 in July 02) and platelets 85 (normally 's)  Heme-onc recommends bone marrow biopsy as he has never had one for further investigation of anemia and thrombocytopenia       - NPO after MN  - plan for CT/fluoro guided bone marrow biopsy 10/14  - genpath form to be brought to to IR with patient     5-10 minutes, >50% of the total time devoted to medical consultative verbal/EMR discussion between providers  Written report will be generated in the EMR  Thank you for allowing Interventional Radiology to participate in the care of Josseline Maher  Please don't hesitate to call or TigerText us with any questions       Enrike Mitchell

## 2022-10-13 NOTE — H&P
[address] [facility]  H&P- Jackie Abraham [birth year], 69 y o  male MRN: [id number]  Unit/Bed#: E5 -01 Encounter: [id number]  Primary Care Provider: Bjorn Jeronimo DO   Date and time admitted to hospital: 10/12/2022  4:49 PM    * Symptomatic anemia  Assessment & Plan  · Patient with prior to ED evaluation and evaluation by primary care for abdominal pain and worsening symptoms of shortness of breath and weakness  · As per primary care; FOBT negative; and hemoglobin 2 months ago was 12 3, now 9 1  · In the ED, CT of abdomen and pelvis negative; 10/4 chest x-ray negative for acute findings  · Repeat chest x-ray pending  · Will obtain UA; patient denies bright red blood per rectum, melena, or hematuria  · Repeat hemoglobin today was 8 7; patient with history of polycythemia vera  · Hold aspirin, continue hydroxyurea  · Clear liquid diet; NPO at midnight  · Consult Gastroenterology  · Acute decompensated heart failure considered; however BNP normal, patient appears euvolemic  · Troponins normal  · Platelets slightly decreased; but otherwise labs and vitals within normal limits      Complete heart block ([facility] [address] )  Assessment & Plan  Pacemaker in place; consider interrogation    Stage 3b chronic kidney disease [facility])  Assessment & Plan  Lab Results   Component Value Date    EGFR 48 10/12/2022    EGFR 56 10/04/2022    EGFR 59 07/30/2022    CREATININE 1 41 (H) 10/12/2022    CREATININE 1 24 10/04/2022    CREATININE 1 19 07/30/2022     Baseline creatinine approximately 1 2; patient appears near baseline  Avoid nephrotoxic medications and relative hypotension    BPH with obstruction/lower urinary tract symptoms  Assessment & Plan  Continue home medications  Urinary retention protocol; ins and outs    Tobacco user  Assessment & Plan  Nicotine replacement declined    Polycythemia vera ([facility] [address] )  Assessment & Plan  Hold aspirin in the setting of possible GI bleed  Hold hydroxyurea as may be underlying cause of anemia  Monitor on daily labs  Consider consult to Hematology    Mixed hyperlipidemia  Assessment & Plan  Continue Lipitor    CAD (coronary artery disease)  Assessment & Plan  Patient status post PCI in 2008, will hold aspirin on admission  Denies chest pain; troponins within normal limits  EKG shows dual paced rhythm; pacemaker in place    Benign essential hypertension  Assessment & Plan  Blood pressure well controlled on admission; continue home medications      VTE Prophylaxis: Pharmacologic VTE Prophylaxis contraindicated due to Possible GI bleed  / sequential compression device   Code Status:  Full  POLST: POLST form is not discussed and not completed at this time  Discussion with family: Care plan discussed with patient who voiced understanding and agrees with recommendations  Anticipated Length of Stay:  Patient will be admitted on an Observation basis with an anticipated length of stay of  less than 2 midnights  Justification for Hospital Stay:  Possible symptomatic anemia    Total Time for Visit, including Counseling / Coordination of Care: 60 minutes  Greater than 50% of this total time spent on direct patient counseling and coordination of care  Chief Complaint:   Abdominal pain, lightheadedness, and shortness of breath    History of Present Illness:    Jonathan Molina is a 69 y o  male with past medical history of hypertension, BPH, coronary artery disease (status post PCI 2008), complete heart block status post ppm (2022), hyperlipidemia, polycythemia vera (on hydroxyurea), CKD 3, 30+ pack-year tobacco use who presents at the request of his primary care provider to be evaluated for worsening abdominal pain, shortness of breath, dizziness  Patient originally presented to the ED on 10/04 with complaints of lightheadedness; noted to have drop in hemoglobin at that time; but refused rectal exam and was subsequently discharged    Since that time, abdominal pain started 2 days prior to admission; periumbilical, sharp pain worsened with eating  Patient re-evaluated by primary care today because, in addition to the light headedness and shortness of breath, patient now with 2 days of abdominal pain  As per PCP and ED; FOBT negative; chest x-ray normal, and no other acute findings to explain his symptoms  Patient reports constipation, but denies fever/chills, nausea/vomiting, hematuria, bright red blood per rectum, melena, hematemesis  Will be admitted for further workup of possible symptomatic anemia  Review of Systems:    Review of Systems   Constitutional: Positive for activity change and appetite change  Negative for chills and fever  HENT: Negative for ear pain and sore throat  Eyes: Negative for pain and visual disturbance  Respiratory: Positive for shortness of breath  Negative for cough  Cardiovascular: Negative for chest pain and palpitations  Gastrointestinal: Positive for abdominal pain and constipation  Negative for anal bleeding, blood in stool, diarrhea, nausea, rectal pain and vomiting  Genitourinary: Negative for dysuria and hematuria  Musculoskeletal: Negative for arthralgias and back pain  Skin: Negative for color change and rash  Neurological: Negative for seizures and syncope  Psychiatric/Behavioral: Negative  All other systems reviewed and are negative        Past Medical and Surgical History:     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"   • Full dentures     but doesnt wear them   • History of 2019 novel coronavirus disease (COVID-19) 02/2021    hospital for 2 days but not in ICU/"mainly dehydrated" "also fever/oxygen below 90"   • History of coronary artery stent placement     x2 in 2008 or so; sees Ayesha Hernandez-- Dr Srinivasan Rondon cardio   • [facility] (hard of hearing)     no hearing aids yet   • Hyperlipidemia     Last Assessed:  11/24/17   • Hypertension     Benign essential, Last Assessed:  11/24/17   • LBBB (left bundle branch block)    • Nocturia    • Polycythemia vera (HCC)     (per history in chart)   • PVD (peripheral vascular disease) ([facility] [address] )    • RBC abnormality     pt reports told has high Red blood cells/goes to infusion center regularly next appt 10/8/21 hematologist is Dr Singer Headings of [address] [place] [address](had been Dr Ilir Sparks)   • Seasickness    • Shortness of breath     "if goes up steps gets SOB, had for awhile"   • Slow urinary stream     "sometimes feels like bladder isnt all the way empty"   • Snores    • Wears glasses     reading       Past Surgical History:   Procedure Laterality Date   • ANGIOPLASTY     • APPENDECTOMY     • CARDIAC CATHETERIZATION N/A 7/28/2022    Procedure: CARDIAC TEMPORARY PACEMAKER;  Surgeon: Hernandez Horner MD;  Location: [facility]; Service: Cardiology   • CARDIAC ELECTROPHYSIOLOGY PROCEDURE N/A 7/29/2022    Procedure: Cardiac pacer implant;  Surgeon: Joseline Galicia MD;  Location: [facility]; Service: Cardiology   • CATARACT EXTRACTION Bilateral    • CORONARY ARTERY BYPASS GRAFT     • CT CYSTOGRAM  11/4/2021   • ELBOW SURGERY Right    • FL CYSTOGRAM  10/27/2021   • KNEE ARTHROSCOPY Right    • UT COLONOSCOPY FLX DX W/COLLJ SPEC WHEN PFRMD N/A 5/22/2017    Procedure: COLONOSCOPY;  Surgeon: Adam Elliott DO;  Location: [facility];   Service: Gastroenterology   • PROSTATE BIOPSY     • PROSTATECTOMY N/A 10/18/2021    Procedure: ROBOTIC SUBTOTAL/SUPRAPUBIC PROSTATECTOMY;  Surgeon: Glenna Brown MD;  Location: [facility] OR;  Service: Urology   • SHOULDER ARTHROSCOPY Right    • TONSILLECTOMY         Meds/Allergies:    Prior to Admission medications Medication Sig Start Date End Date Taking?  Authorizing Provider   acetaminophen (TYLENOL) 500 mg tablet Take 500 mg by mouth every 6 (six) hours as needed for mild pain   Yes Historical Provider, MD   amLODIPine (NORVASC) 10 mg tablet Take 10 mg by mouth every evening    Yes Historical Provider, MD   ascorbic acid (VITAMIN C) 1000 MG tablet Take 1 tablet (1,000 mg total) by mouth every 12 (twelve) hours for 10 doses  Patient taking differently: Take 1,000 mg by mouth 2 (two) times a day 2/13/21 10/12/22 Yes Beltran Leal PA-C   aspirin (ECOTRIN LOW STRENGTH) 81 mg EC tablet Take 81 mg by mouth daily   Yes Historical Provider, MD   atorvastatin (LIPITOR) 80 mg tablet TAKE 1 TABLET BY MOUTH EVERY DAY IN THE EVENING 9/3/22  Yes Geena Jin DO   finasteride (PROSCAR) 5 mg tablet TAKE 1 TABLET BY MOUTH EVERY DAY 8/22/22  Yes Prescott Leyden, MD   hydroxyurea (HYDREA) 500 mg capsule TAKE 1 CAPSULE BY MOUTH TWICE A DAY 7/12/22  Yes Di Rubio MD   metoprolol tartrate (LOPRESSOR) 50 mg tablet Take 50 mg by mouth 2 (two) times a day    Yes Historical Provider, MD   Multiple Vitamin (MULTIVITAMIN) capsule Take 1 capsule by mouth daily   Yes Historical Provider, MD   nitroglycerin (NITROSTAT) 0 4 mg SL tablet Place 1 tablet (0 4 mg total) under the tongue every 5 (five) minutes as needed for chest pain 8/8/22  Yes Nam Chan,    Omega-3 Fatty Acids (FISH OIL) 1200 MG CAPS Take 1,200 mg by mouth 2 (two) times a day     Yes Historical Provider, MD   calcitriol (ROCALTROL) 0 25 mcg capsule Take 1 capsule (0 25 mcg total) by mouth 3 (three) times a week  Patient not taking: Reported on 10/12/2022 6/3/22   Karolina Randolph MD   Cholecalciferol (Vitamin D3) 50 MCG (2000 UT) TABS Take 500 Units by mouth every evening  Patient not taking: No sig reported    Historical Provider, MD   docusate sodium (COLACE) 100 mg capsule Take 1 capsule (100 mg total) by mouth 2 (two) times a day  Patient not taking: No sig reported 10/21/21   Alisha Rodriguez PA-C     I have reviewed home medications with patient personally  Allergies: Allergies   Allergen Reactions   • Other Other (See Comments)     Pt and wife reports took a medication years ago at work cant recall the name or what it was for"     "couldn't talk and cheeks swelled and also right hand"       Social History:     Marital Status: /Civil Union   Occupation:   Patient Pre-hospital Living Situation:  Independently  Patient Pre-hospital Level of Mobility:  Full  Patient Pre-hospital Diet Restrictions:  None  Substance Use History:   Social History     Substance and Sexual Activity   Alcohol Use Never     Social History     Tobacco Use   Smoking Status Current Every Day Smoker   • Packs/day: 0 50   • Years: 35 00   • Pack years: 17 50   • Types: Cigarettes   Smokeless Tobacco Never Used   Tobacco Comment    a little less than 1/2ppd, trying to cut back before surgery last smoked 10/16     Social History     Substance and Sexual Activity   Drug Use No       Family History:    Family History   Problem Relation Age of Onset   • No Known Problems Mother    • Heart disease Father        Physical Exam:     Vitals:   Blood Pressure: 138/73 (10/12/22 2038)  Pulse: 64 (10/12/22 2000)  Temperature: (!) 97 1 °F (36 2 °C) (10/12/22 2038)  Temp Source: Oral (10/12/22 1640)  Respirations: 18 (10/12/22 2038)  Height: 5' 8" (172 7 cm) (10/12/22 2100)  Weight - Scale: 73 5 kg (162 lb 0 6 oz) (10/12/22 2100)  SpO2: 98 % (10/12/22 2000)    Physical Exam  Vitals and nursing note reviewed  Constitutional:       Appearance: He is well-developed  HENT:      Head: Normocephalic and atraumatic  Eyes:      Conjunctiva/sclera: Conjunctivae normal    Cardiovascular:      Rate and Rhythm: Normal rate and regular rhythm  Heart sounds: No murmur heard  Pulmonary:      Effort: Pulmonary effort is normal  No respiratory distress  Breath sounds: Normal breath sounds  Abdominal:      Palpations: Abdomen is soft  Tenderness: There is no abdominal tenderness  Musculoskeletal:      Cervical back: Neck supple  Skin:     General: Skin is warm and dry  Neurological:      Mental Status: He is alert  Additional Data:     Lab Results: I have personally reviewed pertinent reports  Results from last 7 days   Lab Units 10/12/22  1720   WBC Thousand/uL 6 26   HEMOGLOBIN g/dL 8 7*   HEMATOCRIT % 24 8*   PLATELETS Thousands/uL 85*   NEUTROS PCT % 74   LYMPHS PCT % 16   MONOS PCT % 9   EOS PCT % 0     Results from last 7 days   Lab Units 10/12/22  1720   SODIUM mmol/L 136   POTASSIUM mmol/L 3 6   CHLORIDE mmol/L 101   CO2 mmol/L 24   BUN mg/dL 21   CREATININE mg/dL 1 41*   ANION GAP mmol/L 11   CALCIUM mg/dL 9 3   ALBUMIN g/dL 3 6   TOTAL BILIRUBIN mg/dL 2 23*   ALK PHOS U/L 173*   ALT U/L 162*   AST U/L 32   GLUCOSE RANDOM mg/dL 106     Results from last 7 days   Lab Units 10/12/22  1720   INR  1 13                   Imaging: I have personally reviewed pertinent reports  CT abdomen pelvis with contrast   Final Result by Mikhail Ford MD (10/12 1943)      Degraded by respiratory motion  1   No acute abnormality in the abdomen or pelvis  2   Slowly growing hypodensity in the liver, most likely a cyst though confirmation with nonemergent ultrasound is recommended  3   Otherwise stable benign findings as above  The study was marked in [facility] for immediate notification  Workstation performed: [id number]         XR chest 1 view portable    (Results Pending)       EKG, Pathology, and Other Studies Reviewed on Admission:   EKG: AV dual-paced rhythm  Abnormal ECG  When compared with ECG of 04-OCT-2022 11:28,  No significant change was found  · Confirmed by Victor M Roman ([id number]) on 10/12/2022 5:46:43 PM    Allscripts / Epic Records Reviewed: Yes     ** Please Note: This note has been constructed using a voice recognition system   **

## 2022-10-13 NOTE — CONSULTS
Consultation - [facility]  Reytania Susana 69 y o  male MRN: [id number]  Unit/Bed#: E5 -01 Encounter: [id number]              Inpatient consult to gastroenterology     Performed by  Javier Almaguer DO     Authorized by Atilio Chacon MD              Reason for Consult / Principal Problem:     Symptomatic anemia      ASSESSMENT AND PLAN:      71-year-old male with past medical history of CAD on aspirin, tobacco abuse, CKD 3, hypertension, polycythemia vera on hydroxyurea, BPH status post TURP who is admitted to hospital for symptomatic anemia  GI was consulted for further management  1  Symptomatic anemia  Unclear etiology the low suspicion for GI bleeding given history, negative FOBT, negative rectal exam   Hemoglobin currently 8 4, baseline between 12 and 13  MCV significantly elevated at 130 with associated thrombocytopenia of 60  Current iron panel does not show iron deficiency  Recent vitamin B12 and folate normal   Other etiologies for anemia include hemolysis vs hydroxyurea use  • Recommend further testing including haptoglobin, LDH, fibrinogen, peripheral smear, SPEP, UPEP  • No plan for endoscopic intervention at this time, okay to resume diet  • Monitor hemoglobin, transfuse for less than 7  Will monitor patient's clinical status for now  If you develop signs of overt bleeding and becomes transfusion dependent will consider inpatient endoscopy to otherwise will need outpatient evaluation  2  Elevated LFTs  Elevated bilirubin of 2 44, mostly indirect indicating possible intravascular process  AST minimally elevated at 162 and down trending  Similarly alk-phos elevated but downtrending to 140 today  · Check chronic hepatitis panel, GGT to determine etiology of elevated alk-phos  · Monitor LFTs, avoid hepatotoxins, Tylenol maximum of 2 g daily  · Management as described above    3  History of colonic polyps  Prior colonoscopy in 2017 showed 3 tubular adenomas    Was due for repeat in 3 years  Delayed due to COVID  · Plan for outpatient colonoscopy for surveillance  Rest of care per primary team   Thank you for this consultation  ______________________________________________________________________    HPI:  60-year-old male with past medical history of CAD on aspirin, tobacco abuse, CKD 3, hypertension, polycythemia vera on hydroxyurea, BPH status post TURP who is admitted to hospital for symptomatic anemia  GI was consulted for further management  Patient's primary symptoms were dyspnea on exertion, fatigue  He denies any hematemesis, coffee-ground emesis, nausea, vomiting, abdominal pain, melena, hematochezia, hematuria, hemoptysis  His father had to have partial rectal resection with ostomy placed in his 76s  He has unclear reason why  Otherwise no family history of cancer  Unable to follow-up for surveillance colonoscopy due to COVID  Denies significant NSAID use  Currently smoking cigarettes  REVIEW OF SYSTEMS:    Review of Systems   Constitutional: Positive for fatigue  Negative for chills and fever  HENT: Negative for congestion and sinus pressure  Respiratory: Positive for shortness of breath  Negative for cough  Cardiovascular: Negative for chest pain, palpitations and leg swelling  Gastrointestinal: Negative for abdominal pain, diarrhea, nausea and vomiting  Genitourinary: Negative for dysuria and hematuria  Musculoskeletal: Negative for arthralgias and back pain  Skin: Negative for color change and rash  Neurological: Negative for dizziness and headaches  Psychiatric/Behavioral: Negative for agitation and confusion  All other systems reviewed and are negative           Historical Information   Past Medical History:   Diagnosis Date   • Arthritis     both shoulders   • CAD (coronary artery disease)     Last Assessed:  11/4/13   • Cigarette smoker    • Colon polyp    • Elevated prostate specific antigen (PSA)     Last Assessed: 12/21/17   • Enlarged prostate    • Exercise involving walking     in season hunt's and fish's/ 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"   • Full dentures     but doesnt wear them   • History of 2019 novel coronavirus disease (COVID-19) 02/2021    hospital for 2 days but not in ICU/"mainly dehydrated" "also fever/oxygen below 90"   • History of coronary artery stent placement     x2 in 2008 or so; sees [facility]-- Dr Marco Villarreal cardio   • [place] (hard of hearing)     no hearing aids yet   • Hyperlipidemia     Last Assessed:  11/24/17   • Hypertension     Benign essential, Last Assessed:  11/24/17   • LBBB (left bundle branch block)    • Nocturia    • Polycythemia vera (HCC)     (per history in chart)   • PVD (peripheral vascular disease) ([facility] [address] )    • RBC abnormality     pt reports told has high Red blood cells/goes to infusion center regularly next appt 10/8/21 hematologist is Dr Elisabet Harding of [address] [place] [address](had been Dr Moraima Brown)   • Seasickness    • Shortness of breath     "if goes up steps gets SOB, had for awhile"   • Slow urinary stream     "sometimes feels like bladder isnt all the way empty"   • Snores    • Wears glasses     reading     Past Surgical History:   Procedure Laterality Date   • ANGIOPLASTY     • APPENDECTOMY     • CARDIAC CATHETERIZATION N/A 7/28/2022    Procedure: CARDIAC TEMPORARY PACEMAKER;  Surgeon: Demarcus Pérez MD;  [facility]; Service: Cardiology   • CARDIAC ELECTROPHYSIOLOGY PROCEDURE N/A 7/29/2022    Procedure: Cardiac pacer implant;  Surgeon: Leia Dial MD;  [facility];   Service: Cardiology   • CATARACT EXTRACTION Bilateral    • CORONARY ARTERY BYPASS GRAFT     • CT CYSTOGRAM  11/4/2021   • ELBOW SURGERY Right    • FL CYSTOGRAM  10/27/2021   • KNEE ARTHROSCOPY Right    • ME COLONOSCOPY FLX DX W/COLLJ SPEC WHEN PFRMD N/A 5/22/2017    Procedure: COLONOSCOPY;  Surgeon: Lieutenant Radha DO;  Location: [facility]; Service: Gastroenterology   • PROSTATE BIOPSY     • PROSTATECTOMY N/A 10/18/2021    Procedure: ROBOTIC SUBTOTAL/SUPRAPUBIC PROSTATECTOMY;  Surgeon: Jennyfer Riggs MD;  Location: [facility];  Service: Urology   • SHOULDER ARTHROSCOPY Right    • TONSILLECTOMY       Social History   Social History     Substance and Sexual Activity   Alcohol Use Never     Social History     Substance and Sexual Activity   Drug Use No     Social History     Tobacco Use   Smoking Status Current Every Day Smoker   • Packs/day: 0 50   • Years: 35 00   • Pack years: 17 50   • Types: Cigarettes   Smokeless Tobacco Never Used   Tobacco Comment    a little less than 1/2ppd, trying to cut back before surgery last smoked 10/16     Family History   Problem Relation Age of Onset   • No Known Problems Mother    • Heart disease Father        Meds/Allergies     Medications Prior to Admission   Medication   • acetaminophen (TYLENOL) 500 mg tablet   • amLODIPine (NORVASC) 10 mg tablet   • ascorbic acid (VITAMIN C) 1000 MG tablet   • aspirin (ECOTRIN LOW STRENGTH) 81 mg EC tablet   • atorvastatin (LIPITOR) 80 mg tablet   • finasteride (PROSCAR) 5 mg tablet   • hydroxyurea (HYDREA) 500 mg capsule   • metoprolol tartrate (LOPRESSOR) 50 mg tablet   • Multiple Vitamin (MULTIVITAMIN) capsule   • nitroglycerin (NITROSTAT) 0 4 mg SL tablet   • Omega-3 Fatty Acids (FISH OIL) 1200 MG CAPS   • calcitriol (ROCALTROL) 0 25 mcg capsule   • Cholecalciferol (Vitamin D3) 50 MCG (2000 UT) TABS   • docusate sodium (COLACE) 100 mg capsule     Current Facility-Administered Medications   Medication Dose Route Frequency   • acetaminophen (TYLENOL) tablet 650 mg  650 mg Oral Q6H PRN   • amLODIPine (NORVASC) tablet 10 mg  10 mg Oral QPM   • atorvastatin (LIPITOR) tablet 80 mg  80 mg Oral QPM   • finasteride (PROSCAR) tablet 5 mg  5 mg Oral Daily   • metoprolol tartrate (LOPRESSOR) tablet 50 mg  50 mg Oral BID   • nitroglycerin (NITROSTAT) SL tablet 0 4 mg  0 4 mg Sublingual Q5 Min PRN   • ondansetron (ZOFRAN) injection 4 mg  4 mg Intravenous Q6H PRN       Allergies   Allergen Reactions   • Other Other (See Comments)     Pt and wife reports took a medication years ago at work cant recall the name or what it was for"     "couldn't talk and cheeks swelled and also right hand"           Objective     Blood pressure 128/59, pulse 66, temperature 97 7 °F (36 5 °C), resp  rate 16, height 5' 8" (1 727 m), weight 73 5 kg (162 lb 0 6 oz), SpO2 94 %  Body mass index is 24 64 kg/m²  Intake/Output Summary (Last 24 hours) at 10/13/2022 0466  Last data filed at 10/13/2022 0531  Gross per 24 hour   Intake 900 ml   Output 1250 ml   Net -350 ml         PHYSICAL EXAM:      Physical Exam  Vitals and nursing note reviewed  Constitutional:       General: He is not in acute distress  Appearance: Normal appearance  He is well-developed  He is not ill-appearing  HENT:      Head: Normocephalic and atraumatic  Mouth/Throat:      Mouth: Mucous membranes are moist    Eyes:      Extraocular Movements: Extraocular movements intact  Conjunctiva/sclera: Conjunctivae normal       Pupils: Pupils are equal, round, and reactive to light  Cardiovascular:      Rate and Rhythm: Normal rate and regular rhythm  Pulses: Normal pulses  Heart sounds: Normal heart sounds  No murmur heard  No friction rub  No gallop  Pulmonary:      Effort: Pulmonary effort is normal  No respiratory distress  Breath sounds: Normal breath sounds  No wheezing or rales  Abdominal:      General: Abdomen is flat  Bowel sounds are normal  There is no distension  Palpations: Abdomen is soft  Tenderness: There is no abdominal tenderness  There is no guarding  Genitourinary:     Comments: No stool in rectal vault  Musculoskeletal:      Cervical back: Neck supple  Right lower leg: No edema  Left lower leg: No edema  Skin:     General: Skin is warm and dry     Neurological:      General: No focal deficit present  Mental Status: He is alert and oriented to person, place, and time     Psychiatric:         Mood and Affect: Mood normal          Behavior: Behavior normal           Lab Results:   Admission on 10/12/2022   Component Date Value   • Ventricular Rate 10/12/2022 59    • Atrial Rate 10/12/2022 59    • RI Interval 10/12/2022 160    • QRSD Interval 10/12/2022 158    • QT Interval 10/12/2022 452    • QTC Interval 10/12/2022 447    • P Axis 10/12/2022 59    • QRS Axis 10/12/2022 151    • T Wave Axis 10/12/2022 73    • WBC 10/12/2022 6 26    • RBC 10/12/2022 1 89 (A)   • Hemoglobin 10/12/2022 8 7 (A)   • Hematocrit 10/12/2022 24 8 (A)   • MCV 10/12/2022 131 (A)   • MCH 10/12/2022 46 0 (A)   • MCHC 10/12/2022 35 1    • RDW 10/12/2022 17 7 (A)   • MPV 10/12/2022 10 1    • Platelets 59/61/5512 85 (A)   • nRBC 10/12/2022 0    • Neutrophils Relative 10/12/2022 74    • Immat GRANS % 10/12/2022 1    • Lymphocytes Relative 10/12/2022 16    • Monocytes Relative 10/12/2022 9    • Eosinophils Relative 10/12/2022 0    • Basophils Relative 10/12/2022 0    • Neutrophils Absolute 10/12/2022 4 62    • Immature Grans Absolute 10/12/2022 0 05    • Lymphocytes Absolute 10/12/2022 0 98    • Monocytes Absolute 10/12/2022 0 58    • Eosinophils Absolute 10/12/2022 0 02    • Basophils Absolute 10/12/2022 0 01    • Protime 10/12/2022 14 6 (A)   • INR 10/12/2022 1 13    • PTT 10/12/2022 29    • ABO Grouping 10/12/2022 O    • Rh Factor 10/12/2022 Positive    • Antibody Screen 10/12/2022 Negative    • Specimen Expiration Date 10/12/2022 [id number]    • Sodium 10/12/2022 136    • Potassium 10/12/2022 3 6    • Chloride 10/12/2022 101    • CO2 10/12/2022 24    • ANION GAP 10/12/2022 11    • BUN 10/12/2022 21    • Creatinine 10/12/2022 1 41 (A)   • Glucose 10/12/2022 106    • Calcium 10/12/2022 9 3    • AST 10/12/2022 32    • ALT 10/12/2022 162 (A)   • Alkaline Phosphatase 10/12/2022 173 (A)   • Total Protein 10/12/2022 7 7    • Albumin 10/12/2022 3 6    • Total Bilirubin 10/12/2022 2 23 (A)   • eGFR 10/12/2022 48    • Magnesium 10/12/2022 2 2    • Lipase 10/12/2022 199    • hs TnI 0hr 10/12/2022 7    • NT-proBNP 10/12/2022 214    • Retic Ct Abs 10/12/2022 89,200    • Retic Ct Pct 10/12/2022 4 72 (A)   • Iron Saturation 10/12/2022 39    • TIBC 10/12/2022 192 (A)   • Iron 10/12/2022 75    • Ferritin 10/12/2022 598 (A)   • hs TnI 2hr 10/12/2022 7    • Delta 2hr hsTnI 10/12/2022 0    • hs TnI 4hr 10/12/2022 8    • Delta 4hr hsTnI 10/12/2022 1    • Ventricular Rate 10/12/2022 64    • Atrial Rate 10/12/2022 64    • LA Interval 10/12/2022 192    • QRSD Interval 10/12/2022 156    • QT Interval 10/12/2022 456    • QTC Interval 10/12/2022 470    • P Axis 10/12/2022 66    • QRS Axis 10/12/2022 153    • T Wave Axis 10/12/2022 66    • Color, UA 10/13/2022 Yellow    • Clarity, UA 10/13/2022 Clear    • Specific Gravity, UA 10/13/2022 <=1 005    • pH, UA 10/13/2022 6 5    • Leukocytes, UA 10/13/2022 Moderate (A)   • Nitrite, UA 10/13/2022 Positive (A)   • Protein, UA 10/13/2022 Negative    • Glucose, UA 10/13/2022 Negative    • Ketones, UA 10/13/2022 Negative    • Urobilinogen, UA 10/13/2022 1 0    • Bilirubin, UA 10/13/2022 Negative    • Occult Blood, UA 10/13/2022 Negative    • RBC, UA 10/13/2022 4-10 (A)   • WBC, UA 10/13/2022 10-20 (A)   • Epithelial Cells 10/13/2022 None Seen    • Bacteria, UA 10/13/2022 Innumerable (A)   • OTHER OBSERVATIONS 10/13/2022 WBCs Clumped    • WBC 10/13/2022 5 32    • RBC 10/13/2022 1 82 (A)   • Hemoglobin 10/13/2022 8 4 (A)   • Hematocrit 10/13/2022 23 7 (A)   • MCV 10/13/2022 130 (A)   • MCH 10/13/2022 46 2 (A)   • MCHC 10/13/2022 35 4    • RDW 10/13/2022 17 3 (A)   • MPV 10/13/2022 10 7    • Platelets 18/83/7777 76 (A)   • nRBC 10/13/2022 1        Imaging Studies: I have personally reviewed pertinent imaging studies  [address] Sandeep AZUL O    Gastroenterology Fellow  PGY-[id number]  Available via [employer]  10/13/2022 6:28 AM

## 2022-10-13 NOTE — CONSULTS
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 (diagnosed initially in 2017, used to get therapeutic phlebotomies until November 2021, currently on hydrea and ASA), CKD, CAD, and HTN who is admitted to [facility] on 10/12/22 for evaluation of symptomatic anemia  Patient was noted to have a Hgb of 8 7 on admission (baseline Hgb 11-12 until July 2022) and plt 85 (has been thrombocytopenia since April 2022)  Peripheral smear with no evidence of hemolysis  WBCs were reported as morphologically normal without evidence of dysplasia or immature forms  Based on iron panel results, macrocytosis on CBC, and negative FOBT, have very low suspicion for GIB causing pt's symptomatic anemia with higher suspicion for hydroxyurea induced myelosuppression causing the observed cytopenias  Macrocytosis was first noticed in March 2022 CBC, which was the first time a CBC was checked after patient was initiated on Hydrea  Recommend stopping Hydrea and rechecking CBC in 1-2 weeks to evaluate for recovery of blood counts  As patient has never gotten a bone marrow biopsy, will also get a bone marrow biopsy to evaluate for underlying dysplasias  Outpatient follow up plan: Patient currently has an OP hematology appt scheduled with Dr Stephanie Hancock on 11/4/22; we will try to arrange for a faster appt as hospital f/u  Communication with team: Primary team to be updated after case is discussed with Dr Stephanie Hancock  Plan to be discussed with attending, Dr Stephanie Hancock       Reason for consultation: anemia and thrombocytopenia, hx of PV     History of present illness:  Price Velazquez is a 69 y o  male with hx of PV (dx initially in August 2017, was on hydrea 500 bid in the OP setting), HLD, CKD stage III, CAD (s/p PCI in 2008), complete heart block (s/p PPM), HTN, BPH, and tobacco abuse  He presented to [facility] on 10/12/22 for evaluation worsening SOB and dizziness  Of note, patient had presented to the hospital on 10/4 also with c/o lightheadedness, was noted to have a drop in Hgb at that time, but refused rectal exam  FOBT negative  Denied any episodes of bright red blood per rectum, dark stools, hematemesis  Patient had a colonoscopy in May 2017, which revealed 3 sessile polyps, pathology resulted as tubular adenomas and negative for high grade dysplasia  Patient was recommended to have a repeat colo in 3 years, but he did not f/u      ED vitals were stable with normal hemodynamics and SpO2  Labs were notable for Hgb 8 7 (was normal 12 3 in July 2022, was 9 1 on 10/4/22), Plt 85 (pt with thrombocytopenia since April 2022 with plt count ranging from 80s-100s)  MCV elevated in the 110s-120s likely 2/2 hydrea  Iron panel was as follows: ferritin 598, iron sat 39%, TIBC 192, iron 75  CT abd did not reveal any acute abdominal pathology in the abdomen or pelvis  Regarding patient's history of JAK2 + PV, he had been on therapeutic phlebotomy until November 2021  He has never had a bone marrow biopsy  Patient was started on Hydrea 500 mg daily as cytoreductive therapy since November 2021 with increase in dosing to 500 mg twice daily in March 2022  Review of Systems:   Review of Systems   Constitutional: Positive for fatigue  Negative for chills and fever  HENT: Negative for facial swelling and nosebleeds  Eyes: Negative for redness and visual disturbance  Respiratory: Positive for shortness of breath (mainly with exertion)  Negative for cough and wheezing  Gastrointestinal: Positive for abdominal pain (few episodes, last time was on Monday)  Negative for blood in stool, constipation, diarrhea, nausea and vomiting  Genitourinary: Negative for difficulty urinating, dysuria and hematuria     Musculoskeletal: Negative for arthralgias, back pain and joint swelling  Skin: Negative for pallor and rash  Neurological: Positive for weakness (generalized) and light-headedness  Negative for syncope and headaches  Psychiatric/Behavioral: Negative for confusion and hallucinations       Past Medical History:   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"   • Full dentures     but doesnt wear them   • History of 2019 novel coronavirus disease (COVID-19) 02/2021    hospital for 2 days but not in ICU/"mainly dehydrated" "also fever/oxygen below 90"   • History of coronary artery stent placement     x2 in 2008 or so; sees Yadira Cerna-- Dr Ady Segura cardio   • [place] (hard of hearing)     no hearing aids yet   • Hyperlipidemia     Last Assessed:  11/24/17   • Hypertension     Benign essential, Last Assessed:  11/24/17   • LBBB (left bundle branch block)    • Nocturia    • Polycythemia vera (HCC)     (per history in chart)   • PVD (peripheral vascular disease) ([facility] [address] )    • RBC abnormality     pt reports told has high Red blood cells/goes to infusion center regularly next appt 10/8/21 hematologist is Dr Diana Roldan of [address] [place] [address](had been Dr Jase Edouard)   • Seasickness    • Shortness of breath     "if goes up steps gets SOB, had for awhile"   • Slow urinary stream     "sometimes feels like bladder isnt all the way empty"   • Snores    • Wears glasses     reading       Past Surgical History:   Procedure Laterality Date   • ANGIOPLASTY     • APPENDECTOMY     • CARDIAC CATHETERIZATION N/A 7/28/2022    Procedure: CARDIAC TEMPORARY PACEMAKER;  Surgeon: Pro Calvin MD;  Location: [facility];  Service: Cardiology   • CARDIAC ELECTROPHYSIOLOGY PROCEDURE N/A 7/29/2022    Procedure: Cardiac pacer implant;  Surgeon: Holley Gandara MD;  Location: [facility]; Service: Cardiology   • CATARACT EXTRACTION Bilateral    • CORONARY ARTERY BYPASS GRAFT     • CT CYSTOGRAM  11/4/2021   • ELBOW SURGERY Right    • FL CYSTOGRAM  10/27/2021   • KNEE ARTHROSCOPY Right    • NE COLONOSCOPY FLX DX W/COLLJ SPEC WHEN PFRMD N/A 5/22/2017    Procedure: COLONOSCOPY;  Surgeon: Zia Gonzalez DO;  Location: [facility];   Service: Gastroenterology   • PROSTATE BIOPSY     • PROSTATECTOMY N/A 10/18/2021    Procedure: ROBOTIC SUBTOTAL/SUPRAPUBIC PROSTATECTOMY;  Surgeon: Loreta Terrazas MD;  Location: [facility];  Service: Urology   • SHOULDER ARTHROSCOPY Right    • TONSILLECTOMY         Family History   Problem Relation Age of Onset   • No Known Problems Mother    • Heart disease Father        Social History     Socioeconomic History   • Marital status: /Civil Union     Spouse name: None   • Number of children: None   • Years of education: None   • Highest education level: None   Occupational History   • None   Tobacco Use   • Smoking status: Current Every Day Smoker     Packs/day: 0 50     Years: 35 00     Pack years: 17 50     Types: Cigarettes   • Smokeless tobacco: Never Used   • Tobacco comment: a little less than 1/2ppd, trying to cut back before surgery last smoked 10/16   Vaping Use   • Vaping Use: Never used   Substance and Sexual Activity   • Alcohol use: Never   • Drug use: No   • Sexual activity: Not Currently     Comment: defer   Other Topics Concern   • None   Social History Narrative    Always uses seatbelt    Feels safe at home    Uses safety equipment     Social Determinants of Health     Financial Resource Strain: Not on file   Food Insecurity: No Food Insecurity   • Worried About Running Out of Food in the Last Year: Never true   • Ran Out of Food in the Last Year: Never true   Transportation Needs: No Transportation Needs   • Lack of Transportation (Medical): No   • Lack of Transportation (Non-Medical):  No   Physical Activity: Not on file   Stress: Not on file   Social Connections: Not on file   Intimate Partner Violence: Not on file   Housing Stability: Low Risk    • Unable to Pay for Housing in the Last Year: No   • Number of Places Lived in the Last Year: 1   • Unstable Housing in the Last Year: No         Current Facility-Administered Medications:   •  acetaminophen (TYLENOL) tablet 650 mg, 650 mg, Oral, Q6H PRN, Lewis Santizo MD  •  amLODIPine (NORVASC) tablet 10 mg, 10 mg, Oral, QPM, Lewis Santizo MD, 10 mg at 10/12/22 2219  •  atorvastatin (LIPITOR) tablet 80 mg, 80 mg, Oral, QPM, Lewis Santizo MD, 80 mg at 10/12/22 2219  •  finasteride (PROSCAR) tablet 5 mg, 5 mg, Oral, Daily, Lewis Santizo MD, 5 mg at 10/13/22 0820  •  metoprolol tartrate (LOPRESSOR) tablet 50 mg, 50 mg, Oral, BID, Lewis Santizo MD, 50 mg at 10/13/22 0820  •  nitroglycerin (NITROSTAT) SL tablet 0 4 mg, 0 4 mg, Sublingual, Q5 Min PRN, Lewis Santizo MD  •  ondansetron [facility]) injection 4 mg, 4 mg, Intravenous, Q6H PRN, Lewis Santizo MD    Medications Prior to Admission   Medication   • acetaminophen (TYLENOL) 500 mg tablet   • amLODIPine (NORVASC) 10 mg tablet   • ascorbic acid (VITAMIN C) 1000 MG tablet   • aspirin (ECOTRIN LOW STRENGTH) 81 mg EC tablet   • atorvastatin (LIPITOR) 80 mg tablet   • finasteride (PROSCAR) 5 mg tablet   • hydroxyurea (HYDREA) 500 mg capsule   • metoprolol tartrate (LOPRESSOR) 50 mg tablet   • Multiple Vitamin (MULTIVITAMIN) capsule   • nitroglycerin (NITROSTAT) 0 4 mg SL tablet   • Omega-3 Fatty Acids (FISH OIL) 1200 MG CAPS   • calcitriol (ROCALTROL) 0 25 mcg capsule   • Cholecalciferol (Vitamin D3) 50 MCG (2000 UT) TABS   • docusate sodium (COLACE) 100 mg capsule       Allergies   Allergen Reactions   • Other Other (See Comments)     Pt and wife reports took a medication years ago at work cant recall the name or what it was for"     "couldn't talk and cheeks swelled and also right hand"         Physical Exam:     /63   Pulse 63   Temp 97 7 °F (36 5 °C)   Resp 18   Ht 5' 8" (1 727 m)   Wt 73 5 kg (162 lb 0 6 oz)   SpO2 91%   BMI 24 64 kg/m²     Physical Exam  Vitals reviewed  Constitutional:       General: He is not in acute distress  Appearance: He is not ill-appearing, toxic-appearing or diaphoretic  HENT:      Head: Normocephalic and atraumatic  Eyes:      Extraocular Movements: Extraocular movements intact  Conjunctiva/sclera: Conjunctivae normal    Cardiovascular:      Rate and Rhythm: Normal rate and regular rhythm  Heart sounds: No murmur heard  No gallop  Pulmonary:      Effort: No respiratory distress  Breath sounds: Normal breath sounds  No wheezing, rhonchi or rales  Abdominal:      General: Bowel sounds are normal  There is no distension  Palpations: Abdomen is soft  There is no mass  Tenderness: There is no abdominal tenderness  Musculoskeletal:         General: No swelling, tenderness or deformity  Cervical back: Normal range of motion  Right lower leg: No edema  Left lower leg: No edema  Skin:     General: Skin is warm and dry  Findings: No erythema, lesion or rash  Neurological:      General: No focal deficit present  Mental Status: He is alert and oriented to person, place, and time     Psychiatric:         Mood and Affect: Mood normal          Judgment: Judgment normal        Recent Results (from the past 48 hour(s))   POCT hemoccult screening    Collection Time: 10/12/22  4:01 PM   Result Value Ref Range    FECES OCC BLD Negative    ECG 12 lead    Collection Time: 10/12/22  4:59 PM   Result Value Ref Range    Ventricular Rate 59 BPM    Atrial Rate 59 BPM    AR Interval 160 ms    QRSD Interval 158 ms    QT Interval 452 ms    QTC Interval 447 ms    P [place] 59 degrees    QRS Axis 151 degrees    T Wave Axis 73 degrees   CBC and differential    Collection Time: 10/12/22  5:20 PM   Result Value Ref Range    WBC 6 26 4 31 - 10 16 Thousand/uL    RBC 1 89 (L) 3 88 - 5 62 Million/uL    Hemoglobin 8 7 (L) 12 0 - 17 0 g/dL    Hematocrit 24 8 (L) 36 5 - 49 3 %     (H) 82 - 98 fL    MCH 46 0 (H) 26 8 - 34 3 pg    MCHC 35 1 31 4 - 37 4 g/dL    RDW 17 7 (H) 11 6 - 15 1 %    MPV 10 1 8 9 - 12 7 fL    Platelets 85 (L) 697 - 390 Thousands/uL    nRBC 0 /100 WBCs    Neutrophils Relative 74 43 - 75 %    Immat GRANS % 1 0 - 2 %    Lymphocytes Relative 16 14 - 44 %    Monocytes Relative 9 4 - 12 %    Eosinophils Relative 0 0 - 6 %    Basophils Relative 0 0 - 1 %    Neutrophils Absolute 4 62 1 85 - 7 62 Thousands/µL    Immature Grans Absolute 0 05 0 00 - 0 20 Thousand/uL    Lymphocytes Absolute 0 98 0 60 - 4 47 Thousands/µL    Monocytes Absolute 0 58 0 17 - 1 22 Thousand/µL    Eosinophils Absolute 0 02 0 00 - 0 61 Thousand/µL    Basophils Absolute 0 01 0 00 - 0 10 Thousands/µL   Protime-INR    Collection Time: 10/12/22  5:20 PM   Result Value Ref Range    Protime 14 6 (H) 11 6 - 14 5 seconds    INR 1 13 0 84 - 1 19   APTT    Collection Time: 10/12/22  5:20 PM   Result Value Ref Range    PTT 29 23 - 37 seconds   Type and screen    Collection Time: 10/12/22  5:20 PM   Result Value Ref Range    ABO Grouping O     Rh Factor Positive     Antibody Screen Negative     Specimen Expiration Date 20221015    Comprehensive metabolic panel    Collection Time: 10/12/22  5:20 PM   Result Value Ref Range    Sodium 136 135 - 147 mmol/L    Potassium 3 6 3 5 - 5 3 mmol/L    Chloride 101 96 - 108 mmol/L    CO2 24 21 - 32 mmol/L    ANION GAP 11 4 - 13 mmol/L    BUN 21 5 - 25 mg/dL    Creatinine 1 41 (H) 0 60 - 1 30 mg/dL    Glucose 106 65 - 140 mg/dL    Calcium 9 3 8 3 - 10 1 mg/dL    AST 32 5 - 45 U/L     (H) 12 - 78 U/L    Alkaline Phosphatase 173 (H) 46 - 116 U/L    Total Protein 7 7 6 4 - 8 4 g/dL    Albumin 3 6 3 5 - 5 0 g/dL    Total Bilirubin 2 23 (H) 0 20 - 1 00 mg/dL    eGFR 48 ml/min/1 73sq m   Magnesium    Collection Time: 10/12/22  5:20 PM   Result Value Ref Range    Magnesium 2 2 1 6 - 2 6 mg/dL   Lipase    Collection Time: 10/12/22  5:20 PM   Result Value Ref Range    Lipase 199 73 - 393 u/L   HS Troponin 0hr (reflex protocol)    Collection Time: 10/12/22  5:20 PM   Result Value Ref Range    hs TnI 0hr 7 "Refer to ACS Flowchart"- see link ng/L   NT-BNP PRO    Collection Time: 10/12/22  5:20 PM   Result Value Ref Range    NT-proBNP 214 <450 pg/mL   Reticulocytes    Collection Time: 10/12/22  5:20 PM   Result Value Ref Range    Retic Ct Abs 89,200 14,356 - 105,094    Retic Ct Pct 4 72 (H) 0 37 - 1 87 %   Iron Saturation %    Collection Time: 10/12/22  5:20 PM   Result Value Ref Range    Iron Saturation 39 20 - 50 %    TIBC 192 (L) 250 - 450 ug/dL    Iron 75 65 - 175 ug/dL   Ferritin    Collection Time: 10/12/22  5:20 PM   Result Value Ref Range    Ferritin 598 (H) 8 - 388 ng/mL   HS Troponin I 2hr    Collection Time: 10/12/22  7:24 PM   Result Value Ref Range    hs TnI 2hr 7 "Refer to ACS Flowchart"- see link ng/L    Delta 2hr hsTnI 0 <20 ng/L   ECG 12 lead    Collection Time: 10/12/22  7:24 PM   Result Value Ref Range    Ventricular Rate 64 BPM    Atrial Rate 64 BPM    OK Interval 192 ms    QRSD Interval 156 ms    QT Interval 456 ms    QTC Interval 470 ms    P Axis 66 degrees    QRS Axis 153 degrees    T Wave [place] 66 degrees   HS Troponin I 4hr    Collection Time: 10/12/22  9:33 PM   Result Value Ref Range    hs TnI 4hr 8 "Refer to ACS Flowchart"- see link ng/L    Delta 4hr hsTnI 1 <20 ng/L   UA (URINE) with reflex to Scope    Collection Time: 10/13/22 12:35 AM   Result Value Ref Range    Color, UA Yellow     Clarity, UA Clear     Specific Gravity, UA <=1 005 1 003 - 1 030    pH, UA 6 5 4 5, 5 0, 5 5, 6 0, 6 5, 7 0, 7 5, 8 0    Leukocytes, UA Moderate (A) Negative    Nitrite, UA Positive (A) Negative    Protein, UA Negative Negative mg/dl Glucose, UA Negative Negative mg/dl    Ketones, UA Negative Negative mg/dl    Urobilinogen, UA 1 0 0 2, 1 0 E U /dl E U /dl    Bilirubin, UA Negative Negative    Occult Blood, UA Negative Negative   Urine Microscopic    Collection Time: 10/13/22 12:35 AM   Result Value Ref Range    RBC, UA 4-10 (A) None Seen, 2-4 /hpf    WBC, UA 10-20 (A) None Seen, 2-4, 5-60 /hpf    Epithelial Cells None Seen None Seen, Occasional /hpf    Bacteria, UA Innumerable (A) None Seen, Occasional /hpf    OTHER OBSERVATIONS WBCs Clumped    Comprehensive metabolic panel    Collection Time: 10/13/22  5:27 AM   Result Value Ref Range    Sodium 141 135 - 147 mmol/L    Potassium 3 5 3 5 - 5 3 mmol/L    Chloride 108 96 - 108 mmol/L    CO2 26 21 - 32 mmol/L    ANION GAP 7 4 - 13 mmol/L    BUN 14 5 - 25 mg/dL    Creatinine 0 94 0 60 - 1 30 mg/dL    Glucose 97 65 - 140 mg/dL    Calcium 8 5 8 3 - 10 1 mg/dL    Corrected Calcium 9 3 8 3 - 10 1 mg/dL    AST 24 5 - 45 U/L     (H) 12 - 78 U/L    Alkaline Phosphatase 140 (H) 46 - 116 U/L    Total Protein 6 5 6 4 - 8 4 g/dL    Albumin 3 0 (L) 3 5 - 5 0 g/dL    Total Bilirubin 2 44 (H) 0 20 - 1 00 mg/dL    eGFR 78 ml/min/1 73sq m   Magnesium    Collection Time: 10/13/22  5:27 AM   Result Value Ref Range    Magnesium 2 1 1 6 - 2 6 mg/dL   Phosphorus    Collection Time: 10/13/22  5:27 AM   Result Value Ref Range    Phosphorus 3 4 2 3 - 4 1 mg/dL   CBC and differential    Collection Time: 10/13/22  5:27 AM   Result Value Ref Range    WBC 5 32 4 31 - 10 16 Thousand/uL    RBC 1 82 (L) 3 88 - 5 62 Million/uL    Hemoglobin 8 4 (L) 12 0 - 17 0 g/dL    Hematocrit 23 7 (L) 36 5 - 49 3 %     (H) 82 - 98 fL    MCH 46 2 (H) 26 8 - 34 3 pg    MCHC 35 4 31 4 - 37 4 g/dL    RDW 17 3 (H) 11 6 - 15 1 %    MPV 10 7 8 9 - 12 7 fL    Platelets 76 (L) 411 - 390 Thousands/uL    nRBC 1 /100 WBCs   Bilirubin, direct    Collection Time: 10/13/22  5:27 AM   Result Value Ref Range    Bilirubin, Direct 0 72 (H) 0 00 - 0 20 mg/dL   LD,Blood    Collection Time: 10/13/22  5:27 AM   Result Value Ref Range     (H) 81 - 234 U/L   Fibrinogen    Collection Time: 10/13/22  6:24 AM   Result Value Ref Range    Fibrinogen 512 (H) 227 - 495 mg/dL     XR chest 1 view portable    Result Date: 10/13/2022  Narrative: CHEST INDICATION:   sob  COMPARISON:  10/4/2022 EXAM PERFORMED/VIEWS:  XR CHEST PORTABLE FINDINGS: Cardiomediastinal silhouette appears unremarkable  The lungs are clear  Decreased inspiration  Slight bibasilar compression atelectasis  Pacer again noted  No pneumothorax or pleural effusion  Osseous structures appear within normal limits for patient age  Impression: No acute cardiopulmonary disease  Workstation performed: [id number]     XR chest 1 view portable    Result Date: 10/4/2022  Narrative: CHEST INDICATION:   sob  COMPARISON:  CXR 7/30/2022 and chest CT 1/20/2021  EXAM PERFORMED/VIEWS:  XR CHEST PORTABLE FINDINGS: Normal heart size with left subclavian pacemaker leads in the right atrium and right ventricle  The lungs are clear  No pneumothorax or pleural effusion  Osseous structures appear within normal limits for patient age  Impression: No acute cardiopulmonary disease  Workstation performed: [id number]     CT abdomen pelvis with contrast    Result Date: 10/12/2022  Narrative: CT ABDOMEN AND PELVIS WITH IV CONTRAST INDICATION:   Epigastric pain epigastric pain, anemia  COMPARISON:  11/4/2021 and 8/10/2017 TECHNIQUE:  CT examination of the abdomen and pelvis was performed  Axial, sagittal, and coronal 2D reformatted images were created from the source data and submitted for interpretation  Radiation dose length product (DLP) for this visit:  426 mGy-cm   This examination, like all CT scans performed in the [facility], was performed utilizing techniques to minimize radiation dose exposure, including the use of iterative reconstruction and automated exposure control   IV Contrast:  100 mL of iohexol (OMNIPAQUE) Enteric Contrast:  Enteric contrast was not administered  FINDINGS: ABDOMEN Degraded by respiratory motion  LOWER CHEST:  There is dependent atelectasis  There is a 3 mm right lower lobe nodule on series 2, image 8, not significantly changed  There is a right middle lobe measuring 3 mm on image 4, also unchanged  LIVER/BILIARY TREE:  There is a too small to characterize hypodense lesion in the right lobe of the liver  This has increased in size from 2017 though is likely a small cyst  GALLBLADDER:  No calcified gallstones  No pericholecystic inflammatory change  SPLEEN:  Unremarkable  PANCREAS:  Unremarkable  ADRENAL GLANDS:  There is stable nodular thickening of the right adrenal gland compared to 2017, likely benign adenomata  KIDNEYS/URETERS:  There is a stable small left renal cyst  No hydronephrosis  STOMACH AND BOWEL:  Unremarkable  APPENDIX:  No findings to suggest appendicitis  ABDOMINOPELVIC CAVITY: There is stable mild stranding in the small bowel mesentery 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  No ascites  No pneumoperitoneum  No lymphadenopathy  VESSELS:  Unremarkable for patient's age  PELVIS REPRODUCTIVE ORGANS:  Status post TURP  URINARY BLADDER:  There is circumferential bladder wall thickening, similar to the prior study  ABDOMINAL WALL/INGUINAL REGIONS:  Fat containing right inguinal hernia noted  OSSEOUS STRUCTURES:  No acute fracture or destructive osseous lesion  Impression: Degraded by respiratory motion  1   No acute abnormality in the abdomen or pelvis  2   Slowly growing hypodensity in the liver, most likely a cyst though confirmation with nonemergent ultrasound is recommended  3   Otherwise stable benign findings as above  The study was marked in [facility] for immediate notification  Workstation performed: [id number]     Labs and pertinent reports reviewed

## 2022-10-13 NOTE — ASSESSMENT & PLAN NOTE
Hold aspirin in the setting of possible GI bleed  Continue hydroxyurea  Monitor on daily labs  Consider consult to Hematology

## 2022-10-13 NOTE — UTILIZATION REVIEW
Initial Clinical Review    OBSERVATION 10/12 CHANGED TO INPATIENT ON 10/13 @ 1914 - BONE BIOPSY AND MONITORING CBC WITH MED CHANGES  Admission: Date/Time/Statement:   Admission Orders (From admission, onward)     Ordered        10/13/22 1914  Inpatient Admission  Once                      Orders Placed This Encounter   Procedures   • Inpatient Admission     Standing Status:   Standing     Number of Occurrences:   1     Order Specific Question:   Level of Care     Answer:   Med Surg [16]     Order Specific Question:   Bed Type     Answer:   [place] [4]     Order Specific Question:   Estimated length of stay     Answer:   More than 2 Midnights     Order Specific Question:   Certification     Answer:   I certify that inpatient services are medically necessary for this patient for a duration of greater than two midnights  See H&P and MD Progress Notes for additional information about the patient's course of treatment  ED Arrival Information     Expected   10/12/2022     Arrival   10/12/2022 16:33    Acuity   Emergent            Means of arrival   Walk-In    Escorted by   Spouse    Service   Hospitalist    Admission type   Emergency            Arrival complaint   symptomatic anemia           Chief Complaint   Patient presents with   • Abnormal Lab     Sent for low hemoglobin  Reporting abdominal pain and SOB  Initial Presentation: 69 y o  male presents to the ED from PCP office with c/o worsening periumbilical, sharp pain worsened with eating abd pain, SOB, dizziness, constipation  At PCP FOBT negative  PMH: HTN, BPH, CAD s/p PCI 2008, CHB with PPM 2022, HLD, polycythemia vera on Hydroxyurea, CKD 3, smoker  In the ED Hgb 8 7, creat, LFTs, T bili, ferritin elevated, and UA showing UTI  In the ED he is treated with IV fluids, IV Protonix    Imaging shows slowly growing hypodensity in liver, likely cyst   No deficits on exam  He is admitted to OBSERVATION status with Symptomatic Anemia - hold ASA, clear liquid diet, NPO p MN, GI Consult  Polycythemia Vera - hold ASA and Hydroxyurea, consult Hematology  Date: 10/13:  Hgb 8 4 today  Stopping Hydroxyurea and planning for bone bx on 10/14  Pt has no further complaints  10/13 GI Consult - symptomatic anemia - low suspicion GI Bleed, check haptoglobin, LDH, fibrinogen, peripheral smear, SPEP, UPEP, no endoscopy today - can eat, H/H monitoring, transfuse < 7  Elevated LFTs - Check chronic hepatitis panel, GGT to determine etiology of elevated alk-phos  10/13 Hematology Consult - consideration for possible myelosuppression due to hydroxyurea and/or potential clonal evolution of his myeloproliferative neoplasm  Will need to d/c Hydroxyurea and have bone bx  Continue weekly CBC to check and see if being off Hydroxyurea improved CBC    10/13 IR Consult - NPO for bone bx on 10/14          ED Triage Vitals   Temperature Pulse Respirations Blood Pressure SpO2   10/12/22 1640 10/12/22 1640 10/12/22 1640 10/12/22 [facility] 10/12/22 1640   97 6 °F (36 4 °C) 66 18 119/70 96 %      Temp Source Heart Rate Source Patient Position - Orthostatic VS BP Location FiO2 (%)   10/12/22 1640 10/12/22 1640 10/12/22 [facility] 10/12/22 [facility] --   Oral Monitor Sitting Right arm       Pain Score       10/12/22 2045       6          Wt Readings from Last 1 Encounters:   10/12/22 73 5 kg (162 lb 0 6 oz)     Additional Vital Signs:   10/13/22 07:32:36 97 7 °F (36 5 °C) 63 18 120/63 82 91 % -- --   10/12/22 22:53:42 97 7 °F (36 5 °C) 66 16 128/59 82 94 % -- --   10/12/22 22:18:14 -- 65 -- 124/66 85 95 % -- --   10/12/22 2100 -- -- -- -- -- -- None (Room air) --   10/12/22 20:38:35 97 1 °F (36 2 °C) Abnormal  -- 18 138/73 95 -- -- --   10/12/22 2000 -- 64 17 145/66 95 98 % None (Room air) Lying   10/12/22 1900 -- 64 19 127/65 89 98 % None (Room air) Lying   10/12/22 1808 -- 60 18 132/65 -- 98 % None (Room air) Lying     Pertinent Labs/Diagnostic Test Results:     10/12 ECG - AV dual-paced rhythm    CT abdomen pelvis with contrast   Final Result by Vinh Dillon MD (10/12 1943)      Degraded by respiratory motion  1   No acute abnormality in the abdomen or pelvis  2   Slowly growing hypodensity in the liver, most likely a cyst though confirmation with nonemergent ultrasound is recommended  3   Otherwise stable benign findings as above  The study was marked in [facility] for immediate notification  Workstation performed: [id number]         XR chest 1 view portable   Final Result by Mily Yen MD (76/07 [id number])      No acute cardiopulmonary disease        IR biopsy bone marrow    (Results Pending)   PLANNED FOR 10/14         Results from last 7 days   Lab Units 10/13/22  0527 10/12/22  1720   WBC Thousand/uL 5 32 6 26   HEMOGLOBIN g/dL 8 4* 8 7*   HEMATOCRIT % 23 7* 24 8*   PLATELETS Thousands/uL 76* 85*   NEUTROS ABS Thousands/µL  --  4 62     Results from last 7 days   Lab Units 10/12/22  1720   RETIC CT ABS  89,200   RETIC CT PCT % 4 72*     Results from last 7 days   Lab Units 10/13/22  0527 10/12/22  1720   SODIUM mmol/L 141 136   POTASSIUM mmol/L 3 5 3 6   CHLORIDE mmol/L 108 101   CO2 mmol/L 26 24   ANION GAP mmol/L 7 11   BUN mg/dL 14 21   CREATININE mg/dL 0 94 1 41*   EGFR ml/min/1 73sq m 78 48   CALCIUM mg/dL 8 5 9 3   MAGNESIUM mg/dL 2 1 2 2   PHOSPHORUS mg/dL 3 4  --      Results from last 7 days   Lab Units 10/13/22  0527 10/12/22  1720   AST U/L 24 32   ALT U/L 118* 162*   ALK PHOS U/L 140* 173*   TOTAL PROTEIN g/dL 6 5 7 7   ALBUMIN g/dL 3 0* 3 6   TOTAL BILIRUBIN mg/dL 2 44* 2 23*   BILIRUBIN DIRECT mg/dL 0 72*  --          Results from last 7 days   Lab Units 10/13/22  0527 10/12/22  1720   GLUCOSE RANDOM mg/dL 97 106     Results from last 7 days   Lab Units 10/12/22  2133 10/12/22  1924 10/12/22  1720   HS TNI 0HR ng/L  --   --  7   HS TNI 2HR ng/L  --  7  --    HSTNI D2 ng/L  --  0  --    HS TNI 4HR ng/L 8  --   --    HSTNI D4 ng/L 1  --   -- Results from last 7 days   Lab Units 10/12/22  1720   PROTIME seconds 14 6*   INR  1 13   PTT seconds 29     Results from last 7 days   Lab Units 10/12/22  1720   NT-PRO BNP pg/mL 214     Results from last 7 days   Lab Units 10/12/22  1720   FERRITIN ng/mL 598*     Results from last 7 days   Lab Units 10/13/22  0939   HEP B S AG  Non-reactive   HEP C AB  Non-reactive   HEP B C IGM  Non-reactive   HEP B C TOTAL AB  Non-reactive     Results from last 7 days   Lab Units 10/12/22  1720   LIPASE u/L 199                 Results from last 7 days   Lab Units 10/13/22  0035   CLARITY UA  Clear   COLOR UA  Yellow   SPEC GRAV UA  <=1 005   PH UA  6 5   GLUCOSE UA mg/dl Negative   KETONES UA mg/dl Negative   BLOOD UA  Negative   PROTEIN UA mg/dl Negative   NITRITE UA  Positive*   BILIRUBIN UA  Negative   UROBILINOGEN UA E U /dl 1 0   LEUKOCYTES UA  Moderate*   WBC UA /hpf 10-20*   RBC UA /hpf 4-10*   BACTERIA UA /hpf Innumerable*   EPITHELIAL CELLS WET PREP /hpf None Seen     ED Treatment:   Medication Administration from 10/12/2022 1549 to 10/12/2022 2034       Date/Time Order Dose Route Action     10/12/2022 1725 sodium chloride 0 9 % bolus 500 mL 500 mL Intravenous New Bag     10/12/2022 1856 lactated ringers infusion 125 mL/hr Intravenous New Bag     10/12/2022 1755 pantoprazole (PROTONIX) 80 mg in sodium chloride 0 9 % 100 mL IVPB 80 mg Intravenous New Bag     10/12/2022 1823 iohexol (OMNIPAQUE) 350 MG/ML injection (SINGLE-DOSE) 100 mL 100 mL Intravenous Given        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"   • Full dentures     but doesnt wear them   • History of 2019 novel coronavirus disease (COVID-19) 02/2021    hospital for 2 days but not in ICU/"mainly dehydrated" "also fever/oxygen below 90"   • History of coronary artery stent placement     x2 in 2008 or so; sees Josefa Velazquez-- Dr Joel Hernandez cardio   • ALLA [facility] (hard of hearing)     no hearing aids yet   • Hyperlipidemia     Last Assessed:  11/24/17   • Hypertension     Benign essential, Last Assessed:  11/24/17   • LBBB (left bundle branch block)    • Nocturia    • Polycythemia vera (HCC)     (per history in chart)   • PVD (peripheral vascular disease) (HCC)    • RBC abnormality     pt reports told has high Red blood cells/goes to infusion center regularly next appt 10/8/21 hematologist is Dr Nancy Karimi of [address] [place] [address](had been Dr Jarek Knowles)   • Seasickness    • Shortness of breath     "if goes up steps gets SOB, had for awhile"   • Slow urinary stream     "sometimes feels like bladder isnt all the way empty"   • Snores    • Wears glasses     reading     Present on Admission:  • Benign essential hypertension  • (Resolved) BPH with obstruction/lower urinary tract symptoms  • CAD (coronary artery disease)  • (Resolved) Complete heart block (HCC)  • Mixed hyperlipidemia  • Polycythemia vera (HCC)  • Stage 3b chronic kidney disease ([facility] [address] )  • Tobacco user  • (Resolved) Symptomatic anemia      Admitting Diagnosis: ROSEN (dyspnea on exertion) [R06 09]  Anemia [D64 9]  Age/Sex: 69 y o  male  Admission Orders:  Scheduled Medications:  amLODIPine, 10 mg, Oral, QPM  [START ON 10/14/2022] aspirin, 81 mg, Oral, Daily  atorvastatin, 80 mg, Oral, QPM  finasteride, 5 mg, Oral, Daily  heparin (porcine), 5,000 Units, Subcutaneous, Q8H [address]  metoprolol tartrate, 50 mg, Oral, BID      Continuous IV Infusions:    IV LR @ 125 ml/hr - d/c 10/13 @ 0607     PRN Meds:  acetaminophen, 650 mg, Oral, Q6H PRN  nitroglycerin, 0 4 mg, Sublingual, Q5 Min PRN  ondansetron, 4 mg, Intravenous, Q6H PRN    Monitor urinary retention  Clears  IP CONSULT TO GASTROENTEROLOGY  IP CONSULT TO HEMATOLOGY  INPATIENT CONSULT TO IR    Network Utilization Review Department  ATTENTION: Please call with any questions or concerns to [phone] and carefully listen to the prompts so that you are directed to the right person  All voicemails are confidential   Elke Valenzuela all requests for admission clinical reviews, approved or denied determinations and any other requests to dedicated fax number below belonging to the campus where the patient is receiving treatment   List of dedicated fax numbers for the Facilities:  [address] DENIALS (Administrative/Medical Necessity) [phone]   [address] (Maternity/NICU/Pediatrics) [phone]   [address] [place] daniel [phone]   [facility] 77 [phone]   1306 [facility] 150 [facility] [place] [address] [address] Leyla Cadena 28 [phone]   [address] [place] Carol Keita [facility] [address] 815 [facility] [phone]

## 2022-10-14 ENCOUNTER — APPOINTMENT (INPATIENT)
Dept: RADIOLOGY | Facility: HOSPITAL | Age: 77
DRG: 812 | End: 2022-10-14
Payer: COMMERCIAL

## 2022-10-14 VITALS
SYSTOLIC BLOOD PRESSURE: 110 MMHG | RESPIRATION RATE: 22 BRPM | WEIGHT: 162.04 LBS | HEIGHT: 68 IN | HEART RATE: 60 BPM | OXYGEN SATURATION: 98 % | BODY MASS INDEX: 24.56 KG/M2 | DIASTOLIC BLOOD PRESSURE: 63 MMHG | TEMPERATURE: 97.5 F

## 2022-10-14 LAB
BASOPHILS # BLD AUTO: 0.01 THOUSANDS/ÂΜL (ref 0–0.1)
BASOPHILS NFR BLD AUTO: 0 % (ref 0–1)
EOSINOPHIL # BLD AUTO: 0.04 THOUSAND/ÂΜL (ref 0–0.61)
EOSINOPHIL NFR BLD AUTO: 1 % (ref 0–6)
ERYTHROCYTE [DISTWIDTH] IN BLOOD BY AUTOMATED COUNT: 17.2 % (ref 11.6–15.1)
FOLATE SERPL-MCNC: 18.1 NG/ML (ref 3.1–17.5)
HAPTOGLOB SERPL-MCNC: 37 MG/DL (ref 34–355)
HCT VFR BLD AUTO: 23.5 % (ref 36.5–49.3)
HGB BLD-MCNC: 8.4 G/DL (ref 12–17)
IMM GRANULOCYTES # BLD AUTO: 0.03 THOUSAND/UL (ref 0–0.2)
IMM GRANULOCYTES NFR BLD AUTO: 1 % (ref 0–2)
LYMPHOCYTES # BLD AUTO: 1.51 THOUSANDS/ÂΜL (ref 0.6–4.47)
LYMPHOCYTES NFR BLD AUTO: 33 % (ref 14–44)
MCH RBC QN AUTO: 46.9 PG (ref 26.8–34.3)
MCHC RBC AUTO-ENTMCNC: 35.7 G/DL (ref 31.4–37.4)
MCV RBC AUTO: 131 FL (ref 82–98)
MONOCYTES # BLD AUTO: 0.5 THOUSAND/ÂΜL (ref 0.17–1.22)
MONOCYTES NFR BLD AUTO: 11 % (ref 4–12)
NEUTROPHILS # BLD AUTO: 2.53 THOUSANDS/ÂΜL (ref 1.85–7.62)
NEUTS SEG NFR BLD AUTO: 54 % (ref 43–75)
NRBC BLD AUTO-RTO: 0 /100 WBCS
PLATELET # BLD AUTO: 76 THOUSANDS/UL (ref 149–390)
PMV BLD AUTO: 10.1 FL (ref 8.9–12.7)
RBC # BLD AUTO: 1.79 MILLION/UL (ref 3.88–5.62)
TSH SERPL DL<=0.05 MIU/L-ACNC: 1.57 UIU/ML (ref 0.45–4.5)
VIT B12 SERPL-MCNC: 590 PG/ML (ref 100–900)
WBC # BLD AUTO: 4.62 THOUSAND/UL (ref 4.31–10.16)

## 2022-10-14 PROCEDURE — 99239 HOSP IP/OBS DSCHRG MGMT >30: CPT | Performed by: INTERNAL MEDICINE

## 2022-10-14 PROCEDURE — 88185 FLOWCYTOMETRY/TC ADD-ON: CPT

## 2022-10-14 PROCEDURE — 88184 FLOWCYTOMETRY/ TC 1 MARKER: CPT | Performed by: INTERNAL MEDICINE

## 2022-10-14 PROCEDURE — 85025 COMPLETE CBC W/AUTO DIFF WBC: CPT | Performed by: INTERNAL MEDICINE

## 2022-10-14 PROCEDURE — 81263 IGH VARI REGIONAL MUTATION: CPT | Performed by: INTERNAL MEDICINE

## 2022-10-14 PROCEDURE — 07DR3ZX EXTRACTION OF ILIAC BONE MARROW, PERCUTANEOUS APPROACH, DIAGNOSTIC: ICD-10-PCS | Performed by: INTERNAL MEDICINE

## 2022-10-14 PROCEDURE — 99152 MOD SED SAME PHYS/QHP 5/>YRS: CPT

## 2022-10-14 PROCEDURE — 82746 ASSAY OF FOLIC ACID SERUM: CPT | Performed by: STUDENT IN AN ORGANIZED HEALTH CARE EDUCATION/TRAINING PROGRAM

## 2022-10-14 PROCEDURE — 38221 DX BONE MARROW BIOPSIES: CPT

## 2022-10-14 PROCEDURE — 82607 VITAMIN B-12: CPT | Performed by: STUDENT IN AN ORGANIZED HEALTH CARE EDUCATION/TRAINING PROGRAM

## 2022-10-14 PROCEDURE — 84443 ASSAY THYROID STIM HORMONE: CPT | Performed by: STUDENT IN AN ORGANIZED HEALTH CARE EDUCATION/TRAINING PROGRAM

## 2022-10-14 PROCEDURE — 88262 CHROMOSOME ANALYSIS 15-20: CPT | Performed by: INTERNAL MEDICINE

## 2022-10-14 PROCEDURE — 88237 TISSUE CULTURE BONE MARROW: CPT | Performed by: INTERNAL MEDICINE

## 2022-10-14 RX ORDER — FENTANYL CITRATE 50 UG/ML
INJECTION, SOLUTION INTRAMUSCULAR; INTRAVENOUS CODE/TRAUMA/SEDATION MEDICATION
Status: COMPLETED | OUTPATIENT
Start: 2022-10-14 | End: 2022-10-14

## 2022-10-14 RX ORDER — MIDAZOLAM HYDROCHLORIDE 2 MG/2ML
INJECTION, SOLUTION INTRAMUSCULAR; INTRAVENOUS CODE/TRAUMA/SEDATION MEDICATION
Status: COMPLETED | OUTPATIENT
Start: 2022-10-14 | End: 2022-10-14

## 2022-10-14 RX ADMIN — METOPROLOL TARTRATE 50 MG: 50 TABLET, FILM COATED ORAL at 08:40

## 2022-10-14 RX ADMIN — FENTANYL CITRATE 50 MCG: 50 INJECTION INTRAMUSCULAR; INTRAVENOUS at 10:10

## 2022-10-14 RX ADMIN — FINASTERIDE 5 MG: 5 TABLET, FILM COATED ORAL at 08:40

## 2022-10-14 RX ADMIN — FENTANYL CITRATE 50 MCG: 50 INJECTION INTRAMUSCULAR; INTRAVENOUS at 10:14

## 2022-10-14 RX ADMIN — MIDAZOLAM 1 MG: 1 INJECTION INTRAMUSCULAR; INTRAVENOUS at 10:10

## 2022-10-14 RX ADMIN — MIDAZOLAM 1 MG: 1 INJECTION INTRAMUSCULAR; INTRAVENOUS at 10:14

## 2022-10-14 RX ADMIN — ASPIRIN 81 MG CHEWABLE TABLET 81 MG: 81 TABLET CHEWABLE at 08:40

## 2022-10-14 NOTE — UTILIZATION REVIEW
Continued Stay Review    Date: 10/14                         Current Patient Class:  10/12 - 10/13    OBS  10/13 - 10/14    INSTEPHANIE ,  MS level of care     HPI:77 y o  male initially admitted on   10/12     with shortness of breath difficulty breathing  He was found to have worsening anemia  His past medical history is notable for polycythemia vera for which he is on hydroxyurea  Initially the patient was evaluated by the gastrology service, they felt the patient did not have a GI bleed     Hematology evaluation was obtained given his previous history of polycythemia vera  They did notice he has significant macrocytosis with worsening blood counts since July  Given his new blood dyscrasia bone marrow biopsy was obtained  He was discontinued on his side tracks urea given concern for myelosuppression    Patient did subsequently remains stable, he did not require blood transfusion    At the time of discharge patient is tolerating oral diet without acute complaint was medically optimized for discharge home  10/14  Findings: Fluoroscopic guided left iliac bone marrow aspiration and core biopsy  Vital Signs:   10/14/22 0837 97 5 °F (36 4 °C) 67 18 123/64 -- 93 % None (Room air) Lying   10/13/22 22:38:06 97 7 °F (36 5 °C) 63 -- 111/65 80 96 % -- --         Pertinent Labs/Diagnostic Results:   10/12  CTAP:   Degraded by respiratory motion  1   No acute abnormality in the abdomen or pelvis  2   Slowly growing hypodensity in the liver, most likely a cyst though confirmation with nonemergent ultrasound is recommended  3   Otherwise stable benign findings as above           Results from last 7 days   Lab Units 10/14/22  0753 10/13/22  0527 10/12/22  1720   WBC Thousand/uL 4 62 5 32 6 26   HEMOGLOBIN g/dL 8 4* 8 4* 8 7*   HEMATOCRIT % 23 5* 23 7* 24 8*   PLATELETS Thousands/uL 76* 76* 85*   NEUTROS ABS Thousands/µL 2 53  --  4 62     Results from last 7 days   Lab Units 10/12/22  1720   RETIC CT ABS  89,200 RETIC CT PCT % 4 72*     Results from last 7 days   Lab Units 10/13/22  0527 10/12/22  1720   SODIUM mmol/L 141 136   POTASSIUM mmol/L 3 5 3 6   CHLORIDE mmol/L 108 101   CO2 mmol/L 26 24   ANION GAP mmol/L 7 11   BUN mg/dL 14 21   CREATININE mg/dL 0 94 1 41*   EGFR ml/min/1 73sq m 78 48   CALCIUM mg/dL 8 5 9 3   MAGNESIUM mg/dL 2 1 2 2   PHOSPHORUS mg/dL 3 4  --      Results from last 7 days   Lab Units 10/13/22  0527 10/12/22  1720   AST U/L 24 32   ALT U/L 118* 162*   ALK PHOS U/L 140* 173*   TOTAL PROTEIN g/dL 6 5 7 7   ALBUMIN g/dL 3 0* 3 6   TOTAL BILIRUBIN mg/dL 2 44* 2 23*   BILIRUBIN DIRECT mg/dL 0 72*  --          Results from last 7 days   Lab Units 10/13/22  0527 10/12/22  1720   GLUCOSE RANDOM mg/dL 97 106       Results from last 7 days   Lab Units 10/12/22  2133 10/12/22  1924 10/12/22  1720   HS TNI 0HR ng/L  --   --  7   HS TNI 2HR ng/L  --  7  --    HSTNI D2 ng/L  --  0  --    HS TNI 4HR ng/L 8  --   --    HSTNI D4 ng/L 1  --   --          Results from last 7 days   Lab Units 10/12/22  1720   PROTIME seconds 14 6*   INR  1 13   PTT seconds 29     Results from last 7 days   Lab Units 10/14/22  0438   TSH 3RD GENERATON uIU/mL 1 573       Results from last 7 days   Lab Units 10/12/22  1720   NT-PRO BNP pg/mL 214     Results from last 7 days   Lab Units 10/12/22  1720   FERRITIN ng/mL 598*     Results from last 7 days   Lab Units 10/13/22  0939   HEP B S AG  Non-reactive   HEP C AB  Non-reactive   HEP B C IGM  Non-reactive   HEP B C TOTAL AB  Non-reactive     Results from last 7 days   Lab Units 10/12/22  1720   LIPASE u/L 199                 Results from last 7 days   Lab Units 10/13/22  0035   CLARITY UA  Clear   COLOR UA  Yellow   SPEC GRAV UA  <=1 005   PH UA  6 5   GLUCOSE UA mg/dl Negative   KETONES UA mg/dl Negative   BLOOD UA  Negative   PROTEIN UA mg/dl Negative   NITRITE UA  Positive*   BILIRUBIN UA  Negative   UROBILINOGEN UA E U /dl 1 0   LEUKOCYTES UA  Moderate*   WBC UA /hpf 10-20*   RBC UA /hpf 4-10*   BACTERIA UA /hpf Innumerable*   EPITHELIAL CELLS WET PREP /hpf None Seen                                     Results from last 7 days   Lab Units 10/13/22  0624   TOTAL COUNTED  100             Medications:   Scheduled Medications:  amLODIPine, 10 mg, Oral, QPM  aspirin, 81 mg, Oral, Daily  atorvastatin, 80 mg, Oral, QPM  finasteride, 5 mg, Oral, Daily  heparin (porcine), 5,000 Units, Subcutaneous, Q8H TALIB  metoprolol tartrate, 50 mg, Oral, BID      Continuous IV Infusions:     PRN Meds:  acetaminophen, 650 mg, Oral, Q6H PRN  nitroglycerin, 0 4 mg, Sublingual, Q5 Min PRN  ondansetron, 4 mg, Intravenous, Q6H PRN        Discharge Plan:   d     Network Utilization Review Department  ATTENTION: Please call with any questions or concerns to [phone] and carefully listen to the prompts so that you are directed to the right person  All voicemails are confidential   Kimberli Noe all requests for admission clinical reviews, approved or denied determinations and any other requests to dedicated fax number below belonging to the campus where the patient is receiving treatment   List of dedicated fax numbers for the Facilities:  [address] DENIALS (Administrative/Medical Necessity) [phone]   [address] (Maternity/NICU/Pediatrics) [phone]   3 Regina Lucero [phone]   [facility] [address] 77 [phone]   [address] [place] [address] Leyla Cadena 28 [phone]   [address] [place] Carol Christiansen [place] [address] [phone]

## 2022-10-14 NOTE — BRIEF OP NOTE (RAD/CATH)
INTERVENTIONAL RADIOLOGY PROCEDURE NOTE    Date: 10/14/2022    Procedure: IR BIOPSY BONE MARROW     Preoperative diagnosis:   1  Anemia    2  ROSEN (dyspnea on exertion)    3  Symptomatic anemia    4  Thrombocytopenia ([address] )    5  JAK2 gene mutation         Postoperative diagnosis: Same  Surgeon: Marjorie Lozada     Assistant: None  No qualified resident was available  Blood loss: Minimal    Specimens: right iliac bone marrow    Findings: Fluoroscopic guided left iliac bone marrow aspiration and core biopsy  Complications: None immediate      Anesthesia: conscious sedation and local

## 2022-10-14 NOTE — ASSESSMENT & PLAN NOTE
No evidence of GI bleed  Does have evidence of significant macrocytosis  Previous hemoglobin reviewed which is in the 10 range  Discontinue hydroxyurea- given concern for myelosuppression  Bone marrow biopsy performed  Outpatient follow-up with his typical Hematology  Recent Labs     10/12/22  1720 10/13/22  0527 10/14/22  0753   HGB 8 7* 8 4* 8 4*

## 2022-10-14 NOTE — DISCHARGE INSTR - AVS FIRST PAGE
Dear Carol Quintana,     It was our pleasure to care for you here at [facility]  It is our hope that we were always able to exceed the expected standards for your care during your stay  You were hospitalized due to anemia  You were cared for on the 5th floor by Magdy Gallardo with the [facility] who covers for your primary care physician (PCP), Jane Vivas DO, while you were hospitalized  If you have any questions or concerns related to this hospitalization, you may contact us at [phone]  For follow up as well as any medication refills, we recommend that you follow up with your primary care physician  A registered nurse will reach out to you by phone within a few days after your discharge to answer any additional questions that you may have after going home  However, at this time we provide for you here, the most important instructions / recommendations at discharge:     Notable Medication Adjustments -   Discontinue hydroxyurea until evaluated by your Hematology  Testing Required after Discharge -   Outpatient follow-up with PCP in 1 week  Outpatient follow-up with your hematologist within 1-2 for bone marrow biopsy result  Important follow up information -   None  Other Instructions -   None  Please review this entire after visit summary as additional general instructions including medication list, appointments, activity, diet, any pertinent wound care, and other additional recommendations from your care team that may be provided for you        Sincerely,     Magdy Gallardo and Nurse Jossie Galindo

## 2022-10-14 NOTE — UTILIZATION REVIEW
NOTIFICATION OF INPATIENT ADMISSION   AUTHORIZATION REQUEST   SERVICING FACILITY:   [address] [place] [address] E [facility]  Tax ID: [id number]  NPI: [id number] ATTENDING PROVIDER:  Attending Name and NPI#: Ranjan Keene [[id number]]  Address: [address] [place] [address] E [facility]  Phone: [phone]     ADMISSION INFORMATION:  Place of Service: [address] [place] [address] Code: 21  Inpatient Admission Date/Time: 10/13/22  7:14 PM  Discharge Date/Time: 10/14/2022  3:46 PM  Admitting Diagnosis Code/Description:  ROSEN (dyspnea on exertion) [R06 09]  Anemia [D64 9]     UTILIZATION REVIEW CONTACT:  Abimael Landaverde Utilization   Network Utilization Review Department  Phone: [phone]  Fax: [phone]  Email: [email or web address]  Contact for approvals/pending authorizations, clinical reviews, and discharge  PHYSICIAN ADVISORY SERVICES:  Medical Necessity Denial & [facility] Review  Phone: [phone]  Fax: [phone]  Email: [email or web address]

## 2022-10-14 NOTE — DISCHARGE SUMMARY
[address] [facility]  Discharge- Ghanshyam Jo [birth year], 69 y o  male MRN: [id number]  Unit/Bed#: E5 -01 Encounter: [id number]  Primary Care Provider: Yemi Doll DO   Date and time admitted to hospital: 10/12/2022  4:49 PM    Admitting Provider:  Mina Veloz DO  Discharge Provider:  Mina Veloz  Admission Date: 10/12/2022       Discharge Date: 10/14/22   LOS: 1  Primary Care Physician at Discharge: Yemi Doll, [id number]:  Ghanshyam Jo is a 69 y o  male who presented with shortness of breath difficulty breathing  He was found to have worsening anemia  His past medical history is notable for polycythemia vera for which he is on hydroxyurea  Initially the patient was evaluated by the gastrology service, they felt the patient did not have a GI bleed  Hematology evaluation was obtained given his previous history of polycythemia vera  They did notice he has significant macrocytosis with worsening blood counts since July  Given his new blood dyscrasia bone marrow biopsy was obtained  He was discontinued on his side tracks urea given concern for myelosuppression  Patient did subsequently remains stable, he did not require blood transfusion  He will be followed up as an outpatient with his primary care provider, and can follow-up with bone marrow biopsy with his assigned hematologist     At the time of discharge patient is tolerating oral diet without acute complaint was medically optimized for discharge home  All questions were answered to the patient's satisfaction and he was in agreement with the discharge plan    The patient, initially admitted to the hospital as inpatient, was discharged earlier than expected given the following: Rapid unexpected improvement    DISCHARGE DIAGNOSES  * Polycythemia vera ([facility] [address] )  Assessment & Plan  No evidence of GI bleed  Does have evidence of significant macrocytosis  Previous hemoglobin reviewed which is in the 10 range  Discontinue hydroxyurea- given concern for myelosuppression  Bone marrow biopsy performed  Outpatient follow-up with his typical Hematology  Recent Labs     10/12/22  1720 10/13/22  0527 10/14/22  0753   HGB 8 7* 8 4* 8 4*         Stage 3b chronic kidney disease [facility])  Assessment & Plan  Lab Results   Component Value Date    EGFR 78 10/13/2022    EGFR 48 10/12/2022    EGFR 56 10/04/2022    CREATININE 0 94 10/13/2022    CREATININE 1 41 (H) 10/12/2022    CREATININE 1 24 10/04/2022     Baseline creatinine approximately 1 2; patient appears near baseline  Avoid nephrotoxic medications and relative hypotension    Tobacco user  Assessment & Plan  Nicotine replacement declined    Mixed hyperlipidemia  Assessment & Plan  Continue Lipitor    CAD (coronary artery disease)  Assessment & Plan  Patient status post PCI in 2008  No chest pain, resume aspirin    Benign essential hypertension  Assessment & Plan  Continue metoprolol, amlodipine, as previously prescribed      CONSULTING PROVIDERS   IP CONSULT TO GASTROENTEROLOGY  IP CONSULT TO HEMATOLOGY  INPATIENT CONSULT TO IR    PROCEDURES PERFORMED  * No surgery found *    RADIOLOGY RESULTS  XR chest 1 view portable      Impression: No acute cardiopulmonary disease  Workstation performed: [id number]     XR chest 1 view portable    Result Date: 10/4/2022    Impression: No acute cardiopulmonary disease  CT abdomen pelvis with contrast    Result Date: 10/12/2022    Impression: Degraded by respiratory motion  1   No acute abnormality in the abdomen or pelvis  2   Slowly growing hypodensity in the liver, most likely a cyst though confirmation with nonemergent ultrasound is recommended  3   Otherwise stable benign findings as above  IR biopsy bone marrow    Result Date: 10/14/2022    Impression: Impression: Successful percutaneous diagnostic bone marrow aspiration and bone core biopsy  A full pathology report will follow         LABS  Results from last 7 days Lab Units 10/14/22  0753 10/13/22  0527 10/12/22  1720   WBC Thousand/uL 4 62 5 32 6 26   HEMOGLOBIN g/dL 8 4* 8 4* 8 7*   HEMATOCRIT % 23 5* 23 7* 24 8*   MCV fL 131* 130* 131*   PLATELETS Thousands/uL 76* 76* 85*   INR   --   --  1 13     Results from last 7 days   Lab Units 10/13/22  0527 10/12/22  1720   SODIUM mmol/L 141 136   POTASSIUM mmol/L 3 5 3 6   CHLORIDE mmol/L 108 101   CO2 mmol/L 26 24   BUN mg/dL 14 21   CREATININE mg/dL 0 94 1 41*   CALCIUM mg/dL 8 5 9 3   ALBUMIN g/dL 3 0* 3 6   TOTAL BILIRUBIN mg/dL 2 44* 2 23*   ALK PHOS U/L 140* 173*   ALT U/L 118* 162*   AST U/L 24 32   EGFR ml/min/1 73sq m 78 48   GLUCOSE RANDOM mg/dL 97 106     Results from last 7 days   Lab Units 10/12/22  2133 10/12/22  1924 10/12/22  1720   HS TNI 0HR ng/L  --   --  7   HS TNI 2HR ng/L  --  7  --    HS TNI 4HR ng/L 8  --   --      Results from last 7 days   Lab Units 10/12/22  1720   NT-PRO BNP pg/mL 214                  Results from last 7 days   Lab Units 10/14/22  0438   TSH 3RD GENERATON uIU/mL 1 573               Cultures:   Results from last 7 days   Lab Units 10/13/22  0035   COLOR UA  Yellow   CLARITY UA  Clear   SPEC GRAV UA  <=1 005   PH UA  6 5   LEUKOCYTES UA  Moderate*   NITRITE UA  Positive*   GLUCOSE UA mg/dl Negative   KETONES UA mg/dl Negative   BILIRUBIN UA  Negative   BLOOD UA  Negative      Results from last 7 days   Lab Units 10/13/22  0035   RBC UA /hpf 4-10*   WBC UA /hpf 10-20*   EPITHELIAL CELLS WET PREP /hpf None Seen   BACTERIA UA /hpf Innumerable*   OTHER OBSERVATIONS  WBCs Clumped            PHYSICAL EXAM:  Vitals:   Blood Pressure: 110/63 (10/14/22 1016)  Pulse: 60 (10/14/22 1021)  Temperature: 97 5 °F (36 4 °C) (10/14/22 0837)  Temp Source: Oral (10/14/22 0837)  Respirations: 22 (10/14/22 1020)  Height: 5' 8" (172 7 cm) (10/12/22 2100)  Weight - Scale: 73 5 kg (162 lb 0 6 oz) (10/12/22 2100)  SpO2: 98 % (10/14/22 1021)      General: well appearing, no acute distress  HEENT: atraumatic, PERRLA, moist mucosa, normal pharynx, normal tonsils and adenoids, normal tongue, no fluid in sinuses  Neck: Trachea midline, no carotid bruit, no masses  Respiratory: normal chest wall expansion, CTA B, no r/r/w, no rubs  Cardiovascular: RRR, no m/r/g, Normal S1 and S2  Abdomen: Soft, non-tender, non-distended, normal bowel sounds in all quadrants, no hepatosplenomegaly, no tympany  Rectal: deferred  Musculoskeletal: normal ROM in upper and lower extremities  Integumentary: warm, dry, and pink, with no rash, purpura, or petechia  Heme/Lymph: no lymphadenopathy, no bruises  Neurological: Cranial Nerves II-XII grossly intact, no tics, normal sensation to pressure and light touch  Psychiatric: cooperative with normal mood, affect, and cognition      Discharge Disposition: Home/Self Care      Test Results Pending at Discharge:   Pending Labs     Order Current Status    Chromosome analysis, leukemia/lymphoma In process    Leukemia/Lymphoma flow cytometry In process    Protein electrophoresis, serum In process    Protein electrophoresis, urine In process    Tissue Exam In process              Medications   · Summary of Medication Adjustments made as a result of this hospitalization:  Discontinue hydroxyurea  · Medication Dosing Tapers - Please refer to Discharge Medication List for details on any medication dosing tapers (if applicable to patient)  · Discharge Medication List: See after visit summary for reconciled discharge medications  Diet restrictions:         Diet Orders   (From admission, onward)             Start     Ordered    10/14/22 1024  Diet Cardiovascular; Lo Cholesterol; Sodium 2 GM  Diet effective now        References:    Nutrtion Support Algorithm Enteral vs  Parenteral   Question Answer Comment   Diet Type Cardiovascular    Cardiac Lo Cholesterol    Other Restriction(s): Sodium 2 GM    RD to adjust diet per protocol?  Yes        10/14/22 1026              Activity restrictions: No strenuous activity  Discharge Condition: good    Outpatient Follow-Up and Discharge Instructions  See after visit summary section titled Discharge Instructions for information provided to patient and family  Code Status: Level 1 - Full Code  Discharge Statement   I spent 35 minutes discharging the patient  This time was spent on the day of discharge  Greater than 50% of total time was spent with the patient and / or family counseling and / or coordination of care  ** Please Note: This note was completed in part utilizing [employer] Direct Software  Grammatical errors, random word insertions, spelling mistakes, and incomplete sentences may be an occasional consequence of this system secondary to software limitations, ambient noise, and hardware issues  If you have any questions or concerns about the content, text, or information contained within the body of this dictation, please contact the provider for clarification  **

## 2022-10-14 NOTE — CASE MANAGEMENT
Case Management Discharge Planning Note    Patient name Sha Abebe  Location East 5 /E5 MS 0-* MRN [id number]  : 1945 Date 10/14/2022       Current Admission Date: 10/12/2022  Current Admission Diagnosis:Polycythemia vera [facility])   Patient Active Problem List    Diagnosis Date Noted   • Periumbilical abdominal pain 10/12/2022   • Thrombocytopenia ([facility] [address] ) 2022   • History of cardiac pacemaker 2022   • Heart block 2022   • Stage 3b chronic kidney disease ([facility] [address] ) 12/10/2021   • JAK2 gene mutation 2021   • Encounter for antineoplastic chemotherapy 2021   • Pre-operative clearance 2021   • COVID-19 virus infection 2021   • Cigarette smoker 2019   • Cervical spondylosis without myelopathy    • Chronic left shoulder pain 2018   • Gastritis 2018   • Polycythemia vera ([facility] [address] ) 10/20/2017   • Prediabetes 2017   • Left bundle branch block 2014   • Mixed hyperlipidemia 2013   • Status post percutaneous transluminal coronary angioplasty 10/03/2013   • Tobacco user 10/02/2013   • Cervical radiculopathy 2013   • Benign essential hypertension 2012   • CAD (coronary artery disease) 2012      LOS (days): 1  Geometric Mean LOS (GMLOS) (days): 2 70  Days to GMLOS:2     OBJECTIVE:  Risk of Unplanned Readmission Score: 15 43         Current admission status: Inpatient   Preferred Pharmacy:   [id number] [place], [place] - [address]  [address]  [address]  Phone: [phone] Fax: [address] [facility] [address] , [address] [facility] [address]  Phone: [phone] Fax: [phone]    Primary Care Provider: Adriano Crowder DO    Primary Insurance: [address]  Secondary Insurance:     DISCHARGE DETAILS:    Additional Comments: Patient reviewed during care coordination rounds with Dr Yolanda Enrique who reports pt will likely discharge home after bone marrow biopsy today  No CM concerns or needs expressed or identified at this time, CM department to remain available

## 2022-10-14 NOTE — INCIDENTAL FINDINGS
The following findings require follow up:  Radiographic finding   Finding: CT abdomen pelvis with contrast: Degraded by respiratory motion  , 1  No acute abnormality in the abdomen or pelvis , 2   Slowly growing hypodensity in the liver, most likely a cyst though confirmation with nonemergent ultrasound is recommended  , 3   Otherwise stable benign findings as above ,     Follow up required: Yes   Follow up should be done within 1-2 week(s)    Please notify the following clinician to assist with the follow up:   PCP

## 2022-10-14 NOTE — DISCHARGE INSTRUCTIONS
Bone Marrow Biopsy     WHAT YOU NEED TO KNOW:   A bone marrow biopsy is a procedure to remove a small amount of bone marrow from your bone  Bone marrow is the soft tissue inside your bone that helps to make blood cells  The sample is tested for disease or infection  DISCHARGE INSTRUCTIONS:     1  Limit your activities day of biopsy as directed by your doctor  2  Use medication as ordered  3  Return to your normal diet  Small sips of flat soda will help with nausea  4  Remove band-aid or dressing 24 hours after procedure  Contact Interventional Radiology at [phone] Judy PATIENTS: Contact Interventional Radiology at [phone]) Sandy Schmidt PATIENTS: Contact Interventional Radiology at [phone]) if:    1  Difficulty breathing, nausea or vomiting  2  Chills or fever above 101 F     3  Pain at biopsy site not relieved by medication  4  Develop any redness, swelling, heat, unusual drainage, heavy bruising or bleeding from biopsy site  Moderate Sedation   WHAT YOU NEED TO KNOW:   Moderate sedation, or conscious sedation, is medicine used during procedures to help you feel relaxed and calm  You will be awake and able to follow directions without anxiety or pain  You will remember little to none of the procedure  You may feel tired, weak, or unsteady on your feet after you get sedation  You may also have trouble concentrating or short-term memory loss  These symptoms should go away in 24 hours or less  DISCHARGE INSTRUCTIONS:   Call 911 or have someone else call for any of the following: You have sudden trouble breathing  You cannot be woken  Seek care immediately if:   You have a severe headache or dizziness  Your heart is beating faster than usual   Contact your healthcare provider if:   You have a fever  You have nausea or are vomiting for more than 8 hours after the procedure  Your skin is itchy, swollen, or you have a rash       You have questions or concerns about your condition or care  Self-care:   Have someone stay with you for 24 hours  This person can drive you to errands and help you do things around the house  This person can also watch for problems  Rest and do quiet activities for 24 hours  Do not exercise, ride a bike, or play sports  Stand up slowly to prevent dizziness and falls  Take short walks around the house with another person  Slowly return to your usual activities the next day  Do not drive or use dangerous machines or tools for 24 hours  You may injure yourself or others  Examples include a lawnmower, saw, or drill  Do not return to work for 24 hours if you use dangerous machines or tools for work  Do not make important decisions for 24 hours  For example, do not sign important papers or invest money  Drink liquids as directed  Liquids help flush the sedation medicine out of your body  Ask how much liquid to drink each day and which liquids are best for you  Eat small, frequent meals to prevent nausea and vomiting  Start with clear liquids such as juice or broth  If you do not vomit after clear liquids, you can eat your usual foods  Do not drink alcohol or take medicines that make you drowsy  This includes medicines that help you sleep and anxiety medicines  Ask your healthcare provider if it is safe for you to take pain medicine  Follow up with your healthcare provider as directed: Write down your questions so you remember to ask them during your visits  © 2017 [address] [employer] Information is for End User's use only and may not be sold, redistributed or otherwise used for commercial purposes  All illustrations and images included in CareNotes® are the copyrighted property of A D A M [employer] Inc  or Hayden Scott  The above information is an  only  It is not intended as medical advice for individual conditions or treatments   Talk to your doctor, nurse or pharmacist before following any medical regimen to see if it is safe and effective for you

## 2022-10-14 NOTE — NURSING NOTE
Discharge instructions reviewed with pt  He is aware of follow-up appointments  Pt has no question or concerns, spouse is providing ride home

## 2022-10-16 LAB
ALBUMIN UR ELPH-MCNC: 100 %
ALPHA1 GLOB MFR UR ELPH: 0 %
ALPHA2 GLOB MFR UR ELPH: 0 %
B-GLOBULIN MFR UR ELPH: 0 %
GAMMA GLOB MFR UR ELPH: 0 %
PROT PATTERN UR ELPH-IMP: NORMAL
PROT UR-MCNC: 16 MG/DL

## 2022-10-16 PROCEDURE — 84166 PROTEIN E-PHORESIS/URINE/CSF: CPT | Performed by: PATHOLOGY

## 2022-10-17 ENCOUNTER — TELEPHONE (OUTPATIENT)
Dept: HEMATOLOGY ONCOLOGY | Facility: CLINIC | Age: 77
End: 2022-10-17

## 2022-10-17 ENCOUNTER — TRANSITIONAL CARE MANAGEMENT (OUTPATIENT)
Dept: FAMILY MEDICINE CLINIC | Facility: CLINIC | Age: 77
End: 2022-10-17

## 2022-10-17 ENCOUNTER — OFFICE VISIT (OUTPATIENT)
Dept: FAMILY MEDICINE CLINIC | Facility: CLINIC | Age: 77
End: 2022-10-17
Payer: COMMERCIAL

## 2022-10-17 VITALS
DIASTOLIC BLOOD PRESSURE: 60 MMHG | HEIGHT: 68 IN | RESPIRATION RATE: 16 BRPM | HEART RATE: 62 BPM | WEIGHT: 168 LBS | OXYGEN SATURATION: 98 % | SYSTOLIC BLOOD PRESSURE: 110 MMHG | BODY MASS INDEX: 25.46 KG/M2 | TEMPERATURE: 98.1 F

## 2022-10-17 DIAGNOSIS — J44.9 CHRONIC OBSTRUCTIVE PULMONARY DISEASE, UNSPECIFIED COPD TYPE (HCC): ICD-10-CM

## 2022-10-17 DIAGNOSIS — I25.10 CORONARY ARTERY DISEASE WITHOUT ANGINA PECTORIS, UNSPECIFIED VESSEL OR LESION TYPE, UNSPECIFIED WHETHER NATIVE OR TRANSPLANTED HEART: ICD-10-CM

## 2022-10-17 DIAGNOSIS — F17.210 CIGARETTE SMOKER: ICD-10-CM

## 2022-10-17 DIAGNOSIS — E78.2 MIXED HYPERLIPIDEMIA: ICD-10-CM

## 2022-10-17 DIAGNOSIS — R17 ELEVATED BILIRUBIN: ICD-10-CM

## 2022-10-17 DIAGNOSIS — R73.03 PREDIABETES: ICD-10-CM

## 2022-10-17 DIAGNOSIS — D64.9 ANEMIA, UNSPECIFIED TYPE: Primary | ICD-10-CM

## 2022-10-17 DIAGNOSIS — I10 BENIGN ESSENTIAL HYPERTENSION: ICD-10-CM

## 2022-10-17 DIAGNOSIS — N18.32 STAGE 3B CHRONIC KIDNEY DISEASE (HCC): ICD-10-CM

## 2022-10-17 DIAGNOSIS — D45 POLYCYTHEMIA VERA (HCC): ICD-10-CM

## 2022-10-17 PROBLEM — Z01.818 PRE-OPERATIVE CLEARANCE: Status: RESOLVED | Noted: 2021-09-21 | Resolved: 2022-10-17

## 2022-10-17 LAB — SCAN RESULT: NORMAL

## 2022-10-17 PROCEDURE — 99495 TRANSJ CARE MGMT MOD F2F 14D: CPT | Performed by: FAMILY MEDICINE

## 2022-10-17 RX ORDER — ALBUTEROL SULFATE 90 UG/1
2 AEROSOL, METERED RESPIRATORY (INHALATION) EVERY 4 HOURS PRN
Qty: 18 G | Refills: 1 | Status: SHIPPED | OUTPATIENT
Start: 2022-10-17

## 2022-10-17 NOTE — ASSESSMENT & PLAN NOTE
Patient presents for transition of care management visit  He was admitted to [facility] from October 12th to October 14th due to abdominal pain and shortness of breath  He was found to have low hemoglobin  And mildly elevated LFTs  Abnormalities were thought to be due to myelosuppression caused by hydroxyurea  Patient's hematologist recommended discontinuing this medication  He also had a bone marrow biopsy while hospitalized  He was discharged in stable condition on October 14th  He has follow-up scheduled with hematologist on November 4th  Will recheck CBC/CMP in 1 week

## 2022-10-17 NOTE — ASSESSMENT & PLAN NOTE
Hydroxyurea was discontinued due to thought that med was causing myelosuppression  Bone marrow biopsy was also performed during recent hospitalization    Patient will be seeing hematologist for recheck on November 4th

## 2022-10-17 NOTE — TELEPHONE ENCOUNTER
Spoke with Lyndon Cason, informed him that his BMBx results are still pending and can take up to 10 business days  Therefore, we will keep the 11/4 appt  He voiced understanding

## 2022-10-17 NOTE — ASSESSMENT & PLAN NOTE
Longstanding smoker  Patient currently smoking 1/2 pack per day  Counseled again today  Patient still with ongoing shortness of breath  Will give trial of albuterol HFA (patient declines steroid inhaler at this time)    Call with further problems

## 2022-10-17 NOTE — PROGRESS NOTES
Assessment & Plan     1  Anemia, unspecified type  Assessment & Plan:  Patient presents for transition of care management visit  He was admitted to [facility] from October 12th to October 14th due to abdominal pain and shortness of breath  He was found to have low hemoglobin  And mildly elevated LFTs  Abnormalities were thought to be due to myelosuppression caused by hydroxyurea  Patient's hematologist recommended discontinuing this medication  He also had a bone marrow biopsy while hospitalized  He was discharged in stable condition on October 14th  He has follow-up scheduled with hematologist on November 4th  Will recheck CBC/CMP in 1 week  Orders:  -     CBC and differential; Future; Expected date: 10/17/2022    2  Polycythemia vera ([facility] [address] )  Assessment & Plan:  Hydroxyurea was discontinued due to thought that med was causing myelosuppression  Bone marrow biopsy was also performed during recent hospitalization  Patient will be seeing hematologist for recheck on November 4th      3  Prediabetes  Assessment & Plan:  Continue reduced carb diet  Will continue to monitor      4  Benign essential hypertension  Assessment & Plan:  Blood pressure controlled on amlodipine 10 and metoprolol 50 b i d       5  Coronary artery disease without angina pectoris, unspecified vessel or lesion type, unspecified whether native or transplanted heart  Assessment & Plan:  Status post PCI  Continue regular follow-up with cardiologist as directed      6  Mixed hyperlipidemia  Assessment & Plan:  Doing well on atorvastatin 80 mg daily  Will continue to monitor      7  Stage 3b chronic kidney disease [facility])  Assessment & Plan:  Lab Results   Component Value Date    EGFR 78 10/13/2022    EGFR 48 10/12/2022    EGFR 56 10/04/2022    CREATININE 0 94 10/13/2022    CREATININE 1 41 (H) 10/12/2022    CREATININE 1 24 10/04/2022   Renal function has been stable  Will continue to monitor      8   Elevated bilirubin  - Comprehensive metabolic panel; Future; Expected date: 10/17/2022    9  Cigarette smoker  Assessment & Plan:  Patient still smoking approximately 1/2 pack per day  Counseled again      10  Chronic obstructive pulmonary disease, unspecified COPD type ([facility] [address] )  Assessment & Plan:  Longstanding smoker  Patient currently smoking 1/2 pack per day  Counseled again today  Patient still with ongoing shortness of breath  Will give trial of albuterol HFA (patient declines steroid inhaler at this time)  Call with further problems    Orders:  -     albuterol (Ventolin HFA) 90 mcg/act inhaler; Inhale 2 puffs every 4 (four) hours as needed for wheezing        Recheck CBC/CMP in 1 week  Will call with results  Patient to see hematologist on November 4th    THREE MONTHS, FASTING BLOOD WORK PRIOR (orders previously placed)       Subjective     Transitional Care Management Review:   Josseline Maher is a 69 y o  male here for TCM follow up  During the TCM phone call patient stated:  TCM Call     Date and time call was made  10/17/2022 10:04 AM    Hospital care reviewed  Records reviewed    Patient was hospitialized at  [address] [address]    Date of Admission  10/12/22    Date of discharge  10/14/22    Diagnosis  Polycythemia vera    Disposition  Home    Were the patients medications reviewed and updated  Yes    Current Symptoms  None    Fatigue severity  Mild      TCM Call     Post hospital issues  None    Should patient be enrolled in anticoag monitoring? No    Scheduled for follow up?   Yes    Patients specialists  Urologist    Urologist name  Dr Candice Lowery    Did you obtain your prescribed medications  Yes    Do you need help managing your prescriptions or medications  No    Is transportation to your appointment needed  No    I have advised the patient to call PCP with any new or worsening symptoms  Lopez Ch MA    Living Arrangements  Spouse or Significiant other    Are you recieving any outpatient services  No    Are you recieving home care services  No    Are you using any community resources  No    Current waiver services  No    Have you fallen in the last 12 months  No    Interperter language line needed  No        Patient presents for transition of care management visit  He was admitted to [facility] from October 12th to October 14th due to abdominal pain and shortness of breath  He was found to have low hemoglobin  And mildly elevated LFTs  Abnormalities were thought to be due to myelosuppression caused by hydroxyurea  Patient's hematologist recommended discontinuing this medication  He also had a bone marrow biopsy while hospitalized  He was discharged in stable condition on October 14th  He has follow-up scheduled with hematologist on November 4th  Review of Systems   Respiratory: Negative  Cardiovascular: Negative  Gastrointestinal: Negative  Genitourinary: Negative  Objective     /60 (BP Location: Left arm, Patient Position: Sitting, Cuff Size: Adult)   Pulse 62   Temp 98 1 °F (36 7 °C) (Temporal)   Resp 16   Ht 5' 7 72" (1 72 m)   Wt 76 2 kg (168 lb)   SpO2 98%   BMI 25 76 kg/m²      Physical Exam  Constitutional:       Appearance: Normal appearance  Eyes:      Pupils: Pupils are equal, round, and reactive to light  Neck:      Vascular: No carotid bruit  Cardiovascular:      Rate and Rhythm: Normal rate and regular rhythm  Pulses: Normal pulses  Heart sounds: Normal heart sounds  No murmur heard  No gallop  Pulmonary:      Effort: Pulmonary effort is normal       Breath sounds: Normal breath sounds  Neurological:      Mental Status: He is alert     Psychiatric:         Mood and Affect: Mood normal          Behavior: Behavior normal        Lore Vasquez DO

## 2022-10-17 NOTE — TELEPHONE ENCOUNTER
CALL RETURN FORM   Reason for patient call? Patient would like to know if Dr Cely Stanley would like to see him sooner than his 11/04 follow up due to his recent hospital admissions    Patient's primary oncologist? Dr Cely Stanley   Name of person the patient was calling for? Dr Cely Stanley   Any additional information to add, if applicable? Please call back [phone]   Informed patient that the message will be forwarded to the team and someone will get back to them as soon as possible    Did you relay this information to the patient?  Yes

## 2022-10-17 NOTE — ASSESSMENT & PLAN NOTE
Lab Results   Component Value Date    EGFR 78 10/13/2022    EGFR 48 10/12/2022    EGFR 56 10/04/2022    CREATININE 0 94 10/13/2022    CREATININE 1 41 (H) 10/12/2022    CREATININE 1 24 10/04/2022   Renal function has been stable    Will continue to monitor

## 2022-10-20 LAB — SCAN RESULT: NORMAL

## 2022-10-24 ENCOUNTER — APPOINTMENT (OUTPATIENT)
Dept: LAB | Facility: MEDICAL CENTER | Age: 77
End: 2022-10-24
Payer: COMMERCIAL

## 2022-10-24 DIAGNOSIS — D64.9 ANEMIA, UNSPECIFIED TYPE: ICD-10-CM

## 2022-10-24 DIAGNOSIS — R17 ELEVATED BILIRUBIN: ICD-10-CM

## 2022-10-24 DIAGNOSIS — D45 POLYCYTHEMIA VERA (HCC): ICD-10-CM

## 2022-10-24 LAB
ALBUMIN SERPL BCP-MCNC: 3.6 G/DL (ref 3.5–5)
ALP SERPL-CCNC: 143 U/L (ref 46–116)
ALT SERPL W P-5'-P-CCNC: 41 U/L (ref 12–78)
ANION GAP SERPL CALCULATED.3IONS-SCNC: 5 MMOL/L (ref 4–13)
AST SERPL W P-5'-P-CCNC: 16 U/L (ref 5–45)
BASOPHILS # BLD AUTO: 0.02 THOUSANDS/ÂΜL (ref 0–0.1)
BASOPHILS NFR BLD AUTO: 0 % (ref 0–1)
BILIRUB SERPL-MCNC: 1.41 MG/DL (ref 0.2–1)
BUN SERPL-MCNC: 18 MG/DL (ref 5–25)
CALCIUM SERPL-MCNC: 9 MG/DL (ref 8.3–10.1)
CHLORIDE SERPL-SCNC: 111 MMOL/L (ref 96–108)
CO2 SERPL-SCNC: 25 MMOL/L (ref 21–32)
CREAT SERPL-MCNC: 1.24 MG/DL (ref 0.6–1.3)
EOSINOPHIL # BLD AUTO: 0.06 THOUSAND/ÂΜL (ref 0–0.61)
EOSINOPHIL NFR BLD AUTO: 1 % (ref 0–6)
ERYTHROCYTE [DISTWIDTH] IN BLOOD BY AUTOMATED COUNT: 15.9 % (ref 11.6–15.1)
FERRITIN SERPL-MCNC: 407 NG/ML (ref 8–388)
GFR SERPL CREATININE-BSD FRML MDRD: 56 ML/MIN/1.73SQ M
GLUCOSE P FAST SERPL-MCNC: 106 MG/DL (ref 65–99)
HCT VFR BLD AUTO: 28.4 % (ref 36.5–49.3)
HGB BLD-MCNC: 9.4 G/DL (ref 12–17)
IMM GRANULOCYTES # BLD AUTO: 0.06 THOUSAND/UL (ref 0–0.2)
IMM GRANULOCYTES NFR BLD AUTO: 1 % (ref 0–2)
IRON SATN MFR SERPL: 48 % (ref 20–50)
IRON SERPL-MCNC: 106 UG/DL (ref 65–175)
LYMPHOCYTES # BLD AUTO: 1.33 THOUSANDS/ÂΜL (ref 0.6–4.47)
LYMPHOCYTES NFR BLD AUTO: 16 % (ref 14–44)
MCH RBC QN AUTO: 45 PG (ref 26.8–34.3)
MCHC RBC AUTO-ENTMCNC: 33.1 G/DL (ref 31.4–37.4)
MCV RBC AUTO: 136 FL (ref 82–98)
MONOCYTES # BLD AUTO: 0.55 THOUSAND/ÂΜL (ref 0.17–1.22)
MONOCYTES NFR BLD AUTO: 7 % (ref 4–12)
NEUTROPHILS # BLD AUTO: 6.24 THOUSANDS/ÂΜL (ref 1.85–7.62)
NEUTS SEG NFR BLD AUTO: 75 % (ref 43–75)
NRBC BLD AUTO-RTO: 0 /100 WBCS
PLATELET # BLD AUTO: 152 THOUSANDS/UL (ref 149–390)
PMV BLD AUTO: 10.9 FL (ref 8.9–12.7)
POTASSIUM SERPL-SCNC: 4.3 MMOL/L (ref 3.5–5.3)
PROT SERPL-MCNC: 7.2 G/DL (ref 6.4–8.4)
RBC # BLD AUTO: 2.09 MILLION/UL (ref 3.88–5.62)
SODIUM SERPL-SCNC: 141 MMOL/L (ref 135–147)
TIBC SERPL-MCNC: 222 UG/DL (ref 250–450)
WBC # BLD AUTO: 8.26 THOUSAND/UL (ref 4.31–10.16)

## 2022-10-24 PROCEDURE — 82728 ASSAY OF FERRITIN: CPT

## 2022-10-24 PROCEDURE — 85025 COMPLETE CBC W/AUTO DIFF WBC: CPT

## 2022-10-24 PROCEDURE — 80053 COMPREHEN METABOLIC PANEL: CPT

## 2022-10-24 PROCEDURE — 83540 ASSAY OF IRON: CPT

## 2022-10-24 PROCEDURE — 83550 IRON BINDING TEST: CPT

## 2022-11-03 LAB
MISCELLANEOUS LAB TEST RESULT: NORMAL
SCAN RESULT: NORMAL
SCAN RESULT: NORMAL

## 2022-11-04 ENCOUNTER — OFFICE VISIT (OUTPATIENT)
Dept: HEMATOLOGY ONCOLOGY | Facility: CLINIC | Age: 77
End: 2022-11-04

## 2022-11-04 VITALS
WEIGHT: 167 LBS | DIASTOLIC BLOOD PRESSURE: 64 MMHG | HEIGHT: 68 IN | SYSTOLIC BLOOD PRESSURE: 118 MMHG | BODY MASS INDEX: 25.31 KG/M2 | RESPIRATION RATE: 16 BRPM | TEMPERATURE: 98 F | HEART RATE: 60 BPM | OXYGEN SATURATION: 97 %

## 2022-11-04 DIAGNOSIS — D45 POLYCYTHEMIA VERA (HCC): Primary | ICD-10-CM

## 2022-11-04 DIAGNOSIS — C91.10 CLL (CHRONIC LYMPHOCYTIC LEUKEMIA) (HCC): ICD-10-CM

## 2022-11-04 DIAGNOSIS — D47.09 MAST CELL DISORDER: ICD-10-CM

## 2022-11-04 DIAGNOSIS — D47.2 MONOCLONAL GAMMOPATHY: ICD-10-CM

## 2022-11-04 DIAGNOSIS — D53.9 MACROCYTIC ANEMIA: ICD-10-CM

## 2022-11-04 DIAGNOSIS — Z15.89 JAK2 GENE MUTATION: ICD-10-CM

## 2022-11-04 NOTE — PROGRESS NOTES
[address] [facility] - Hematology & Medical Oncology  Outpatient Visit Encounter Note      Susan Santiago 69 y o  male [id number] Date:  11/4/2022      SUBJECTIVE      Susan Santiago is a 69 y o  transferring their care from Dr Pati Layne and [facility]   They were last seen on 11/5/2020 as an office visit  Patient was being managed for JAK2+ PV  In review of the chart and talking with the patient, diagnosed with this condition after presenting with Hct 55% in August 2017 with documentation stating high levels of Hct for several years before that  Since then, he has been on therapeutic phlebotomy  Denies any f/c/n/v/cp/ap/sob/cough  Denies diarrhea or skin rash  THIS VISIT    Since last seen in the office, he was in the hospital where I was able to see him because that was the rounding physician for our division  He was experiencing a lot of fatigue and other complaints  Subsequently his hydroxyurea was discontinued because this medication was felt to be the possible etiology  He underwent a bone marrow biopsy for which he is here to discuss results  He has no acute complaints  Continues to smoke  Is taking his aspirin  Denies any fevers chills nausea vomiting chest pain abdominal pain shortness of breath  Has episodes of fatigue  He is continuing his hunting season  I have reviewed the relevant past medical, surgical, social and family history  I have also reviewed allergies and medications for this patient  Review of Systems  Review of Systems   All other systems reviewed and are negative  OBJECTIVE     Physical Exam  Vitals:    11/04/22 1407   BP: 118/64   BP Location: Left arm   Patient Position: Sitting   Cuff Size: Adult   Pulse: 60   Resp: 16   Temp: 98 °F (36 7 °C)   TempSrc: Temporal   SpO2: 97%   Weight: 75 8 kg (167 lb)   Height: 5' 7 72" (1 72 m)       Physical Exam  Vitals reviewed  HENT:      Head: Normocephalic     Eyes:      General: No scleral icterus  Cardiovascular:      Rate and Rhythm: Normal rate  Pulmonary:      Effort: Pulmonary effort is normal  No respiratory distress  Abdominal:      General: There is no distension  Musculoskeletal:         General: Normal range of motion  Cervical back: Normal range of motion  Neurological:      General: No focal deficit present  Mental Status: He is alert and oriented to person, place, and time  Mental status is at baseline  Imaging  Relevant imaging reviewed in chart    Labs  Relevant labs reviewed in chart   ASSESSMENT & PLAN      Diagnosis ICD-10-CM Associated Orders   1  Polycythemia vera (HCC)  D45 CBC and differential     Comprehensive metabolic panel   2  JAK2 gene mutation  Z15 89    3  CLL (chronic lymphocytic leukemia) (Former[facility])  C91 10 CBC and differential     Comprehensive metabolic panel     Uric acid     LD,Blood   4  Monoclonal gammopathy  D47 2 CBC and differential     Protein electrophoresis, serum     Protein electrophoresis, urine     IgG, IgA, IgM     Immunoglobulin free LT chains blood   5  Macrocytic anemia  D53 9 Iron Panel (Includes Ferritin, Iron Sat%, Iron, and TIBC)     Vitamin B12     Folate   6  Mast cell disorder  D47 09 Tryptase         69 y o  male with a history of JAK2 positive polycythemia vera since 2017  Until November 2021, he was being managed with therapeutic phlebotomy  The patient states he never had a bone marrow biopsy  He has high risk designation of his JAK2 positive polycythemia vera given his age as well as his ongoing cigarette smoking  In October 2022, he was diagnosed with antineoplastic therapy induced cytopenias  Subsequently hydroxyurea was discontinued and a bone marrow biopsy was performed which also 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, CLL, early signs of possible mast cell disorder and medication induced effect of his JAK2 positive myeloproliferative neoplasm      · Discussion/Plan/Labs  · At this time, given his most recent CBC, there are signs of marrow recovery with normal platelet counts and improvement of his anemia  At this point, given that the risks outweigh the benefits, he will not be taking his hydroxyurea  He will be taking aspirin however  · He continues to smoke which he understands increases his risk of several complications  I recommend additional blood work as ordered above before he comes back and sees me    Follow Up  5 weeks    All questions were answered to the patient's satisfaction during this encounter  They appreciated and thanked me for spending time with them  The patient knows the contact information for our office and know to reach out for any relevant concerns related to this encounter  For all other listed problems and medical diagnosis in his chart - they are managed by PCP and/or other specialists, which patient acknowledges  Dr Cori Barnes MD  Hematology & Medical Oncology

## 2022-11-16 LAB — MISCELLANEOUS LAB TEST RESULT: NORMAL

## 2022-12-01 ENCOUNTER — APPOINTMENT (OUTPATIENT)
Dept: LAB | Facility: MEDICAL CENTER | Age: 77
End: 2022-12-01

## 2022-12-01 DIAGNOSIS — D47.2 MONOCLONAL GAMMOPATHY: ICD-10-CM

## 2022-12-01 DIAGNOSIS — D47.09 MAST CELL DISORDER: ICD-10-CM

## 2022-12-01 DIAGNOSIS — D45 POLYCYTHEMIA VERA (HCC): ICD-10-CM

## 2022-12-01 DIAGNOSIS — D53.9 MACROCYTIC ANEMIA: ICD-10-CM

## 2022-12-01 DIAGNOSIS — C91.10 CLL (CHRONIC LYMPHOCYTIC LEUKEMIA) (HCC): ICD-10-CM

## 2022-12-01 LAB
ALBUMIN SERPL BCP-MCNC: 3.9 G/DL (ref 3.5–5)
ALP SERPL-CCNC: 122 U/L (ref 46–116)
ALT SERPL W P-5'-P-CCNC: 27 U/L (ref 12–78)
ANION GAP SERPL CALCULATED.3IONS-SCNC: 3 MMOL/L (ref 4–13)
AST SERPL W P-5'-P-CCNC: 24 U/L (ref 5–45)
BASOPHILS # BLD AUTO: 0.04 THOUSANDS/ÂΜL (ref 0–0.1)
BASOPHILS NFR BLD AUTO: 0 % (ref 0–1)
BILIRUB SERPL-MCNC: 1.03 MG/DL (ref 0.2–1)
BUN SERPL-MCNC: 13 MG/DL (ref 5–25)
CALCIUM SERPL-MCNC: 9.2 MG/DL (ref 8.3–10.1)
CHLORIDE SERPL-SCNC: 110 MMOL/L (ref 96–108)
CO2 SERPL-SCNC: 27 MMOL/L (ref 21–32)
CREAT SERPL-MCNC: 1.34 MG/DL (ref 0.6–1.3)
EOSINOPHIL # BLD AUTO: 0.35 THOUSAND/ÂΜL (ref 0–0.61)
EOSINOPHIL NFR BLD AUTO: 4 % (ref 0–6)
ERYTHROCYTE [DISTWIDTH] IN BLOOD BY AUTOMATED COUNT: 13.2 % (ref 11.6–15.1)
FERRITIN SERPL-MCNC: 107 NG/ML (ref 8–388)
FOLATE SERPL-MCNC: >20 NG/ML (ref 3.1–17.5)
GFR SERPL CREATININE-BSD FRML MDRD: 51 ML/MIN/1.73SQ M
GLUCOSE P FAST SERPL-MCNC: 103 MG/DL (ref 65–99)
HCT VFR BLD AUTO: 45.7 % (ref 36.5–49.3)
HGB BLD-MCNC: 14.9 G/DL (ref 12–17)
IGA SERPL-MCNC: 369 MG/DL (ref 70–400)
IGG SERPL-MCNC: 1080 MG/DL (ref 700–1600)
IGM SERPL-MCNC: 65 MG/DL (ref 40–230)
IMM GRANULOCYTES # BLD AUTO: 0.02 THOUSAND/UL (ref 0–0.2)
IMM GRANULOCYTES NFR BLD AUTO: 0 % (ref 0–2)
IRON SATN MFR SERPL: 24 % (ref 20–50)
IRON SERPL-MCNC: 64 UG/DL (ref 65–175)
LDH SERPL-CCNC: 286 U/L (ref 81–234)
LYMPHOCYTES # BLD AUTO: 1.36 THOUSANDS/ÂΜL (ref 0.6–4.47)
LYMPHOCYTES NFR BLD AUTO: 15 % (ref 14–44)
MCH RBC QN AUTO: 37.3 PG (ref 26.8–34.3)
MCHC RBC AUTO-ENTMCNC: 32.6 G/DL (ref 31.4–37.4)
MCV RBC AUTO: 115 FL (ref 82–98)
MONOCYTES # BLD AUTO: 0.69 THOUSAND/ÂΜL (ref 0.17–1.22)
MONOCYTES NFR BLD AUTO: 8 % (ref 4–12)
NEUTROPHILS # BLD AUTO: 6.58 THOUSANDS/ÂΜL (ref 1.85–7.62)
NEUTS SEG NFR BLD AUTO: 73 % (ref 43–75)
NRBC BLD AUTO-RTO: 0 /100 WBCS
PLATELET # BLD AUTO: 185 THOUSANDS/UL (ref 149–390)
PMV BLD AUTO: 10.7 FL (ref 8.9–12.7)
POTASSIUM SERPL-SCNC: 4 MMOL/L (ref 3.5–5.3)
PROT SERPL-MCNC: 7.1 G/DL (ref 6.4–8.4)
RBC # BLD AUTO: 3.99 MILLION/UL (ref 3.88–5.62)
SODIUM SERPL-SCNC: 140 MMOL/L (ref 135–147)
TIBC SERPL-MCNC: 265 UG/DL (ref 250–450)
URATE SERPL-MCNC: 4 MG/DL (ref 3.5–8.5)
VIT B12 SERPL-MCNC: 730 PG/ML (ref 100–900)
WBC # BLD AUTO: 9.04 THOUSAND/UL (ref 4.31–10.16)

## 2022-12-02 LAB
ALBUMIN SERPL ELPH-MCNC: 4.01 G/DL (ref 3.5–5)
ALBUMIN SERPL ELPH-MCNC: 57.3 % (ref 52–65)
ALBUMIN UR ELPH-MCNC: 100 %
ALPHA1 GLOB MFR UR ELPH: 0 %
ALPHA1 GLOB SERPL ELPH-MCNC: 0.33 G/DL (ref 0.1–0.4)
ALPHA1 GLOB SERPL ELPH-MCNC: 4.7 % (ref 2.5–5)
ALPHA2 GLOB MFR UR ELPH: 0 %
ALPHA2 GLOB SERPL ELPH-MCNC: 0.69 G/DL (ref 0.4–1.2)
ALPHA2 GLOB SERPL ELPH-MCNC: 9.9 % (ref 7–13)
B-GLOBULIN MFR UR ELPH: 0 %
BETA GLOB ABNORMAL SERPL ELPH-MCNC: 0.36 G/DL (ref 0.4–0.8)
BETA1 GLOB SERPL ELPH-MCNC: 5.2 % (ref 5–13)
BETA2 GLOB SERPL ELPH-MCNC: 6.1 % (ref 2–8)
BETA2+GAMMA GLOB SERPL ELPH-MCNC: 0.43 G/DL (ref 0.2–0.5)
GAMMA GLOB ABNORMAL SERPL ELPH-MCNC: 1.18 G/DL (ref 0.5–1.6)
GAMMA GLOB MFR UR ELPH: 0 %
GAMMA GLOB SERPL ELPH-MCNC: 16.8 % (ref 12–22)
IGG/ALB SER: 1.34 {RATIO} (ref 1.1–1.8)
KAPPA LC FREE SER-MCNC: 32.2 MG/L (ref 3.3–19.4)
KAPPA LC FREE/LAMBDA FREE SER: 1.27 {RATIO} (ref 0.26–1.65)
LAMBDA LC FREE SERPL-MCNC: 25.4 MG/L (ref 5.7–26.3)
PROT PATTERN SERPL ELPH-IMP: ABNORMAL
PROT PATTERN UR ELPH-IMP: ABNORMAL
PROT SERPL-MCNC: 7 G/DL (ref 6.4–8.2)
PROT UR-MCNC: 40 MG/DL

## 2022-12-04 ENCOUNTER — OFFICE VISIT (OUTPATIENT)
Dept: URGENT CARE | Facility: MEDICAL CENTER | Age: 77
End: 2022-12-04

## 2022-12-04 VITALS
WEIGHT: 172 LBS | HEART RATE: 60 BPM | OXYGEN SATURATION: 99 % | HEIGHT: 68 IN | TEMPERATURE: 98 F | RESPIRATION RATE: 20 BRPM | BODY MASS INDEX: 26.07 KG/M2

## 2022-12-04 DIAGNOSIS — S00.461A TICK BITE OF RIGHT EAR, INITIAL ENCOUNTER: Primary | ICD-10-CM

## 2022-12-04 DIAGNOSIS — N40.1 BPH WITH OBSTRUCTION/LOWER URINARY TRACT SYMPTOMS: ICD-10-CM

## 2022-12-04 DIAGNOSIS — N13.8 BPH WITH OBSTRUCTION/LOWER URINARY TRACT SYMPTOMS: ICD-10-CM
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
## 2022-12-04 LAB — TRYPTASE SERPL-MCNC: 7.6 UG/L (ref 2.2–13.2)

## 2022-12-04 RX ORDER — DOXYCYCLINE 100 MG/1
200 TABLET ORAL ONCE
Qty: 2 TABLET | Refills: 0 | Status: SHIPPED | OUTPATIENT
Start: 2022-12-04 | End: 2022-12-04

## 2022-12-04 NOTE — PROGRESS NOTES
[facility]    NAME: Katy Melara is a 69 y o  male  : 1945    MRN: [id number]  DATE: 2022  TIME: 11:26 AM    Assessment and Plan   Tick bite of right ear, initial encounter [Y79 775A, W57  XXXA]  1  Tick bite of right ear, initial encounter  doxycycline (ADOXA) 100 MG tablet        Given that the patient was able to remove the tick and presented less than 72 hours from the event, will provide the patient with doxycycline and prophylactic dose  Provided the patient with precautionary measures    Patient Instructions   There are no Patient Instructions on file for this visit  Follow up with PCP in 3-5 days  Proceed to ER if symptoms worsen  Chief Complaint     Chief Complaint   Patient presents with   • Tick Removal     Patient was out hunting yesterday and has a tick on his R earlobe     History of Present Illness   HPI   52-year-old male with multiple medical illnesses who presented after noting a tick on his right ear lobe  Patient noted that his wife removed the tick few hours prior to presentation  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  He denied any previous history of Lyme disease or tick bites  Review of Systems   Review of Systems   All other systems reviewed and are negative  The following portions of the patient's history were reviewed and updated as appropriate: allergies, current medications, past family history, past medical history, past social history, past surgical history and problem list      Medications have been verified  Objective   Pulse 60   Temp 98 °F (36 7 °C)   Resp 20   Ht 5' 8" (1 727 m)   Wt 78 kg (172 lb)   SpO2 99%   BMI 26 15 kg/m²     Physical Exam  Vitals reviewed  Constitutional:       General: He is not in acute distress  Appearance: Normal appearance  He is not ill-appearing, toxic-appearing or diaphoretic  HENT:      Head: Normocephalic and atraumatic        Right Ear: Tympanic membrane, ear canal and external ear normal  There is no impacted cerumen  Left Ear: Tympanic membrane, ear canal and external ear normal  There is no impacted cerumen  Ears:      Comments: Right ear lobe erythema surrounding the entry site, an extra piece of the tick was removed   No noted fluctuations or tracking marks or active bleeding or discharge     Nose: Nose normal    Neurological:      Mental Status: He is alert         Santana Sanches MD

## 2022-12-05 ENCOUNTER — OFFICE VISIT (OUTPATIENT)
Dept: FAMILY MEDICINE CLINIC | Facility: CLINIC | Age: 77
End: 2022-12-05

## 2022-12-05 ENCOUNTER — TELEPHONE (OUTPATIENT)
Dept: OBGYN CLINIC | Facility: OTHER | Age: 77
End: 2022-12-05

## 2022-12-05 ENCOUNTER — APPOINTMENT (OUTPATIENT)
Dept: RADIOLOGY | Facility: CLINIC | Age: 77
End: 2022-12-05

## 2022-12-05 VITALS
TEMPERATURE: 96.4 F | HEIGHT: 68 IN | HEART RATE: 70 BPM | DIASTOLIC BLOOD PRESSURE: 80 MMHG | WEIGHT: 170.2 LBS | SYSTOLIC BLOOD PRESSURE: 122 MMHG | OXYGEN SATURATION: 97 % | BODY MASS INDEX: 25.79 KG/M2

## 2022-12-05 DIAGNOSIS — M25.531 RIGHT WRIST PAIN: Primary | ICD-10-CM

## 2022-12-05 DIAGNOSIS — M25.531 RIGHT WRIST PAIN: ICD-10-CM

## 2022-12-05 RX ORDER — FINASTERIDE 5 MG/1
TABLET, FILM COATED ORAL
Qty: 90 TABLET | Refills: 0 | Status: SHIPPED | OUTPATIENT
Start: 2022-12-05

## 2022-12-05 RX ORDER — LIDOCAINE 50 MG/G
1 PATCH TOPICAL DAILY
Qty: 30 PATCH | Refills: 0 | Status: SHIPPED | OUTPATIENT
Start: 2022-12-05

## 2022-12-05 NOTE — ASSESSMENT & PLAN NOTE
Will check x-rays given tenderness in wrist; trial of lidoderm patches over area and wrist splints; given decreased  strength recommend referral to ortho

## 2022-12-05 NOTE — TELEPHONE ENCOUNTER
Patient is being referred to a orthopedics  Please schedule accordingly      [address]   [phone]

## 2022-12-05 NOTE — PROGRESS NOTES
Assessment/Plan:     1  Right wrist pain  Assessment & Plan: Will check x-rays given tenderness in wrist; trial of lidoderm patches over area and wrist splints; given decreased  strength recommend referral to ortho    Orders:  -     XR wrist 3+ vw right; Future; Expected date: 12/05/2022  -     Cock Up Wrist Splint  -     Ambulatory Referral to Hand Surgery; Future  -     lidocaine (Lidoderm) 5 %; Apply 1 patch topically daily Remove & Discard patch within 12 hours or as directed by MD        Subjective:      Patient ID: Jenna Ramirez is a 69 y o  male  Patient is here with concerns of R wrist pain  Started on Sunday when he got out of bed  No known injury  Seems to hurt in wrist and his fingers get numb and tingle digits 2-4  Has been constant since it started  Had tried a wrist splint but seems to make it worse  Feels it may be too small  Had required carpal tunnel surgery in left hand years ago  The following portions of the patient's history were reviewed and updated as appropriate: allergies, current medications, past family history, past medical history, past social history, past surgical history, and problem list     Review of Systems   Constitutional: Negative for chills and fever  Respiratory: Negative for shortness of breath  Cardiovascular: Negative for chest pain  Musculoskeletal: Positive for arthralgias  Neurological: Positive for numbness  Objective:      /80   Pulse 70   Temp (!) 96 4 °F (35 8 °C) (Tympanic)   Ht 5' 7 72" (1 72 m)   Wt 77 2 kg (170 lb 3 2 oz)   SpO2 97%   BMI 26 09 kg/m²          Physical Exam  Vitals reviewed  Constitutional:       General: He is not in acute distress  Appearance: Normal appearance  He is not ill-appearing, toxic-appearing or diaphoretic  HENT:      Head: Normocephalic and atraumatic  Eyes:      General: No scleral icterus  Right eye: No discharge  Left eye: No discharge        Conjunctiva/sclera: Conjunctivae normal    Cardiovascular:      Rate and Rhythm: Normal rate and regular rhythm  Pulses: Normal pulses  Heart sounds: Normal heart sounds  No murmur heard  No gallop  Pulmonary:      Effort: Pulmonary effort is normal  No respiratory distress  Breath sounds: Normal breath sounds  No stridor  No wheezing, rhonchi or rales  Musculoskeletal:      Right wrist: Tenderness and bony tenderness present  Right hand: Decreased strength  Right lower leg: No edema  Left lower leg: No edema  Comments: +decreased  strength on right compared to left  Negative Tinel's   Neurological:      General: No focal deficit present  Mental Status: He is alert and oriented to person, place, and time  Psychiatric:         Mood and Affect: Mood normal          Behavior: Behavior normal          Thought Content:  Thought content normal          Judgment: Judgment normal

## 2022-12-06 ENCOUNTER — TELEPHONE (OUTPATIENT)
Dept: FAMILY MEDICINE CLINIC | Facility: CLINIC | Age: 77
End: 2022-12-06

## 2022-12-08 NOTE — TELEPHONE ENCOUNTER
[facility] at [facility] [phone] opt 3 left voice mail needing further answers :  Req # [id number]

## 2022-12-09 ENCOUNTER — OFFICE VISIT (OUTPATIENT)
Dept: HEMATOLOGY ONCOLOGY | Facility: CLINIC | Age: 77
End: 2022-12-09

## 2022-12-09 VITALS
WEIGHT: 168 LBS | BODY MASS INDEX: 25.46 KG/M2 | DIASTOLIC BLOOD PRESSURE: 76 MMHG | OXYGEN SATURATION: 98 % | HEART RATE: 62 BPM | RESPIRATION RATE: 18 BRPM | HEIGHT: 68 IN | SYSTOLIC BLOOD PRESSURE: 124 MMHG | TEMPERATURE: 97.7 F

## 2022-12-09 DIAGNOSIS — D45 POLYCYTHEMIA VERA (HCC): Primary | ICD-10-CM

## 2022-12-09 DIAGNOSIS — Z51.11 ENCOUNTER FOR ANTINEOPLASTIC CHEMOTHERAPY: ICD-10-CM

## 2022-12-09 DIAGNOSIS — F17.210 CIGARETTE SMOKER: ICD-10-CM

## 2022-12-09 DIAGNOSIS — C91.10 CLL (CHRONIC LYMPHOCYTIC LEUKEMIA) (HCC): ICD-10-CM

## 2022-12-09 DIAGNOSIS — Z15.89 JAK2 GENE MUTATION: ICD-10-CM

## 2022-12-09 RX ORDER — HYDROXYUREA 500 MG/1
CAPSULE ORAL
Qty: 30 CAPSULE | Refills: 0 | Status: SHIPPED | OUTPATIENT
Start: 2022-12-09 | End: 2023-01-09

## 2022-12-09 NOTE — PROGRESS NOTES
[address] [facility] - Hematology & Medical Oncology  Outpatient Visit Encounter Note      Jenna Ramirez 69 y o  male [id number] Date:  12/9/2022      SUBJECTIVE      Jenna Ramirez is a 69 y o  transferring their care from Dr Tiffanie Moody and [facility]   They were last seen on 11/5/2020 as an office visit  Patient was being managed for JAK2+ PV  In review of the chart and talking with the patient, diagnosed with this condition after presenting with Hct 55% in August 2017 with documentation stating high levels of Hct for several years before that  Since then, he has been on therapeutic phlebotomy  Denies any f/c/n/v/cp/ap/sob/cough  Denies diarrhea or skin rash  THIS VISIT    Denies any f/c/n/v/cp/ap/sob/cough  Denies diarrhea or skin rash  No new issues  Continues to smoke  I have reviewed the relevant past medical, surgical, social and family history  I have also reviewed allergies and medications for this patient  Review of Systems  Review of Systems   All other systems reviewed and are negative  OBJECTIVE     Physical Exam  Vitals:    12/09/22 1445   BP: 124/76   BP Location: Left arm   Patient Position: Sitting   Cuff Size: Adult   Pulse: 62   Resp: 18   Temp: 97 7 °F (36 5 °C)   TempSrc: Temporal   SpO2: 98%   Weight: 76 2 kg (168 lb)   Height: 5' 7 72" (1 72 m)       Physical Exam  Vitals reviewed  HENT:      Head: Normocephalic  Eyes:      General: No scleral icterus  Cardiovascular:      Rate and Rhythm: Normal rate  Pulmonary:      Effort: Pulmonary effort is normal  No respiratory distress  Abdominal:      General: There is no distension  Musculoskeletal:         General: Normal range of motion  Cervical back: Normal range of motion  Neurological:      General: No focal deficit present  Mental Status: He is alert and oriented to person, place, and time  Mental status is at baseline            Imaging  Relevant imaging reviewed in chart    Labs  Relevant labs reviewed in chart   ASSESSMENT & PLAN      Diagnosis ICD-10-CM Associated Orders   1  Polycythemia vera (HCC)  D45 hydroxyurea (HYDREA) 500 mg capsule     CBC and differential     Hepatic function panel     CBC and differential     Hepatic function panel      2  JAK2 gene mutation  Z15 89 hydroxyurea (HYDREA) 500 mg capsule     CBC and differential     Hepatic function panel     CBC and differential     Hepatic function panel      3  Encounter for antineoplastic chemotherapy  Z51 11 hydroxyurea (HYDREA) 500 mg capsule     CBC and differential     Hepatic function panel     CBC and differential     Hepatic function panel      4  CLL (chronic lymphocytic leukemia) (HCC)  C91 10       5  Cigarette smoker  F17 210             69 y o  male with a history of JAK2 positive polycythemia vera since 2017  Until November 2021, he was being managed with therapeutic phlebotomy  The patient states he never had a bone marrow biopsy  He has high risk designation of his JAK2 positive polycythemia vera given his age as well as his ongoing cigarette smoking  In October 2022, he was diagnosed with antineoplastic therapy induced cytopenias  Subsequently hydroxyurea was discontinued and a bone marrow biopsy was performed which also 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, CLL, early signs of possible mast cell disorder and medication induced effect of his JAK2 positive myeloproliferative neoplasm  · Discussion/Plan/Labs  · I recommend he starts Hydrea 500mg on MWF starting today  · I recommend CBC and HFP q2w x5  · I recommend daily baby aspirin  · Recommend smoking cessation like always  · Reviewed other labs he got done which do not show any MGUS issues or CLL issues  Follow Up  5 weeks    All questions were answered to the patient's satisfaction during this encounter  They appreciated and thanked me for spending time with them   The patient knows the contact information for our office and know to reach out for any relevant concerns related to this encounter  For all other listed problems and medical diagnosis in his chart - they are managed by PCP and/or other specialists, which patient acknowledges  Dr Bebeto Dickens MD  Hematology & Medical Oncology

## 2022-12-15 NOTE — TELEPHONE ENCOUNTER
Spoke with pt he is using the lidocaine cream otc on wrist along with splint  Still has tingling in fingers, with lidocaine cream feels like electric shock going up arm   Has appt with hand specialist 12/28/22

## 2022-12-16 NOTE — TELEPHONE ENCOUNTER
Okay to hold on lidocaine if not helping  Recommend f/u with orthopedics as scheduled and wear brace

## 2022-12-28 ENCOUNTER — APPOINTMENT (OUTPATIENT)
Dept: LAB | Facility: MEDICAL CENTER | Age: 77
End: 2022-12-28

## 2022-12-28 ENCOUNTER — OFFICE VISIT (OUTPATIENT)
Dept: OBGYN CLINIC | Facility: MEDICAL CENTER | Age: 77
End: 2022-12-28

## 2022-12-28 VITALS
HEART RATE: 60 BPM | SYSTOLIC BLOOD PRESSURE: 135 MMHG | DIASTOLIC BLOOD PRESSURE: 80 MMHG | BODY MASS INDEX: 25.76 KG/M2 | WEIGHT: 170 LBS | HEIGHT: 68 IN

## 2022-12-28 DIAGNOSIS — G56.01 CARPAL TUNNEL SYNDROME ON RIGHT: Primary | ICD-10-CM

## 2022-12-28 DIAGNOSIS — M65.341 TRIGGER FINGER, RIGHT RING FINGER: ICD-10-CM

## 2022-12-28 DIAGNOSIS — M25.531 RIGHT WRIST PAIN: ICD-10-CM

## 2022-12-28 RX ADMIN — BETAMETHASONE SODIUM PHOSPHATE AND BETAMETHASONE ACETATE 3 MG: 3; 3 INJECTION, SUSPENSION INTRA-ARTICULAR; INTRALESIONAL; INTRAMUSCULAR; SOFT TISSUE at 15:12

## 2022-12-28 RX ADMIN — BUPIVACAINE HYDROCHLORIDE 0.5 ML: 2.5 INJECTION, SOLUTION INFILTRATION; PERINEURAL at 15:12

## 2022-12-28 NOTE — PATIENT INSTRUCTIONS

## 2022-12-28 NOTE — PROGRESS NOTES
1  Carpal tunnel syndrome on right  US MSK limited    Cock Up Wrist Splint      2  Right wrist pain  Ambulatory Referral to Hand Surgery      3  Trigger finger, right ring finger  Hand/upper extremity injection: R ring A1        Orders Placed This Encounter   Procedures   • Hand/upper extremity injection: R ring A1   • Cock Up Wrist Splint   • US MSK limited        Imaging Studies (I personally reviewed images in PACS and report):    • X-ray right wrist 12/5/2022: No acute osseous abnormalities  No significant degenerative changes  • X-ray cervical spine 8/24/2018: Mild to moderate multilevel degenerative changes of the cervical spine with disc base loss most extensive at C6-C7  IMPRESSION:  • Right sided trigger finger of ring digit  • Paresthesias of Right hand - reports all digits worsen with use - h/o LEFT sided CTR - currently asymptomatic at rest and negative phalen's/tinels - DDx: CTS, radicular from cervical spine (given spondylotic changes)  • Ongoing for several months - no precipitating injury    Other factors:  • RHD  • History of seeing pain management in 2018 in regards to cervical spondylosis for injection    PLAN:    • Clinical exam and radiographic imaging reviewed with patient today, with impression as per above  I have discussed with the patient the pathophysiology of this diagnosis and reviewed how the examination correlates with this diagnosis  • In regards to his right ring finger trigger finger symptoms, treatment options were discussed and patient was agreeable to a cortisone steroid injection of his A1 pulley as per procedure note below  I counseled that we can attempt this procedure up to 2 times before having to consider surgical intervention    • In regards to his right upper extremity paresthesias, I ordered an ultrasound of his right wrist today for further evaluation for suspected carpal tunnel syndrome despite his unremarkable clinical exam   I did briefly discuss carpal tunnel cortisone injections as well but recommended no more than 2 injections as well before considering surgical intervention  Patient prefers to defer injection to another visit and obtain an ultrasound first which is reasonable  If the ultrasound of his wrist is unremarkable, I may need to consider obtaining an EMG given he does have a history of cervical spondylosis  I have prescribed him a wrist brace today to be used for night splinting  I have also provided him home exercises in regards to carpal tunnel syndrome  Return for Follow up after Ultrasound of right wrist         Portions of the record may have been created with voice recognition software  Occasional wrong word or "sound a like" substitutions may have occurred due to the inherent limitations of voice recognition software  Read the chart carefully and recognize, using context, where substitutions have occurred  CHIEF COMPLAINT:  Chief Complaint   Patient presents with   • Right Hand - Pain       HPI:  Rafaela Wilkins is a 69 y o  male  who presents with significant other in regards to a referral from Dr Loree Babinski for       Visit 12/28/2022 :  Initial evaluation of right hand/wrist pain and paresthesias:  Of note, he has a history of CTR of his LUE in the past 10+ years ago  Reports he was supposed to undergo a right sided CTR as well but deferred  Patient reports two issues with his RUE:  He reports his right ring finger is catching/locking during range of motion movements  He states he has to physically "unlock" his finger when it get stuck in flexion  He denies any pain with his finger but feels that it is catching and locking more frequently over time and has been ongoing for months  Reports he has a history of trigger finger feels his symptoms are similar to what it was in the past   He has never had injection of this finger before  His second issue is paresthesias of his right hand that have progressively been worsening    He states in the past he was told to have a carpal tunnel surgery of his right upper extremity similar to her left left upper extremity but patient deferred at the time  Patient states he is having numbness with progressive use of his hand such as gripping/pushing/pulling  He states he can tingling sensation of all his digits  He states there is nighttime numbness and tingling  He does not use a brace  Denies hand swelling or discoloration  He states the numbness does not radiate beyond his hand  He denies any pain associated with the numbness/tingling  He has never had an injection of his wrist before  He states he had an EMG in the past but it was 10+ years ago and I cannot find any imaging report in his chart today to confirm        Medical, Surgical, Family, and Social History    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"   • Full dentures     but doesnt wear them   • History of 2019 novel coronavirus disease (COVID-19) 02/2021    hospital for 2 days but not in ICU/"mainly dehydrated" "also fever/oxygen below 90"   • History of coronary artery stent placement     x2 in 2008 or so; sees Lazaro Flores-- Dr Severo Parry cardio   • [place] (hard of hearing)     no hearing aids yet   • Hyperlipidemia     Last Assessed:  11/24/17   • Hypertension     Benign essential, Last Assessed:  11/24/17   • LBBB (left bundle branch block)    • Nocturia    • Polycythemia vera (HCC)     (per history in chart)   • PVD (peripheral vascular disease) ([facility] [address] )    • RBC abnormality     pt reports told has high Red blood cells/goes to infusion center regularly next appt 10/8/21 hematologist is Dr Hilary Brooks of [address] [place] [address](had been Dr Gage Quinones)   • Seasickness    • Shortness of breath     "if goes up steps gets SOB, had for awhile"   • Slow urinary stream     "sometimes feels like bladder isnt all the way empty"   • Snores    • Wears glasses     reading     Past Surgical History:   Procedure Laterality Date   • ANGIOPLASTY     • APPENDECTOMY     • CARDIAC CATHETERIZATION N/A 7/28/2022    Procedure: CARDIAC TEMPORARY PACEMAKER;  Surgeon: Rose Capps MD;  [facility]; Service: Cardiology   • CARDIAC ELECTROPHYSIOLOGY PROCEDURE N/A 7/29/2022    Procedure: Cardiac pacer implant;  Surgeon: Irene Peralta MD;  [facility]; Service: Cardiology   • CATARACT EXTRACTION Bilateral    • CORONARY ARTERY BYPASS GRAFT     • CT CYSTOGRAM  11/4/2021   • ELBOW SURGERY Right    • FL CYSTOGRAM  10/27/2021   • IR BIOPSY BONE MARROW  10/14/2022   • KNEE ARTHROSCOPY Right    • NM COLONOSCOPY FLX DX W/COLLJ SPEC WHEN PFRMD N/A 5/22/2017    Procedure: COLONOSCOPY;  Surgeon: Alonso Joaquin DO;  Location: [facility];   Service: Gastroenterology   • PROSTATE BIOPSY     • PROSTATECTOMY N/A 10/18/2021    Procedure: ROBOTIC SUBTOTAL/SUPRAPUBIC PROSTATECTOMY;  Surgeon: Satnam Peralta MD;  Location: [facility] OR;  Service: Urology   • SHOULDER ARTHROSCOPY Right    • TONSILLECTOMY       Social History   Social History     Substance and Sexual Activity   Alcohol Use Never     Social History     Substance and Sexual Activity   Drug Use No     Social History     Tobacco Use   Smoking Status Every Day   • Packs/day: 0 50   • Years: 35 00   • Pack years: 17 50   • Types: Cigarettes   Smokeless Tobacco Never   Tobacco Comments    a little less than 1/2ppd, trying to cut back before surgery last smoked 10/16     Family History   Problem Relation Age of Onset   • No Known Problems Mother    • Heart disease Father      Allergies   Allergen Reactions   • Other Other (See Comments)     Pt and wife reports took a medication years ago at work cant recall the name or what it was for"     "couldn't talk and cheeks swelled and also right hand"          Physical Exam  /80   Pulse 60   Ht 5' 8" (1 727 m)   Wt 77 1 kg (170 lb)   BMI 25 85 kg/m²     Constitutional:  see vital signs  Gen: well-developed, normocephalic/atraumatic, well-groomed  Pulmonary/Chest: Effort normal  No respiratory distress  Right Hand Exam     Muscle Strength   : 5/5     Tests   Phalen’s Sign: negative  Tinel's sign (median nerve): negative  Finkelstein's test: negative    Other   Erythema: absent    Comments:  Full digit MCP/PIP/DIP motion without malrotation or digital scissoring  Thumb MCP/DIP intact with opposition  No swelling, bruising, erythema  Sensation intact in radial/median/ulnar distribution    OK sign: intact  Froment sign: intact  Thumb extension: 5/5 and intact     (reports numbness not currently present but aggravated with gripping)        Right ring finger: Palpable nodule over the A1 pulley  Negative tenderness to palpation over A1 pulley  Positive catching  Positive clicking  Hand/upper extremity injection: R ring A1  Universal Protocol:  Consent: Verbal consent obtained    Risks and benefits: risks, benefits and alternatives were discussed  Consent given by: patient  Patient understanding: patient states understanding of the procedure being performed  Site marked: the operative site was marked  Required items: required blood products, implants, devices, and special equipment available  Patient identity confirmed: verbally with patient    Supporting Documentation  Indications: pain and therapeutic   Procedure Details  Condition:trigger finger Location: ring finger - R ring A1   Preparation: Patient was prepped and draped in the usual sterile fashion  Needle size: 25 G  Ultrasound guidance: no  Approach: dorsal  Medications administered: 0 5 mL bupivacaine 0 25 %; 3 mg betamethasone acetate-betamethasone sodium phosphate 6 (3-3) mg/mL    Patient tolerance: patient tolerated the procedure well with no immediate complications  Dressing:  Sterile dressing applied

## 2022-12-29 ENCOUNTER — TELEPHONE (OUTPATIENT)
Dept: HEMATOLOGY ONCOLOGY | Facility: CLINIC | Age: 77
End: 2022-12-29

## 2022-12-29 DIAGNOSIS — D45 POLYCYTHEMIA VERA (HCC): Primary | ICD-10-CM

## 2022-12-29 RX ORDER — BUPIVACAINE HYDROCHLORIDE 2.5 MG/ML
0.5 INJECTION, SOLUTION INFILTRATION; PERINEURAL
Status: COMPLETED | OUTPATIENT
Start: 2022-12-28 | End: 2022-12-28

## 2022-12-29 RX ORDER — BETAMETHASONE SODIUM PHOSPHATE AND BETAMETHASONE ACETATE 3; 3 MG/ML; MG/ML
3 INJECTION, SUSPENSION INTRA-ARTICULAR; INTRALESIONAL; INTRAMUSCULAR; SOFT TISSUE
Status: COMPLETED | OUTPATIENT
Start: 2022-12-28 | End: 2022-12-28

## 2022-12-29 NOTE — TELEPHONE ENCOUNTER
Phoned patient to discuss recent lab results and Dr Carmela Pardo recommendations for phlebotomy    Left voice message with direct RN number for return call           ----- Message from Ruth Sotomayor MD sent at 12/29/2022  8:10 AM EST -----  RN to help with phlebotomy and then 1L NS administration thereafter  Goal for phlebotomy is 500cc

## 2022-12-29 NOTE — TELEPHONE ENCOUNTER
Please schedule treatment and leave a message with date/time on patient's voicemail  Tomeka Montoya returned call stating nA Rene is hunting today and tomorrow  Reviewed Santana's labs with her and informed her Dr Lucy Gipson is recommending phlebotomy treatment with 1L NSS  Tomeka Montoya asked it to be scheduled and leave a message with date/time

## 2023-01-03 ENCOUNTER — TELEPHONE (OUTPATIENT)
Dept: HEMATOLOGY ONCOLOGY | Facility: CLINIC | Age: 78
End: 2023-01-03

## 2023-01-03 NOTE — TELEPHONE ENCOUNTER
Received voice message: This is yesika herb  You can reach me at [phone]  I'm calling about my appointment on January the 5th, if you could call me back  Thank you  Dandree  Returned call to patient  Patient needing to change infusion appointment  Provided patient with direct phone number to [facility] infusion center to reschedule  Patient agreeable to call an reschedule

## 2023-01-06 ENCOUNTER — HOSPITAL ENCOUNTER (OUTPATIENT)
Dept: INFUSION CENTER | Facility: CLINIC | Age: 78
End: 2023-01-06

## 2023-01-06 VITALS
DIASTOLIC BLOOD PRESSURE: 79 MMHG | SYSTOLIC BLOOD PRESSURE: 126 MMHG | HEART RATE: 60 BPM | RESPIRATION RATE: 18 BRPM | TEMPERATURE: 97.7 F

## 2023-01-06 DIAGNOSIS — D45 POLYCYTHEMIA VERA (HCC): Primary | ICD-10-CM

## 2023-01-06 RX ADMIN — SODIUM CHLORIDE 1000 ML: 0.9 INJECTION, SOLUTION INTRAVENOUS at 13:46

## 2023-01-06 NOTE — PROGRESS NOTES
Pt tolerated phlebotomy and hydration without incident  Pt declined AVS   Pt states he will have bloodwork done in two weeks and then if he needs an appt with the infusion center, the office will call and notify him    Pt declined AVS

## 2023-01-06 NOTE — PROGRESS NOTES
Therapeutic phlebotomy performed via the right antecubital vein without difficulty  500 ml removed per parameters  Parameters double checked with Cindy Zaragoza, CHAGO  Pt tolerated without incident  Hydration to infuse over 2 hrs per order

## 2023-01-09 ENCOUNTER — OFFICE VISIT (OUTPATIENT)
Dept: FAMILY MEDICINE CLINIC | Facility: CLINIC | Age: 78
End: 2023-01-09

## 2023-01-09 VITALS
HEIGHT: 68 IN | SYSTOLIC BLOOD PRESSURE: 120 MMHG | BODY MASS INDEX: 25.76 KG/M2 | WEIGHT: 170 LBS | OXYGEN SATURATION: 98 % | HEART RATE: 60 BPM | TEMPERATURE: 97.3 F | DIASTOLIC BLOOD PRESSURE: 70 MMHG | RESPIRATION RATE: 16 BRPM

## 2023-01-09 DIAGNOSIS — F17.210 CIGARETTE SMOKER: ICD-10-CM

## 2023-01-09 DIAGNOSIS — D64.9 ANEMIA, UNSPECIFIED TYPE: Primary | ICD-10-CM

## 2023-01-09 DIAGNOSIS — R73.03 PREDIABETES: ICD-10-CM

## 2023-01-09 DIAGNOSIS — I10 BENIGN ESSENTIAL HYPERTENSION: ICD-10-CM

## 2023-01-09 DIAGNOSIS — I25.10 CORONARY ARTERY DISEASE WITHOUT ANGINA PECTORIS, UNSPECIFIED VESSEL OR LESION TYPE, UNSPECIFIED WHETHER NATIVE OR TRANSPLANTED HEART: ICD-10-CM

## 2023-01-09 DIAGNOSIS — J44.9 CHRONIC OBSTRUCTIVE PULMONARY DISEASE, UNSPECIFIED COPD TYPE (HCC): ICD-10-CM

## 2023-01-09 DIAGNOSIS — Z12.5 SCREENING FOR PROSTATE CANCER: ICD-10-CM

## 2023-01-09 DIAGNOSIS — D45 POLYCYTHEMIA VERA (HCC): ICD-10-CM

## 2023-01-09 DIAGNOSIS — Z13.220 SCREENING CHOLESTEROL LEVEL: ICD-10-CM

## 2023-01-09 DIAGNOSIS — N18.32 STAGE 3B CHRONIC KIDNEY DISEASE (HCC): ICD-10-CM

## 2023-01-09 PROBLEM — U07.1 COVID-19 VIRUS INFECTION: Status: RESOLVED | Noted: 2021-02-08 | Resolved: 2023-01-09

## 2023-01-09 PROBLEM — K29.70 GASTRITIS: Status: RESOLVED | Noted: 2018-02-22 | Resolved: 2023-01-09

## 2023-01-09 PROBLEM — R10.33 PERIUMBILICAL ABDOMINAL PAIN: Status: RESOLVED | Noted: 2022-10-12 | Resolved: 2023-01-09

## 2023-01-09 NOTE — ASSESSMENT & PLAN NOTE
Status post two-vessel stent over 10 years ago  Patient also had pacemaker implant 2022  Doing well without chest pain or shortness of breath    Continue follow-up with cardiology as directed

## 2023-01-09 NOTE — ASSESSMENT & PLAN NOTE
Stable polycythemia  Patient had recent phlebotomy  He is also back on hydroxyurea (Monday, Wednesday, Friday)    Continue follow-up with hematologist as directed

## 2023-01-09 NOTE — ASSESSMENT & PLAN NOTE
Lab Results   Component Value Date    EGFR 51 12/01/2022    EGFR 56 10/24/2022    EGFR 78 10/13/2022    CREATININE 1 34 (H) 12/01/2022    CREATININE 1 24 10/24/2022    CREATININE 0 94 10/13/2022   Last GFR from December 2022 was 51  Patient is due for labs    We will call with results

## 2023-01-09 NOTE — ASSESSMENT & PLAN NOTE
Continue reduced carb diet and exercise  Patient is due for fasting blood work    We will call with results

## 2023-01-09 NOTE — ASSESSMENT & PLAN NOTE
Patient was admitted to [facility] October 2022 due to anemia  Patient thought to have myelosuppression caused by hydroxyurea  Hematologist recommended discontinuing this medication  He also had bone myoneural biopsy which was negative  Since that time, his hemoglobin and hematocrit have been stable  Patient was also placed back on hydroxyurea Monday, Wednesday, Friday    Continue follow-up with hematology as directed

## 2023-01-09 NOTE — ASSESSMENT & PLAN NOTE
Patient still smoking approximately 1/2 pack/day  Counseled again  Patient is due for LDCT chest   Order placed today

## 2023-01-13 ENCOUNTER — TELEPHONE (OUTPATIENT)
Dept: HEMATOLOGY ONCOLOGY | Facility: CLINIC | Age: 78
End: 2023-01-13

## 2023-01-13 NOTE — TELEPHONE ENCOUNTER
Voicemail left for patient to have Q2 week labs completed asap  Follow up scheduled for Monday with Dr Duncan Zelaya  If unable to complete labs patient was instructed to return call and reschedule appt

## 2023-01-16 ENCOUNTER — APPOINTMENT (OUTPATIENT)
Dept: LAB | Facility: MEDICAL CENTER | Age: 78
End: 2023-01-16

## 2023-01-16 ENCOUNTER — OFFICE VISIT (OUTPATIENT)
Dept: HEMATOLOGY ONCOLOGY | Facility: CLINIC | Age: 78
End: 2023-01-16

## 2023-01-16 VITALS
SYSTOLIC BLOOD PRESSURE: 114 MMHG | HEIGHT: 68 IN | DIASTOLIC BLOOD PRESSURE: 68 MMHG | OXYGEN SATURATION: 97 % | HEART RATE: 62 BPM | WEIGHT: 171 LBS | TEMPERATURE: 97.9 F | RESPIRATION RATE: 16 BRPM | BODY MASS INDEX: 25.91 KG/M2

## 2023-01-16 DIAGNOSIS — D45 POLYCYTHEMIA VERA (HCC): Primary | ICD-10-CM

## 2023-01-16 DIAGNOSIS — Z15.89 JAK2 GENE MUTATION: ICD-10-CM

## 2023-01-16 RX ORDER — OMEGA-3S/DHA/EPA/FISH OIL/D3 300MG-1000
400 CAPSULE ORAL DAILY
COMMUNITY

## 2023-01-16 NOTE — PROGRESS NOTES
[facility] - Hematology & Medical Oncology  Outpatient Visit Encounter Note      Sanya Pillai 69 y o  male [facility][id number] [id number] Date:  1/16/2023      SUBJECTIVE      Continues to smoke  Forgot to get labs done  Denies any f/c/n/v/cp/ap/sob/cough  Living his routine life  Review of Systems  Review of Systems   All other systems reviewed and are negative  OBJECTIVE     Physical Exam  Vitals:    01/16/23 1459   BP: 114/68   BP Location: Left arm   Patient Position: Sitting   Cuff Size: Large   Pulse: 62   Resp: 16   Temp: 97 9 °F (36 6 °C)   TempSrc: Temporal   SpO2: 97%   Weight: 77 6 kg (171 lb)   Height: 5' 7 72" (1 72 m)       Physical Exam  Vitals reviewed  HENT:      Head: Normocephalic  Eyes:      General: No scleral icterus  Cardiovascular:      Rate and Rhythm: Normal rate  Pulmonary:      Effort: Pulmonary effort is normal  No respiratory distress  Abdominal:      General: There is no distension  Musculoskeletal:         General: Normal range of motion  Cervical back: Normal range of motion  Neurological:      General: No focal deficit present  Mental Status: He is alert and oriented to person, place, and time  Mental status is at baseline  Imaging  Relevant imaging reviewed in chart    Labs  Relevant labs reviewed in chart   ASSESSMENT & PLAN      Diagnosis ICD-10-CM Associated Orders   1  Polycythemia vera ([facility] [address] )  D45       2  JAK2 gene mutation  [id number]             69 y o  male with a history of JAK2 positive polycythemia vera since 2017  Until November 2021, he was being managed with therapeutic phlebotomy  The patient states he never had a bone marrow biopsy  He has high risk designation of his JAK2 positive polycythemia vera given his age as well as his ongoing cigarette smoking  In October 2022, he was diagnosed with antineoplastic therapy induced cytopenias    Subsequently hydroxyurea was discontinued and a bone marrow biopsy was performed which 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

## 2023-01-17 ENCOUNTER — TELEPHONE (OUTPATIENT)
Dept: HEMATOLOGY ONCOLOGY | Facility: CLINIC | Age: 78
End: 2023-01-17

## 2023-01-17 DIAGNOSIS — D45 POLYCYTHEMIA VERA (HCC): Primary | ICD-10-CM

## 2023-01-17 NOTE — TELEPHONE ENCOUNTER
Left message on answering machine with time and date of infusion  Patient was provided with my teams number to return my call

## 2023-01-17 NOTE — TELEPHONE ENCOUNTER
Please reschedule patient [place] phlebotomy appointment to [address] [place] [address] for 1/20, thanks!

## 2023-01-17 NOTE — TELEPHONE ENCOUNTER
Please schedule pt for phlebotomy    ----- Message from Linda Andrews MD sent at 1/17/2023  8:09 AM EST -----  Pls help with 1 session of phlebotomy

## 2023-01-20 ENCOUNTER — HOSPITAL ENCOUNTER (OUTPATIENT)
Dept: INFUSION CENTER | Facility: CLINIC | Age: 78
Discharge: HOME | End: 2023-01-20

## 2023-01-20 VITALS
TEMPERATURE: 97.3 F | SYSTOLIC BLOOD PRESSURE: 151 MMHG | RESPIRATION RATE: 18 BRPM | DIASTOLIC BLOOD PRESSURE: 76 MMHG | HEART RATE: 60 BPM

## 2023-01-20 DIAGNOSIS — D45 POLYCYTHEMIA VERA (HCC): Primary | ICD-10-CM

## 2023-01-20 RX ADMIN — SODIUM CHLORIDE 1000 ML: 0.9 INJECTION, SOLUTION INTRAVENOUS at 14:16

## 2023-01-20 NOTE — PROGRESS NOTES
Pt presents for NSS 1L and therapeutic phlebotomy  No new meds or concerns  Pt tolerated infusion without adverse reaction  Future visits discussed  AVS declined

## 2023-01-30 NOTE — PROGRESS NOTES
Addendum: On 1/20/23 500mL removed vis therapeutic phlebotomy per orders and per protocol  Tolerated well

## 2023-02-08 ENCOUNTER — APPOINTMENT (OUTPATIENT)
Dept: LAB | Facility: MEDICAL CENTER | Age: 78
End: 2023-02-08

## 2023-02-08 DIAGNOSIS — D64.9 ANEMIA, UNSPECIFIED TYPE: ICD-10-CM

## 2023-02-08 DIAGNOSIS — D45 POLYCYTHEMIA VERA (HCC): ICD-10-CM

## 2023-02-08 DIAGNOSIS — Z12.5 SCREENING FOR PROSTATE CANCER: ICD-10-CM

## 2023-02-08 DIAGNOSIS — Z12.11 SCREEN FOR COLON CANCER: Primary | ICD-10-CM

## 2023-02-08 DIAGNOSIS — Z13.220 SCREENING CHOLESTEROL LEVEL: ICD-10-CM

## 2023-02-08 DIAGNOSIS — N18.32 STAGE 3B CHRONIC KIDNEY DISEASE (HCC): ICD-10-CM

## 2023-02-08 DIAGNOSIS — I10 BENIGN ESSENTIAL HYPERTENSION: ICD-10-CM

## 2023-02-08 DIAGNOSIS — R73.03 PREDIABETES: ICD-10-CM

## 2023-02-08 LAB
ALBUMIN SERPL BCP-MCNC: 3.7 G/DL (ref 3.5–5)
ALP SERPL-CCNC: 126 U/L (ref 46–116)
ALT SERPL W P-5'-P-CCNC: 24 U/L (ref 12–78)
ANION GAP SERPL CALCULATED.3IONS-SCNC: 6 MMOL/L (ref 4–13)
AST SERPL W P-5'-P-CCNC: 18 U/L (ref 5–45)
BILIRUB SERPL-MCNC: 0.63 MG/DL (ref 0.2–1)
BUN SERPL-MCNC: 22 MG/DL (ref 5–25)
CALCIUM SERPL-MCNC: 9.3 MG/DL (ref 8.3–10.1)
CHLORIDE SERPL-SCNC: 111 MMOL/L (ref 96–108)
CHOLEST SERPL-MCNC: 92 MG/DL
CO2 SERPL-SCNC: 25 MMOL/L (ref 21–32)
CREAT SERPL-MCNC: 1.31 MG/DL (ref 0.6–1.3)
GFR SERPL CREATININE-BSD FRML MDRD: 52 ML/MIN/1.73SQ M
GLUCOSE P FAST SERPL-MCNC: 110 MG/DL (ref 65–99)
HDLC SERPL-MCNC: 25 MG/DL
LDLC SERPL CALC-MCNC: 44 MG/DL (ref 0–100)
POTASSIUM SERPL-SCNC: 4.1 MMOL/L (ref 3.5–5.3)
PROT SERPL-MCNC: 7.3 G/DL (ref 6.4–8.4)
PSA SERPL-MCNC: 0.2 NG/ML (ref 0–4)
SODIUM SERPL-SCNC: 142 MMOL/L (ref 135–147)
TRIGL SERPL-MCNC: 113 MG/DL
TSH SERPL DL<=0.05 MIU/L-ACNC: 1.73 UIU/ML (ref 0.45–4.5)

## 2023-02-10 LAB
EST. AVERAGE GLUCOSE BLD GHB EST-MCNC: 91 MG/DL
HBA1C MFR BLD: 4.8 %

## 2023-02-14 ENCOUNTER — TELEPHONE (OUTPATIENT)
Dept: HEMATOLOGY ONCOLOGY | Facility: CLINIC | Age: 78
End: 2023-02-14

## 2023-02-14 NOTE — TELEPHONE ENCOUNTER
Appointment Cancellation Or Reschedule     Person calling in Patient   If someone other than patient calling, are they listed on the communication consent form? N/A   Provider Dr Monse Le   Office Visit Date and Time  04/03/23 3PM Dr Breanne Cormier   Did patient want to reschedule their office appointment? If so, when was it scheduled to? 04/06/23 8:40AM   Did you have STAR scheduled for this appointment? No   Do you need STAR set up for your new appointment? If yes, please send to "PATIENT RIDESHARE" pool for STAR rescheduling N/A   Is this patient calling to reschedule an infusion appointment? No   When is their next infusion appointment? N/A   Is this patient a Chemo patient? Yes   Reason for Cancellation or Reschedule GRAEME Dr [place] Areas   Was No show policy reviewed with patient if patient canceling within 24 hours? No     If the patient is cancelling an appointment and needs their STAR Transport cancelled, please route to [address]  If the patient is a treatment patient, please route this to the office nurse  If the patient is not on treatment, please route to the Clerical pool based on location  If the patient is a surgical oncology patient, please route to surg/onc clinical pool  Route message as high priority if same day cancellation

## 2023-02-15 ENCOUNTER — HOSPITAL ENCOUNTER (OUTPATIENT)
Dept: ULTRASOUND IMAGING | Facility: HOSPITAL | Age: 78
Discharge: HOME | End: 2023-02-15

## 2023-02-15 DIAGNOSIS — G56.01 CARPAL TUNNEL SYNDROME ON RIGHT: ICD-10-CM

## 2023-02-21 ENCOUNTER — HOSPITAL ENCOUNTER (OUTPATIENT)
Dept: CT IMAGING | Facility: HOSPITAL | Age: 78
Discharge: HOME | End: 2023-02-21

## 2023-02-21 DIAGNOSIS — F17.210 CIGARETTE SMOKER: ICD-10-CM

## 2023-02-23 ENCOUNTER — OFFICE VISIT (OUTPATIENT)
Dept: UROLOGY | Facility: CLINIC | Age: 78
End: 2023-02-23

## 2023-02-23 VITALS
HEART RATE: 62 BPM | BODY MASS INDEX: 26.22 KG/M2 | DIASTOLIC BLOOD PRESSURE: 72 MMHG | SYSTOLIC BLOOD PRESSURE: 122 MMHG | HEIGHT: 68 IN | WEIGHT: 173 LBS

## 2023-02-23 DIAGNOSIS — N13.8 BPH WITH OBSTRUCTION/LOWER URINARY TRACT SYMPTOMS: ICD-10-CM

## 2023-02-23 DIAGNOSIS — R97.20 ELEVATED PSA: Primary | ICD-10-CM

## 2023-02-23 DIAGNOSIS — N40.1 BPH WITH OBSTRUCTION/LOWER URINARY TRACT SYMPTOMS: ICD-10-CM

## 2023-02-23 RX ORDER — FINASTERIDE 5 MG/1
5 TABLET, FILM COATED ORAL DAILY
Qty: 90 TABLET | Refills: 3 | Status: SHIPPED | OUTPATIENT
Start: 2023-02-23

## 2023-02-23 NOTE — PROGRESS NOTES
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  No concerns at this time  Happy with his surgical outcome  HARINI today smooth flat small prostatic fossa without nodularity  Check one more PSA in a year before discontinue screening altogether  Will continue finasteride daily  Lab Results   Component Value Date    PSA 0 2 02/08/2023    PSA 17 7 (H) 07/29/2021    PSA 17 2 (H) 11/27/2020             History of Present Illness  Sha Abebe is a 69 y o  male here for evaluation of six month followup BPH, fossa navicularis stricture  He underwent robotic simple prostatectomy October 2021 revealing 121g benign tissue did well postop later developed worsening stream and nocturia  Cystoscopy August 2022 reveals fossa navicularis urethral stricture  This was dilated with the scope  He was instructed on cone dilation of his urethra at home he admits he never did this  However he returns today with no symptoms, good stream and no complaints  He is on proscar to prevent prostatic regrowth or bleeding  PSA reduced dramatically current 0 2  Review of Systems   Constitutional: Negative for activity change, appetite change, chills, fever and unexpected weight change  HENT: Negative  Respiratory: Negative  Negative for shortness of breath  Cardiovascular: Negative  Negative for chest pain  Gastrointestinal: Negative for abdominal pain, diarrhea, nausea and vomiting  Endocrine: Negative  Genitourinary: Negative for decreased urine volume, difficulty urinating, dysuria, flank pain, frequency, hematuria and urgency  Musculoskeletal: Negative for back pain and gait problem  Skin: Negative  Allergic/Immunologic: Negative  Neurological: Negative  Hematological: Negative for adenopathy   Does not bruise/bleed easily  Vitals  Vitals:    02/23/23 0758   BP: 122/72   Pulse: 62   Weight: 78 5 kg (173 lb)   Height: 5' 7 72" (1 72 m)       Physical Exam  Vitals and nursing note reviewed  Constitutional:       General: He is not in acute distress  Appearance: He is well-developed  He is not diaphoretic  HENT:      Head: Normocephalic and atraumatic  Pulmonary:      Effort: Pulmonary effort is normal    Abdominal:      Hernia: No hernia is present  Comments: Well healed laparoscopy incision/scars  No hernia  Genitourinary:     Comments: uncircumcised penis mild phimosis, normal phallus, orthotopic patent meatus without visible palpable stricture  Testes smooth descended bilaterally into the scrotum nontender with no palpable mass  Digital rectal exam smooth flat small prostatic fossa without appreciable nodule, induration or asymmetry  Skin:     General: Skin is warm and dry  Neurological:      Mental Status: He is alert and oriented to person, place, and time        Gait: Gait normal    Psychiatric:         Speech: Speech normal          Behavior: Behavior normal            Past History  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"   • Full dentures     but doesnt wear them   • History of 2019 novel coronavirus disease (COVID-19) 02/2021    hospital for 2 days but not in ICU/"mainly dehydrated" "also fever/oxygen below 90"   • History of coronary artery stent placement     x2 in 2008 or so; sees Rowan Tran-- Dr Cortez Pap cardio   • [place] (hard of hearing)     no hearing aids yet   • Hyperlipidemia     Last Assessed: 11/24/17   • Hypertension     Benign essential, Last Assessed:  11/24/17   • LBBB (left bundle branch block)    • Nocturia    • Polycythemia vera (HCC)     (per history in chart)   • PVD (peripheral vascular disease) (HCC)    • RBC abnormality     pt reports told has high Red blood cells/goes to infusion center regularly next appt 10/8/21 hematologist is Dr Elisabet Harding of [address] [place] [address](had been Dr Moraima Brown)   • Seasickness    • Shortness of breath     "if goes up steps gets SOB, had for awhile"   • Slow urinary stream     "sometimes feels like bladder isnt all the way empty"   • Snores    • Wears glasses     reading     Social History     Socioeconomic History   • Marital status: /Civil Union     Spouse name: None   • Number of children: None   • Years of education: None   • Highest education level: None   Occupational History   • None   Tobacco Use   • Smoking status: Every Day     Packs/day: 0 50     Years: 35 00     Pack years: 17 50     Types: Cigarettes   • Smokeless tobacco: Never   • Tobacco comments:     a little less than 1/2ppd, trying to cut back before surgery last smoked 10/16   Vaping Use   • Vaping Use: Never used   Substance and Sexual Activity   • Alcohol use: Never   • Drug use: No   • Sexual activity: Not Currently     Comment: defer   Other Topics Concern   • None   Social History Narrative    Always uses seatbelt    Feels safe at home    Uses safety equipment     Social Determinants of Health     Financial Resource Strain: Not on file   Food Insecurity: No Food Insecurity   • Worried About Running Out of Food in the Last Year: Never true   • Ran Out of Food in the Last Year: Never true   Transportation Needs: No Transportation Needs   • Lack of Transportation (Medical): No   • Lack of Transportation (Non-Medical):  No   Physical Activity: Not on file   Stress: Not on file   Social Connections: Not on file   Intimate Partner Violence: Not on file   Housing Stability: Low Risk    • Unable to Pay for Housing in the Last Year: No   • Number of Places Lived in the Last Year: 1   • Unstable Housing in the Last Year: No     Social History     Tobacco Use   Smoking Status Every Day   • Packs/day: 0 50   • Years: 35 00   • Pack years: 17 50   • Types: Cigarettes   Smokeless Tobacco Never   Tobacco Comments    a little less than 1/2ppd, trying to cut back before surgery last smoked 10/16     Family History   Problem Relation Age of Onset   • No Known Problems Mother    • Heart disease Father        The following portions of the patient's history were reviewed and updated as appropriate: allergies, current medications, past medical history, past social history, past surgical history and problem list     Results  No results found for this or any previous visit (from the past 1 hour(s)) ]  Lab Results   Component Value Date    PSA 0 2 02/08/2023    PSA 17 7 (H) 07/29/2021    PSA 17 2 (H) 11/27/2020    PSA 15 0 (H) 05/22/2020     Lab Results   Component Value Date    CALCIUM 9 3 02/08/2023     11/13/2017    K 4 1 02/08/2023    CO2 25 02/08/2023     (H) 02/08/2023    BUN 22 02/08/2023    CREATININE 1 31 (H) 02/08/2023     Lab Results   Component Value Date    WBC 10 01 02/08/2023    HGB 15 8 02/08/2023    HCT 48 5 02/08/2023     (H) 02/08/2023     (L) 02/08/2023

## 2023-02-27 ENCOUNTER — OFFICE VISIT (OUTPATIENT)
Age: 78
End: 2023-02-27

## 2023-02-27 VITALS
HEIGHT: 68 IN | BODY MASS INDEX: 26.07 KG/M2 | DIASTOLIC BLOOD PRESSURE: 80 MMHG | WEIGHT: 172 LBS | HEART RATE: 68 BPM | SYSTOLIC BLOOD PRESSURE: 120 MMHG

## 2023-02-27 DIAGNOSIS — M65.341 TRIGGER FINGER, RIGHT RING FINGER: ICD-10-CM

## 2023-02-27 DIAGNOSIS — G56.01 CARPAL TUNNEL SYNDROME ON RIGHT: Primary | ICD-10-CM

## 2023-02-27 NOTE — PROGRESS NOTES
1  Carpal tunnel syndrome on right  Ambulatory Referral to Hand Surgery      2  Trigger finger, right ring finger  Ambulatory Referral to Hand Surgery        Orders Placed This Encounter   Procedures   • Ambulatory Referral to Hand Surgery        Imaging Studies (I personally reviewed images in PACS and report):    • Ultrasound evaluation of right wrist 2/15/2023: Carpal tunnel syndrome ruled in based on marked abnormal CSA greater than 14 mm²  • X-ray right wrist 12/5/2022: No acute osseous abnormalities  No significant degenerative changes  • X-ray cervical spine 8/24/2018: Mild to moderate multilevel degenerative changes of the cervical spine with disc base loss most extensive at C6-C7  IMPRESSION:  • Right sided trigger finger of ring digit (vs Dupuytren) - reports some improvement from prior A1 pulley CSI but continued locking in flexion   • Paresthesias of Right hand - reports all digits worsen with use - h/o LEFT sided CTR - currently asymptomatic at rest and negative phalen's/tinels - DDx: CTS, radicular from cervical spine (given spondylotic changes)  o Ongoing for several months - no precipitating injury -  U/S evaluation right wrist confirming carpal tunnel syndrome changes    Other factors:  • RHD  • History of seeing pain management in 2018 in regards to cervical spondylosis for injection    PLAN:    • Clinical exam and radiographic imaging reviewed with patient today, with impression as per above  I have discussed with the patient the pathophysiology of this diagnosis and reviewed how the examination correlates with this diagnosis  • Ultrasound of his wrist reviewed today with patient as noted above  • Despite his clinical exam being negative for Tinel's and Phalen's, he does report a history consistent with carpal tunnel syndrome  The differential could include cervical source as well    However, given he does have carpal tunnel changes of his ultrasound as well as his history of left-sided carpal tunnel release, I recommended he discuss with hand surgery about carpal tunnel release of his right upper extremity going forward  I did offer a carpal tunnel cortisone injection today as well but patient deferred this option  • In regards to his right ring finger trigger finger, I counseled discussed this as well with hand surgery as he may need to consider surgical intervention for this issue as well going forward    Return for Follow up with Dr Priyank Dunn from hand surgery  Portions of the record may have been created with voice recognition software  Occasional wrong word or "sound a like" substitutions may have occurred due to the inherent limitations of voice recognition software  Read the chart carefully and recognize, using context, where substitutions have occurred  I have spent a total time of 20 minutes on 02/28/23 in caring for this patient including Diagnostic results, Prognosis, Risks and benefits of tx options, Instructions for management, Patient and family education, Importance of tx compliance, Risk factor reductions, Impressions, Counseling / Coordination of care, Documenting in the medical record and Reviewing / ordering tests, medicine, procedures    CHIEF COMPLAINT:  Follow up right ring finger pain, right hand numbness    HPI:  Clyde Holstein is a 69 y o  male  who presents for    Visit 2/27/2023: Follow-up right ring finger catching/locking- suspected trigger finger last visit was given a cortisone injection of his A1 pulley  He states he does have improved range of motion overall but still feels with full flexion he gets "stuck" and forced flexion in which he has to self reduce  Reports pain only when ring digit is stuck  Separately, in regards to the paresthesias of his right hand, ultrasound was reviewed as noted above  He states numbness mainly of his thumb, index, middle fingers but intermittently he can have numbness of his entire hand as well    He states it is mainly aggravated from use with his right hand with gripping/pushing/pulling  When I saw him last visit I did prescribe him a night splint to use which he admits he is only been using sparingly    He is never had a cortisone injection of his wrist before and has been discouraged from cortisone injections as he states it does not provide relief for his other issues in the past       Medical, Surgical, Family, and Social History    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"   • Full dentures     but doesnt wear them   • History of 2019 novel coronavirus disease (COVID-19) 02/2021    hospital for 2 days but not in ICU/"mainly dehydrated" "also fever/oxygen below 90"   • History of coronary artery stent placement     x2 in 2008 or so; sees Alena Sosa-- Dr Annmarie Nelson cardio   • [place] (hard of hearing)     no hearing aids yet   • Hyperlipidemia     Last Assessed:  11/24/17   • Hypertension     Benign essential, Last Assessed:  11/24/17   • LBBB (left bundle branch block)    • Nocturia    • Polycythemia vera (HCC)     (per history in chart)   • PVD (peripheral vascular disease) ([facility] [address] )    • RBC abnormality     pt reports told has high Red blood cells/goes to infusion center regularly next appt 10/8/21 hematologist is Dr Stacie Crystal of [address] [place] [address](had been Dr Anders Lesches)   • Seasickness    • Shortness of breath     "if goes up steps gets SOB, had for awhile"   • Slow urinary stream     "sometimes feels like bladder isnt all the way empty"   • Snores    • Wears glasses     reading     Past Surgical History:   Procedure Laterality Date   • ANGIOPLASTY     • APPENDECTOMY     • CARDIAC CATHETERIZATION N/A 7/28/2022    Procedure: CARDIAC TEMPORARY PACEMAKER;  Surgeon: Laurel Strange MD;  [facility]; Service: Cardiology   • CARDIAC ELECTROPHYSIOLOGY PROCEDURE N/A 7/29/2022    Procedure: Cardiac pacer implant;  Surgeon: Edith Johnson MD;  [facility]; Service: Cardiology   • CATARACT EXTRACTION Bilateral    • CORONARY ARTERY BYPASS GRAFT     • CT CYSTOGRAM  11/4/2021   • ELBOW SURGERY Right    • FL CYSTOGRAM  10/27/2021   • IR BIOPSY BONE MARROW  10/14/2022   • KNEE ARTHROSCOPY Right    • MT COLONOSCOPY FLX DX W/COLLJ SPEC WHEN PFRMD N/A 5/22/2017    Procedure: COLONOSCOPY;  Surgeon: Stephani Dumont DO;  Location: [facility];   Service: Gastroenterology   • PROSTATE BIOPSY     • PROSTATECTOMY N/A 10/18/2021    Procedure: ROBOTIC SUBTOTAL/SUPRAPUBIC PROSTATECTOMY;  Surgeon: Noreen Moreno MD;  Location: [facility];  Service: Urology   • SHOULDER ARTHROSCOPY Right    • TONSILLECTOMY       Social History   Social History     Substance and Sexual Activity   Alcohol Use Never     Social History     Substance and Sexual Activity   Drug Use No     Social History     Tobacco Use   Smoking Status Every Day   • Packs/day: 0 50   • Years: 35 00   • Pack years: 17 50   • Types: Cigarettes   Smokeless Tobacco Never   Tobacco Comments    a little less than 1/2ppd, trying to cut back before surgery last smoked 10/16     Family History   Problem Relation Age of Onset   • No Known Problems Mother    • Heart disease Father      Allergies   Allergen Reactions   • Other Other (See Comments)     Pt and wife reports took a medication years ago at work cant recall the name or what it was for"     "couldn't talk and cheeks swelled and also right hand"          Physical Exam  /80   Pulse 68   Ht 5' 8" (1 727 m)   Wt 78 kg (172 lb)   BMI 26 15 kg/m²     Constitutional:  see vital signs  Gen: well-developed, normocephalic/atraumatic, well-groomed  Pulmonary/Chest: Effort normal  No respiratory distress  Right Hand Exam     Muscle Strength   : 5/5     Tests   Phalen’s Sign: negative  Tinel's sign (median nerve): negative  Finkelstein's test: negative    Other   Erythema: absent    Comments:  Full digit MCP/PIP/DIP motion without malrotation or digital scissoring  Thumb MCP/DIP intact with opposition  No swelling, bruising, erythema  Sensation intact in radial/median/ulnar distribution    OK sign: intact  Froment sign: intact  Thumb extension: 5/5 and intact    Reports that he is not experiencing numbness currently with his hand and that he feels is only aggravated with activities of gripping/pushing/pulling        Right ring finger: Palpable nodule over the A1 pulley  There is palmar puckering/tense tissue just proximal to the A1 pulley along fascia  Negative tenderness to palpation over A1 pulley  Positive catching  Positive clicking           Procedures

## 2023-02-28 DIAGNOSIS — E78.2 MIXED HYPERLIPIDEMIA: ICD-10-CM

## 2023-02-28 DIAGNOSIS — I25.10 CORONARY ARTERY DISEASE WITHOUT ANGINA PECTORIS, UNSPECIFIED VESSEL OR LESION TYPE, UNSPECIFIED WHETHER NATIVE OR TRANSPLANTED HEART: ICD-10-CM

## 2023-02-28 RX ORDER — ATORVASTATIN CALCIUM 80 MG/1
TABLET, FILM COATED ORAL
Qty: 90 TABLET | Refills: 1 | Status: SHIPPED | OUTPATIENT
Start: 2023-02-28

## 2023-03-12 ENCOUNTER — TELEPHONE (OUTPATIENT)
Dept: FAMILY MEDICINE CLINIC | Facility: CLINIC | Age: 78
End: 2023-03-12

## 2023-03-13 NOTE — TELEPHONE ENCOUNTER
Please call patient   The  CT of his chest was negative   I would recommend repeating scan again in 1 year

## 2023-04-06 ENCOUNTER — OFFICE VISIT (OUTPATIENT)
Dept: HEMATOLOGY ONCOLOGY | Facility: CLINIC | Age: 78
End: 2023-04-06

## 2023-04-06 VITALS
HEIGHT: 68 IN | BODY MASS INDEX: 26.07 KG/M2 | RESPIRATION RATE: 18 BRPM | OXYGEN SATURATION: 97 % | SYSTOLIC BLOOD PRESSURE: 118 MMHG | DIASTOLIC BLOOD PRESSURE: 76 MMHG | HEART RATE: 60 BPM | WEIGHT: 172 LBS | TEMPERATURE: 97.1 F

## 2023-04-06 DIAGNOSIS — D45 POLYCYTHEMIA VERA (HCC): ICD-10-CM

## 2023-04-06 DIAGNOSIS — Z15.89 JAK2 GENE MUTATION: ICD-10-CM

## 2023-04-06 DIAGNOSIS — Z51.11 ENCOUNTER FOR ANTINEOPLASTIC CHEMOTHERAPY: ICD-10-CM

## 2023-04-06 RX ORDER — HYDROXYUREA 500 MG/1
500 CAPSULE ORAL DAILY
Qty: 90 CAPSULE | Refills: 1 | Status: SHIPPED | OUTPATIENT
Start: 2023-04-06

## 2023-04-06 NOTE — PROGRESS NOTES
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  He is currently on hydroxyurea 500 mg 3 times per week with suboptimal hematocrit control with 48%  I recommended him to take hydroxyurea 500 mg daily  We will continue to monitor his CBC monthly  We may titrate his dose of hydroxyurea based on his hematocrit  We discussed the natural course polycythemia vera which she is aware  I will see him again in 6 months for routine follow-up  Subjective:     HPI:          Interval History:   A 81-year-old gentleman who was diagnosed with JAK2 mutation positive polycythemia vera in 2017  He was initially on phlebotomy as well as baby aspirin  Since 2021, he was placed on hydroxyurea  However, he developed significant anemia as well as small thalassemia in October 2022, when he was on hydroxyurea 1000 mg daily  Temporarily, he discontinued hydroxyurea, later on he was placed on hydroxyurea 500 mg 3 times per week  He underwent bone marrow biopsy for the first time in October 2022 which showed minute involvement of MGUS as well as minute involvement of CLL population  He is currently on hydroxyurea 500 mg 3 times per week  His most recent hematocrit was 48%  He is active smoker  He has no intention to quit  He feels well with no new complaints  He has no fever, chills or night sweats  His weight is stable  His performance status is normal   He has chronic mild exertional shortness of breath  Objective:     Primary Diagnosis:    1  Polycythemia vera with JAK2 mutation, diagnosed in 2017  2   MGUS, diagnosed in September 2022  3   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, diagnosed in September 2022      Cancer Staging:  Cancer Staging   No matching staging information was found for the patient  Previous Hematologic/ Oncologic Treatment:         Current Hematologic/ Oncologic Treatment:      Hydroxyurea since 2021  Disease Status:     na    Test Results:    Pathology:    Bone marrow biopsy in September 2022 showed 7% plasma cell involvement as well as 5 to 7% of B-cell monoclonal population, consistent with CLL clone  Cytogenetics showed 55 XY  Flow cytometry showed minute involvement of CLL clone  Radiology:        Laboratory:    See below  I reviewed his last 6 months of CBC  Physical Exam:      General Appearance:    Alert, oriented        Eyes:    PERRL   Ears:    Normal external ear canals, both ears   Nose:   Nares normal, septum midline   Throat:   Mucosa moist  Pharynx without injection  Neck:   Supple       Lungs:     Clear to auscultation bilaterally   Chest Wall:    No tenderness or deformity    Heart:    Regular rate and rhythm       Abdomen:     Soft, non-tender, bowel sounds +, no organomegaly           Extremities:   Extremities no cyanosis or edema       Skin:   no rash or icterus  Lymph nodes:   Cervical, supraclavicular, and axillary nodes normal   Neurologic:   CNII-XII intact, normal strength, sensation and reflexes     Throughout          Breast exam:   NA         ROS: Review of Systems   All other systems reviewed and are negative  Imaging: No results found        Labs:   Lab Results   Component Value Date    WBC 10 01 02/08/2023    HGB 15 8 02/08/2023    HCT 48 5 02/08/2023     (H) 02/08/2023     (L) 02/08/2023     Lab Results   Component Value Date     11/13/2017    K 4 1 02/08/2023     (H) 02/08/2023    CO2 25 02/08/2023    BUN 22 02/08/2023    CREATININE 1 31 (H) 02/08/2023    GLUF 110 (H) 02/08/2023    CALCIUM 9 3 02/08/2023    CORRECTEDCA 9 3 10/13/2022    AST 18 02/08/2023    ALT 24 02/08/2023    ALKPHOS 126 (H) 02/08/2023    PROT 7 1 11/13/2017    BILITOT 1 1 11/13/2017    EGFR 52 02/08/2023         Lab Results   Component Value Date     (H) 12/01/2022       Lab Results   Component Value Date    SPEP See Comment 12/01/2022    UPEP See Comment 12/01/2022       Lab Results   Component Value Date    PSA 0 2 02/08/2023    PSA 17 7 (H) 07/29/2021    PSA 17 2 (H) 11/27/2020         Lab Results   Component Value Date    IRON 64 (L) 12/01/2022    TIBC 265 12/01/2022    FERRITIN 107 12/01/2022       Lab Results   Component Value Date    GVXFFEVW03 730 12/01/2022       Lab Results   Component Value Date    FOLATE >20 0 (H) 12/01/2022         Current Medications: Reviewed  Allergies: Reviewed  PMH/FH/SH:  Reviewed      Vital Sign:    Body surface area is 1 91 meters squared      Wt Readings from Last 3 Encounters:   04/06/23 78 kg (172 lb)   02/27/23 78 kg (172 lb)   02/23/23 78 5 kg (173 lb)        Temp Readings from Last 3 Encounters:   04/06/23 (!) 97 1 °F (36 2 °C) (Tympanic)   01/20/23 (!) 97 3 °F (36 3 °C) (Temporal)   01/16/23 97 9 °F (36 6 °C) (Temporal)        BP Readings from Last 3 Encounters:   04/06/23 118/76   02/27/23 120/80   02/23/23 122/72         Pulse Readings from Last 3 Encounters:   04/06/23 60   02/27/23 68   02/23/23 62     @LASTSAO2(3)@

## 2023-04-24 ENCOUNTER — TELEPHONE (OUTPATIENT)
Dept: HEMATOLOGY ONCOLOGY | Facility: CLINIC | Age: 78
End: 2023-04-24

## 2023-04-24 ENCOUNTER — OFFICE VISIT (OUTPATIENT)
Dept: OBGYN CLINIC | Facility: MEDICAL CENTER | Age: 78
End: 2023-04-24

## 2023-04-24 ENCOUNTER — LAB (OUTPATIENT)
Dept: LAB | Facility: MEDICAL CENTER | Age: 78
End: 2023-04-24

## 2023-04-24 VITALS
HEIGHT: 67 IN | HEART RATE: 60 BPM | DIASTOLIC BLOOD PRESSURE: 81 MMHG | SYSTOLIC BLOOD PRESSURE: 133 MMHG | BODY MASS INDEX: 27.53 KG/M2 | WEIGHT: 175.4 LBS

## 2023-04-24 DIAGNOSIS — I25.10 CORONARY ARTERIOSCLEROSIS: ICD-10-CM

## 2023-04-24 DIAGNOSIS — N18.32 STAGE 3B CHRONIC KIDNEY DISEASE (HCC): ICD-10-CM

## 2023-04-24 DIAGNOSIS — M65.341 TRIGGER FINGER, RIGHT RING FINGER: ICD-10-CM

## 2023-04-24 DIAGNOSIS — G56.01 CARPAL TUNNEL SYNDROME ON RIGHT: ICD-10-CM

## 2023-04-24 DIAGNOSIS — N25.81 SECONDARY HYPERPARATHYROIDISM OF RENAL ORIGIN (HCC): ICD-10-CM

## 2023-04-24 DIAGNOSIS — I10 BENIGN ESSENTIAL HYPERTENSION: ICD-10-CM

## 2023-04-24 DIAGNOSIS — D45 POLYCYTHEMIA VERA (HCC): Primary | ICD-10-CM

## 2023-04-24 LAB
25(OH)D3 SERPL-MCNC: 40.8 NG/ML (ref 30–100)
ALBUMIN SERPL BCP-MCNC: 3.9 G/DL (ref 3.5–5)
ANION GAP SERPL CALCULATED.3IONS-SCNC: 3 MMOL/L (ref 4–13)
BASOPHILS # BLD AUTO: 0.04 THOUSANDS/ΜL (ref 0–0.1)
BASOPHILS NFR BLD AUTO: 0 % (ref 0–1)
BUN SERPL-MCNC: 16 MG/DL (ref 5–25)
CALCIUM SERPL-MCNC: 9.5 MG/DL (ref 8.3–10.1)
CHLORIDE SERPL-SCNC: 105 MMOL/L (ref 96–108)
CO2 SERPL-SCNC: 29 MMOL/L (ref 21–32)
CREAT SERPL-MCNC: 1.23 MG/DL (ref 0.6–1.3)
EOSINOPHIL # BLD AUTO: 0.51 THOUSAND/ΜL (ref 0–0.61)
EOSINOPHIL NFR BLD AUTO: 5 % (ref 0–6)
ERYTHROCYTE [DISTWIDTH] IN BLOOD BY AUTOMATED COUNT: 15.2 % (ref 11.6–15.1)
GFR SERPL CREATININE-BSD FRML MDRD: 56 ML/MIN/1.73SQ M
GLUCOSE P FAST SERPL-MCNC: 94 MG/DL (ref 65–99)
HCT VFR BLD AUTO: 55.3 % (ref 36.5–49.3)
HGB BLD-MCNC: 18.5 G/DL (ref 12–17)
IMM GRANULOCYTES # BLD AUTO: 0.05 THOUSAND/UL (ref 0–0.2)
IMM GRANULOCYTES NFR BLD AUTO: 0 % (ref 0–2)
LYMPHOCYTES # BLD AUTO: 1.51 THOUSANDS/ΜL (ref 0.6–4.47)
LYMPHOCYTES NFR BLD AUTO: 13 % (ref 14–44)
MCH RBC QN AUTO: 34.8 PG (ref 26.8–34.3)
MCHC RBC AUTO-ENTMCNC: 33.5 G/DL (ref 31.4–37.4)
MCV RBC AUTO: 104 FL (ref 82–98)
MONOCYTES # BLD AUTO: 0.8 THOUSAND/ΜL (ref 0.17–1.22)
MONOCYTES NFR BLD AUTO: 7 % (ref 4–12)
NEUTROPHILS # BLD AUTO: 8.42 THOUSANDS/ΜL (ref 1.85–7.62)
NEUTS SEG NFR BLD AUTO: 75 % (ref 43–75)
NRBC BLD AUTO-RTO: 0 /100 WBCS
PHOSPHATE SERPL-MCNC: 2.7 MG/DL (ref 2.3–4.1)
PLATELET # BLD AUTO: 150 THOUSANDS/UL (ref 149–390)
PMV BLD AUTO: 10.1 FL (ref 8.9–12.7)
POTASSIUM SERPL-SCNC: 4.2 MMOL/L (ref 3.5–5.3)
PTH-INTACT SERPL-MCNC: 58.9 PG/ML (ref 18.4–80.1)
RBC # BLD AUTO: 5.32 MILLION/UL (ref 3.88–5.62)
SODIUM SERPL-SCNC: 137 MMOL/L (ref 135–147)
WBC # BLD AUTO: 11.33 THOUSAND/UL (ref 4.31–10.16)

## 2023-04-24 NOTE — TELEPHONE ENCOUNTER
Spoke with patient to make him aware of Dr Giuseppe To recommendations below  Orders are in the system for x2 phlebotomies  Patient would like to go to the [facility] infusion center  Any day this week except for Tuesday  He prefers mornings as well

## 2023-04-24 NOTE — PROGRESS NOTES
ORTHOPAEDIC HAND, WRIST, AND ELBOW OFFICE  VISIT       ASSESSMENT/PLAN:      69 y o male who presents with Right hand Carpal Tunnel and Ring finger Trigger finger  Physical exam peformed  Results discussed  [facility] Ultrasound reviewed  Perioperative and popstoperative care discussed  All of the risks and benefits of operative treatment were explained to the patient, as well as the risks and benefits of any alternative treatment options, including nonoperative care  This risks of surgery include, but are not limited to, infection, bleeding, blood clot, damage to nerves/arteries, need for further surgery, cardiovascular risk, anesthesia risk, and continued pain  The patient understood this and elects to proceed forward with surgical intervention  Right carpal tunnel release and Ring trigger finger release  Consent obtained  Sedation      The patient verbalized understanding of exam findings and treatment plan  We engaged in the shared decision-making process and treatment options were discussed at length with the patient  Surgical and conservative management discussed today along with risks and benefits  Diagnoses and all orders for this visit:    Carpal tunnel syndrome on right  -     Ambulatory Referral to Hand Surgery    Trigger finger, right ring finger  -     Ambulatory Referral to Hand Surgery        Follow Up:  Return for Post-Op  To Do Next Visit:  Re-evaluation of current issue and Sutures out      Operative Discussions:  Open Carpal Tunnel Release: The anatomy and physiology of carpal tunnel syndrome was discussed with the patient today  Increase pressure localized under the transverse carpal ligament can cause pain, numbness, tingling, or dysesthesias within the median nerve distribution as well as feelings of fatigue, clumsiness, or awkwardness  These symptoms typically occur at night and worse in the morning upon waking    Eventually, untreated carpal tunnel syndrome can result in weakness and permanent loss of muscle within the thenar compartment of the hand  Treatment options were discussed with the patient  Conservative treatment includes nocturnal resting splints to keep the nerve in a neutral position, ergonomic changes within the work or home environment, activity modification, and tendon gliding exercises  Vitamin B6 one tablet daily over the counter may helpful to reduce symptoms  Steroid injections within the carpal canal can help a majority of patients, however this is often self-limited in a majority of patients  Surgical intervention to divide the transverse carpal ligament typically results in a long-lasting relief of the patient's complaints, with the recurrence rate of less than 1%  The patient has elected to undergo an open carpal tunnel release  The palmar incision technique was discussed in the office with the patient today  In the postoperative period, light activities are allowed immediately, driving is allowed when narcotic medication has stopped, and the bandages may be removed and incision may get wet after 2 days  Heavy activities (lifting more than approximately 10 pounds) will be allowed after follow up appointment in 1-2 weeks  While night symptoms (waking from sleep, pain, and discomfort in the hands) generally improves rapidly, the numbness and tingling as well as the strength will slowly improve over weeks to months depending on the chronicity and severity of the carpal tunnel syndrome  Pillar pain and scar discomfort were discussed with the patient which are self-limiting conditions  The risks of bleeding and infection from the surgery are less than 1%  Risk of recurrence is approximately 0 5%  The risks of nerve injury or nerve damage or damage to the blood vessels is approximately 1 in 1200  The patient has an understanding of the above mentioned discussion  The risks and benefits of the procedure were explained to the patient, which include, but are not limited to: Bleeding, infection, recurrence, pain, scar, damage to tendons, damage to nerves, and damage to blood vessels, failure to give desired results and complications related to anesthesia  These risks, along with alternative conservative treatment options, and postoperative protocols were voiced back and understood by the patient  All questions were answered to the patient's satisfaction  The patient agrees to comply with a standard postoperative protocol, and is willing to proceed  Education was provided via written and auditory forms  There were no barriers to learning  Written handouts regarding wound care, incision and scar care, and general preoperative information was provided to the patient  Prior to surgery, the patient may be requested to stop all anti-inflammatory medications  Prophylactic aspirin, Plavix, and Coumadin may be allowed to be continued  Medications including vitamin E , ginkgo, and fish oil are requested to be stopped approximately one week prior to surgery  Hypertensive medications and beta blockers, if taken, s      ____________________________________________________________________________________________________________________________________________      CHIEF COMPLAINT:  Chief Complaint   Patient presents with   • Right Wrist - Pain, Numbness     Fingers, turn white middle finger and ring finger          SUBJECTIVE:  Alisha Villa is a 69y o  year old  male who presents for evaluation of Right hand Carpal Tunnel Syndrome and Ring finger Trigger finger   Referred by Dr Dilan Hardin  Pt reports he has had on/off symptoms of numbness in his Right hand, primarily his 2nd-3rd digits  He states he was given a splint by Dr Dilan Hardin  Pt states he is not really complaint with the brace  Numbness is primarily with using his hand for activities  He ws supposed to have surgery previously but deferred   Would like to discuss options    He also has a Right ring finger trigger finger that  "Yohan injected on 12/12/2022 which did not give his relief of his symptoms   He states he still has locking at times in his finger    History of Left CTR  No past injections  No past Surgeries    I have personally reviewed all the relevant PMH, PSH, SH, FH, Medications and allergies      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\"   • Full dentures     but doesnt wear them   • History of 2019 novel coronavirus disease (COVID-19) 02/2021    hospital for 2 days but not in ICU/\"mainly dehydrated\" \"also fever/oxygen below 90\"   • History of coronary artery stent placement     x2 in 2008 or so; sees Jae Choudhury-- Dr Fredis Abel cardio   • [place] (hard of hearing)     no hearing aids yet   • Hyperlipidemia     Last Assessed:  11/24/17   • Hypertension     Benign essential, Last Assessed:  11/24/17   • LBBB (left bundle branch block)    • Nocturia    • Polycythemia vera (HCC)     (per history in chart)   • PVD (peripheral vascular disease) ([facility] [address] )    • RBC abnormality     pt reports told has high Red blood cells/goes to infusion center regularly next appt 10/8/21 hematologist is Dr Justyn Feliciano of [address] [place] [address](had been Dr Joey Cheung)   • Seasickness    • Shortness of breath     \"if goes up steps gets SOB, had for awhile\"   • Slow urinary stream     \"sometimes feels like bladder isnt all the way empty\"   • Snores    • Wears glasses     reading       PAST SURGICAL HISTORY:  Past Surgical History:   Procedure Laterality Date   • ANGIOPLASTY     • APPENDECTOMY     • CARDIAC CATHETERIZATION N/A 7/28/2022    Procedure: CARDIAC TEMPORARY PACEMAKER;  " Surgeon: Josie Hoffman MD;  [facility]; Service: Cardiology   • CARDIAC ELECTROPHYSIOLOGY PROCEDURE N/A 7/29/2022    Procedure: Cardiac pacer implant;  Surgeon: Andrew Mitchell MD;  [facility]; Service: Cardiology   • CATARACT EXTRACTION Bilateral    • CORONARY ARTERY BYPASS GRAFT     • CT CYSTOGRAM  11/4/2021   • ELBOW SURGERY Right    • FL CYSTOGRAM  10/27/2021   • IR BIOPSY BONE MARROW  10/14/2022   • KNEE ARTHROSCOPY Right    • UT COLONOSCOPY FLX DX W/COLLJ SPEC WHEN PFRMD N/A 5/22/2017    Procedure: COLONOSCOPY;  Surgeon: Manav Byrne DO;  Location: [facility];   Service: Gastroenterology   • PROSTATE BIOPSY     • PROSTATECTOMY N/A 10/18/2021    Procedure: ROBOTIC SUBTOTAL/SUPRAPUBIC PROSTATECTOMY;  Surgeon: Charlotte Ireland MD;  Location: [facility];  Service: Urology   • SHOULDER ARTHROSCOPY Right    • TONSILLECTOMY         FAMILY HISTORY:  Family History   Problem Relation Age of Onset   • No Known Problems Mother    • Heart disease Father        SOCIAL HISTORY:  Social History     Tobacco Use   • Smoking status: Every Day     Packs/day: 0 50     Years: 35 00     Pack years: 17 50     Types: Cigarettes   • Smokeless tobacco: Never   • Tobacco comments:     a little less than 1/2ppd, trying to cut back before surgery last smoked 10/16   Vaping Use   • Vaping Use: Never used   Substance Use Topics   • Alcohol use: Never   • Drug use: No       MEDICATIONS:    Current Outpatient Medications:   •  acetaminophen (TYLENOL) 500 mg tablet, Take 500 mg by mouth every 6 (six) hours as needed for mild pain, Disp: , Rfl:   •  albuterol (Ventolin HFA) 90 mcg/act inhaler, Inhale 2 puffs every 4 (four) hours as needed for wheezing, Disp: 18 g, Rfl: 1  •  amLODIPine (NORVASC) 10 mg tablet, Take 10 mg by mouth every evening , Disp: , Rfl:   •  aspirin (ECOTRIN LOW STRENGTH) 81 mg EC tablet, Take 81 mg by mouth daily, Disp: , Rfl:   •  atorvastatin (LIPITOR) 80 mg tablet, TAKE 1 TABLET "BY MOUTH EVERY DAY IN THE EVENING, Disp: 90 tablet, Rfl: 1  •  cholecalciferol (VITAMIN D3) 400 units tablet, Take 400 Units by mouth daily, Disp: , Rfl:   •  finasteride (PROSCAR) 5 mg tablet, Take 1 tablet (5 mg total) by mouth daily, Disp: 90 tablet, Rfl: 3  •  hydroxyurea (HYDREA) 500 mg capsule, Take 1 capsule (500 mg total) by mouth daily, Disp: 90 capsule, Rfl: 1  •  metoprolol tartrate (LOPRESSOR) 50 mg tablet, Take 50 mg by mouth 2 (two) times a day , Disp: , Rfl:   •  Multiple Vitamin (MULTIVITAMIN) capsule, Take 1 capsule by mouth daily, Disp: , Rfl:   •  nitroglycerin (NITROSTAT) 0 4 mg SL tablet, Place 1 tablet (0 4 mg total) under the tongue every 5 (five) minutes as needed for chest pain, Disp: 10 tablet, Rfl: 1  •  Omega-3 Fatty Acids (FISH OIL) 1200 MG CAPS, Take 1,200 mg by mouth 2 (two) times a day  , Disp: , Rfl:   •  lidocaine (Lidoderm) 5 %, Apply 1 patch topically daily Remove & Discard patch within 12 hours or as directed by MD (Patient not taking: Reported on 1/9/2023), Disp: 30 patch, Rfl: 0    ALLERGIES:  Allergies   Allergen Reactions   • Other Other (See Comments)     Pt and wife reports took a medication years ago at work cant recall the name or what it was for\"     \"couldn't talk and cheeks swelled and also right hand\"           REVIEW OF SYSTEMS:  Review of Systems   Constitutional: Negative for chills and fever  HENT: Negative for ear pain and sore throat  Eyes: Negative for pain and visual disturbance  Respiratory: Negative for cough and shortness of breath  Cardiovascular: Negative for chest pain and palpitations  Gastrointestinal: Negative for abdominal pain and vomiting  Genitourinary: Negative for dysuria and hematuria  Musculoskeletal: Negative for arthralgias and back pain  Skin: Negative for color change and rash  Neurological: Positive for numbness  Negative for seizures and syncope  All other systems reviewed and are negative        VITALS:  Vitals:    " 04/24/23 1029   BP: 133/81   Pulse: 60       LABS:  HgA1c:   Lab Results   Component Value Date    HGBA1C 4 8 02/08/2023     BMP:   Lab Results   Component Value Date    CALCIUM 9 3 02/08/2023     11/13/2017    K 4 1 02/08/2023    CO2 25 02/08/2023     (H) 02/08/2023    BUN 22 02/08/2023    CREATININE 1 31 (H) 02/08/2023       _____________________________________________________  PHYSICAL EXAMINATION:  General: well developed and well nourished, alert, oriented times 3 and appears comfortable  Psychiatric: Normal  HEENT: Normocephalic, Atraumatic Trachea Midline, No torticollis  Pulmonary: No audible wheezing or respiratory distress   Abdomen/GI: Non tender, non distended   Cardiovascular: No pitting edema, 2+ radial pulse   Skin: No masses, erythema, lacerations, fluctation, ulcerations  Neurovascular: Sensation Intact to the Median, Ulnar, Radial Nerve, Motor Intact to the Median, Ulnar, Radial Nerve and Pulses Intact  Musculoskeletal: Normal, except as noted in detailed exam and in HPI  MUSCULOSKELETAL EXAMINATION:  SILT  Composite fist  2 point discrimination  Right   Thumb 8 mm  Rest 5 mm    right Ring finger:  Positive palpable nodule over the A1 pulley  Positive tenderness to palpation over A1 pulley  Positive catching  Negative clicking         ___________________________________________________  STUDIES REVIEWED:  [facility] Ultrasound       IMPRESSION:     Carpal tunnel syndrome ruled in based on marked abnormal CSA > 14 sq mm               PROCEDURES PERFORMED:  Procedures  No Procedures performed today    _____________________________________________________      Arlen Henderson    I,:  Eamon Bacon am acting as a scribe while in the presence of the attending physician :       I,:  Gilberto Molina MD personally performed the services described in this documentation    as scribed in my presence :

## 2023-04-24 NOTE — TELEPHONE ENCOUNTER
----- Message from Danny Young MD sent at 4/24/2023  2:42 PM EDT -----  His Hct is 55  Set up therapeutic phlebotomy 400ml at a time  Do one this week and 2nd in 2 weeks  Can you make sure he takes Hydrea 500mg daily? Check his CBC in 1 month

## 2023-04-24 NOTE — TELEPHONE ENCOUNTER
Please schedule patient for phlebotomy infusion any day in the morning is patients preference  If morning is not available for phlebotomy please schedule earliest appointment in the afternoon  Thank you!

## 2023-04-25 ENCOUNTER — TELEPHONE (OUTPATIENT)
Dept: NEPHROLOGY | Facility: CLINIC | Age: 78
End: 2023-04-25

## 2023-04-25 NOTE — TELEPHONE ENCOUNTER
----- Message from Tita Palencia MD sent at 4/25/2023  3:02 PM EDT -----  Please let the patient know that most recent lab work in terms of renal parameters are stable  Will discuss further at the upcoming visit, let me know if they have any questions or concerns      Thanks

## 2023-04-28 ENCOUNTER — HOSPITAL ENCOUNTER (OUTPATIENT)
Dept: INFUSION CENTER | Facility: CLINIC | Age: 78
Discharge: HOME | End: 2023-04-28

## 2023-04-28 VITALS
RESPIRATION RATE: 16 BRPM | SYSTOLIC BLOOD PRESSURE: 130 MMHG | TEMPERATURE: 97.1 F | HEART RATE: 59 BPM | DIASTOLIC BLOOD PRESSURE: 84 MMHG

## 2023-04-28 DIAGNOSIS — D45 POLYCYTHEMIA VERA (HCC): Primary | ICD-10-CM

## 2023-04-28 NOTE — PROGRESS NOTES
Pt  Denied new symptoms or concerns today  Therapeutic Phlebotomy completed removing 400 ml of Blood via RAC  Pt  Tolerated well  Pt  Will return in 14 days as ordered  CBC ordered monthly per Dr Terrence An  See Note from 4/6/23    Declined AVS

## 2023-05-02 ENCOUNTER — TELEPHONE (OUTPATIENT)
Dept: FAMILY MEDICINE CLINIC | Facility: CLINIC | Age: 78
End: 2023-05-02

## 2023-05-02 DIAGNOSIS — K76.89 HEPATIC CYST: Primary | ICD-10-CM

## 2023-05-02 NOTE — TELEPHONE ENCOUNTER
Please call patient and notify him that when he was in the emergency room October 2022, the doctor advised him to have a follow-up ultrasound of his liver due to a cyst that was seen on CT scan  The cyst appears benign, but should have further clarification  I will place order for this ultrasound  He can schedule this anytime

## 2023-05-04 NOTE — TELEPHONE ENCOUNTER
Spoke with patient  Gave him Dr Lachelle Moore message along with Central Scheduling number  Patient stated he will call to schedule

## 2023-05-08 ENCOUNTER — TELEPHONE (OUTPATIENT)
Dept: HEMATOLOGY ONCOLOGY | Facility: CLINIC | Age: 78
End: 2023-05-08

## 2023-05-08 NOTE — TELEPHONE ENCOUNTER
Appointment Confirmation   Who are you speaking with? Patient   If it is not the patient, are they listed on an active communication consent form? N/A   Which provider is the appointment scheduled with? Infusion    When is the appointment scheduled? Please list date and time 05/12 at 9:30am   At which location is the appointment scheduled to take place? [place]   Did caller verbalize understanding of appointment details?  Yes

## 2023-05-09 ENCOUNTER — HOSPITAL ENCOUNTER (OUTPATIENT)
Dept: ULTRASOUND IMAGING | Facility: HOSPITAL | Age: 78
Discharge: HOME | End: 2023-05-09

## 2023-05-09 DIAGNOSIS — K76.89 HEPATIC CYST: ICD-10-CM

## 2023-05-12 ENCOUNTER — HOSPITAL ENCOUNTER (OUTPATIENT)
Dept: INFUSION CENTER | Facility: CLINIC | Age: 78
Discharge: HOME | End: 2023-05-12

## 2023-05-12 VITALS
DIASTOLIC BLOOD PRESSURE: 81 MMHG | HEART RATE: 66 BPM | RESPIRATION RATE: 16 BRPM | SYSTOLIC BLOOD PRESSURE: 133 MMHG | TEMPERATURE: 97.5 F

## 2023-05-12 DIAGNOSIS — D45 POLYCYTHEMIA VERA (HCC): Primary | ICD-10-CM

## 2023-05-12 NOTE — PROGRESS NOTES
Pt  Denies new symptoms or concerns today  Labs reviewed  There are no parameters  Pt  Has labs monthly and Dr Camille Dyson will call with ordered  Pt  Tolerated removal of 400ml of Blood today  AVS declined

## 2023-05-12 NOTE — PLAN OF CARE
Problem: INFECTION - ADULT  Goal: Absence or prevention of progression during hospitalization  Description: INTERVENTIONS:  - Assess and monitor for signs and symptoms of infection  - Monitor lab/diagnostic results  - Monitor all insertion sites, i e  indwelling lines, tubes, and drains  - Monitor endotracheal if appropriate and nasal secretions for changes in amount and color  - [place] appropriate cooling/warming therapies per order  - Administer medications as ordered  - Instruct and encourage patient and family to use good hand hygiene technique  - Identify and instruct in appropriate isolation precautions for identified infection/condition  Outcome: Progressing  Goal: Absence of fever/infection during neutropenic period  Description: INTERVENTIONS:  - Monitor WBC    Outcome: Progressing     Problem: Knowledge Deficit  Goal: Patient/family/caregiver demonstrates understanding of disease process, treatment plan, medications, and discharge instructions  Description: Complete learning assessment and assess knowledge base    Interventions:  - Provide teaching at level of understanding  - Provide teaching via preferred learning methods  Outcome: Progressing

## 2023-05-16 ENCOUNTER — ANESTHESIA EVENT (OUTPATIENT)
Dept: PERIOP | Facility: HOSPITAL | Age: 78
End: 2023-05-16

## 2023-05-23 NOTE — PRE-PROCEDURE INSTRUCTIONS
Pre-Surgery Instructions:   Medication Instructions   • acetaminophen (TYLENOL) 500 mg tablet Uses PRN- OK to take day of surgery   • amLODIPine (NORVASC) 10 mg tablet Take night before surgery   • aspirin (ECOTRIN LOW STRENGTH) 81 mg EC tablet Instructions provided by MD   • atorvastatin (LIPITOR) 80 mg tablet Take day of surgery  • cholecalciferol (VITAMIN D3) 400 units tablet Hold day of surgery  • finasteride (PROSCAR) 5 mg tablet Hold day of surgery  • hydroxyurea (HYDREA) 500 mg capsule Hold day of surgery  • metoprolol tartrate (LOPRESSOR) 50 mg tablet Take day of surgery  • Multiple Vitamin (MULTIVITAMIN) capsule Stop taking 3 days prior to surgery  • nitroglycerin (NITROSTAT) 0 4 mg SL tablet Uses PRN- OK to take day of surgery   • Omega-3 Fatty Acids (FISH OIL) 1200 MG CAPS Stop taking 3 days prior to surgery  Medication instructions for day surgery reviewed  Please use only a sip of water to take your instructed medications  Avoid all over the counter vitamins, supplements and NSAIDS for one week prior to surgery per anesthesia guidelines  Tylenol is ok to take as needed  You will receive a call one business day prior to surgery with an arrival time and hospital directions  If your surgery is scheduled on a Monday, the hospital will be calling you on the Friday prior to your surgery  If you have not heard from anyone by 8pm, please call the hospital supervisor through the hospital  at [phone]  Son Kumar [phone])  Do not eat or drink anything after midnight the night before your surgery, including candy, mints, lifesavers, or chewing gum  Do not drink alcohol 24hrs before your surgery  Try not to smoke at least 24hrs before your surgery  Follow the pre surgery showering instructions as listed in the [facility] Surgical Experience Booklet” or otherwise provided by your surgeon's office  Do not shave the surgical area 24 hours before surgery   Do not apply any lotions, creams, including makeup, cologne, deodorant, or perfumes after showering on the day of your surgery  No contact lenses, eye make-up, or artificial eyelashes  Remove nail polish, including gel polish, and any artificial, gel, or acrylic nails if possible  Remove all jewelry including rings and body piercing jewelry  Wear causal clothing that is easy to take on and off  Consider your type of surgery  Keep any valuables, jewelry, piercings at home  Please bring any specially ordered equipment (sling, braces) if indicated  Arrange for a responsible person to drive you to and from the hospital on the day of your surgery  Visitor Guidelines discussed  Call the surgeon's office with any new illnesses, exposures, or additional questions prior to surgery  Please reference your [facility] Surgical Experience Booklet” for additional information to prepare for your upcoming surgery

## 2023-05-24 ENCOUNTER — HOSPITAL ENCOUNTER (OUTPATIENT)
Dept: CT IMAGING | Facility: HOSPITAL | Age: 78
Discharge: HOME | End: 2023-05-24

## 2023-05-24 ENCOUNTER — OFFICE VISIT (OUTPATIENT)
Dept: FAMILY MEDICINE CLINIC | Facility: CLINIC | Age: 78
End: 2023-05-24

## 2023-05-24 VITALS
RESPIRATION RATE: 16 BRPM | HEART RATE: 66 BPM | DIASTOLIC BLOOD PRESSURE: 80 MMHG | WEIGHT: 171 LBS | HEIGHT: 67 IN | TEMPERATURE: 97.7 F | BODY MASS INDEX: 26.84 KG/M2 | OXYGEN SATURATION: 98 % | SYSTOLIC BLOOD PRESSURE: 126 MMHG

## 2023-05-24 DIAGNOSIS — R10.13 EPIGASTRIC PAIN: Primary | ICD-10-CM

## 2023-05-24 DIAGNOSIS — R10.13 EPIGASTRIC PAIN: ICD-10-CM

## 2023-05-24 RX ORDER — OMEPRAZOLE 40 MG/1
40 CAPSULE, DELAYED RELEASE ORAL
Qty: 30 CAPSULE | Refills: 0 | Status: SHIPPED | OUTPATIENT
Start: 2023-05-24 | End: 2023-05-30

## 2023-05-24 RX ADMIN — IOHEXOL 100 ML: 350 INJECTION, SOLUTION INTRAVENOUS at 20:58

## 2023-05-24 RX ADMIN — IOHEXOL 30 ML: 300 INJECTION, SOLUTION INTRAVENOUS at 21:00

## 2023-05-24 NOTE — PATIENT INSTRUCTIONS
Complete blood work and CT scan  We will notify you of results  If worsening pain, go to ER  Call if no improvement with medications

## 2023-05-24 NOTE — ASSESSMENT & PLAN NOTE
Check labs and CT scan given exam; may be gastritis and will trial PPI; worsening symptoms advised ER; f/u guidance given; if CT and labs unremarkable will recheck in 2 weeks

## 2023-05-24 NOTE — PROGRESS NOTES
"Assessment/Plan:     1  Epigastric pain  Assessment & Plan:  Check labs and CT scan given exam; may be gastritis and will trial PPI; worsening symptoms advised ER; f/u guidance given; if CT and labs unremarkable will recheck in 2 weeks    Orders:  -     CT abdomen pelvis w contrast; Future; Expected date: 05/24/2023  -     Lipase; Future  -     Comprehensive metabolic panel; Future  -     CBC and differential; Future  -     omeprazole (PriLOSEC) 40 MG capsule; Take 1 capsule (40 mg total) by mouth daily before breakfast        Subjective:      Patient ID: Philip Hamman is a 69 y o  male  Patient is here with concerns of abdominal pain  Started on Monday  Feels a better than when it started but still painful  Constant  Has not tried anything for it  No N/V  No diarrhea/constipation  Does not feel worse with eating  Feels bloated  Has a lot of belching  No fevers/chills  Does not drink alcohol  He is a smoker  Has not had any symptoms like this before  Had recent u/s that showed gallbladder sludge but no signs of acute cholecystitis  The following portions of the patient's history were reviewed and updated as appropriate: allergies, current medications, past family history, past medical history, past social history, past surgical history, and problem list     Review of Systems   Constitutional: Positive for chills  Negative for appetite change  Respiratory: Negative for shortness of breath  Cardiovascular: Negative for chest pain  Gastrointestinal: Positive for abdominal pain  Negative for diarrhea, nausea and vomiting  Objective:      /80 (BP Location: Left arm, Patient Position: Sitting, Cuff Size: Adult)   Pulse 66   Temp 97 7 °F (36 5 °C) (Temporal)   Resp 16   Ht 5' 6 93\" (1 7 m)   Wt 77 6 kg (171 lb)   SpO2 98%   BMI 26 84 kg/m²          Physical Exam  Vitals reviewed  Constitutional:       General: He is not in acute distress  Appearance: Normal appearance   He is not " ill-appearing, toxic-appearing or diaphoretic  HENT:      Head: Normocephalic and atraumatic  Eyes:      General: No scleral icterus  Right eye: No discharge  Left eye: No discharge  Conjunctiva/sclera: Conjunctivae normal    Cardiovascular:      Rate and Rhythm: Normal rate and regular rhythm  Pulses: Normal pulses  Heart sounds: Normal heart sounds  No murmur heard  No gallop  Pulmonary:      Effort: Pulmonary effort is normal  No respiratory distress  Breath sounds: Normal breath sounds  No stridor  No wheezing, rhonchi or rales  Abdominal:      General: Bowel sounds are normal       Palpations: Abdomen is soft  Tenderness: There is abdominal tenderness in the right upper quadrant and epigastric area  There is guarding  There is no rebound  Musculoskeletal:      Right lower leg: No edema  Left lower leg: No edema  Neurological:      General: No focal deficit present  Mental Status: He is alert and oriented to person, place, and time  Psychiatric:         Mood and Affect: Mood normal          Behavior: Behavior normal          Thought Content:  Thought content normal          Judgment: Judgment normal

## 2023-05-25 ENCOUNTER — TELEPHONE (OUTPATIENT)
Dept: OBGYN CLINIC | Facility: HOSPITAL | Age: 78
End: 2023-05-25

## 2023-05-25 NOTE — TELEPHONE ENCOUNTER
Caller:  Willem ANDREWS    Doctor/Office: Anna Duarte    Call regarding :  Questions about holding aspirin     Call was transferred to: surgery coordinator

## 2023-05-25 NOTE — ANESTHESIA PREPROCEDURE EVALUATION
Procedure:  CTR, RIGHT (Right: Wrist)  RELEASE TRIGGER FINGER, RIGHT RING FINGER (Right: Hand)    Relevant Problems   ANESTHESIA (within normal limits)      CARDIO   (+) Benign essential hypertension   (+) CAD (coronary artery disease)   (+) Heart block   (+) Left bundle branch block   (+) Mixed hyperlipidemia      GI/HEPATIC   (+) Hepatic cyst      /RENAL   (+) Stage 3b chronic kidney disease (HCC)      HEMATOLOGY  Polycythemia   (+) Thrombocytopenia (HCC)      MUSCULOSKELETAL   (+) Cervical spondylosis without myelopathy      NEURO/PSYCH   (+) Chronic left shoulder pain      PULMONARY   (+) Chronic obstructive pulmonary disease (HCC)   (+) Cigarette smoker      Nervous and Auditory   (+) Cervical radiculopathy      Other   (+) History of cardiac pacemaker   (+) Polycythemia vera (HCC)   (+) Prediabetes        Physical Exam    Airway    Mallampati score: II  TM Distance: >3 FB  Neck ROM: limited     Dental   Comment: edentulous,     Cardiovascular  Rhythm: regular, Rate: normal, Cardiovascular exam normal    Pulmonary  Pulmonary exam normal Breath sounds clear to auscultation,     Other Findings        Anesthesia Plan  ASA Score- 3     Anesthesia Type- general with ASA Monitors  Additional Monitors:   Airway Plan:           Plan Factors-    Chart reviewed  EKG reviewed  Existing labs reviewed  Patient summary reviewed  Patient is a current smoker  Patient instructed to abstain from smoking on day of procedure  Patient did not smoke on day of surgery  Obstructive sleep apnea risk education given perioperatively  Induction- intravenous  Postoperative Plan-     Informed Consent- Anesthetic plan and risks discussed with patient

## 2023-05-26 ENCOUNTER — ANESTHESIA (OUTPATIENT)
Dept: PERIOP | Facility: HOSPITAL | Age: 78
End: 2023-05-26

## 2023-05-26 ENCOUNTER — TELEPHONE (OUTPATIENT)
Dept: FAMILY MEDICINE CLINIC | Facility: CLINIC | Age: 78
End: 2023-05-26

## 2023-05-26 ENCOUNTER — HOSPITAL ENCOUNTER (OUTPATIENT)
Facility: HOSPITAL | Age: 78
Setting detail: OUTPATIENT SURGERY
Discharge: HOME | End: 2023-05-26
Attending: SURGERY | Admitting: SURGERY

## 2023-05-26 VITALS
DIASTOLIC BLOOD PRESSURE: 69 MMHG | BODY MASS INDEX: 26.26 KG/M2 | RESPIRATION RATE: 16 BRPM | WEIGHT: 167.33 LBS | OXYGEN SATURATION: 96 % | TEMPERATURE: 97 F | HEART RATE: 60 BPM | SYSTOLIC BLOOD PRESSURE: 150 MMHG

## 2023-05-26 DIAGNOSIS — Z48.89 AFTERCARE FOLLOWING SURGERY: Primary | ICD-10-CM

## 2023-05-26 DIAGNOSIS — R93.89 ABNORMAL CT OF THE CHEST: Primary | ICD-10-CM

## 2023-05-26 RX ORDER — OXYCODONE HYDROCHLORIDE 5 MG/1
5 TABLET ORAL EVERY 4 HOURS PRN
Status: DISCONTINUED | OUTPATIENT
Start: 2023-05-26 | End: 2023-05-26 | Stop reason: HOSPADM

## 2023-05-26 RX ORDER — ONDANSETRON 2 MG/ML
4 INJECTION INTRAMUSCULAR; INTRAVENOUS ONCE AS NEEDED
Status: DISCONTINUED | OUTPATIENT
Start: 2023-05-26 | End: 2023-05-26 | Stop reason: HOSPADM

## 2023-05-26 RX ORDER — MIDAZOLAM HYDROCHLORIDE 2 MG/2ML
INJECTION, SOLUTION INTRAMUSCULAR; INTRAVENOUS AS NEEDED
Status: DISCONTINUED | OUTPATIENT
Start: 2023-05-26 | End: 2023-05-26

## 2023-05-26 RX ORDER — PROPOFOL 10 MG/ML
INJECTION, EMULSION INTRAVENOUS AS NEEDED
Status: DISCONTINUED | OUTPATIENT
Start: 2023-05-26 | End: 2023-05-26

## 2023-05-26 RX ORDER — LIDOCAINE HYDROCHLORIDE AND EPINEPHRINE 10; 10 MG/ML; UG/ML
INJECTION, SOLUTION INFILTRATION; PERINEURAL AS NEEDED
Status: DISCONTINUED | OUTPATIENT
Start: 2023-05-26 | End: 2023-05-26 | Stop reason: HOSPADM

## 2023-05-26 RX ORDER — FENTANYL CITRATE 50 UG/ML
INJECTION, SOLUTION INTRAMUSCULAR; INTRAVENOUS AS NEEDED
Status: DISCONTINUED | OUTPATIENT
Start: 2023-05-26 | End: 2023-05-26

## 2023-05-26 RX ORDER — FENTANYL CITRATE/PF 50 MCG/ML
25 SYRINGE (ML) INJECTION
Status: DISCONTINUED | OUTPATIENT
Start: 2023-05-26 | End: 2023-05-26 | Stop reason: HOSPADM

## 2023-05-26 RX ORDER — ACETAMINOPHEN 325 MG/1
650 TABLET ORAL EVERY 6 HOURS PRN
Status: DISCONTINUED | OUTPATIENT
Start: 2023-05-26 | End: 2023-05-26 | Stop reason: HOSPADM

## 2023-05-26 RX ORDER — BUPIVACAINE HYDROCHLORIDE AND EPINEPHRINE 2.5; 5 MG/ML; UG/ML
INJECTION, SOLUTION EPIDURAL; INFILTRATION; INTRACAUDAL; PERINEURAL AS NEEDED
Status: DISCONTINUED | OUTPATIENT
Start: 2023-05-26 | End: 2023-05-26 | Stop reason: HOSPADM

## 2023-05-26 RX ORDER — HYDROMORPHONE HCL/PF 1 MG/ML
0.5 SYRINGE (ML) INJECTION
Status: DISCONTINUED | OUTPATIENT
Start: 2023-05-26 | End: 2023-05-26 | Stop reason: HOSPADM

## 2023-05-26 RX ORDER — SODIUM CHLORIDE, SODIUM LACTATE, POTASSIUM CHLORIDE, CALCIUM CHLORIDE 600; 310; 30; 20 MG/100ML; MG/100ML; MG/100ML; MG/100ML
125 INJECTION, SOLUTION INTRAVENOUS CONTINUOUS
Status: DISCONTINUED | OUTPATIENT
Start: 2023-05-26 | End: 2023-05-26 | Stop reason: HOSPADM

## 2023-05-26 RX ORDER — PROPOFOL 10 MG/ML
INJECTION, EMULSION INTRAVENOUS CONTINUOUS PRN
Status: DISCONTINUED | OUTPATIENT
Start: 2023-05-26 | End: 2023-05-26

## 2023-05-26 RX ORDER — MAGNESIUM HYDROXIDE 1200 MG/15ML
LIQUID ORAL AS NEEDED
Status: DISCONTINUED | OUTPATIENT
Start: 2023-05-26 | End: 2023-05-26 | Stop reason: HOSPADM

## 2023-05-26 RX ADMIN — FENTANYL CITRATE 100 MCG: 50 INJECTION INTRAMUSCULAR; INTRAVENOUS at 07:45

## 2023-05-26 RX ADMIN — MIDAZOLAM 2 MG: 1 INJECTION INTRAMUSCULAR; INTRAVENOUS at 07:45

## 2023-05-26 RX ADMIN — PROPOFOL 50 MCG/KG/MIN: 10 INJECTION, EMULSION INTRAVENOUS at 07:48

## 2023-05-26 RX ADMIN — SODIUM CHLORIDE, SODIUM LACTATE, POTASSIUM CHLORIDE, AND CALCIUM CHLORIDE 125 ML/HR: .6; .31; .03; .02 INJECTION, SOLUTION INTRAVENOUS at 06:42

## 2023-05-26 RX ADMIN — PROPOFOL 40 MG: 10 INJECTION, EMULSION INTRAVENOUS at 07:47

## 2023-05-26 NOTE — TELEPHONE ENCOUNTER
Called and left message for patient again  Per review, feeling better  Will try again to assess symptoms

## 2023-05-26 NOTE — ANESTHESIA POSTPROCEDURE EVALUATION
Post-Op Assessment Note    CV Status:  Stable    Pain management: adequate     Mental Status:  Alert and awake   Hydration Status:  Euvolemic   PONV Controlled:  Controlled   Airway Patency:  Patent      Post Op Vitals Reviewed: Yes      Staff: Anesthesiologist         No notable events documented      BP      Temp      Pulse     Resp      SpO2      /63   Pulse 60   Temp 97 7 °F (36 5 °C) (Temporal)   Resp 16   Wt 75 9 kg (167 lb 5 3 oz)   SpO2 97%   BMI 26 26 kg/m²

## 2023-05-26 NOTE — TELEPHONE ENCOUNTER
"patient stated he did have some sharp pain to the abdomen which is no longer having  \"feeling better\"    "

## 2023-05-26 NOTE — H&P
Hand Surgery H&P    ASSESSMENT/PLAN:       69 y o male who presents with Right hand Carpal Tunnel and Ring finger Trigger finger  Physical exam peformed  Results discussed  [facility] Ultrasound reviewed  Perioperative and popstoperative care discussed  All of the risks and benefits of operative treatment were explained to the patient, as well as the risks and benefits of any alternative treatment options, including nonoperative care  This risks of surgery include, but are not limited to, infection, bleeding, blood clot, damage to nerves/arteries, need for further surgery, cardiovascular risk, anesthesia risk, and continued pain   The patient understood this and elects to proceed forward with surgical intervention      Right carpal tunnel release and Ring trigger finger release  Consent on chart  Sedation        The patient verbalized understanding of exam findings and treatment plan  We engaged in the shared decision-making process and treatment options were discussed at length with the patient  Surgical and conservative management discussed today along with risks and benefits      Diagnoses and all orders for this visit:     Carpal tunnel syndrome on right  -     Ambulatory Referral to Hand Surgery     Trigger finger, right ring finger  -     Ambulatory Referral to Hand Surgery           Follow Up:  Return for Post-Op      To Do Next Visit:  Re-evaluation of current issue and Sutures out        Operative Discussions:  Open Carpal Tunnel Release: The anatomy and physiology of carpal tunnel syndrome was discussed with the patient today  Increase pressure localized under the transverse carpal ligament can cause pain, numbness, tingling, or dysesthesias within the median nerve distribution as well as feelings of fatigue, clumsiness, or awkwardness  These symptoms typically occur at night and worse in the morning upon waking    Eventually, untreated carpal tunnel syndrome can result in weakness and permanent loss of muscle within the thenar compartment of the hand  Treatment options were discussed with the patient  Conservative treatment includes nocturnal resting splints to keep the nerve in a neutral position, ergonomic changes within the work or home environment, activity modification, and tendon gliding exercises  Vitamin B6 one tablet daily over the counter may helpful to reduce symptoms  Steroid injections within the carpal canal can help a majority of patients, however this is often self-limited in a majority of patients  Surgical intervention to divide the transverse carpal ligament typically results in a long-lasting relief of the patient's complaints, with the recurrence rate of less than 1%  The patient has elected to undergo an open carpal tunnel release  The palmar incision technique was discussed in the office with the patient today  In the postoperative period, light activities are allowed immediately, driving is allowed when narcotic medication has stopped, and the bandages may be removed and incision may get wet after 2 days  Heavy activities (lifting more than approximately 10 pounds) will be allowed after follow up appointment in 1-2 weeks  While night symptoms (waking from sleep, pain, and discomfort in the hands) generally improves rapidly, the numbness and tingling as well as the strength will slowly improve over weeks to months depending on the chronicity and severity of the carpal tunnel syndrome  Pillar pain and scar discomfort were discussed with the patient which are self-limiting conditions  The risks of bleeding and infection from the surgery are less than 1%  Risk of recurrence is approximately 0 5%  The risks of nerve injury or nerve damage or damage to the blood vessels is approximately 1 in 1200  The patient has an understanding of the above mentioned discussion  The risks and benefits of the procedure were explained to the patient, which include, but are not limited to: Bleeding, infection, recurrence, pain, scar, damage to tendons, damage to nerves, and damage to blood vessels, failure to give desired results and complications related to anesthesia  These risks, along with alternative conservative treatment options, and postoperative protocols were voiced back and understood by the patient  All questions were answered to the patient's satisfaction  The patient agrees to comply with a standard postoperative protocol, and is willing to proceed  Education was provided via written and auditory forms  There were no barriers to learning  Written handouts regarding wound care, incision and scar care, and general preoperative information was provided to the patient  Prior to surgery, the patient may be requested to stop all anti-inflammatory medications  Prophylactic aspirin, Plavix, and Coumadin may be allowed to be continued  Medications including vitamin E , ginkgo, and fish oil are requested to be stopped approximately one week prior to surgery  Hypertensive medications and beta blockers, if taken, s        ____________________________________________________________________________________________________________________________________________        CHIEF COMPLAINT:       Chief Complaint   Patient presents with   • Right Wrist - Pain, Numbness       Fingers, turn white middle finger and ring finger             SUBJECTIVE:  Chela Maki is a 69y o  year old  male who presents for evaluation of Right hand Carpal Tunnel Syndrome and Ring finger Trigger finger   Referred by Dr Kirill Khan  Pt reports he has had on/off symptoms of numbness in his Right hand, primarily his 2nd-3rd digits  He states he was given a splint by Dr Kirill Khan  Pt states he is not really complaint with the brace  Numbness is primarily with using his hand for activities  He ws supposed to have surgery previously but deferred   Would like to discuss options     He also has a Right ring finger trigger finger that Dr Kirill Khan "injected on 12/12/2022 which did not give his relief of his symptoms   He states he still has locking at times in his finger     History of Left CTR  No past injections  No past Surgeries     I have personally reviewed all the relevant PMH, PSH, SH, FH, Medications and allergies        PAST MEDICAL HISTORY:  Medical History        Past Medical History:   Diagnosis Date   • Arthritis       both shoulders   • CAD (coronary artery disease)       Last Assessed:  11/4/13   • Cigarette smoker     • Colon polyp     • Elevated prostate specific antigen (PSA)       Last Assessed:  12/21/17   • Enlarged prostate     • Exercise involving walking       in season hunt's and fish's/ 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\"   • Full dentures       but doesnt wear them   • History of 2019 novel coronavirus disease (COVID-19) 02/2021     hospital for 2 days but not in ICU/\"mainly dehydrated\" \"also fever/oxygen below 90\"   • History of coronary artery stent placement       x2 in 2008 or so; sees Kate Stringer-- Dr Naldo Calloway cardio   • [place] (hard of hearing)       no hearing aids yet   • Hyperlipidemia       Last Assessed:  11/24/17   • Hypertension       Benign essential, Last Assessed:  11/24/17   • LBBB (left bundle branch block)     • Nocturia     • Polycythemia vera (HCC)       (per history in chart)   • PVD (peripheral vascular disease) ([facility] [address] )     • RBC abnormality       pt reports told has high Red blood cells/goes to infusion center regularly next appt 10/8/21 hematologist is Dr Kiersten Amanda of [address] [place] [address](had been Dr Brittany Fowler)   • Seasickness     • Shortness of breath       \"if goes up steps gets SOB, had for awhile\"   • Slow urinary stream       \"sometimes feels like bladder isnt all the way empty\"   • Snores     • Wears glasses       reading            PAST SURGICAL HISTORY:  Surgical History         Past Surgical History:   Procedure Laterality Date   • ANGIOPLASTY       • APPENDECTOMY     " • CARDIAC CATHETERIZATION N/A 7/28/2022     Procedure: CARDIAC TEMPORARY PACEMAKER;  Surgeon: Alexa Dent MD;  [facility]; Service: Cardiology   • CARDIAC ELECTROPHYSIOLOGY PROCEDURE N/A 7/29/2022     Procedure: Cardiac pacer implant;  Surgeon: Cortez Turner MD;  [facility]; Service: Cardiology   • CATARACT EXTRACTION Bilateral     • CORONARY ARTERY BYPASS GRAFT       • CT CYSTOGRAM   11/4/2021   • ELBOW SURGERY Right     • FL CYSTOGRAM   10/27/2021   • IR BIOPSY BONE MARROW   10/14/2022   • KNEE ARTHROSCOPY Right     • SD COLONOSCOPY FLX DX W/COLLJ SPEC WHEN PFRMD N/A 5/22/2017     Procedure: COLONOSCOPY;  Surgeon: Zainab Almeida DO;  Location: [facility]; Service: Gastroenterology   • PROSTATE BIOPSY       • PROSTATECTOMY N/A 10/18/2021     Procedure: ROBOTIC SUBTOTAL/SUPRAPUBIC PROSTATECTOMY;  Surgeon: Liyah Sawyer MD;  Location: [facility];  Service: Urology   • SHOULDER ARTHROSCOPY Right     • TONSILLECTOMY                FAMILY HISTORY:  Family History         Family History   Problem Relation Age of Onset   • No Known Problems Mother     • Heart disease Father              SOCIAL HISTORY:  Social History            Tobacco Use   • Smoking status: Every Day       Packs/day: 0 50       Years: 35 00       Pack years: 17 50       Types: Cigarettes   • Smokeless tobacco: Never   • Tobacco comments:       a little less than 1/2ppd, trying to cut back before surgery last smoked 10/16   Vaping Use   • Vaping Use: Never used   Substance Use Topics   • Alcohol use: Never   • Drug use:  No         MEDICATIONS:     Current Outpatient Medications:   •  acetaminophen (TYLENOL) 500 mg tablet, Take 500 mg by mouth every 6 (six) hours as needed for mild pain, Disp: , Rfl:   •  albuterol (Ventolin HFA) 90 mcg/act inhaler, Inhale 2 puffs every 4 (four) hours as needed for wheezing, Disp: 18 g, Rfl: 1  •  amLODIPine (NORVASC) 10 mg tablet, Take 10 mg by mouth every evening , "Disp: , Rfl:   •  aspirin (ECOTRIN LOW STRENGTH) 81 mg EC tablet, Take 81 mg by mouth daily, Disp: , Rfl:   •  atorvastatin (LIPITOR) 80 mg tablet, TAKE 1 TABLET BY MOUTH EVERY DAY IN THE EVENING, Disp: 90 tablet, Rfl: 1  •  cholecalciferol (VITAMIN D3) 400 units tablet, Take 400 Units by mouth daily, Disp: , Rfl:   •  finasteride (PROSCAR) 5 mg tablet, Take 1 tablet (5 mg total) by mouth daily, Disp: 90 tablet, Rfl: 3  •  hydroxyurea (HYDREA) 500 mg capsule, Take 1 capsule (500 mg total) by mouth daily, Disp: 90 capsule, Rfl: 1  •  metoprolol tartrate (LOPRESSOR) 50 mg tablet, Take 50 mg by mouth 2 (two) times a day , Disp: , Rfl:   •  Multiple Vitamin (MULTIVITAMIN) capsule, Take 1 capsule by mouth daily, Disp: , Rfl:   •  nitroglycerin (NITROSTAT) 0 4 mg SL tablet, Place 1 tablet (0 4 mg total) under the tongue every 5 (five) minutes as needed for chest pain, Disp: 10 tablet, Rfl: 1  •  Omega-3 Fatty Acids (FISH OIL) 1200 MG CAPS, Take 1,200 mg by mouth 2 (two) times a day  , Disp: , Rfl:   •  lidocaine (Lidoderm) 5 %, Apply 1 patch topically daily Remove & Discard patch within 12 hours or as directed by MD (Patient not taking: Reported on 1/9/2023), Disp: 30 patch, Rfl: 0     ALLERGIES:        Allergies   Allergen Reactions   • Other Other (See Comments)       Pt and wife reports took a medication years ago at work cant recall the name or what it was for\"     \"couldn't talk and cheeks swelled and also right hand\"               REVIEW OF SYSTEMS:  Review of Systems   Constitutional: Negative for chills and fever  HENT: Negative for ear pain and sore throat  Eyes: Negative for pain and visual disturbance  Respiratory: Negative for cough and shortness of breath  Cardiovascular: Negative for chest pain and palpitations  Gastrointestinal: Negative for abdominal pain and vomiting  Genitourinary: Negative for dysuria and hematuria  Musculoskeletal: Negative for arthralgias and back pain     Skin: " Negative for color change and rash  Neurological: Positive for numbness  Negative for seizures and syncope  All other systems reviewed and are negative         VITALS:      Vitals:     04/24/23 1029   BP: 133/81   Pulse: 60         LABS:  HgA1c:   Lab Results   Component Value Date     HGBA1C 4 8 02/08/2023      BMP:         Lab Results   Component Value Date     CALCIUM 9 3 02/08/2023      11/13/2017     K 4 1 02/08/2023     CO2 25 02/08/2023      (H) 02/08/2023     BUN 22 02/08/2023     CREATININE 1 31 (H) 02/08/2023         _____________________________________________________  PHYSICAL EXAMINATION:  General: well developed and well nourished, alert, oriented times 3 and appears comfortable  Psychiatric: Normal  HEENT: Normocephalic, Atraumatic Trachea Midline, No torticollis  Pulmonary: No audible wheezing or respiratory distress   Abdomen/GI: Non tender, non distended   Cardiovascular: No pitting edema, 2+ radial pulse   Skin: No masses, erythema, lacerations, fluctation, ulcerations  Neurovascular: Sensation Intact to the Median, Ulnar, Radial Nerve, Motor Intact to the Median, Ulnar, Radial Nerve and Pulses Intact  Musculoskeletal: Normal, except as noted in detailed exam and in HPI         MUSCULOSKELETAL EXAMINATION:  SILT  Composite fist  2 point discrimination  Right   Thumb 8 mm  Rest 5 mm     right Ring finger:  Positive palpable nodule over the A1 pulley  Positive tenderness to palpation over A1 pulley  Positive catching   Negative clicking           ___________________________________________________  STUDIES REVIEWED:  [facility] Ultrasound        IMPRESSION:     Carpal tunnel syndrome ruled in based on marked abnormal CSA > 14 sq mm

## 2023-05-26 NOTE — DISCHARGE INSTR - AVS FIRST PAGE
Elevate hand above heart as much as possible to help pain and swelling  May use hand for simple tasks, but no heavy lifting or tight squeezing x 4 weeks  Keep operative bandage clean and dry  You may remove bandage in 4 days, just place a band-aid over incision  You are permitted to shower after 4 days with band-aid off  Perform simple finger motion exercises: opening & closing fingers 10 x every hr    Follow-up in the office 6/12/2023 for suture removal

## 2023-05-26 NOTE — TELEPHONE ENCOUNTER
Spoke with patient and reviewed CT scan results  Denies any SOB, coughing, fever  States he feels good  Abdominal pain has resolved  Advised f/u with pulmonary given CT findings of possible pulmonary infarct less likely PNA given no current symptoms and did not present with respiratory symptoms  However, any concerning sx develop advised f/u  Referral placed  No acute findings of abdomen noted

## 2023-05-26 NOTE — TELEPHONE ENCOUNTER
Pt returning your call  I did attempt again this morning, no answer  LM to call back for results   And to check for any symptoms per provider note

## 2023-05-26 NOTE — OP NOTE
OPERATIVE REPORT  PATIENT NAME: Redd Escalante    :  [birth year]  MRN: [id number]  Pt Location: [facility]    SURGERY DATE: 2023    Surgeon(s) and Role:     Liam Cintron MD - Primary     * Harshal Pineda - Assisting    Preop Diagnosis:  Carpal tunnel syndrome on right [G56 01]  Trigger finger, right ring finger [M65 341]    Post-Op Diagnosis Codes:     * Carpal tunnel syndrome on right [G56 01]     * Trigger finger, right ring finger [M65 341]    Procedure(s):  Right - CTR  RIGHT  Right - RELEASE TRIGGER FINGER  RIGHT RING FINGER    Specimen(s):  * No specimens in log *    Estimated Blood Loss:   Minimal    Drains:  * No LDAs found *    Anesthesia Type:   IV Sedation with Anesthesia    Operative Indications:  Carpal tunnel syndrome on right [G56 01]  Trigger finger, right ring finger [M65 341]      Operative Findings:  Full passive ROM without locking or catching of ring finger    Complications:   None    Procedure and Technique:  The patient's right hand was cleansed with alcohol  A field block was performed using marcaine 0 25% with epinephrine and lidocaine 1% with epinephrine in a 50-50 mixture at both incision sites  The right upper extremity was prepped and draped in a sterile fashion  A longitudinal incision was made overlying the transverse carpal ligament in line with the ring finger in a flexed position  Sharp dissection was performed down to the level of the transverse carpal ligament  Cheryle Tejinder was used for counterretraction  The transverse carpal ligament was divided with a 15 blade scalpel distally, and tenotomy scissors proximally along with a portion of the distal antebrachial fascia  The wound was irrigated copiously with normal saline  The skin was approximated with 4-0 nylon in an interrupted horizontal mattress fashion  An incision was made in the distal palmar crease in line with the right ring finger  Dissection was performed down to the flexor tendon sheath    A palmar fascial cord was divided during the approach  The A1 pulley was identified, and incised with a scalpel  The release was extended proximally and distally with tenotomy scissors  Passive ROM was performed with no evidence of locking or catching  The wound was irrigated with normal saline, and closed with 4-0 nylon sutures in an horizontal mattress interrupted manner  Xeroform was applied followed by 4x4 gauze  An ace bandage over wrap was applied  The patient was transferred to phase 2 recovery in stable condition  I was present for the entire procedure  Patient Disposition:  PACU     My Assistant was necessary throughout the procedure(s) for retraction and positioning  I understand that section 1842 (b)(7)(D) of the [address] [place] [address] generally prohibits Medicare physician fee schedule payment for the services of assistants-at-surgery in teaching hospitals when qualified residents are available to furnish such services  I certify that the services for which payment is claimed were medically necessary, and that no qualified resident was available to perform the services  I further understand that these services are subject to post-payment review by the Medicare carrier        SIGNATURE: Kameron Almaguer MD  DATE: May 26, 2023  TIME: 8:15 AM

## 2023-05-29 DIAGNOSIS — R10.13 EPIGASTRIC PAIN: ICD-10-CM

## 2023-05-30 RX ORDER — OMEPRAZOLE 40 MG/1
CAPSULE, DELAYED RELEASE ORAL
Qty: 30 CAPSULE | Refills: 0 | Status: SHIPPED | OUTPATIENT
Start: 2023-05-30

## 2023-05-31 ENCOUNTER — RA CDI HCC (OUTPATIENT)
Dept: OTHER | Facility: HOSPITAL | Age: 78
End: 2023-05-31

## 2023-05-31 NOTE — PROGRESS NOTES
[facility]  coding opportunities       Chart reviewed, no opportunity found: CHART REVIEWED, NO OPPORTUNITY FOUND        Patients Insurance     Medicare Insurance: [facility] Advantage

## 2023-06-05 ENCOUNTER — APPOINTMENT (OUTPATIENT)
Dept: LAB | Facility: MEDICAL CENTER | Age: 78
End: 2023-06-05
Payer: COMMERCIAL

## 2023-06-05 DIAGNOSIS — R10.13 EPIGASTRIC PAIN: ICD-10-CM

## 2023-06-05 DIAGNOSIS — D45 POLYCYTHEMIA VERA (HCC): ICD-10-CM

## 2023-06-05 LAB
ALBUMIN SERPL BCP-MCNC: 3.5 G/DL (ref 3.5–5)
ALP SERPL-CCNC: 142 U/L (ref 46–116)
ALT SERPL W P-5'-P-CCNC: 28 U/L (ref 12–78)
ANION GAP SERPL CALCULATED.3IONS-SCNC: 2 MMOL/L (ref 4–13)
AST SERPL W P-5'-P-CCNC: 24 U/L (ref 5–45)
BASOPHILS # BLD AUTO: 0.03 THOUSANDS/ÂΜL (ref 0–0.1)
BASOPHILS NFR BLD AUTO: 0 % (ref 0–1)
BILIRUB SERPL-MCNC: 0.65 MG/DL (ref 0.2–1)
BUN SERPL-MCNC: 14 MG/DL (ref 5–25)
CALCIUM SERPL-MCNC: 8.5 MG/DL (ref 8.3–10.1)
CHLORIDE SERPL-SCNC: 111 MMOL/L (ref 96–108)
CO2 SERPL-SCNC: 25 MMOL/L (ref 21–32)
CREAT SERPL-MCNC: 1.16 MG/DL (ref 0.6–1.3)
EOSINOPHIL # BLD AUTO: 0.41 THOUSAND/ÂΜL (ref 0–0.61)
EOSINOPHIL NFR BLD AUTO: 4 % (ref 0–6)
ERYTHROCYTE [DISTWIDTH] IN BLOOD BY AUTOMATED COUNT: 15.4 % (ref 11.6–15.1)
GFR SERPL CREATININE-BSD FRML MDRD: 60 ML/MIN/1.73SQ M
GLUCOSE P FAST SERPL-MCNC: 106 MG/DL (ref 65–99)
HCT VFR BLD AUTO: 47.9 % (ref 36.5–49.3)
HGB BLD-MCNC: 15.6 G/DL (ref 12–17)
IMM GRANULOCYTES # BLD AUTO: 0.05 THOUSAND/UL (ref 0–0.2)
IMM GRANULOCYTES NFR BLD AUTO: 0 % (ref 0–2)
LIPASE SERPL-CCNC: 380 U/L (ref 73–393)
LYMPHOCYTES # BLD AUTO: 1.52 THOUSANDS/ÂΜL (ref 0.6–4.47)
LYMPHOCYTES NFR BLD AUTO: 13 % (ref 14–44)
MCH RBC QN AUTO: 34.9 PG (ref 26.8–34.3)
MCHC RBC AUTO-ENTMCNC: 32.6 G/DL (ref 31.4–37.4)
MCV RBC AUTO: 107 FL (ref 82–98)
MONOCYTES # BLD AUTO: 0.91 THOUSAND/ÂΜL (ref 0.17–1.22)
MONOCYTES NFR BLD AUTO: 8 % (ref 4–12)
NEUTROPHILS # BLD AUTO: 8.54 THOUSANDS/ÂΜL (ref 1.85–7.62)
NEUTS SEG NFR BLD AUTO: 75 % (ref 43–75)
NRBC BLD AUTO-RTO: 0 /100 WBCS
PLATELET # BLD AUTO: 159 THOUSANDS/UL (ref 149–390)
PMV BLD AUTO: 10.4 FL (ref 8.9–12.7)
POTASSIUM SERPL-SCNC: 3.8 MMOL/L (ref 3.5–5.3)
PROT SERPL-MCNC: 7.2 G/DL (ref 6.4–8.4)
RBC # BLD AUTO: 4.47 MILLION/UL (ref 3.88–5.62)
SODIUM SERPL-SCNC: 138 MMOL/L (ref 135–147)
WBC # BLD AUTO: 11.46 THOUSAND/UL (ref 4.31–10.16)

## 2023-06-05 PROCEDURE — 80053 COMPREHEN METABOLIC PANEL: CPT

## 2023-06-05 PROCEDURE — 83690 ASSAY OF LIPASE: CPT

## 2023-06-05 PROCEDURE — 36415 COLL VENOUS BLD VENIPUNCTURE: CPT

## 2023-06-05 PROCEDURE — 85025 COMPLETE CBC W/AUTO DIFF WBC: CPT

## 2023-06-07 ENCOUNTER — OFFICE VISIT (OUTPATIENT)
Dept: FAMILY MEDICINE CLINIC | Facility: CLINIC | Age: 78
End: 2023-06-07
Payer: COMMERCIAL

## 2023-06-07 VITALS
SYSTOLIC BLOOD PRESSURE: 124 MMHG | DIASTOLIC BLOOD PRESSURE: 80 MMHG | TEMPERATURE: 97.7 F | HEART RATE: 60 BPM | BODY MASS INDEX: 26.53 KG/M2 | OXYGEN SATURATION: 97 % | WEIGHT: 169 LBS | HEIGHT: 67 IN | RESPIRATION RATE: 16 BRPM

## 2023-06-07 DIAGNOSIS — R93.89 ABNORMAL CT OF THE CHEST: ICD-10-CM

## 2023-06-07 DIAGNOSIS — R10.13 EPIGASTRIC PAIN: Primary | ICD-10-CM

## 2023-06-07 PROCEDURE — 99213 OFFICE O/P EST LOW 20 MIN: CPT | Performed by: FAMILY MEDICINE

## 2023-06-07 NOTE — PATIENT INSTRUCTIONS
Take omeprazole daily for 2 more weeks  Follow up with pulmonary  Call with any concerning cough or shortness of breath development or if return of abdominal pain  Follow up in July as scheduled  Follow up with hematology

## 2023-06-07 NOTE — PROGRESS NOTES
"Assessment/Plan:     1  Epigastric pain  Assessment & Plan:  Resolved with omeprazole; CT scan did show chronic panniculitis which could have been source of pain but sx more consistent with GERD; advised PPI for 2 more weeks and if recurrent will refer to GI given smoking hx      2  Abnormal CT of the chest  Assessment & Plan:  Pt denies any symptoms of CAP; no SOB; advised f/u with pulmonary for further evaluation          Subjective:      Patient ID: Drew Marinelli is a 69 y o  male  Patient states he is doing better  Abdominal pain has improved  Continues to take omeprazole  No N/V  Patient denies any coughing, SOB, or fevers  No leg swelling  The following portions of the patient's history were reviewed and updated as appropriate: allergies, current medications, past family history, past medical history, past social history, past surgical history, and problem list     Review of Systems   Constitutional: Negative for chills and fever  Gastrointestinal: Negative for abdominal pain  Objective:      /80 (BP Location: Left arm, Patient Position: Sitting, Cuff Size: Adult)   Pulse 60   Temp 97 7 °F (36 5 °C) (Temporal)   Resp 16   Ht 5' 6 93\" (1 7 m)   Wt 76 7 kg (169 lb)   SpO2 97%   BMI 26 53 kg/m²          Physical Exam  Vitals reviewed  Constitutional:       General: He is not in acute distress  Appearance: Normal appearance  He is not ill-appearing, toxic-appearing or diaphoretic  HENT:      Head: Normocephalic and atraumatic  Eyes:      General: No scleral icterus  Right eye: No discharge  Left eye: No discharge  Conjunctiva/sclera: Conjunctivae normal    Cardiovascular:      Rate and Rhythm: Normal rate and regular rhythm  Pulses: Normal pulses  Heart sounds: Normal heart sounds  No murmur heard  No gallop  Pulmonary:      Effort: Pulmonary effort is normal  No respiratory distress  Breath sounds: Normal breath sounds  No stridor   No " wheezing, rhonchi or rales  Abdominal:      Palpations: Abdomen is soft  Tenderness: There is no abdominal tenderness  Musculoskeletal:      Right lower leg: No edema  Left lower leg: No edema  Neurological:      General: No focal deficit present  Mental Status: He is alert and oriented to person, place, and time  Psychiatric:         Mood and Affect: Mood normal          Behavior: Behavior normal          Thought Content:  Thought content normal          Judgment: Judgment normal

## 2023-06-08 PROBLEM — R93.89 ABNORMAL CT OF THE CHEST: Status: ACTIVE | Noted: 2023-06-08

## 2023-06-08 NOTE — ASSESSMENT & PLAN NOTE
Resolved with omeprazole; CT scan did show chronic panniculitis which could have been source of pain but sx more consistent with GERD; advised PPI for 2 more weeks and if recurrent will refer to GI given smoking hx

## 2023-06-12 ENCOUNTER — OFFICE VISIT (OUTPATIENT)
Dept: OBGYN CLINIC | Facility: MEDICAL CENTER | Age: 78
End: 2023-06-12

## 2023-06-12 VITALS
HEIGHT: 67 IN | BODY MASS INDEX: 26.53 KG/M2 | SYSTOLIC BLOOD PRESSURE: 128 MMHG | DIASTOLIC BLOOD PRESSURE: 80 MMHG | WEIGHT: 169 LBS | HEART RATE: 60 BPM

## 2023-06-12 DIAGNOSIS — M65.341 TRIGGER FINGER, RIGHT RING FINGER: ICD-10-CM

## 2023-06-12 DIAGNOSIS — Z98.890 S/P MUSCULOSKELETAL SYSTEM SURGERY: ICD-10-CM

## 2023-06-12 DIAGNOSIS — G56.01 CARPAL TUNNEL SYNDROME ON RIGHT: Primary | ICD-10-CM

## 2023-06-12 PROCEDURE — 99024 POSTOP FOLLOW-UP VISIT: CPT | Performed by: SURGERY

## 2023-06-12 NOTE — PROGRESS NOTES
Assessment/Plan:  Patient ID: Ludin Caballero 69 y o  male   Surgery: Ctr, Right - Right and Release Trigger Finger, Right Ring Finger - Right  Date of Surgery: 5/26/2023    17 days s/p above surgery  Resume activities as tolerated, NSAIDs, Tylenol and scar tissue massage  Soreness and swelling to be expected while healing  Exercises shown for ring finger (all fingers with some stiffness)    Follow Up:  4  week(s)    To Do Next Visit:   ROM check    CHIEF COMPLAINT:  Chief Complaint   Patient presents with   • Right Hand - Post-op   • Right Wrist - Post-op     SUBJECTIVE:  Ludin Caballero is a 69y o  year old male who presents for follow up after Ctr, Right - Right and Release Trigger Finger, Right Ring Finger - Right  Today patient has No Complaints       PHYSICAL EXAMINATION:  General: well developed and well nourished, alert, oriented times 3 and appears comfortable  Psychiatric: Normal    MUSCULOSKELETAL EXAMINATION:  Incision: Clean, dry, intact  Surgery Site: normal, no evidence of infection   Range of Motion: As expected  Neurovascular status: Neuro intact, good cap refill  Activity Restrictions: avoid standing water 3-4 more days to allow suture holes to close  Done today: Sutures out    STUDIES REVIEWED:  No Studies to review    PROCEDURES PERFORMED:  Procedures  No Procedures performed today    Scribe Attestation    I,:  Abigail Mendez am acting as a scribe while in the presence of the attending physician :       I,:  Libia Wallace MD personally performed the services described in this documentation    as scribed in my presence :

## 2023-06-26 ENCOUNTER — APPOINTMENT (OUTPATIENT)
Dept: LAB | Facility: MEDICAL CENTER | Age: 78
End: 2023-06-26
Payer: COMMERCIAL

## 2023-07-10 ENCOUNTER — OFFICE VISIT (OUTPATIENT)
Dept: OBGYN CLINIC | Facility: CLINIC | Age: 78
End: 2023-07-10

## 2023-07-10 VITALS
BODY MASS INDEX: 26.53 KG/M2 | WEIGHT: 169 LBS | DIASTOLIC BLOOD PRESSURE: 71 MMHG | SYSTOLIC BLOOD PRESSURE: 124 MMHG | HEIGHT: 67 IN

## 2023-07-10 DIAGNOSIS — M65.341 TRIGGER FINGER, RIGHT RING FINGER: ICD-10-CM

## 2023-07-10 DIAGNOSIS — G56.01 CARPAL TUNNEL SYNDROME ON RIGHT: Primary | ICD-10-CM

## 2023-07-10 PROCEDURE — 99024 POSTOP FOLLOW-UP VISIT: CPT | Performed by: SURGERY

## 2023-07-10 NOTE — PROGRESS NOTES
Assessment/Plan:  Patient ID: Rebecca Campos 69 y.o. male   Surgery: Ctr, Right - Right and Release Trigger Finger, Right Ring Finger - Right  Date of Surgery: 5/26/2023    Cont scar massage. Cont ROM of finger  Offered formal Therapy if pt feels he needs it. Pt declined  NSAIDs as needed for discomfort    Follow Up:  PRN          CHIEF COMPLAINT:  Chief Complaint   Patient presents with   • Right Hand - Post-op     SUBJECTIVE:  Rebecca Campos is a 69y.o. year old male who presents for follow up after Ctr, Right - Right and Release Trigger Finger, Right Ring Finger - Right. Pt states he feels he has made some motion improvement in his Ring finger flexion. He does report some discomfort in his Ring and middle fingers.  Denies numbness tingling since the surgery    PHYSICAL EXAMINATION:  General: well developed and well nourished, alert, oriented times 3 and appears comfortable  Psychiatric: Normal    MUSCULOSKELETAL EXAMINATION:  Incision: Well healed  Surgery Site: normal, no evidence of infection   Range of Motion: As expected  Neurovascular status: Neuro intact, good cap refill  Activity Restrictions: None    STUDIES REVIEWED:  No Studies to review    PROCEDURES PERFORMED:  Procedures  No Procedures performed today    Scribe Attestation    I,:  Kofi Burk am acting as a scribe while in the presence of the attending physician.:       I,:  Malini Raya MD personally performed the services described in this documentation    as scribed in my presence.:

## 2023-07-12 ENCOUNTER — OFFICE VISIT (OUTPATIENT)
Dept: FAMILY MEDICINE CLINIC | Facility: CLINIC | Age: 78
End: 2023-07-12
Payer: COMMERCIAL

## 2023-07-12 VITALS
HEART RATE: 60 BPM | WEIGHT: 174 LBS | HEIGHT: 67 IN | BODY MASS INDEX: 27.31 KG/M2 | DIASTOLIC BLOOD PRESSURE: 70 MMHG | OXYGEN SATURATION: 96 % | SYSTOLIC BLOOD PRESSURE: 120 MMHG | TEMPERATURE: 97.9 F | RESPIRATION RATE: 16 BRPM

## 2023-07-12 DIAGNOSIS — J44.9 CHRONIC OBSTRUCTIVE PULMONARY DISEASE, UNSPECIFIED COPD TYPE (HCC): ICD-10-CM

## 2023-07-12 DIAGNOSIS — R73.03 PREDIABETES: ICD-10-CM

## 2023-07-12 DIAGNOSIS — I25.10 CORONARY ARTERY DISEASE WITHOUT ANGINA PECTORIS, UNSPECIFIED VESSEL OR LESION TYPE, UNSPECIFIED WHETHER NATIVE OR TRANSPLANTED HEART: ICD-10-CM

## 2023-07-12 DIAGNOSIS — F17.210 CIGARETTE SMOKER: ICD-10-CM

## 2023-07-12 DIAGNOSIS — I10 BENIGN ESSENTIAL HYPERTENSION: ICD-10-CM

## 2023-07-12 DIAGNOSIS — Z00.00 ENCOUNTER FOR ANNUAL WELLNESS EXAM IN MEDICARE PATIENT: Primary | ICD-10-CM

## 2023-07-12 DIAGNOSIS — D45 POLYCYTHEMIA VERA (HCC): ICD-10-CM

## 2023-07-12 PROCEDURE — G0439 PPPS, SUBSEQ VISIT: HCPCS | Performed by: FAMILY MEDICINE

## 2023-07-12 PROCEDURE — 99214 OFFICE O/P EST MOD 30 MIN: CPT | Performed by: FAMILY MEDICINE

## 2023-07-12 NOTE — PROGRESS NOTES
Name: Sharda Lee      :       MRN: [id number]  Encounter Provider: Divina Bailey DO  Encounter Date: 2023   Encounter department: [address] [place], [place] [address]     1. Encounter for annual wellness exam in Medicare patient    2. Polycythemia vera ([address])  Assessment & Plan:  Stable on hydroxyurea and occasional phlebotomy. Continue regular follow-up with hematologist as directed    Orders:  -     CBC and differential; Future; Expected date: 2024    3. Benign essential hypertension  Assessment & Plan:  Blood pressure control on amlodipine 10 and metoprolol 50 twice daily    Orders:  -     TSH, 3rd generation with Free T4 reflex; Future; Expected date: 2024    4. Prediabetes  Assessment & Plan:  Continue reduced carb diet and exercise. We will continue to follow    Orders:  -     Comprehensive metabolic panel; Future; Expected date: 2024  -     Hemoglobin A1C; Future; Expected date: 2024    5. Coronary artery disease without angina pectoris, unspecified vessel or lesion type, unspecified whether native or transplanted heart  Assessment & Plan:  Status post two-vessel stent over 10 years ago. Patient had pacemaker implanted . Doing well without chest pain or shortness of breath. Continue follow-up with cardiology as directed    Orders:  -     Lipid Panel with Direct LDL reflex; Future; Expected date: 2024    6. Cigarette smoker  Assessment & Plan:  Still smoking approximately 1/2 pack/day. Counseled again. Patient had LDCT chest performed 2023, which was unremarkable. Recommend recheck in 1 year      7. Chronic obstructive pulmonary disease, unspecified COPD type ([address])  Assessment & Plan:  Still smoking approximately 1/2 pack/day. Counseled again.   Continue albuterol HFA as needed           Patient continues to refuse all age-appropriate vaccinations    6 months, fasting blood work prior  Subjective     Patient presents for recheck chronic medical problems today. Compliant with prescribed medications. Patient still being followed by cardiology, hematology and urology    Review of Systems   Respiratory: Negative. Cardiovascular: Negative. Gastrointestinal: Negative. Genitourinary: Negative.         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"   • Full dentures     but doesnt wear them   • History of 2019 novel coronavirus disease (COVID-19) 02/2021    hospital for 2 days but not in ICU/"mainly dehydrated" "also fever/oxygen below 90"   • History of coronary artery stent placement     x2 in 2008 or so; sees Suzy Nugent-- Dr Lana Amaro cardio   • Lummi (hard of hearing)     no hearing aids yet   • Hyperlipidemia     Last Assessed:  11/24/17   • Hypertension     Benign essential, Last Assessed:  11/24/17   • LBBB (left bundle branch block)    • Nocturia    • Polycythemia vera (HCC)     (per history in chart)   • PVD (peripheral vascular disease) ([address])    • RBC abnormality     pt reports told has high Red blood cells/goes to infusion center regularly next appt 10/8/21 hematologist is Dr Jeniffer Dejesus of [facility](had been Dr Prudencio Garcia)   • Seasickness    • Shortness of breath     "if goes up steps gets SOB, had for awhile"   • Slow urinary stream     "sometimes feels like bladder isnt all the way empty"   • Snores    • Wears glasses     reading     Past Surgical History:   Procedure Laterality Date   • ANGIOPLASTY     • APPENDECTOMY     • CARDIAC CATHETERIZATION N/A 07/28/2022    Procedure: CARDIAC TEMPORARY PACEMAKER;  Surgeon: Jason Weir MD;  [facility];  Service: Cardiology   • CARDIAC ELECTROPHYSIOLOGY PROCEDURE N/A 07/29/2022    Procedure: Cardiac pacer implant;  Surgeon: Anam Morales MD;  [facility]: [facility]; Service: Cardiology   • CATARACT EXTRACTION Bilateral    • CT CYSTOGRAM  11/04/2021   • ELBOW SURGERY Right    • FL CYSTOGRAM  10/27/2021   • IR BIOPSY BONE MARROW  10/14/2022   • KNEE ARTHROSCOPY Right    • CO COLONOSCOPY FLX DX W/COLLJ SPEC WHEN PFRMD N/A 05/22/2017    Procedure: COLONOSCOPY;  Surgeon: Nanda Beebe DO;  Location: [facility];   Service: Gastroenterology   • CO NEUROPLASTY &/TRANSPOS MEDIAN NRV CARPAL [place] Right 5/26/2023    Procedure: CTR, RIGHT;  Surgeon: Michelle Gonzalez MD;  [facility];  Service: Orthopedics   • CO TENDON SHEATH INCISION Right 5/26/2023    Procedure: RELEASE TRIGGER FINGER, RIGHT RING FINGER;  Surgeon: Michelle Gonzalez MD;  [facility];  Service: Orthopedics   • PROSTATE BIOPSY     • PROSTATECTOMY N/A 10/18/2021    Procedure: ROBOTIC SUBTOTAL/SUPRAPUBIC PROSTATECTOMY;  Surgeon: Rosales Prado MD;  [facility];  Service: Urology   • SHOULDER ARTHROSCOPY Right    • TONSILLECTOMY       Family History   Problem Relation Age of Onset   • No Known Problems Mother    • Heart disease Father      Social History     Socioeconomic History   • Marital status: /Civil Union     Spouse name: None   • Number of children: None   • Years of education: None   • Highest education level: None   Occupational History   • None   Tobacco Use   • Smoking status: Every Day     Packs/day: 0.50     Years: 35.00     Total pack years: 17.50     Types: Cigarettes   • Smokeless tobacco: Never   • Tobacco comments:     a little less than 1/2ppd, trying to cut back before surgery last smoked 5.25.23   Vaping Use   • Vaping Use: Never used   Substance and Sexual Activity   • Alcohol use: Never   • Drug use: No   • Sexual activity: Not Currently     Comment: defer   Other Topics Concern   • None   Social History Narrative    Always uses seatbelt    Feels safe at home    Uses safety equipment     Social Determinants of Health     Financial Resource Strain: Not on file   Food Insecurity: No Food Insecurity (7/29/2022)    Hunger Vital Sign    • Worried About Running Out of Food in the Last Year: Never true    • Ran Out of Food in the Last Year: Never true   Transportation Needs: No Transportation Needs (7/29/2022)    PRAPARE - Transportation    • Lack of Transportation (Medical): No    • Lack of Transportation (Non-Medical):  No   Physical Activity: Not on file   Stress: Not on file   Social Connections: Not on file   Intimate Partner Violence: Not on file   Housing Stability: Low Risk  (7/29/2022)    Housing Stability Vital Sign    • Unable to Pay for Housing in the Last Year: No    • Number of Places Lived in the Last Year: 1    • Unstable Housing in the Last Year: No     Current Outpatient Medications on File Prior to Visit   Medication Sig   • acetaminophen (TYLENOL) 500 mg tablet Take 500 mg by mouth every 6 (six) hours as needed for mild pain   • acetaminophen-codeine (TYLENOL #3) 300-30 mg per tablet Take 1 tablet by mouth every 12 (twelve) hours as needed for moderate pain   • amLODIPine (NORVASC) 10 mg tablet Take 10 mg by mouth every evening    • aspirin (ECOTRIN LOW STRENGTH) 81 mg EC tablet Take 81 mg by mouth daily   • atorvastatin (LIPITOR) 80 mg tablet TAKE 1 TABLET BY MOUTH EVERY DAY IN THE EVENING   • cholecalciferol (VITAMIN D3) 400 units tablet Take 400 Units by mouth daily   • finasteride (PROSCAR) 5 mg tablet Take 1 tablet (5 mg total) by mouth daily   • hydroxyurea (HYDREA) 500 mg capsule Take 1 capsule (500 mg total) by mouth daily   • metoprolol tartrate (LOPRESSOR) 50 mg tablet Take 50 mg by mouth 2 (two) times a day    • Multiple Vitamin (MULTIVITAMIN) capsule Take 1 capsule by mouth daily   • nitroglycerin (NITROSTAT) 0.4 mg SL tablet Place 1 tablet (0.4 mg total) under the tongue every 5 (five) minutes as needed for chest pain   • Omega-3 Fatty Acids (FISH OIL) 1200 MG CAPS Take 1,200 mg by mouth 2 (two) times a day     • omeprazole (PriLOSEC) 40 MG capsule TAKE 1 CAPSULE BY MOUTH EVERY DAY BEFORE BREAKFAST   • albuterol (Ventolin HFA) 90 mcg/act inhaler Inhale 2 puffs every 4 (four) hours as needed for wheezing (Patient not taking: Reported on 5/23/2023)   • lidocaine (Lidoderm) 5 % Apply 1 patch topically daily Remove & Discard patch within 12 hours or as directed by MD (Patient not taking: Reported on 1/9/2023)     Allergies   Allergen Reactions   • Other Other (See Comments)     Pt and wife reports took a medication years ago at work cant recall the name or what it was for"     "couldn't talk and cheeks swelled and also right hand"     Immunization History   Administered Date(s) Administered   • Pneumococcal Polysaccharide PPV23 1945       Objective     /70 (BP Location: Left arm, Patient Position: Sitting, Cuff Size: Adult)   Pulse 60   Temp 97.9 °F (36.6 °C) (Temporal)   Resp 16   Ht 5' 6.93" (1.7 m)   Wt 78.9 kg (174 lb)   SpO2 96%   BMI 27.31 kg/m²     Physical Exam  Cardiovascular:      Rate and Rhythm: Normal rate and regular rhythm. Heart sounds: Normal heart sounds. Comments: Carotids: no bruits  Ext: no edema  Pulmonary:      Effort: Pulmonary effort is normal. No respiratory distress. Breath sounds: No wheezing or rales. Psychiatric:         Behavior: Behavior normal.         Thought Content:  Thought content normal.       Maulik Shah, DO

## 2023-07-12 NOTE — ASSESSMENT & PLAN NOTE
Stable on hydroxyurea and occasional phlebotomy.   Continue regular follow-up with hematologist as directed

## 2023-07-12 NOTE — ASSESSMENT & PLAN NOTE
Status post two-vessel stent over 10 years ago. Patient had pacemaker implanted 2022. Doing well without chest pain or shortness of breath.   Continue follow-up with cardiology as directed

## 2023-07-12 NOTE — PATIENT INSTRUCTIONS
Medicare Preventive Visit Patient Instructions  Thank you for completing your Welcome to Medicare Visit or Medicare Annual Wellness Visit today. Your next wellness visit will be due in one year (7/12/2024). The screening/preventive services that you may require over the next 5-10 years are detailed below. Some tests may not apply to you based off risk factors and/or age. Screening tests ordered at today's visit but not completed yet may show as past due. Also, please note that scanned in results may not display below. Preventive Screenings:  Service Recommendations Previous Testing/Comments   Colorectal Cancer Screening  · Colonoscopy    · Fecal Occult Blood Test (FOBT)/Fecal Immunochemical Test (FIT)  · Fecal DNA/Cologuard Test  · Flexible Sigmoidoscopy Age: 43-73 years old   Colonoscopy: every 10 years (May be performed more frequently if at higher risk)  OR  FOBT/FIT: every 1 year  OR  Cologuard: every 3 years  OR  Sigmoidoscopy: every 5 years  Screening may be recommended earlier than age 39 if at higher risk for colorectal cancer. Also, an individualized decision between you and your healthcare provider will decide whether screening between the ages of 77-80 would be appropriate.  Colonoscopy: 05/22/2017  FOBT/FIT: Not on file  Cologuard: Not on file  Sigmoidoscopy: Not on file          Prostate Cancer Screening Individualized decision between patient and health care provider in men between ages of 53-66   Medicare will cover every 12 months beginning on the day after your 50th birthday PSA: 0.2 ng/mL     Screening Not Indicated     Hepatitis C Screening Once for adults born between 1945 and 1965  More frequently in patients at high risk for Hepatitis C Hep C Antibody: Not on file    Screening Current   Diabetes Screening 1-2 times per year if you're at risk for diabetes or have pre-diabetes Fasting glucose: 106 mg/dL (6/5/2023)  A1C: 4.8 % (2/8/2023)  Screening Current   Cholesterol Screening Once every 5 years if you don't have a lipid disorder. May order more often based on risk factors. Lipid panel: 02/08/2023  Screening Not Indicated  History Lipid Disorder      Other Preventive Screenings Covered by Medicare:  1. Abdominal Aortic Aneurysm (AAA) Screening: covered once if your at risk. You're considered to be at risk if you have a family history of AAA or a male between the age of 70-76 who smoking at least 100 cigarettes in your lifetime. 2. Lung Cancer Screening: covers low dose CT scan once per year if you meet all of the following conditions: (1) Age 48-67; (2) No signs or symptoms of lung cancer; (3) Current smoker or have quit smoking within the last 15 years; (4) You have a tobacco smoking history of at least 20 pack years (packs per day x number of years you smoked); (5) You get a written order from a healthcare provider. 3. Glaucoma Screening: covered annually if you're considered high risk: (1) You have diabetes OR (2) Family history of glaucoma OR (3)  aged 48 and older OR (3)  American aged 72 and older  3. Osteoporosis Screening: covered every 2 years if you meet one of the following conditions: (1) Have a vertebral abnormality; (2) On glucocorticoid therapy for more than 3 months; (3) Have primary hyperparathyroidism; (4) On osteoporosis medications and need to assess response to drug therapy. 5. HIV Screening: covered annually if you're between the age of 14-79. Also covered annually if you are younger than 13 and older than 72 with risk factors for HIV infection. For pregnant patients, it is covered up to 3 times per pregnancy.     Immunizations:  Immunization Recommendations   Influenza Vaccine Annual influenza vaccination during flu season is recommended for all persons aged >= 6 months who do not have contraindications   Pneumococcal Vaccine   * Pneumococcal conjugate vaccine = PCV13 (Prevnar 13), PCV15 (Vaxneuvance), PCV20 (Prevnar 20)  * Pneumococcal polysaccharide vaccine = PPSV23 (Pneumovax) Adults 2364 years old: 1-3 doses may be recommended based on certain risk factors  Adults 72 years old: 1-2 doses may be recommended based off what pneumonia vaccine you previously received   Hepatitis B Vaccine 3 dose series if at intermediate or high risk (ex: diabetes, end stage renal disease, liver disease)   Tetanus (Td) Vaccine - COST NOT COVERED BY MEDICARE PART B Following completion of primary series, a booster dose should be given every 10 years to maintain immunity against tetanus. Td may also be given as tetanus wound prophylaxis. Tdap Vaccine - COST NOT COVERED BY MEDICARE PART B Recommended at least once for all adults. For pregnant patients, recommended with each pregnancy. Shingles Vaccine (Shingrix) - COST NOT COVERED BY MEDICARE PART B  2 shot series recommended in those aged 48 and above     Health Maintenance Due:      Topic Date Due   • Colorectal Cancer Screening  05/22/2020   • Hepatitis C Screening  Completed     Immunizations Due:      Topic Date Due   • COVID-19 Vaccine (1) Never done   • Pneumococcal Vaccine: 65+ Years (1 - PCV) 09/18/1951   • Hepatitis A Vaccine (1 of 2 - Risk 2-dose series) Never done   • Hepatitis B Vaccine (1 of 3 - Risk 3-dose series) Never done   • Influenza Vaccine (1) 09/01/2023     Advance Directives   What are advance directives? Advance directives are legal documents that state your wishes and plans for medical care. These plans are made ahead of time in case you lose your ability to make decisions for yourself. Advance directives can apply to any medical decision, such as the treatments you want, and if you want to donate organs. What are the types of advance directives? There are many types of advance directives, and each state has rules about how to use them. You may choose a combination of any of the following:  · Living will: This is a written record of the treatment you want.  You can also choose which treatments you do not want, which to limit, and which to stop at a certain time. This includes surgery, medicine, IV fluid, and tube feedings. · Durable power of  for healthcare [facility]): This is a written record that states who you want to make healthcare choices for you when you are unable to make them for yourself. This person, called a proxy, is usually a family member or a friend. You may choose more than 1 proxy. · Do not resuscitate (DNR) order:  A DNR order is used in case your heart stops beating or you stop breathing. It is a request not to have certain forms of treatment, such as CPR. A DNR order may be included in other types of advance directives. · Medical directive: This covers the care that you want if you are in a coma, near death, or unable to make decisions for yourself. You can list the treatments you want for each condition. Treatment may include pain medicine, surgery, blood transfusions, dialysis, IV or tube feedings, and a ventilator (breathing machine). · Values history: This document has questions about your views, beliefs, and how you feel and think about life. This information can help others choose the care that you would choose. Why are advance directives important? An advance directive helps you control your care. Although spoken wishes may be used, it is better to have your wishes written down. Spoken wishes can be misunderstood, or not followed. Treatments may be given even if you do not want them. An advance directive may make it easier for your family to make difficult choices about your care. Cigarette Smoking and Your Health   Risks to your health if you smoke:  Nicotine and other chemicals found in tobacco damage every cell in your body. Even if you are a light smoker, you have an increased risk for cancer, heart disease, and lung disease. If you are pregnant or have diabetes, smoking increases your risk for complications.    Benefits to your health if you stop smoking: · You decrease respiratory symptoms such as coughing, wheezing, and shortness of breath. · You reduce your risk for cancers of the lung, mouth, throat, kidney, bladder, pancreas, stomach, and cervix. If you already have cancer, you increase the benefits of chemotherapy. You also reduce your risk for cancer returning or a second cancer from developing. · You reduce your risk for heart disease, blood clots, heart attack, and stroke. · You reduce your risk for lung infections, and diseases such as pneumonia, asthma, chronic bronchitis, and emphysema. · Your circulation improves. More oxygen can be delivered to your body. If you have diabetes, you lower your risk for complications, such as kidney, artery, and eye diseases. You also lower your risk for nerve damage. Nerve damage can lead to amputations, poor vision, and blindness. · You improve your body's ability to heal and to fight infections. For more information and support to stop smoking:   · [employer]  Phone: 8- 681 - 389-7122  Web Address: www.[employer]  Weight Management   Why it is important to manage your weight:  Being overweight increases your risk of health conditions such as heart disease, high blood pressure, type 2 diabetes, and certain types of cancer. It can also increase your risk for osteoarthritis, sleep apnea, and other respiratory problems. Aim for a slow, steady weight loss. Even a small amount of weight loss can lower your risk of health problems. How to lose weight safely:  A safe and healthy way to lose weight is to eat fewer calories and get regular exercise. You can lose up about 1 pound a week by decreasing the number of calories you eat by 500 calories each day. Healthy meal plan for weight management:  A healthy meal plan includes a variety of foods, contains fewer calories, and helps you stay healthy. A healthy meal plan includes the following:  · Eat whole-grain foods more often.   A healthy meal plan should contain fiber. Fiber is the part of grains, fruits, and vegetables that is not broken down by your body. Whole-grain foods are healthy and provide extra fiber in your diet. Some examples of whole-grain foods are whole-wheat breads and pastas, oatmeal, brown rice, and bulgur. · Eat a variety of vegetables every day. Include dark, leafy greens such as spinach, kale, desirae greens, and mustard greens. Eat yellow and orange vegetables such as carrots, sweet potatoes, and winter squash. · Eat a variety of fruits every day. Choose fresh or canned fruit (canned in its own juice or light syrup) instead of juice. Fruit juice has very little or no fiber. · Eat low-fat dairy foods. Drink fat-free (skim) milk or 1% milk. Eat fat-free yogurt and low-fat cottage cheese. Try low-fat cheeses such as mozzarella and other reduced-fat cheeses. · Choose meat and other protein foods that are low in fat. Choose beans or other legumes such as split peas or lentils. Choose fish, skinless poultry (chicken or turkey), or lean cuts of red meat (beef or pork). Before you cook meat or poultry, cut off any visible fat. · Use less fat and oil. Try baking foods instead of frying them. Add less fat, such as margarine, sour cream, regular salad dressing and mayonnaise to foods. Eat fewer high-fat foods. Some examples of high-fat foods include french fries, doughnuts, ice cream, and cakes. · Eat fewer sweets. Limit foods and drinks that are high in sugar. This includes candy, cookies, regular soda, and sweetened drinks. Exercise:  Exercise at least 30 minutes per day on most days of the week. Some examples of exercise include walking, biking, dancing, and swimming. You can also fit in more physical activity by taking the stairs instead of the elevator or parking farther away from stores. Ask your healthcare provider about the best exercise plan for you.       © Copyright [employer] 2018 Information is for End User's use only and may not be sold, redistributed or otherwise used for commercial purposes.  All illustrations and images included in CareNotes® are the copyrighted property of [employer] or [address] [place] [address]

## 2023-07-12 NOTE — PROGRESS NOTES
Assessment and Plan:   Medicare wellness visit  Problem List Items Addressed This Visit     Benign essential hypertension    CAD (coronary artery disease)    Polycythemia vera ([address])    Prediabetes    Cigarette smoker    Chronic obstructive pulmonary disease ([address])   Other Visit Diagnoses     Encounter for annual wellness exam in Medicare patient    -  Primary             Preventive health issues were discussed with patient, and age appropriate screening tests were ordered as noted in patient's After Visit Summary. Personalized health advice and appropriate referrals for health education or preventive services given if needed, as noted in patient's After Visit Summary.      History of Present Illness:     Patient presents for a Medicare Wellness Visit    HPI   Patient Care Team:  Pretty Garcia DO as PCP - General  MD Dmitry Garcia DO  [facility]  [facility] Zahra Lucero DO as Endoscopist  Danica Priest MD (Hematology and Oncology)     Review of Systems:     Review of Systems     Problem List:     Patient Active Problem List   Diagnosis   • Benign essential hypertension   • Cervical radiculopathy   • CAD (coronary artery disease)   • Mixed hyperlipidemia   • Polycythemia vera ([address])   • Prediabetes   • Chronic left shoulder pain   • Cervical spondylosis without myelopathy   • Status post percutaneous transluminal coronary angioplasty   • Cigarette smoker   • Left bundle branch block   • JAK2 gene mutation   • Encounter for antineoplastic chemotherapy   • Stage 3b chronic kidney disease ([address])   • History of cardiac pacemaker   • Heart block   • Thrombocytopenia (HCC)   • Chronic obstructive pulmonary disease (HCC)   • Right wrist pain   • Hepatic cyst   • Epigastric pain   • Abnormal CT of the chest      Past Medical and Surgical History:     Past Medical History:   Diagnosis Date   • Arthritis     both shoulders   • CAD (coronary artery disease)     Last Assessed:  11/4/13 • Cigarette smoker    • Colon polyp    • Elevated prostate specific antigen (PSA)     Last Assessed:  12/21/17   • Enlarged prostate    • Exercise involving walking     in season hunt's and fish's/ 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"   • Full dentures     but doesnt wear them   • History of 2019 novel coronavirus disease (COVID-19) 02/2021    hospital for 2 days but not in ICU/"mainly dehydrated" "also fever/oxygen below 90"   • History of coronary artery stent placement     x2 in 2008 or so; sees Betzy Garcia-- Dr Geoff Fernando cardio   • [place] (hard of hearing)     no hearing aids yet   • Hyperlipidemia     Last Assessed:  11/24/17   • Hypertension     Benign essential, Last Assessed:  11/24/17   • LBBB (left bundle branch block)    • Nocturia    • Polycythemia vera (HCC)     (per history in chart)   • PVD (peripheral vascular disease) ([address])    • RBC abnormality     pt reports told has high Red blood cells/goes to infusion center regularly next appt 10/8/21 hematologist is Dr Caryn Osuna of [address](had been Dr Sami Charles)   • Seasickness    • Shortness of breath     "if goes up steps gets SOB, had for awhile"   • Slow urinary stream     "sometimes feels like bladder isnt all the way empty"   • Snores    • Wears glasses     reading     Past Surgical History:   Procedure Laterality Date   • ANGIOPLASTY     • APPENDECTOMY     • CARDIAC CATHETERIZATION N/A 07/28/2022    Procedure: CARDIAC TEMPORARY PACEMAKER;  Surgeon: Andrew Hale MD;  [facility]; Service: Cardiology   • CARDIAC ELECTROPHYSIOLOGY PROCEDURE N/A 07/29/2022    Procedure: Cardiac pacer implant;  Surgeon: Ruthann Villareal MD;  [facility];   Service: Cardiology   • CATARACT EXTRACTION Bilateral    • CT CYSTOGRAM  11/04/2021   • ELBOW SURGERY Right    • FL CYSTOGRAM  10/27/2021   • IR BIOPSY BONE MARROW  10/14/2022   • KNEE ARTHROSCOPY Right    • SC COLONOSCOPY FLX DX W/COLLJ SPEC WHEN PFRMD N/A 05/22/2017    Procedure: COLONOSCOPY;  Surgeon: Key Anna DO;  [facility]: [facility];   Service: Gastroenterology   • MD NEUROPLASTY &/TRANSPOS MEDIAN NRV CARPAL Dennys Andrews Right 5/26/2023    Procedure: CTR, RIGHT;  Surgeon: Rebecca Barron MD;  [facility];  Service: Orthopedics   • MD TENDON SHEATH INCISION Right 5/26/2023    Procedure: RELEASE TRIGGER FINGER, RIGHT RING FINGER;  Surgeon: Rebecca Barron MD;  [facility];  Service: Orthopedics   • PROSTATE BIOPSY     • PROSTATECTOMY N/A 10/18/2021    Procedure: ROBOTIC SUBTOTAL/SUPRAPUBIC PROSTATECTOMY;  Surgeon: Yumiko Walsh MD;  [facility];  Service: Urology   • SHOULDER ARTHROSCOPY Right    • TONSILLECTOMY        Family History:     Family History   Problem Relation Age of Onset   • No Known Problems Mother    • Heart disease Father       Social History:     Social History     Socioeconomic History   • Marital status: /Civil Union     Spouse name: None   • Number of children: None   • Years of education: None   • Highest education level: None   Occupational History   • None   Tobacco Use   • Smoking status: Every Day     Packs/day: 0.50     Years: 35.00     Total pack years: 17.50     Types: Cigarettes   • Smokeless tobacco: Never   • Tobacco comments:     a little less than 1/2ppd, trying to cut back before surgery last smoked 5.25.23   Vaping Use   • Vaping Use: Never used   Substance and Sexual Activity   • Alcohol use: Never   • Drug use: No   • Sexual activity: Not Currently     Comment: defer   Other Topics Concern   • None   Social History Narrative    Always uses seatbelt    Feels safe at home    Uses safety equipment     Social Determinants of Health     Financial Resource Strain: Not on file   Food Insecurity: No Food Insecurity (7/29/2022)    Hunger Vital Sign    • Worried About Running Out of Food in the Last Year: Never true    • Ran Out of Food in the Last Year: Never true   Transportation Needs: No Transportation Needs (7/29/2022)    PRAPARE - Transportation    • Lack of Transportation (Medical): No    • Lack of Transportation (Non-Medical):  No   Physical Activity: Not on file   Stress: Not on file   Social Connections: Not on file   Intimate Partner Violence: Not on file   Housing Stability: Low Risk  (7/29/2022)    Housing Stability Vital Sign    • Unable to Pay for Housing in the Last Year: No    • Number of Places Lived in the Last Year: 1    • Unstable Housing in the Last Year: No      Medications and Allergies:     Current Outpatient Medications   Medication Sig Dispense Refill   • acetaminophen (TYLENOL) 500 mg tablet Take 500 mg by mouth every 6 (six) hours as needed for mild pain     • acetaminophen-codeine (TYLENOL #3) 300-30 mg per tablet Take 1 tablet by mouth every 12 (twelve) hours as needed for moderate pain 5 tablet 0   • amLODIPine (NORVASC) 10 mg tablet Take 10 mg by mouth every evening      • aspirin (ECOTRIN LOW STRENGTH) 81 mg EC tablet Take 81 mg by mouth daily     • atorvastatin (LIPITOR) 80 mg tablet TAKE 1 TABLET BY MOUTH EVERY DAY IN THE EVENING 90 tablet 1   • cholecalciferol (VITAMIN D3) 400 units tablet Take 400 Units by mouth daily     • finasteride (PROSCAR) 5 mg tablet Take 1 tablet (5 mg total) by mouth daily 90 tablet 3   • hydroxyurea (HYDREA) 500 mg capsule Take 1 capsule (500 mg total) by mouth daily 90 capsule 1   • metoprolol tartrate (LOPRESSOR) 50 mg tablet Take 50 mg by mouth 2 (two) times a day      • Multiple Vitamin (MULTIVITAMIN) capsule Take 1 capsule by mouth daily     • nitroglycerin (NITROSTAT) 0.4 mg SL tablet Place 1 tablet (0.4 mg total) under the tongue every 5 (five) minutes as needed for chest pain 10 tablet 1   • Omega-3 Fatty Acids (FISH OIL) 1200 MG CAPS Take 1,200 mg by mouth 2 (two) times a day       • omeprazole (PriLOSEC) 40 MG capsule TAKE 1 CAPSULE BY MOUTH EVERY DAY BEFORE BREAKFAST 30 capsule 0   • albuterol (Ventolin HFA) 90 mcg/act inhaler Inhale 2 puffs every 4 (four) hours as needed for wheezing (Patient not taking: Reported on 5/23/2023) 18 g 1   • lidocaine (Lidoderm) 5 % Apply 1 patch topically daily Remove & Discard patch within 12 hours or as directed by MD (Patient not taking: Reported on 1/9/2023) 30 patch 0     No current facility-administered medications for this visit. Allergies   Allergen Reactions   • Other Other (See Comments)     Pt and wife reports took a medication years ago at work cant recall the name or what it was for"     "couldn't talk and cheeks swelled and also right hand"      Immunizations:     Immunization History   Administered Date(s) Administered   • Pneumococcal Polysaccharide PPV23 1945      Health Maintenance:         Topic Date Due   • Colorectal Cancer Screening  05/22/2020   • Hepatitis C Screening  Completed         Topic Date Due   • COVID-19 Vaccine (1) Never done   • Pneumococcal Vaccine: 65+ Years (1 - PCV) 09/18/1951   • Hepatitis A Vaccine (1 of 2 - Risk 2-dose series) Never done   • Hepatitis B Vaccine (1 of 3 - Risk 3-dose series) Never done   • Influenza Vaccine (1) 09/01/2023      Medicare Screening Tests and Risk Assessments:     Beth Jordan is here for his Subsequent Wellness visit. Last Medicare Wellness visit information reviewed, patient interviewed and updates made to the record today. Health Risk Assessment:   Patient rates overall health as fair. Patient feels that their physical health rating is same. Patient is satisfied with their life. Eyesight was rated as same. Hearing was rated as slightly worse. Patient feels that their emotional and mental health rating is same. Patients states they are never, rarely angry. Patient states they are never, rarely unusually tired/fatigued. Pain experienced in the last 7 days has been none. Patient states that he has experienced no weight loss or gain in last 6 months. Depression Screening:   PHQ-2 Score: 0      Fall Risk Screening:    In the past year, patient has experienced: no history of falling in past year      Home Safety:  Patient does not have trouble with stairs inside or outside of their home. Patient has working smoke alarms and has working carbon monoxide detector. Home safety hazards include: none. Nutrition:   Current diet is Regular. Medications:   Patient is currently taking over-the-counter supplements. OTC medications include: see medication list. Patient is able to manage medications. Activities of Daily Living (ADLs)/Instrumental Activities of Daily Living (IADLs):   Walk and transfer into and out of bed and chair?: Yes  Dress and groom yourself?: Yes    Bathe or shower yourself?: Yes    Feed yourself? Yes  Do your laundry/housekeeping?: Yes  Manage your money, pay your bills and track your expenses?: Yes  Make your own meals?: Yes    Do your own shopping?: Yes    Previous Hospitalizations:   Any hospitalizations or ED visits within the last 12 months?: No      Advance Care Planning:   Living will: No    Durable POA for healthcare:  Yes    Advanced directive: No    Advanced directive counseling given: Yes    Five wishes given: Yes    End of Life Decisions reviewed with patient: Yes    Provider agrees with end of life decisions: Yes      Cognitive Screening:   Provider or family/friend/caregiver concerned regarding cognition?: No    PREVENTIVE SCREENINGS      Cardiovascular Screening:    General: Screening Not Indicated and History Lipid Disorder      Diabetes Screening:     General: Screening Current      Colorectal Cancer Screening:     General: Risks and Benefits Discussed      Prostate Cancer Screening:    General: Screening Not Indicated      Osteoporosis Screening:    General: Risks and Benefits Discussed      Abdominal Aortic Aneurysm (AAA) Screening:    Risk factors include: tobacco use        General: Risks and Benefits Discussed      Lung Cancer Screening:     General: Screening Not Indicated      Hepatitis C Screening:    General: Screening Current    Screening, Brief Intervention, and Referral to Treatment (SBIRT)    Screening    Typical number of drinks in a week: 0    Single Item Drug Screening:  How often have you used an illegal drug (including marijuana) or a prescription medication for non-medical reasons in the past year? never    Single Item Drug Screen Score: 0  Interpretation: Negative screen for possible drug use disorder    No results found.      Physical Exam:     /70 (BP Location: Left arm, Patient Position: Sitting, Cuff Size: Adult)   Pulse 60   Temp 97.9 °F (36.6 °C) (Temporal)   Resp 16   Ht 5' 6.93" (1.7 m)   Wt 78.9 kg (174 lb)   SpO2 96%   BMI 27.31 kg/m²     Physical Exam     Joey Best, DO

## 2023-07-12 NOTE — ASSESSMENT & PLAN NOTE
Still smoking approximately 1/2 pack/day. Counseled again. Patient had LDCT chest performed February 2023, which was unremarkable.   Recommend recheck in 1 year

## 2023-07-18 DIAGNOSIS — R10.13 EPIGASTRIC PAIN: ICD-10-CM

## 2023-07-18 RX ORDER — OMEPRAZOLE 40 MG/1
CAPSULE, DELAYED RELEASE ORAL
Qty: 90 CAPSULE | Refills: 1 | Status: SHIPPED | OUTPATIENT
Start: 2023-07-18

## 2023-08-24 DIAGNOSIS — E78.2 MIXED HYPERLIPIDEMIA: ICD-10-CM

## 2023-08-24 DIAGNOSIS — I25.10 CORONARY ARTERY DISEASE WITHOUT ANGINA PECTORIS, UNSPECIFIED VESSEL OR LESION TYPE, UNSPECIFIED WHETHER NATIVE OR TRANSPLANTED HEART: ICD-10-CM

## 2023-08-24 RX ORDER — ATORVASTATIN CALCIUM 80 MG/1
TABLET, FILM COATED ORAL
Qty: 90 TABLET | Refills: 1 | Status: SHIPPED | OUTPATIENT
Start: 2023-08-24

## 2023-09-20 ENCOUNTER — TELEPHONE (OUTPATIENT)
Dept: HEMATOLOGY ONCOLOGY | Facility: CLINIC | Age: 78
End: 2023-09-20

## 2023-09-20 ENCOUNTER — CONSULT (OUTPATIENT)
Dept: PULMONOLOGY | Facility: CLINIC | Age: 78
End: 2023-09-20
Payer: COMMERCIAL

## 2023-09-20 VITALS
BODY MASS INDEX: 27.47 KG/M2 | DIASTOLIC BLOOD PRESSURE: 82 MMHG | HEART RATE: 60 BPM | SYSTOLIC BLOOD PRESSURE: 140 MMHG | OXYGEN SATURATION: 95 % | HEIGHT: 67 IN | TEMPERATURE: 98 F | WEIGHT: 175 LBS

## 2023-09-20 DIAGNOSIS — F17.210 CIGARETTE NICOTINE DEPENDENCE WITHOUT COMPLICATION: ICD-10-CM

## 2023-09-20 DIAGNOSIS — J44.9 CHRONIC OBSTRUCTIVE PULMONARY DISEASE, UNSPECIFIED COPD TYPE (HCC): Primary | ICD-10-CM

## 2023-09-20 DIAGNOSIS — R93.89 ABNORMAL CT OF THE CHEST: ICD-10-CM

## 2023-09-20 DIAGNOSIS — D45 POLYCYTHEMIA VERA (HCC): ICD-10-CM

## 2023-09-20 PROCEDURE — 94010 BREATHING CAPACITY TEST: CPT

## 2023-09-20 PROCEDURE — 99204 OFFICE O/P NEW MOD 45 MIN: CPT | Performed by: INTERNAL MEDICINE

## 2023-09-20 NOTE — TELEPHONE ENCOUNTER
GRAEME  DR mary HOBBS   Who are you speaking with? Patient   If it is not the patient, are they listed on an active communication consent form? N/A   Is this a GRAEME or DR mary HOBBS GRAEME   Which provider is patient currently scheduled or established with? Dr. Chepe Harman   What is the original appointment date and time? 10/6/23 8:20AM   At which location is the appointment scheduled to take place? [facility]   Which provider is the patient transitioning care to? Bhupendra Matthews PA-C   What is the new appointment date and time? 10/18/23 8:00AM   At which location is the new appointment scheduled to take place? [facility]   What is the reason for this change?  Provider

## 2023-09-20 NOTE — PROGRESS NOTES
Pulmonary Consultation   Kaya Louis 69 y.o. male MRN: [id number]  9/20/2023      Referring provider: Dr. Salome Monroy  Reason for consult: abnormal CT chest    Assessment and Plan:  Cigarette nicotine dependence without complication  Smokes 1/2 ppd x 61 years. - Reviewed deleterious effects of smoking including MI, cad, PVD.   - Not interested in quitting.  - Qualifies for yearly screening CT chest for early lung cancer. Next CT after infiltrate work up complete. Abnormal CT of the chest  Pt incidentally found to have GGO in B/L lungs; at the time, he was asymptomatic. When I compared to previous CT from February, the findings are new. - Differential includes infection, inflammation, atelectasis, or other  - Suspect this was atelectasis or inflammation and is most likely already  improved. Repeat CT chest in November, which will be 6 months from previous CT. Chronic obstructive pulmonary disease (HCC)  Possible COPD, never had PFTs. Office spirometry shows mild obstruction.  - Can continue Albuterol inhaler as needed  - Cont. excellent activity.  - Needs flu shot this fall. Polycythemia vera ([address])  Maintained on Hydroxyurea and occasionally has phlebotomy      Diagnoses and all orders for this visit:    Chronic obstructive pulmonary disease, unspecified COPD type ([address])  -     POCT spirometry    Abnormal CT of the chest  -     Ambulatory Referral to Pulmonology  -     CT chest without contrast; Future    Cigarette nicotine dependence without complication    Polycythemia vera ([address])    Plan of care discussed with pt. Concerns addressed. Return for follow up as needed. If CT chest has significant findings, we will have him come in for follow up. History of Present Illness   HPI:  Kaya Louis is a 69 y.o. male who presents for evaluation of abnormal CT chest.    States that his breathing is "okay" unless he goes up the stairs. Can go up stairs without stopping but is winded at the top.  Can ambulate multiple blocks on flat surface without stopping. Never had PFTs. Smokes 1/2 ppd x 61 years. 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"   • Full dentures     but doesnt wear them   • History of 2019 novel coronavirus disease (COVID-19) 02/2021    hospital for 2 days but not in ICU/"mainly dehydrated" "also fever/oxygen below 90"   • History of coronary artery stent placement     x2 in 2008 or so; sees Kelli Vigil-- Dr Marly De La Paz cardio   • [place] (hard of hearing)     no hearing aids yet   • Hyperlipidemia     Last Assessed:  11/24/17   • Hypertension     Benign essential, Last Assessed:  11/24/17   • LBBB (left bundle branch block)    • Nocturia    • Polycythemia vera (HCC)     (per history in chart)   • PVD (peripheral vascular disease) ([address])    • RBC abnormality     pt reports told has high Red blood cells/goes to infusion center regularly next appt 10/8/21 hematologist is Dr He Moyer of [facility](had been Dr Marlen Caceres)   • Seasickness    • Shortness of breath     "if goes up steps gets SOB, had for awhile"   • Slow urinary stream     "sometimes feels like bladder isnt all the way empty"   • Snores    • Wears glasses     reading     Past Surgical History:   Procedure Laterality Date   • ANGIOPLASTY     • APPENDECTOMY     • CARDIAC CATHETERIZATION N/A 07/28/2022    Procedure: CARDIAC TEMPORARY PACEMAKER;  Surgeon: Eduarda Yun MD;  [facility];   Service: Cardiology   • CARDIAC ELECTROPHYSIOLOGY PROCEDURE N/A 07/29/2022    Procedure: Cardiac pacer implant;  Surgeon: Duane Bernhardt, MD;  [facility]; Service: Cardiology   • CATARACT EXTRACTION Bilateral    • CT CYSTOGRAM  11/04/2021   • ELBOW SURGERY Right    • FL CYSTOGRAM  10/27/2021   • IR BIOPSY BONE MARROW  10/14/2022   • KNEE ARTHROSCOPY Right    • MI COLONOSCOPY FLX DX W/COLLJ SPEC WHEN PFRMD N/A 05/22/2017    Procedure: COLONOSCOPY;  Surgeon: Bro Clark DO;  [facility]: [facility];   Service: Gastroenterology   • MI NEUROPLASTY &/TRANSPOS MEDIAN NRV CARPAL Lyndal Govern Right 5/26/2023    Procedure: CTR, RIGHT;  Surgeon: Sadie Haider MD;  [facility];  Service: Orthopedics   • MI TENDON SHEATH INCISION Right 5/26/2023    Procedure: RELEASE TRIGGER FINGER, RIGHT RING FINGER;  Surgeon: Sadie Haider MD;  [facility];  Service: Orthopedics   • PROSTATE BIOPSY     • PROSTATECTOMY N/A 10/18/2021    Procedure: ROBOTIC SUBTOTAL/SUPRAPUBIC PROSTATECTOMY;  Surgeon: Lisandra Aguayo MD;  [facility];  Service: Urology   • SHOULDER ARTHROSCOPY Right    • TONSILLECTOMY       Family History   Problem Relation Age of Onset   • No Known Problems Mother    • Heart disease Father        Occupational History: worked in maintenance  Used to work as a  and did not wear a mask    Social History: 1/2 ppd x 61 years  Pets: none    Meds/Allergies     Current Outpatient Medications:   •  acetaminophen (TYLENOL) 500 mg tablet, Take 500 mg by mouth every 6 (six) hours as needed for mild pain, Disp: , Rfl:   •  acetaminophen-codeine (TYLENOL #3) 300-30 mg per tablet, Take 1 tablet by mouth every 12 (twelve) hours as needed for moderate pain, Disp: 5 tablet, Rfl: 0  •  albuterol (Ventolin HFA) 90 mcg/act inhaler, Inhale 2 puffs every 4 (four) hours as needed for wheezing (Patient not taking: Reported on 5/23/2023), Disp: 18 g, Rfl: 1  •  amLODIPine (NORVASC) 10 mg tablet, Take 10 mg by mouth every evening , Disp: , Rfl:   •  aspirin (ECOTRIN LOW STRENGTH) 81 mg EC tablet, Take 81 mg by mouth daily, Disp: , Rfl:   •  atorvastatin (LIPITOR) 80 mg tablet, TAKE 1 TABLET BY MOUTH EVERY DAY IN THE EVENING, Disp: 90 tablet, Rfl: 1  •  cholecalciferol (VITAMIN D3) 400 units tablet, Take 400 Units by mouth daily, Disp: , Rfl:   •  finasteride (PROSCAR) 5 mg tablet, Take 1 tablet (5 mg total) by mouth daily, Disp: 90 tablet, Rfl: 3  •  hydroxyurea (HYDREA) 500 mg capsule, Take 1 capsule (500 mg total) by mouth daily, Disp: 90 capsule, Rfl: 1  •  lidocaine (Lidoderm) 5 %, Apply 1 patch topically daily Remove & Discard patch within 12 hours or as directed by MD (Patient not taking: Reported on 1/9/2023), Disp: 30 patch, Rfl: 0  •  metoprolol tartrate (LOPRESSOR) 50 mg tablet, Take 50 mg by mouth 2 (two) times a day , Disp: , Rfl:   •  Multiple Vitamin (MULTIVITAMIN) capsule, Take 1 capsule by mouth daily, Disp: , Rfl:   •  nitroglycerin (NITROSTAT) 0.4 mg SL tablet, Place 1 tablet (0.4 mg total) under the tongue every 5 (five) minutes as needed for chest pain, Disp: 10 tablet, Rfl: 1  •  Omega-3 Fatty Acids (FISH OIL) 1200 MG CAPS, Take 1,200 mg by mouth 2 (two) times a day  , Disp: , Rfl:   •  omeprazole (PriLOSEC) 40 MG capsule, TAKE 1 CAPSULE BY MOUTH EVERY DAY BEFORE BREAKFAST, Disp: 90 capsule, Rfl: 1  Allergies   Allergen Reactions   • Other Other (See Comments)     Pt and wife reports took a medication years ago at work cant recall the name or what it was for"     "couldn't talk and cheeks swelled and also right hand"       Vitals:   Vitals:    09/20/23 1536   BP: 140/82   Pulse: 60   Temp: 98 °F (36.7 °C)   SpO2: 95%       Physical Exam  GEN: WDWN, nad, comfortable  HEENT: NCAT; EOMI; anicteric sclera b/l  CVS: regular, no m/r/g  LUNGS: clear b/l bs  ABD: soft, nd  EXT: no c/c/e  NEURO: nonfocal    Labs: I have personally reviewed pertinent lab results.   Lab Results   Component Value Date    WBC 9.70 06/26/2023    HGB 16.1 06/26/2023    HCT 47.0 06/26/2023     (H) 06/26/2023     (L) 2023     Lab Results   Component Value Date    CALCIUM 8.5 2023     2017    K 3.8 2023    CO2 25 2023     (H) 2023    BUN 14 2023    CREATININE 1.16 2023     No results found for: "IGE"  Lab Results   Component Value Date    ALT 28 2023    AST 24 2023     (H) 10/13/2022    ALKPHOS 142 (H) 2023    BILITOT 1.1 2017       Labs per my interpretation show normal renal function; normal hemoglobin, thrombocytopenia    Imaging and other studies: I have personally reviewed pertinent films in PACS  CT A/P  per my review shows GGO RLL and LLL; + emphysema    Pulmonary function testin at [facility] per my review shows normal PFTs    Office spirometry shows mild obstruction FEV1 2.33L 89% predicted    EKG, Pathology, and Other Studies: I have personally reviewed pertinent reports. 22 ECHO: normal EF    Ashley Wilkinson D.O.   [facility]

## 2023-09-20 NOTE — ASSESSMENT & PLAN NOTE
Smokes 1/2 ppd x 61 years. - Reviewed deleterious effects of smoking including MI, cad, PVD.   - Not interested in quitting.  - Qualifies for yearly screening CT chest for early lung cancer. Next CT after infiltrate work up complete.

## 2023-09-20 NOTE — ASSESSMENT & PLAN NOTE
Possible COPD, never had PFTs. Office spirometry shows mild obstruction.  - Can continue Albuterol inhaler as needed  - Cont. excellent activity.  - Needs flu shot this fall.

## 2023-09-20 NOTE — ASSESSMENT & PLAN NOTE
Pt incidentally found to have GGO in B/L lungs; at the time, he was asymptomatic. When I compared to previous CT from February, the findings are new. - Differential includes infection, inflammation, atelectasis, or other  - Suspect this was atelectasis or inflammation and is most likely already  improved. Repeat CT chest in November, which will be 6 months from previous CT.

## 2023-10-06 DIAGNOSIS — Z51.11 ENCOUNTER FOR ANTINEOPLASTIC CHEMOTHERAPY: ICD-10-CM

## 2023-10-06 DIAGNOSIS — Z15.89 JAK2 GENE MUTATION: ICD-10-CM

## 2023-10-06 DIAGNOSIS — D45 POLYCYTHEMIA VERA (HCC): ICD-10-CM

## 2023-10-09 ENCOUNTER — OFFICE VISIT (OUTPATIENT)
Dept: URGENT CARE | Facility: MEDICAL CENTER | Age: 78
End: 2023-10-09
Payer: COMMERCIAL

## 2023-10-09 VITALS
RESPIRATION RATE: 20 BRPM | DIASTOLIC BLOOD PRESSURE: 70 MMHG | OXYGEN SATURATION: 98 % | SYSTOLIC BLOOD PRESSURE: 140 MMHG | TEMPERATURE: 97.4 F | HEART RATE: 60 BPM

## 2023-10-09 DIAGNOSIS — R10.11 RIGHT UPPER QUADRANT PAIN: Primary | ICD-10-CM

## 2023-10-09 PROCEDURE — 99213 OFFICE O/P EST LOW 20 MIN: CPT | Performed by: PHYSICIAN ASSISTANT

## 2023-10-09 NOTE — PROGRESS NOTES
[facility]        NAME: Castillo Kellogg is a 69 y.o. male  : 1945    MRN: [id number]  DATE: 2023  TIME: 11:45 AM    Assessment and Plan   Right upper quadrant pain [R10.11]  1. Right upper quadrant pain              Patient Instructions       Follow up with PCP as soon as possible. I explained to the patient that we do not order ultrasounds or tests and this needs to be done by the family doctor or if his pain increases go to the emergency room. The patient explained he has been counseled 4 times and he needs evaluated. I referred him back to his PCP for work-up    Chief Complaint     Chief Complaint   Patient presents with   • Abdominal Pain     Pt c/o RUQ pain for the past month - at first pain was intermittent, but now is constant. Denies N/V but states when he coughs or stretches a certain way it bothers him         History of Present Illness       Abdominal Pain  This is a recurrent problem. The current episode started more than 1 month ago. The onset quality is sudden. The problem has been gradually worsening. The pain is located in the RUQ. The pain is moderate. The quality of the pain is aching. The abdominal pain radiates to the right shoulder. Associated symptoms include anorexia. Pertinent negatives include no arthralgias, belching, constipation, diarrhea, dysuria, fever, flatus, frequency, headaches, hematochezia, hematuria, melena, myalgias, nausea, vomiting or weight loss. Review of Systems   Review of Systems   Constitutional: Negative for fever and weight loss. Gastrointestinal: Positive for abdominal pain and anorexia. Negative for constipation, diarrhea, flatus, hematochezia, melena, nausea and vomiting. Genitourinary: Negative for dysuria, frequency and hematuria. Musculoskeletal: Negative for arthralgias and myalgias. Neurological: Negative for headaches. All other systems reviewed and are negative.         Current Medications       Current Outpatient Medications:   •  acetaminophen (TYLENOL) 500 mg tablet, Take 500 mg by mouth every 6 (six) hours as needed for mild pain, Disp: , Rfl:   •  albuterol (Ventolin HFA) 90 mcg/act inhaler, Inhale 2 puffs every 4 (four) hours as needed for wheezing, Disp: 18 g, Rfl: 1  •  amLODIPine (NORVASC) 10 mg tablet, Take 10 mg by mouth every evening , Disp: , Rfl:   •  aspirin (ECOTRIN LOW STRENGTH) 81 mg EC tablet, Take 81 mg by mouth daily, Disp: , Rfl:   •  atorvastatin (LIPITOR) 80 mg tablet, TAKE 1 TABLET BY MOUTH EVERY DAY IN THE EVENING, Disp: 90 tablet, Rfl: 1  •  hydroxyurea (HYDREA) 500 mg capsule, Take 1 capsule (500 mg total) by mouth daily, Disp: 90 capsule, Rfl: 1  •  metoprolol tartrate (LOPRESSOR) 50 mg tablet, Take 50 mg by mouth 2 (two) times a day , Disp: , Rfl:   •  Multiple Vitamin (MULTIVITAMIN) capsule, Take 1 capsule by mouth daily, Disp: , Rfl:   •  Multiple Vitamins-Minerals (VITAMIN D3 COMPLETE PO), Take 1 capsule by mouth daily, Disp: , Rfl:   •  nitroglycerin (NITROSTAT) 0.4 mg SL tablet, Place 1 tablet (0.4 mg total) under the tongue every 5 (five) minutes as needed for chest pain, Disp: 10 tablet, Rfl: 1  •  Omega-3 Fatty Acids (FISH OIL) 1200 MG CAPS, Take 1,200 mg by mouth 2 (two) times a day  , Disp: , Rfl:     Current Allergies     Allergies as of 10/09/2023 - Reviewed 10/09/2023   Allergen Reaction Noted   • Other Other (See Comments) 10/06/2021            The following portions of the patient's history were reviewed and updated as appropriate: allergies, current medications, past family history, past medical history, past social history, past surgical history and problem list.     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"   • Full dentures     but doesnt wear them   • History of 2019 novel coronavirus disease (COVID-19) 02/2021    hospital for 2 days but not in ICU/"mainly dehydrated" "also fever/oxygen below 90"   • History of coronary artery stent placement     x2 in 2008 or so; sees Michael Yen-- Dr Wilfredo Fields cardio   • [place] (hard of hearing)     no hearing aids yet   • Hyperlipidemia     Last Assessed:  11/24/17   • Hypertension     Benign essential, Last Assessed:  11/24/17   • LBBB (left bundle branch block)    • Nocturia    • Polycythemia vera (HCC)     (per history in chart)   • PVD (peripheral vascular disease) ([address])    • RBC abnormality     pt reports told has high Red blood cells/goes to infusion center regularly next appt 10/8/21 hematologist is Dr Marcia Montenegro of [facility](had been Dr Salvador Winkler)   • Seasickness    • Shortness of breath     "if goes up steps gets SOB, had for awhile"   • Slow urinary stream     "sometimes feels like bladder isnt all the way empty"   • Snores    • Wears glasses     reading       Past Surgical History:   Procedure Laterality Date   • ANGIOPLASTY     • APPENDECTOMY     • CARDIAC CATHETERIZATION N/A 07/28/2022    Procedure: CARDIAC TEMPORARY PACEMAKER;  Surgeon: Mary Carrillo MD;  [facility]: [facility]; Service: Cardiology   • CARDIAC ELECTROPHYSIOLOGY PROCEDURE N/A 07/29/2022    Procedure: Cardiac pacer implant;  Surgeon: Lili Wellington MD;  [facility]; Service: Cardiology   • CATARACT EXTRACTION Bilateral    • CT CYSTOGRAM  11/04/2021   • ELBOW SURGERY Right    • FL CYSTOGRAM  10/27/2021   • IR BIOPSY BONE MARROW  10/14/2022   • KNEE ARTHROSCOPY Right    • MN COLONOSCOPY FLX DX W/COLLJ SPEC WHEN PFRMD N/A 05/22/2017    Procedure: COLONOSCOPY;  Surgeon: Marnie Marx DO;  Location: [facility];   Service: Gastroenterology   • MN NEUROPLASTY &/TRANSPOS MEDIAN NRV CARPAL TUNNE Right 5/26/2023    Procedure: CTR, RIGHT;  Surgeon: Louie Frey MD;  Location: [facility];  Service: Orthopedics   • HI TENDON SHEATH INCISION Right 5/26/2023    Procedure: RELEASE TRIGGER FINGER, RIGHT RING FINGER;  Surgeon: Louie Frey MD;  [facility];  Service: Orthopedics   • PROSTATE BIOPSY     • PROSTATECTOMY N/A 10/18/2021    Procedure: ROBOTIC SUBTOTAL/SUPRAPUBIC PROSTATECTOMY;  Surgeon: Arina Washington MD;  [facility];  Service: Urology   • SHOULDER ARTHROSCOPY Right    • TONSILLECTOMY         Family History   Problem Relation Age of Onset   • No Known Problems Mother    • Heart disease Father          Medications have been verified. Objective   /70   Pulse 60   Temp (!) 97.4 °F (36.3 °C) (Tympanic)   Resp 20   SpO2 98%   No LMP for male patient. Physical Exam     Physical Exam  Constitutional:       Appearance: He is well-developed and normal weight. Cardiovascular:      Rate and Rhythm: Regular rhythm. Bradycardia present. Pulses: Normal pulses. Heart sounds: Normal heart sounds. Pulmonary:      Effort: Pulmonary effort is normal.      Breath sounds: Normal breath sounds. Abdominal:      General: Bowel sounds are normal.      Palpations: Abdomen is soft. Tenderness: There is abdominal tenderness ( Right upper quadrant). There is no guarding. Neurological:      Mental Status: He is alert. Positive Tucker sign.

## 2023-10-11 ENCOUNTER — OFFICE VISIT (OUTPATIENT)
Dept: FAMILY MEDICINE CLINIC | Facility: CLINIC | Age: 78
End: 2023-10-11
Payer: COMMERCIAL

## 2023-10-11 ENCOUNTER — APPOINTMENT (OUTPATIENT)
Dept: RADIOLOGY | Facility: CLINIC | Age: 78
End: 2023-10-11
Payer: COMMERCIAL

## 2023-10-11 VITALS
TEMPERATURE: 97.9 F | WEIGHT: 174 LBS | DIASTOLIC BLOOD PRESSURE: 78 MMHG | BODY MASS INDEX: 27.25 KG/M2 | HEART RATE: 65 BPM | SYSTOLIC BLOOD PRESSURE: 126 MMHG | OXYGEN SATURATION: 97 % | RESPIRATION RATE: 20 BRPM

## 2023-10-11 DIAGNOSIS — R07.89 RIGHT-SIDED CHEST WALL PAIN: Primary | ICD-10-CM

## 2023-10-11 DIAGNOSIS — R07.89 RIGHT-SIDED CHEST WALL PAIN: ICD-10-CM

## 2023-10-11 PROCEDURE — 71101 X-RAY EXAM UNILAT RIBS/CHEST: CPT

## 2023-10-11 PROCEDURE — 99213 OFFICE O/P EST LOW 20 MIN: CPT | Performed by: NURSE PRACTITIONER

## 2023-10-11 RX ORDER — LIDOCAINE 50 MG/G
1 PATCH TOPICAL DAILY
Qty: 30 PATCH | Refills: 0 | Status: SHIPPED | OUTPATIENT
Start: 2023-10-11

## 2023-10-11 NOTE — PROGRESS NOTES
[facility]    ASSESSMENT AND PLAN     1. Right-sided chest wall pain  Assessment & Plan:  Suspect muscular  We will check x-ray today   Rule out rib fracture  Supportive care reviewed   Rest muscle, brace with cough/movement   OTC naproxen twice daily-1 week    GFR 60 in June   Rx sent for Lidoderm patch-12 hour on/12 hour off    Unsure if insurance will cover-may obtain 4% OTC   Heat: 20 minutes on/20 minutes off  We will call with imaging results  Further follow-up guidance given    Orders:  -     XR ribs 2 vw right; Future; Expected date: 10/11/2023  -     lidocaine (Lidoderm) 5 %; Apply 1 patch topically over 12 hours daily Remove & Discard patch within 12 hours or as directed by MD           SUBJECTIVE       Patient ID: Slade Burnette is a 69 y.o. male. Chief Complaint   Patient presents with    Follow-up       HISTORY OF PRESENT ILLNESS    Patient presents today for urgent care follow-up. Was evaluated secondary to right chest wall pain-present for greater than 1 month. Unsure of exact onset date. Describes the pain as surface. Reports pain with palpation, stretching and or coughing. Feels external only. No pain with shallow or deep breathing. No shortness of breath. No chest pain or pressure. No abdominal pain. No change in appetite. No nausea or vomiting. No change in his bowels. No recent fevers. Known injury that he is aware of, but he is physically active. 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 Urgent care noted reviewed 10/9:  Abdominal Pain   Pt c/o RUQ pain for the past month - at first pain was intermittent, but now is constant. Denies N/V but states when he coughs or stretches a certain way it bothers him. Follow up with PCP as soon as possible. I explained to the patient that we do not order ultrasounds or tests and this needs to be done by the family doctor or if his pain increases go to the emergency room.   The patient explained he has been counseled 4 times and he needs evaluated. I referred him back to his PCP for work-up              The following portions of the patient's history were reviewed and updated as appropriate: allergies, current medications, past family history, past medical history, past social history, past surgical history, and problem list.    REVIEW OF SYSTEMS  Review of Systems   Constitutional:  Negative for activity change, appetite change, fatigue and fever. Respiratory: Negative. Negative for cough, chest tightness, shortness of breath and wheezing. Current tobacco use   Cardiovascular: Negative. Negative for chest pain and palpitations. CAD. Pacer   Gastrointestinal: Negative. Negative for abdominal pain, constipation, diarrhea, nausea and vomiting. Musculoskeletal:         Chest wall pain as in HPI   Neurological: Negative. Psychiatric/Behavioral: Negative.          OBJECTIVE      VITAL SIGNS  /78 (BP Location: Right arm, Patient Position: Sitting, Cuff Size: Standard)   Pulse 65   Temp 97.9 °F (36.6 °C) (Temporal)   Resp 20   Wt 78.9 kg (174 lb)   SpO2 97%   BMI 27.25 kg/m²     CURRENT MEDICATIONS    Current Outpatient Medications:     acetaminophen (TYLENOL) 500 mg tablet, Take 500 mg by mouth every 6 (six) hours as needed for mild pain, Disp: , Rfl:     albuterol (Ventolin HFA) 90 mcg/act inhaler, Inhale 2 puffs every 4 (four) hours as needed for wheezing, Disp: 18 g, Rfl: 1    amLODIPine (NORVASC) 10 mg tablet, Take 10 mg by mouth every evening , Disp: , Rfl:     aspirin (ECOTRIN LOW STRENGTH) 81 mg EC tablet, Take 81 mg by mouth daily, Disp: , Rfl:     atorvastatin (LIPITOR) 80 mg tablet, TAKE 1 TABLET BY MOUTH EVERY DAY IN THE EVENING, Disp: 90 tablet, Rfl: 1    hydroxyurea (HYDREA) 500 mg capsule, Take 1 capsule (500 mg total) by mouth daily, Disp: 90 capsule, Rfl: 1    lidocaine (Lidoderm) 5 %, Apply 1 patch topically over 12 hours daily Remove & Discard patch within 12 hours or as directed by MD, Disp: 30 patch, Rfl: 0    metoprolol tartrate (LOPRESSOR) 50 mg tablet, Take 50 mg by mouth 2 (two) times a day , Disp: , Rfl:     Multiple Vitamin (MULTIVITAMIN) capsule, Take 1 capsule by mouth daily, Disp: , Rfl:     Multiple Vitamins-Minerals (VITAMIN D3 COMPLETE PO), Take 1 capsule by mouth daily, Disp: , Rfl:     nitroglycerin (NITROSTAT) 0.4 mg SL tablet, Place 1 tablet (0.4 mg total) under the tongue every 5 (five) minutes as needed for chest pain, Disp: 10 tablet, Rfl: 1    Omega-3 Fatty Acids (FISH OIL) 1200 MG CAPS, Take 1,200 mg by mouth 2 (two) times a day  , Disp: , Rfl:       PHYSICAL EXAMINATION   Physical Exam  Vitals and nursing note reviewed. Constitutional:       General: He is not in acute distress. Appearance: Normal appearance. He is well-developed. He is not ill-appearing. HENT:      Head: Normocephalic. Cardiovascular:      Rate and Rhythm: Normal rate and regular rhythm. Pulmonary:      Effort: Pulmonary effort is normal. No respiratory distress. Breath sounds: Normal breath sounds and air entry. No decreased breath sounds, wheezing, rhonchi or rales. Chest:      Chest wall: Swelling and tenderness present. Comments: Mild swelling right lateral chest wall-mid axilla-base of rib cage. Extreme tenderness to light palpation  Localized to that area only  Abdominal:      General: Bowel sounds are normal. There is no distension. Palpations: Abdomen is soft. Tenderness: There is no abdominal tenderness. There is no guarding or rebound. Musculoskeletal:        Arms:       Right lower leg: No edema. Left lower leg: No edema. Comments: Concentrated area of pain   Skin:     General: Skin is warm and dry. Neurological:      General: No focal deficit present. Mental Status: He is alert and oriented to person, place, and time.    Psychiatric:         Attention and Perception: Attention normal.         Mood and Affect: Mood normal.         Behavior: Behavior normal.

## 2023-10-11 NOTE — ASSESSMENT & PLAN NOTE
Suspect muscular  We will check x-ray today   Rule out rib fracture  Supportive care reviewed   Rest muscle, brace with cough/movement   OTC naproxen twice daily-1 week    GFR 60 in June   Rx sent for Lidoderm patch-12 hour on/12 hour off    Unsure if insurance will cover-may obtain 4% OTC   Heat: 20 minutes on/20 minutes off  We will call with imaging results  Further follow-up guidance given

## 2023-10-12 ENCOUNTER — TELEPHONE (OUTPATIENT)
Dept: FAMILY MEDICINE CLINIC | Facility: CLINIC | Age: 78
End: 2023-10-12

## 2023-10-16 ENCOUNTER — TELEPHONE (OUTPATIENT)
Dept: HEMATOLOGY ONCOLOGY | Facility: CLINIC | Age: 78
End: 2023-10-16

## 2023-10-16 DIAGNOSIS — Z51.11 ENCOUNTER FOR ANTINEOPLASTIC CHEMOTHERAPY: ICD-10-CM

## 2023-10-16 DIAGNOSIS — Z15.89 JAK2 GENE MUTATION: ICD-10-CM

## 2023-10-16 DIAGNOSIS — D45 POLYCYTHEMIA VERA (HCC): Primary | ICD-10-CM

## 2023-10-16 RX ORDER — HYDROXYUREA 500 MG/1
500 CAPSULE ORAL DAILY
Qty: 90 CAPSULE | Refills: 1 | Status: SHIPPED | OUTPATIENT
Start: 2023-10-16

## 2023-10-18 ENCOUNTER — OFFICE VISIT (OUTPATIENT)
Dept: HEMATOLOGY ONCOLOGY | Facility: CLINIC | Age: 78
End: 2023-10-18

## 2023-10-18 VITALS
DIASTOLIC BLOOD PRESSURE: 78 MMHG | TEMPERATURE: 97.6 F | HEART RATE: 60 BPM | OXYGEN SATURATION: 96 % | SYSTOLIC BLOOD PRESSURE: 118 MMHG | RESPIRATION RATE: 18 BRPM | BODY MASS INDEX: 26.07 KG/M2 | WEIGHT: 172 LBS | HEIGHT: 68 IN

## 2023-10-18 DIAGNOSIS — Z15.89 JAK2 GENE MUTATION: Primary | ICD-10-CM

## 2023-10-18 DIAGNOSIS — D45 POLYCYTHEMIA VERA (HCC): ICD-10-CM

## 2023-10-18 NOTE — PROGRESS NOTES
Hematology/Oncology Outpatient Follow-up  Castillo Kellogg 69 y.o. male 1945 [id number]    Date:  10/18/2023      Assessment and Plan:  1. JAK2 gene mutation, 2. Polycythemia vera [facility])  81-year-old male presents for follow-up visit regarding history of JAK2 mutation positive polycythemia vera. He also has an every day smoker smoking approximately half a pack per day. His most recent hematocrit is 47. Platelets are over 816. Previously hematocrit was 55. There is been significant improvement in hematocrit over the last 6 months. We reviewed that target hematocrit is closer to 45 however with increasing dose previously patient developed significant anemia. We will continue Hydrea 500 mg daily at this time. Mild increase in hematocrit is likely also related to his chronic cigarette smoking. He has no intention of quitting. We also reviewed his right-sided rib pain. This was tender on physical exam.  He has pending x-rays from PCP. I called reading room to have these read as they were performed over 1 week ago. - CBC and differential; Future      HPI:  81-year-old male presents for follow-up visit regarding history of JAK2 mutation positive polycythemia vera diagnosed in 2017. He was initially receiving phlebotomy as well as baby aspirin. Since 2021 he has been on Hydrea. Previously he was on 1000 mg daily at 1 time and developed significant anemia resulting in hospitalization. Hydrea was then temporarily held and restarted at 500 mg 3 times per week. He underwent bone marrow biopsy for the first time in October 2022 which showed a minute involvement of MGUS as well as minute involvement of CLL population. Most recently he has been taking Hydrea 500 mg 1 tablet daily. He is a daily smoker smoking approximately half pack per day.     Interval history: pain in the lower right ribs, heating pad helps, not taking any OTC pain reliever     ROS: Review of Systems   Constitutional:  Negative for appetite change, chills, fatigue, fever and unexpected weight change. HENT:  Negative for mouth sores and nosebleeds. Respiratory:  Negative for cough and shortness of breath. Cardiovascular:  Negative for chest pain, palpitations and leg swelling. Gastrointestinal:  Negative for abdominal pain, blood in stool, constipation, diarrhea, nausea and vomiting. Genitourinary:  Negative for difficulty urinating, dysuria and hematuria. Musculoskeletal:  Negative for arthralgias. Skin: Negative. Neurological:  Negative for dizziness, weakness, light-headedness, numbness and headaches. Hematological: Negative. Psychiatric/Behavioral: Negative.          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"    Full dentures     but doesnt wear them    History of 2019 novel coronavirus disease (COVID-19) 02/2021    hospital for 2 days but not in ICU/"mainly dehydrated" "also fever/oxygen below 90"    History of coronary artery stent placement     x2 in 2008 or so; sees Tsering Veliz-- Dr Renato Morales cardio    [facility] (hard of hearing)     no hearing aids yet    Hyperlipidemia     Last Assessed:  11/24/17    Hypertension     Benign essential, Last Assessed:  11/24/17    LBBB (left bundle branch block)     Nocturia     Polycythemia vera (HCC)     (per history in chart)    PVD (peripheral vascular disease) (HCC)     RBC abnormality     pt reports told has high Red blood cells/goes to infusion center regularly next appt 10/8/21 hematologist is Dr Vinh Mcmillan of [facility](had been Dr Radha Messer)    Seasickness     Shortness of breath     "if goes up steps gets SOB, had for awhile"    Slow urinary stream     "sometimes feels like bladder isnt all the way empty"    Snores     Wears glasses     reading       Past Surgical History:   Procedure Laterality Date    ANGIOPLASTY      APPENDECTOMY      CARDIAC CATHETERIZATION N/A 07/28/2022    Procedure: CARDIAC TEMPORARY PACEMAKER;  Surgeon: Filipe Roy MD;  [facility]; Service: Cardiology    CARDIAC ELECTROPHYSIOLOGY PROCEDURE N/A 07/29/2022    Procedure: Cardiac pacer implant;  Surgeon: Lilli Serrano MD;  [facility]; Service: Cardiology    CATARACT EXTRACTION Bilateral     CT CYSTOGRAM  11/04/2021    ELBOW SURGERY Right     FL CYSTOGRAM  10/27/2021    IR BIOPSY BONE MARROW  10/14/2022    KNEE ARTHROSCOPY Right     MS COLONOSCOPY FLX DX W/COLLJ SPEC WHEN PFRMD N/A 05/22/2017    Procedure: COLONOSCOPY;  Surgeon: Sheryl Martin DO;  Location: [facility];   Service: Gastroenterology    MS NEUROPLASTY &/TRANSPOS MEDIAN NRV CARPAL Oleta Fleischer Right 5/26/2023    Procedure: Kaye Rinne, RIGHT;  Surgeon: Hoang Latif MD;  [facility];  Service: Orthopedics    MS TENDON SHEATH INCISION Right 5/26/2023    Procedure: RELEASE TRIGGER FINGER, RIGHT RING FINGER;  Surgeon: Hoang Latif MD;  [facility];  Service: Orthopedics    PROSTATE BIOPSY      PROSTATECTOMY N/A 10/18/2021    Procedure: ROBOTIC SUBTOTAL/SUPRAPUBIC PROSTATECTOMY;  Surgeon: Newton Petersen MD;  [facility];  Service: Urology    SHOULDER ARTHROSCOPY Right     TONSILLECTOMY         Social History     Socioeconomic History    Marital status: /Civil Union     Spouse name: Not on file    Number of children: Not on file    Years of education: Not on file    Highest education level: Not on file   Occupational History    Not on file   Tobacco Use    Smoking status: Every Day     Packs/day: 0.50     Years: 35.00     Total pack years: 17.50     Types: Cigarettes     Start date: 0    Smokeless tobacco: Never    Tobacco comments:     a little less than 1/2ppd, trying to cut back before surgery last smoked 5.25.23   Vaping Use    Vaping Use: Never used   Substance and Sexual Activity    Alcohol use: Never    Drug use: No    Sexual activity: Not Currently     Comment: defer   Other Topics Concern    Not on file   Social History Narrative    Always uses seatbelt    Feels safe at home    Uses safety equipment     Social Determinants of Health     Financial Resource Strain: Not on file   Food Insecurity: No Food Insecurity (7/29/2022)    Hunger Vital Sign     Worried About Running Out of Food in the Last Year: Never true     Ran Out of Food in the Last Year: Never true   Transportation Needs: No Transportation Needs (7/29/2022)    PRAPARE - Transportation     Lack of Transportation (Medical): No     Lack of Transportation (Non-Medical):  No   Physical Activity: Not on file   Stress: Not on file   Social Connections: Not on file   Intimate Partner Violence: Not on file   Housing Stability: Low Risk  (7/29/2022)    Housing Stability Vital Sign     Unable to Pay for Housing in the Last Year: No     Number of Places Lived in the Last Year: 1     Unstable Housing in the Last Year: No       Family History   Problem Relation Age of Onset    No Known Problems Mother     Heart disease Father        Allergies   Allergen Reactions    Other Other (See Comments)     Pt and wife reports took a medication years ago at work cant recall the name or what it was for"     "couldn't talk and cheeks swelled and also right hand"         Current Outpatient Medications:     acetaminophen (TYLENOL) 500 mg tablet, Take 500 mg by mouth every 6 (six) hours as needed for mild pain, Disp: , Rfl:     albuterol (Ventolin HFA) 90 mcg/act inhaler, Inhale 2 puffs every 4 (four) hours as needed for wheezing, Disp: 18 g, Rfl: 1    amLODIPine (NORVASC) 10 mg tablet, Take 10 mg by mouth every evening , Disp: , Rfl:     aspirin (ECOTRIN LOW STRENGTH) 81 mg EC tablet, Take 81 mg by mouth daily, Disp: , Rfl:     atorvastatin (LIPITOR) 80 mg tablet, TAKE 1 TABLET BY MOUTH EVERY DAY IN THE EVENING, Disp: 90 tablet, Rfl: 1    hydroxyurea (HYDREA) 500 mg capsule, TAKE 1 CAPSULE (500 MG TOTAL) BY MOUTH DAILY, Disp: 90 capsule, Rfl: 1    lidocaine (Lidoderm) 5 %, Apply 1 patch topically over 12 hours daily Remove & Discard patch within 12 hours or as directed by MD, Disp: 30 patch, Rfl: 0    metoprolol tartrate (LOPRESSOR) 50 mg tablet, Take 50 mg by mouth 2 (two) times a day , Disp: , Rfl:     Multiple Vitamin (MULTIVITAMIN) capsule, Take 1 capsule by mouth daily, Disp: , Rfl:     Multiple Vitamins-Minerals (VITAMIN D3 COMPLETE PO), Take 1 capsule by mouth daily, Disp: , Rfl:     nitroglycerin (NITROSTAT) 0.4 mg SL tablet, Place 1 tablet (0.4 mg total) under the tongue every 5 (five) minutes as needed for chest pain, Disp: 10 tablet, Rfl: 1    Omega-3 Fatty Acids (FISH OIL) 1200 MG CAPS, Take 1,200 mg by mouth 2 (two) times a day  , Disp: , Rfl:     Physical Exam:  /78 (BP Location: Left arm)   Pulse 60   Temp 97.6 °F (36.4 °C) (Tympanic)   Resp 18   Ht 5' 7.72" (1.72 m)   Wt 78 kg (172 lb)   SpO2 96%   BMI 26.37 kg/m²     Physical Exam  Vitals reviewed. Constitutional:       General: He is not in acute distress. Appearance: He is well-developed. He is not ill-appearing. HENT:      Head: Normocephalic and atraumatic. Eyes:      General: No scleral icterus. Conjunctiva/sclera: Conjunctivae normal.   Cardiovascular:      Rate and Rhythm: Normal rate and regular rhythm. Heart sounds: Normal heart sounds. No murmur heard. Pulmonary:      Effort: Pulmonary effort is normal. No respiratory distress. Breath sounds: Normal breath sounds. Chest:      Comments: Tender to palpation right lower ribs  Abdominal:      Palpations: Abdomen is soft. Tenderness: There is no abdominal tenderness. Musculoskeletal:         General: No tenderness. Normal range of motion.       Cervical back: Normal range of motion and neck supple. Right lower leg: No edema. Left lower leg: No edema. Lymphadenopathy:      Cervical: No cervical adenopathy. Skin:     General: Skin is warm and dry. Neurological:      Mental Status: He is alert and oriented to person, place, and time. Cranial Nerves: No cranial nerve deficit. Psychiatric:         Mood and Affect: Mood normal.         Behavior: Behavior normal.       Labs:  Lab Results   Component Value Date    WBC 9.70 06/26/2023    HGB 16.1 06/26/2023    HCT 47.0 06/26/2023     (H) 06/26/2023     (L) 06/26/2023     I have spent 30 minutes with Patient  today in which greater than 50% of this time was spent in counseling/coordination of care regarding Diagnostic results, Instructions for management, Patient and family education, Impressions, Documenting in the medical record, Reviewing / ordering tests, medicine, procedures  , and Obtaining or reviewing history  . Patient voiced understanding and agreement in the above discussion. Aware to contact our office with questions/symptoms in the interim. This note has been generated by voice recognition software system. Therefore, there may be spelling, grammar, and or syntax errors. Please contact if questions arise.

## 2023-10-20 ENCOUNTER — TELEPHONE (OUTPATIENT)
Dept: FAMILY MEDICINE CLINIC | Facility: CLINIC | Age: 78
End: 2023-10-20

## 2023-10-20 NOTE — TELEPHONE ENCOUNTER
Patient returned Rebecca's call regarding his X Ray results. Patient says he will be home until 3pm . He be taking the corn down. But you can give him a call.      Thank you

## 2023-10-21 NOTE — TELEPHONE ENCOUNTER
Attempted to connect with pt again. No answer- left voice mail again. Consent form does not allow detailed voice messages.  Left message to return call to discuss results -

## 2023-10-24 NOTE — TELEPHONE ENCOUNTER
Was able to speak with patient via phone tonight. Discussed results and conservative treatment. Reports he has been putting a heating pad on it and it has slowly been feeling better. He has no changes to the skin. He was advised to contact the office should he have any problems and or concerns, or if his symptoms worsen, or do not continue to improve. Also advised to please add a phone number and permission to leave voice mail to his consent, so we can leave a detailed message in the future.

## 2023-11-03 ENCOUNTER — HOSPITAL ENCOUNTER (OUTPATIENT)
Dept: CT IMAGING | Facility: HOSPITAL | Age: 78
Discharge: HOME | End: 2023-11-03
Attending: INTERNAL MEDICINE
Payer: COMMERCIAL

## 2023-11-03 DIAGNOSIS — R93.89 ABNORMAL CT OF THE CHEST: ICD-10-CM

## 2023-11-03 PROCEDURE — 71250 CT THORAX DX C-: CPT

## 2023-11-03 PROCEDURE — G1004 CDSM NDSC: HCPCS

## 2023-12-20 ENCOUNTER — OFFICE VISIT (OUTPATIENT)
Dept: FAMILY MEDICINE CLINIC | Facility: CLINIC | Age: 78
End: 2023-12-20
Payer: COMMERCIAL

## 2023-12-20 ENCOUNTER — HOSPITAL ENCOUNTER (OUTPATIENT)
Dept: ULTRASOUND IMAGING | Facility: HOSPITAL | Age: 78
Discharge: HOME | DRG: 728 | End: 2023-12-20
Payer: COMMERCIAL

## 2023-12-20 VITALS
DIASTOLIC BLOOD PRESSURE: 88 MMHG | TEMPERATURE: 98.2 F | SYSTOLIC BLOOD PRESSURE: 130 MMHG | OXYGEN SATURATION: 99 % | BODY MASS INDEX: 25.76 KG/M2 | HEART RATE: 62 BPM | HEIGHT: 68 IN | WEIGHT: 170 LBS

## 2023-12-20 DIAGNOSIS — N50.812 LEFT TESTICULAR PAIN: ICD-10-CM

## 2023-12-20 DIAGNOSIS — N50.89 SWELLING OF LEFT HALF OF SCROTUM: ICD-10-CM

## 2023-12-20 DIAGNOSIS — N50.812 LEFT TESTICULAR PAIN: Primary | ICD-10-CM

## 2023-12-20 PROCEDURE — 76870 US EXAM SCROTUM: CPT

## 2023-12-20 PROCEDURE — 99214 OFFICE O/P EST MOD 30 MIN: CPT | Performed by: FAMILY MEDICINE

## 2023-12-20 RX ORDER — DOXYCYCLINE HYCLATE 100 MG/1
100 CAPSULE ORAL EVERY 12 HOURS SCHEDULED
Qty: 14 CAPSULE | Refills: 0 | Status: SHIPPED | OUTPATIENT
Start: 2023-12-20 | End: 2023-12-24

## 2023-12-20 NOTE — PROGRESS NOTES
Name: Santana Jacinto      : 1945      MRN: [id number]  Encounter Provider: Toshia Zhao DO  Encounter Date: 2023   Encounter department: [facility]    Assessment & Plan   Patient is a 78 year old male with left testicular pain, swelling and redness.  I am checking an US to R/O any mass or abscess - patient very tender on palpation and scrotal contents on palpation seem hard. Scrotum also swollen and red.  Doxycycline ordered for possible epidermitis    1. Left testicular pain  -     US scrotum and testicles; Future; Expected date: 2023  -     doxycycline hyclate (VIBRAMYCIN) 100 mg capsule; Take 1 capsule (100 mg total) by mouth every 12 (twelve) hours for 7 days    2. Swelling of left half of scrotum  -     US scrotum and testicles; Future; Expected date: 2023  -     doxycycline hyclate (VIBRAMYCIN) 100 mg capsule; Take 1 capsule (100 mg total) by mouth every 12 (twelve) hours for 7 days         Subjective      Chief Complaint   Patient presents with   • Testicle Pain     Left side and possible swelling, started on 23       Patient complains of pain in left testicle - constant.   No dysuria. Did see red in his urin on Saturday.  Pain is a 9/10    Testicle Pain  He complains of testicular pain. This is a new problem. The current episode started in the past 7 days. The problem occurs constantly. The problem has been gradually worsening since onset. Pertinent negatives include no abdominal pain, chills, constipation, diarrhea, dysuria, fever or flank pain. The testicular pain affects the left testicle. There is swelling in the left testicle. The color of the testicles is Red.     Review of Systems   Constitutional:  Negative for chills and fever.   Gastrointestinal:  Negative for abdominal pain, constipation and diarrhea.   Genitourinary:  Positive for testicular pain. Negative for dysuria and flank pain.       Current Outpatient Medications on File Prior to Visit  "  Medication Sig   • acetaminophen (TYLENOL) 500 mg tablet Take 500 mg by mouth every 6 (six) hours as needed for mild pain   • albuterol (Ventolin HFA) 90 mcg/act inhaler Inhale 2 puffs every 4 (four) hours as needed for wheezing   • amLODIPine (NORVASC) 10 mg tablet Take 10 mg by mouth every evening    • aspirin (ECOTRIN LOW STRENGTH) 81 mg EC tablet Take 81 mg by mouth daily   • atorvastatin (LIPITOR) 80 mg tablet TAKE 1 TABLET BY MOUTH EVERY DAY IN THE EVENING   • hydroxyurea (HYDREA) 500 mg capsule TAKE 1 CAPSULE (500 MG TOTAL) BY MOUTH DAILY   • lidocaine (Lidoderm) 5 % Apply 1 patch topically over 12 hours daily Remove & Discard patch within 12 hours or as directed by MD   • metoprolol tartrate (LOPRESSOR) 50 mg tablet Take 50 mg by mouth 2 (two) times a day    • Multiple Vitamin (MULTIVITAMIN) capsule Take 1 capsule by mouth daily   • Multiple Vitamins-Minerals (VITAMIN D3 COMPLETE PO) Take 1 capsule by mouth daily   • nitroglycerin (NITROSTAT) 0.4 mg SL tablet Place 1 tablet (0.4 mg total) under the tongue every 5 (five) minutes as needed for chest pain   • Omega-3 Fatty Acids (FISH OIL) 1200 MG CAPS Take 1,200 mg by mouth 2 (two) times a day         Objective     /88 (BP Location: Left arm, Patient Position: Sitting, Cuff Size: Standard)   Pulse 62   Temp 98.2 °F (36.8 °C) (Temporal)   Ht 5' 7.72\" (1.72 m)   Wt 77.1 kg (170 lb)   SpO2 99%   BMI 26.06 kg/m²     Physical Exam  Vitals and nursing note reviewed.   Constitutional:       General: He is in acute distress (secondary to pain).   HENT:      Head: Normocephalic.   Abdominal:      General: Bowel sounds are normal.      Palpations: Abdomen is soft.      Tenderness: There is no abdominal tenderness. There is no right CVA tenderness or left CVA tenderness.      Hernia: No hernia is present.   Genitourinary:     Testes:         Right: Tenderness or swelling not present.         Left: Tenderness and swelling present.      Comments: Erythema and " swelling of left testicle. Very tender on palpation. Cannot palpate a distinct mass but contents of left scrotum is hard.  Lymphadenopathy:      Lower Body: No right inguinal adenopathy. No left inguinal adenopathy.   Neurological:      Mental Status: He is alert.       Toshia Zhao DO

## 2023-12-21 ENCOUNTER — TELEPHONE (OUTPATIENT)
Age: 78
End: 2023-12-21

## 2023-12-21 ENCOUNTER — HOSPITAL ENCOUNTER (INPATIENT)
Facility: HOSPITAL | Age: 78
LOS: 2 days | Discharge: HOME | DRG: 728 | End: 2023-12-24
Attending: EMERGENCY MEDICINE | Admitting: INTERNAL MEDICINE
Payer: COMMERCIAL

## 2023-12-21 DIAGNOSIS — N50.812 TESTICULAR PAIN, LEFT: ICD-10-CM

## 2023-12-21 DIAGNOSIS — N45.1 EPIDIDYMITIS: Primary | ICD-10-CM

## 2023-12-21 DIAGNOSIS — N45.1 LEFT EPIDIDYMITIS: ICD-10-CM

## 2023-12-21 DIAGNOSIS — N43.3 LEFT HYDROCELE: ICD-10-CM

## 2023-12-21 LAB
BILIRUB UR QL STRIP: NEGATIVE
CLARITY UR: ABNORMAL
COLOR UR: ABNORMAL
GLUCOSE UR STRIP-MCNC: NEGATIVE MG/DL
HGB UR QL STRIP.AUTO: ABNORMAL
KETONES UR STRIP-MCNC: NEGATIVE MG/DL
LEUKOCYTE ESTERASE UR QL STRIP: ABNORMAL
NITRITE UR QL STRIP: NEGATIVE
PH UR STRIP.AUTO: 7 [PH] (ref 4.5–8)
PROT UR STRIP-MCNC: ABNORMAL MG/DL
SP GR UR STRIP.AUTO: 1.02 (ref 1–1.03)
UROBILINOGEN UR QL STRIP.AUTO: 4 E.U./DL

## 2023-12-21 PROCEDURE — 36415 COLL VENOUS BLD VENIPUNCTURE: CPT | Performed by: EMERGENCY MEDICINE

## 2023-12-21 PROCEDURE — 99285 EMERGENCY DEPT VISIT HI MDM: CPT

## 2023-12-21 PROCEDURE — 87086 URINE CULTURE/COLONY COUNT: CPT

## 2023-12-21 PROCEDURE — 85025 COMPLETE CBC W/AUTO DIFF WBC: CPT | Performed by: EMERGENCY MEDICINE

## 2023-12-21 PROCEDURE — 99285 EMERGENCY DEPT VISIT HI MDM: CPT | Performed by: EMERGENCY MEDICINE

## 2023-12-21 PROCEDURE — 81001 URINALYSIS AUTO W/SCOPE: CPT

## 2023-12-21 PROCEDURE — 80053 COMPREHEN METABOLIC PANEL: CPT | Performed by: EMERGENCY MEDICINE

## 2023-12-21 RX ORDER — LEVOFLOXACIN 5 MG/ML
500 INJECTION, SOLUTION INTRAVENOUS ONCE
Status: COMPLETED | OUTPATIENT
Start: 2023-12-21 | End: 2023-12-22

## 2023-12-21 NOTE — TELEPHONE ENCOUNTER
Please see my chart message from daughter Sol and family would like our clinical team to review ultrasound and advise on further recommendations. Please review and advise.

## 2023-12-21 NOTE — TELEPHONE ENCOUNTER
Called and spoke with patients daughter Sol. Advised that SADIE Pierce reviewed imaging and agrees with completing Doxycycline course as ordered.

## 2023-12-21 NOTE — TELEPHONE ENCOUNTER
----- Message from Santana Jacinto sent at 12/20/2023  5:50 PM EST -----  Regarding: Issue  Contact: [phone]  Good evening, my name is Sol Jacinto and my father Santana is a patient of yours and Dr. Vega's. He went to his PCP today because he has pain in his left testicle, swelling, and redness. He did have some blood in his urine on Saturday 12/16 but none since. The PCP sent him for an ultrasound of his scrotum and testicles which was done this afternoon. She also prescribed him doxycycline hyclate incase of infection. She called him this evening and said that he does have some inflammation but that the doxycycline hyclate should take care of it and that if it doesn't improve by Friday he should call their office. Since this is your area of expertise and my father is your patient I was wondering if you could look at the test results and see if you agree with the PCP or would like any other tests or treatments done. Thank you for your time. Happy Holidays!!  Sincerely,   Sol Jacinto

## 2023-12-22 ENCOUNTER — APPOINTMENT (EMERGENCY)
Dept: ULTRASOUND IMAGING | Facility: HOSPITAL | Age: 78
DRG: 728 | End: 2023-12-22
Payer: COMMERCIAL

## 2023-12-22 PROBLEM — N45.1 LEFT EPIDIDYMITIS: Status: ACTIVE | Noted: 2023-12-22

## 2023-12-22 LAB
ALBUMIN SERPL BCP-MCNC: 4.1 G/DL (ref 3.5–5)
ALP SERPL-CCNC: 112 U/L (ref 34–104)
ALT SERPL W P-5'-P-CCNC: 34 U/L (ref 7–52)
ANION GAP SERPL CALCULATED.3IONS-SCNC: 6 MMOL/L
ANION GAP SERPL CALCULATED.3IONS-SCNC: 6 MMOL/L
AST SERPL W P-5'-P-CCNC: 30 U/L (ref 13–39)
BACTERIA UR QL AUTO: ABNORMAL /HPF
BASOPHILS # BLD AUTO: 0.04 THOUSANDS/ÂΜL (ref 0–0.1)
BASOPHILS # BLD AUTO: 0.05 THOUSANDS/ÂΜL (ref 0–0.1)
BASOPHILS NFR BLD AUTO: 0 % (ref 0–1)
BASOPHILS NFR BLD AUTO: 0 % (ref 0–1)
BILIRUB SERPL-MCNC: 1.58 MG/DL (ref 0.2–1)
BUN SERPL-MCNC: 15 MG/DL (ref 5–25)
BUN SERPL-MCNC: 18 MG/DL (ref 5–25)
CALCIUM SERPL-MCNC: 8.9 MG/DL (ref 8.4–10.2)
CALCIUM SERPL-MCNC: 9.3 MG/DL (ref 8.4–10.2)
CHLORIDE SERPL-SCNC: 102 MMOL/L (ref 96–108)
CHLORIDE SERPL-SCNC: 102 MMOL/L (ref 96–108)
CO2 SERPL-SCNC: 29 MMOL/L (ref 21–32)
CO2 SERPL-SCNC: 29 MMOL/L (ref 21–32)
CREAT SERPL-MCNC: 0.98 MG/DL (ref 0.6–1.3)
CREAT SERPL-MCNC: 1.09 MG/DL (ref 0.6–1.3)
EOSINOPHIL # BLD AUTO: 0.12 THOUSAND/ÂΜL (ref 0–0.61)
EOSINOPHIL # BLD AUTO: 0.18 THOUSAND/ÂΜL (ref 0–0.61)
EOSINOPHIL NFR BLD AUTO: 1 % (ref 0–6)
EOSINOPHIL NFR BLD AUTO: 2 % (ref 0–6)
ERYTHROCYTE [DISTWIDTH] IN BLOOD BY AUTOMATED COUNT: 14.1 % (ref 11.6–15.1)
ERYTHROCYTE [DISTWIDTH] IN BLOOD BY AUTOMATED COUNT: 14.1 % (ref 11.6–15.1)
GFR SERPL CREATININE-BSD FRML MDRD: 65 ML/MIN/1.73SQ M
GFR SERPL CREATININE-BSD FRML MDRD: 74 ML/MIN/1.73SQ M
GLUCOSE SERPL-MCNC: 100 MG/DL (ref 65–140)
GLUCOSE SERPL-MCNC: 83 MG/DL (ref 65–140)
HCT VFR BLD AUTO: 47.3 % (ref 36.5–49.3)
HCT VFR BLD AUTO: 50.2 % (ref 36.5–49.3)
HGB BLD-MCNC: 16.8 G/DL (ref 12–17)
HGB BLD-MCNC: 18 G/DL (ref 12–17)
HYALINE CASTS #/AREA URNS LPF: ABNORMAL /LPF
IMM GRANULOCYTES # BLD AUTO: 0.05 THOUSAND/UL (ref 0–0.2)
IMM GRANULOCYTES # BLD AUTO: 0.18 THOUSAND/UL (ref 0–0.2)
IMM GRANULOCYTES NFR BLD AUTO: 1 % (ref 0–2)
IMM GRANULOCYTES NFR BLD AUTO: 2 % (ref 0–2)
LYMPHOCYTES # BLD AUTO: 1.21 THOUSANDS/ÂΜL (ref 0.6–4.47)
LYMPHOCYTES # BLD AUTO: 1.28 THOUSANDS/ÂΜL (ref 0.6–4.47)
LYMPHOCYTES NFR BLD AUTO: 10 % (ref 14–44)
LYMPHOCYTES NFR BLD AUTO: 12 % (ref 14–44)
MAGNESIUM SERPL-MCNC: 2 MG/DL (ref 1.9–2.7)
MCH RBC QN AUTO: 37.4 PG (ref 26.8–34.3)
MCH RBC QN AUTO: 38 PG (ref 26.8–34.3)
MCHC RBC AUTO-ENTMCNC: 35.5 G/DL (ref 31.4–37.4)
MCHC RBC AUTO-ENTMCNC: 35.9 G/DL (ref 31.4–37.4)
MCV RBC AUTO: 105 FL (ref 82–98)
MCV RBC AUTO: 106 FL (ref 82–98)
MONOCYTES # BLD AUTO: 0.96 THOUSAND/ÂΜL (ref 0.17–1.22)
MONOCYTES # BLD AUTO: 1.02 THOUSAND/ÂΜL (ref 0.17–1.22)
MONOCYTES NFR BLD AUTO: 10 % (ref 4–12)
MONOCYTES NFR BLD AUTO: 8 % (ref 4–12)
MUCOUS THREADS UR QL AUTO: ABNORMAL
NEUTROPHILS # BLD AUTO: 8.11 THOUSANDS/ÂΜL (ref 1.85–7.62)
NEUTROPHILS # BLD AUTO: 9.3 THOUSANDS/ÂΜL (ref 1.85–7.62)
NEUTS SEG NFR BLD AUTO: 75 % (ref 43–75)
NEUTS SEG NFR BLD AUTO: 79 % (ref 43–75)
NON-SQ EPI CELLS URNS QL MICRO: ABNORMAL /HPF
NRBC BLD AUTO-RTO: 0 /100 WBCS
NRBC BLD AUTO-RTO: 0 /100 WBCS
PLATELET # BLD AUTO: 165 THOUSANDS/UL (ref 149–390)
PLATELET # BLD AUTO: 172 THOUSANDS/UL (ref 149–390)
PMV BLD AUTO: 9.7 FL (ref 8.9–12.7)
PMV BLD AUTO: 9.9 FL (ref 8.9–12.7)
POTASSIUM SERPL-SCNC: 3.6 MMOL/L (ref 3.5–5.3)
POTASSIUM SERPL-SCNC: 4.6 MMOL/L (ref 3.5–5.3)
PROT SERPL-MCNC: 7.4 G/DL (ref 6.4–8.4)
RBC # BLD AUTO: 4.49 MILLION/UL (ref 3.88–5.62)
RBC # BLD AUTO: 4.74 MILLION/UL (ref 3.88–5.62)
RBC #/AREA URNS AUTO: ABNORMAL /HPF
SODIUM SERPL-SCNC: 137 MMOL/L (ref 135–147)
SODIUM SERPL-SCNC: 137 MMOL/L (ref 135–147)
WBC # BLD AUTO: 10.68 THOUSAND/UL (ref 4.31–10.16)
WBC # BLD AUTO: 11.82 THOUSAND/UL (ref 4.31–10.16)
WBC #/AREA URNS AUTO: ABNORMAL /HPF

## 2023-12-22 PROCEDURE — 76870 US EXAM SCROTUM: CPT

## 2023-12-22 PROCEDURE — 87491 CHLMYD TRACH DNA AMP PROBE: CPT | Performed by: INTERNAL MEDICINE

## 2023-12-22 PROCEDURE — 80048 BASIC METABOLIC PNL TOTAL CA: CPT

## 2023-12-22 PROCEDURE — 99222 1ST HOSP IP/OBS MODERATE 55: CPT

## 2023-12-22 PROCEDURE — 83735 ASSAY OF MAGNESIUM: CPT

## 2023-12-22 PROCEDURE — 96366 THER/PROPH/DIAG IV INF ADDON: CPT

## 2023-12-22 PROCEDURE — 96372 THER/PROPH/DIAG INJ SC/IM: CPT

## 2023-12-22 PROCEDURE — 87591 N.GONORRHOEAE DNA AMP PROB: CPT | Performed by: INTERNAL MEDICINE

## 2023-12-22 PROCEDURE — 85025 COMPLETE CBC W/AUTO DIFF WBC: CPT

## 2023-12-22 PROCEDURE — 96375 TX/PRO/DX INJ NEW DRUG ADDON: CPT

## 2023-12-22 PROCEDURE — 96365 THER/PROPH/DIAG IV INF INIT: CPT

## 2023-12-22 RX ORDER — CEFTRIAXONE 1 G/50ML
1000 INJECTION, SOLUTION INTRAVENOUS EVERY 24 HOURS
Status: DISCONTINUED | OUTPATIENT
Start: 2023-12-22 | End: 2023-12-22

## 2023-12-22 RX ORDER — ALBUTEROL SULFATE 90 UG/1
2 AEROSOL, METERED RESPIRATORY (INHALATION) EVERY 4 HOURS PRN
Status: DISCONTINUED | OUTPATIENT
Start: 2023-12-22 | End: 2023-12-24 | Stop reason: HOSPADM

## 2023-12-22 RX ORDER — METOPROLOL TARTRATE 50 MG/1
50 TABLET, FILM COATED ORAL 2 TIMES DAILY
Status: DISCONTINUED | OUTPATIENT
Start: 2023-12-22 | End: 2023-12-24 | Stop reason: HOSPADM

## 2023-12-22 RX ORDER — HYDROXYUREA 500 MG/1
500 CAPSULE ORAL DAILY
Status: DISCONTINUED | OUTPATIENT
Start: 2023-12-22 | End: 2023-12-24 | Stop reason: HOSPADM

## 2023-12-22 RX ORDER — OXYCODONE HYDROCHLORIDE 5 MG/1
5 TABLET ORAL EVERY 6 HOURS PRN
Status: DISCONTINUED | OUTPATIENT
Start: 2023-12-22 | End: 2023-12-24 | Stop reason: HOSPADM

## 2023-12-22 RX ORDER — FENTANYL CITRATE 50 UG/ML
50 INJECTION, SOLUTION INTRAMUSCULAR; INTRAVENOUS ONCE
Status: COMPLETED | OUTPATIENT
Start: 2023-12-22 | End: 2023-12-22

## 2023-12-22 RX ORDER — AMLODIPINE BESYLATE 10 MG/1
10 TABLET ORAL EVERY EVENING
Status: DISCONTINUED | OUTPATIENT
Start: 2023-12-22 | End: 2023-12-24 | Stop reason: HOSPADM

## 2023-12-22 RX ORDER — ATORVASTATIN CALCIUM 80 MG/1
80 TABLET, FILM COATED ORAL EVERY EVENING
Status: DISCONTINUED | OUTPATIENT
Start: 2023-12-22 | End: 2023-12-24 | Stop reason: HOSPADM

## 2023-12-22 RX ORDER — NICOTINE 21 MG/24HR
1 PATCH, TRANSDERMAL 24 HOURS TRANSDERMAL DAILY
Status: DISCONTINUED | OUTPATIENT
Start: 2023-12-22 | End: 2023-12-24 | Stop reason: HOSPADM

## 2023-12-22 RX ORDER — ONDANSETRON 2 MG/ML
4 INJECTION INTRAMUSCULAR; INTRAVENOUS EVERY 6 HOURS PRN
Status: DISCONTINUED | OUTPATIENT
Start: 2023-12-22 | End: 2023-12-24 | Stop reason: HOSPADM

## 2023-12-22 RX ORDER — HYDROMORPHONE HCL/PF 1 MG/ML
0.5 SYRINGE (ML) INJECTION EVERY 4 HOURS PRN
Status: DISCONTINUED | OUTPATIENT
Start: 2023-12-22 | End: 2023-12-24 | Stop reason: HOSPADM

## 2023-12-22 RX ORDER — HEPARIN SODIUM 5000 [USP'U]/ML
5000 INJECTION, SOLUTION INTRAVENOUS; SUBCUTANEOUS EVERY 8 HOURS SCHEDULED
Status: DISCONTINUED | OUTPATIENT
Start: 2023-12-22 | End: 2023-12-24 | Stop reason: HOSPADM

## 2023-12-22 RX ORDER — ACETAMINOPHEN 325 MG/1
650 TABLET ORAL EVERY 6 HOURS PRN
Status: DISCONTINUED | OUTPATIENT
Start: 2023-12-22 | End: 2023-12-24 | Stop reason: HOSPADM

## 2023-12-22 RX ORDER — LEVOFLOXACIN 5 MG/ML
500 INJECTION, SOLUTION INTRAVENOUS EVERY 24 HOURS
Status: DISCONTINUED | OUTPATIENT
Start: 2023-12-22 | End: 2023-12-24 | Stop reason: HOSPADM

## 2023-12-22 RX ORDER — MAGNESIUM HYDROXIDE/ALUMINUM HYDROXICE/SIMETHICONE 120; 1200; 1200 MG/30ML; MG/30ML; MG/30ML
30 SUSPENSION ORAL EVERY 6 HOURS PRN
Status: DISCONTINUED | OUTPATIENT
Start: 2023-12-22 | End: 2023-12-24 | Stop reason: HOSPADM

## 2023-12-22 RX ADMIN — HYDROXYUREA 500 MG: 500 CAPSULE ORAL at 08:36

## 2023-12-22 RX ADMIN — METOPROLOL TARTRATE 50 MG: 50 TABLET, FILM COATED ORAL at 17:05

## 2023-12-22 RX ADMIN — FENTANYL CITRATE 50 MCG: 50 INJECTION, SOLUTION INTRAMUSCULAR; INTRAVENOUS at 00:44

## 2023-12-22 RX ADMIN — OXYCODONE HYDROCHLORIDE 5 MG: 5 TABLET ORAL at 04:32

## 2023-12-22 RX ADMIN — LEVOFLOXACIN 500 MG: 5 INJECTION, SOLUTION INTRAVENOUS at 23:18

## 2023-12-22 RX ADMIN — AMLODIPINE BESYLATE 10 MG: 10 TABLET ORAL at 17:05

## 2023-12-22 RX ADMIN — LIDOCAINE HYDROCHLORIDE 500 MG: 10 INJECTION, SOLUTION EPIDURAL; INFILTRATION; INTRACAUDAL; PERINEURAL at 00:09

## 2023-12-22 RX ADMIN — HEPARIN SODIUM 5000 UNITS: 5000 INJECTION INTRAVENOUS; SUBCUTANEOUS at 14:43

## 2023-12-22 RX ADMIN — ATORVASTATIN CALCIUM 80 MG: 80 TABLET, FILM COATED ORAL at 17:05

## 2023-12-22 RX ADMIN — HEPARIN SODIUM 5000 UNITS: 5000 INJECTION INTRAVENOUS; SUBCUTANEOUS at 05:31

## 2023-12-22 RX ADMIN — LEVOFLOXACIN 500 MG: 5 INJECTION, SOLUTION INTRAVENOUS at 00:03

## 2023-12-22 RX ADMIN — HEPARIN SODIUM 5000 UNITS: 5000 INJECTION INTRAVENOUS; SUBCUTANEOUS at 23:15

## 2023-12-22 RX ADMIN — ASPIRIN 81 MG: 81 TABLET, COATED ORAL at 08:36

## 2023-12-22 RX ADMIN — METOPROLOL TARTRATE 50 MG: 50 TABLET, FILM COATED ORAL at 08:36

## 2023-12-22 RX ADMIN — HYDROMORPHONE HYDROCHLORIDE 0.5 MG: 1 INJECTION, SOLUTION INTRAMUSCULAR; INTRAVENOUS; SUBCUTANEOUS at 08:41

## 2023-12-22 NOTE — UTILIZATION REVIEW
"Initial Clinical Review    WAS OBSERVATION 12/22/23 @ 0202 (start of care 12/21/23)  CONVERTED TO INPATIENT ADMISSION 12/22/23 @ 1453 DUE TO CONTINUED STAY REQUIRED TO CARE FOR PATIENT WITH LEFT EPIDIDYMITIS.    Admission: Date/Time/Statement:   Admission Orders (From admission, onward)       Ordered        12/22/23 1453  Inpatient Admission  Once            12/22/23 0202  Place in Observation  Once                          Orders Placed This Encounter   Procedures    Inpatient Admission     Standing Status:   Standing     Number of Occurrences:   1     Order Specific Question:   Level of Care     Answer:   Med Surg [16]     Order Specific Question:   Estimated length of stay     Answer:   More than 2 Midnights     Order Specific Question:   Certification     Answer:   I certify that inpatient services are medically necessary for this patient for a duration of greater than two midnights. See H&P and MD Progress Notes for additional information about the patient's course of treatment.     ED Arrival Information       Expected   -    Arrival   12/21/2023 22:43    Acuity   Urgent              Means of arrival   Walk-In    Escorted by   Family Member    Service   Hospitalist    Admission type   Emergency              Arrival complaint   testicle pain             Chief Complaint   Patient presents with    Testicle Pain     Pt reports left testicle pain, following with pcp for same, currently taking abx. Pain increased tonight.        Initial Presentation: 78 y.o. male to ED via walk-in from home  Present to ED with  left epididymitis.  Patient reports that beginning on Monday he noted that his left scrotum was swollen and erythematous. US 12/20 demonstrating \"Findings of the left epididymitis, with likely secondary mild hydrocele. Incidental left epididymal head cysts.\" His family doctor prescribed him doxycycline.  He reports that he took 3 doses of the doxycycline however, he has continued to have pain and no " "improvement prompting him to come to the emergency department tonight.   PMHX: CAD s/p  stents x2 in 2008; prostate surgery; HLD; HTN; PVD; COPD; CKD; polycthemia   Admitted to OBS with DX: Left epididymitis   on exam: hypertensive;  Left scrotal swelling and erythema - pain 10/10; WBC 11.82 ; UA with inummerable WBC, but no bacteria   US tonight demonstrating \"Stable appearance of hyperemia and hypertrophy of the left epididymis, consistent with epididymitis. Stable left hydrocele\"   PLAN: cont iv abx; f/u urine cx; pain control (see below); urology consult    Date: 12/22/23 - CHANGED TO INPATIENT    left testicle swollen and tender to palpation.   Plan: cont iv abx; f/u urine cx; pain control (see below)    UROLOGY CONSULT  Left epididymitis: Scrotal US today stable appearance of hyperemia and hypertrophy of the left epididymis, consistent with epididymitis. Stable left hydrocele.  Physical exam with moderate swelling and induration to left testicle and vas deferens. Moderate tenderness. Right testicle WNL. Mild left sided scrotal erythema, no scrotal thickening, fluctuance, or crepitus. Patient reports this is improving.   Urine micro with 2-4 RBC, Innumerable WBC, and no bacteria.   Urine culture pending. Was started on doxycycline 2 days ago.  Mild leukocytosis improving current 10.68    Plan: Continue IV Levaquin and adjust based on final urine culture;  Serial exam; Monitor fevers; Scrotal support; pain control      ED Triage Vitals   Temperature Pulse Respirations Blood Pressure SpO2   12/21/23 2251 12/21/23 2252 12/21/23 2251 12/21/23 2252 12/21/23 2251   (!) 97.4 °F (36.3 °C) 65 18 147/69 97 %      Temp Source Heart Rate Source Patient Position - Orthostatic VS BP Location FiO2 (%)   12/21/23 2251 12/21/23 2251 12/21/23 2251 12/21/23 2251 --   Oral Monitor Sitting Right arm       Pain Score       12/21/23 2252       10 - Worst Possible Pain          Wt Readings from Last 1 Encounters:   12/22/23 73.4 kg " (161 lb 13.1 oz)     Additional Vital Signs:   Date/Time Temp Pulse Resp BP MAP (mmHg) SpO2 O2 Device Patient Position - Orthostatic VS   12/22/23 07:48:34 98.1 °F (36.7 °C) 69 17 143/83 103 94 % None (Room air) Lying   12/22/23 03:41:31 98.1 °F (36.7 °C) 65 -- 181/90 Abnormal  120 97 % -- --   12/22/23 0200 -- 68 -- 126/60 87 96 % -- Lying   12/22/23 0138 -- 61 12 154/78 -- 95 % None (Room air) Lying   12/22/23 0026 -- -- -- -- -- -- None (Room air) --   12/21/23 2252 -- 65 -- 147/69 -- -- -- --   12/21/23 2251 97.4 °F (36.3 °C) Abnormal  -- 18 -- -- 97 % None (Room air) Sitting       EKG: No EKG obtained.       Pertinent Labs/Diagnostic Test Results:   US scrotum and testicles   Final Result by Mauri Parker MD (12/22 0136)      1.  Stable appearance of hyperemia and hypertrophy of the left epididymis, consistent with epididymitis   2.  Stable left hydrocele      Workstation performed: [id number]              Results from last 7 days   Lab Units 12/22/23  0441 12/21/23 2356   WBC Thousand/uL 10.68* 11.82*   HEMOGLOBIN g/dL 16.8 18.0*   HEMATOCRIT % 47.3 50.2*   PLATELETS Thousands/uL 165 172   NEUTROS ABS Thousands/µL 8.11* 9.30*        Results from last 7 days   Lab Units 12/22/23  0441 12/21/23  2356   SODIUM mmol/L 137 137   POTASSIUM mmol/L 3.6 4.6   CHLORIDE mmol/L 102 102   CO2 mmol/L 29 29   ANION GAP mmol/L 6 6   BUN mg/dL 15 18   CREATININE mg/dL 0.98 1.09   EGFR ml/min/1.73sq m 74 65   CALCIUM mg/dL 8.9 9.3   MAGNESIUM mg/dL 2.0  --      Results from last 7 days   Lab Units 12/21/23  2356   AST U/L 30   ALT U/L 34   ALK PHOS U/L 112*   TOTAL PROTEIN g/dL 7.4   ALBUMIN g/dL 4.1   TOTAL BILIRUBIN mg/dL 1.58*        Results from last 7 days   Lab Units 12/22/23  0441 12/21/23  2356   GLUCOSE RANDOM mg/dL 83 100        Results from last 7 days   Lab Units 12/21/23  2355   CLARITY UA  Cloudy   COLOR UA  Yoana   SPEC GRAV UA  1.020   PH UA  7.0   GLUCOSE UA mg/dl Negative   KETONES UA mg/dl Negative   BLOOD UA   Trace*   PROTEIN UA mg/dl 30 (1+)*   NITRITE UA  Negative   BILIRUBIN UA  Negative   UROBILINOGEN UA E.U./dl 4.0*   LEUKOCYTES UA  Small*   WBC UA /hpf Innumerable*   RBC UA /hpf 2-4*   BACTERIA UA /hpf None Seen   EPITHELIAL CELLS WET PREP /hpf Occasional   MUCUS THREADS  Moderate*          ED Treatment:   Medication Administration from 12/21/2023 2243 to 12/22/2023 0337         Date/Time Order Dose Route Action     12/22/2023 0003 EST levofloxacin (LEVAQUIN) IVPB (premix in dextrose) 500 mg 100 mL 500 mg Intravenous New Bag     12/22/2023 0009 EST cefTRIAXone (ROCEPHIN) 500 mg in lidocaine (PF) (XYLOCAINE-MPF) 1 % IM only syringe 500 mg Intramuscular Given     12/22/2023 0044 EST fentanyl citrate (PF) 100 MCG/2ML 50 mcg 50 mcg Intravenous Given            Present on Admission:   Benign essential hypertension   CAD (coronary artery disease)   Mixed hyperlipidemia   Cigarette nicotine dependence without complication   Stage 3b chronic kidney disease (HCC)   Chronic obstructive pulmonary disease (HCC)   Polycythemia vera (HCC)      Admitting Diagnosis: Epididymitis [N45.1]  Testicle pain [N50.819]  Left hydrocele [N43.3]  Testicular pain, left [N50.812]    Age/Sex: 78 y.o. male    Admission Orders: SCDs; regular diet    Scheduled Medications:  amLODIPine, 10 mg, Oral, QPM  aspirin, 81 mg, Oral, Daily  atorvastatin, 80 mg, Oral, QPM  cefTRIAXone, 1,000 mg, Intravenous, Q24H  heparin (porcine), 5,000 Units, Subcutaneous, Q8H TALIB  hydroxyurea, 500 mg, Oral, Daily  levofloxacin, 500 mg, Intravenous, Q24H  metoprolol tartrate, 50 mg, Oral, BID  nicotine, 1 patch, Transdermal, Daily      Continuous IV Infusions: None     PRN Meds:  acetaminophen, 650 mg, Oral, Q6H PRN  albuterol, 2 puff, Inhalation, Q4H PRN  aluminum-magnesium hydroxide-simethicone, 30 mL, Oral, Q6H PRN  HYDROmorphone, 0.5 mg, Intravenous, Q4H PRN  (12/22 rec'd x1 so far today)  ondansetron, 4 mg, Intravenous, Q6H PRN  oxyCODONE, 5 mg, Oral, Q6H PRN   (12/22 rec'd x1 so far today)         IP CONSULT TO UROLOGY    Network Utilization Review Department  ATTENTION: Please call with any questions or concerns to [phone] and carefully listen to the prompts so that you are directed to the right person. All voicemails are confidential.   For Discharge needs, contact Care Management DC Support Team at [phone] opt. 2  Send all requests for admission clinical reviews, approved or denied determinations and any other requests to dedicated fax number below belonging to the campus where the patient is receiving treatment. List of dedicated fax numbers for the Facilities:  FACILITY NAME UR FAX NUMBER   ADMISSION DENIALS (Administrative/Medical Necessity) [phone]   DISCHARGE SUPPORT TEAM (NETWORK) [phone]   PARENT CHILD HEALTH (Maternity/NICU/Pediatrics) [phone]   [facility] [phone]   [facility] [phone]   [facility] [phone]   [facility] [phone]   Critical access hospital [phone]   [facility] [phone]   [facility] [phone]   [facility] [phone]   [facility] [phone]   [facility] [phone]   [facility] [phone]

## 2023-12-22 NOTE — CONSULTS
" Inpatient consult to Urology  Consult performed by: THOMAS Lunsford  Consult ordered by: Raman Ramirez PA-C      : UROLOGY  Santana Jacinto 78 y.o. male [id number]   Unit/Bed #: [place] [address] -01  Encounter: [id number]        Assessment  & Plan  :    Left epididymitis  Scrotal US today stable appearance of hyperemia and hypertrophy of the left epididymis, consistent with epididymitis. Stable left hydrocele  Physical exam with moderate swelling and induration to left testicle and vas deferens. Moderate tenderness. Right testicle WNL. Mild left sided scrotal erythema, no scrotal thickening, fluctuance, or crepitus. Patient reports this is improving.   Urine micro with 2-4 RBC, Innumerable WBC, and no bacteria.   Urine culture pending. Was started on doxycycline 2 days ago  Mild leukocytosis improving current 10.68  Afebrile with stable vital signs  No voiding complaints    Plan:  Continue IV Levaquin and adjust based on final urine culture  Serial exam  Monitor fevers  Scrotal support  Supportive care and pain control.       Subjective :    Santana Jacinto  is a 78 y.o. male who was admitted for with PMHx of HTN, CAD, HLD, COPD, CKD, and polycthemia vera presented to the ED secondary to continued left testicular pain, erythema, and swelling. He was seen by his PCP 2 days ago and scrotal US was done which showed left epididymitis, with likely secondary mild hydrocele. Incidental left epididymal head cysts. He was started on doxycycline 100 mg BID for 7 days but reported no symptom improvement after 3 doses.    Repeat scrotal US today shows stable appearance of hyperemia and hypertrophy of the left epididymis, consistent with epididymitis. Stable left hydrocele              Allergies   Allergen Reactions    Other Other (See Comments)     Pt and wife reports took a medication years ago at work cant recall the name or what it was for\"     \"couldn't talk and cheeks swelled and also right hand\"      Current Outpatient " "Medications   Medication Instructions    acetaminophen (TYLENOL) 500 mg, Oral, Every 6 hours PRN    albuterol (Ventolin HFA) 90 mcg/act inhaler 2 puffs, Inhalation, Every 4 hours PRN    amLODIPine (NORVASC) 10 mg, Oral, Every evening    aspirin (ECOTRIN LOW STRENGTH) 81 mg, Oral, Daily    atorvastatin (LIPITOR) 80 mg tablet TAKE 1 TABLET BY MOUTH EVERY DAY IN THE EVENING    doxycycline hyclate (VIBRAMYCIN) 100 mg, Oral, Every 12 hours scheduled    Fish Oil 1,200 mg, Oral, 2 times daily    hydroxyurea (HYDREA) 500 mg, Oral, Daily    lidocaine (Lidoderm) 5 % 1 patch, Topical, Daily, Remove & Discard patch within 12 hours or as directed by MD    metoprolol tartrate (LOPRESSOR) 50 mg, Oral, 2 times daily    Multiple Vitamin (MULTIVITAMIN) capsule 1 capsule, Oral, Daily    Multiple Vitamins-Minerals (VITAMIN D3 COMPLETE PO) 1 capsule, Oral, Daily    nitroglycerin (NITROSTAT) 0.4 mg, Sublingual, Every 5 minutes PRN      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\"    Full dentures     but doesnt wear them    History of 2019 novel coronavirus disease (COVID-19) 02/2021    hospital for 2 days but not in ICU/\"mainly dehydrated\" \"also fever/oxygen below 90\"    History of coronary artery stent placement     x2 in 2008 or so; sees [facility]-- Dr Hutton cardio    [place] (hard of hearing)     no hearing aids yet    Hyperlipidemia     Last Assessed:  11/24/17    Hypertension     Benign essential, Last Assessed:  11/24/17    LBBB (left bundle branch block)     Nocturia     Polycythemia vera (HCC)     (per history in chart)    PVD (peripheral vascular disease) (HCC)     RBC " "abnormality     pt reports told has high Red blood cells/goes to infusion center regularly next appt 10/8/21 hematologist is Dr Charlton of [facility](had been Dr Ahn)    Seasickness     Shortness of breath     \"if goes up steps gets SOB, had for awhile\"    Slow urinary stream     \"sometimes feels like bladder isnt all the way empty\"    Snores     Wears glasses     reading     Past Surgical History:   Procedure Laterality Date    ANGIOPLASTY      APPENDECTOMY      CARDIAC CATHETERIZATION N/A 07/28/2022    Procedure: CARDIAC TEMPORARY PACEMAKER;  Surgeon: Fransico Morgan MD;  [facility]: [facility];  Service: Cardiology    CARDIAC ELECTROPHYSIOLOGY PROCEDURE N/A 07/29/2022    Procedure: Cardiac pacer implant;  Surgeon: Anand Valero MD;  [facility]: [facility];  Service: Cardiology    CATARACT EXTRACTION Bilateral     CT CYSTOGRAM  11/04/2021    ELBOW SURGERY Right     FL CYSTOGRAM  10/27/2021    IR BIOPSY BONE MARROW  10/14/2022    KNEE ARTHROSCOPY Right     LA COLONOSCOPY FLX DX W/COLLJ SPEC WHEN PFRMD N/A 05/22/2017    Procedure: COLONOSCOPY;  Surgeon: Josseline Zhou DO;  Location: [facility];  Service: Gastroenterology    LA NEUROPLASTY &/TRANSPOS MEDIAN NRV CARPAL TUNNE Right 5/26/2023    Procedure: CTR, RIGHT;  Surgeon: Cosme Varela MD;  Location: [facility];  Service: Orthopedics    LA TENDON SHEATH INCISION Right 5/26/2023    Procedure: RELEASE TRIGGER FINGER, RIGHT RING FINGER;  Surgeon: Cosme Varela MD;  [facility];  Service: Orthopedics    PROSTATE BIOPSY      PROSTATECTOMY N/A 10/18/2021    Procedure: ROBOTIC SUBTOTAL/SUPRAPUBIC PROSTATECTOMY;  Surgeon: Kieran Vega MD;  [facility];  Service: Urology    SHOULDER ARTHROSCOPY Right     TONSILLECTOMY       Family History   Problem Relation Age of Onset    No Known Problems Mother     Heart disease Father      Social History     Socioeconomic History    Marital status: /Civil Union     Spouse name: None    " Number of children: None    Years of education: None    Highest education level: None   Occupational History    None   Tobacco Use    Smoking status: Every Day     Current packs/day: 0.50     Average packs/day: 0.5 packs/day for 52.0 years (26.0 ttl pk-yrs)     Types: Cigarettes     Start date: 1972    Smokeless tobacco: Never    Tobacco comments:     a little less than 1/2ppd, trying to cut back before surgery last smoked 5.25.23   Vaping Use    Vaping status: Never Used   Substance and Sexual Activity    Alcohol use: Never    Drug use: No    Sexual activity: Not Currently     Comment: defer   Other Topics Concern    None   Social History Narrative    Always uses seatbelt    Feels safe at home    Uses safety equipment     Social Determinants of Health     Financial Resource Strain: Not on file   Food Insecurity: No Food Insecurity (12/22/2023)    Hunger Vital Sign     Worried About Running Out of Food in the Last Year: Never true     Ran Out of Food in the Last Year: Never true   Transportation Needs: No Transportation Needs (12/22/2023)    PRAPARE - Transportation     Lack of Transportation (Medical): No     Lack of Transportation (Non-Medical): No   Physical Activity: Not on file   Stress: Not on file   Social Connections: Not on file   Intimate Partner Violence: Not on file   Housing Stability: Unknown (12/22/2023)    Housing Stability Vital Sign     Unable to Pay for Housing in the Last Year: No     Number of Places Lived in the Last Year: Not on file     Unstable Housing in the Last Year: No        Review of Systems   Constitutional:  Negative for chills and fever.   HENT:  Negative for congestion and sore throat.    Respiratory:  Negative for cough and shortness of breath.    Cardiovascular:  Negative for chest pain and leg swelling.   Gastrointestinal:  Negative for abdominal pain, constipation and diarrhea.   Genitourinary:  Positive for scrotal swelling and testicular pain. Negative for difficulty  urinating, dysuria, frequency, hematuria and urgency.   Musculoskeletal:  Negative for back pain and gait problem.   Skin:  Negative for wound.   Allergic/Immunologic: Negative for immunocompromised state.   Hematological:  Does not bruise/bleed easily.        Objective     Physical Exam  Vitals reviewed.   Constitutional:       General: He is not in acute distress.     Appearance: Normal appearance. He is not ill-appearing or toxic-appearing.   HENT:      Head: Normocephalic and atraumatic.   Eyes:      General: No scleral icterus.     Conjunctiva/sclera: Conjunctivae normal.   Cardiovascular:      Rate and Rhythm: Normal rate.   Pulmonary:      Effort: Pulmonary effort is normal. No respiratory distress.   Abdominal:      Tenderness: There is no right CVA tenderness or left CVA tenderness.      Hernia: No hernia is present.   Genitourinary:     Comments: Left testicle and vas deferens with moderate swelling and induration. Moderate pain with palpation. Right testicle is unremarkable. Mild erythema to left scrotum, no scrotal thickening, fluctuance, or crepitus.   Musculoskeletal:      Cervical back: Normal range of motion.      Right lower leg: No edema.      Left lower leg: No edema.   Skin:     General: Skin is warm and dry.      Coloration: Skin is not jaundiced or pale.   Neurological:      General: No focal deficit present.      Mental Status: He is alert and oriented to person, place, and time. Mental status is at baseline.      Gait: Gait normal.   Psychiatric:         Mood and Affect: Mood normal.         Behavior: Behavior normal.         Thought Content: Thought content normal.         Judgment: Judgment normal.                Imagin2023  SCROTAL ULTRASOUND     INDICATION:    Left testicular pain and swelling.  Evaluate for torsion.     COMPARISON: Ultrasound 2023     TECHNIQUE:   Ultrasound the scrotal contents was performed with a high frequency linear transducer utilizing volumetric  sweep imaging as well as standard still image techniques. Imaging performed in longitudinal and transverse orientation. Color and   spectral Doppler evaluation also performed bilaterally.     FINDINGS:     TESTES:  Testes are symmetric and normal in size.     RIGHT testis = 3.8 x 1.6 x 3.0 cm. Volume 9.6 mL  Normal contour with homogeneous smooth echotexture.  No intratesticular mass lesion or calcifications.     LEFT testis = 2.7 x 2.3 x 2.8 cm. Volume 9.4 mL  Normal contour with homogeneous smooth echotexture.  No intratesticular mass lesion or calcifications.     Doppler flow within both testes is present and symmetric.     EPIDIDYMIDES:  9 mm cyst on the left with additional smaller cyst. The left epididymis is hypertrophied in size.  Doppler ultrasound demonstrates hyperemia in the left epididymis.  No epididymal lesions.     HYDROCELE:  No significant fluid present.     VARICOCELE:  None present.     SCROTUM:  Scrotal thickness and appearance within normal limits. No evidence for extratesticular mass or hernia demonstrated.     IMPRESSION:     1.  Stable appearance of hyperemia and hypertrophy of the left epididymis, consistent with epididymitis  2.  Stable left hydrocele            Labs:  Lab Results   Component Value Date    SODIUM 137 12/22/2023    K 3.6 12/22/2023     12/22/2023    CO2 29 12/22/2023    BUN 15 12/22/2023    CREATININE 0.98 12/22/2023    GLUC 83 12/22/2023    CALCIUM 8.9 12/22/2023         Lab Results   Component Value Date    WBC 10.68 (H) 12/22/2023    HGB 16.8 12/22/2023    HCT 47.3 12/22/2023     (H) 12/22/2023     12/22/2023          Valdemar GUZMAN

## 2023-12-22 NOTE — PLAN OF CARE
Problem: PAIN - ADULT  Goal: Verbalizes/displays adequate comfort level or baseline comfort level  Description: Interventions:  - Encourage patient to monitor pain and request assistance  - Assess pain using appropriate pain scale  - Administer analgesics based on type and severity of pain and evaluate response  - Implement non-pharmacological measures as appropriate and evaluate response  - Consider cultural and social influences on pain and pain management  - Notify physician/advanced practitioner if interventions unsuccessful or patient reports new pain  Outcome: Progressing     Problem: INFECTION - ADULT  Goal: Absence or prevention of progression during hospitalization  Description: INTERVENTIONS:  - Assess and monitor for signs and symptoms of infection  - Monitor lab/diagnostic results  - Monitor all insertion sites, i.e. indwelling lines, tubes, and drains  - Monitor endotracheal if appropriate and nasal secretions for changes in amount and color  - [place] appropriate cooling/warming therapies per order  - Administer medications as ordered  - Instruct and encourage patient and family to use good hand hygiene technique  - Identify and instruct in appropriate isolation precautions for identified infection/condition  Outcome: Progressing     Problem: SAFETY ADULT  Goal: Patient will remain free of falls  Description: INTERVENTIONS:  - Educate patient/family on patient safety including physical limitations  - Instruct patient to call for assistance with activity   - Consult OT/PT to assist with strengthening/mobility   - Keep Call bell within reach  - Keep bed low and locked with side rails adjusted as appropriate  - Keep care items and personal belongings within reach  - Initiate and maintain comfort rounds  - Make Fall Risk Sign visible to staff  - Offer Toileting every  Hours, in advance of need  - Initiate/Maintain alarm  - Obtain necessary fall risk management equipment:   - Apply yellow socks and  bracelet for high fall risk patients  - Consider moving patient to room near nurses station  Outcome: Progressing  Goal: Maintain or return to baseline ADL function  Description: INTERVENTIONS:  -  Assess patient's ability to carry out ADLs; assess patient's baseline for ADL function and identify physical deficits which impact ability to perform ADLs (bathing, care of mouth/teeth, toileting, grooming, dressing, etc.)  - Assess/evaluate cause of self-care deficits   - Assess range of motion  - Assess patient's mobility; develop plan if impaired  - Assess patient's need for assistive devices and provide as appropriate  - Encourage maximum independence but intervene and supervise when necessary  - Involve family in performance of ADLs  - Assess for home care needs following discharge   - Consider OT consult to assist with ADL evaluation and planning for discharge  - Provide patient education as appropriate  Outcome: Progressing  Goal: Maintains/Returns to pre admission functional level  Description: INTERVENTIONS:  - Perform AM-PAC 6 Click Basic Mobility/ Daily Activity assessment daily.  - Set and communicate daily mobility goal to care team and patient/family/caregiver.   - Collaborate with rehabilitation services on mobility goals if consulted  - Perform Range of Motion  times a day.  - Reposition patient every  hours.  - Dangle patient  times a day  - Stand patient  times a day  - Ambulate patient  times a day  - Out of bed to chair  times a day   - Out of bed for meals  times a day  - Out of bed for toileting  - Record patient progress and toleration of activity level   Outcome: Progressing     Problem: DISCHARGE PLANNING  Goal: Discharge to home or other facility with appropriate resources  Description: INTERVENTIONS:  - Identify barriers to discharge w/patient and caregiver  - Arrange for needed discharge resources and transportation as appropriate  - Identify discharge learning needs (meds, wound care, etc.)  -  Arrange for interpretive services to assist at discharge as needed  - Refer to Case Management Department for coordinating discharge planning if the patient needs post-hospital services based on physician/advanced practitioner order or complex needs related to functional status, cognitive ability, or social support system  Outcome: Progressing     Problem: Knowledge Deficit  Goal: Patient/family/caregiver demonstrates understanding of disease process, treatment plan, medications, and discharge instructions  Description: Complete learning assessment and assess knowledge base.  Interventions:  - Provide teaching at level of understanding  - Provide teaching via preferred learning methods  Outcome: Progressing     Problem: SKIN/TISSUE INTEGRITY - ADULT  Goal: Skin Integrity remains intact(Skin Breakdown Prevention)  Description: Assess:  -Perform Keith assessment every   -Clean and moisturize skin every   -Inspect skin when repositioning, toileting, and assisting with ADLS  -Assess under medical devices such as  every   -Assess extremities for adequate circulation and sensation     Bed Management:  -Have minimal linens on bed & keep smooth, unwrinkled  -Change linens as needed when moist or perspiring  -Avoid sitting or lying in one position for more than  hours while in bed  -Keep HOB at degrees     Toileting:  -Offer bedside commode  -Assess for incontinence every   -Use incontinent care products after each incontinent episode such as     Activity:  -Mobilize patient  times a day  -Encourage activity and walks on unit  -Encourage or provide ROM exercises   -Turn and reposition patient every  Hours  -Use appropriate equipment to lift or move patient in bed  -Instruct/ Assist with weight shifting every  when out of bed in chair  -Consider limitation of chair time  hour intervals    Skin Care:  -Avoid use of baby powder, tape, friction and shearing, hot water or constrictive clothing  -Relieve pressure over bony  prominences using   -Do not massage red bony areas    Next Steps:  -Teach patient strategies to minimize risks such as    -Consider consults to  interdisciplinary teams such as   Outcome: Progressing  Goal: Incision(s), wounds(s) or drain site(s) healing without S/S of infection  Description: INTERVENTIONS  - Assess and document dressing, incision, wound bed, drain sites and surrounding tissue  - Provide patient and family education  - Perform skin care/dressing changes every nestor  Outcome: Progressing     Problem: MUSCULOSKELETAL - ADULT  Goal: Maintain proper alignment of affected body part  Description: INTERVENTIONS:  - Support, maintain and protect limb and body alignment  - Provide patient/ family with appropriate education  Outcome: Progressing

## 2023-12-22 NOTE — ASSESSMENT & PLAN NOTE
Lab Results   Component Value Date    EGFR 65 12/21/2023    EGFR 60 06/05/2023    EGFR 56 04/24/2023    CREATININE 1.09 12/21/2023    CREATININE 1.16 06/05/2023    CREATININE 1.23 04/24/2023   At baseline

## 2023-12-22 NOTE — H&P
"[facility]  H&P  Name: Santana Jacinto 78 y.o. male I MRN: [id number]  Unit/Bed#: [address] 217[address]01 I Date of Admission: 12/21/2023   Date of Service: 12/22/2023 I Hospital Day: 0      Assessment/Plan   * Left epididymitis  Assessment & Plan  Patient with PMHx of HTN, CAD, HLD, COPD, CKD, and polycthemia vera presented to the ED secondary to continued left testicular pain, erythema, and swelling  Pt was seen by his PCP 12/20 for this issue and scrotal US was performed. US 12/20 demonstrating \"Findings of the left epididymitis, with likely secondary mild hydrocele. Incidental left epididymal head cysts.\"  Pt was placed on regimen of 100 mg doxycycline bid x 7 days starting 12/20, but patient reports no improvement so far after taking 3 doses  He denies any other symptoms such as fevers or chills  Scrotal US tonight demonstrating \"Stable appearance of hyperemia and hypertrophy of the left epididymis, consistent with epididymitis. Stable left hydrocele\"  WBC 11.82, not fulfilling septic criteria   UA with inummerable WBC, but no bacteria  Urine culture pending  Supportive care, pain control  ED initiated patient on levaquin, continue for now  Urology consult pending     Chronic obstructive pulmonary disease (HCC)  Assessment & Plan  No acute exacerbation  Continue albuterol prn    Stage 3b chronic kidney disease (HCC)  Assessment & Plan  Lab Results   Component Value Date    EGFR 65 12/21/2023    EGFR 60 06/05/2023    EGFR 56 04/24/2023    CREATININE 1.09 12/21/2023    CREATININE 1.16 06/05/2023    CREATININE 1.23 04/24/2023   At baseline    Cigarette nicotine dependence without complication  Assessment & Plan  Patient reports smoking half a pack a day  Nicotine patch supplementation  Encourage cessation    Polycythemia vera (HCC)  Assessment & Plan  POA hgb 18  Pt denies any symptoms  Continue to monitor CBC  Continue 500 mg hydroxyurea daily    Mixed hyperlipidemia  Assessment & Plan  Continue " "statin    CAD (coronary artery disease)  Assessment & Plan  Hx of PCI in 2008  Continue aspirin, statin therapy    Benign essential hypertension  Assessment & Plan  Continue home regimen of 10 mg amlodipine and 50 mg metoprolol bid           VTE Pharmacologic Prophylaxis: VTE Score: 4 Moderate Risk (Score 3-4) - Pharmacological DVT Prophylaxis Ordered: heparin.  Code Status: Level 3 - DNAR and DNI   Discussion with family: Patient declined call to .     Anticipated Length of Stay: Patient will be admitted on an observation basis with an anticipated length of stay of less than 2 midnights secondary to left epididymitis .    Total Time Spent on Date of Encounter in care of patient: 65 minutes This time was spent on one or more of the following: performing physical exam; counseling and coordination of care; obtaining or reviewing history; documenting in the medical record; reviewing/ordering tests, medications or procedures; communicating with other healthcare professionals and discussing with patient's family/caregivers.    Chief Complaint: \"I still have pain in my scrotum\"    History of Present Illness:  Santana Jacinto is a 78 y.o. male who presents with left epididymitis.  Patient reports that beginning on Monday he noted that his left scrotum was swollen and erythematous.  He reports that it continued to worsen prompting him to go to his family doctor on Wednesday who ordered a scrotal ultrasound.  Aced on the results of the ultrasound.  His family doctor prescribed him doxycycline.  He reports that he took 3 doses of the doxycycline however, he has continued to have pain and no improvement prompting him to come to the emergency department tonight.  He states that he currently has 7-8 out of 10 scrotal pain.  He denies any fever or chills.  He also denies any urinary symptoms such as dysuria, urgency, frequency, suprapubic pain, flank pain, or urinary retention.  Patient reports that about a year ago he " "had surgery on his prostate and he has had no issues with his urinary system since that time.  He denies any other symptoms such as chest pain, palpitations, shortness of breath, nausea, vomiting, or pedal edema.    Review of Systems:  Review of Systems   Constitutional:  Negative for appetite change, chills, diaphoresis, fatigue and fever.   HENT:  Negative for congestion, rhinorrhea and sore throat.    Eyes:  Negative for photophobia and visual disturbance.   Respiratory:  Negative for cough, shortness of breath and wheezing.    Cardiovascular:  Negative for chest pain, palpitations and leg swelling.   Gastrointestinal:  Negative for abdominal distention, abdominal pain, blood in stool, constipation, diarrhea, nausea and vomiting.   Genitourinary:  Positive for scrotal swelling (left) and testicular pain (left). Negative for dysuria and hematuria.   Musculoskeletal:  Negative for arthralgias, back pain, myalgias and neck stiffness.   Skin:  Positive for color change (left scrotal erythema). Negative for rash.   Neurological:  Negative for dizziness, seizures, syncope, weakness, light-headedness and headaches.   Psychiatric/Behavioral:  Negative for agitation, behavioral problems and confusion. The patient is not nervous/anxious.    All other systems reviewed and are negative.      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\"    Full dentures     but doesnt wear them    History of 2019 novel coronavirus disease (COVID-19) 02/2021    hospital for 2 days but not in ICU/\"mainly dehydrated\" \"also fever/oxygen below 90\"    History " "of coronary artery stent placement     x2 in 2008 or so; sees [facility]-- Dr Hutton cardio    [place] (hard of hearing)     no hearing aids yet    Hyperlipidemia     Last Assessed:  11/24/17    Hypertension     Benign essential, Last Assessed:  11/24/17    LBBB (left bundle branch block)     Nocturia     Polycythemia vera (HCC)     (per history in chart)    PVD (peripheral vascular disease) (HCC)     RBC abnormality     pt reports told has high Red blood cells/goes to infusion center regularly next appt 10/8/21 hematologist is Dr Charlton of [facility](had been Dr Ahn)    Seasickness     Shortness of breath     \"if goes up steps gets SOB, had for awhile\"    Slow urinary stream     \"sometimes feels like bladder isnt all the way empty\"    Snores     Wears glasses     reading       Past Surgical History:   Procedure Laterality Date    ANGIOPLASTY      APPENDECTOMY      CARDIAC CATHETERIZATION N/A 07/28/2022    Procedure: CARDIAC TEMPORARY PACEMAKER;  Surgeon: Fransico Morgan MD;  Location: [facility];  Service: Cardiology    CARDIAC ELECTROPHYSIOLOGY PROCEDURE N/A 07/29/2022    Procedure: Cardiac pacer implant;  Surgeon: Anand Valero MD;  [facility]: [facility];  Service: Cardiology    CATARACT EXTRACTION Bilateral     CT CYSTOGRAM  11/04/2021    ELBOW SURGERY Right     FL CYSTOGRAM  10/27/2021    IR BIOPSY BONE MARROW  10/14/2022    KNEE ARTHROSCOPY Right     SD COLONOSCOPY FLX DX W/COLLJ SPEC WHEN PFRMD N/A 05/22/2017    Procedure: COLONOSCOPY;  Surgeon: Josseline Zhou DO;  Location: [facility];  Service: Gastroenterology    SD NEUROPLASTY &/TRANSPOS MEDIAN NRV CARPAL TUNNE Right 5/26/2023    Procedure: CTR, RIGHT;  Surgeon: Cosme Varela MD;  Location: [facility];  Service: Orthopedics    SD TENDON SHEATH INCISION Right 5/26/2023    Procedure: RELEASE TRIGGER FINGER, RIGHT RING FINGER;  Surgeon: Cosme Varela MD;  [facility];  Service: Orthopedics    PROSTATE BIOPSY      " PROSTATECTOMY N/A 10/18/2021    Procedure: ROBOTIC SUBTOTAL/SUPRAPUBIC PROSTATECTOMY;  Surgeon: Kieran Vega MD;  [facility];  Service: Urology    SHOULDER ARTHROSCOPY Right     TONSILLECTOMY         Meds/Allergies:  Prior to Admission medications    Medication Sig Start Date End Date Taking? Authorizing Provider   acetaminophen (TYLENOL) 500 mg tablet Take 500 mg by mouth every 6 (six) hours as needed for mild pain    Historical Provider, MD   albuterol (Ventolin HFA) 90 mcg/act inhaler Inhale 2 puffs every 4 (four) hours as needed for wheezing 10/17/22   Mina Denny,    amLODIPine (NORVASC) 10 mg tablet Take 10 mg by mouth every evening     Historical Provider, MD   aspirin (ECOTRIN LOW STRENGTH) 81 mg EC tablet Take 81 mg by mouth daily    Historical Provider, MD   atorvastatin (LIPITOR) 80 mg tablet TAKE 1 TABLET BY MOUTH EVERY DAY IN THE EVENING 8/24/23   Mina Denny DO   doxycycline hyclate (VIBRAMYCIN) 100 mg capsule Take 1 capsule (100 mg total) by mouth every 12 (twelve) hours for 7 days 12/20/23 12/27/23  Toshia Zhao DO   hydroxyurea (HYDREA) 500 mg capsule TAKE 1 CAPSULE (500 MG TOTAL) BY MOUTH DAILY 10/16/23   Mira Rodríguez PA-C   lidocaine (Lidoderm) 5 % Apply 1 patch topically over 12 hours daily Remove & Discard patch within 12 hours or as directed by MD 10/11/23   THOMAS Posadas   metoprolol tartrate (LOPRESSOR) 50 mg tablet Take 50 mg by mouth 2 (two) times a day     Historical Provider, MD   Multiple Vitamin (MULTIVITAMIN) capsule Take 1 capsule by mouth daily    Historical Provider, MD   Multiple Vitamins-Minerals (VITAMIN D3 COMPLETE PO) Take 1 capsule by mouth daily    Historical Provider, MD   nitroglycerin (NITROSTAT) 0.4 mg SL tablet Place 1 tablet (0.4 mg total) under the tongue every 5 (five) minutes as needed for chest pain 8/8/22   Lilia Pearce, DO   Omega-3 Fatty Acids (FISH OIL) 1200 MG CAPS Take 1,200 mg by mouth 2 (two) times a day       "Historical Provider, MD     I have reviewed home medications using recent Epic encounter.    Allergies:   Allergies   Allergen Reactions    Other Other (See Comments)     Pt and wife reports took a medication years ago at work cant recall the name or what it was for\"     \"couldn't talk and cheeks swelled and also right hand\"       Social History:  Marital Status: /Civil Union   Patient Pre-hospital Living Situation: Home  Patient Pre-hospital Level of Mobility: walks  Patient Pre-hospital Diet Restrictions: None  Substance Use History:   Social History     Substance and Sexual Activity   Alcohol Use Never     Social History     Tobacco Use   Smoking Status Every Day    Current packs/day: 0.50    Average packs/day: 0.5 packs/day for 52.0 years (26.0 ttl pk-yrs)    Types: Cigarettes    Start date: 1972   Smokeless Tobacco Never   Tobacco Comments    a little less than 1/2ppd, trying to cut back before surgery last smoked 5.25.23     Social History     Substance and Sexual Activity   Drug Use No       Family History:  Family History   Problem Relation Age of Onset    No Known Problems Mother     Heart disease Father        Physical Exam:     Vitals:   Blood Pressure: (!) 181/90 (12/22/23 0341)  Pulse: 65 (12/22/23 0341)  Temperature: 98.1 °F (36.7 °C) (12/22/23 0341)  Temp Source: Oral (12/21/23 2251)  Respirations: 12 (12/22/23 0138)  Height: 5' 8\" (172.7 cm) (12/22/23 0300)  Weight - Scale: 73.4 kg (161 lb 13.1 oz) (12/22/23 0300)  SpO2: 97 % (12/22/23 0341)    Physical Exam  Vitals and nursing note reviewed.   Constitutional:       General: He is not in acute distress.     Appearance: He is well-developed. He is ill-appearing (chronically).   HENT:      Head: Normocephalic and atraumatic.      Nose: Nose normal. No congestion.      Mouth/Throat:      Mouth: Mucous membranes are moist.      Pharynx: Oropharynx is clear.   Eyes:      Conjunctiva/sclera: Conjunctivae normal.   Cardiovascular:      Rate and " Rhythm: Normal rate and regular rhythm.      Heart sounds: Normal heart sounds. No murmur heard.     No friction rub. No gallop.   Pulmonary:      Effort: Pulmonary effort is normal. No respiratory distress.      Breath sounds: Normal breath sounds. No wheezing, rhonchi or rales.   Abdominal:      General: Bowel sounds are normal. There is no distension.      Palpations: Abdomen is soft.      Tenderness: There is no abdominal tenderness.   Genitourinary:     Comments: Left scrotal swelling and erythema  Musculoskeletal:      Cervical back: Neck supple.      Right lower leg: No edema.      Left lower leg: No edema.   Skin:     General: Skin is warm and dry.      Capillary Refill: Capillary refill takes less than 2 seconds.   Neurological:      General: No focal deficit present.      Mental Status: He is alert and oriented to person, place, and time. Mental status is at baseline.   Psychiatric:         Mood and Affect: Mood normal.         Behavior: Behavior normal.          Additional Data:     Lab Results:  Results from last 7 days   Lab Units 12/21/23  2356   WBC Thousand/uL 11.82*   HEMOGLOBIN g/dL 18.0*   HEMATOCRIT % 50.2*   PLATELETS Thousands/uL 172   NEUTROS PCT % 79*   LYMPHS PCT % 10*   MONOS PCT % 8   EOS PCT % 1     Results from last 7 days   Lab Units 12/21/23  2356   SODIUM mmol/L 137   POTASSIUM mmol/L 4.6   CHLORIDE mmol/L 102   CO2 mmol/L 29   BUN mg/dL 18   CREATININE mg/dL 1.09   ANION GAP mmol/L 6   CALCIUM mg/dL 9.3   ALBUMIN g/dL 4.1   TOTAL BILIRUBIN mg/dL 1.58*   ALK PHOS U/L 112*   ALT U/L 34   AST U/L 30   GLUCOSE RANDOM mg/dL 100                       Lines/Drains:  Invasive Devices       Peripheral Intravenous Line  Duration             Peripheral IV 12/21/23 Right Antecubital <1 day                        Imaging: Reviewed radiology reports from this admission including: ultrasound(s)  US scrotum and testicles   Final Result by Mauri Parker MD (12/22 6376)      1.  Stable appearance of  hyperemia and hypertrophy of the left epididymis, consistent with epididymitis   2.  Stable left hydrocele      Workstation performed: [id number]             EKG and Other Studies Reviewed on Admission:   EKG: No EKG obtained.    ** Please Note: This note has been constructed using a voice recognition system. **

## 2023-12-22 NOTE — ASSESSMENT & PLAN NOTE
"Patient with PMHx of HTN, CAD, HLD, COPD, CKD, and polycthemia vera presented to the ED secondary to continued left testicular pain, erythema, and swelling  Pt was seen by his PCP 12/20 for this issue and scrotal US was performed. US 12/20 demonstrating \"Findings of the left epididymitis, with likely secondary mild hydrocele. Incidental left epididymal head cysts.\"  Pt was placed on regimen of 100 mg doxycycline bid x 7 days starting 12/20, but patient reports no improvement so far after taking 3 doses  He denies any other symptoms such as fevers or chills  Scrotal US tonight demonstrating \"Stable appearance of hyperemia and hypertrophy of the left epididymis, consistent with epididymitis. Stable left hydrocele\"  WBC 11.82, not fulfilling septic criteria   UA with inummerable WBC, but no bacteria  Urine culture pending  Supportive care, pain control  ED initiated patient on levaquin, continue for now  Urology consult pending   "

## 2023-12-22 NOTE — ED PROVIDER NOTES
History  Chief Complaint   Patient presents with    Testicle Pain     Pt reports left testicle pain, following with pcp for same, currently taking abx. Pain increased tonight.      78-year-old male presents for worsening left testicular pain.  Had an outpatient ultrasound done by his PCP on December 20 which showed left-sided epididymitis.  Patient was started on doxycycline and he has taken 4 doses at this time.  Patient states that pain is worse currently.  Denies any systemic symptoms such as fevers, chills, nausea, vomiting, abdominal pain or diarrhea.  No recent procedures and no prior history of epididymitis.  Does have a history of BPH and did have surgery in the past.      History provided by:  Patient, spouse and relative   used: No    Testicle Pain      Prior to Admission Medications   Prescriptions Last Dose Informant Patient Reported? Taking?   Multiple Vitamin (MULTIVITAMIN) capsule  Self Yes No   Sig: Take 1 capsule by mouth daily   Multiple Vitamins-Minerals (VITAMIN D3 COMPLETE PO)   Yes No   Sig: Take 1 capsule by mouth daily   Omega-3 Fatty Acids (FISH OIL) 1200 MG CAPS  Self Yes No   Sig: Take 1,200 mg by mouth 2 (two) times a day     acetaminophen (TYLENOL) 500 mg tablet  Self Yes No   Sig: Take 500 mg by mouth every 6 (six) hours as needed for mild pain   albuterol (Ventolin HFA) 90 mcg/act inhaler  Self No No   Sig: Inhale 2 puffs every 4 (four) hours as needed for wheezing   amLODIPine (NORVASC) 10 mg tablet  Self Yes No   Sig: Take 10 mg by mouth every evening    aspirin (ECOTRIN LOW STRENGTH) 81 mg EC tablet  Self Yes No   Sig: Take 81 mg by mouth daily   atorvastatin (LIPITOR) 80 mg tablet   No No   Sig: TAKE 1 TABLET BY MOUTH EVERY DAY IN THE EVENING   doxycycline hyclate (VIBRAMYCIN) 100 mg capsule   No No   Sig: Take 1 capsule (100 mg total) by mouth every 12 (twelve) hours for 7 days   hydroxyurea (HYDREA) 500 mg capsule   No No   Sig: TAKE 1 CAPSULE (500 MG TOTAL)  "BY MOUTH DAILY   lidocaine (Lidoderm) 5 %   No No   Sig: Apply 1 patch topically over 12 hours daily Remove & Discard patch within 12 hours or as directed by MD   metoprolol tartrate (LOPRESSOR) 50 mg tablet  Self Yes No   Sig: Take 50 mg by mouth 2 (two) times a day    nitroglycerin (NITROSTAT) 0.4 mg SL tablet  Self No No   Sig: Place 1 tablet (0.4 mg total) under the tongue every 5 (five) minutes as needed for chest pain      Facility-Administered Medications: None       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\"    Full dentures     but doesnt wear them    History of 2019 novel coronavirus disease (COVID-19) 02/2021    hospital for 2 days but not in ICU/\"mainly dehydrated\" \"also fever/oxygen below 90\"    History of coronary artery stent placement     x2 in 2008 or so; sees [facility]-- Dr Hutton cardio    [place] (hard of hearing)     no hearing aids yet    Hyperlipidemia     Last Assessed:  11/24/17    Hypertension     Benign essential, Last Assessed:  11/24/17    LBBB (left bundle branch block)     Nocturia     Polycythemia vera (HCC)     (per history in chart)    PVD (peripheral vascular disease) (HCC)     RBC abnormality     pt reports told has high Red blood cells/goes to infusion center regularly next appt 10/8/21 hematologist is Dr Charlton of [facility](had been Dr Ahn)    Seasickness     Shortness of breath     \"if goes up steps gets SOB, had for awhile\"    Slow urinary stream     \"sometimes feels like bladder isnt all the way empty\"    Snores     Wears glasses     reading       Past Surgical History:   Procedure Laterality Date    " ANGIOPLASTY      APPENDECTOMY      CARDIAC CATHETERIZATION N/A 07/28/2022    Procedure: CARDIAC TEMPORARY PACEMAKER;  Surgeon: Fransico Morgan MD;  [facility];  Service: Cardiology    CARDIAC ELECTROPHYSIOLOGY PROCEDURE N/A 07/29/2022    Procedure: Cardiac pacer implant;  Surgeon: Anand Valero MD;  [facility];  Service: Cardiology    CATARACT EXTRACTION Bilateral     CT CYSTOGRAM  11/04/2021    ELBOW SURGERY Right     FL CYSTOGRAM  10/27/2021    IR BIOPSY BONE MARROW  10/14/2022    KNEE ARTHROSCOPY Right     ID COLONOSCOPY FLX DX W/COLLJ SPEC WHEN PFRMD N/A 05/22/2017    Procedure: COLONOSCOPY;  Surgeon: Josseline Zhou DO;  Location: [facility];  Service: Gastroenterology    ID NEUROPLASTY &/TRANSPOS MEDIAN NRV CARPAL TUNNE Right 5/26/2023    Procedure: CTR, RIGHT;  Surgeon: Cosme Varela MD;  [facility];  Service: Orthopedics    ID TENDON SHEATH INCISION Right 5/26/2023    Procedure: RELEASE TRIGGER FINGER, RIGHT RING FINGER;  Surgeon: Cosme Varela MD;  [facility];  Service: Orthopedics    PROSTATE BIOPSY      PROSTATECTOMY N/A 10/18/2021    Procedure: ROBOTIC SUBTOTAL/SUPRAPUBIC PROSTATECTOMY;  Surgeon: Kieran Vega MD;  [facility];  Service: Urology    SHOULDER ARTHROSCOPY Right     TONSILLECTOMY         Family History   Problem Relation Age of Onset    No Known Problems Mother     Heart disease Father      I have reviewed and agree with the history as documented.    E-Cigarette/Vaping    E-Cigarette Use Never User      E-Cigarette/Vaping Substances    Nicotine No     THC No     CBD No     Flavoring No     Other No     Unknown No      Social History     Tobacco Use    Smoking status: Every Day     Current packs/day: 0.50     Average packs/day: 0.5 packs/day for 52.0 years (26.0 ttl pk-yrs)     Types: Cigarettes     Start date: 1972    Smokeless tobacco: Never    Tobacco comments:     a little less than 1/2ppd, trying to cut back before surgery  last smoked 5.25.23   Vaping Use    Vaping status: Never Used   Substance Use Topics    Alcohol use: Never    Drug use: No       Review of Systems   Genitourinary:  Positive for scrotal swelling and testicular pain. Negative for penile swelling.       Physical Exam  Physical Exam    Vital Signs  ED Triage Vitals   Temperature Pulse Respirations Blood Pressure SpO2   12/21/23 2251 12/21/23 2252 12/21/23 2251 12/21/23 2252 12/21/23 2251   (!) 97.4 °F (36.3 °C) 65 18 147/69 97 %      Temp Source Heart Rate Source Patient Position - Orthostatic VS BP Location FiO2 (%)   12/21/23 2251 12/21/23 2251 12/21/23 2251 12/21/23 2251 --   Oral Monitor Sitting Right arm       Pain Score       12/21/23 2252       10 - Worst Possible Pain           Vitals:    12/21/23 2251 12/21/23 2252 12/22/23 0138 12/22/23 0200   BP:  147/69 154/78 126/60   Pulse:  65 61 68   Patient Position - Orthostatic VS: Sitting  Lying Lying         Visual Acuity      ED Medications  Medications   levofloxacin (LEVAQUIN) IVPB (premix in dextrose) 500 mg 100 mL (0 mg Intravenous Stopped 12/22/23 0148)   cefTRIAXone (ROCEPHIN) 500 mg in lidocaine (PF) (XYLOCAINE-MPF) 1 % IM only syringe (500 mg Intramuscular Given 12/22/23 0009)   fentanyl citrate (PF) 100 MCG/2ML 50 mcg (50 mcg Intravenous Given 12/22/23 0044)       Diagnostic Studies  Results Reviewed       Procedure Component Value Units Date/Time    Urine Microscopic [[id number]]  (Abnormal) Collected: 12/21/23 2355    Lab Status: Final result Specimen: Urine, Clean Catch Updated: 12/22/23 0017     RBC, UA 2-4 /hpf      WBC, UA Innumerable /hpf      Epithelial Cells Occasional /hpf      Bacteria, UA None Seen /hpf      MUCUS THREADS Moderate     Hyaline Casts, UA 0-3 /lpf     Urine culture [[id number]] Collected: 12/21/23 2355    Lab Status: In process Specimen: Urine, Clean Catch Updated: 12/22/23 0017    Comprehensive metabolic panel [[id number]]  (Abnormal) Collected: 12/21/23 2356    Lab Status: Final  result Specimen: Blood from Arm, Right Updated: 12/22/23 0015     Sodium 137 mmol/L      Potassium 4.6 mmol/L      Chloride 102 mmol/L      CO2 29 mmol/L      ANION GAP 6 mmol/L      BUN 18 mg/dL      Creatinine 1.09 mg/dL      Glucose 100 mg/dL      Calcium 9.3 mg/dL      AST 30 U/L      ALT 34 U/L      Alkaline Phosphatase 112 U/L      Total Protein 7.4 g/dL      Albumin 4.1 g/dL      Total Bilirubin 1.58 mg/dL      eGFR 65 ml/min/1.73sq m     Narrative:      National Kidney Disease Foundation guidelines for Chronic Kidney Disease (CKD):     Stage 1 with normal or high GFR (GFR > 90 mL/min/1.73 square meters)    Stage 2 Mild CKD (GFR = 60-89 mL/min/1.73 square meters)    Stage 3A Moderate CKD (GFR = 45-59 mL/min/1.73 square meters)    Stage 3B Moderate CKD (GFR = 30-44 mL/min/1.73 square meters)    Stage 4 Severe CKD (GFR = 15-29 mL/min/1.73 square meters)    Stage 5 End Stage CKD (GFR <15 mL/min/1.73 square meters)  Note: GFR calculation is accurate only with a steady state creatinine    CBC and differential [[id number]]  (Abnormal) Collected: 12/21/23 2356    Lab Status: Final result Specimen: Blood from Arm, Right Updated: 12/22/23 0005     WBC 11.82 Thousand/uL      RBC 4.74 Million/uL      Hemoglobin 18.0 g/dL      Hematocrit 50.2 %       fL      MCH 38.0 pg      MCHC 35.9 g/dL      RDW 14.1 %      MPV 9.7 fL      Platelets 172 Thousands/uL      nRBC 0 /100 WBCs      Neutrophils Relative 79 %      Immat GRANS % 2 %      Lymphocytes Relative 10 %      Monocytes Relative 8 %      Eosinophils Relative 1 %      Basophils Relative 0 %      Neutrophils Absolute 9.30 Thousands/µL      Immature Grans Absolute 0.18 Thousand/uL      Lymphocytes Absolute 1.21 Thousands/µL      Monocytes Absolute 0.96 Thousand/µL      Eosinophils Absolute 0.12 Thousand/µL      Basophils Absolute 0.05 Thousands/µL     Urine Macroscopic, POC [[id number]]  (Abnormal) Collected: 12/21/23 2355    Lab Status: Final result Specimen: Urine  Updated: 12/21/23 2356     Color, UA Yoana     Clarity, UA Cloudy     pH, UA 7.0     Leukocytes, UA Small     Nitrite, UA Negative     Protein, UA 30 (1+) mg/dl      Glucose, UA Negative mg/dl      Ketones, UA Negative mg/dl      Urobilinogen, UA 4.0 E.U./dl      Bilirubin, UA Negative     Occult Blood, UA Trace     Specific Gravity, UA 1.020    Narrative:      CLINITEK RESULT                   US scrotum and testicles   Final Result by Mauri Parker MD (12/22 0136)      1.  Stable appearance of hyperemia and hypertrophy of the left epididymis, consistent with epididymitis   2.  Stable left hydrocele      Workstation performed: [id number]                    Procedures  Procedures         ED Course  ED Course as of 12/22/23 0203   Fri Dec 22, 2023   0037 CBC and differential(!)  Leukocytosis with hemoconcentration.  Similar to prior results in the past   0037 Comprehensive metabolic panel(!)  No major abnormality                               SBIRT 22yo+      Flowsheet Row Most Recent Value   Initial Alcohol Screen: US AUDIT-C     1. How often do you have a drink containing alcohol? 0 Filed at: 12/21/2023 2254   2. How many drinks containing alcohol do you have on a typical day you are drinking?  0 Filed at: 12/21/2023 2254   3a. Male UNDER 65: How often do you have five or more drinks on one occasion? 0 Filed at: 12/21/2023 2254   3b. FEMALE Any Age, or MALE 65+: How often do you have 4 or more drinks on one occassion? 0 Filed at: 12/21/2023 2254   Audit-C Score 0 Filed at: 12/21/2023 2254   [place]: How many times in the past year have you...    Used an illegal drug or used a prescription medication for non-medical reasons? Never Filed at: 12/21/2023 2254                      Medical Decision Making  11:39 PM  Patient's history and exam are concerning for possible developing scrotal abscess and possible torsion.  Patient is very tender, red and swollen on exam.  Left testicle does appear to be higher riding than the  right testicle with tenderness to palpation.  Patient states that pain did worsen today.  Has not been on antibiotics for 48 hours.  Will obtain labs, ultrasound, treat with IV antibiotics along with IM ceftriaxone and plan for admission for urology consultation.    1:52 AM  No major lab abnormalities.  Ultrasound shows a stable left hydrocele and epididymitis without evidence of torsion.  Given the patient's pain and failure to outpatient antibiotics, I will have him admitted to internal medicine for IV therapy and urology consultation.    Amount and/or Complexity of Data Reviewed  Labs: ordered. Decision-making details documented in ED Course.  Radiology: ordered.    Risk  Prescription drug management.  Decision regarding hospitalization.             Disposition  Final diagnoses:   Epididymitis   Testicular pain, left   Left hydrocele     Time reflects when diagnosis was documented in both MDM as applicable and the Disposition within this note       Time User Action Codes Description Comment    12/22/2023  1:51 AM Rolly Sharma [N45.1] Epididymitis     12/22/2023  1:51 AM Rolly Sharma [N50.812] Testicular pain, left     12/22/2023  1:52 AM Rolly Sharma [N43.3] Left hydrocele           ED Disposition       ED Disposition   Admit    Condition   Stable    Date/Time   Fri Dec 22, 2023 0151    Comment   Case was discussed with RSOALBA and the patient's admission status was agreed to be Admission Status: observation status to the service of Dr. Nugent .               Follow-up Information    None         Patient's Medications   Discharge Prescriptions    No medications on file       No discharge procedures on file.    PDMP Review       None            ED Provider  Electronically Signed by             Rolly Sharma Jr.,   12/22/23 0200

## 2023-12-23 ENCOUNTER — TELEPHONE (OUTPATIENT)
Dept: OTHER | Facility: HOSPITAL | Age: 78
End: 2023-12-23

## 2023-12-23 LAB
ANION GAP SERPL CALCULATED.3IONS-SCNC: 5 MMOL/L
BACTERIA UR CULT: NORMAL
BUN SERPL-MCNC: 19 MG/DL (ref 5–25)
C TRACH DNA SPEC QL NAA+PROBE: NEGATIVE
CALCIUM SERPL-MCNC: 9 MG/DL (ref 8.4–10.2)
CHLORIDE SERPL-SCNC: 104 MMOL/L (ref 96–108)
CO2 SERPL-SCNC: 28 MMOL/L (ref 21–32)
CREAT SERPL-MCNC: 1.32 MG/DL (ref 0.6–1.3)
ERYTHROCYTE [DISTWIDTH] IN BLOOD BY AUTOMATED COUNT: 13.7 % (ref 11.6–15.1)
GFR SERPL CREATININE-BSD FRML MDRD: 51 ML/MIN/1.73SQ M
GLUCOSE SERPL-MCNC: 91 MG/DL (ref 65–140)
HCT VFR BLD AUTO: 45.7 % (ref 36.5–49.3)
HGB BLD-MCNC: 16.2 G/DL (ref 12–17)
MCH RBC QN AUTO: 37.1 PG (ref 26.8–34.3)
MCHC RBC AUTO-ENTMCNC: 35.4 G/DL (ref 31.4–37.4)
MCV RBC AUTO: 105 FL (ref 82–98)
N GONORRHOEA DNA SPEC QL NAA+PROBE: NEGATIVE
PLATELET # BLD AUTO: 154 THOUSANDS/UL (ref 149–390)
PMV BLD AUTO: 9.8 FL (ref 8.9–12.7)
POTASSIUM SERPL-SCNC: 4 MMOL/L (ref 3.5–5.3)
PROCALCITONIN SERPL-MCNC: 0.08 NG/ML
RBC # BLD AUTO: 4.37 MILLION/UL (ref 3.88–5.62)
SODIUM SERPL-SCNC: 137 MMOL/L (ref 135–147)
WBC # BLD AUTO: 9.77 THOUSAND/UL (ref 4.31–10.16)

## 2023-12-23 PROCEDURE — 80048 BASIC METABOLIC PNL TOTAL CA: CPT | Performed by: INTERNAL MEDICINE

## 2023-12-23 PROCEDURE — 85027 COMPLETE CBC AUTOMATED: CPT | Performed by: INTERNAL MEDICINE

## 2023-12-23 PROCEDURE — 84145 PROCALCITONIN (PCT): CPT | Performed by: INTERNAL MEDICINE

## 2023-12-23 PROCEDURE — 99233 SBSQ HOSP IP/OBS HIGH 50: CPT | Performed by: PHYSICIAN ASSISTANT

## 2023-12-23 PROCEDURE — 99232 SBSQ HOSP IP/OBS MODERATE 35: CPT | Performed by: NURSE PRACTITIONER

## 2023-12-23 RX ORDER — SODIUM CHLORIDE 9 MG/ML
100 INJECTION, SOLUTION INTRAVENOUS CONTINUOUS
Status: DISCONTINUED | OUTPATIENT
Start: 2023-12-23 | End: 2023-12-24 | Stop reason: HOSPADM

## 2023-12-23 RX ADMIN — METOPROLOL TARTRATE 50 MG: 50 TABLET, FILM COATED ORAL at 09:18

## 2023-12-23 RX ADMIN — HEPARIN SODIUM 5000 UNITS: 5000 INJECTION INTRAVENOUS; SUBCUTANEOUS at 14:00

## 2023-12-23 RX ADMIN — SODIUM CHLORIDE 100 ML/HR: 0.9 INJECTION, SOLUTION INTRAVENOUS at 21:12

## 2023-12-23 RX ADMIN — HYDROXYUREA 500 MG: 500 CAPSULE ORAL at 09:18

## 2023-12-23 RX ADMIN — HEPARIN SODIUM 5000 UNITS: 5000 INJECTION INTRAVENOUS; SUBCUTANEOUS at 21:10

## 2023-12-23 RX ADMIN — ATORVASTATIN CALCIUM 80 MG: 80 TABLET, FILM COATED ORAL at 17:22

## 2023-12-23 RX ADMIN — METOPROLOL TARTRATE 50 MG: 50 TABLET, FILM COATED ORAL at 17:22

## 2023-12-23 RX ADMIN — AMLODIPINE BESYLATE 10 MG: 10 TABLET ORAL at 17:22

## 2023-12-23 RX ADMIN — SODIUM CHLORIDE 100 ML/HR: 0.9 INJECTION, SOLUTION INTRAVENOUS at 10:34

## 2023-12-23 RX ADMIN — ASPIRIN 81 MG: 81 TABLET, COATED ORAL at 09:18

## 2023-12-23 RX ADMIN — LEVOFLOXACIN 500 MG: 5 INJECTION, SOLUTION INTRAVENOUS at 23:00

## 2023-12-23 RX ADMIN — HEPARIN SODIUM 5000 UNITS: 5000 INJECTION INTRAVENOUS; SUBCUTANEOUS at 06:33

## 2023-12-23 NOTE — ASSESSMENT & PLAN NOTE
"Patient with PMHx of HTN, CAD, HLD, COPD, CKD, and polycthemia vera presented to the ED secondary to continued left testicular pain, erythema, and swelling  Pt was seen by his PCP 12/20 for this issue and scrotal US was performed. US 12/20 demonstrating \"Findings of the left epididymitis, with likely secondary mild hydrocele. Incidental left epididymal head cysts.\"  Pt was placed on regimen of 100 mg doxycycline bid x 7 days starting 12/20, but patient reports no improvement so far after taking 3 doses  He denies any other symptoms such as fevers or chills  Scrotal US tonight demonstrating \"Stable appearance of hyperemia and hypertrophy of the left epididymis, consistent with epididymitis. Stable left hydrocele\"  WBC 11.82 on admit, now down to 9.77, not fulfilling septic criteria   UA with inummerable WBC, but no bacteria  Urine culture pending  Supportive care, pain control  ED initiated patient on levaquin, continue for now  Urology consult appreciated, seems to be clinically improving on IV levaquin  "

## 2023-12-23 NOTE — ASSESSMENT & PLAN NOTE
Lab Results   Component Value Date    EGFR 51 12/23/2023    EGFR 74 12/22/2023    EGFR 65 12/21/2023    CREATININE 1.32 (H) 12/23/2023    CREATININE 0.98 12/22/2023    CREATININE 1.09 12/21/2023     Creat up to 1.32  Will start IV fluids  Recheck labs in AM

## 2023-12-23 NOTE — PLAN OF CARE
Problem: PAIN - ADULT  Goal: Verbalizes/displays adequate comfort level or baseline comfort level  Description: Interventions:  - Encourage patient to monitor pain and request assistance  - Assess pain using appropriate pain scale  - Administer analgesics based on type and severity of pain and evaluate response  - Implement non-pharmacological measures as appropriate and evaluate response  - Consider cultural and social influences on pain and pain management  - Notify physician/advanced practitioner if interventions unsuccessful or patient reports new pain  Outcome: Progressing     Problem: INFECTION - ADULT  Goal: Absence or prevention of progression during hospitalization  Description: INTERVENTIONS:  - Assess and monitor for signs and symptoms of infection  - Monitor lab/diagnostic results  - Monitor all insertion sites, i.e. indwelling lines, tubes, and drains  - Monitor endotracheal if appropriate and nasal secretions for changes in amount and color  - [place] appropriate cooling/warming therapies per order  - Administer medications as ordered  - Instruct and encourage patient and family to use good hand hygiene technique  - Identify and instruct in appropriate isolation precautions for identified infection/condition  Outcome: Progressing     Problem: SAFETY ADULT  Goal: Patient will remain free of falls  Description: INTERVENTIONS:  - Educate patient/family on patient safety including physical limitations  - Instruct patient to call for assistance with activity   - Consult OT/PT to assist with strengthening/mobility   - Keep Call bell within reach  - Keep bed low and locked with side rails adjusted as appropriate  - Keep care items and personal belongings within reach  - Initiate and maintain comfort rounds  - Make Fall Risk Sign visible to staff  - Offer Toileting every  Hours, in advance of need  - Initiate/Maintain alarm  - Obtain necessary fall risk management equipment:   - Apply yellow socks and  bracelet for high fall risk patients  - Consider moving patient to room near nurses station  Outcome: Progressing  Goal: Maintain or return to baseline ADL function  Description: INTERVENTIONS:  -  Assess patient's ability to carry out ADLs; assess patient's baseline for ADL function and identify physical deficits which impact ability to perform ADLs (bathing, care of mouth/teeth, toileting, grooming, dressing, etc.)  - Assess/evaluate cause of self-care deficits   - Assess range of motion  - Assess patient's mobility; develop plan if impaired  - Assess patient's need for assistive devices and provide as appropriate  - Encourage maximum independence but intervene and supervise when necessary  - Involve family in performance of ADLs  - Assess for home care needs following discharge   - Consider OT consult to assist with ADL evaluation and planning for discharge  - Provide patient education as appropriate  Outcome: Progressing  Goal: Maintains/Returns to pre admission functional level  Description: INTERVENTIONS:  - Perform AM-PAC 6 Click Basic Mobility/ Daily Activity assessment daily.  - Set and communicate daily mobility goal to care team and patient/family/caregiver.   - Collaborate with rehabilitation services on mobility goals if consulted  - Perform Range of Motion  times a day.  - Reposition patient every  hours.  - Dangle patient  times a day  - Stand patient  times a day  - Ambulate patient  times a day  - Out of bed to chair  times a day   - Out of bed for meals  times a day  - Out of bed for toileting  - Record patient progress and toleration of activity level   Outcome: Progressing     Problem: DISCHARGE PLANNING  Goal: Discharge to home or other facility with appropriate resources  Description: INTERVENTIONS:  - Identify barriers to discharge w/patient and caregiver  - Arrange for needed discharge resources and transportation as appropriate  - Identify discharge learning needs (meds, wound care, etc.)  -  Arrange for interpretive services to assist at discharge as needed  - Refer to Case Management Department for coordinating discharge planning if the patient needs post-hospital services based on physician/advanced practitioner order or complex needs related to functional status, cognitive ability, or social support system  Outcome: Progressing     Problem: Knowledge Deficit  Goal: Patient/family/caregiver demonstrates understanding of disease process, treatment plan, medications, and discharge instructions  Description: Complete learning assessment and assess knowledge base.  Interventions:  - Provide teaching at level of understanding  - Provide teaching via preferred learning methods  Outcome: Progressing     Problem: SKIN/TISSUE INTEGRITY - ADULT  Goal: Skin Integrity remains intact(Skin Breakdown Prevention)  Description: Assess:  -Perform Keith assessment every   -Clean and moisturize skin every   -Inspect skin when repositioning, toileting, and assisting with ADLS  -Assess under medical devices such as  every   -Assess extremities for adequate circulation and sensation     Bed Management:  -Have minimal linens on bed & keep smooth, unwrinkled  -Change linens as needed when moist or perspiring  -Avoid sitting or lying in one position for more than  hours while in bed  -Keep HOB at degrees     Toileting:  -Offer bedside commode  -Assess for incontinence every   -Use incontinent care products after each incontinent episode such as     Activity:  -Mobilize patient  times a day  -Encourage activity and walks on unit  -Encourage or provide ROM exercises   -Turn and reposition patient every  Hours  -Use appropriate equipment to lift or move patient in bed  -Instruct/ Assist with weight shifting every  when out of bed in chair  -Consider limitation of chair time  hour intervals    Skin Care:  -Avoid use of baby powder, tape, friction and shearing, hot water or constrictive clothing  -Relieve pressure over bony  prominences using   -Do not massage red bony areas    Next Steps:  -Teach patient strategies to minimize risks such as    -Consider consults to  interdisciplinary teams such as   Outcome: Progressing  Goal: Incision(s), wounds(s) or drain site(s) healing without S/S of infection  Description: INTERVENTIONS  - Assess and document dressing, incision, wound bed, drain sites and surrounding tissue  - Provide patient and family education  - Perform skin care/dressing changes ever  Outcome: Progressing     Problem: MUSCULOSKELETAL - ADULT  Goal: Maintain proper alignment of affected body part  Description: INTERVENTIONS:  - Support, maintain and protect limb and body alignment  - Provide patient/ family with appropriate education  Outcome: Progressing

## 2023-12-23 NOTE — PLAN OF CARE
Problem: PAIN - ADULT  Goal: Verbalizes/displays adequate comfort level or baseline comfort level  Description: Interventions:  - Encourage patient to monitor pain and request assistance  - Assess pain using appropriate pain scale  - Administer analgesics based on type and severity of pain and evaluate response  - Implement non-pharmacological measures as appropriate and evaluate response  - Consider cultural and social influences on pain and pain management  - Notify physician/advanced practitioner if interventions unsuccessful or patient reports new pain  Outcome: Progressing     Problem: INFECTION - ADULT  Goal: Absence or prevention of progression during hospitalization  Description: INTERVENTIONS:  - Assess and monitor for signs and symptoms of infection  - Monitor lab/diagnostic results  - Monitor all insertion sites, i.e. indwelling lines, tubes, and drains  - Monitor endotracheal if appropriate and nasal secretions for changes in amount and color  - [place] appropriate cooling/warming therapies per order  - Administer medications as ordered  - Instruct and encourage patient and family to use good hand hygiene technique  - Identify and instruct in appropriate isolation precautions for identified infection/condition  Outcome: Progressing     Problem: SAFETY ADULT  Goal: Patient will remain free of falls  Description: INTERVENTIONS:  - Educate patient/family on patient safety including physical limitations  - Instruct patient to call for assistance with activity   - Consult OT/PT to assist with strengthening/mobility   - Keep Call bell within reach  - Keep bed low and locked with side rails adjusted as appropriate  - Keep care items and personal belongings within reach  - Initiate and maintain comfort rounds  - Make Fall Risk Sign visible to staff  - Offer Toileting every 2 Hours, in advance of need  - Initiate/Maintain   - Obtain necessary fall risk management equipment:   - Apply yellow socks and bracelet  for high fall risk patients  - Consider moving patient to room near nurses station  Outcome: Progressing  Goal: Maintain or return to baseline ADL function  Description: INTERVENTIONS:  -  Assess patient's ability to carry out ADLs; assess patient's baseline for ADL function and identify physical deficits which impact ability to perform ADLs (bathing, care of mouth/teeth, toileting, grooming, dressing, etc.)  - Assess/evaluate cause of self-care deficits   - Assess range of motion  - Assess patient's mobility; develop plan if impaired  - Assess patient's need for assistive devices and provide as appropriate  - Encourage maximum independence but intervene and supervise when necessary  - Involve family in performance of ADLs  - Assess for home care needs following discharge   - Consider OT consult to assist with ADL evaluation and planning for discharge  - Provide patient education as appropriate  Outcome: Progressing  Goal: Maintains/Returns to pre admission functional level  Description: INTERVENTIONS:  - Perform AM-PAC 6 Click Basic Mobility/ Daily Activity assessment daily.  - Set and communicate daily mobility goal to care team and patient/family/caregiver.   - Collaborate with rehabilitation services on mobility goals if consulted  - Perform Range of Motion 2 times a day.  - Reposition patient every 2 hours.  - Dangle patient 3 times a day  - Stand patient 3 times a day  - Ambulate patient 3 times a day  - Out of bed to chair 3 times a day   - Out of bed for meals 3 times a day  - Out of bed for toileting  - Record patient progress and toleration of activity level   Outcome: Progressing

## 2023-12-23 NOTE — TELEPHONE ENCOUNTER
Santana Jacinto is a 78-year-old male well-known to our service at the [facility], recently contacted the office for left testicular pain.  Was treated with doxycycline for left epididymoorchitis.  Admitted for oral antibiotic failure.  Was seen in urologic consultation at [facility].  Will be discharged when deemed medically appropriate by the internal medicine service.  Requesting office follow-up with PVR and scrotal exam within the next 3 weeks or so.  Thank you.

## 2023-12-23 NOTE — PROGRESS NOTES
"Progress Note - Santana Jacinto 78 y.o. male MRN: [id number]    Unit/Bed#: [address] 217-01 Encounter: [id number]      Assessment:  Santana Jacinto is a 78-year-old male with left epididymal orchitis without abscess formation.  Afebrile, hemodynamically stable with reports of significant improvement.  No leukocytosis.  Mild bump in creatinine.  Denies flank pain or nausea/vomiting.  Urine culture positive mixed contaminants.  Chlamydia/GC panel--negative.    Scrotal exam--normal phallus.  Bilaterally descended testicles with left hemiscrotal edema, mild firmness without fluctuance, crepitus, or necrosis.  Minimally tender.  Able to manipulate without eliciting exquisite pain.    Plan:  Continue medical optimization and empiric antibiosis.  Monitor voiding.  Discharge at the discretion of the internal medicine service.  Patient hopeful to leave by the morning.  Patient well-established with our practice.  Will follow-up for re-exam and PVR within the next 3 weeks.  No indication for acute/urgent/emergent  surgical intervention.   Urology will sign off.      Subjective:   Denies fever, chills.  Reports substantial improvement with pain.  Denies voiding symptoms.    Objective:     Vitals: Blood pressure 114/77, pulse 66, temperature (!) 97 °F (36.1 °C), temperature source Oral, resp. rate 17, height 5' 8\" (1.727 m), weight 73.4 kg (161 lb 13.1 oz), SpO2 96%.,Body mass index is 24.6 kg/m².      Intake/Output Summary (Last 24 hours) at 12/23/2023 0957  Last data filed at 12/23/2023 0632  Gross per 24 hour   Intake 350 ml   Output --   Net 350 ml       Physical Exam: General appearance: alert and oriented, in no acute distress, appears stated age, cooperative, and no distress  Head: Normocephalic, without obvious abnormality, atraumatic  Neck: no JVD and supple, symmetrical, trachea midline  Lungs: clear to auscultation bilaterally  Heart: regular rate and rhythm, S1, S2 normal, no murmur, click, rub or gallop  Abdomen: soft, " non-tender; bowel sounds normal; no masses,  no organomegaly  Extremities: extremities normal, warm and well-perfused; no cyanosis, clubbing, or edema  Pulses: 2+ and symmetric  Neurologic: Grossly normal     Invasive Devices       Peripheral Intravenous Line  Duration             Peripheral IV 12/21/23 Right Antecubital 1 day                    Lab, Imaging and other studies: I have personally reviewed pertinent reports.

## 2023-12-23 NOTE — PROGRESS NOTES
"Critical access hospital  Progress Note  Name: Santana Jacinto I  MRN: [id number]  Unit/Bed#: [place] [address] -01 I Date of Admission: 12/21/2023   Date of Service: 12/23/2023 I Hospital Day: 1    Assessment/Plan   Chronic obstructive pulmonary disease (HCC)  Assessment & Plan  No acute exacerbation  Continue albuterol prn    Stage 3b chronic kidney disease (HCC)  Assessment & Plan  Lab Results   Component Value Date    EGFR 51 12/23/2023    EGFR 74 12/22/2023    EGFR 65 12/21/2023    CREATININE 1.32 (H) 12/23/2023    CREATININE 0.98 12/22/2023    CREATININE 1.09 12/21/2023     Creat up to 1.32  Will start IV fluids  Recheck labs in AM    Cigarette nicotine dependence without complication  Assessment & Plan  Patient reports smoking half a pack a day  Nicotine patch supplementation  Encourage cessation    Polycythemia vera (HCC)  Assessment & Plan  POA hgb 18  Pt denies any symptoms  Continue to monitor CBC  Continue 500 mg hydroxyurea daily    Mixed hyperlipidemia  Assessment & Plan  Continue statin    CAD (coronary artery disease)  Assessment & Plan  Hx of PCI in 2008  Continue aspirin, statin therapy    Benign essential hypertension  Assessment & Plan  Continue home regimen of 10 mg amlodipine and 50 mg metoprolol bid    * Left epididymitis  Assessment & Plan  Patient with PMHx of HTN, CAD, HLD, COPD, CKD, and polycthemia vera presented to the ED secondary to continued left testicular pain, erythema, and swelling  Pt was seen by his PCP 12/20 for this issue and scrotal US was performed. US 12/20 demonstrating \"Findings of the left epididymitis, with likely secondary mild hydrocele. Incidental left epididymal head cysts.\"  Pt was placed on regimen of 100 mg doxycycline bid x 7 days starting 12/20, but patient reports no improvement so far after taking 3 doses  He denies any other symptoms such as fevers or chills  Scrotal US tonight demonstrating \"Stable appearance of hyperemia and hypertrophy of the left " "epididymis, consistent with epididymitis. Stable left hydrocele\"  WBC 11.82 on admit, now down to 9.77, not fulfilling septic criteria   UA with inummerable WBC, but no bacteria  Urine culture pending  Supportive care, pain control  ED initiated patient on levaquin, continue for now  Urology consult appreciated, seems to be clinically improving on IV levaquin             VTE Pharmacologic Prophylaxis:   Pharmacologic: Heparin  Mechanical VTE Prophylaxis in Place: No        Discussions with Specialists or Other Care Team Provider: reviewed urology note    Education and Discussions with Family / Patient: spoke with wife over the phone for update    Time Spent for Care: 30 minutes.  More than 50% of total time spent on counseling and coordination of care as described above.    Current Length of Stay: 1 day(s)    Current Patient Status: Inpatient   Certification Statement: The patient will continue to require additional inpatient hospital stay due to continue IV fluids/antibiotics    Discharge Plan: home when ready, likely in next 24 hours    Code Status: Level 3 - DNAR and DNI      Subjective:   Patient feeling better. Denies pain. Afebrile. He is eager to go home. Did not eat breakfast today and is not drinking enough fluids. Wife states he does not drink water and only drinks Peps[employer] at home    Objective:     Vitals:   Temp (24hrs), Av.4 °F (36.9 °C), Min:97 °F (36.1 °C), Max:100.4 °F (38 °C)    Temp:  [97 °F (36.1 °C)-100.4 °F (38 °C)] 97 °F (36.1 °C)  HR:  [61-72] 66  Resp:  [17-19] 17  BP: (114-131)/(66-78) 114/77  SpO2:  [92 %-96 %] 96 %  Body mass index is 24.6 kg/m².     Input and Output Summary (last 24 hours):       Intake/Output Summary (Last 24 hours) at 2023 1011  Last data filed at 2023 0632  Gross per 24 hour   Intake 350 ml   Output --   Net 350 ml       Physical Exam:     Physical Exam  Vitals reviewed.   Constitutional:       General: He is not in acute distress.     Appearance: He is " not diaphoretic.   HENT:      Head: Normocephalic and atraumatic.      Nose: Nose normal.      Mouth/Throat:      Pharynx: Oropharynx is clear.   Cardiovascular:      Rate and Rhythm: Normal rate.   Pulmonary:      Effort: Pulmonary effort is normal. No respiratory distress.   Abdominal:      General: There is no distension.      Palpations: Abdomen is soft.      Tenderness: There is no abdominal tenderness.   Genitourinary:     Comments: Scrotal swelling decreased per patient. No significant erythema  Musculoskeletal:         General: No deformity or signs of injury.   Skin:     General: Skin is warm and dry.      Findings: No bruising or erythema.   Neurological:      Mental Status: He is alert and oriented to person, place, and time.           Additional Data:     Labs:    Results from last 7 days   Lab Units 12/23/23 0624 12/22/23 0441   WBC Thousand/uL 9.77 10.68*   HEMOGLOBIN g/dL 16.2 16.8   HEMATOCRIT % 45.7 47.3   PLATELETS Thousands/uL 154 165   NEUTROS PCT %  --  75   LYMPHS PCT %  --  12*   MONOS PCT %  --  10   EOS PCT %  --  2     Results from last 7 days   Lab Units 12/23/23 0624 12/22/23 0441 12/21/23  4836   SODIUM mmol/L 137   < > 137   POTASSIUM mmol/L 4.0   < > 4.6   CHLORIDE mmol/L 104   < > 102   CO2 mmol/L 28   < > 29   BUN mg/dL 19   < > 18   CREATININE mg/dL 1.32*   < > 1.09   ANION GAP mmol/L 5   < > 6   CALCIUM mg/dL 9.0   < > 9.3   ALBUMIN g/dL  --   --  4.1   TOTAL BILIRUBIN mg/dL  --   --  1.58*   ALK PHOS U/L  --   --  112*   ALT U/L  --   --  34   AST U/L  --   --  30   GLUCOSE RANDOM mg/dL 91   < > 100    < > = values in this interval not displayed.                 Results from last 7 days   Lab Units 12/23/23 0624   PROCALCITONIN ng/ml 0.08           * I Have Reviewed All Lab Data Listed Above.  * Additional Pertinent Lab Tests Reviewed: All Labs For Current Hospital Admission Reviewed        Recent Cultures (last 7 days):     Results from last 7 days   Lab Units 12/21/23  5838    URINE CULTURE  10,000-19,000 cfu/ml       Last 24 Hours Medication List:   Current Facility-Administered Medications   Medication Dose Route Frequency Provider Last Rate    acetaminophen  650 mg Oral Q6H PRN Raman Ramirez, SADIE      albuterol  2 puff Inhalation Q4H PRN Raman Ramirez, PA-JEY      aluminum-magnesium hydroxide-simethicone  30 mL Oral Q6H PRN Raman Ramirez, PA-JEY      amLODIPine  10 mg Oral QPM Raman Ramirez, PA-C      aspirin  81 mg Oral Daily Raman Ramirez, PA-C      atorvastatin  80 mg Oral QPM Raman Ramirez, PA-JEY      heparin (porcine)  5,000 Units Subcutaneous Q8H TALIB Raman Ramirez, PA-JEY      HYDROmorphone  0.5 mg Intravenous Q4H PRN Raman Ramirez, PA-JEY      hydroxyurea  500 mg Oral Daily Raman Ramirez, PA-JEY      levofloxacin  500 mg Intravenous Q24H Raman Ramirez, PA-C 500 mg (12/22/23 0464)    metoprolol tartrate  50 mg Oral BID Raman Ramirez, SADIE      nicotine  1 patch Transdermal Daily Raman Ramirez, SADIE      ondansetron  4 mg Intravenous Q6H PRN Raman Ramirez, SADIE      oxyCODONE  5 mg Oral Q6H PRN Raman Ramirez, SADIE      sodium chloride  100 mL/hr Intravenous Continuous Mary Salazar PA-C          Today, Patient Was Seen By: Mary Salazar PA-C    ** Please Note: Dictation voice to text software may have been used in the creation of this document. **

## 2023-12-24 VITALS
BODY MASS INDEX: 24.52 KG/M2 | DIASTOLIC BLOOD PRESSURE: 78 MMHG | RESPIRATION RATE: 16 BRPM | WEIGHT: 161.82 LBS | OXYGEN SATURATION: 96 % | HEART RATE: 58 BPM | TEMPERATURE: 98 F | HEIGHT: 68 IN | SYSTOLIC BLOOD PRESSURE: 125 MMHG

## 2023-12-24 LAB
ANION GAP SERPL CALCULATED.3IONS-SCNC: 7 MMOL/L
BASOPHILS # BLD AUTO: 0.03 THOUSANDS/ÂΜL (ref 0–0.1)
BASOPHILS NFR BLD AUTO: 0 % (ref 0–1)
BUN SERPL-MCNC: 17 MG/DL (ref 5–25)
CALCIUM SERPL-MCNC: 8.3 MG/DL (ref 8.4–10.2)
CHLORIDE SERPL-SCNC: 107 MMOL/L (ref 96–108)
CO2 SERPL-SCNC: 25 MMOL/L (ref 21–32)
CREAT SERPL-MCNC: 1.07 MG/DL (ref 0.6–1.3)
EOSINOPHIL # BLD AUTO: 0.17 THOUSAND/ÂΜL (ref 0–0.61)
EOSINOPHIL NFR BLD AUTO: 2 % (ref 0–6)
ERYTHROCYTE [DISTWIDTH] IN BLOOD BY AUTOMATED COUNT: 14 % (ref 11.6–15.1)
GFR SERPL CREATININE-BSD FRML MDRD: 66 ML/MIN/1.73SQ M
GLUCOSE SERPL-MCNC: 106 MG/DL (ref 65–140)
HCT VFR BLD AUTO: 42.9 % (ref 36.5–49.3)
HGB BLD-MCNC: 15.1 G/DL (ref 12–17)
IMM GRANULOCYTES # BLD AUTO: 0.04 THOUSAND/UL (ref 0–0.2)
IMM GRANULOCYTES NFR BLD AUTO: 1 % (ref 0–2)
LYMPHOCYTES # BLD AUTO: 1.08 THOUSANDS/ÂΜL (ref 0.6–4.47)
LYMPHOCYTES NFR BLD AUTO: 13 % (ref 14–44)
MCH RBC QN AUTO: 37.5 PG (ref 26.8–34.3)
MCHC RBC AUTO-ENTMCNC: 35.2 G/DL (ref 31.4–37.4)
MCV RBC AUTO: 107 FL (ref 82–98)
MONOCYTES # BLD AUTO: 0.68 THOUSAND/ÂΜL (ref 0.17–1.22)
MONOCYTES NFR BLD AUTO: 8 % (ref 4–12)
NEUTROPHILS # BLD AUTO: 6.34 THOUSANDS/ÂΜL (ref 1.85–7.62)
NEUTS SEG NFR BLD AUTO: 76 % (ref 43–75)
NRBC BLD AUTO-RTO: 0 /100 WBCS
PLATELET # BLD AUTO: 157 THOUSANDS/UL (ref 149–390)
PMV BLD AUTO: 10.1 FL (ref 8.9–12.7)
POTASSIUM SERPL-SCNC: 3.7 MMOL/L (ref 3.5–5.3)
RBC # BLD AUTO: 4.03 MILLION/UL (ref 3.88–5.62)
SODIUM SERPL-SCNC: 139 MMOL/L (ref 135–147)
WBC # BLD AUTO: 8.34 THOUSAND/UL (ref 4.31–10.16)

## 2023-12-24 PROCEDURE — 85025 COMPLETE CBC W/AUTO DIFF WBC: CPT | Performed by: PHYSICIAN ASSISTANT

## 2023-12-24 PROCEDURE — 80048 BASIC METABOLIC PNL TOTAL CA: CPT | Performed by: PHYSICIAN ASSISTANT

## 2023-12-24 PROCEDURE — 99239 HOSP IP/OBS DSCHRG MGMT >30: CPT | Performed by: PHYSICIAN ASSISTANT

## 2023-12-24 RX ORDER — LEVOFLOXACIN 750 MG/1
750 TABLET, FILM COATED ORAL EVERY 24 HOURS
Qty: 7 TABLET | Refills: 0 | Status: SHIPPED | OUTPATIENT
Start: 2023-12-24 | End: 2023-12-24

## 2023-12-24 RX ORDER — LEVOFLOXACIN 750 MG/1
750 TABLET, FILM COATED ORAL EVERY 24 HOURS
Qty: 7 TABLET | Refills: 0 | Status: SHIPPED | OUTPATIENT
Start: 2023-12-24 | End: 2023-12-31

## 2023-12-24 RX ADMIN — ASPIRIN 81 MG: 81 TABLET, COATED ORAL at 09:20

## 2023-12-24 RX ADMIN — HYDROXYUREA 500 MG: 500 CAPSULE ORAL at 09:22

## 2023-12-24 RX ADMIN — HEPARIN SODIUM 5000 UNITS: 5000 INJECTION INTRAVENOUS; SUBCUTANEOUS at 05:00

## 2023-12-24 RX ADMIN — METOPROLOL TARTRATE 50 MG: 50 TABLET, FILM COATED ORAL at 09:20

## 2023-12-24 NOTE — NURSING NOTE
Reviewed discharge instructions and upcoming medications with patient. Discussed the importance of completing antibiotics and follow up care.

## 2023-12-24 NOTE — PLAN OF CARE
Problem: PAIN - ADULT  Goal: Verbalizes/displays adequate comfort level or baseline comfort level  Description: Interventions:  - Encourage patient to monitor pain and request assistance  - Assess pain using appropriate pain scale  - Administer analgesics based on type and severity of pain and evaluate response  - Implement non-pharmacological measures as appropriate and evaluate response  - Consider cultural and social influences on pain and pain management  - Notify physician/advanced practitioner if interventions unsuccessful or patient reports new pain  Outcome: Progressing     Problem: INFECTION - ADULT  Goal: Absence or prevention of progression during hospitalization  Description: INTERVENTIONS:  - Assess and monitor for signs and symptoms of infection  - Monitor lab/diagnostic results  - Monitor all insertion sites, i.e. indwelling lines, tubes, and drains  - Monitor endotracheal if appropriate and nasal secretions for changes in amount and color  - [place] appropriate cooling/warming therapies per order  - Administer medications as ordered  - Instruct and encourage patient and family to use good hand hygiene technique  - Identify and instruct in appropriate isolation precautions for identified infection/condition  Outcome: Progressing     Problem: SAFETY ADULT  Goal: Patient will remain free of falls  Description: INTERVENTIONS:  - Educate patient/family on patient safety including physical limitations  - Instruct patient to call for assistance with activity   - Consult OT/PT to assist with strengthening/mobility   - Keep Call bell within reach  - Keep bed low and locked with side rails adjusted as appropriate  - Keep care items and personal belongings within reach  - Initiate and maintain comfort rounds  - Make Fall Risk Sign visible to staff  - Apply yellow socks and bracelet for high fall risk patients  - Consider moving patient to room near nurses station  Outcome: Progressing  Goal: Maintain or  return to baseline ADL function  Description: INTERVENTIONS:  -  Assess patient's ability to carry out ADLs; assess patient's baseline for ADL function and identify physical deficits which impact ability to perform ADLs (bathing, care of mouth/teeth, toileting, grooming, dressing, etc.)  - Assess/evaluate cause of self-care deficits   - Assess range of motion  - Assess patient's mobility; develop plan if impaired  - Assess patient's need for assistive devices and provide as appropriate  - Encourage maximum independence but intervene and supervise when necessary  - Involve family in performance of ADLs  - Assess for home care needs following discharge   - Consider OT consult to assist with ADL evaluation and planning for discharge  - Provide patient education as appropriate  Outcome: Progressing  Goal: Maintains/Returns to pre admission functional level  Description: INTERVENTIONS:  - Perform AM-PAC 6 Click Basic Mobility/ Daily Activity assessment daily.  - Set and communicate daily mobility goal to care team and patient/family/caregiver.   - Collaborate with rehabilitation services on mobility goals if consulted  - Out of bed for toileting  - Record patient progress and toleration of activity level   Outcome: Progressing     Problem: DISCHARGE PLANNING  Goal: Discharge to home or other facility with appropriate resources  Description: INTERVENTIONS:  - Identify barriers to discharge w/patient and caregiver  - Arrange for needed discharge resources and transportation as appropriate  - Identify discharge learning needs (meds, wound care, etc.)  - Arrange for interpretive services to assist at discharge as needed  - Refer to Case Management Department for coordinating discharge planning if the patient needs post-hospital services based on physician/advanced practitioner order or complex needs related to functional status, cognitive ability, or social support system  Outcome: Progressing     Problem: Knowledge  Deficit  Goal: Patient/family/caregiver demonstrates understanding of disease process, treatment plan, medications, and discharge instructions  Description: Complete learning assessment and assess knowledge base.  Interventions:  - Provide teaching at level of understanding  - Provide teaching via preferred learning methods  Outcome: Progressing     Problem: SKIN/TISSUE INTEGRITY - ADULT  Goal: Skin Integrity remains intact(Skin Breakdown Prevention)  Description: Assess:  -Inspect skin when repositioning, toileting, and assisting with ADLS  -Assess extremities for adequate circulation and sensation     Bed Management:  -Have minimal linens on bed & keep smooth, unwrinkled  -Change linens as needed when moist or perspiring    Toileting:  -Offer bedside commode    Activity:  -Encourage activity and walks on unit  -Encourage or provide ROM exercises   -Use appropriate equipment to lift or move patient in bed    Skin Care:  -Avoid use of baby powder, tape, friction and shearing, hot water or constrictive clothing  -Do not massage red bony areas  Outcome: Progressing  Goal: Incision(s), wounds(s) or drain site(s) healing without S/S of infection  Description: INTERVENTIONS  - Assess and document dressing, incision, wound bed, drain sites and surrounding tissue  - Provide patient and family education  Outcome: Progressing     Problem: MUSCULOSKELETAL - ADULT  Goal: Maintain proper alignment of affected body part  Description: INTERVENTIONS:  - Support, maintain and protect limb and body alignment  - Provide patient/ family with appropriate education  Outcome: Progressing

## 2023-12-24 NOTE — DISCHARGE SUMMARY
"[facility]  Discharge- Santana MARTINEZ [place] 1945, 78 y.o. male MRN: [id number]  Unit/Bed#: [place] [address] -01 Encounter: [id number]  Primary Care Provider: Mina Denny DO   Date and time admitted to hospital: 12/21/2023 10:53 PM    Chronic obstructive pulmonary disease (HCC)  Assessment & Plan  No acute exacerbation  Continue albuterol prn    Stage 3b chronic kidney disease (HCC)  Assessment & Plan  Lab Results   Component Value Date    EGFR 66 12/24/2023    EGFR 51 12/23/2023    EGFR 74 12/22/2023    CREATININE 1.07 12/24/2023    CREATININE 1.32 (H) 12/23/2023    CREATININE 0.98 12/22/2023     Resolved today after IVF  Encouraged patient to continue drinking water at home after discharge, he usually drinks only Pepsi at home  Cleared for discharge    Cigarette nicotine dependence without complication  Assessment & Plan  Patient reports smoking half a pack a day  Nicotine patch supplementation  Encourage cessation    Polycythemia vera (HCC)  Assessment & Plan  POA hgb 18  Pt denies any symptoms  Continue to monitor CBC  Continue 500 mg hydroxyurea daily    Mixed hyperlipidemia  Assessment & Plan  Continue statin    CAD (coronary artery disease)  Assessment & Plan  Hx of PCI in 2008  Continue aspirin, statin therapy    Benign essential hypertension  Assessment & Plan  Continue home regimen of 10 mg amlodipine and 50 mg metoprolol bid    * Left epididymitis  Assessment & Plan  Patient with PMHx of HTN, CAD, HLD, COPD, CKD, and polycthemia vera presented to the ED secondary to continued left testicular pain, erythema, and swelling  Pt was seen by his PCP 12/20 for this issue and scrotal US was performed. US 12/20 demonstrating \"Findings of the left epididymitis, with likely secondary mild hydrocele. Incidental left epididymal head cysts.\"  Pt was placed on regimen of 100 mg doxycycline bid x 7 days starting 12/20, but patient reports no improvement so far after taking 3 doses  He denies any " "other symptoms such as fevers or chills  Scrotal US on admit demonstrating \"Stable appearance of hyperemia and hypertrophy of the left epididymis, consistent with epididymitis. Stable left hydrocele\"  WBC 11.82 on admit, now down to 8.34, not fulfilling septic criteria   UA with inummerable WBC, but no bacteria  Urine culture mixed contaminants  Supportive care, pain control  ED initiated patient on IV levaquin, switch to PO levaquin for discharge  Urology consult appreciated, will need follow up with urology in the next 3 weeks         Discharging Physician / Practitioner: Mary Salazar PA-C  PCP: Mina Denny DO  Admission Date:   Admission Orders (From admission, onward)       Ordered        12/22/23 1453  Inpatient Admission  Once            12/22/23 0202  Place in Observation  Once                          Discharge Date: 12/24/23    Medical Problems       Resolved Problems  Date Reviewed: 12/24/2023   None         Consultations During Hospital Stay:  Urology- Dr Arjun Mitchell    Procedures Performed:   none    Significant Findings / Test Results:   US showed epididymitis and stable left hydrocele  Urine culture- mixed contaminants    Incidental Findings:   none    Test Results Pending at Discharge (will require follow up):   none     Outpatient Tests Requested:  none    Complications:  none    Reason for Admission: scrotal swelling    Hospital Course:     Santana Jacinto is a 78 y.o. male patient who originally presented to the hospital on 12/21/2023 due to testicular pain/scrotal swelling. Follows with [facility] office as outpatient. Failed PO doxycycline at home prior to admission. Started on IV levaquin and improved.   Denies pain. Swelling improved. His creatinine did increase to 1.32 on 12/23. Improved with IV fluids overnight. Creat on day of admit normal at 1.07.  Cleared by urology for discharge. Needs follow up within the next 3 weeks for PVR and scrotal exam        Please see above list of " "diagnoses and related plan for additional information.     Condition at Discharge: stable     Discharge Day Visit / Exam:     Subjective:  \"I want to go home\"  Vitals: Blood Pressure: 135/76 (12/23/23 2258)  Pulse: 60 (12/23/23 1451)  Temperature: 98.8 °F (37.1 °C) (12/23/23 2258)  Temp Source: Oral (12/23/23 1451)  Respirations: 19 (12/23/23 2258)  Height: 5' 8\" (172.7 cm) (12/22/23 0300)  Weight - Scale: 73.4 kg (161 lb 13.1 oz) (12/22/23 0300)  SpO2: 95 % (12/23/23 1451)  Exam:   Physical Exam  Vitals reviewed.   Constitutional:       General: He is not in acute distress.     Appearance: He is not ill-appearing or diaphoretic.   HENT:      Head: Normocephalic and atraumatic.      Nose: Nose normal.      Mouth/Throat:      Pharynx: Oropharynx is clear.   Cardiovascular:      Rate and Rhythm: Normal rate.   Pulmonary:      Effort: Pulmonary effort is normal. No respiratory distress.   Abdominal:      General: There is no distension.      Palpations: Abdomen is soft.      Tenderness: There is no abdominal tenderness.   Musculoskeletal:         General: No deformity or signs of injury.   Skin:     General: Skin is warm and dry.      Findings: No bruising or erythema.   Neurological:      Mental Status: He is alert and oriented to person, place, and time.         Discussion with Family: discussed with wife over the phone    Discharge instructions/Information to patient and family:   See after visit summary for information provided to patient and family.      Provisions for Follow-Up Care:  See after visit summary for information related to follow-up care and any pertinent home health orders.      Disposition:     Home      Planned Readmission: no     Discharge Statement:  I spent 40 minutes discharging the patient. This time was spent on the day of discharge. I had direct contact with the patient on the day of discharge. Greater than 50% of the total time was spent examining patient, answering all patient questions, " arranging and discussing plan of care with patient as well as directly providing post-discharge instructions.  Additional time then spent on discharge activities.    Discharge Medications:  See after visit summary for reconciled discharge medications provided to patient and family.      ** Please Note: This note has been constructed using a voice recognition system **

## 2023-12-24 NOTE — ASSESSMENT & PLAN NOTE
Lab Results   Component Value Date    EGFR 66 12/24/2023    EGFR 51 12/23/2023    EGFR 74 12/22/2023    CREATININE 1.07 12/24/2023    CREATININE 1.32 (H) 12/23/2023    CREATININE 0.98 12/22/2023     Resolved today after IVF  Encouraged patient to continue drinking water at home after discharge, he usually drinks only Pepsi at home  Cleared for discharge

## 2023-12-24 NOTE — UTILIZATION REVIEW
Date: 12/23    Day 3: Has surpassed a 2nd midnight with active treatments and services, which include: creatinine up, IVF restarted. Cont to mon labs. Cont IV abx. Pain control prn. Mon voiding.  See initial review completed by ELIDA Morgan on 12/22.

## 2023-12-24 NOTE — ASSESSMENT & PLAN NOTE
"Patient with PMHx of HTN, CAD, HLD, COPD, CKD, and polycthemia vera presented to the ED secondary to continued left testicular pain, erythema, and swelling  Pt was seen by his PCP 12/20 for this issue and scrotal US was performed. US 12/20 demonstrating \"Findings of the left epididymitis, with likely secondary mild hydrocele. Incidental left epididymal head cysts.\"  Pt was placed on regimen of 100 mg doxycycline bid x 7 days starting 12/20, but patient reports no improvement so far after taking 3 doses  He denies any other symptoms such as fevers or chills  Scrotal US on admit demonstrating \"Stable appearance of hyperemia and hypertrophy of the left epididymis, consistent with epididymitis. Stable left hydrocele\"  WBC 11.82 on admit, now down to 8.34, not fulfilling septic criteria   UA with inummerable WBC, but no bacteria  Urine culture mixed contaminants  Supportive care, pain control  ED initiated patient on IV levaquin, switch to PO levaquin for discharge  Urology consult appreciated, will need follow up with urology in the next 3 weeks   "

## 2023-12-26 ENCOUNTER — TRANSITIONAL CARE MANAGEMENT (OUTPATIENT)
Dept: FAMILY MEDICINE CLINIC | Facility: CLINIC | Age: 78
End: 2023-12-26

## 2023-12-26 NOTE — TELEPHONE ENCOUNTER
Contacted and spoke with the patient who states he continues to have left testicular pain.    Patient scheduled 01/19/2024 at 1040 am with Kari NOEL at the [facility].  Directions given to the patient and is aware of office location.    Patient made aware to call the office with any questions.

## 2024-01-04 NOTE — UTILIZATION REVIEW
NOTIFICATION OF ADMISSION DISCHARGE   This is a Notification of Discharge from [facility]. Please be advised that this patient has been discharge from our facility. Below you will find the admission and discharge date and time including the patient’s disposition.   UTILIZATION REVIEW CONTACT:  Shruthi Jay  Utilization   Network Utilization Review Department  Phone: [phone] x carefully listen to the prompts. All voicemails are confidential.  Email: [email or web address]     ADMISSION INFORMATION  PRESENTATION DATE: 12/21/2023 10:53 PM  OBERVATION ADMISSION DATE:   INPATIENT ADMISSION DATE: 12/22/23  2:53 PM   DISCHARGE DATE: 12/24/2023 12:13 PM   DISPOSITION:Home/Self Care    Network Utilization Review Department  ATTENTION: Please call with any questions or concerns to [phone] and carefully listen to the prompts so that you are directed to the right person. All voicemails are confidential.   For Discharge needs, contact Care Management DC Support Team at [phone] opt. [phone]  Send all requests for admission clinical reviews, approved or denied determinations and any other requests to dedicated fax number below belonging to the campus where the patient is receiving treatment. List of dedicated fax numbers for the Facilities:  FACILITY NAME UR FAX NUMBER   ADMISSION DENIALS (Administrative/Medical Necessity) [phone]   DISCHARGE SUPPORT TEAM ([facility]) [phone]   PARENT CHILD HEALTH (Maternity/NICU/Pediatrics) [phone]   [facility] [phone]   [facility] [phone]   [facility] [phone]   [facility] [phone]   [facility] [phone]   [facility] [phone]   [facility] [phone]   [facility] [phone]    [facility] [phone]   [facility] [phone]   [facility] [phone]

## 2024-01-10 ENCOUNTER — APPOINTMENT (OUTPATIENT)
Dept: LAB | Facility: MEDICAL CENTER | Age: 79
End: 2024-01-10
Payer: COMMERCIAL

## 2024-01-10 DIAGNOSIS — D45 POLYCYTHEMIA VERA (HCC): ICD-10-CM

## 2024-01-10 DIAGNOSIS — I25.10 CORONARY ARTERY DISEASE WITHOUT ANGINA PECTORIS, UNSPECIFIED VESSEL OR LESION TYPE, UNSPECIFIED WHETHER NATIVE OR TRANSPLANTED HEART: ICD-10-CM

## 2024-01-10 DIAGNOSIS — I10 BENIGN ESSENTIAL HYPERTENSION: ICD-10-CM

## 2024-01-10 DIAGNOSIS — R73.03 PREDIABETES: ICD-10-CM

## 2024-01-10 DIAGNOSIS — Z15.89 JAK2 GENE MUTATION: ICD-10-CM

## 2024-01-10 DIAGNOSIS — R97.20 ELEVATED PSA: ICD-10-CM

## 2024-01-10 LAB
ALBUMIN SERPL BCP-MCNC: 4.3 G/DL (ref 3.5–5)
ALP SERPL-CCNC: 106 U/L (ref 34–104)
ALT SERPL W P-5'-P-CCNC: 18 U/L (ref 7–52)
ANION GAP SERPL CALCULATED.3IONS-SCNC: 13 MMOL/L
AST SERPL W P-5'-P-CCNC: 22 U/L (ref 13–39)
BASOPHILS # BLD AUTO: 0.04 THOUSANDS/ÂΜL (ref 0–0.1)
BASOPHILS NFR BLD AUTO: 1 % (ref 0–1)
BILIRUB SERPL-MCNC: 1.1 MG/DL (ref 0.2–1)
BUN SERPL-MCNC: 16 MG/DL (ref 5–25)
CALCIUM SERPL-MCNC: 9.4 MG/DL (ref 8.4–10.2)
CHLORIDE SERPL-SCNC: 105 MMOL/L (ref 96–108)
CHOLEST SERPL-MCNC: 93 MG/DL
CO2 SERPL-SCNC: 25 MMOL/L (ref 21–32)
CREAT SERPL-MCNC: 1.3 MG/DL (ref 0.6–1.3)
EOSINOPHIL # BLD AUTO: 0.3 THOUSAND/ÂΜL (ref 0–0.61)
EOSINOPHIL NFR BLD AUTO: 3 % (ref 0–6)
ERYTHROCYTE [DISTWIDTH] IN BLOOD BY AUTOMATED COUNT: 15.9 % (ref 11.6–15.1)
EST. AVERAGE GLUCOSE BLD GHB EST-MCNC: 100 MG/DL
GFR SERPL CREATININE-BSD FRML MDRD: 52 ML/MIN/1.73SQ M
GLUCOSE P FAST SERPL-MCNC: 114 MG/DL (ref 65–99)
HBA1C MFR BLD: 5.1 %
HCT VFR BLD AUTO: 50.2 % (ref 36.5–49.3)
HDLC SERPL-MCNC: 29 MG/DL
HGB BLD-MCNC: 17.1 G/DL (ref 12–17)
IMM GRANULOCYTES # BLD AUTO: 0.04 THOUSAND/UL (ref 0–0.2)
IMM GRANULOCYTES NFR BLD AUTO: 1 % (ref 0–2)
LDLC SERPL CALC-MCNC: 46 MG/DL (ref 0–100)
LYMPHOCYTES # BLD AUTO: 1.37 THOUSANDS/ÂΜL (ref 0.6–4.47)
LYMPHOCYTES NFR BLD AUTO: 16 % (ref 14–44)
MCH RBC QN AUTO: 37.5 PG (ref 26.8–34.3)
MCHC RBC AUTO-ENTMCNC: 34.1 G/DL (ref 31.4–37.4)
MCV RBC AUTO: 110 FL (ref 82–98)
MONOCYTES # BLD AUTO: 0.63 THOUSAND/ÂΜL (ref 0.17–1.22)
MONOCYTES NFR BLD AUTO: 7 % (ref 4–12)
NEUTROPHILS # BLD AUTO: 6.35 THOUSANDS/ÂΜL (ref 1.85–7.62)
NEUTS SEG NFR BLD AUTO: 72 % (ref 43–75)
NRBC BLD AUTO-RTO: 0 /100 WBCS
PLATELET # BLD AUTO: 150 THOUSANDS/UL (ref 149–390)
PMV BLD AUTO: 10.6 FL (ref 8.9–12.7)
POTASSIUM SERPL-SCNC: 3.9 MMOL/L (ref 3.5–5.3)
PROT SERPL-MCNC: 6.9 G/DL (ref 6.4–8.4)
PSA SERPL-MCNC: 0.36 NG/ML (ref 0–4)
RBC # BLD AUTO: 4.56 MILLION/UL (ref 3.88–5.62)
SODIUM SERPL-SCNC: 143 MMOL/L (ref 135–147)
TRIGL SERPL-MCNC: 91 MG/DL
TSH SERPL DL<=0.05 MIU/L-ACNC: 1.29 UIU/ML (ref 0.45–4.5)
WBC # BLD AUTO: 8.73 THOUSAND/UL (ref 4.31–10.16)

## 2024-01-10 PROCEDURE — 83036 HEMOGLOBIN GLYCOSYLATED A1C: CPT

## 2024-01-10 PROCEDURE — 80061 LIPID PANEL: CPT

## 2024-01-10 PROCEDURE — 84153 ASSAY OF PSA TOTAL: CPT

## 2024-01-10 PROCEDURE — 80053 COMPREHEN METABOLIC PANEL: CPT

## 2024-01-10 PROCEDURE — 36415 COLL VENOUS BLD VENIPUNCTURE: CPT

## 2024-01-10 PROCEDURE — 84443 ASSAY THYROID STIM HORMONE: CPT

## 2024-01-12 ENCOUNTER — TELEPHONE (OUTPATIENT)
Age: 79
End: 2024-01-12

## 2024-01-12 NOTE — TELEPHONE ENCOUNTER
Clover from [employer] called to confirm if the patient is scheduled for TCM did check and confirmed no to her. Thanks.

## 2024-01-17 ENCOUNTER — OFFICE VISIT (OUTPATIENT)
Dept: HEMATOLOGY ONCOLOGY | Facility: CLINIC | Age: 79
End: 2024-01-17
Payer: COMMERCIAL

## 2024-01-17 VITALS
TEMPERATURE: 97.4 F | SYSTOLIC BLOOD PRESSURE: 126 MMHG | WEIGHT: 167 LBS | BODY MASS INDEX: 25.31 KG/M2 | HEART RATE: 60 BPM | DIASTOLIC BLOOD PRESSURE: 64 MMHG | OXYGEN SATURATION: 99 % | RESPIRATION RATE: 16 BRPM | HEIGHT: 68 IN

## 2024-01-17 DIAGNOSIS — Z15.89 JAK2 GENE MUTATION: Primary | ICD-10-CM

## 2024-01-17 DIAGNOSIS — N18.32 CHRONIC KIDNEY DISEASE, STAGE 3B (HCC): ICD-10-CM

## 2024-01-17 DIAGNOSIS — Z51.11 ENCOUNTER FOR ANTINEOPLASTIC CHEMOTHERAPY: ICD-10-CM

## 2024-01-17 DIAGNOSIS — D45 POLYCYTHEMIA VERA (HCC): ICD-10-CM

## 2024-01-17 PROCEDURE — 99214 OFFICE O/P EST MOD 30 MIN: CPT | Performed by: PHYSICIAN ASSISTANT

## 2024-01-17 RX ORDER — HYDROXYUREA 500 MG/1
500 CAPSULE ORAL DAILY
Qty: 90 CAPSULE | Refills: 2 | Status: SHIPPED | OUTPATIENT
Start: 2024-01-17

## 2024-01-17 NOTE — PROGRESS NOTES
Hematology/Oncology Outpatient Follow-up  Santana Jacinto 78 y.o. male 1945 [id number]    Date:  1/17/2024      Assessment and Plan:  1. Polycythemia vera (HCC) 2. JAK2 gene mutation  78-year-old male presents for follow-up visit regarding history of JAK2 mutation positive polycythemia vera.  He also has an every day smoker smoking approximately half a pack per day.     Hydrea is maintaining his platelet count and hematocrit quite well except for he continues to have secondary erythrocytosis in addition secondary to smoking. He states he will not ever quit smoking.     Continue CBC monitoring monthly.  Therapeutic phlebotomy for hematocrit greater than 45%.    Follow up in 6 months.     - hydroxyurea (HYDREA) 500 mg capsule; Take 1 capsule (500 mg total) by mouth daily  Dispense: 90 capsule; Refill: 2  - CBC and differential; Standing  - CBC and differential    3. Encounter for antineoplastic chemotherapy  - hydroxyurea (HYDREA) 500 mg capsule; Take 1 capsule (500 mg total) by mouth daily  Dispense: 90 capsule; Refill: 2    4. Chronic kidney disease, stage 3b (HCC)  F/u PCP    HPI:  78-year-old male presents for follow-up visit regarding history of JAK2 mutation positive polycythemia vera diagnosed in 2017.  He was initially receiving phlebotomy as well as baby aspirin.  Since 2021 he has been on Hydrea.  Previously he was on 1000 mg daily at 1 time and developed significant anemia resulting in hospitalization.  Hydrea was then temporarily held and restarted at 500 mg 3 times per week.  He underwent bone marrow biopsy for the first time in October 2022 which showed a minute involvement of MGUS as well as minute involvement of CLL population.     Most recently he has been taking Hydrea 500 mg 1 tablet daily.     He is a daily smoker smoking approximately half pack per day. Smoking since     Interval history: Was admitted in December for epididymitis.  During hospitalization hematocrit was normal.    ROS: Review of  "Systems   Constitutional:  Negative for activity change, appetite change, chills, fatigue, fever and unexpected weight change.   Respiratory:  Negative for cough and shortness of breath.    Cardiovascular:  Negative for chest pain, palpitations and leg swelling.   Gastrointestinal:  Negative for abdominal pain, constipation, diarrhea, nausea and vomiting.   Genitourinary:  Negative for difficulty urinating, dysuria and hematuria.   Musculoskeletal:  Negative for arthralgias.   Skin: Negative.    Neurological:  Negative for dizziness, weakness, light-headedness, numbness and headaches.   Hematological: Negative.    Psychiatric/Behavioral: Negative.       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\"    Full dentures     but doesnt wear them    History of 2019 novel coronavirus disease (COVID-19) 02/2021    hospital for 2 days but not in ICU/\"mainly dehydrated\" \"also fever/oxygen below 90\"    History of coronary artery stent placement     x2 in 2008 or so; sees [facility]-- Dr Hutton cardio    [place]     no hearing aids yet    Hyperlipidemia     Last Assessed:  11/24/17    Hypertension     Benign essential, Last Assessed:  11/24/17    LBBB (left bundle branch block)     Nocturia     Polycythemia vera (HCC)     (per history in chart)    PVD (peripheral vascular disease) (HCC)     RBC abnormality     pt reports told has high Red blood cells/goes to infusion center regularly next appt 10/8/21 hematologist is Dr Charlton of [facility](had been Dr Ahn)    Seasickness     Shortness of breath     \"if goes up steps gets SOB, had for awhile\"    " "Slow urinary stream     \"sometimes feels like bladder isnt all the way empty\"    Snores     Wears glasses     reading       Past Surgical History:   Procedure Laterality Date    ANGIOPLASTY      APPENDECTOMY      CARDIAC CATHETERIZATION N/A 07/28/2022    Procedure: CARDIAC TEMPORARY PACEMAKER;  Surgeon: Fransico Morgan MD;  Location: [facility];  Service: Cardiology    CARDIAC ELECTROPHYSIOLOGY PROCEDURE N/A 07/29/2022    Procedure: Cardiac pacer implant;  Surgeon: Anand Valero MD;  Location: [facility];  Service: Cardiology    CATARACT EXTRACTION Bilateral     CT CYSTOGRAM  11/04/2021    ELBOW SURGERY Right     FL CYSTOGRAM  10/27/2021    IR BIOPSY BONE MARROW  10/14/2022    KNEE ARTHROSCOPY Right     DE COLONOSCOPY FLX DX W/COLLJ SPEC WHEN PFRMD N/A 05/22/2017    Procedure: COLONOSCOPY;  Surgeon: Josseline Zhou DO;  Location: [facility];  Service: Gastroenterology    DE NEUROPLASTY &/TRANSPOS MEDIAN NRV CARPAL TUNNE Right 5/26/2023    Procedure: CTR, RIGHT;  Surgeon: Cosme Varela MD;  Location: [facility];  Service: Orthopedics    DE TENDON SHEATH INCISION Right 5/26/2023    Procedure: RELEASE TRIGGER FINGER, RIGHT RING FINGER;  Surgeon: Cosme Varela MD;  Location[facility];  Service: Orthopedics    PROSTATE BIOPSY      PROSTATECTOMY N/A 10/18/2021    Procedure: ROBOTIC SUBTOTAL/SUPRAPUBIC PROSTATECTOMY;  Surgeon: Kieran Vega MD;  Location: [facility];  Service: Urology    SHOULDER ARTHROSCOPY Right     TONSILLECTOMY         Social History     Socioeconomic History    Marital status: /Civil Union     Spouse name: None    Number of children: None    Years of education: None    Highest education level: None   Occupational History    None   Tobacco Use    Smoking status: Every Day     Current packs/day: 0.50     Average packs/day: 0.5 packs/day for 52.0 years (26.0 ttl pk-yrs)     Types: Cigarettes     Start date: 1972     Passive exposure: Current    Smokeless tobacco: Never    Tobacco " "comments:     a little less than 1/2ppd, trying to cut back before surgery last smoked 5.25.23   Vaping Use    Vaping status: Never Used   Substance and Sexual Activity    Alcohol use: Never    Drug use: No    Sexual activity: Not Currently     Comment: defer   Other Topics Concern    None   Social History Narrative    Always uses seatbelt    Feels safe at home    Uses safety equipment     Social Determinants of Health     Financial Resource Strain: Not on file   Food Insecurity: No Food Insecurity (12/22/2023)    Hunger Vital Sign     Worried About Running Out of Food in the Last Year: Never true     Ran Out of Food in the Last Year: Never true   Transportation Needs: No Transportation Needs (12/22/2023)    PRAPARE - Transportation     Lack of Transportation (Medical): No     Lack of Transportation (Non-Medical): No   Physical Activity: Not on file   Stress: Not on file   Social Connections: Not on file   Intimate Partner Violence: Not on file   Housing Stability: Unknown (12/22/2023)    Housing Stability Vital Sign     Unable to Pay for Housing in the Last Year: No     Number of Places Lived in the Last Year: Not on file     Unstable Housing in the Last Year: No       Family History   Problem Relation Age of Onset    No Known Problems Mother     Heart disease Father        Allergies   Allergen Reactions    Other Other (See Comments)     Pt and wife reports took a medication years ago at work cant recall the name or what it was for\"     \"couldn't talk and cheeks swelled and also right hand\"         Current Outpatient Medications:     acetaminophen (TYLENOL) 500 mg tablet, Take 500 mg by mouth every 6 (six) hours as needed for mild pain, Disp: , Rfl:     albuterol (Ventolin HFA) 90 mcg/act inhaler, Inhale 2 puffs every 4 (four) hours as needed for wheezing, Disp: 18 g, Rfl: 1    amLODIPine (NORVASC) 10 mg tablet, Take 10 mg by mouth every evening , Disp: , Rfl:     aspirin (ECOTRIN LOW STRENGTH) 81 mg EC tablet, " "Take 81 mg by mouth daily, Disp: , Rfl:     atorvastatin (LIPITOR) 80 mg tablet, TAKE 1 TABLET BY MOUTH EVERY DAY IN THE EVENING, Disp: 90 tablet, Rfl: 1    hydroxyurea (HYDREA) 500 mg capsule, TAKE 1 CAPSULE (500 MG TOTAL) BY MOUTH DAILY, Disp: 90 capsule, Rfl: 1    metoprolol tartrate (LOPRESSOR) 50 mg tablet, Take 50 mg by mouth 2 (two) times a day , Disp: , Rfl:     Multiple Vitamins-Minerals (VITAMIN D3 COMPLETE PO), Take 1 capsule by mouth daily, Disp: , Rfl:     nitroglycerin (NITROSTAT) 0.4 mg SL tablet, Place 1 tablet (0.4 mg total) under the tongue every 5 (five) minutes as needed for chest pain, Disp: 10 tablet, Rfl: 1    Omega-3 Fatty Acids (FISH OIL) 1200 MG CAPS, Take 1,200 mg by mouth 2 (two) times a day  , Disp: , Rfl:       Physical Exam:  /64 (BP Location: Left arm, Patient Position: Sitting, Cuff Size: Adult)   Pulse 60   Temp (!) 97.4 °F (36.3 °C) (Temporal)   Resp 16   Ht 5' 8\" (1.727 m)   Wt 75.8 kg (167 lb)   SpO2 99%   BMI 25.39 kg/m²     Physical Exam  Vitals reviewed.   Constitutional:       General: He is not in acute distress.     Appearance: He is well-developed. He is not ill-appearing.   HENT:      Head: Normocephalic and atraumatic.   Eyes:      General: No scleral icterus.     Conjunctiva/sclera: Conjunctivae normal.   Cardiovascular:      Rate and Rhythm: Normal rate and regular rhythm.      Heart sounds: Normal heart sounds. No murmur heard.  Pulmonary:      Effort: Pulmonary effort is normal. No respiratory distress.      Breath sounds: Normal breath sounds.   Abdominal:      Palpations: Abdomen is soft.      Tenderness: There is no abdominal tenderness.   Musculoskeletal:         General: No tenderness. Normal range of motion.      Cervical back: Normal range of motion and neck supple.      Right lower leg: No edema.      Left lower leg: No edema.   Lymphadenopathy:      Cervical: No cervical adenopathy.   Skin:     General: Skin is warm and dry.   Neurological:      " Mental Status: He is alert and oriented to person, place, and time.      Cranial Nerves: No cranial nerve deficit.   Psychiatric:         Mood and Affect: Mood normal.         Behavior: Behavior normal.       Labs:  Lab Results   Component Value Date    WBC 8.73 01/10/2024    HGB 17.1 (H) 01/10/2024    HCT 50.2 (H) 01/10/2024     (H) 01/10/2024     01/10/2024     I have spent 30 minutes with Patient  today in which greater than 50% of this time was spent in counseling/coordination of care regarding Diagnostic results, Risks and benefits of tx options, Instructions for management, Impressions, Documenting in the medical record, Reviewing / ordering tests, medicine, procedures  , and Obtaining or reviewing history  .    Patient voiced understanding and agreement in the above discussion. Aware to contact our office with questions/symptoms in the interim.     This note has been generated by voice recognition software system.  Therefore, there may be spelling, grammar, and or syntax errors. Please contact if questions arise.

## 2024-01-19 ENCOUNTER — OFFICE VISIT (OUTPATIENT)
Dept: UROLOGY | Facility: MEDICAL CENTER | Age: 79
End: 2024-01-19
Payer: COMMERCIAL

## 2024-01-19 ENCOUNTER — RA CDI HCC (OUTPATIENT)
Dept: OTHER | Facility: HOSPITAL | Age: 79
End: 2024-01-19

## 2024-01-19 VITALS
OXYGEN SATURATION: 97 % | HEART RATE: 60 BPM | WEIGHT: 166 LBS | HEIGHT: 68 IN | BODY MASS INDEX: 25.16 KG/M2 | DIASTOLIC BLOOD PRESSURE: 80 MMHG | SYSTOLIC BLOOD PRESSURE: 128 MMHG

## 2024-01-19 DIAGNOSIS — N99.115 POSTPROCEDURAL FOSSA NAVICULARIS URETHRAL STRICTURE: ICD-10-CM

## 2024-01-19 DIAGNOSIS — N40.1 BPH WITH OBSTRUCTION/LOWER URINARY TRACT SYMPTOMS: Primary | ICD-10-CM

## 2024-01-19 DIAGNOSIS — N13.8 BPH WITH OBSTRUCTION/LOWER URINARY TRACT SYMPTOMS: Primary | ICD-10-CM

## 2024-01-19 LAB
POST-VOID RESIDUAL VOLUME, ML POC: 10 ML
SL AMB  POCT GLUCOSE, UA: ABNORMAL
SL AMB LEUKOCYTE ESTERASE,UA: ABNORMAL
SL AMB POCT BILIRUBIN,UA: ABNORMAL
SL AMB POCT BLOOD,UA: ABNORMAL
SL AMB POCT CLARITY,UA: CLEAR
SL AMB POCT COLOR,UA: ABNORMAL
SL AMB POCT KETONES,UA: ABNORMAL
SL AMB POCT NITRITE,UA: ABNORMAL
SL AMB POCT PH,UA: 5
SL AMB POCT SPECIFIC GRAVITY,UA: 1.03
SL AMB POCT URINE PROTEIN: ABNORMAL
SL AMB POCT UROBILINOGEN: 0.2

## 2024-01-19 PROCEDURE — 99213 OFFICE O/P EST LOW 20 MIN: CPT | Performed by: PHYSICIAN ASSISTANT

## 2024-01-19 PROCEDURE — 81003 URINALYSIS AUTO W/O SCOPE: CPT | Performed by: PHYSICIAN ASSISTANT

## 2024-01-19 PROCEDURE — 51798 US URINE CAPACITY MEASURE: CPT | Performed by: PHYSICIAN ASSISTANT

## 2024-01-19 NOTE — PROGRESS NOTES
"1/19/2024      Chief Complaint   Patient presents with    Benign Prostatic Hypertrophy     Follow up to ED visit         Assessment and Plan    78 y.o. male managed by Dr Vega    1. BPH- simple prostatectomy, no cancer; 2021; continue proscar 5mg daily indefinitely  2. Epididymitis- completed levaquin few weeks ago, improved    PVR 10ml no voiding issues epididymitis is at least 90% healed. no pain redness swelling. small area of induration is healing he feels great    return in 1 yr for his annual        History of Present Illness  Santana Jacinto is a 78 y.o. male here for evaluation of hospital followup had epididymitis.    simple prostatectomy 121g of benign tissue in October 2021, had postop fossa navicularis the year later, dilated in office with cystoscope. Has not needed dilation again since. remains on proscar to prevent regrowth or bleeding. Postop PSAs are nice and low since surgery, at 0.2-0.3.        Review of Systems   Constitutional: Negative.    Respiratory: Negative.     Cardiovascular: Negative.    Genitourinary:  Negative for decreased urine volume, difficulty urinating, dysuria, flank pain, frequency, hematuria and urgency.   Musculoskeletal: Negative.                 Vitals  Vitals:    01/19/24 1046   BP: 128/80   BP Location: Left arm   Patient Position: Sitting   Cuff Size: Standard   Pulse: 60   SpO2: 97%   Weight: 75.3 kg (166 lb)   Height: 5' 8\" (1.727 m)       Physical Exam  Vitals and nursing note reviewed.   Constitutional:       General: He is not in acute distress.     Appearance: Normal appearance. He is well-developed. He is not diaphoretic.   HENT:      Head: Normocephalic and atraumatic.   Pulmonary:      Effort: Pulmonary effort is normal.      Comments: No cough or audible wheeze  Abdominal:      General: There is no distension.      Tenderness: There is no abdominal tenderness. There is no right CVA tenderness or left CVA tenderness.   Musculoskeletal:      Right lower leg: No " "edema.      Left lower leg: No edema.   Skin:     General: Skin is warm and dry.   Neurological:      Mental Status: He is alert and oriented to person, place, and time.      Gait: Gait normal.   Psychiatric:         Speech: Speech normal.         Behavior: Behavior normal.           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\"    Full dentures     but doesnt wear them    History of 2019 novel coronavirus disease (COVID-19) 02/2021    hospital for 2 days but not in ICU/\"mainly dehydrated\" \"also fever/oxygen below 90\"    History of coronary artery stent placement     x2 in 2008 or so; sees [facility]-- Dr Hutton cardio    [place] (hard of hearing)     no hearing aids yet    Hyperlipidemia     Last Assessed:  11/24/17    Hypertension     Benign essential, Last Assessed:  11/24/17    LBBB (left bundle branch block)     Nocturia     Polycythemia vera (HCC)     (per history in chart)    PVD (peripheral vascular disease) (HCC)     RBC abnormality     pt reports told has high Red blood cells/goes to infusion center regularly next appt 10/8/21 hematologist is Dr [facility]ton of [facility](had been Dr Ahn)    Seasickness     Shortness of breath     \"if goes up steps gets SOB, had for awhile\"    Slow urinary stream     \"sometimes feels like bladder isnt all the way empty\"    Snores     Wears glasses     reading     Social History     Socioeconomic History    Marital status: /Civil Union     Spouse name: Not on file    Number of children: Not on file    Years of education: Not on file    Highest education level: Not on file   Occupational History "    Not on file   Tobacco Use    Smoking status: Every Day     Current packs/day: 0.50     Average packs/day: 0.5 packs/day for 52.0 years (26.0 ttl pk-yrs)     Types: Cigarettes     Start date: 1972     Passive exposure: Current    Smokeless tobacco: Never    Tobacco comments:     a little less than 1/2ppd, trying to cut back before surgery last smoked 5.25.23   Vaping Use    Vaping status: Never Used   Substance and Sexual Activity    Alcohol use: Never    Drug use: No    Sexual activity: Not Currently     Comment: defer   Other Topics Concern    Not on file   Social History Narrative    Always uses seatbelt    Feels safe at home    Uses safety equipment     Social Determinants of Health     Financial Resource Strain: Not on file   Food Insecurity: No Food Insecurity (12/22/2023)    Hunger Vital Sign     Worried About Running Out of Food in the Last Year: Never true     Ran Out of Food in the Last Year: Never true   Transportation Needs: No Transportation Needs (12/22/2023)    PRAPARE - Transportation     Lack of Transportation (Medical): No     Lack of Transportation (Non-Medical): No   Physical Activity: Not on file   Stress: Not on file   Social Connections: Not on file   Intimate Partner Violence: Not on file   Housing Stability: Unknown (12/22/2023)    Housing Stability Vital Sign     Unable to Pay for Housing in the Last Year: No     Number of Places Lived in the Last Year: Not on file     Unstable Housing in the Last Year: No     Social History     Tobacco Use   Smoking Status Every Day    Current packs/day: 0.50    Average packs/day: 0.5 packs/day for 52.0 years (26.0 ttl pk-yrs)    Types: Cigarettes    Start date: 1972    Passive exposure: Current   Smokeless Tobacco Never   Tobacco Comments    a little less than 1/2ppd, trying to cut back before surgery last smoked 5.25.23     Family History   Problem Relation Age of Onset    No Known Problems Mother     Heart disease Father        The following portions  of the patient's history were reviewed and updated as appropriate: allergies, current medications, past medical history, past social history, past surgical history and problem list.    Results  No results found for this or any previous visit (from the past 1 hour(s)).  ]  Lab Results   Component Value Date    PSA 0.36 01/10/2024    PSA 0.2 02/08/2023    PSA 17.7 (H) 07/29/2021    PSA 17.2 (H) 11/27/2020     Lab Results   Component Value Date    CALCIUM 9.4 01/10/2024     11/13/2017    K 3.9 01/10/2024    CO2 25 01/10/2024     01/10/2024    BUN 16 01/10/2024    CREATININE 1.30 01/10/2024     Lab Results   Component Value Date    WBC 8.73 01/10/2024    HGB 17.1 (H) 01/10/2024    HCT 50.2 (H) 01/10/2024     (H) 01/10/2024     01/10/2024

## 2024-01-22 ENCOUNTER — OFFICE VISIT (OUTPATIENT)
Dept: FAMILY MEDICINE CLINIC | Facility: CLINIC | Age: 79
End: 2024-01-22
Payer: COMMERCIAL

## 2024-01-22 VITALS
DIASTOLIC BLOOD PRESSURE: 70 MMHG | TEMPERATURE: 98.2 F | OXYGEN SATURATION: 99 % | WEIGHT: 171.8 LBS | SYSTOLIC BLOOD PRESSURE: 138 MMHG | BODY MASS INDEX: 26.12 KG/M2 | HEART RATE: 60 BPM

## 2024-01-22 DIAGNOSIS — N40.0 BENIGN PROSTATIC HYPERPLASIA WITHOUT LOWER URINARY TRACT SYMPTOMS: ICD-10-CM

## 2024-01-22 DIAGNOSIS — I25.10 CORONARY ARTERY DISEASE WITHOUT ANGINA PECTORIS, UNSPECIFIED VESSEL OR LESION TYPE, UNSPECIFIED WHETHER NATIVE OR TRANSPLANTED HEART: ICD-10-CM

## 2024-01-22 DIAGNOSIS — D45 POLYCYTHEMIA VERA (HCC): ICD-10-CM

## 2024-01-22 DIAGNOSIS — R73.03 PREDIABETES: ICD-10-CM

## 2024-01-22 DIAGNOSIS — F17.210 CIGARETTE NICOTINE DEPENDENCE WITHOUT COMPLICATION: ICD-10-CM

## 2024-01-22 DIAGNOSIS — E78.2 MIXED HYPERLIPIDEMIA: ICD-10-CM

## 2024-01-22 DIAGNOSIS — R10.11 RIGHT UPPER QUADRANT ABDOMINAL PAIN: ICD-10-CM

## 2024-01-22 DIAGNOSIS — Z23 ENCOUNTER FOR IMMUNIZATION: ICD-10-CM

## 2024-01-22 DIAGNOSIS — I10 BENIGN ESSENTIAL HYPERTENSION: Primary | ICD-10-CM

## 2024-01-22 DIAGNOSIS — J44.9 CHRONIC OBSTRUCTIVE PULMONARY DISEASE, UNSPECIFIED COPD TYPE (HCC): ICD-10-CM

## 2024-01-22 PROCEDURE — 90677 PCV20 VACCINE IM: CPT

## 2024-01-22 PROCEDURE — G0009 ADMIN PNEUMOCOCCAL VACCINE: HCPCS

## 2024-01-22 PROCEDURE — 99214 OFFICE O/P EST MOD 30 MIN: CPT | Performed by: FAMILY MEDICINE

## 2024-01-22 NOTE — ASSESSMENT & PLAN NOTE
Hemoglobin from January 10 was 17.1, hematocrit 50.2.  Patient still being followed by hematologist.  Being scheduled for phlebotomy in near future

## 2024-01-22 NOTE — ASSESSMENT & PLAN NOTE
Patient still smoking half pack per day.  Greater than 30-pack-year history.  Counseled again today.  Current with LDCT chest screening (last CT November 3, 2023).

## 2024-01-22 NOTE — ASSESSMENT & PLAN NOTE
Still smoking half pack per day.  Counseled again.  Continue albuterol as needed.  Patient had CT chest November 3, 2023 which was essentially negative.  Will repeat LDCT November 2024

## 2024-01-22 NOTE — PROGRESS NOTES
Name: Santana Jacinto      : 1945      MRN: [id number]  Encounter Provider: Mina Denny DO  Encounter Date: 2024   Encounter department: [facility]    Assessment & Plan     1. Benign essential hypertension  Assessment & Plan:  Blood pressure reasonably controlled on amlodipine 10 and metoprolol 50 twice daily      2. Mixed hyperlipidemia  Assessment & Plan:  Cholesterol from January 10 was 93.  Doing well on atorvastatin 80 mg daily.  Continue follow-up with cardiology      3. Prediabetes  Assessment & Plan:  A1c stable at 5.1%.  Will continue to monitor    Orders:  -     Comprehensive metabolic panel; Future; Expected date: 2024  -     Hemoglobin A1C; Future; Expected date: 2024    4. Coronary artery disease without angina pectoris, unspecified vessel or lesion type, unspecified whether native or transplanted heart  Assessment & Plan:  Status post two-vessel stent over 10 years ago.  Patient had pacemaker implanted .  Patient denies any chest pain.  Does admit to occasional shortness of breath.  Continue follow-up with cardiologist as directed      5. Polycythemia vera (HCC)  Assessment & Plan:  Hemoglobin from January 10 was 17.1, hematocrit 50.2.  Patient still being followed by hematologist.  Being scheduled for phlebotomy in near future    Orders:  -     CBC and differential; Future; Expected date: 2024    6. Cigarette nicotine dependence without complication  Assessment & Plan:  Patient still smoking half pack per day.  Greater than 30-pack-year history.  Counseled again today.  Current with LDCT chest screening (last CT November 3, 2023).      7. Chronic obstructive pulmonary disease, unspecified COPD type (HCC)  Assessment & Plan:  Still smoking half pack per day.  Counseled again.  Continue albuterol as needed.  Patient had CT chest November 3, 2023 which was essentially negative.  Will repeat LDCT 2024      8. Benign prostatic hyperplasia  "without lower urinary tract symptoms  Assessment & Plan:  Patient recent admission for epididymitis and BPH.  Currently stable on finasteride 5 mg daily.  Continue follow-up with urology as directed      9. Encounter for immunization  -     Pneumococcal Conjugate Vaccine 20-valent (Pcv20)    10. Right upper quadrant abdominal pain  Assessment & Plan:  Patient has been complaining of right upper quadrant abdominal pain.  History of cholelithiasis.  Will send for ultrasound abdomen.  Possible referral to general surgeon    Orders:  -     US abdomen limited; Future; Expected date: 01/22/2024         Patient has historically declined all age-appropriate vaccinations.  His daughter has convinced him to get Prevnar 20 today.  He will also consider Shingrix at some point in the near future.  He knows he needs to go to the pharmacy for this.    Lab results from January 10 reviewed    6 months, CBC, CMP, A1c prior  Subjective     Patient presents today for recheck chronic medical problems.  He has been complaining of right upper quadrant abdominal pain lately.  Otherwise doing pretty well.  Compliant with prescribed medications.  He had labs done January 10      Review of Systems   Respiratory: Negative.     Cardiovascular: Negative.    Gastrointestinal:  Positive for abdominal pain.   Genitourinary: Negative.        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\"   • Full dentures     but doesnt wear them   • History of 2019 novel coronavirus disease (COVID-19) 02/2021    hospital for 2 days but not in ICU/\"mainly dehydrated\" \"also fever/oxygen below 90\"   • History of " "coronary artery stent placement     x2 in 2008 or so; sees [facility]-- Dr Hutton cardio   • [place] (hard of hearing)     no hearing aids yet   • Hyperlipidemia     Last Assessed:  11/24/17   • Hypertension     Benign essential, Last Assessed:  11/24/17   • LBBB (left bundle branch block)    • Nocturia    • Polycythemia vera (HCC)     (per history in chart)   • PVD (peripheral vascular disease) (HCC)    • RBC abnormality     pt reports told has high Red blood cells/goes to infusion center regularly next appt 10/8/21 hematologist is Dr Charlton of [facility](had been Dr Ahn)   • Seasickness    • Shortness of breath     \"if goes up steps gets SOB, had for awhile\"   • Slow urinary stream     \"sometimes feels like bladder isnt all the way empty\"   • Snores    • Wears glasses     reading     Past Surgical History:   Procedure Laterality Date   • ANGIOPLASTY     • APPENDECTOMY     • CARDIAC CATHETERIZATION N/A 07/28/2022    Procedure: CARDIAC TEMPORARY PACEMAKER;  Surgeon: Fransico Morgan MD;  [facility]: [facility];  Service: Cardiology   • CARDIAC ELECTROPHYSIOLOGY PROCEDURE N/A 07/29/2022    Procedure: Cardiac pacer implant;  Surgeon: Anand Valero MD;  Location: [facility];  Service: Cardiology   • CATARACT EXTRACTION Bilateral    • CT CYSTOGRAM  11/04/2021   • ELBOW SURGERY Right    • FL CYSTOGRAM  10/27/2021   • IR BIOPSY BONE MARROW  10/14/2022   • KNEE ARTHROSCOPY Right    • TX COLONOSCOPY FLX DX W/COLLJ SPEC WHEN PFRMD N/A 05/22/2017    Procedure: COLONOSCOPY;  Surgeon: Josseline Zhou DO;  Location: [facility];  Service: Gastroenterology   • TX NEUROPLASTY &/TRANSPOS MEDIAN NRV CARPAL TUNNE Right 5/26/2023    Procedure: CTR, RIGHT;  Surgeon: Cosme Varela MD;  Location: [facility];  Service: Orthopedics   • TX TENDON SHEATH INCISION Right 5/26/2023    Procedure: RELEASE TRIGGER FINGER, RIGHT RING FINGER;  Surgeon: Cosme Varela MD;  Location[facility];  Service: Orthopedics   • PROSTATE " BIOPSY     • PROSTATECTOMY N/A 10/18/2021    Procedure: ROBOTIC SUBTOTAL/SUPRAPUBIC PROSTATECTOMY;  Surgeon: Kieran Vega MD;  Location: [facility];  Service: Urology   • SHOULDER ARTHROSCOPY Right    • TONSILLECTOMY       Family History   Problem Relation Age of Onset   • No Known Problems Mother    • Heart disease Father      Social History     Socioeconomic History   • Marital status: /Civil Union     Spouse name: None   • Number of children: None   • Years of education: None   • Highest education level: None   Occupational History   • None   Tobacco Use   • Smoking status: Every Day     Current packs/day: 0.50     Average packs/day: 0.5 packs/day for 52.1 years (26.0 ttl pk-yrs)     Types: Cigarettes     Start date: 1972     Passive exposure: Current   • Smokeless tobacco: Never   • Tobacco comments:     a little less than 1/2ppd, trying to cut back before surgery last smoked 5.25.23   Vaping Use   • Vaping status: Never Used   Substance and Sexual Activity   • Alcohol use: Never   • Drug use: No   • Sexual activity: Not Currently     Comment: defer   Other Topics Concern   • None   Social History Narrative    Always uses seatbelt    Feels safe at home    Uses safety equipment     Social Determinants of Health     Financial Resource Strain: Not on file   Food Insecurity: No Food Insecurity (12/22/2023)    Hunger Vital Sign    • Worried About Running Out of Food in the Last Year: Never true    • Ran Out of Food in the Last Year: Never true   Transportation Needs: No Transportation Needs (12/22/2023)    PRAPARE - Transportation    • Lack of Transportation (Medical): No    • Lack of Transportation (Non-Medical): No   Physical Activity: Not on file   Stress: Not on file   Social Connections: Not on file   Intimate Partner Violence: Not on file   Housing Stability: Unknown (12/22/2023)    Housing Stability Vital Sign    • Unable to Pay for Housing in the Last Year: No    • Number of Places Lived in the  "Last Year: Not on file    • Unstable Housing in the Last Year: No     Current Outpatient Medications on File Prior to Visit   Medication Sig   • acetaminophen (TYLENOL) 500 mg tablet Take 500 mg by mouth every 6 (six) hours as needed for mild pain   • albuterol (Ventolin HFA) 90 mcg/act inhaler Inhale 2 puffs every 4 (four) hours as needed for wheezing (Patient not taking: Reported on 1/19/2024)   • amLODIPine (NORVASC) 10 mg tablet Take 10 mg by mouth every evening    • aspirin (ECOTRIN LOW STRENGTH) 81 mg EC tablet Take 81 mg by mouth daily   • atorvastatin (LIPITOR) 80 mg tablet TAKE 1 TABLET BY MOUTH EVERY DAY IN THE EVENING   • hydroxyurea (HYDREA) 500 mg capsule Take 1 capsule (500 mg total) by mouth daily   • metoprolol tartrate (LOPRESSOR) 50 mg tablet Take 50 mg by mouth 2 (two) times a day    • Multiple Vitamins-Minerals (VITAMIN D3 COMPLETE PO) Take 1 capsule by mouth daily   • nitroglycerin (NITROSTAT) 0.4 mg SL tablet Place 1 tablet (0.4 mg total) under the tongue every 5 (five) minutes as needed for chest pain   • Omega-3 Fatty Acids (FISH OIL) 1200 MG CAPS Take 1,200 mg by mouth 2 (two) times a day       Allergies   Allergen Reactions   • Other Other (See Comments)     Pt and wife reports took a medication years ago at work cant recall the name or what it was for\"     \"couldn't talk and cheeks swelled and also right hand\"     Immunization History   Administered Date(s) Administered   • Pneumococcal Conjugate Vaccine 20-valent (Pcv20), Polysace 01/22/2024   • Pneumococcal Polysaccharide PPV23 1945       Objective     /70 (BP Location: Left arm, Patient Position: Sitting, Cuff Size: Adult)   Pulse 60   Temp 98.2 °F (36.8 °C)   Wt 77.9 kg (171 lb 12.8 oz)   SpO2 99%   BMI 26.12 kg/m²     Physical Exam  Cardiovascular:      Rate and Rhythm: Normal rate and regular rhythm.      Heart sounds: Normal heart sounds.      Comments: Carotids: no bruits  Ext: no edema  Pulmonary:      Effort: " Pulmonary effort is normal. No respiratory distress.      Breath sounds: No wheezing or rales.   Psychiatric:         Behavior: Behavior normal.         Thought Content: Thought content normal.       Mina Denny, DO

## 2024-01-22 NOTE — ASSESSMENT & PLAN NOTE
Status post two-vessel stent over 10 years ago.  Patient had pacemaker implanted 2022.  Patient denies any chest pain.  Does admit to occasional shortness of breath.  Continue follow-up with cardiologist as directed

## 2024-01-22 NOTE — ASSESSMENT & PLAN NOTE
Patient has been complaining of right upper quadrant abdominal pain.  History of cholelithiasis.  Will send for ultrasound abdomen.  Possible referral to general surgeon

## 2024-01-22 NOTE — ASSESSMENT & PLAN NOTE
Patient recent admission for epididymitis and BPH.  Currently stable on finasteride 5 mg daily.  Continue follow-up with urology as directed

## 2024-01-22 NOTE — ASSESSMENT & PLAN NOTE
Cholesterol from January 10 was 93.  Doing well on atorvastatin 80 mg daily.  Continue follow-up with cardiology

## 2024-01-24 ENCOUNTER — OFFICE VISIT (OUTPATIENT)
Dept: NEPHROLOGY | Facility: CLINIC | Age: 79
End: 2024-01-24
Payer: COMMERCIAL

## 2024-01-24 VITALS
HEIGHT: 68 IN | DIASTOLIC BLOOD PRESSURE: 78 MMHG | BODY MASS INDEX: 25.91 KG/M2 | WEIGHT: 171 LBS | SYSTOLIC BLOOD PRESSURE: 122 MMHG

## 2024-01-24 DIAGNOSIS — N18.31 STAGE 3A CHRONIC KIDNEY DISEASE (HCC): Primary | ICD-10-CM

## 2024-01-24 DIAGNOSIS — N40.0 BENIGN PROSTATIC HYPERPLASIA WITHOUT LOWER URINARY TRACT SYMPTOMS: ICD-10-CM

## 2024-01-24 DIAGNOSIS — E78.2 MIXED HYPERLIPIDEMIA: ICD-10-CM

## 2024-01-24 DIAGNOSIS — I10 BENIGN ESSENTIAL HYPERTENSION: ICD-10-CM

## 2024-01-24 DIAGNOSIS — D45 POLYCYTHEMIA VERA (HCC): ICD-10-CM

## 2024-01-24 DIAGNOSIS — R73.03 PREDIABETES: ICD-10-CM

## 2024-01-24 DIAGNOSIS — N18.32 STAGE 3B CHRONIC KIDNEY DISEASE (HCC): ICD-10-CM

## 2024-01-24 PROCEDURE — 99214 OFFICE O/P EST MOD 30 MIN: CPT | Performed by: INTERNAL MEDICINE

## 2024-01-24 NOTE — PROGRESS NOTES
Nephrology Follow up Consultation  Santana Jacinto 78 y.o. male MRN: [id number]            BACKGROUND:  Santana Jacinto is a 78 y.o.male who was referred by Mina Denny DO for evaluation of Follow-up and Chronic Kidney Disease  .      ASSESSMENT / PLAN:   78 y.o.  male with pmh of multiple co-morbidities including HTN, pre diabetes, polycythemia vera, BPH, CAD and CKD stage 3 status post recent hospitalization with robotic suprapubic prostatectomy on 10/18/2021 presents to the office to establish care for CKD.     CKD stage 3A/B:  -  after review of records In [facility] as well as Care everywhere patient has a baseline creatinine of 1.2-1.5 mg/dL. Most recent labs show a Creatinine of 1.30 mg/dL on 1/10/24. Renal function remains stable at baseline.   - likely has underlying CKD secondary to age-related nephron loss plus hypertensive nephrosclerosis plus some component of diabetic kidney disease due to underlying pre diabetes.  - CT abdomen from August 2017 with no hydronephrosis exophytic left renal cyst.  -Renal ultrasound from 5/12/2022 bilateral kidneys approximately 11 cm simple cyst on left kidney no hydronephrosis no masses.  - Acid base and lytes stable.  - Clinically the patient appears to be euvolemic  - Recommend to avoid use of NSAIDs, nephrotoxins. Caution advised with regards to exposure to IV contrast dye.   - Discussed with the patient in depth his renal status, including the possible etiologies for CKD.   - Advised the patient that when is GFR is close to 20mL/min then will start discussing about RRT(renal replacement therapy) options such as renal transplant, peritoneal dialysis and hemodialysis.   - Informed the patient about the various options for Renal Replacement therapy.  - Discussed with the patient how we need to work together to delay the progression of CKD with optimal BP control based on their age and co-morbidities, optimal BS control with HbA1c of <7% and trying to reduce proteinuria by the use of  anti-proteinuric agents.   -Offered patient CKD education/kidney smart he wishes to hold off respect his wishes.    Hypertension:  - Patient is on Norvasc 10 mg p.o. q.day, metoprolol 50 mg p.o. b.i.d..   - if blood pressure continues to be low discussed with patient we will need to decrease Norvasc dosage.  Currently is asymptomatic and doing well.  -May consider addition of ACE inhibitor or ARB at next visit to substitute instead of Norvasc  - Goal BP of < 130/80 based on age and comorbidities  - Instructed to follow low sodium (2gm)diet.    Hemoglobin:  - Goal Hb of 10-12 g/dL  - Most recent labs suggestive of 17.1gm/dl.   - follow-up with Heme-Onc    CKD-MBD(Mineral Bone Disease):  - Based on patients CKD stage following is the goal of therapy.  - Maintain calcium phosphorus product of < 55.  - Stage 3 CKD - Goal Ca 8.5-10 mg/dL , goal Phos 2.7-4.6 mg/dL  , goal iPTH 30-70 pg/mL  - Patient is currently at goal.  - Most recent vitamin D level 40.8 and intact PTH of 58.9 as of 4/24/2023  - check intact PTH vitamin-D prior to next visit     Proteinuria:  - most recent protein creatinine ratio of 190 mg as of May 2022 improved from prior  -Most recent UA from 1/19/2024 positive protein no blood citrated specimen.  - check protein creatinine ratio prior to next visit  - if continues to have increased proteinuria on next visit will likely initiate ACE-inhibitor or ARB therapy if tolerable.  - currently on therapy for proteinuria with vitamin-D    Lipids:  - on fish oil,lipitor  - goal LDL less than 70  - management per primary team    Pre DM:  - management as per Primary team  - on no medications diet-controlled  -Most recent A1c 5.1% as of January 2024    Polycythemia vera:  - follow-up with Heme-Onc  - last seen 1/17/24 notes reviewed  - on hydroxyurea    BPH:  - Management as per primary team  - follow-up with Urology, last seen 1/19/24 notes reviewed,   - status post elective robotic suprapubic prostatectomy on  "10/18/2021  - status post CT cystogram on to 11/04/2021.    Nutrition:  - Encouraged patient to follow a renal diet comprising of moderate potassium, low phosphorus and protein restriction to 0.8gm/kg.  - Will check serum albumin with next blood work.     Followup:  - Patient is to follow-up in 6 months, with lab work to be performed a few days prior to the next visit. Advised patient to call me in 10 days to review the results if they do not hear back from me, as I may have not received the results.    Clara Ramos MD, FASN, 1/24/2024, 2:33 PM             SUBJECTIVE: 78 y.o. male presents to the office for routine follow-up feels well has no complaints hospitalized in December with epididymitis.  Fully recovered as per him no NSAID use not really checking blood pressures at home wishes to hold off on CKD education/kidney smart at this time I respect his wishes.      Review of Systems   Constitutional:  Negative for chills and fever.   HENT:  Negative for congestion and sore throat.    Respiratory:  Negative for cough and wheezing.    Cardiovascular:  Negative for leg swelling.   Gastrointestinal:  Negative for diarrhea and vomiting.   Genitourinary:  Negative for difficulty urinating and hematuria.   Musculoskeletal:  Negative for back pain.   Skin:  Negative for wound.   Neurological:  Negative for dizziness, light-headedness and headaches.   Psychiatric/Behavioral:  Negative for agitation and confusion.    All other systems reviewed and are negative.      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\"   • Full dentures     but " "doesnt wear them   • History of 2019 novel coronavirus disease (COVID-19) 02/2021    hospital for 2 days but not in ICU/\"mainly dehydrated\" \"also fever/oxygen below 90\"   • History of coronary artery stent placement     x2 in 2008 or so; sees [facility]-- Dr Hutton cardio   • [place] (hard of hearing)     no hearing aids yet   • Hyperlipidemia     Last Assessed:  11/24/17   • Hypertension     Benign essential, Last Assessed:  11/24/17   • LBBB (left bundle branch block)    • Nocturia    • Polycythemia vera (HCC)     (per history in chart)   • PVD (peripheral vascular disease) ([facility])    • RBC abnormality     pt reports told has high Red blood cells/goes to infusion center regularly next appt 10/8/21 hematologist is Dr Charlton of [facility](had been Dr Ahn)   • Seasickness    • Shortness of breath     \"if goes up steps gets SOB, had for awhile\"   • Slow urinary stream     \"sometimes feels like bladder isnt all the way empty\"   • Snores    • Wears glasses     reading       PROBLEM LIST    Patient Active Problem List   Diagnosis   • Benign essential hypertension   • Cervical radiculopathy   • CAD (coronary artery disease)   • Mixed hyperlipidemia   • Polycythemia vera (HCC)   • Prediabetes   • Chronic left shoulder pain   • Cervical spondylosis without myelopathy   • Benign prostatic hyperplasia without lower urinary tract symptoms   • Status post percutaneous transluminal coronary angioplasty   • Cigarette nicotine dependence without complication   • Left bundle branch block   • JAK2 gene mutation   • Encounter for antineoplastic chemotherapy   • Stage 3b chronic kidney disease (HCC)   • History of cardiac pacemaker   • Heart block   • Thrombocytopenia (HCC)   • Chronic obstructive pulmonary disease (HCC)   • Right wrist pain   • Hepatic cyst   • Epigastric pain   • Abnormal CT of the chest   • Right-sided chest wall pain   • Left epididymitis   • Right upper quadrant abdominal pain   • Stage 3a chronic " kidney disease (HCC)       PAST SURGICAL HISTORY:  Past Surgical History:   Procedure Laterality Date   • ANGIOPLASTY     • APPENDECTOMY     • CARDIAC CATHETERIZATION N/A 07/28/2022    Procedure: CARDIAC TEMPORARY PACEMAKER;  Surgeon: Fransico Morgan MD;  [facility];  Service: Cardiology   • CARDIAC ELECTROPHYSIOLOGY PROCEDURE N/A 07/29/2022    Procedure: Cardiac pacer implant;  Surgeon: Anand Valero MD;  [facility];  Service: Cardiology   • CATARACT EXTRACTION Bilateral    • CT CYSTOGRAM  11/04/2021   • ELBOW SURGERY Right    • FL CYSTOGRAM  10/27/2021   • IR BIOPSY BONE MARROW  10/14/2022   • KNEE ARTHROSCOPY Right    • PA COLONOSCOPY FLX DX W/COLLJ SPEC WHEN PFRMD N/A 05/22/2017    Procedure: COLONOSCOPY;  Surgeon: Josseline Zhou DO;  Location: [facility];  Service: Gastroenterology   • PA NEUROPLASTY &/TRANSPOS MEDIAN NRV CARPAL TUNNE Right 5/26/2023    Procedure: CTR, RIGHT;  Surgeon: Cosme Varela MD;  Location[facility];  Service: Orthopedics   • PA TENDON SHEATH INCISION Right 5/26/2023    Procedure: RELEASE TRIGGER FINGER, RIGHT RING FINGER;  Surgeon: Cosme Varela MD;  [facility];  Service: Orthopedics   • PROSTATE BIOPSY     • PROSTATECTOMY N/A 10/18/2021    Procedure: ROBOTIC SUBTOTAL/SUPRAPUBIC PROSTATECTOMY;  Surgeon: Kieran Vega MD;  Location[facility];  Service: Urology   • SHOULDER ARTHROSCOPY Right    • TONSILLECTOMY         SOCIAL HISTORY :   reports that he has been smoking cigarettes. He started smoking about 52 years ago. He has a 26.0 pack-year smoking history. He has been exposed to tobacco smoke. He has never used smokeless tobacco. He reports that he does not drink alcohol and does not use drugs.    FAMILY HISTORY:  Family History   Problem Relation Age of Onset   • No Known Problems Mother    • Heart disease Father        ALLERGIES:  Allergies   Allergen Reactions   • Other Other (See Comments)     Pt and wife reports took a medication  "years ago at work cant recall the name or what it was for\"     \"couldn't talk and cheeks swelled and also right hand\"           PHYSICAL EXAM:  Vitals:    01/24/24 1416   BP: 122/78   BP Location: Left arm   Patient Position: Sitting   Cuff Size: Standard   Weight: 77.6 kg (171 lb)   Height: 5' 8\" (1.727 m)       Body mass index is 26 kg/m².    Physical Exam  Vitals reviewed.   Constitutional:       General: He is not in acute distress.     Appearance: Normal appearance. He is normal weight. He is not ill-appearing, toxic-appearing or diaphoretic.   HENT:      Head: Normocephalic and atraumatic.      Mouth/Throat:      Mouth: Mucous membranes are moist.      Pharynx: No oropharyngeal exudate.   Eyes:      General: No scleral icterus.  Cardiovascular:      Rate and Rhythm: Normal rate.   Pulmonary:      Effort: No respiratory distress.      Breath sounds: Normal breath sounds. No stridor.   Abdominal:      General: There is no distension.      Palpations: Abdomen is soft. There is no mass.      Tenderness: There is no right CVA tenderness or left CVA tenderness.   Musculoskeletal:         General: No swelling.      Cervical back: Normal range of motion. No rigidity.   Skin:     Coloration: Skin is not jaundiced.   Neurological:      General: No focal deficit present.      Mental Status: He is alert and oriented to person, place, and time. Mental status is at baseline.   Psychiatric:         Mood and Affect: Mood normal.         Behavior: Behavior normal.         LABORATORY DATA:     Results from last 6 Months   Lab Units 01/10/24  1007 12/24/23  0457 12/23/23  0624 12/22/23  0441   WBC Thousand/uL 8.73 8.34 9.77 10.68*   HEMOGLOBIN g/dL 17.1* 15.1 16.2 16.8   HEMATOCRIT % 50.2* 42.9 45.7 47.3   PLATELETS Thousands/uL 150 157 154 165   POTASSIUM mmol/L 3.9 3.7 4.0 3.6   CHLORIDE mmol/L 105 107 104 102   CO2 mmol/L 25 25 28 29   BUN mg/dL 16 17 19 15   CREATININE mg/dL 1.30 1.07 1.32* 0.98   CALCIUM mg/dL 9.4 8.3* 9.0 " "8.9   MAGNESIUM mg/dL  --   --   --  2.0          rest all reviewed    RADIOLOGY:  No orders to display     Rest all reviewed        MEDICATIONS:    Current Outpatient Medications:   •  acetaminophen (TYLENOL) 500 mg tablet, Take 500 mg by mouth every 6 (six) hours as needed for mild pain, Disp: , Rfl:   •  amLODIPine (NORVASC) 10 mg tablet, Take 10 mg by mouth every evening , Disp: , Rfl:   •  aspirin (ECOTRIN LOW STRENGTH) 81 mg EC tablet, Take 81 mg by mouth daily, Disp: , Rfl:   •  atorvastatin (LIPITOR) 80 mg tablet, TAKE 1 TABLET BY MOUTH EVERY DAY IN THE EVENING, Disp: 90 tablet, Rfl: 1  •  hydroxyurea (HYDREA) 500 mg capsule, Take 1 capsule (500 mg total) by mouth daily, Disp: 90 capsule, Rfl: 2  •  metoprolol tartrate (LOPRESSOR) 50 mg tablet, Take 50 mg by mouth 2 (two) times a day , Disp: , Rfl:   •  Multiple Vitamins-Minerals (VITAMIN D3 COMPLETE PO), Take 1 capsule by mouth daily, Disp: , Rfl:   •  nitroglycerin (NITROSTAT) 0.4 mg SL tablet, Place 1 tablet (0.4 mg total) under the tongue every 5 (five) minutes as needed for chest pain, Disp: 10 tablet, Rfl: 1  •  Omega-3 Fatty Acids (FISH OIL) 1200 MG CAPS, Take 1,200 mg by mouth 2 (two) times a day  , Disp: , Rfl:   •  albuterol (Ventolin HFA) 90 mcg/act inhaler, Inhale 2 puffs every 4 (four) hours as needed for wheezing (Patient not taking: Reported on 1/19/2024), Disp: 18 g, Rfl: 1          Portions of the record may have been created with voice recognition software. Occasional wrong word or \"sound a like\" substitutions may have occurred due to the inherent limitations of voice recognition software. Read the chart carefully and recognize, using context, where substitutions have occurred.If you have any questions, please contact the dictating provider.      "

## 2024-01-24 NOTE — PATIENT INSTRUCTIONS
"  - Please call me in 10 days after having your blood work done to review the results if you do not hear back from me or my office, as I may have not received the results.  - please remember to perform blood work prior to the next visit.  - Please call if the blood pressure top number is greater than 140 or less than 110 consistently.  - Please call if you are gaining more than 2lbs in 2 days for adjustment of water pills.  ~ Please AVOID the following pain medications.  LIST OF NSAIDS (NONSTEROIDAL ANTI-INFLAMMATORY DRUGS) AND DUQUE-2 INHIBITORS    DIFLUNISAL (DOLOBID)  IBUPROFEN (MOTRIN, ADVIL)  FLURBIPROFEN (ANSAID)  KETOPROFEN (ORUDIS, ORUVAIL)  FENOPROFEN (NALFON)  NABUMETONE (RELAFEN)  PIROXICAM (FELDENE)  NAPROXEN (ALEVE, NAPROSYN, NAPRELAN, ANAPROX)  DICLOFENAC (VOLTAREN, CATAFLAM)  INDOMETHACIN (INDOCIN)  SULINDAC (CLINORIL)  TOLMETIN (TOLETIN)  ETODOLAC (LODINE)  MELOXICAM (MOBIC)  KETOROLAC (TORADOL)  OXAPROZIN (DAYPRO)  CELECOXIB (CELEBREX)    Phosphorus diet  Follow a low phosphorus diet.    Avoid these higher phosphorus foods: Choose these lower phosphorus foods:   Milk, pudding or yogurt (from animals and from many soy varieties) Rice milk (unfortified), nondairy creamer (if it doesn't have terms in the ingredients list that contain the letters \"phos\")   Hard cheeses, ricotta or cottage cheese, fat-free cream cheese Regular and low-fat cream cheese   Ice cream or frozen yogurt Sherbet or frozen fruit pops   Soups made with higher phosphorus ingredients (milk, dried peas, beans, lentils) Soups made with lower phosphorus ingredients (broth- or water-based with other lower phosphorus ingredients)   Whole grains, including whole-grain breads, crackers, cereal, rice and pasta Refined grains, including white bread, crackers, cereals, rice and pasta   Quick breads, biscuits, cornbread, muffins, pancakes or waffles Homemade refined (white) dinner rolls, bagels or English muffins   Dried peas (split, " "black-eyed), beans (black, garbanzo, lima, kidney, navy, kennedy) or lentils Green peas (canned, frozen), green beans or wax beans   Organ meats, walleye, pollock or sardines Lean beef, pork, lamb, poultry or other fish   Nuts and seeds Popcorn   Peanut butter and other nut butters Jam, jelly or honey   Chocolate, including chocolate drinks Carob (chocolate-flavored) candy, hard candy or gumdrops   Waqas and pepper-type sodas, flavored gutierrez, bottled teas (if a term in the ingredients list contains the letters \"phos\") Lemon-lime soda, ginger ale or root beer, plain water   Follow a moderate potassium diet.          Things to do to reduce your blood pressure include working with all your physician to do the following:  ~ stop smoking if you smoke.  ~ increase cardiovascular exercise like walking and swimming.   ~ modify your diet to decrease fat and salt intake.  ~ reduce your weight if you are overweight or obese.  ~ increase the consumption of fruits, vegetables and whole grains.  ~ decrease alcohol consumption if you consume alcohol.   ~ try to minimize stress in your life with lifestyle modifications.   ~ be compliant with your anti-hypertensive medications.   ~ adjust your medications to help improve your vascular stiffness and decrease risks for heart attacks and strokes.   "

## 2024-02-28 ENCOUNTER — HOSPITAL ENCOUNTER (OUTPATIENT)
Dept: ULTRASOUND IMAGING | Facility: MEDICAL CENTER | Age: 79
Discharge: HOME | End: 2024-02-28
Payer: COMMERCIAL

## 2024-02-28 DIAGNOSIS — R10.11 RIGHT UPPER QUADRANT ABDOMINAL PAIN: ICD-10-CM

## 2024-02-28 PROCEDURE — 76705 ECHO EXAM OF ABDOMEN: CPT

## 2024-03-06 DIAGNOSIS — K80.12 CALCULUS OF GALLBLADDER WITH ACUTE ON CHRONIC CHOLECYSTITIS WITHOUT OBSTRUCTION: Primary | ICD-10-CM

## 2024-03-08 DIAGNOSIS — E78.2 MIXED HYPERLIPIDEMIA: ICD-10-CM

## 2024-03-08 DIAGNOSIS — I25.10 CORONARY ARTERY DISEASE WITHOUT ANGINA PECTORIS, UNSPECIFIED VESSEL OR LESION TYPE, UNSPECIFIED WHETHER NATIVE OR TRANSPLANTED HEART: ICD-10-CM

## 2024-03-08 RX ORDER — ATORVASTATIN CALCIUM 80 MG/1
TABLET, FILM COATED ORAL
Qty: 90 TABLET | Refills: 1 | Status: SHIPPED | OUTPATIENT
Start: 2024-03-08

## 2024-04-03 ENCOUNTER — CONSULT (OUTPATIENT)
Dept: SURGERY | Facility: CLINIC | Age: 79
End: 2024-04-03
Payer: COMMERCIAL

## 2024-04-03 VITALS
RESPIRATION RATE: 16 BRPM | OXYGEN SATURATION: 98 % | SYSTOLIC BLOOD PRESSURE: 116 MMHG | HEART RATE: 60 BPM | BODY MASS INDEX: 26.07 KG/M2 | DIASTOLIC BLOOD PRESSURE: 74 MMHG | HEIGHT: 68 IN | WEIGHT: 172 LBS | TEMPERATURE: 98.2 F

## 2024-04-03 DIAGNOSIS — K80.12 CALCULUS OF GALLBLADDER WITH ACUTE ON CHRONIC CHOLECYSTITIS WITHOUT OBSTRUCTION: ICD-10-CM

## 2024-04-03 DIAGNOSIS — Z12.11 ENCOUNTER FOR SCREENING COLONOSCOPY: ICD-10-CM

## 2024-04-03 DIAGNOSIS — D45 POLYCYTHEMIA VERA (HCC): Primary | ICD-10-CM

## 2024-04-03 DIAGNOSIS — K21.9 GASTROESOPHAGEAL REFLUX DISEASE WITHOUT ESOPHAGITIS: ICD-10-CM

## 2024-04-03 PROCEDURE — 99204 OFFICE O/P NEW MOD 45 MIN: CPT | Performed by: SURGERY

## 2024-04-03 NOTE — ASSESSMENT & PLAN NOTE
I reviewed his ultrasound that does show gallstones sludge and mild wall thickening consistent with chronic calculus cholecystitis.  However a lot of his symptoms sound to be more reflux related either esophagitis or polyp possible peptic ulcer disease then symptomatic cholelithiasis at this time.  I do believe in general he would benefit from cholecystectomy but I believe his primary issues may be related to his reflux.  He has never had an endoscopy and his last colonoscopy was in 2017 and at that time was likely a 3-year recall which she has not had 1 since.  I will therefore refer him to gastroenterology for reevaluation and possible endoscopy and colonoscopy.  I did discuss the options of empirically starting a proton pump inhibitor or H2 blocker but he was not interested in more pills at this time which is reasonable.  I told to keep track of his symptoms and I will have him follow-up after being evaluated by gastroenterology.  At that time we will consider plans for laparoscopic cholecystectomy.

## 2024-04-03 NOTE — PROGRESS NOTES
Assessment/Plan:    Polycythemia vera (HCC)  He has a longstanding history of polycythemia for which he follows with hematology.  His last hemoglobin and hematocrit were elevated in January.  He has not had repeat blood work since then.  I will send him for repeat blood work as if he is above 45% then he will likely need plasmapheresis per the hematology notes.  If they are elevated he will need to be seen by hematology sooner for evaluation prior to any surgical intervention.  Will also request any anticoagulation guidelines at the time of surgery if necessary.    Calculus of gallbladder with acute on chronic cholecystitis without obstruction  I reviewed his ultrasound that does show gallstones sludge and mild wall thickening consistent with chronic calculus cholecystitis.  However a lot of his symptoms sound to be more reflux related either esophagitis or polyp possible peptic ulcer disease then symptomatic cholelithiasis at this time.  I do believe in general he would benefit from cholecystectomy but I believe his primary issues may be related to his reflux.  He has never had an endoscopy and his last colonoscopy was in 2017 and at that time was likely a 3-year recall which she has not had 1 since.  I will therefore refer him to gastroenterology for reevaluation and possible endoscopy and colonoscopy.  I did discuss the options of empirically starting a proton pump inhibitor or H2 blocker but he was not interested in more pills at this time which is reasonable.  I told to keep track of his symptoms and I will have him follow-up after being evaluated by gastroenterology.  At that time we will consider plans for laparoscopic cholecystectomy.    Gastroesophageal reflux disease without esophagitis  His symptoms may be related to esophagitis or ulcer disease and recommend evaluation for possible endoscopy.    Encounter for screening colonoscopy  I reviewed his last colonoscopy from 2017 and showed 2 small polyps that  were tubular adenomas.  He is due for repeat colonoscopy and will refer him to gastroenterology.       Diagnoses and all orders for this visit:    Polycythemia vera (HCC)    Calculus of gallbladder with acute on chronic cholecystitis without obstruction  -     Ambulatory Referral to General Surgery  -     Ambulatory Referral to Gastroenterology; Future  -     CBC and differential; Future  -     Comprehensive metabolic panel; Future    Gastroesophageal reflux disease without esophagitis    Encounter for screening colonoscopy          Subjective:      Patient ID: Santana Jacinto is a 78 y.o. male.    Mr. Jacinto is a 78-year-old gentleman here for evaluation of abdominal pain and abnormal gallbladder ultrasound.  He states he is having symptoms on and off for some time now.  Primarily he complains of epigastric pain that is somewhat constant.  It usually starts or is worse at night and sometimes persist during the day.  He does not have specific right upper quadrant pain.  He does not associate these pains to eating.  Although he states he does not eat a lot and its mostly just meat.  He does also complain of reflux type symptoms of acid in the back of throat and some heartburn symptoms.  He does not take medication for this.  He had an ultrasound that does show abnormal gallbladder and was referred here.  He has a significant history of polycythemia and he follows with hematology.  His last hematocrit was above 45% but he has not had any treatment or recheck of his blood work since then which was done in January.  He has history of colonoscopy and he states he is due for another one as his last one he thinks was in around 2017.        The following portions of the patient's history were reviewed and updated as appropriate: allergies, current medications, past family history, past medical history, past social history, past surgical history, and problem list.    Review of Systems   Constitutional:  Negative for chills and  "fever.   HENT:  Negative for ear pain and sore throat.    Eyes:  Negative for pain and visual disturbance.   Respiratory:  Positive for cough. Negative for shortness of breath.    Cardiovascular:  Negative for chest pain and palpitations.   Gastrointestinal:  Positive for abdominal pain. Negative for vomiting.   Musculoskeletal:  Positive for back pain. Negative for arthralgias.   Skin:  Negative for color change and rash.   Neurological:  Negative for seizures and syncope.   All other systems reviewed and are negative.        Objective:      /74 (BP Location: Left arm, Patient Position: Sitting, Cuff Size: Large)   Pulse 60   Temp 98.2 °F (36.8 °C) (Temporal)   Resp 16   Ht 5' 8\" (1.727 m)   Wt 78 kg (172 lb)   SpO2 98%   BMI 26.15 kg/m²          Physical Exam  Vitals and nursing note reviewed.   Constitutional:       Appearance: Normal appearance.   Cardiovascular:      Rate and Rhythm: Normal rate and regular rhythm.   Pulmonary:      Effort: Pulmonary effort is normal.      Breath sounds: Wheezing present.   Abdominal:      General: There is no distension.      Palpations: Abdomen is soft. There is no mass.      Tenderness: There is no abdominal tenderness.   Neurological:      Mental Status: He is alert.   Psychiatric:         Mood and Affect: Mood normal.         Behavior: Behavior normal.           "

## 2024-04-03 NOTE — ASSESSMENT & PLAN NOTE
I reviewed his last colonoscopy from 2017 and showed 2 small polyps that were tubular adenomas.  He is due for repeat colonoscopy and will refer him to gastroenterology.

## 2024-04-03 NOTE — ASSESSMENT & PLAN NOTE
He has a longstanding history of polycythemia for which he follows with hematology.  His last hemoglobin and hematocrit were elevated in January.  He has not had repeat blood work since then.  I will send him for repeat blood work as if he is above 45% then he will likely need plasmapheresis per the hematology notes.  If they are elevated he will need to be seen by hematology sooner for evaluation prior to any surgical intervention.  Will also request any anticoagulation guidelines at the time of surgery if necessary.

## 2024-04-03 NOTE — ASSESSMENT & PLAN NOTE
His symptoms may be related to esophagitis or ulcer disease and recommend evaluation for possible endoscopy.

## 2024-05-15 ENCOUNTER — APPOINTMENT (OUTPATIENT)
Dept: LAB | Facility: MEDICAL CENTER | Age: 79
End: 2024-05-15
Payer: COMMERCIAL

## 2024-05-15 DIAGNOSIS — K80.12 CALCULUS OF GALLBLADDER WITH ACUTE ON CHRONIC CHOLECYSTITIS WITHOUT OBSTRUCTION: ICD-10-CM

## 2024-05-15 LAB
ALBUMIN SERPL BCP-MCNC: 4.4 G/DL (ref 3.5–5)
ALP SERPL-CCNC: 109 U/L (ref 34–104)
ALT SERPL W P-5'-P-CCNC: 22 U/L (ref 7–52)
ANION GAP SERPL CALCULATED.3IONS-SCNC: 7 MMOL/L (ref 4–13)
AST SERPL W P-5'-P-CCNC: 22 U/L (ref 13–39)
BASOPHILS # BLD AUTO: 0.04 THOUSANDS/ÂΜL (ref 0–0.1)
BASOPHILS NFR BLD AUTO: 0 % (ref 0–1)
BILIRUB SERPL-MCNC: 1.23 MG/DL (ref 0.2–1)
BUN SERPL-MCNC: 19 MG/DL (ref 5–25)
CALCIUM SERPL-MCNC: 9.1 MG/DL (ref 8.4–10.2)
CHLORIDE SERPL-SCNC: 103 MMOL/L (ref 96–108)
CO2 SERPL-SCNC: 31 MMOL/L (ref 21–32)
CREAT SERPL-MCNC: 1.37 MG/DL (ref 0.6–1.3)
EOSINOPHIL # BLD AUTO: 0.23 THOUSAND/ÂΜL (ref 0–0.61)
EOSINOPHIL NFR BLD AUTO: 2 % (ref 0–6)
ERYTHROCYTE [DISTWIDTH] IN BLOOD BY AUTOMATED COUNT: 13.9 % (ref 11.6–15.1)
GFR SERPL CREATININE-BSD FRML MDRD: 49 ML/MIN/1.73SQ M
GLUCOSE P FAST SERPL-MCNC: 99 MG/DL (ref 65–99)
HCT VFR BLD AUTO: 51 % (ref 36.5–49.3)
HGB BLD-MCNC: 18.2 G/DL (ref 12–17)
IMM GRANULOCYTES # BLD AUTO: 0.05 THOUSAND/UL (ref 0–0.2)
IMM GRANULOCYTES NFR BLD AUTO: 1 % (ref 0–2)
LYMPHOCYTES # BLD AUTO: 1.63 THOUSANDS/ÂΜL (ref 0.6–4.47)
LYMPHOCYTES NFR BLD AUTO: 16 % (ref 14–44)
MCH RBC QN AUTO: 40.4 PG (ref 26.8–34.3)
MCHC RBC AUTO-ENTMCNC: 35.7 G/DL (ref 31.4–37.4)
MCV RBC AUTO: 113 FL (ref 82–98)
MONOCYTES # BLD AUTO: 0.77 THOUSAND/ÂΜL (ref 0.17–1.22)
MONOCYTES NFR BLD AUTO: 8 % (ref 4–12)
NEUTROPHILS # BLD AUTO: 7.22 THOUSANDS/ÂΜL (ref 1.85–7.62)
NEUTS SEG NFR BLD AUTO: 73 % (ref 43–75)
NRBC BLD AUTO-RTO: 0 /100 WBCS
PLATELET # BLD AUTO: 128 THOUSANDS/UL (ref 149–390)
PMV BLD AUTO: 10.1 FL (ref 8.9–12.7)
POTASSIUM SERPL-SCNC: 4 MMOL/L (ref 3.5–5.3)
PROT SERPL-MCNC: 7.2 G/DL (ref 6.4–8.4)
RBC # BLD AUTO: 4.51 MILLION/UL (ref 3.88–5.62)
SODIUM SERPL-SCNC: 141 MMOL/L (ref 135–147)
WBC # BLD AUTO: 9.94 THOUSAND/UL (ref 4.31–10.16)

## 2024-05-15 PROCEDURE — 85025 COMPLETE CBC W/AUTO DIFF WBC: CPT

## 2024-05-15 PROCEDURE — 80053 COMPREHEN METABOLIC PANEL: CPT

## 2024-05-15 PROCEDURE — 36415 COLL VENOUS BLD VENIPUNCTURE: CPT

## 2024-05-20 ENCOUNTER — TELEPHONE (OUTPATIENT)
Dept: HEMATOLOGY ONCOLOGY | Facility: CLINIC | Age: 79
End: 2024-05-20

## 2024-05-20 DIAGNOSIS — Z15.89 JAK2 GENE MUTATION: ICD-10-CM

## 2024-05-20 DIAGNOSIS — D45 POLYCYTHEMIA VERA (HCC): Primary | ICD-10-CM

## 2024-05-20 NOTE — TELEPHONE ENCOUNTER
Spoke with patient and explained that he needs to continue with Monthly phlebotomy  Will send to scheduling to arrange appointment for now and then additional monthly appointments    Explained to patient that he should call the office about his lab results if he does not hear from us.  We can confirm if phlebotomy is needed in the future     Patient agreeable and verbalized understanding

## 2024-05-20 NOTE — TELEPHONE ENCOUNTER
Patient should be receiving therapeutic phleb per orders and parameter. Not gilmar why he has not been receiving it?    Please schedule

## 2024-05-28 ENCOUNTER — TELEPHONE (OUTPATIENT)
Dept: GASTROENTEROLOGY | Facility: MEDICAL CENTER | Age: 79
End: 2024-05-28

## 2024-05-28 ENCOUNTER — OFFICE VISIT (OUTPATIENT)
Dept: GASTROENTEROLOGY | Facility: MEDICAL CENTER | Age: 79
End: 2024-05-28
Payer: COMMERCIAL

## 2024-05-28 ENCOUNTER — PREP FOR PROCEDURE (OUTPATIENT)
Dept: GASTROENTEROLOGY | Facility: MEDICAL CENTER | Age: 79
End: 2024-05-28

## 2024-05-28 VITALS
HEART RATE: 60 BPM | TEMPERATURE: 98.4 F | WEIGHT: 170 LBS | SYSTOLIC BLOOD PRESSURE: 132 MMHG | BODY MASS INDEX: 25.76 KG/M2 | DIASTOLIC BLOOD PRESSURE: 74 MMHG | HEIGHT: 68 IN | OXYGEN SATURATION: 97 %

## 2024-05-28 DIAGNOSIS — K80.12 CALCULUS OF GALLBLADDER WITH ACUTE ON CHRONIC CHOLECYSTITIS WITHOUT OBSTRUCTION: ICD-10-CM

## 2024-05-28 DIAGNOSIS — K21.9 GASTROESOPHAGEAL REFLUX DISEASE WITHOUT ESOPHAGITIS: Primary | ICD-10-CM

## 2024-05-28 DIAGNOSIS — Z86.010 HISTORY OF COLON POLYPS: ICD-10-CM

## 2024-05-28 PROCEDURE — 99204 OFFICE O/P NEW MOD 45 MIN: CPT | Performed by: INTERNAL MEDICINE

## 2024-05-28 RX ORDER — OMEPRAZOLE 20 MG/1
20 CAPSULE, DELAYED RELEASE ORAL DAILY
Qty: 30 CAPSULE | Refills: 3 | Status: SHIPPED | OUTPATIENT
Start: 2024-05-28

## 2024-05-28 NOTE — TELEPHONE ENCOUNTER
Procedure: Colonoscopy/EGD  Date: 07/29/2024  Physician performing: Dr. Jaiyeola  Location of procedure:    Instructions given to patient: Yes  Diabetic: No  Clearances: Yes, Cardiac

## 2024-05-28 NOTE — TELEPHONE ENCOUNTER
Our mutual patient, Santana Jacinto, is scheduled for the following   procedure(s): EGD/Colonoscopy              On: 07/29/2024              With: Dr. Jaiyeola    Our office is requesting cardiac clearance for the upcoming procedure(s).        Thank you,  [facility]    Faxed form to [facility] at [phone].

## 2024-05-28 NOTE — PROGRESS NOTES
[facility] - Outpatient Consultation  Santana Jacinto 78 y.o. male MRN: [id number]  Encounter: [id number]          ASSESSMENT AND PLAN:  78-year-old male with history of coronary artery disease s/p PCI  on aspirin 81 mg, PPM, COPD, GERD, polycythemia who presents for evaluation      1. Calculus of gallbladder with acute on chronic cholecystitis without obstruction  2. Gastroesophageal reflux disease without esophagitis  He has a history of intermittent right upper quadrant abdominal pain and was seen by general surgery.  He also reports symptoms that may be related to chronic gastroesophageal reflux or peptic ulcer disease.  I recommend starting daily PPI for an 8-week trial and scheduling a diagnostic EGD.  If his EGD is unremarkable and symptoms persist despite PPI then symptoms are more likely related to symptomatic cholelithiasis for which she will follow-up with general surgery.  - omeprazole (PriLOSEC) 20 mg delayed release capsule; Take 1 capsule (20 mg total) by mouth daily  Dispense: 30 capsule; Refill: 3  - EGD; Future    3. History of colon polyps  He last underwent colonoscopy 2017 showing 3 small polyps all of which were consistent with tubular adenoma.  He is overdue for surveillance exam.  He also reports a family history of his sister diagnosed with colon cancer in her 80s and father with possible colon cancer in his 70s. I obtained informed consent from the patient. The risks/benefits/alternatives of the procedure were discussed with the patient. Risks included, but not limited to, infection, bleeding, perforation, injury to organs in the abdomen, missed lesion and incomplete procedure were discussed. Patient was agreeable and electronic signature was obtained.      - polyethylene glycol (GOLYTELY) 4000 mL solution; Take as instructed on bowel preparation instructions  Dispense: 4000 mL; Refill: 0  - Colonoscopy;  Future    ______________________________________________________________________    HPI: 78-year-old male with history of coronary artery disease s/p PCI  on aspirin 81 mg, PPM, COPD, GERD, polycythemia who presents for evaluation.    He reports symptoms of abdominal pain and underwent an ultrasound showing cholelithiasis with borderline gallbladder wall thickening.  He was seen by general surgery and symptoms were not thought to be related to biliary etiology of potential gastroesophageal reflux component and he was recommended to follow-up with GI.    He reports right upper quadrant abdominal pain.  The pain is nonradiating.  He states the pain is not associated with eating.  At times he can have symptoms of gastroesophageal reflux with substernal burning and a sour taste in his mouth.  He does not report trigger foods that can cause his symptoms.  He is using Tums as needed but has not been taking acid suppression medication.    He reports diagnosis of colon cancer in his older sister diagnosed at age 84 and possible diagnosis of colon cancer in his father who was diagnosed in the 1960s when he was around age 70.      2024 abdominal ultrasound showed cholelithiasis and borderline gallbladder wall thickening.    2017 colonoscopy showed 3 small polyps with pathology noting tubular adenoma.  He was recommend repeat in 3 years      REVIEW OF SYSTEMS:    CONSTITUTIONAL: Denies any fever, chills, rigors, and weight loss.  HEENT: No earache or tinnitus. Denies hearing loss or visual disturbances.  CARDIOVASCULAR: No chest pain or palpitations.   RESPIRATORY: Denies any cough, hemoptysis, shortness of breath or dyspnea on exertion.  GASTROINTESTINAL: As noted in the History of Present Illness.   GENITOURINARY: No problems with urination. Denies any hematuria or dysuria.  NEUROLOGIC: No dizziness or vertigo, denies headaches.   MUSCULOSKELETAL: Denies any muscle or joint pain.   SKIN: Denies skin rashes or itching.  "  ENDOCRINE: Denies excessive thirst. Denies intolerance to heat or cold.  PSYCHOSOCIAL: Denies depression or anxiety. Denies any recent memory loss.       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\"    Full dentures     but doesnt wear them    History of 2019 novel coronavirus disease (COVID-19) 02/2021    hospital for 2 days but not in ICU/\"mainly dehydrated\" \"also fever/oxygen below 90\"    History of coronary artery stent placement     x2 in 2008 or so; sees [facility]-- Dr Hutton cardio    [place] (hard of hearing)     no hearing aids yet    Hyperlipidemia     Last Assessed:  11/24/17    Hypertension     Benign essential, Last Assessed:  11/24/17    LBBB (left bundle branch block)     Nocturia     Polycythemia vera (HCC)     (per history in chart)    PVD (peripheral vascular disease) ([facility])     RBC abnormality     pt reports told has high Red blood cells/goes to infusion center regularly next appt 10/8/21 hematologist is Dr Charlton of [facility](had been Dr Ahn)    Seasickness     Shortness of breath     \"if goes up steps gets SOB, had for awhile\"    Slow urinary stream     \"sometimes feels like bladder isnt all the way empty\"    Snores     Wears glasses     reading     Past Surgical History:   Procedure Laterality Date    ANGIOPLASTY      APPENDECTOMY      CARDIAC CATHETERIZATION N/A 07/28/2022    Procedure: CARDIAC TEMPORARY PACEMAKER;  Surgeon: Fransico Morgan MD;  Location: [facility];  Service: Cardiology    CARDIAC ELECTROPHYSIOLOGY PROCEDURE N/A 07/29/2022    Procedure: Cardiac pacer implant;  Surgeon: Anand" MD Marylu;  Location: [facility];  Service: Cardiology    CATARACT EXTRACTION Bilateral     CT CYSTOGRAM  11/04/2021    ELBOW SURGERY Right     FL CYSTOGRAM  10/27/2021    IR BIOPSY BONE MARROW  10/14/2022    KNEE ARTHROSCOPY Right     WI COLONOSCOPY FLX DX W/COLLJ SPEC WHEN PFRMD N/A 05/22/2017    Procedure: COLONOSCOPY;  Surgeon: Josseline Zhou DO;  Location: [facility];  Service: Gastroenterology    WI NEUROPLASTY &/TRANSPOS MEDIAN NRV CARPAL TUNNE Right 5/26/2023    Procedure: CTR, RIGHT;  Surgeon: Cosme Varela MD;  Location: [facility];  Service: Orthopedics    WI TENDON SHEATH INCISION Right 5/26/2023    Procedure: RELEASE TRIGGER FINGER, RIGHT RING FINGER;  Surgeon: Cosme Varela MD;  [facility];  Service: Orthopedics    PROSTATE BIOPSY      PROSTATECTOMY N/A 10/18/2021    Procedure: ROBOTIC SUBTOTAL/SUPRAPUBIC PROSTATECTOMY;  Surgeon: Kieran Vega MD;  [facility];  Service: Urology    SHOULDER ARTHROSCOPY Right     TONSILLECTOMY       Social History   Social History     Substance and Sexual Activity   Alcohol Use Never     Social History     Substance and Sexual Activity   Drug Use No     Social History     Tobacco Use   Smoking Status Every Day    Current packs/day: 0.50    Average packs/day: 0.5 packs/day for 52.4 years (26.2 ttl pk-yrs)    Types: Cigarettes    Start date: 1972    Passive exposure: Current   Smokeless Tobacco Never   Tobacco Comments    a little less than 1/2ppd, trying to cut back before surgery last smoked 5.25.23     Family History   Problem Relation Age of Onset    No Known Problems Mother     Heart disease Father     Colon cancer Father     Colon cancer Sister        Meds/Allergies       Current Outpatient Medications:     acetaminophen (TYLENOL) 500 mg tablet    amLODIPine (NORVASC) 10 mg tablet    aspirin (ECOTRIN LOW STRENGTH) 81 mg EC tablet    atorvastatin (LIPITOR) 80 mg tablet    hydroxyurea (HYDREA) 500 mg capsule    metoprolol tartrate  "(LOPRESSOR) 50 mg tablet    Multiple Vitamins-Minerals (VITAMIN D3 COMPLETE PO)    nitroglycerin (NITROSTAT) 0.4 mg SL tablet    Omega-3 Fatty Acids (FISH OIL) 1200 MG CAPS    albuterol (Ventolin HFA) 90 mcg/act inhaler    Allergies   Allergen Reactions    Other Other (See Comments)     Pt and wife reports took a medication years ago at work cant recall the name or what it was for\"     \"couldn't talk and cheeks swelled and also right hand\"           Objective     Blood pressure 132/74, pulse 60, temperature 98.4 °F (36.9 °C), temperature source Tympanic, height 5' 8\" (1.727 m), weight 77.1 kg (170 lb), SpO2 97%. Body mass index is 25.85 kg/m².        PHYSICAL EXAM:      General Appearance:   Alert, cooperative, no distress   HEENT:   Normocephalic, atraumatic, anicteric.     Neck:  Supple, symmetrical, trachea midline   Lungs:   Clear to auscultation bilaterally; no rales, rhonchi or wheezing; respirations unlabored    Heart::   Regular rate and rhythm; no murmur, rub, or gallop.   Abdomen:   Soft, mild epigastric tenderness to deep palpation without rebound or guard non-distended; normal bowel sounds; no masses, no organomegaly    Genitalia:   Deferred    Rectal:   Deferred    Extremities:  No cyanosis, clubbing or edema    Pulses:  2+ and symmetric    Skin:  No jaundice, rashes, or lesions    Lymph nodes:  No palpable cervical lymphadenopathy        Lab Results:   No visits with results within 1 Day(s) from this visit.   Latest known visit with results is:   Appointment on 05/15/2024   Component Date Value    WBC 05/15/2024 9.94     RBC 05/15/2024 4.51     Hemoglobin 05/15/2024 18.2 (H)     Hematocrit 05/15/2024 51.0 (H)     MCV 05/15/2024 113 (H)     MCH 05/15/2024 40.4 (H)     MCHC 05/15/2024 35.7     RDW 05/15/2024 13.9     MPV 05/15/2024 10.1     Platelets 05/15/2024 128 (L)     nRBC 05/15/2024 0     Segmented % 05/15/2024 73     Immature Grans % 05/15/2024 1     Lymphocytes % 05/15/2024 16     Monocytes % " 05/15/2024 8     Eosinophils Relative 05/15/2024 2     Basophils Relative 05/15/2024 0     Absolute Neutrophils 05/15/2024 7.22     Absolute Immature Grans 05/15/2024 0.05     Absolute Lymphocytes 05/15/2024 1.63     Absolute Monocytes 05/15/2024 0.77     Eosinophils Absolute 05/15/2024 0.23     Basophils Absolute 05/15/2024 0.04     Sodium 05/15/2024 141     Potassium 05/15/2024 4.0     Chloride 05/15/2024 103     CO2 05/15/2024 31     ANION GAP 05/15/2024 7     BUN 05/15/2024 19     Creatinine 05/15/2024 1.37 (H)     Glucose, Fasting 05/15/2024 99     Calcium 05/15/2024 9.1     AST 05/15/2024 22     ALT 05/15/2024 22     Alkaline Phosphatase 05/15/2024 109 (H)     Total Protein 05/15/2024 7.2     Albumin 05/15/2024 4.4     Total Bilirubin 05/15/2024 1.23 (H)     eGFR 05/15/2024 49          Radiology Results:   No results found.    This note was completed in part utilizing Dragon Software. Grammatical errors, random word insertions, spelling mistakes, and incomplete sentences may be an occasional consequence of this system secondary to software limitations, ambient noise, and hardware issues. If you have any questions or concerns about the content, text, or information contained within the body of this dictation, please contact the provider for clarification.

## 2024-05-30 ENCOUNTER — HOSPITAL ENCOUNTER (OUTPATIENT)
Dept: INFUSION CENTER | Facility: CLINIC | Age: 79
Discharge: HOME | End: 2024-05-30
Payer: COMMERCIAL

## 2024-05-30 VITALS
TEMPERATURE: 97.5 F | DIASTOLIC BLOOD PRESSURE: 84 MMHG | RESPIRATION RATE: 18 BRPM | HEART RATE: 59 BPM | SYSTOLIC BLOOD PRESSURE: 143 MMHG

## 2024-05-30 DIAGNOSIS — Z15.89 JAK2 GENE MUTATION: Primary | ICD-10-CM

## 2024-05-30 DIAGNOSIS — D45 POLYCYTHEMIA VERA (HCC): ICD-10-CM

## 2024-05-30 PROCEDURE — 99195 PHLEBOTOMY: CPT

## 2024-05-30 NOTE — PROGRESS NOTES
Pt offers no new complaints today.  Patient's recent hematocrit on 5-15-24 is 51, double checked with Shahana Jerome RN pt meets parameters for today, therapeutic phlebotomy performed from patient's right a/c from 9070-9821 and 500mL taken per orders. Patient given water, vitals stable on discharge, pt asymptomatic. Next appt scheduled, confirmed 6-27-24 1:00 and AVS provided.

## 2024-06-12 ENCOUNTER — TELEPHONE (OUTPATIENT)
Age: 79
End: 2024-06-12

## 2024-06-12 ENCOUNTER — TELEPHONE (OUTPATIENT)
Dept: GASTROENTEROLOGY | Facility: MEDICAL CENTER | Age: 79
End: 2024-06-12

## 2024-06-12 NOTE — TELEPHONE ENCOUNTER
Anitha from [facility] called and stated that the cardiac clearance was faxed on 05/28/24. Clearance is in the chart under encounters as well as under Media. Any questions call [phone]

## 2024-06-12 NOTE — TELEPHONE ENCOUNTER
Called and left VM about a previously faxed clearance form for upcoming colonoscopy on 07/29/2024.

## 2024-06-23 DIAGNOSIS — K21.9 GASTROESOPHAGEAL REFLUX DISEASE WITHOUT ESOPHAGITIS: ICD-10-CM

## 2024-06-24 ENCOUNTER — APPOINTMENT (OUTPATIENT)
Dept: LAB | Facility: MEDICAL CENTER | Age: 79
End: 2024-06-24
Payer: COMMERCIAL

## 2024-06-24 DIAGNOSIS — D45 POLYCYTHEMIA VERA (HCC): ICD-10-CM

## 2024-06-24 DIAGNOSIS — N18.32 STAGE 3B CHRONIC KIDNEY DISEASE (HCC): ICD-10-CM

## 2024-06-24 DIAGNOSIS — R73.03 PREDIABETES: ICD-10-CM

## 2024-06-24 DIAGNOSIS — E78.2 MIXED HYPERLIPIDEMIA: ICD-10-CM

## 2024-06-24 DIAGNOSIS — Z15.89 JAK2 GENE MUTATION: ICD-10-CM

## 2024-06-24 DIAGNOSIS — I10 BENIGN ESSENTIAL HYPERTENSION: ICD-10-CM

## 2024-06-24 DIAGNOSIS — N40.0 BENIGN PROSTATIC HYPERPLASIA WITHOUT LOWER URINARY TRACT SYMPTOMS: ICD-10-CM

## 2024-06-24 DIAGNOSIS — N18.31 STAGE 3A CHRONIC KIDNEY DISEASE (HCC): ICD-10-CM

## 2024-06-24 LAB
PHOSPHATE SERPL-MCNC: 2.8 MG/DL (ref 2.3–4.1)
PTH-INTACT SERPL-MCNC: 79.1 PG/ML (ref 12–88)

## 2024-06-24 PROCEDURE — 36415 COLL VENOUS BLD VENIPUNCTURE: CPT

## 2024-06-24 PROCEDURE — 83970 ASSAY OF PARATHORMONE: CPT

## 2024-06-24 PROCEDURE — 84100 ASSAY OF PHOSPHORUS: CPT

## 2024-06-24 RX ORDER — OMEPRAZOLE 20 MG/1
20 CAPSULE, DELAYED RELEASE ORAL DAILY
Qty: 90 CAPSULE | Refills: 1 | Status: SHIPPED | OUTPATIENT
Start: 2024-06-24

## 2024-06-25 ENCOUNTER — TELEPHONE (OUTPATIENT)
Dept: NEPHROLOGY | Facility: CLINIC | Age: 79
End: 2024-06-25

## 2024-06-25 NOTE — TELEPHONE ENCOUNTER
----- Message from Clara Ramos MD sent at 6/25/2024  1:20 PM EDT -----  Needed to be seen in June nothing is scheduled labs are done could you please schedule an appointment so labs can be reviewed thank you

## 2024-06-27 ENCOUNTER — HOSPITAL ENCOUNTER (OUTPATIENT)
Dept: INFUSION CENTER | Facility: CLINIC | Age: 79
Discharge: HOME | End: 2024-06-27
Payer: COMMERCIAL

## 2024-06-27 VITALS
DIASTOLIC BLOOD PRESSURE: 74 MMHG | SYSTOLIC BLOOD PRESSURE: 116 MMHG | TEMPERATURE: 98.2 F | RESPIRATION RATE: 16 BRPM | HEART RATE: 62 BPM

## 2024-06-27 DIAGNOSIS — Z15.89 JAK2 GENE MUTATION: Primary | ICD-10-CM

## 2024-06-27 DIAGNOSIS — D45 POLYCYTHEMIA VERA (HCC): ICD-10-CM

## 2024-06-27 PROCEDURE — 99195 PHLEBOTOMY: CPT

## 2024-06-27 NOTE — PROGRESS NOTES
Pt presents for therapeutic phlebotomy. VSS. Pt meets hct parameters verified by second RN from labs drawn on 6/24/24 starting at 1300 and ended at 1310 via ASHLEY. 500ml removed per orders. Gauze dressing applied. Tolerated well. Repeat VSS. Pt observed, no reaction. Future appts discussed, confirmed with pt for 7/25/24 1300. AVS given.

## 2024-07-02 ENCOUNTER — TELEPHONE (OUTPATIENT)
Dept: GASTROENTEROLOGY | Facility: MEDICAL CENTER | Age: 79
End: 2024-07-02

## 2024-07-11 ENCOUNTER — TELEPHONE (OUTPATIENT)
Dept: GASTROENTEROLOGY | Facility: MEDICAL CENTER | Age: 79
End: 2024-07-11

## 2024-07-11 NOTE — TELEPHONE ENCOUNTER
Patient was busy on another call will call back to confirm with patient.     Called patient to confirm his procedure for 07/29/2024 with Dr.Jaiyeola at [facility], if you have any questions please call. Will send another [employer] message with prep instructions also.

## 2024-07-17 ENCOUNTER — OFFICE VISIT (OUTPATIENT)
Dept: HEMATOLOGY ONCOLOGY | Facility: CLINIC | Age: 79
End: 2024-07-17
Payer: COMMERCIAL

## 2024-07-17 VITALS
HEIGHT: 68 IN | WEIGHT: 164.5 LBS | HEART RATE: 61 BPM | DIASTOLIC BLOOD PRESSURE: 66 MMHG | SYSTOLIC BLOOD PRESSURE: 108 MMHG | RESPIRATION RATE: 16 BRPM | TEMPERATURE: 97.2 F | OXYGEN SATURATION: 97 % | BODY MASS INDEX: 24.93 KG/M2

## 2024-07-17 DIAGNOSIS — D45 POLYCYTHEMIA VERA (HCC): ICD-10-CM

## 2024-07-17 DIAGNOSIS — Z15.89 JAK2 GENE MUTATION: Primary | ICD-10-CM

## 2024-07-17 PROCEDURE — 99214 OFFICE O/P EST MOD 30 MIN: CPT | Performed by: PHYSICIAN ASSISTANT

## 2024-07-17 PROCEDURE — G2211 COMPLEX E/M VISIT ADD ON: HCPCS | Performed by: PHYSICIAN ASSISTANT

## 2024-07-17 NOTE — PROGRESS NOTES
Hematology/Oncology Outpatient Follow-up  Santana Jacinto 78 y.o. male 1945 [id number]    Date:  7/17/2024      Assessment and Plan:  1. JAK2 gene mutation 2. 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  He was initially receiving phlebotomy as well as baby aspirin.  Since 2021 he has been on Hydrea.  Previously he was on 1000 mg daily at 1 time and developed significant anemia resulting in hospitalization.  Hydrea was then temporarily held and restarted at 500 mg 3 times per week.  He underwent bone marrow biopsy for the first time in October 2022 which showed a minute involvement of MGUS as well as minute involvement of CLL population.     Most recently he has been taking Hydrea 500 mg 1 tablet daily.     He is a daily smoker smoking approximately half pack per day. he does not ever plan to quit smoking.     Interval history:     ROS: Review of Systems   Constitutional:  Positive for fatigue. Negative for appetite change, chills, fever and unexpected weight change.   Respiratory:  Negative for cough and shortness of breath.    Cardiovascular:  Negative for chest pain, palpitations and leg swelling.   Gastrointestinal:  Negative for abdominal pain, constipation, diarrhea, nausea and vomiting.   Genitourinary:  Negative for difficulty urinating, dysuria and hematuria.   Musculoskeletal:   "Negative for arthralgias.   Skin: Negative.    Neurological:  Negative for dizziness, weakness, light-headedness, numbness and headaches.   Hematological: Negative.    Psychiatric/Behavioral: Negative.       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\"    Full dentures     but doesnt wear them    History of 2019 novel coronavirus disease (COVID-19) 02/2021    hospital for 2 days but not in ICU/\"mainly dehydrated\" \"also fever/oxygen below 90\"    History of coronary artery stent placement     x2 in 2008 or so; sees [facility]-- Dr Hutton cardio    [place] (hard of hearing[place]     no hearing aids yet    Hyperlipidemia     Last Assessed:  11/24/17    Hypertension     Benign essential, Last Assessed:  11/24/17    LBBB (left bundle branch block)     Nocturia     Polycythemia vera (HCC)     (per history in chart)    PVD (peripheral vascular disease) (HCC)     RBC abnormality     pt reports told has high Red blood cells/goes to infusion center regularly next appt 10/8/21 hematologist is Dr Charlton of [facility](had been Dr Ahn)    Seasickness     Shortness of breath     \"if goes up steps gets SOB, had for awhile\"    Slow urinary stream     \"sometimes feels like bladder isnt all the way empty\"    Snores     Wears glasses     reading       Past Surgical History:   Procedure Laterality Date    ANGIOPLASTY      APPENDECTOMY      CARDIAC CATHETERIZATION N/A 07/28/2022    Procedure: CARDIAC TEMPORARY PACEMAKER;  Surgeon: Fransico Morgan MD;  [facility];  Service: Cardiology    CARDIAC ELECTROPHYSIOLOGY PROCEDURE N/A 07/29/2022    Procedure: Cardiac " pacer implant;  Surgeon: Anand Valero MD;  [facility];  Service: Cardiology    CATARACT EXTRACTION Bilateral     CT CYSTOGRAM  11/04/2021    ELBOW SURGERY Right     FL CYSTOGRAM  10/27/2021    IR BIOPSY BONE MARROW  10/14/2022    KNEE ARTHROSCOPY Right     VT COLONOSCOPY FLX DX W/COLLJ SPEC WHEN PFRMD N/A 05/22/2017    Procedure: COLONOSCOPY;  Surgeon: Josseline Zhou DO;  Location: [facility];  Service: Gastroenterology    VT NEUROPLASTY &/TRANSPOS MEDIAN NRV CARPAL TUNNE Right 5/26/2023    Procedure: CTR, RIGHT;  Surgeon: Cosme Varela MD;  [facility];  Service: Orthopedics    VT TENDON SHEATH INCISION Right 5/26/2023    Procedure: RELEASE TRIGGER FINGER, RIGHT RING FINGER;  Surgeon: Cosme Varela MD;  [facility];  Service: Orthopedics    PROSTATE BIOPSY      PROSTATECTOMY N/A 10/18/2021    Procedure: ROBOTIC SUBTOTAL/SUPRAPUBIC PROSTATECTOMY;  Surgeon: Kieran Vega MD;  [facility];  Service: Urology    SHOULDER ARTHROSCOPY Right     TONSILLECTOMY         Social History     Socioeconomic History    Marital status: /Civil Union     Spouse name: None    Number of children: None    Years of education: None    Highest education level: None   Occupational History    None   Tobacco Use    Smoking status: Every Day     Current packs/day: 0.50     Average packs/day: 0.5 packs/day for 52.5 years (26.3 ttl pk-yrs)     Types: Cigarettes     Start date: 1972     Passive exposure: Current    Smokeless tobacco: Never    Tobacco comments:     a little less than 1/2ppd, trying to cut back before surgery last smoked 5.25.23   Vaping Use    Vaping status: Never Used   Substance and Sexual Activity    Alcohol use: Never    Drug use: No    Sexual activity: Not Currently     Comment: defer   Other Topics Concern    None   Social History Narrative    Always uses seatbelt    Feels safe at home    Uses safety equipment     Social Determinants of Health     Financial Resource  "Strain: Not on file   Food Insecurity: No Food Insecurity (12/22/2023)    Hunger Vital Sign     Worried About Running Out of Food in the Last Year: Never true     Ran Out of Food in the Last Year: Never true   Transportation Needs: No Transportation Needs (12/22/2023)    PRAPARE - Transportation     Lack of Transportation (Medical): No     Lack of Transportation (Non-Medical): No   Physical Activity: Not on file   Stress: Not on file   Social Connections: Not on file   Intimate Partner Violence: Not on file   Housing Stability: Unknown (12/22/2023)    Housing Stability Vital Sign     Unable to Pay for Housing in the Last Year: No     Number of Times Moved in the Last Year: Not on file     Homeless in the Last Year: No       Family History   Problem Relation Age of Onset    No Known Problems Mother     Heart disease Father     Colon cancer Father     Colon cancer Sister        Allergies   Allergen Reactions    Other Other (See Comments)     Pt and wife reports took a medication years ago at work cant recall the name or what it was for\"     \"couldn't talk and cheeks swelled and also right hand\"         Current Outpatient Medications:     acetaminophen (TYLENOL) 500 mg tablet, Take 500 mg by mouth every 6 (six) hours as needed for mild pain, Disp: , Rfl:     amLODIPine (NORVASC) 10 mg tablet, Take 10 mg by mouth every evening , Disp: , Rfl:     aspirin (ECOTRIN LOW STRENGTH) 81 mg EC tablet, Take 81 mg by mouth daily, Disp: , Rfl:     atorvastatin (LIPITOR) 80 mg tablet, TAKE 1 TABLET BY MOUTH EVERY DAY IN THE EVENING, Disp: 90 tablet, Rfl: 1    hydroxyurea (HYDREA) 500 mg capsule, Take 1 capsule (500 mg total) by mouth daily, Disp: 90 capsule, Rfl: 2    metoprolol tartrate (LOPRESSOR) 50 mg tablet, Take 50 mg by mouth 2 (two) times a day , Disp: , Rfl:     Multiple Vitamins-Minerals (VITAMIN D3 COMPLETE PO), Take 1 capsule by mouth daily, Disp: , Rfl:     nitroglycerin (NITROSTAT) 0.4 mg SL tablet, Place 1 tablet (0.4 " "mg total) under the tongue every 5 (five) minutes as needed for chest pain, Disp: 10 tablet, Rfl: 1    Omega-3 Fatty Acids (FISH OIL) 1200 MG CAPS, Take 1,200 mg by mouth 2 (two) times a day  , Disp: , Rfl:     omeprazole (PriLOSEC) 20 mg delayed release capsule, TAKE 1 CAPSULE BY MOUTH EVERY DAY, Disp: 90 capsule, Rfl: 1    polyethylene glycol (GOLYTELY) 4000 mL solution, Take as instructed on bowel preparation instructions, Disp: 4000 mL, Rfl: 0    albuterol (Ventolin HFA) 90 mcg/act inhaler, Inhale 2 puffs every 4 (four) hours as needed for wheezing (Patient not taking: Reported on 1/19/2024), Disp: 18 g, Rfl: 1      Physical Exam:  /66 (BP Location: Left arm, Patient Position: Sitting, Cuff Size: Adult)   Pulse 61   Temp (!) 97.2 °F (36.2 °C) (Temporal)   Resp 16   Ht 5' 8\" (1.727 m)   Wt 74.6 kg (164 lb 8 oz)   SpO2 97%   BMI 25.01 kg/m²     Physical Exam  Vitals reviewed.   Constitutional:       General: He is not in acute distress.     Appearance: He is well-developed. He is not ill-appearing.   HENT:      Head: Normocephalic and atraumatic.   Eyes:      General: No scleral icterus.     Conjunctiva/sclera: Conjunctivae normal.   Cardiovascular:      Rate and Rhythm: Normal rate and regular rhythm.      Heart sounds: Normal heart sounds. No murmur heard.  Pulmonary:      Effort: Pulmonary effort is normal. No respiratory distress.      Breath sounds: Normal breath sounds.   Abdominal:      Palpations: Abdomen is soft.      Tenderness: There is no abdominal tenderness.   Musculoskeletal:         General: No tenderness. Normal range of motion.      Cervical back: Normal range of motion and neck supple.      Right lower leg: No edema.      Left lower leg: No edema.   Lymphadenopathy:      Cervical: No cervical adenopathy.   Skin:     General: Skin is warm and dry.   Neurological:      Mental Status: He is alert and oriented to person, place, and time.      Cranial Nerves: No cranial nerve deficit. "   Psychiatric:         Mood and Affect: Mood normal.         Behavior: Behavior normal.       Labs:  Lab Results   Component Value Date    WBC 10.36 (H) 06/24/2024    HGB 17.5 (H) 06/24/2024    HCT 50.5 (H) 06/24/2024     (H) 06/24/2024     (L) 06/24/2024     I have spent 20 minutes with Patient  today in which greater than 50% of this time was spent in counseling/coordination of care regarding Diagnostic results, Risks and benefits of tx options, Instructions for management, Impressions, Documenting in the medical record, Reviewing / ordering tests, medicine, procedures  , and Obtaining or reviewing history  .    Patient voiced understanding and agreement in the above discussion. Aware to contact our office with questions/symptoms in the interim.     This note has been generated by voice recognition software system.  Therefore, there may be spelling, grammar, and or syntax errors. Please contact if questions arise.

## 2024-07-22 ENCOUNTER — APPOINTMENT (OUTPATIENT)
Dept: LAB | Facility: MEDICAL CENTER | Age: 79
End: 2024-07-22
Payer: COMMERCIAL

## 2024-07-22 DIAGNOSIS — Z51.11 ENCOUNTER FOR ANTINEOPLASTIC CHEMOTHERAPY: ICD-10-CM

## 2024-07-22 DIAGNOSIS — Z15.89 JAK2 GENE MUTATION: ICD-10-CM

## 2024-07-22 DIAGNOSIS — D45 POLYCYTHEMIA VERA (HCC): ICD-10-CM

## 2024-07-22 LAB
BASOPHILS # BLD AUTO: 0.03 THOUSANDS/ÂΜL (ref 0–0.1)
BASOPHILS NFR BLD AUTO: 0 % (ref 0–1)
EOSINOPHIL # BLD AUTO: 0.16 THOUSAND/ÂΜL (ref 0–0.61)
EOSINOPHIL NFR BLD AUTO: 2 % (ref 0–6)
ERYTHROCYTE [DISTWIDTH] IN BLOOD BY AUTOMATED COUNT: 13.7 % (ref 11.6–15.1)
HCT VFR BLD AUTO: 43 % (ref 36.5–49.3)
HGB BLD-MCNC: 14.4 G/DL (ref 12–17)
IMM GRANULOCYTES # BLD AUTO: 0.05 THOUSAND/UL (ref 0–0.2)
IMM GRANULOCYTES NFR BLD AUTO: 1 % (ref 0–2)
LYMPHOCYTES # BLD AUTO: 1.6 THOUSANDS/ÂΜL (ref 0.6–4.47)
LYMPHOCYTES NFR BLD AUTO: 15 % (ref 14–44)
MCH RBC QN AUTO: 38.2 PG (ref 26.8–34.3)
MCHC RBC AUTO-ENTMCNC: 33.5 G/DL (ref 31.4–37.4)
MCV RBC AUTO: 114 FL (ref 82–98)
MONOCYTES # BLD AUTO: 0.79 THOUSAND/ÂΜL (ref 0.17–1.22)
MONOCYTES NFR BLD AUTO: 7 % (ref 4–12)
NEUTROPHILS # BLD AUTO: 8.12 THOUSANDS/ÂΜL (ref 1.85–7.62)
NEUTS SEG NFR BLD AUTO: 75 % (ref 43–75)
NRBC BLD AUTO-RTO: 0 /100 WBCS
PLATELET # BLD AUTO: 308 THOUSANDS/UL (ref 149–390)
PMV BLD AUTO: 9.5 FL (ref 8.9–12.7)
RBC # BLD AUTO: 3.77 MILLION/UL (ref 3.88–5.62)
WBC # BLD AUTO: 10.75 THOUSAND/UL (ref 4.31–10.16)

## 2024-07-22 PROCEDURE — 36415 COLL VENOUS BLD VENIPUNCTURE: CPT

## 2024-07-22 PROCEDURE — 85025 COMPLETE CBC W/AUTO DIFF WBC: CPT

## 2024-07-25 ENCOUNTER — HOSPITAL ENCOUNTER (OUTPATIENT)
Dept: INFUSION CENTER | Facility: CLINIC | Age: 79
Discharge: HOME | End: 2024-07-25

## 2024-07-25 ENCOUNTER — OFFICE VISIT (OUTPATIENT)
Dept: FAMILY MEDICINE CLINIC | Facility: CLINIC | Age: 79
End: 2024-07-25
Payer: COMMERCIAL

## 2024-07-25 VITALS
DIASTOLIC BLOOD PRESSURE: 70 MMHG | WEIGHT: 164.2 LBS | BODY MASS INDEX: 24.89 KG/M2 | HEIGHT: 68 IN | HEART RATE: 60 BPM | SYSTOLIC BLOOD PRESSURE: 112 MMHG | OXYGEN SATURATION: 99 %

## 2024-07-25 DIAGNOSIS — J44.9 CHRONIC OBSTRUCTIVE PULMONARY DISEASE, UNSPECIFIED COPD TYPE (HCC): ICD-10-CM

## 2024-07-25 DIAGNOSIS — I10 BENIGN ESSENTIAL HYPERTENSION: Primary | ICD-10-CM

## 2024-07-25 DIAGNOSIS — R73.03 PREDIABETES: ICD-10-CM

## 2024-07-25 DIAGNOSIS — F17.210 CIGARETTE NICOTINE DEPENDENCE WITHOUT COMPLICATION: ICD-10-CM

## 2024-07-25 DIAGNOSIS — D45 POLYCYTHEMIA VERA (HCC): ICD-10-CM

## 2024-07-25 DIAGNOSIS — N18.32 STAGE 3B CHRONIC KIDNEY DISEASE (HCC): ICD-10-CM

## 2024-07-25 DIAGNOSIS — N40.0 BENIGN PROSTATIC HYPERPLASIA WITHOUT LOWER URINARY TRACT SYMPTOMS: ICD-10-CM

## 2024-07-25 DIAGNOSIS — I25.10 CORONARY ARTERY DISEASE WITHOUT ANGINA PECTORIS, UNSPECIFIED VESSEL OR LESION TYPE, UNSPECIFIED WHETHER NATIVE OR TRANSPLANTED HEART: ICD-10-CM

## 2024-07-25 DIAGNOSIS — E78.2 MIXED HYPERLIPIDEMIA: ICD-10-CM

## 2024-07-25 DIAGNOSIS — Z12.5 SCREENING FOR PROSTATE CANCER: ICD-10-CM

## 2024-07-25 PROBLEM — N18.31 STAGE 3A CHRONIC KIDNEY DISEASE (HCC): Status: RESOLVED | Noted: 2024-01-24 | Resolved: 2024-07-25

## 2024-07-25 PROCEDURE — G0439 PPPS, SUBSEQ VISIT: HCPCS | Performed by: FAMILY MEDICINE

## 2024-07-25 PROCEDURE — 99214 OFFICE O/P EST MOD 30 MIN: CPT | Performed by: FAMILY MEDICINE

## 2024-07-25 NOTE — ASSESSMENT & PLAN NOTE
Lab Results   Component Value Date    EGFR 59 06/24/2024    EGFR 49 05/15/2024    EGFR 52 01/10/2024    CREATININE 1.17 06/24/2024    CREATININE 1.37 (H) 05/15/2024    CREATININE 1.30 01/10/2024   GFR stable at 59.  Will continue to monitor

## 2024-07-25 NOTE — PATIENT INSTRUCTIONS
Medicare Preventive Visit Patient Instructions  Thank you for completing your Welcome to Medicare Visit or Medicare Annual Wellness Visit today. Your next wellness visit will be due in one year (7/26/2025).  The screening/preventive services that you may require over the next 5-10 years are detailed below. Some tests may not apply to you based off risk factors and/or age. Screening tests ordered at today's visit but not completed yet may show as past due. Also, please note that scanned in results may not display below.  Preventive Screenings:  Service Recommendations Previous Testing/Comments   Colorectal Cancer Screening  Colonoscopy    Fecal Occult Blood Test (FOBT)/Fecal Immunochemical Test (FIT)  Fecal DNA/Cologuard Test  Flexible Sigmoidoscopy Age: 45-75 years old   Colonoscopy: every 10 years (May be performed more frequently if at higher risk)  OR  FOBT/FIT: every 1 year  OR  Cologuard: every 3 years  OR  Sigmoidoscopy: every 5 years  Screening may be recommended earlier than age 45 if at higher risk for colorectal cancer. Also, an individualized decision between you and your healthcare provider will decide whether screening between the ages of 76-85 would be appropriate. Colonoscopy: 05/22/2017  FOBT/FIT: Not on file  Cologuard: Not on file  Sigmoidoscopy: Not on file          Prostate Cancer Screening Individualized decision between patient and health care provider in men between ages of 55-69   Medicare will cover every 12 months beginning on the day after your 50th birthday PSA: 0.36 ng/mL           Hepatitis C Screening Once for adults born between 1945 and 1965  More frequently in patients at high risk for Hepatitis C Hep C Antibody: Not on file        Diabetes Screening 1-2 times per year if you're at risk for diabetes or have pre-diabetes Fasting glucose: 99 mg/dL (5/15/2024)  A1C: 4.7 % (6/24/2024)      Cholesterol Screening Once every 5 years if you don't have a lipid disorder. May order more often  based on risk factors. Lipid panel: 01/10/2024         Other Preventive Screenings Covered by Medicare:  Abdominal Aortic Aneurysm (AAA) Screening: covered once if your at risk. You're considered to be at risk if you have a family history of AAA or a male between the age of 65-75 who smoking at least 100 cigarettes in your lifetime.  Lung Cancer Screening: covers low dose CT scan once per year if you meet all of the following conditions: (1) Age 55-77; (2) No signs or symptoms of lung cancer; (3) Current smoker or have quit smoking within the last 15 years; (4) You have a tobacco smoking history of at least 20 pack years (packs per day x number of years you smoked); (5) You get a written order from a healthcare provider.  Glaucoma Screening: covered annually if you're considered high risk: (1) You have diabetes OR (2) Family history of glaucoma OR (3)  aged 50 and older OR (4)  American aged 65 and older  Osteoporosis Screening: covered every 2 years if you meet one of the following conditions: (1) Have a vertebral abnormality; (2) On glucocorticoid therapy for more than 3 months; (3) Have primary hyperparathyroidism; (4) On osteoporosis medications and need to assess response to drug therapy.  HIV Screening: covered annually if you're between the age of 15-65. Also covered annually if you are younger than 15 and older than 65 with risk factors for HIV infection. For pregnant patients, it is covered up to 3 times per pregnancy.    Immunizations:  Immunization Recommendations   Influenza Vaccine Annual influenza vaccination during flu season is recommended for all persons aged >= 6 months who do not have contraindications   Pneumococcal Vaccine   * Pneumococcal conjugate vaccine = PCV13 (Prevnar 13), PCV15 (Vaxneuvance), PCV20 (Prevnar 20)  * Pneumococcal polysaccharide vaccine = PPSV23 (Pneumovax) Adults 19-63 yo with certain risk factors or if 65+ yo  If never received any pneumonia vaccine:  recommend Prevnar 20 (PCV20)  Give PCV20 if previously received 1 dose of PCV13 or PPSV23   Hepatitis B Vaccine 3 dose series if at intermediate or high risk (ex: diabetes, end stage renal disease, liver disease)   Respiratory syncytial virus (RSV) Vaccine - COVERED BY MEDICARE PART D  * RSVPreF3 (Arexvy) CDC recommends that adults 60 years of age and older may receive a single dose of RSV vaccine using shared clinical decision-making (SCDM)   Tetanus (Td) Vaccine - COST NOT COVERED BY MEDICARE PART B Following completion of primary series, a booster dose should be given every 10 years to maintain immunity against tetanus. Td may also be given as tetanus wound prophylaxis.   Tdap Vaccine - COST NOT COVERED BY MEDICARE PART B Recommended at least once for all adults. For pregnant patients, recommended with each pregnancy.   Shingles Vaccine (Shingrix) - COST NOT COVERED BY MEDICARE PART B  2 shot series recommended in those 19 years and older who have or will have weakened immune systems or those 50 years and older     Health Maintenance Due:      Topic Date Due   • Colorectal Cancer Screening  05/22/2020   • Lung Cancer Screening  11/03/2024   • Hepatitis C Screening  Completed     Immunizations Due:      Topic Date Due   • COVID-19 Vaccine (1 - 2023-24 season) Never done   • Influenza Vaccine (1) 09/01/2024     Advance Directives   What are advance directives?  Advance directives are legal documents that state your wishes and plans for medical care. These plans are made ahead of time in case you lose your ability to make decisions for yourself. Advance directives can apply to any medical decision, such as the treatments you want, and if you want to donate organs.   What are the types of advance directives?  There are many types of advance directives, and each state has rules about how to use them. You may choose a combination of any of the following:  Living will:  This is a written record of the treatment you  want. You can also choose which treatments you do not want, which to limit, and which to stop at a certain time. This includes surgery, medicine, IV fluid, and tube feedings.   Durable power of  for healthcare (DPAHC):  This is a written record that states who you want to make healthcare choices for you when you are unable to make them for yourself. This person, called a proxy, is usually a family member or a friend. You may choose more than 1 proxy.  Do not resuscitate (DNR) order:  A DNR order is used in case your heart stops beating or you stop breathing. It is a request not to have certain forms of treatment, such as CPR. A DNR order may be included in other types of advance directives.  Medical directive:  This covers the care that you want if you are in a coma, near death, or unable to make decisions for yourself. You can list the treatments you want for each condition. Treatment may include pain medicine, surgery, blood transfusions, dialysis, IV or tube feedings, and a ventilator (breathing machine).  Values history:  This document has questions about your views, beliefs, and how you feel and think about life. This information can help others choose the care that you would choose.  Why are advance directives important?  An advance directive helps you control your care. Although spoken wishes may be used, it is better to have your wishes written down. Spoken wishes can be misunderstood, or not followed. Treatments may be given even if you do not want them. An advance directive may make it easier for your family to make difficult choices about your care.   Cigarette Smoking and Your Health   Risks to your health if you smoke:  Nicotine and other chemicals found in tobacco damage every cell in your body. Even if you are a light smoker, you have an increased risk for cancer, heart disease, and lung disease. If you are pregnant or have diabetes, smoking increases your risk for complications.   Benefits to  your health if you stop smoking:   You decrease respiratory symptoms such as coughing, wheezing, and shortness of breath.   You reduce your risk for cancers of the lung, mouth, throat, kidney, bladder, pancreas, stomach, and cervix. If you already have cancer, you increase the benefits of chemotherapy. You also reduce your risk for cancer returning or a second cancer from developing.   You reduce your risk for heart disease, blood clots, heart attack, and stroke.   You reduce your risk for lung infections, and diseases such as pneumonia, asthma, chronic bronchitis, and emphysema.  Your circulation improves. More oxygen can be delivered to your body. If you have diabetes, you lower your risk for complications, such as kidney, artery, and eye diseases. You also lower your risk for nerve damage. Nerve damage can lead to amputations, poor vision, and blindness.  You improve your body's ability to heal and to fight infections.  For more information and support to stop smoking:   Smokefree.gov  Phone: 8- 550 - 098-4272  Web Address: www.[employer]ial     © Copyright [employer] 2018 Information is for End User's use only and may not be sold, redistributed or otherwise used for commercial purposes. All illustrations and images included in CareNotes® are the copyrighted property of [employer]., Inc[employer] or [employer]

## 2024-07-25 NOTE — PROGRESS NOTES
Ambulatory Visit  Name: Santana Jacinto      : 1945      MRN: [id number]  Encounter Provider: Mina Denny DO  Encounter Date: 2024   Encounter department: [facility]    Assessment & Plan   1. Benign essential hypertension  Assessment & Plan:  Controlled on amlodipine 10 and metoprolol 50 twice daily  Orders:  -     TSH, 3rd generation with Free T4 reflex; Future; Expected date: 2025  2. Prediabetes  Assessment & Plan:  A1c from  4.7%.  Continue to work on diet and exercise.  Will continue to monitor  Orders:  -     Comprehensive metabolic panel; Future; Expected date: 2025  -     Hemoglobin A1C; Future; Expected date: 2025  3. Mixed hyperlipidemia  Assessment & Plan:  Continue atorvastatin 80 mg daily.  Orders:  -     Lipid Panel with Direct LDL reflex; Future; Expected date: 2025  4. Coronary artery disease without angina pectoris, unspecified vessel or lesion type, unspecified whether native or transplanted heart  Assessment & Plan:  Status post two-vessel stent 10 years ago.  Patient had pacemaker implanted .  Doing well.  Patient denies any chest pain or shortness of breath.  Continue follow-up with cardiologist as directed  5. Polycythemia vera (HCC)  Assessment & Plan:  Longstanding history of polycythemia.  Being followed by hematology.  He does get periodic pubic phlebotomy.  Orders:  -     CBC and differential; Future; Expected date: 2025  6. Cigarette nicotine dependence without complication  Assessment & Plan:  Patient still smoking approxi-1/2 pack/day.  Counseled again.  He is greater than 30-pack-year history.  Last CT chest from 2023 negative.  Recommend continue to check yearly until age 81  7. Chronic obstructive pulmonary disease, unspecified COPD type (HCC)  Assessment & Plan:  Patient still smoking half pack cigarettes a day.  Counseled again.  Continue albuterol as needed.  8. Benign prostatic hyperplasia without  "lower urinary tract symptoms  9. Screening for prostate cancer  -     PSA, Total Screen; Future; Expected date: 01/11/2025  10. Stage 3b chronic kidney disease (HCC)  Assessment & Plan:  Lab Results   Component Value Date    EGFR 59 06/24/2024    EGFR 49 05/15/2024    EGFR 52 01/10/2024    CREATININE 1.17 06/24/2024    CREATININE 1.37 (H) 05/15/2024    CREATININE 1.30 01/10/2024   GFR stable at 59.  Will continue to monitor       Patient will be getting colonoscopy and EGD done in a few days.    Patient had Prevnar 20 earlier this year.  Patient continues to decline all other age-appropriate vaccinations at this time    6 months, fasting blood work prior        History of Present Illness     Patient presents for recheck of chronic medical problems.  He has been complaining of abdominal pain.  This is being worked up by gastroenterology.  He has colonoscopy and EGD scheduled in near future.  Otherwise he is doing well.  He is compliant on prescribed medications.  Labs done June 24.      Review of Systems   Respiratory: Negative.     Cardiovascular: Negative.    Gastrointestinal: Negative.    Genitourinary: Negative.      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\"   • Full dentures     but doesnt wear them   • History of 2019 novel coronavirus disease (COVID-19) 02/2021    hospital for 2 days but not in ICU/\"mainly dehydrated\" \"also fever/oxygen below 90\"   • History of coronary artery stent placement     x2 in 2008 or so; sees [facility]-- Dr Hutton cardio   • [place] (hard of hearing)     no hearing aids yet   • Hyperlipidemia     Last Assessed:  " "11/24/17   • Hypertension     Benign essential, Last Assessed:  11/24/17   • LBBB (left bundle branch block)    • Nocturia    • Polycythemia vera (HCC)     (per history in chart)   • PVD (peripheral vascular disease) (HCC)    • RBC abnormality     pt reports told has high Red blood cells/goes to infusion center regularly next appt 10/8/21 hematologist is Dr Charlton of [facility](had been Dr Ahn)   • Seasickness    • Shortness of breath     \"if goes up steps gets SOB, had for awhile\"   • Slow urinary stream     \"sometimes feels like bladder isnt all the way empty\"   • Snores    • Wears glasses     reading     Past Surgical History:   Procedure Laterality Date   • ANGIOPLASTY     • APPENDECTOMY     • CARDIAC CATHETERIZATION N/A 07/28/2022    Procedure: CARDIAC TEMPORARY PACEMAKER;  Surgeon: Fransico Morgan MD;  [facility];  Service: Cardiology   • CARDIAC ELECTROPHYSIOLOGY PROCEDURE N/A 07/29/2022    Procedure: Cardiac pacer implant;  Surgeon: Anand Valero MD;  [facility];  Service: Cardiology   • CATARACT EXTRACTION Bilateral    • CT CYSTOGRAM  11/04/2021   • ELBOW SURGERY Right    • FL CYSTOGRAM  10/27/2021   • IR BIOPSY BONE MARROW  10/14/2022   • KNEE ARTHROSCOPY Right    • IL COLONOSCOPY FLX DX W/COLLJ SPEC WHEN PFRMD N/A 05/22/2017    Procedure: COLONOSCOPY;  Surgeon: Josseline Zhou DO;  Location: [facility];  Service: Gastroenterology   • IL NEUROPLASTY &/TRANSPOS MEDIAN NRV CARPAL TUNNE Right 5/26/2023    Procedure: CTR, RIGHT;  Surgeon: Cosme Varela MD;  [facility];  Service: Orthopedics   • IL TENDON SHEATH INCISION Right 5/26/2023    Procedure: RELEASE TRIGGER FINGER, RIGHT RING FINGER;  Surgeon: Cosme Varela MD;  [facility];  Service: Orthopedics   • PROSTATE BIOPSY     • PROSTATECTOMY N/A 10/18/2021    Procedure: ROBOTIC SUBTOTAL/SUPRAPUBIC PROSTATECTOMY;  Surgeon: Kieran Vega MD;  [facility];  Service: Urology   • SHOULDER " ARTHROSCOPY Right    • TONSILLECTOMY       Family History   Problem Relation Age of Onset   • No Known Problems Mother    • Heart disease Father    • Colon cancer Father    • Colon cancer Sister      Social History     Tobacco Use   • Smoking status: Every Day     Current packs/day: 0.50     Average packs/day: 0.5 packs/day for 52.6 years (26.3 ttl pk-yrs)     Types: Cigarettes     Start date: 1972     Passive exposure: Current   • Smokeless tobacco: Never   • Tobacco comments:     a little less than 1/2ppd, trying to cut back before surgery last smoked 5.25.23   Vaping Use   • Vaping status: Never Used   Substance and Sexual Activity   • Alcohol use: Never   • Drug use: No   • Sexual activity: Not Currently     Comment: defer     Current Outpatient Medications on File Prior to Visit   Medication Sig   • acetaminophen (TYLENOL) 500 mg tablet Take 500 mg by mouth every 6 (six) hours as needed for mild pain   • amLODIPine (NORVASC) 10 mg tablet Take 10 mg by mouth every evening    • aspirin (ECOTRIN LOW STRENGTH) 81 mg EC tablet Take 81 mg by mouth daily   • atorvastatin (LIPITOR) 80 mg tablet TAKE 1 TABLET BY MOUTH EVERY DAY IN THE EVENING   • hydroxyurea (HYDREA) 500 mg capsule Take 1 capsule (500 mg total) by mouth daily   • metoprolol tartrate (LOPRESSOR) 50 mg tablet Take 50 mg by mouth 2 (two) times a day    • Multiple Vitamins-Minerals (VITAMIN D3 COMPLETE PO) Take 1 capsule by mouth daily   • nitroglycerin (NITROSTAT) 0.4 mg SL tablet Place 1 tablet (0.4 mg total) under the tongue every 5 (five) minutes as needed for chest pain   • Omega-3 Fatty Acids (FISH OIL) 1200 MG CAPS Take 1,200 mg by mouth 2 (two) times a day     • omeprazole (PriLOSEC) 20 mg delayed release capsule TAKE 1 CAPSULE BY MOUTH EVERY DAY   • polyethylene glycol (GOLYTELY) 4000 mL solution Take as instructed on bowel preparation instructions   • albuterol (Ventolin HFA) 90 mcg/act inhaler Inhale 2 puffs every 4 (four) hours as needed for  "wheezing (Patient not taking: Reported on 1/19/2024)     Allergies   Allergen Reactions   • Other Other (See Comments)     Pt and wife reports took a medication years ago at work cant recall the name or what it was for\"     \"couldn't talk and cheeks swelled and also right hand\"     Immunization History   Administered Date(s) Administered   • Pneumococcal Conjugate Vaccine 20-valent (Pcv20), Polysace 01/22/2024   • Pneumococcal Polysaccharide PPV23 1945     Objective     /70 (BP Location: Left arm, Patient Position: Sitting, Cuff Size: Adult)   Pulse 60   Ht 5' 8\" (1.727 m)   Wt 74.5 kg (164 lb 3.2 oz)   SpO2 99%   BMI 24.97 kg/m²     Physical Exam  Constitutional:       Appearance: Normal appearance.   Eyes:      Pupils: Pupils are equal, round, and reactive to light.   Neck:      Vascular: No carotid bruit.   Cardiovascular:      Rate and Rhythm: Normal rate and regular rhythm.      Pulses: Normal pulses.      Heart sounds: Normal heart sounds. No murmur heard.     No gallop.   Pulmonary:      Effort: Pulmonary effort is normal.      Breath sounds: Normal breath sounds.   Neurological:      Mental Status: He is alert.   Psychiatric:         Mood and Affect: Mood normal.         Behavior: Behavior normal.         "

## 2024-07-25 NOTE — ASSESSMENT & PLAN NOTE
Status post two-vessel stent 10 years ago.  Patient had pacemaker implanted 2022.  Doing well.  Patient denies any chest pain or shortness of breath.  Continue follow-up with cardiologist as directed

## 2024-07-25 NOTE — ASSESSMENT & PLAN NOTE
Patient still smoking half pack cigarettes a day.  Counseled again.  Continue albuterol as needed.

## 2024-07-25 NOTE — ASSESSMENT & PLAN NOTE
Patient still smoking approxi-1/2 pack/day.  Counseled again.  He is greater than 30-pack-year history.  Last CT chest from November 2023 negative.  Recommend continue to check yearly until age 81

## 2024-07-25 NOTE — ASSESSMENT & PLAN NOTE
Longstanding history of polycythemia.  Being followed by hematology.  He does get periodic pubic phlebotomy.

## 2024-07-25 NOTE — PROGRESS NOTES
Medicare wellness visit    Ambulatory Visit  Name: Santana Jacinto      : 1945      MRN: [id number]  Encounter Provider: Mina Denny DO  Encounter Date: 2024   Encounter department: [facility]    Assessment & Plan   1. Benign essential hypertension  2. Prediabetes  3. Mixed hyperlipidemia  4. Coronary artery disease without angina pectoris, unspecified vessel or lesion type, unspecified whether native or transplanted heart  5. Polycythemia vera (HCC)  6. Cigarette nicotine dependence without complication  7. Chronic obstructive pulmonary disease, unspecified COPD type (HCC)  8. Benign prostatic hyperplasia without lower urinary tract symptoms  9. Screening for prostate cancer       Preventive health issues were discussed with patient, and age appropriate screening tests were ordered as noted in patient's After Visit Summary. Personalized health advice and appropriate referrals for health education or preventive services given if needed, as noted in patient's After Visit Summary.    History of Present Illness     HPI   Patient Care Team:  Mina Denny DO as PCP - General  Kieran MD Dirk Flores DO Kimberly Jegel Chaput, DO Kimberly Jegel Chaput, DO as Endoscopist  Kassidy Charlton MD (Hematology and Oncology)    Review of Systems  Medical History Reviewed by provider this encounter:  Tobacco  Allergies  Meds  Problems  Med Hx  Surg Hx  Fam Hx       Annual Wellness Visit Questionnaire       Health Risk Assessment:   Patient rates overall health as fair. Patient feels that their physical health rating is same. Patient is satisfied with their life. Eyesight was rated as same. Hearing was rated as same. Patient feels that their emotional and mental health rating is same. Patients states they are never, rarely angry. Patient states they are sometimes unusually tired/fatigued. Pain experienced in the last 7 days has been some. Patient's pain rating has been  5/10. Patient states that he has experienced no weight loss or gain in last 6 months.     Depression Screening:   PHQ-2 Score: 0      Fall Risk Screening:   In the past year, patient has experienced: no history of falling in past year      Home Safety:  Patient does not have trouble with stairs inside or outside of their home. Patient has working smoke alarms and has working carbon monoxide detector. Home safety hazards include: none.     Nutrition:   Current diet is Regular.     Medications:   Patient is currently taking over-the-counter supplements. OTC medications include: see medication list. Patient is able to manage medications.     Activities of Daily Living (ADLs)/Instrumental Activities of Daily Living (IADLs):   Walk and transfer into and out of bed and chair?: Yes  Dress and groom yourself?: Yes    Bathe or shower yourself?: Yes    Feed yourself? Yes  Do your laundry/housekeeping?: Yes  Manage your money, pay your bills and track your expenses?: Yes  Make your own meals?: Yes    Do your own shopping?: Yes    Previous Hospitalizations:   Any hospitalizations or ED visits within the last 12 months?: No      Advance Care Planning:   Living will: No      PREVENTIVE SCREENINGS      Cardiovascular Screening:    General: Screening Not Indicated and History Lipid Disorder      Diabetes Screening:     General: Screening Current      Colorectal Cancer Screening:     General: Risks and Benefits Discussed      Prostate Cancer Screening:    General: Screening Not Indicated      Osteoporosis Screening:    General: Risks and Benefits Discussed      Abdominal Aortic Aneurysm (AAA) Screening:    Risk factors include: tobacco use        General: Risks and Benefits Discussed      Lung Cancer Screening:     General: Screening Current      Hepatitis C Screening:    General: Screening Current    Screening, Brief Intervention, and Referral to Treatment (SBIRT)    Screening  Typical number of drinks in a day: 0  Typical number  "of drinks in a week: 0  Interpretation: Low risk drinking behavior.    Single Item Drug Screening:  How often have you used an illegal drug (including marijuana) or a prescription medication for non-medical reasons in the past year? never    Single Item Drug Screen Score: 0  Interpretation: Negative screen for possible drug use disorder    Social Determinants of Health     Food Insecurity: No Food Insecurity (7/25/2024)    Hunger Vital Sign    • Worried About Running Out of Food in the Last Year: Never true    • Ran Out of Food in the Last Year: Never true   Transportation Needs: No Transportation Needs (7/25/2024)    PRAPARE - Transportation    • Lack of Transportation (Medical): No    • Lack of Transportation (Non-Medical): No   Housing Stability: Unknown (7/25/2024)    Housing Stability Vital Sign    • Unable to Pay for Housing in the Last Year: No    • Homeless in the Last Year: No   Utilities: Not At Risk (7/25/2024)    [facility] Utilities    • Threatened with loss of utilities: No     No results found.    Objective     /70 (BP Location: Left arm, Patient Position: Sitting, Cuff Size: Adult)   Pulse 60   Ht 5' 8\" (1.727 m)   Wt 74.5 kg (164 lb 3.2 oz)   SpO2 99%   BMI 24.97 kg/m²     Physical Exam      "

## 2024-07-26 ENCOUNTER — HOSPITAL ENCOUNTER (OUTPATIENT)
Dept: INFUSION CENTER | Facility: CLINIC | Age: 79
Discharge: HOME | End: 2024-07-26

## 2024-07-29 ENCOUNTER — ANESTHESIA EVENT (OUTPATIENT)
Dept: GASTROENTEROLOGY | Facility: HOSPITAL | Age: 79
End: 2024-07-29

## 2024-07-29 ENCOUNTER — ANESTHESIA (OUTPATIENT)
Dept: GASTROENTEROLOGY | Facility: HOSPITAL | Age: 79
End: 2024-07-29

## 2024-07-29 ENCOUNTER — HOSPITAL ENCOUNTER (OUTPATIENT)
Dept: GASTROENTEROLOGY | Facility: HOSPITAL | Age: 79
Setting detail: OUTPATIENT SURGERY
Discharge: HOME | End: 2024-07-29
Attending: INTERNAL MEDICINE
Payer: COMMERCIAL

## 2024-07-29 VITALS
RESPIRATION RATE: 18 BRPM | WEIGHT: 164 LBS | TEMPERATURE: 97.8 F | DIASTOLIC BLOOD PRESSURE: 82 MMHG | OXYGEN SATURATION: 95 % | SYSTOLIC BLOOD PRESSURE: 136 MMHG | BODY MASS INDEX: 24.86 KG/M2 | HEIGHT: 68 IN | HEART RATE: 76 BPM

## 2024-07-29 DIAGNOSIS — K21.9 GASTROESOPHAGEAL REFLUX DISEASE WITHOUT ESOPHAGITIS: ICD-10-CM

## 2024-07-29 DIAGNOSIS — Z86.010 HISTORY OF COLON POLYPS: ICD-10-CM

## 2024-07-29 PROCEDURE — 88342 IMHCHEM/IMCYTCHM 1ST ANTB: CPT | Performed by: PATHOLOGY

## 2024-07-29 PROCEDURE — 43239 EGD BIOPSY SINGLE/MULTIPLE: CPT | Performed by: INTERNAL MEDICINE

## 2024-07-29 PROCEDURE — 88305 TISSUE EXAM BY PATHOLOGIST: CPT | Performed by: PATHOLOGY

## 2024-07-29 PROCEDURE — 45385 COLONOSCOPY W/LESION REMOVAL: CPT | Performed by: INTERNAL MEDICINE

## 2024-07-29 RX ORDER — LIDOCAINE HYDROCHLORIDE 20 MG/ML
INJECTION, SOLUTION EPIDURAL; INFILTRATION; INTRACAUDAL; PERINEURAL AS NEEDED
Status: DISCONTINUED | OUTPATIENT
Start: 2024-07-29 | End: 2024-07-29

## 2024-07-29 RX ORDER — PROPOFOL 10 MG/ML
INJECTION, EMULSION INTRAVENOUS AS NEEDED
Status: DISCONTINUED | OUTPATIENT
Start: 2024-07-29 | End: 2024-07-29

## 2024-07-29 RX ORDER — SODIUM CHLORIDE, SODIUM LACTATE, POTASSIUM CHLORIDE, CALCIUM CHLORIDE 600; 310; 30; 20 MG/100ML; MG/100ML; MG/100ML; MG/100ML
INJECTION, SOLUTION INTRAVENOUS CONTINUOUS PRN
Status: DISCONTINUED | OUTPATIENT
Start: 2024-07-29 | End: 2024-07-29

## 2024-07-29 RX ADMIN — SODIUM CHLORIDE, SODIUM LACTATE, POTASSIUM CHLORIDE, AND CALCIUM CHLORIDE: .6; .31; .03; .02 INJECTION, SOLUTION INTRAVENOUS at 10:42

## 2024-07-29 RX ADMIN — Medication 40 MG: at 11:34

## 2024-07-29 RX ADMIN — PROPOFOL 100 MG: 10 INJECTION, EMULSION INTRAVENOUS at 11:08

## 2024-07-29 RX ADMIN — LIDOCAINE HYDROCHLORIDE 50 MG: 20 INJECTION, SOLUTION EPIDURAL; INFILTRATION; INTRACAUDAL at 11:08

## 2024-07-29 RX ADMIN — PROPOFOL 100 MCG/KG/MIN: 10 INJECTION, EMULSION INTRAVENOUS at 11:11

## 2024-07-29 NOTE — ANESTHESIA POSTPROCEDURE EVALUATION
Post-Op Assessment Note    CV Status:  Stable  Pain Score: 0    Pain management: adequate       Mental Status:  Awake and sleepy   Hydration Status:  Euvolemic   PONV Controlled:  Controlled   Airway Patency:  Patent     Post Op Vitals Reviewed: Yes    No anethesia notable event occurred.    Staff: SY           /67 (07/29/24 1159)    Temp 97.8 °F (36.6 °C) (07/29/24 1159)    Pulse 61 (07/29/24 1159)   Resp 16 (07/29/24 1159)    SpO2 94 % (07/29/24 1159)

## 2024-07-29 NOTE — ANESTHESIA POSTPROCEDURE EVALUATION
Post-Op Assessment Note        /67 (07/29/24 1159)    Temp 97.8 °F (36.6 °C) (07/29/24 1159)    Pulse 61 (07/29/24 1159)   Resp 16 (07/29/24 1159)    SpO2 94 % (07/29/24 1159)

## 2024-07-29 NOTE — H&P
"H&P EXAM - Outpatient Endoscopy   Santana Jacinto 78 y.o. male MRN: [id number]    [facility]   Encounter: [id number]        History and Physical - [facility]  Santana Jacinto 78 y.o. male MRN: [id number]                  HPI: Santana Jacinto is a 78 y.o. year old male who presents for history of colon polyps, family history of colon cancer, abdominal pain      REVIEW OF SYSTEMS: Per the HPI, and otherwise unremarkable.    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\"    Full dentures     but doesnt wear them    History of 2019 novel coronavirus disease (COVID-19) 02/2021    hospital for 2 days but not in ICU/\"mainly dehydrated\" \"also fever/oxygen below 90\"    History of coronary artery stent placement     x2 in 2008 or so; sees [facility]-- Dr Hutton cardio    [place] (hard of hearing)     no hearing aids yet    Hyperlipidemia     Last Assessed:  11/24/17    Hypertension     Benign essential, Last Assessed:  11/24/17    LBBB (left bundle branch block)     Nocturia     Polycythemia vera (HCC)     (per history in chart)    PVD (peripheral vascular disease) (HCC)     RBC abnormality     pt reports told has high Red blood cells/goes to infusion center regularly next appt 10/8/21 hematologist is Dr Charlton of [facility](had been Dr Ahn)    Seasickness     Shortness of breath     \"if goes up steps gets SOB, had for awhile\"    Slow urinary stream     \"sometimes feels like bladder isnt all the way empty\"    Snores     Wears glasses     reading     Past Surgical History:   Procedure Laterality " Date    ANGIOPLASTY      APPENDECTOMY      CARDIAC CATHETERIZATION N/A 07/28/2022    Procedure: CARDIAC TEMPORARY PACEMAKER;  Surgeon: Fransico Morgan MD;  [facility];  Service: Cardiology    CARDIAC ELECTROPHYSIOLOGY PROCEDURE N/A 07/29/2022    Procedure: Cardiac pacer implant;  Surgeon: Anand Valero MD;  [facility];  Service: Cardiology    CATARACT EXTRACTION Bilateral     CT CYSTOGRAM  11/04/2021    ELBOW SURGERY Right     FL CYSTOGRAM  10/27/2021    IR BIOPSY BONE MARROW  10/14/2022    KNEE ARTHROSCOPY Right     ND COLONOSCOPY FLX DX W/COLLJ SPEC WHEN PFRMD N/A 05/22/2017    Procedure: COLONOSCOPY;  Surgeon: Josseline Zhou DO;  [facility];  Service: Gastroenterology    ND NEUROPLASTY &/TRANSPOS MEDIAN NRV CARPAL TUNNE Right 5/26/2023    Procedure: CTR, RIGHT;  Surgeon: Cosme Varela MD;  [facility];  Service: Orthopedics    ND TENDON SHEATH INCISION Right 5/26/2023    Procedure: RELEASE TRIGGER FINGER, RIGHT RING FINGER;  Surgeon: Cosme Varela MD;  [facility];  Service: Orthopedics    PROSTATE BIOPSY      PROSTATECTOMY N/A 10/18/2021    Procedure: ROBOTIC SUBTOTAL/SUPRAPUBIC PROSTATECTOMY;  Surgeon: Kieran Vega MD;  [facility];  Service: Urology    SHOULDER ARTHROSCOPY Right     TONSILLECTOMY       Social History   Social History     Substance and Sexual Activity   Alcohol Use Never     Social History     Substance and Sexual Activity   Drug Use No     Social History     Tobacco Use   Smoking Status Every Day    Current packs/day: 0.50    Average packs/day: 0.5 packs/day for 52.6 years (26.3 ttl pk-yrs)    Types: Cigarettes    Start date: 1972    Passive exposure: Current   Smokeless Tobacco Never   Tobacco Comments    a little less than 1/2ppd, trying to cut back before surgery last smoked 5.25.23     Family History   Problem Relation Age of Onset    No Known Problems Mother     Heart disease Father     Colon cancer Father      "Colon cancer Sister        Meds/Allergies     Not in a hospital admission.    Allergies   Allergen Reactions    Other Other (See Comments)     Pt and wife reports took a medication years ago at work cant recall the name or what it was for\"     \"couldn't talk and cheeks swelled and also right hand\"       Objective     /82   Pulse 80   Temp 97.9 °F (36.6 °C) (Temporal)   Resp 20   Ht 5' 8\" (1.727 m)   Wt 74.4 kg (164 lb)   SpO2 94%   BMI 24.94 kg/m²       PHYSICAL EXAM    Gen: NAD  CV: RRR  CHEST: Clear  ABD: soft, NT/ND  EXT: no edema      ASSESSMENT/PLAN:  This is a 78 y.o. year old male here for egd/colonoscopy, and he is stable and optimized for his procedure.            "

## 2024-07-29 NOTE — ANESTHESIA PREPROCEDURE EVALUATION
Procedure:  COLONOSCOPY  EGD    Relevant Problems   CARDIO   (+) Benign essential hypertension   (+) CAD (coronary artery disease)   (+) Heart block   (+) Left bundle branch block   (+) Mixed hyperlipidemia   (+) Right-sided chest wall pain      GI/HEPATIC   (+) Gastroesophageal reflux disease without esophagitis   (+) Hepatic cyst      /RENAL   (+) Benign prostatic hyperplasia without lower urinary tract symptoms   (+) Stage 3b chronic kidney disease (HCC)      HEMATOLOGY   (+) Thrombocytopenia (HCC)      MUSCULOSKELETAL   (+) Cervical spondylosis without myelopathy      NEURO/PSYCH   (+) Chronic left shoulder pain      PULMONARY   (+) Chronic obstructive pulmonary disease (HCC)      Other   (+) Polycythemia vera (HCC)        Physical Exam    Airway    Mallampati score: II  TM Distance: >3 FB  Neck ROM: full     Dental    upper dentures and lower dentures    Cardiovascular  Cardiovascular exam normal    Pulmonary  Pulmonary exam normal     Other Findings        Anesthesia Plan  ASA Score- 3     Anesthesia Type- IV sedation with anesthesia with ASA Monitors.         Additional Monitors:     Airway Plan:            Plan Factors-Exercise tolerance (METS): >4 METS.    Chart reviewed. EKG reviewed. Imaging results reviewed. Existing labs reviewed. Patient summary reviewed.    Patient is a current smoker.  Patient instructed to abstain from smoking on day of procedure. Patient smoked on day of surgery.            Induction- intravenous.    Postoperative Plan-         Informed Consent- Anesthetic plan and risks discussed with patient.  I personally reviewed this patient with the CRNA. Discussed and agreed on the Anesthesia Plan with the CRNA..

## 2024-08-02 PROCEDURE — 88342 IMHCHEM/IMCYTCHM 1ST ANTB: CPT | Performed by: PATHOLOGY

## 2024-08-02 PROCEDURE — 88305 TISSUE EXAM BY PATHOLOGIST: CPT | Performed by: PATHOLOGY

## 2024-08-02 NOTE — RESULT ENCOUNTER NOTE
"Please call the patient with the results    Stomach biopsy shows \"intestinal metaplasia.\" This is a pre-cancerous change to the stomach lining and should be monitored. We will repeat EGD in 1 years to take additional biopsies.    The  colon polyp removed was called an adenoma. This is a pre-cancerous lesion and was completely removed. There was no evidence of cancer in the polyp.     He should have the colonoscopy repeated in 3 years due to a history of colon polyps          "

## 2024-08-21 ENCOUNTER — APPOINTMENT (OUTPATIENT)
Dept: LAB | Facility: MEDICAL CENTER | Age: 79
End: 2024-08-21
Payer: COMMERCIAL

## 2024-08-21 DIAGNOSIS — Z15.89 JAK2 GENE MUTATION: ICD-10-CM

## 2024-08-21 DIAGNOSIS — D45 POLYCYTHEMIA VERA (HCC): ICD-10-CM

## 2024-08-21 LAB
BASOPHILS # BLD AUTO: 0.03 THOUSANDS/ÂΜL (ref 0–0.1)
BASOPHILS NFR BLD AUTO: 0 % (ref 0–1)
CREAT UR-MCNC: 125 MG/DL
EOSINOPHIL # BLD AUTO: 0.29 THOUSAND/ÂΜL (ref 0–0.61)
EOSINOPHIL NFR BLD AUTO: 3 % (ref 0–6)
ERYTHROCYTE [DISTWIDTH] IN BLOOD BY AUTOMATED COUNT: 14.9 % (ref 11.6–15.1)
HCT VFR BLD AUTO: 44.8 % (ref 36.5–49.3)
HGB BLD-MCNC: 15.2 G/DL (ref 12–17)
IMM GRANULOCYTES # BLD AUTO: 0.06 THOUSAND/UL (ref 0–0.2)
IMM GRANULOCYTES NFR BLD AUTO: 1 % (ref 0–2)
LYMPHOCYTES # BLD AUTO: 1.95 THOUSANDS/ÂΜL (ref 0.6–4.47)
LYMPHOCYTES NFR BLD AUTO: 20 % (ref 14–44)
MCH RBC QN AUTO: 37.3 PG (ref 26.8–34.3)
MCHC RBC AUTO-ENTMCNC: 33.9 G/DL (ref 31.4–37.4)
MCV RBC AUTO: 110 FL (ref 82–98)
MICROALBUMIN UR-MCNC: 116.7 MG/L
MICROALBUMIN/CREAT 24H UR: 93 MG/G CREATININE (ref 0–30)
MONOCYTES # BLD AUTO: 0.68 THOUSAND/ÂΜL (ref 0.17–1.22)
MONOCYTES NFR BLD AUTO: 7 % (ref 4–12)
NEUTROPHILS # BLD AUTO: 6.9 THOUSANDS/ÂΜL (ref 1.85–7.62)
NEUTS SEG NFR BLD AUTO: 69 % (ref 43–75)
NRBC BLD AUTO-RTO: 0 /100 WBCS
PLATELET # BLD AUTO: 180 THOUSANDS/UL (ref 149–390)
PMV BLD AUTO: 9.8 FL (ref 8.9–12.7)
RBC # BLD AUTO: 4.08 MILLION/UL (ref 3.88–5.62)
WBC # BLD AUTO: 9.91 THOUSAND/UL (ref 4.31–10.16)

## 2024-08-21 PROCEDURE — 82570 ASSAY OF URINE CREATININE: CPT

## 2024-08-21 PROCEDURE — 85025 COMPLETE CBC W/AUTO DIFF WBC: CPT

## 2024-08-21 PROCEDURE — 82043 UR ALBUMIN QUANTITATIVE: CPT

## 2024-08-23 ENCOUNTER — HOSPITAL ENCOUNTER (OUTPATIENT)
Dept: INFUSION CENTER | Facility: CLINIC | Age: 79
Discharge: HOME | End: 2024-08-23

## 2024-09-05 ENCOUNTER — APPOINTMENT (OUTPATIENT)
Dept: RADIOLOGY | Facility: CLINIC | Age: 79
End: 2024-09-05
Payer: COMMERCIAL

## 2024-09-05 ENCOUNTER — OFFICE VISIT (OUTPATIENT)
Dept: FAMILY MEDICINE CLINIC | Facility: CLINIC | Age: 79
End: 2024-09-05
Payer: COMMERCIAL

## 2024-09-05 VITALS
WEIGHT: 166 LBS | SYSTOLIC BLOOD PRESSURE: 110 MMHG | BODY MASS INDEX: 25.24 KG/M2 | DIASTOLIC BLOOD PRESSURE: 70 MMHG | TEMPERATURE: 98.4 F | OXYGEN SATURATION: 95 % | HEART RATE: 60 BPM

## 2024-09-05 DIAGNOSIS — M25.562 ACUTE PAIN OF LEFT KNEE: Primary | ICD-10-CM

## 2024-09-05 DIAGNOSIS — M25.562 ACUTE PAIN OF LEFT KNEE: ICD-10-CM

## 2024-09-05 DIAGNOSIS — R73.03 PREDIABETES: ICD-10-CM

## 2024-09-05 DIAGNOSIS — N18.32 STAGE 3B CHRONIC KIDNEY DISEASE (HCC): ICD-10-CM

## 2024-09-05 PROCEDURE — 99213 OFFICE O/P EST LOW 20 MIN: CPT | Performed by: FAMILY MEDICINE

## 2024-09-05 PROCEDURE — 1159F MED LIST DOCD IN RCRD: CPT | Performed by: FAMILY MEDICINE

## 2024-09-05 PROCEDURE — 3074F SYST BP LT 130 MM HG: CPT | Performed by: FAMILY MEDICINE

## 2024-09-05 PROCEDURE — G2211 COMPLEX E/M VISIT ADD ON: HCPCS | Performed by: FAMILY MEDICINE

## 2024-09-05 PROCEDURE — 3078F DIAST BP <80 MM HG: CPT | Performed by: FAMILY MEDICINE

## 2024-09-05 PROCEDURE — 73562 X-RAY EXAM OF KNEE 3: CPT

## 2024-09-05 PROCEDURE — 1160F RVW MEDS BY RX/DR IN RCRD: CPT | Performed by: FAMILY MEDICINE

## 2024-09-05 RX ORDER — LIDOCAINE 50 MG/G
OINTMENT TOPICAL AS NEEDED
Qty: 30 G | Refills: 0 | Status: SHIPPED | OUTPATIENT
Start: 2024-09-05

## 2024-09-05 NOTE — ASSESSMENT & PLAN NOTE
Medial line tenderness and limited ROM; advised x-rays and f/u with ortho concern for mensicus injury based on tenderness and exam, although limited due to limited ROM; advised on topical lidocaine and Tylenol given limitations with NSAIDS due to CKD; f/u guidance given as he defers further pain medication for now

## 2024-09-05 NOTE — PROGRESS NOTES
Assessment/Plan:     1. Acute pain of left knee  Assessment & Plan:  Medial line tenderness and limited ROM; advised x-rays and f/u with ortho concern for mensicus injury based on tenderness and exam, although limited due to limited ROM; advised on topical lidocaine and Tylenol given limitations with NSAIDS due to CKD; f/u guidance given as he defers further pain medication for now  Orders:  -     XR knee 3 vw left non injury; Future; Expected date: 09/05/2024  -     Ambulatory Referral to Orthopedic Surgery; Future  -     lidocaine (XYLOCAINE) 5 % ointment; Apply topically as needed for mild pain  2. Stage 3b chronic kidney disease (HCC)  3. Prediabetes        Subjective:      Patient ID: Santana Jacinto is a 78 y.o. male.    Patient is here with concerns of L knee pain. Started 4 days ago. No trauma. No swelling. Has been putting ice on knee without much relief. Tried tylenol without much relief. No known injury.         The following portions of the patient's history were reviewed and updated as appropriate: allergies, current medications, past family history, past medical history, past social history, past surgical history, and problem list.    Review of Systems   Constitutional:  Negative for chills and fever.   Neurological:  Negative for weakness and numbness.         Objective:      /70 (BP Location: Left arm, Patient Position: Sitting, Cuff Size: Standard)   Pulse 60   Temp 98.4 °F (36.9 °C)   Wt 75.3 kg (166 lb)   SpO2 95%   BMI 25.24 kg/m²          Physical Exam  Vitals reviewed.   Constitutional:       General: He is not in acute distress.     Appearance: Normal appearance. He is not ill-appearing, toxic-appearing or diaphoretic.   HENT:      Head: Normocephalic and atraumatic.   Eyes:      General: No scleral icterus.        Right eye: No discharge.         Left eye: No discharge.      Conjunctiva/sclera: Conjunctivae normal.   Cardiovascular:      Rate and Rhythm: Normal rate and regular rhythm.       Pulses: Normal pulses.      Heart sounds: Normal heart sounds. No murmur heard.     No gallop.   Pulmonary:      Effort: Pulmonary effort is normal. No respiratory distress.      Breath sounds: Normal breath sounds. No stridor. No wheezing, rhonchi or rales.   Musculoskeletal:      Left knee: Swelling present. Decreased range of motion. Tenderness present over the medial joint line. Abnormal meniscus.      Instability Tests: Medial Bessy test positive.      Right lower leg: No edema.      Left lower leg: No edema.      Comments: +difficulty with weight bearing on left and unable to fully extend   Neurological:      General: No focal deficit present.      Mental Status: He is alert and oriented to person, place, and time.   Psychiatric:         Mood and Affect: Mood normal.         Behavior: Behavior normal.         Thought Content: Thought content normal.         Judgment: Judgment normal.

## 2024-09-10 DIAGNOSIS — Z51.11 ENCOUNTER FOR ANTINEOPLASTIC CHEMOTHERAPY: ICD-10-CM

## 2024-09-10 DIAGNOSIS — I25.10 CORONARY ARTERY DISEASE WITHOUT ANGINA PECTORIS, UNSPECIFIED VESSEL OR LESION TYPE, UNSPECIFIED WHETHER NATIVE OR TRANSPLANTED HEART: ICD-10-CM

## 2024-09-10 DIAGNOSIS — D45 POLYCYTHEMIA VERA (HCC): ICD-10-CM

## 2024-09-10 DIAGNOSIS — Z15.89 JAK2 GENE MUTATION: ICD-10-CM

## 2024-09-10 DIAGNOSIS — E78.2 MIXED HYPERLIPIDEMIA: ICD-10-CM

## 2024-09-10 RX ORDER — ATORVASTATIN CALCIUM 80 MG/1
TABLET, FILM COATED ORAL
Qty: 90 TABLET | Refills: 1 | Status: SHIPPED | OUTPATIENT
Start: 2024-09-10

## 2024-09-11 RX ORDER — HYDROXYUREA 500 MG/1
500 CAPSULE ORAL DAILY
Qty: 90 CAPSULE | Refills: 2 | Status: SHIPPED | OUTPATIENT
Start: 2024-09-11

## 2024-09-17 ENCOUNTER — APPOINTMENT (EMERGENCY)
Dept: ULTRASOUND IMAGING | Facility: HOSPITAL | Age: 79
DRG: 690 | End: 2024-09-17
Payer: COMMERCIAL

## 2024-09-17 ENCOUNTER — APPOINTMENT (EMERGENCY)
Dept: CT IMAGING | Facility: HOSPITAL | Age: 79
DRG: 690 | End: 2024-09-17
Payer: COMMERCIAL

## 2024-09-17 ENCOUNTER — HOSPITAL ENCOUNTER (INPATIENT)
Facility: HOSPITAL | Age: 79
LOS: 2 days | Discharge: HOME | DRG: 690 | End: 2024-09-20
Attending: INTERNAL MEDICINE | Admitting: INTERNAL MEDICINE
Payer: COMMERCIAL

## 2024-09-17 DIAGNOSIS — N43.3 HYDROCELE: ICD-10-CM

## 2024-09-17 DIAGNOSIS — E87.1 HYPONATREMIA: ICD-10-CM

## 2024-09-17 DIAGNOSIS — R17 ELEVATED BILIRUBIN: ICD-10-CM

## 2024-09-17 DIAGNOSIS — N45.1 EPIDIDYMITIS: Primary | ICD-10-CM

## 2024-09-17 DIAGNOSIS — N12 PYELONEPHRITIS: ICD-10-CM

## 2024-09-17 DIAGNOSIS — N40.0 BENIGN PROSTATIC HYPERPLASIA WITHOUT LOWER URINARY TRACT SYMPTOMS: ICD-10-CM

## 2024-09-17 LAB
ALBUMIN SERPL BCG-MCNC: 3.5 G/DL (ref 3.5–5)
ALP SERPL-CCNC: 107 U/L (ref 34–104)
ALT SERPL W P-5'-P-CCNC: 43 U/L (ref 7–52)
ANION GAP SERPL CALCULATED.3IONS-SCNC: 8 MMOL/L (ref 4–13)
AST SERPL W P-5'-P-CCNC: 54 U/L (ref 13–39)
BACTERIA UR QL AUTO: ABNORMAL /HPF
BASOPHILS # BLD AUTO: 0.03 THOUSANDS/ΜL (ref 0–0.1)
BASOPHILS NFR BLD AUTO: 0 % (ref 0–1)
BILIRUB SERPL-MCNC: 1.78 MG/DL (ref 0.2–1)
BILIRUB UR QL STRIP: NEGATIVE
BUN SERPL-MCNC: 22 MG/DL (ref 5–25)
CALCIUM SERPL-MCNC: 8.8 MG/DL (ref 8.4–10.2)
CHLORIDE SERPL-SCNC: 95 MMOL/L (ref 96–108)
CLARITY UR: ABNORMAL
CO2 SERPL-SCNC: 29 MMOL/L (ref 21–32)
COLOR UR: YELLOW
CREAT SERPL-MCNC: 1.41 MG/DL (ref 0.6–1.3)
EOSINOPHIL # BLD AUTO: 0.02 THOUSAND/ΜL (ref 0–0.61)
EOSINOPHIL NFR BLD AUTO: 0 % (ref 0–6)
ERYTHROCYTE [DISTWIDTH] IN BLOOD BY AUTOMATED COUNT: 14.3 % (ref 11.6–15.1)
GFR SERPL CREATININE-BSD FRML MDRD: 47 ML/MIN/1.73SQ M
GLUCOSE SERPL-MCNC: 152 MG/DL (ref 65–140)
GLUCOSE UR STRIP-MCNC: NEGATIVE MG/DL
HCT VFR BLD AUTO: 46 % (ref 36.5–49.3)
HGB BLD-MCNC: 15.8 G/DL (ref 12–17)
HGB UR QL STRIP.AUTO: ABNORMAL
IMM GRANULOCYTES # BLD AUTO: 0.09 THOUSAND/UL (ref 0–0.2)
IMM GRANULOCYTES NFR BLD AUTO: 1 % (ref 0–2)
KETONES UR STRIP-MCNC: NEGATIVE MG/DL
LEUKOCYTE ESTERASE UR QL STRIP: ABNORMAL
LYMPHOCYTES # BLD AUTO: 0.96 THOUSANDS/ΜL (ref 0.6–4.47)
LYMPHOCYTES NFR BLD AUTO: 6 % (ref 14–44)
MCH RBC QN AUTO: 35.9 PG (ref 26.8–34.3)
MCHC RBC AUTO-ENTMCNC: 34.3 G/DL (ref 31.4–37.4)
MCV RBC AUTO: 105 FL (ref 82–98)
MONOCYTES # BLD AUTO: 1.28 THOUSAND/ΜL (ref 0.17–1.22)
MONOCYTES NFR BLD AUTO: 8 % (ref 4–12)
MUCOUS THREADS UR QL AUTO: ABNORMAL
NEUTROPHILS # BLD AUTO: 13.74 THOUSANDS/ΜL (ref 1.85–7.62)
NEUTS SEG NFR BLD AUTO: 85 % (ref 43–75)
NITRITE UR QL STRIP: NEGATIVE
NON-SQ EPI CELLS URNS QL MICRO: ABNORMAL /HPF
NRBC BLD AUTO-RTO: 0 /100 WBCS
PH UR STRIP.AUTO: 6 [PH]
PLATELET # BLD AUTO: 152 THOUSANDS/UL (ref 149–390)
PMV BLD AUTO: 10.2 FL (ref 8.9–12.7)
POTASSIUM SERPL-SCNC: 3.8 MMOL/L (ref 3.5–5.3)
PROT SERPL-MCNC: 6.9 G/DL (ref 6.4–8.4)
PROT UR STRIP-MCNC: ABNORMAL MG/DL
RBC # BLD AUTO: 4.4 MILLION/UL (ref 3.88–5.62)
RBC #/AREA URNS AUTO: ABNORMAL /HPF
SODIUM SERPL-SCNC: 132 MMOL/L (ref 135–147)
SP GR UR STRIP.AUTO: 1.01 (ref 1–1.03)
UROBILINOGEN UR STRIP-ACNC: 2 MG/DL
WBC # BLD AUTO: 16.12 THOUSAND/UL (ref 4.31–10.16)
WBC #/AREA URNS AUTO: ABNORMAL /HPF
WBC CLUMPS # UR AUTO: PRESENT /UL

## 2024-09-17 PROCEDURE — 81001 URINALYSIS AUTO W/SCOPE: CPT

## 2024-09-17 PROCEDURE — 99285 EMERGENCY DEPT VISIT HI MDM: CPT

## 2024-09-17 PROCEDURE — 85025 COMPLETE CBC W/AUTO DIFF WBC: CPT

## 2024-09-17 PROCEDURE — 74177 CT ABD & PELVIS W/CONTRAST: CPT

## 2024-09-17 PROCEDURE — 76870 US EXAM SCROTUM: CPT

## 2024-09-17 PROCEDURE — 87077 CULTURE AEROBIC IDENTIFY: CPT

## 2024-09-17 PROCEDURE — 96365 THER/PROPH/DIAG IV INF INIT: CPT

## 2024-09-17 PROCEDURE — 87181 SC STD AGAR DILUTION PER AGT: CPT

## 2024-09-17 PROCEDURE — 96375 TX/PRO/DX INJ NEW DRUG ADDON: CPT

## 2024-09-17 PROCEDURE — 36415 COLL VENOUS BLD VENIPUNCTURE: CPT

## 2024-09-17 PROCEDURE — 87086 URINE CULTURE/COLONY COUNT: CPT

## 2024-09-17 PROCEDURE — 99284 EMERGENCY DEPT VISIT MOD MDM: CPT

## 2024-09-17 PROCEDURE — 87186 SC STD MICRODIL/AGAR DIL: CPT

## 2024-09-17 PROCEDURE — 96366 THER/PROPH/DIAG IV INF ADDON: CPT

## 2024-09-17 PROCEDURE — 80053 COMPREHEN METABOLIC PANEL: CPT

## 2024-09-17 RX ORDER — LEVOFLOXACIN 500 MG/1
500 TABLET, FILM COATED ORAL ONCE
Status: COMPLETED | OUTPATIENT
Start: 2024-09-17 | End: 2024-09-17

## 2024-09-17 RX ORDER — LEVOFLOXACIN 500 MG/1
500 TABLET, FILM COATED ORAL DAILY
Qty: 10 TABLET | Refills: 0 | Status: SHIPPED | OUTPATIENT
Start: 2024-09-17 | End: 2024-09-20

## 2024-09-17 RX ORDER — FENTANYL CITRATE 50 UG/ML
25 INJECTION, SOLUTION INTRAMUSCULAR; INTRAVENOUS ONCE
Status: COMPLETED | OUTPATIENT
Start: 2024-09-17 | End: 2024-09-17

## 2024-09-17 RX ADMIN — SODIUM CHLORIDE, SODIUM LACTATE, POTASSIUM CHLORIDE, AND CALCIUM CHLORIDE 1000 ML: .6; .31; .03; .02 INJECTION, SOLUTION INTRAVENOUS at 22:01

## 2024-09-17 RX ADMIN — IOHEXOL 100 ML: 350 INJECTION, SOLUTION INTRAVENOUS at 22:39

## 2024-09-17 RX ADMIN — FENTANYL CITRATE 25 MCG: 50 INJECTION INTRAMUSCULAR; INTRAVENOUS at 21:58

## 2024-09-17 RX ADMIN — LEVOFLOXACIN 500 MG: 500 TABLET, FILM COATED ORAL at 22:09

## 2024-09-18 PROBLEM — Z12.11 ENCOUNTER FOR SCREENING COLONOSCOPY: Status: RESOLVED | Noted: 2024-04-03 | Resolved: 2024-09-18

## 2024-09-18 PROBLEM — N17.9 ACUTE KIDNEY INJURY SUPERIMPOSED ON CHRONIC KIDNEY DISEASE  (HCC): Status: ACTIVE | Noted: 2021-12-10

## 2024-09-18 PROBLEM — M25.512 CHRONIC LEFT SHOULDER PAIN: Status: RESOLVED | Noted: 2018-05-30 | Resolved: 2024-09-18

## 2024-09-18 PROBLEM — R93.89 ABNORMAL CT OF THE CHEST: Status: RESOLVED | Noted: 2023-06-08 | Resolved: 2024-09-18

## 2024-09-18 PROBLEM — K21.9 GASTROESOPHAGEAL REFLUX DISEASE WITHOUT ESOPHAGITIS: Status: RESOLVED | Noted: 2024-04-03 | Resolved: 2024-09-18

## 2024-09-18 PROBLEM — D69.6 THROMBOCYTOPENIA (HCC): Status: RESOLVED | Noted: 2022-09-01 | Resolved: 2024-09-18

## 2024-09-18 PROBLEM — M25.531 RIGHT WRIST PAIN: Status: RESOLVED | Noted: 2022-12-05 | Resolved: 2024-09-18

## 2024-09-18 PROBLEM — K80.12 CALCULUS OF GALLBLADDER WITH ACUTE ON CHRONIC CHOLECYSTITIS WITHOUT OBSTRUCTION: Status: RESOLVED | Noted: 2024-03-06 | Resolved: 2024-09-18

## 2024-09-18 PROBLEM — R10.13 EPIGASTRIC PAIN: Status: RESOLVED | Noted: 2023-05-24 | Resolved: 2024-09-18

## 2024-09-18 PROBLEM — R10.11 RIGHT UPPER QUADRANT ABDOMINAL PAIN: Status: RESOLVED | Noted: 2024-01-22 | Resolved: 2024-09-18

## 2024-09-18 PROBLEM — G89.29 CHRONIC LEFT SHOULDER PAIN: Status: RESOLVED | Noted: 2018-05-30 | Resolved: 2024-09-18

## 2024-09-18 PROBLEM — M25.562 ACUTE PAIN OF LEFT KNEE: Status: RESOLVED | Noted: 2024-09-05 | Resolved: 2024-09-18

## 2024-09-18 PROBLEM — N18.9 ACUTE KIDNEY INJURY SUPERIMPOSED ON CHRONIC KIDNEY DISEASE  (HCC): Status: ACTIVE | Noted: 2021-12-10

## 2024-09-18 PROBLEM — I45.9 HEART BLOCK: Status: RESOLVED | Noted: 2022-08-08 | Resolved: 2024-09-18

## 2024-09-18 PROBLEM — N12 PYELONEPHRITIS: Status: ACTIVE | Noted: 2024-09-18

## 2024-09-18 PROBLEM — K76.89 HEPATIC CYST: Status: RESOLVED | Noted: 2023-05-02 | Resolved: 2024-09-18

## 2024-09-18 PROBLEM — R07.89 RIGHT-SIDED CHEST WALL PAIN: Status: RESOLVED | Noted: 2023-10-11 | Resolved: 2024-09-18

## 2024-09-18 LAB
ANION GAP SERPL CALCULATED.3IONS-SCNC: 10 MMOL/L (ref 4–13)
BUN SERPL-MCNC: 18 MG/DL (ref 5–25)
CALCIUM SERPL-MCNC: 8.4 MG/DL (ref 8.4–10.2)
CHLORIDE SERPL-SCNC: 99 MMOL/L (ref 96–108)
CO2 SERPL-SCNC: 27 MMOL/L (ref 21–32)
CREAT SERPL-MCNC: 1.17 MG/DL (ref 0.6–1.3)
ERYTHROCYTE [DISTWIDTH] IN BLOOD BY AUTOMATED COUNT: 14.1 % (ref 11.6–15.1)
GFR SERPL CREATININE-BSD FRML MDRD: 59 ML/MIN/1.73SQ M
GLUCOSE SERPL-MCNC: 112 MG/DL (ref 65–140)
HCT VFR BLD AUTO: 41.7 % (ref 36.5–49.3)
HGB BLD-MCNC: 14.6 G/DL (ref 12–17)
MCH RBC QN AUTO: 36 PG (ref 26.8–34.3)
MCHC RBC AUTO-ENTMCNC: 35 G/DL (ref 31.4–37.4)
MCV RBC AUTO: 103 FL (ref 82–98)
PLATELET # BLD AUTO: 131 THOUSANDS/UL (ref 149–390)
PMV BLD AUTO: 10.1 FL (ref 8.9–12.7)
POTASSIUM SERPL-SCNC: 2.9 MMOL/L (ref 3.5–5.3)
PROCALCITONIN SERPL-MCNC: 0.34 NG/ML
RBC # BLD AUTO: 4.05 MILLION/UL (ref 3.88–5.62)
SODIUM SERPL-SCNC: 136 MMOL/L (ref 135–147)
WBC # BLD AUTO: 16.78 THOUSAND/UL (ref 4.31–10.16)

## 2024-09-18 PROCEDURE — 84145 PROCALCITONIN (PCT): CPT

## 2024-09-18 PROCEDURE — 96376 TX/PRO/DX INJ SAME DRUG ADON: CPT

## 2024-09-18 PROCEDURE — 87040 BLOOD CULTURE FOR BACTERIA: CPT

## 2024-09-18 PROCEDURE — 85027 COMPLETE CBC AUTOMATED: CPT

## 2024-09-18 PROCEDURE — 99222 1ST HOSP IP/OBS MODERATE 55: CPT

## 2024-09-18 PROCEDURE — 80048 BASIC METABOLIC PNL TOTAL CA: CPT

## 2024-09-18 PROCEDURE — 96366 THER/PROPH/DIAG IV INF ADDON: CPT

## 2024-09-18 RX ORDER — FENTANYL CITRATE 50 UG/ML
50 INJECTION, SOLUTION INTRAMUSCULAR; INTRAVENOUS ONCE
Status: COMPLETED | OUTPATIENT
Start: 2024-09-18 | End: 2024-09-18

## 2024-09-18 RX ORDER — AMLODIPINE BESYLATE 10 MG/1
10 TABLET ORAL EVERY EVENING
Status: DISCONTINUED | OUTPATIENT
Start: 2024-09-18 | End: 2024-09-20 | Stop reason: HOSPADM

## 2024-09-18 RX ORDER — OXYCODONE HYDROCHLORIDE 5 MG/1
5 TABLET ORAL EVERY 6 HOURS PRN
Status: DISCONTINUED | OUTPATIENT
Start: 2024-09-18 | End: 2024-09-20 | Stop reason: HOSPADM

## 2024-09-18 RX ORDER — ACETAMINOPHEN 325 MG/1
650 TABLET ORAL EVERY 6 HOURS PRN
Status: DISCONTINUED | OUTPATIENT
Start: 2024-09-18 | End: 2024-09-20 | Stop reason: HOSPADM

## 2024-09-18 RX ORDER — ONDANSETRON 2 MG/ML
4 INJECTION INTRAMUSCULAR; INTRAVENOUS EVERY 6 HOURS PRN
Status: DISCONTINUED | OUTPATIENT
Start: 2024-09-18 | End: 2024-09-20 | Stop reason: HOSPADM

## 2024-09-18 RX ORDER — METOPROLOL TARTRATE 50 MG
50 TABLET ORAL 2 TIMES DAILY
Status: DISCONTINUED | OUTPATIENT
Start: 2024-09-18 | End: 2024-09-20 | Stop reason: HOSPADM

## 2024-09-18 RX ORDER — POLYETHYLENE GLYCOL 3350 17 G/17G
17 POWDER, FOR SOLUTION ORAL DAILY PRN
Status: DISCONTINUED | OUTPATIENT
Start: 2024-09-18 | End: 2024-09-20 | Stop reason: HOSPADM

## 2024-09-18 RX ORDER — MAGNESIUM HYDROXIDE/ALUMINUM HYDROXICE/SIMETHICONE 120; 1200; 1200 MG/30ML; MG/30ML; MG/30ML
30 SUSPENSION ORAL EVERY 6 HOURS PRN
Status: DISCONTINUED | OUTPATIENT
Start: 2024-09-18 | End: 2024-09-20

## 2024-09-18 RX ORDER — POTASSIUM CHLORIDE 14.9 MG/ML
20 INJECTION INTRAVENOUS ONCE
Status: COMPLETED | OUTPATIENT
Start: 2024-09-18 | End: 2024-09-18

## 2024-09-18 RX ORDER — HYDROXYUREA 500 MG/1
500 CAPSULE ORAL DAILY
Status: DISCONTINUED | OUTPATIENT
Start: 2024-09-18 | End: 2024-09-20 | Stop reason: HOSPADM

## 2024-09-18 RX ORDER — ENOXAPARIN SODIUM 100 MG/ML
40 INJECTION SUBCUTANEOUS DAILY
Status: DISCONTINUED | OUTPATIENT
Start: 2024-09-18 | End: 2024-09-20 | Stop reason: HOSPADM

## 2024-09-18 RX ORDER — PANTOPRAZOLE SODIUM 40 MG/1
40 TABLET, DELAYED RELEASE ORAL
Status: DISCONTINUED | OUTPATIENT
Start: 2024-09-18 | End: 2024-09-20 | Stop reason: HOSPADM

## 2024-09-18 RX ORDER — POTASSIUM CHLORIDE 20MEQ/15ML
40 LIQUID (ML) ORAL 2 TIMES DAILY
Status: COMPLETED | OUTPATIENT
Start: 2024-09-18 | End: 2024-09-18

## 2024-09-18 RX ORDER — ATORVASTATIN CALCIUM 80 MG/1
80 TABLET, FILM COATED ORAL EVERY EVENING
Status: DISCONTINUED | OUTPATIENT
Start: 2024-09-18 | End: 2024-09-20 | Stop reason: HOSPADM

## 2024-09-18 RX ADMIN — POTASSIUM CHLORIDE 40 MEQ: 20 SOLUTION ORAL at 16:59

## 2024-09-18 RX ADMIN — ENOXAPARIN SODIUM 40 MG: 40 INJECTION SUBCUTANEOUS at 08:29

## 2024-09-18 RX ADMIN — PANTOPRAZOLE SODIUM 40 MG: 40 TABLET, DELAYED RELEASE ORAL at 05:58

## 2024-09-18 RX ADMIN — METOPROLOL TARTRATE 50 MG: 50 TABLET, FILM COATED ORAL at 16:59

## 2024-09-18 RX ADMIN — ATORVASTATIN CALCIUM 80 MG: 80 TABLET, FILM COATED ORAL at 16:59

## 2024-09-18 RX ADMIN — DEXTROSE 2000 MG: 50 INJECTION, SOLUTION INTRAVENOUS at 04:59

## 2024-09-18 RX ADMIN — AMLODIPINE BESYLATE 10 MG: 10 TABLET ORAL at 16:59

## 2024-09-18 RX ADMIN — POTASSIUM CHLORIDE 40 MEQ: 20 SOLUTION ORAL at 09:29

## 2024-09-18 RX ADMIN — POTASSIUM CHLORIDE 20 MEQ: 14.9 INJECTION, SOLUTION INTRAVENOUS at 09:29

## 2024-09-18 RX ADMIN — FENTANYL CITRATE 50 MCG: 50 INJECTION INTRAMUSCULAR; INTRAVENOUS at 00:40

## 2024-09-18 RX ADMIN — HYDROXYUREA 500 MG: 500 CAPSULE ORAL at 08:29

## 2024-09-18 RX ADMIN — METOPROLOL TARTRATE 50 MG: 50 TABLET, FILM COATED ORAL at 08:29

## 2024-09-18 RX ADMIN — ASPIRIN 81 MG: 81 TABLET, COATED ORAL at 08:29

## 2024-09-18 NOTE — PLAN OF CARE
Problem: GENITOURINARY - ADULT  Goal: Maintains or returns to baseline urinary function  Description: INTERVENTIONS:  - Assess urinary function  - Encourage oral fluids to ensure adequate hydration if ordered  - Administer IV fluids as ordered to ensure adequate hydration  - Administer ordered medications as needed  - Offer frequent toileting  - Follow urinary retention protocol if ordered  Outcome: Progressing  Goal: Absence of urinary retention  Description: INTERVENTIONS:  - Assess patient’s ability to void and empty bladder  - Monitor I/O  - Bladder scan as needed  - Discuss with physician/AP medications to alleviate retention as needed  - Discuss catheterization for long term situations as appropriate  Outcome: Progressing  Goal: Urinary catheter remains patent  Description: INTERVENTIONS:  - Assess patency of urinary catheter  - If patient has a chronic lynn, consider changing catheter if non-functioning  - Follow guidelines for intermittent irrigation of non-functioning urinary catheter  Outcome: Progressing

## 2024-09-18 NOTE — ASSESSMENT & PLAN NOTE
Controlled  Outpatient regimen: amlodipine 10mg daily, metoprolol tartrate 50mg BID    Plan:  Continue outpatient regimen

## 2024-09-18 NOTE — H&P
H&P - Hospitalist   Name: Santana Jacinto 79 y.o. male I MRN: [id number]  Unit/Bed#: [place] [address] -01 I Date of Admission: 9/17/2024   Date of Service: 9/18/2024 I Hospital Day: 0     Assessment & Plan  Pyelonephritis  Presents with malaise, abdominal pain, flank pain, scrotal pain. Leukocytosis noted, CT abd/pelvis reviewed: concerning for multifocal right pyelonephritis, left upper pole pyelonephritis, suspicious for pyelitis/ureteritis/cystitis     Plan:  ABX: levaquin in ED, will continue with ceftriaxone  Follow infectious markers, culture data  Multimodal analgesia     Benign essential hypertension  Controlled  Outpatient regimen: amlodipine 10mg daily, metoprolol tartrate 50mg BID    Plan:  Continue outpatient regimen     Coronary artery disease involving native coronary artery of native heart without angina pectoris  CAD s/p two vessel stent ~10 years ago    Plan:  Continue GDMT    Polycythemia vera (HCC)  History of JAK2 mutation positive polycythemia vera, follows outpatient [facility] Heme    Plan:  Continue hydroxyurea   Cigarette nicotine dependence without complication  1/2PPD >30 years    Plan:  Encourage cessation  Nicotine replacement PRN    Acute kidney injury superimposed on chronic kidney disease  (HCC)  Lab Results   Component Value Date    EGFR 47 09/17/2024    EGFR 59 06/24/2024    EGFR 49 05/15/2024    CREATININE 1.41 (H) 09/17/2024    CREATININE 1.17 06/24/2024    CREATININE 1.37 (H) 05/15/2024     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, 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   Baseline Cr: 0.9-1.3  Admit Cr: 1.41     Plan:  1L crystalloid in ED, hold further volume, encourage PO intake  Monitor renal function, renal dose medications, maintain normotension/euvolemia, avoid nephrotoxic agents, I&Os  History of cardiac pacemaker  S/p dual ventricular PPM  Left epididymitis  Admitted ~9 months prior   U/S scrotum/testicles tonight reviewed: hypertrophy hyperemia of left epididymitis, small left hydrocele     Plan:  Elevate  scrotum, ice PRN  Multimodal analgesia       VTE Pharmacologic Prophylaxis: VTE Score: 4 Moderate Risk (Score 3-4) - Pharmacological DVT Prophylaxis Ordered: enoxaparin (Lovenox).  Code Status: Level 1 - Full Code   Discussion with family:     Anticipated Length of Stay: Patient will be admitted on an observation basis with an anticipated length of stay of less than 2 midnights secondary to pyelonephritis.    History of Present Illness   Chief Complaint: testicular pain    Santana Jacinto is a 79 y.o. male with a PMH of HTN, CAD, s/p PPM, CKD, polycythemia vera who presents with testicular pain.   History obtained from patient, chart review and discussion with ED PA-C/nursing. Presents this afternoon for evaluation of testicular pain, began a few days ago with swelling and felt hard. He had the same problem few months prior. No inciting event or trauma. With this pain he has been experiencing abdominal and flank pain as well. Did have some chills at home but no documented fevers. No recently change in medications.     Review of Systems   Constitutional:  Positive for chills. Negative for fever.   Respiratory:  Negative for shortness of breath.    Cardiovascular:  Negative for chest pain and palpitations.   Gastrointestinal:  Positive for abdominal pain. Negative for diarrhea, nausea and vomiting.   Genitourinary:  Positive for flank pain and scrotal swelling. Negative for dysuria.   Neurological:  Negative for syncope.   All other systems reviewed and are negative.      I have reviewed the patient's PMH, PSH, Social History, Family History, Meds, and Allergies  Social History:  Marital Status: /Civil Union   Occupation:   Patient Pre-hospital Living Situation: Home  Patient Pre-hospital Level of Mobility: walks  Patient Pre-hospital Diet Restrictions:      Objective     Vitals:   Blood Pressure: 133/76 (09/18/24 0229)  Pulse: 85 (09/18/24 0241)  Temperature: 97.6 °F (36.4 °C) (09/18/24 0241)  Respirations: 16  "(09/18/24 0041)  Height: 5' 8\" (172.7 cm) (09/18/24 0229)  Weight - Scale: 71.7 kg (158 lb 1.1 oz) (09/18/24 0229)  SpO2: 94 % (09/18/24 0241)    Physical Exam  Vitals and nursing note reviewed.   HENT:      Head: Normocephalic and atraumatic.      Mouth/Throat:      Mouth: Mucous membranes are moist.   Cardiovascular:      Rate and Rhythm: Normal rate.      Pulses: Normal pulses.   Pulmonary:      Effort: Pulmonary effort is normal.      Breath sounds: Normal breath sounds.   Abdominal:      General: Abdomen is flat.      Palpations: Abdomen is soft.   Genitourinary:     Testes:         Right: Tenderness and swelling present.         Left: Tenderness and swelling present.   Skin:     General: Skin is warm and dry.      Capillary Refill: Capillary refill takes 2 to 3 seconds.   Neurological:      Mental Status: He is alert and oriented to person, place, and time.         Lines/Drains:  Lines/Drains/Airways       Active Status       None                        Additional Data:   Lab Results: I have reviewed the following results:   Results from last 7 days   Lab Units 09/17/24  1906   WBC Thousand/uL 16.12*   HEMOGLOBIN g/dL 15.8   HEMATOCRIT % 46.0   PLATELETS Thousands/uL 152   SEGS PCT % 85*   LYMPHO PCT % 6*   MONO PCT % 8   EOS PCT % 0     Results from last 7 days   Lab Units 09/17/24  1906   SODIUM mmol/L 132*   POTASSIUM mmol/L 3.8   CHLORIDE mmol/L 95*   CO2 mmol/L 29   BUN mg/dL 22   CREATININE mg/dL 1.41*   ANION GAP mmol/L 8   CALCIUM mg/dL 8.8   ALBUMIN g/dL 3.5   TOTAL BILIRUBIN mg/dL 1.78*   ALK PHOS U/L 107*   ALT U/L 43   AST U/L 54*   GLUCOSE RANDOM mg/dL 152*             Lab Results   Component Value Date    HGBA1C 4.7 06/24/2024    HGBA1C 5.1 01/10/2024    HGBA1C 4.8 02/08/2023           Imaging Review: Reviewed radiology reports from this admission including: CT abdomen/pelvis and Ultrasound(s).  Other Studies: No additional pertinent studies reviewed.    Administrative Statements       ** Please " Note: This note has been constructed using a voice recognition system. **

## 2024-09-18 NOTE — ED CARE HANDOFF
Emergency Department Sign Out Note        Sign out and transfer of care from Elijah Mcgovern PA-C. See Separate Emergency Department note.     The patient, Santana Jacinto, was evaluated by the previous provider for Testicular Pain.    Workup Completed:  Results Reviewed       Procedure Component Value Units Date/Time    Urine Microscopic [[id number]]  (Abnormal) Collected: 09/17/24 2135    Lab Status: Final result Specimen: Urine, Clean Catch Updated: 09/17/24 2158     RBC, UA 10-20 /hpf      WBC, UA Innumerable /hpf      Epithelial Cells Occasional /hpf      Bacteria, UA Innumerable /hpf      MUCUS THREADS Occasional     WBC Clumps Present    Urine culture [[id number]] Collected: 09/17/24 2135    Lab Status: In process Specimen: Urine, Clean Catch Updated: 09/17/24 2158    UA w Reflex to Microscopic w Reflex to Culture [[id number]]  (Abnormal) Collected: 09/17/24 2135    Lab Status: Final result Specimen: Urine, Clean Catch Updated: 09/17/24 2154     Color, UA Yellow     Clarity, UA Turbid     Specific Gravity, UA 1.009     pH, UA 6.0     Leukocytes, UA Large     Nitrite, UA Negative     Protein, UA 50 (1+) mg/dl      Glucose, UA Negative mg/dl      Ketones, UA Negative mg/dl      Urobilinogen, UA 2.0 mg/dl      Bilirubin, UA Negative     Occult Blood, UA Small    Comprehensive metabolic panel [[id number]]  (Abnormal) Collected: 09/17/24 1906    Lab Status: Final result Specimen: Blood from Arm, Right Updated: 09/17/24 1936     Sodium 132 mmol/L      Potassium 3.8 mmol/L      Chloride 95 mmol/L      CO2 29 mmol/L      ANION GAP 8 mmol/L      BUN 22 mg/dL      Creatinine 1.41 mg/dL      Glucose 152 mg/dL      Calcium 8.8 mg/dL      AST 54 U/L      ALT 43 U/L      Alkaline Phosphatase 107 U/L      Total Protein 6.9 g/dL      Albumin 3.5 g/dL      Total Bilirubin 1.78 mg/dL      eGFR 47 ml/min/1.73sq m     Narrative:      National Kidney Disease Foundation guidelines for Chronic Kidney Disease (CKD):     Stage 1 with normal or  high GFR (GFR > 90 mL/min/1.73 square meters)    Stage 2 Mild CKD (GFR = 60-89 mL/min/1.73 square meters)    Stage 3A Moderate CKD (GFR = 45-59 mL/min/1.73 square meters)    Stage 3B Moderate CKD (GFR = 30-44 mL/min/1.73 square meters)    Stage 4 Severe CKD (GFR = 15-29 mL/min/1.73 square meters)    Stage 5 End Stage CKD (GFR <15 mL/min/1.73 square meters)  Note: GFR calculation is accurate only with a steady state creatinine    CBC and differential [[id number]]  (Abnormal) Collected: 09/17/24 1906    Lab Status: Final result Specimen: Blood from Arm, Right Updated: 09/17/24 1911     WBC 16.12 Thousand/uL      RBC 4.40 Million/uL      Hemoglobin 15.8 g/dL      Hematocrit 46.0 %       fL      MCH 35.9 pg      MCHC 34.3 g/dL      RDW 14.3 %      MPV 10.2 fL      Platelets 152 Thousands/uL      nRBC 0 /100 WBCs      Segmented % 85 %      Immature Grans % 1 %      Lymphocytes % 6 %      Monocytes % 8 %      Eosinophils Relative 0 %      Basophils Relative 0 %      Absolute Neutrophils 13.74 Thousands/µL      Absolute Immature Grans 0.09 Thousand/uL      Absolute Lymphocytes 0.96 Thousands/µL      Absolute Monocytes 1.28 Thousand/µL      Eosinophils Absolute 0.02 Thousand/µL      Basophils Absolute 0.03 Thousands/µL           US Scrotum/testicles:  1.  Again noted is hypertrophy and hyperemia of the left epididymis, consistent with epididymitis  2.  Small left hydrocele, decreased in size, however now is mildly complex      ED Course / Workup Pending (followup):  CT-scan of the abdomen/pelv          ED Course as of 09/18/24 0216   Tue Sep 17, 2024   2342 Dispo pending CT results. If negative d/c with treatment for epididymidis. Referral for urology place, anthony at [facility].    Wed Sep 18, 2024   0049 CT abdomen pelvis w contrast  IMPRESSION:     1.  Abnormal findings in the bilateral kidneys as detailed above concerning for multifocal right pyelonephritis and left upper pole pyelonephritis.  There is mild wall  thickening and mucosal hyperenhancement of the renal pelvis and bilateral ureters   noted suspicious for pyelitis/ureteritis. Clinical correlation with urinalysis recommended.  2.  Irregular bladder wall thickening concerning for cystitis, recommend correlation with urinalysis.  3.  Contracted without wall thickening or calcified gallstones. Mucosal hyperenhancement of the gallbladder wall of uncertain etiology. No significant pericholecystic inflammatory change. If clinical concern for gallbladder pathology, consider right   upper quadrant ultrasound after adequate fasting for further evaluation.  4.  No findings of bowel obstruction, inflammation, free air, free fluid, or loculated fluid collections in the abdomen/pelvis.  5.  Bilateral adrenal thickening is again seen, similar compared to the prior exam and may reflect underlying adrenal hyperplasia.  6.  Fat-containing right inguinal hernia.  7.  Dilated upper prostatic urethra, bilateral hydrocele, and left varicocele incidentally seen.     Procedures  Medical Decision Making    Patient given to me in signout by Laura Carty PA-C.  Dispo pending CT result. Patient diagnosed with epididymitis by previous provider.    CT with abnormal findings in the bilateral kidneys as detailed above concerning for multifocal right pyelonephritis and left upper pole pyelonephritis.  There is mild wall thickening and mucosal hyperenhancement of the renal pelvis and bilateral ureters noted suspicious for pyelitis/ureteritis. Irregular bladder wall thickening concerning for cystitis, recommend correlation with urinalysis. Dilated upper prostatic urethra, bilateral hydrocele, and left varicocele incidentally seen.     Discussed findings with patient and spouse bedside.  Patient states he is not feeling well at this time and continues to have pain.  Will give additional pain meds.  Offered admission for possible IV antibiotics and continue pain management.  Patient only SIRS  criteria is leukocytosis of 16, otherwise no concern for sepsis at this time.  Patient would like admission at this time. At the time of admission, the patient is in no acute distress. I discussed with the patient and family the diagnosis, treatment plan, and plan for admission; pt was given the opportunity to ask questions and verbalized understanding. The patient agrees with the plan of care and admission at this time.    Problems Addressed:  Elevated bilirubin: acute illness or injury  Epididymitis: acute illness or injury  Hydrocele: acute illness or injury  Hyponatremia: acute illness or injury  Pyelonephritis: acute illness or injury    Amount and/or Complexity of Data Reviewed  Labs: ordered.  Radiology: ordered. Decision-making details documented in ED Course.    Risk  Prescription drug management.  Decision regarding hospitalization.            Disposition  Final diagnoses:   Epididymitis   Hydrocele   Elevated bilirubin   Hyponatremia   Pyelonephritis     Time reflects when diagnosis was documented in both MDM as applicable and the Disposition within this note       Time User Action Codes Description Comment    9/17/2024  9:54 PM Elijah Mcgovern Add [N45.1] Epididymitis     9/17/2024  9:54 PM Elijah Mcgovern Add [N43.3] Hydrocele     9/17/2024 11:47 PM Elijah Mcgovern Add [R17] Elevated bilirubin     9/17/2024 11:47 PM Elijah Mcgovern Add [E87.1] Hyponatremia     9/18/2024  1:04 AM April Carpio Add [N12] Pyelonephritis           ED Disposition       ED Disposition   Admit    Condition   Stable    Date/Time   Wed Sep 18, 2024  1:03 AM    Comment   Case was discussed with [facility] and the patient's admission status was agreed to be Admission Status: observation status to the service of Dr. Nugent.               Follow-up Information    None       Patient's Medications   Discharge Prescriptions    LEVOFLOXACIN (LEVAQUIN) 500 MG TABLET    Take 1 tablet (500 mg total) by mouth daily for 10 days       Start Date:  9/17/2024 End Date: 9/27/2024       Order Dose: 500 mg       Quantity: 10 tablet    Refills: 0            ED Provider  Electronically Signed by SADIE Browning PA-C  09/18/24 0216     Fellow

## 2024-09-18 NOTE — ASSESSMENT & PLAN NOTE
History of JAK2 mutation positive polycythemia vera, [facility]    Plan:  Continue hydroxyurea

## 2024-09-18 NOTE — QUICK NOTE
Patient seen and examined at bedside.  Has urinary frequency but denies any flank discomfort.  Currently afebrile.  Continue with current broad-spectrum antibiotics and follow-up on urine and blood culture results.  Potassium markedly low today.  Will replace.  Maintain on urinary retention protocol.  Rest of the management as documented in the H&P today.

## 2024-09-18 NOTE — PLAN OF CARE
Problem: PAIN - ADULT  Goal: Verbalizes/displays adequate comfort level or baseline comfort level  Description: Interventions:  - Encourage patient to monitor pain and request assistance  - Assess pain using appropriate pain scale  - Administer analgesics based on type and severity of pain and evaluate response  - Implement non-pharmacological measures as appropriate and evaluate response  - Consider cultural and social influences on pain and pain management  - Notify physician/advanced practitioner if interventions unsuccessful or patient reports new pain  Outcome: Progressing     Problem: INFECTION - ADULT  Goal: Absence or prevention of progression during hospitalization  Description: INTERVENTIONS:  - Assess and monitor for signs and symptoms of infection  - Monitor lab/diagnostic results  - Monitor all insertion sites, i.e. indwelling lines, tubes, and drains  - Monitor endotracheal if appropriate and nasal secretions for changes in amount and color  - [place] appropriate cooling/warming therapies per order  - Administer medications as ordered  - Instruct and encourage patient and family to use good hand hygiene technique  - Identify and instruct in appropriate isolation precautions for identified infection/condition  Outcome: Progressing  Goal: Absence of fever/infection during neutropenic period  Description: INTERVENTIONS:  - Monitor WBC    Outcome: Progressing     Problem: SAFETY ADULT  Goal: Patient will remain free of falls  Description: INTERVENTIONS:  - Educate patient/family on patient safety including physical limitations  - Instruct patient to call for assistance with activity   - Consult OT/PT to assist with strengthening/mobility   - Keep Call bell within reach  - Keep bed low and locked with side rails adjusted as appropriate  - Keep care items and personal belongings within reach  - Initiate and maintain comfort rounds  - Make Fall Risk Sign visible to staff  - Offer Toileting every 2 Hours,  in advance of need  - Initiate/Maintain bed alarm  - Obtain necessary fall risk management equipment: non skid socks  - Apply yellow socks and bracelet for high fall risk patients  - Consider moving patient to room near nurses station  Outcome: Progressing  Goal: Maintain or return to baseline ADL function  Description: INTERVENTIONS:  -  Assess patient's ability to carry out ADLs; assess patient's baseline for ADL function and identify physical deficits which impact ability to perform ADLs (bathing, care of mouth/teeth, toileting, grooming, dressing, etc.)  - Assess/evaluate cause of self-care deficits   - Assess range of motion  - Assess patient's mobility; develop plan if impaired  - Assess patient's need for assistive devices and provide as appropriate  - Encourage maximum independence but intervene and supervise when necessary  - Involve family in performance of ADLs  - Assess for home care needs following discharge   - Consider OT consult to assist with ADL evaluation and planning for discharge  - Provide patient education as appropriate  Outcome: Progressing  Goal: Maintains/Returns to pre admission functional level  Description: INTERVENTIONS:  - Perform AM-PAC 6 Click Basic Mobility/ Daily Activity assessment daily.  - Set and communicate daily mobility goal to care team and patient/family/caregiver.   - Collaborate with rehabilitation services on mobility goals if consulted  - Perform Range of Motion 3 times a day.  - Reposition patient every 2 hours.  - Dangle patient 4 times a day  - Stand patient 4 times a day  - Ambulate patient 4 times a day  - Out of bed to chair 3 times a day   - Out of bed for meals 3 times a day  - Out of bed for toileting  - Record patient progress and toleration of activity level   Outcome: Progressing     Problem: DISCHARGE PLANNING  Goal: Discharge to home or other facility with appropriate resources  Description: INTERVENTIONS:  - Identify barriers to discharge w/patient and  caregiver  - Arrange for needed discharge resources and transportation as appropriate  - Identify discharge learning needs (meds, wound care, etc.)  - Arrange for interpretive services to assist at discharge as needed  - Refer to Case Management Department for coordinating discharge planning if the patient needs post-hospital services based on physician/advanced practitioner order or complex needs related to functional status, cognitive ability, or social support system  Outcome: Progressing     Problem: GENITOURINARY - ADULT  Goal: Maintains or returns to baseline urinary function  Description: INTERVENTIONS:  - Assess urinary function  - Encourage oral fluids to ensure adequate hydration if ordered  - Administer IV fluids as ordered to ensure adequate hydration  - Administer ordered medications as needed  - Offer frequent toileting  - Follow urinary retention protocol if ordered  Outcome: Progressing  Goal: Absence of urinary retention  Description: INTERVENTIONS:  - Assess patient’s ability to void and empty bladder  - Monitor I/O  - Bladder scan as needed  - Discuss with physician/AP medications to alleviate retention as needed  - Discuss catheterization for long term situations as appropriate  Outcome: Progressing  Goal: Urinary catheter remains patent  Description: INTERVENTIONS:  - Assess patency of urinary catheter  - If patient has a chronic lynn, consider changing catheter if non-functioning  - Follow guidelines for intermittent irrigation of non-functioning urinary catheter  Outcome: Progressing     Problem: METABOLIC, FLUID AND ELECTROLYTES - ADULT  Goal: Electrolytes maintained within normal limits  Description: INTERVENTIONS:  - Monitor labs and assess patient for signs and symptoms of electrolyte imbalances  - Administer electrolyte replacement as ordered  - Monitor response to electrolyte replacements, including repeat lab results as appropriate  - Instruct patient on fluid and nutrition as  appropriate  Outcome: Progressing  Goal: Fluid balance maintained  Description: INTERVENTIONS:  - Monitor labs   - Monitor I/O and WT  - Instruct patient on fluid and nutrition as appropriate  - Assess for signs & symptoms of volume excess or deficit  Outcome: Progressing  Goal: Glucose maintained within target range  Description: INTERVENTIONS:  - Monitor Blood Glucose as ordered  - Assess for signs and symptoms of hyperglycemia and hypoglycemia  - Administer ordered medications to maintain glucose within target range  - Assess nutritional intake and initiate nutrition service referral as needed  Outcome: Progressing

## 2024-09-18 NOTE — ASSESSMENT & PLAN NOTE
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

## 2024-09-18 NOTE — ASSESSMENT & PLAN NOTE
Presents with malaise, abdominal pain, flank pain, scrotal pain. Leukocytosis noted, CT abd/pelvis reviewed: concerning for multifocal right pyelonephritis, left upper pole pyelonephritis, suspicious for pyelitis/ureteritis/cystitis     Plan:  ABX: levaquin in ED, will continue with ceftriaxone  Follow infectious markers, culture data  Multimodal analgesia

## 2024-09-18 NOTE — PLAN OF CARE
Problem: PAIN - ADULT  Goal: Verbalizes/displays adequate comfort level or baseline comfort level  Description: Interventions:  - Encourage patient to monitor pain and request assistance  - Assess pain using appropriate pain scale  - Administer analgesics based on type and severity of pain and evaluate response  - Implement non-pharmacological measures as appropriate and evaluate response  - Consider cultural and social influences on pain and pain management  - Notify physician/advanced practitioner if interventions unsuccessful or patient reports new pain  Outcome: Progressing     Problem: INFECTION - ADULT  Goal: Absence or prevention of progression during hospitalization  Description: INTERVENTIONS:  - Assess and monitor for signs and symptoms of infection  - Monitor lab/diagnostic results  - Monitor all insertion sites, i.e. indwelling lines, tubes, and drains  - Monitor endotracheal if appropriate and nasal secretions for changes in amount and color  - [place] appropriate cooling/warming therapies per order  - Administer medications as ordered  - Instruct and encourage patient and family to use good hand hygiene technique  - Identify and instruct in appropriate isolation precautions for identified infection/condition  Outcome: Progressing  Goal: Absence of fever/infection during neutropenic period  Description: INTERVENTIONS:  - Monitor WBC    Outcome: Progressing     Problem: SAFETY ADULT  Goal: Patient will remain free of falls  Description: INTERVENTIONS:  - Educate patient/family on patient safety including physical limitations  - Instruct patient to call for assistance with activity   - Consult OT/PT to assist with strengthening/mobility   - Keep Call bell within reach  - Keep bed low and locked with side rails adjusted as appropriate  - Keep care items and personal belongings within reach  - Initiate and maintain comfort rounds  - Make Fall Risk Sign visible to staff  - Apply yellow socks and bracelet  for high fall risk patients  - Consider moving patient to room near nurses station  Outcome: Progressing  Goal: Maintain or return to baseline ADL function  Description: INTERVENTIONS:  -  Assess patient's ability to carry out ADLs; assess patient's baseline for ADL function and identify physical deficits which impact ability to perform ADLs (bathing, care of mouth/teeth, toileting, grooming, dressing, etc.)  - Assess/evaluate cause of self-care deficits   - Assess range of motion  - Assess patient's mobility; develop plan if impaired  - Assess patient's need for assistive devices and provide as appropriate  - Encourage maximum independence but intervene and supervise when necessary  - Involve family in performance of ADLs  - Assess for home care needs following discharge   - Consider OT consult to assist with ADL evaluation and planning for discharge  - Provide patient education as appropriate  Outcome: Progressing  Goal: Maintains/Returns to pre admission functional level  Description: INTERVENTIONS:  - Perform AM-PAC 6 Click Basic Mobility/ Daily Activity assessment daily.  - Set and communicate daily mobility goal to care team and patient/family/caregiver.   - Collaborate with rehabilitation services on mobility goals if consulted  - Perform Range of Motion 4 times a day.  - Reposition patient every 2 hours.  - Dangle patient 4 times a day  - Stand patient 4 times a day  - Ambulate patient 4 times a day  - Out of bed to chair 4 times a day   - Out of bed for meals 3 times a day  - Out of bed for toileting  - Record patient progress and toleration of activity level   Outcome: Progressing     Problem: DISCHARGE PLANNING  Goal: Discharge to home or other facility with appropriate resources  Description: INTERVENTIONS:  - Identify barriers to discharge w/patient and caregiver  - Arrange for needed discharge resources and transportation as appropriate  - Identify discharge learning needs (meds, wound care, etc.)  -  Arrange for interpretive services to assist at discharge as needed  - Refer to Case Management Department for coordinating discharge planning if the patient needs post-hospital services based on physician/advanced practitioner order or complex needs related to functional status, cognitive ability, or social support system  Outcome: Progressing     Problem: GENITOURINARY - ADULT  Goal: Maintains or returns to baseline urinary function  Description: INTERVENTIONS:  - Assess urinary function  - Encourage oral fluids to ensure adequate hydration if ordered  - Administer IV fluids as ordered to ensure adequate hydration  - Administer ordered medications as needed  - Offer frequent toileting  - Follow urinary retention protocol if ordered  Outcome: Progressing  Goal: Absence of urinary retention  Description: INTERVENTIONS:  - Assess patient’s ability to void and empty bladder  - Monitor I/O  - Bladder scan as needed  - Discuss with physician/AP medications to alleviate retention as needed  - Discuss catheterization for long term situations as appropriate  Outcome: Progressing  Goal: Urinary catheter remains patent  Description: INTERVENTIONS:  - Assess patency of urinary catheter  - If patient has a chronic lynn, consider changing catheter if non-functioning  - Follow guidelines for intermittent irrigation of non-functioning urinary catheter  Outcome: Progressing     Problem: METABOLIC, FLUID AND ELECTROLYTES - ADULT  Goal: Electrolytes maintained within normal limits  Description: INTERVENTIONS:  - Monitor labs and assess patient for signs and symptoms of electrolyte imbalances  - Administer electrolyte replacement as ordered  - Monitor response to electrolyte replacements, including repeat lab results as appropriate  - Instruct patient on fluid and nutrition as appropriate  Outcome: Progressing  Goal: Fluid balance maintained  Description: INTERVENTIONS:  - Monitor labs   - Monitor I/O and WT  - Instruct patient on  fluid and nutrition as appropriate  - Assess for signs & symptoms of volume excess or deficit  Outcome: Progressing  Goal: Glucose maintained within target range  Description: INTERVENTIONS:  - Monitor Blood Glucose as ordered  - Assess for signs and symptoms of hyperglycemia and hypoglycemia  - Administer ordered medications to maintain glucose within target range  - Assess nutritional intake and initiate nutrition service referral as needed  Outcome: Progressing

## 2024-09-18 NOTE — ED PROVIDER NOTES
1. Epididymitis    2. Hydrocele    3. Elevated bilirubin    4. Hyponatremia      ED Disposition       None          Assessment & Plan       Medical Decision Making  Epididymitis- will treat with levofloxacin. Referral sent to urology for this and hydrocele.     Signed out to April Carpio: pending dispo after CT results.    Amount and/or Complexity of Data Reviewed  Labs: ordered. Decision-making details documented in ED Course.  Radiology: ordered. Decision-making details documented in ED Course.    Risk  Prescription drug management.                  ED Course as of 09/17/24 2349 Tue Sep 17, 2024   1941 Sodium(!): 132   1941 Chloride(!): 95   1941 Creatinine(!): 1.41  1.17 on 6/24 1941 GLUCOSE(!): 152   1941 WBC(!): 16.12   2153 US scrotum and testicles  1.  Again noted is hypertrophy and hyperemia of the left epididymis, consistent with epididymitis  2.  Small left hydrocele, decreased in size, however now is mildly complex     2156 Given elevated WBC, creatinine, LFTs and bili- will obtain CT scan    2340 Signed out to April Carpio PA-C- came in with testicular pain- US shows epididymitis and hydrocele. Also having abd pain x1 week- has known gallbladder issues. Pending CT scan. If normal can go home. Levofloxacin script already placed.        Medications   fentaNYL injection 25 mcg (25 mcg Intravenous Given 9/17/24 2158)   lactated ringers bolus 1,000 mL (1,000 mL Intravenous New Bag 9/17/24 2201)   levofloxacin (LEVAQUIN) tablet 500 mg (500 mg Oral Given 9/17/24 2209)   iohexol (OMNIPAQUE) 350 MG/ML injection (MULTI-DOSE) 100 mL (100 mL Intravenous Given 9/17/24 2239)       History of Present Illness       Chief Complaint   Patient presents with    Testicle Pain     Pt reports started feeling testicle pain today when laying down and reports left testicle appears swollen, hard and higher than the other testicle        78-year-old male presents today with testicular pain that started today around 12  "PM.  Reports that he also noticed swelling and it appears \"hard\".  Reports that it is higher up than the other testicle. States that he has been suffering from some abdominal pain for the past week but he states that he has gall bladder issues.  States that chills started yesterday. Denies chest pain, SOB, urinary symptoms, back pain.        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\"    Full dentures     but doesnt wear them    History of 2019 novel coronavirus disease (COVID-19) 02/2021    hospital for 2 days but not in ICU/\"mainly dehydrated\" \"also fever/oxygen below 90\"    History of coronary artery stent placement     x2 in 2008 or so; sees [facility]-- Dr Hutton cardio    [place]     no hearing aids yet    Hyperlipidemia     Last Assessed:  11/24/17    Hypertension     Benign essential, Last Assessed:  11/24/17    LBBB (left bundle branch block)     Nocturia     Polycythemia vera (HCC)     (per history in chart)    PVD (peripheral vascular disease) (HCC)     RBC abnormality     pt reports told has high Red blood cells/goes to infusion center regularly next appt 10/8/21 hematologist is Dr Charlton of [facility](had been Dr Ahn)    Seasickness     Shortness of breath     \"if goes up steps gets SOB, had for awhile\"    Slow urinary stream     \"sometimes feels like bladder isnt all the way empty\"    Snores     Wears glasses     reading         Review of Systems   Constitutional:  Positive for chills. Negative for fever.   Gastrointestinal:  Positive for abdominal pain. Negative for nausea and vomiting.   Genitourinary:  Positive for " scrotal swelling and testicular pain.   All other systems reviewed and are negative.          Objective     ED Triage Vitals   Temperature Pulse Blood Pressure Respirations SpO2 Patient Position - Orthostatic VS   09/17/24 1821 09/17/24 1821 09/17/24 1821 09/17/24 1821 09/17/24 1821 09/17/24 1821   98.4 °F (36.9 °C) 80 130/75 20 99 % Sitting      Temp src Heart Rate Source BP Location FiO2 (%) Pain Score    -- 09/17/24 1821 09/17/24 1821 -- 09/17/24 2158     Monitor Right arm  8        Physical Exam  Vitals and nursing note reviewed. Exam conducted with a chaperone present.   Constitutional:       General: He is not in acute distress.     Appearance: Normal appearance. He is well-developed. He is not ill-appearing.   HENT:      Head: Normocephalic and atraumatic.   Eyes:      Conjunctiva/sclera: Conjunctivae normal.   Cardiovascular:      Rate and Rhythm: Normal rate.   Pulmonary:      Effort: Pulmonary effort is normal.   Abdominal:      Palpations: Abdomen is soft.      Tenderness: There is abdominal tenderness (suprapubic pain and RUQ pain).   Genitourinary:     Testes:         Left: Tenderness and swelling present.   Musculoskeletal:         General: No swelling. Normal range of motion.      Cervical back: Normal range of motion and neck supple.   Skin:     General: Skin is warm and dry.   Neurological:      Mental Status: He is alert.   Psychiatric:         Mood and Affect: Mood normal.         Behavior: Behavior normal.         Labs Reviewed   CBC AND DIFFERENTIAL - Abnormal       Result Value    WBC 16.12 (*)     RBC 4.40      Hemoglobin 15.8      Hematocrit 46.0       (*)     MCH 35.9 (*)     MCHC 34.3      RDW 14.3      MPV 10.2      Platelets 152      nRBC 0      Segmented % 85 (*)     Immature Grans % 1      Lymphocytes % 6 (*)     Monocytes % 8      Eosinophils Relative 0      Basophils Relative 0      Absolute Neutrophils 13.74 (*)     Absolute Immature Grans 0.09      Absolute Lymphocytes 0.96       Absolute Monocytes 1.28 (*)     Eosinophils Absolute 0.02      Basophils Absolute 0.03     COMPREHENSIVE METABOLIC PANEL - Abnormal    Sodium 132 (*)     Potassium 3.8      Chloride 95 (*)     CO2 29      ANION GAP 8      BUN 22      Creatinine 1.41 (*)     Glucose 152 (*)     Calcium 8.8      AST 54 (*)     ALT 43      Alkaline Phosphatase 107 (*)     Total Protein 6.9      Albumin 3.5      Total Bilirubin 1.78 (*)     eGFR 47      Narrative:     National Kidney Disease Foundation guidelines for Chronic Kidney Disease (CKD):     Stage 1 with normal or high GFR (GFR > 90 mL/min/1.73 square meters)    Stage 2 Mild CKD (GFR = 60-89 mL/min/1.73 square meters)    Stage 3A Moderate CKD (GFR = 45-59 mL/min/1.73 square meters)    Stage 3B Moderate CKD (GFR = 30-44 mL/min/1.73 square meters)    Stage 4 Severe CKD (GFR = 15-29 mL/min/1.73 square meters)    Stage 5 End Stage CKD (GFR <15 mL/min/1.73 square meters)  Note: GFR calculation is accurate only with a steady state creatinine   UA W REFLEX TO MICROSCOPIC WITH REFLEX TO CULTURE - Abnormal    Color, UA Yellow      Clarity, UA Turbid      Specific Gravity, UA 1.009      pH, UA 6.0      Leukocytes, UA Large (*)     Nitrite, UA Negative      Protein, UA 50 (1+) (*)     Glucose, UA Negative      Ketones, UA Negative      Urobilinogen, UA 2.0 (*)     Bilirubin, UA Negative      Occult Blood, UA Small (*)    URINE MICROSCOPIC - Abnormal    RBC, UA 10-20 (*)     WBC, UA Innumerable (*)     Epithelial Cells Occasional      Bacteria, UA Innumerable (*)     MUCUS THREADS Occasional (*)     WBC Clumps Present     URINE CULTURE     US scrotum and testicles   Final Interpretation by Mauri Parker MD (09/17 2148)      1.  Again noted is hypertrophy and hyperemia of the left epididymis, consistent with epididymitis   2.  Small left hydrocele, decreased in size, however now is mildly complex      Workstation performed: [id number]         CT abdomen pelvis w contrast    (Results  Pending)       Procedures       Elijah Mcgovern PA-C  09/17/24 [id number]

## 2024-09-19 ENCOUNTER — TELEPHONE (OUTPATIENT)
Dept: OTHER | Facility: HOSPITAL | Age: 79
End: 2024-09-19

## 2024-09-19 LAB
ANION GAP SERPL CALCULATED.3IONS-SCNC: 7 MMOL/L (ref 4–13)
BASOPHILS # BLD AUTO: 0.02 THOUSANDS/ΜL (ref 0–0.1)
BASOPHILS NFR BLD AUTO: 0 % (ref 0–1)
BUN SERPL-MCNC: 18 MG/DL (ref 5–25)
CALCIUM SERPL-MCNC: 8.3 MG/DL (ref 8.4–10.2)
CHLORIDE SERPL-SCNC: 103 MMOL/L (ref 96–108)
CO2 SERPL-SCNC: 27 MMOL/L (ref 21–32)
CREAT SERPL-MCNC: 1.4 MG/DL (ref 0.6–1.3)
EOSINOPHIL # BLD AUTO: 0.04 THOUSAND/ΜL (ref 0–0.61)
EOSINOPHIL NFR BLD AUTO: 0 % (ref 0–6)
ERYTHROCYTE [DISTWIDTH] IN BLOOD BY AUTOMATED COUNT: 14.1 % (ref 11.6–15.1)
GFR SERPL CREATININE-BSD FRML MDRD: 47 ML/MIN/1.73SQ M
GLUCOSE SERPL-MCNC: 102 MG/DL (ref 65–140)
HCT VFR BLD AUTO: 38.8 % (ref 36.5–49.3)
HGB BLD-MCNC: 13.5 G/DL (ref 12–17)
IMM GRANULOCYTES # BLD AUTO: 0.12 THOUSAND/UL (ref 0–0.2)
IMM GRANULOCYTES NFR BLD AUTO: 1 % (ref 0–2)
LYMPHOCYTES # BLD AUTO: 1.26 THOUSANDS/ΜL (ref 0.6–4.47)
LYMPHOCYTES NFR BLD AUTO: 9 % (ref 14–44)
MAGNESIUM SERPL-MCNC: 1.8 MG/DL (ref 1.9–2.7)
MCH RBC QN AUTO: 37.3 PG (ref 26.8–34.3)
MCHC RBC AUTO-ENTMCNC: 34.8 G/DL (ref 31.4–37.4)
MCV RBC AUTO: 107 FL (ref 82–98)
MONOCYTES # BLD AUTO: 1.04 THOUSAND/ΜL (ref 0.17–1.22)
MONOCYTES NFR BLD AUTO: 8 % (ref 4–12)
NEUTROPHILS # BLD AUTO: 11.02 THOUSANDS/ΜL (ref 1.85–7.62)
NEUTS SEG NFR BLD AUTO: 82 % (ref 43–75)
NRBC BLD AUTO-RTO: 0 /100 WBCS
PLATELET # BLD AUTO: 131 THOUSANDS/UL (ref 149–390)
PMV BLD AUTO: 10.3 FL (ref 8.9–12.7)
POTASSIUM SERPL-SCNC: 3.6 MMOL/L (ref 3.5–5.3)
RBC # BLD AUTO: 3.62 MILLION/UL (ref 3.88–5.62)
SODIUM SERPL-SCNC: 137 MMOL/L (ref 135–147)
WBC # BLD AUTO: 13.5 THOUSAND/UL (ref 4.31–10.16)

## 2024-09-19 PROCEDURE — 99232 SBSQ HOSP IP/OBS MODERATE 35: CPT | Performed by: INTERNAL MEDICINE

## 2024-09-19 PROCEDURE — 80048 BASIC METABOLIC PNL TOTAL CA: CPT | Performed by: INTERNAL MEDICINE

## 2024-09-19 PROCEDURE — 99222 1ST HOSP IP/OBS MODERATE 55: CPT | Performed by: UROLOGY

## 2024-09-19 PROCEDURE — 85025 COMPLETE CBC W/AUTO DIFF WBC: CPT | Performed by: INTERNAL MEDICINE

## 2024-09-19 PROCEDURE — 83735 ASSAY OF MAGNESIUM: CPT | Performed by: INTERNAL MEDICINE

## 2024-09-19 RX ORDER — TAMSULOSIN HYDROCHLORIDE 0.4 MG/1
0.4 CAPSULE ORAL
Status: DISCONTINUED | OUTPATIENT
Start: 2024-09-19 | End: 2024-09-20 | Stop reason: HOSPADM

## 2024-09-19 RX ORDER — FINASTERIDE 5 MG/1
5 TABLET, FILM COATED ORAL DAILY
Status: DISCONTINUED | OUTPATIENT
Start: 2024-09-19 | End: 2024-09-20 | Stop reason: HOSPADM

## 2024-09-19 RX ADMIN — PANTOPRAZOLE SODIUM 40 MG: 40 TABLET, DELAYED RELEASE ORAL at 05:00

## 2024-09-19 RX ADMIN — METOPROLOL TARTRATE 50 MG: 50 TABLET, FILM COATED ORAL at 09:36

## 2024-09-19 RX ADMIN — FINASTERIDE 5 MG: 5 TABLET, FILM COATED ORAL at 15:07

## 2024-09-19 RX ADMIN — ASPIRIN 81 MG: 81 TABLET, COATED ORAL at 09:36

## 2024-09-19 RX ADMIN — AMLODIPINE BESYLATE 10 MG: 10 TABLET ORAL at 17:10

## 2024-09-19 RX ADMIN — DEXTROSE 2000 MG: 50 INJECTION, SOLUTION INTRAVENOUS at 04:55

## 2024-09-19 RX ADMIN — TAMSULOSIN HYDROCHLORIDE 0.4 MG: 0.4 CAPSULE ORAL at 17:10

## 2024-09-19 RX ADMIN — HYDROXYUREA 500 MG: 500 CAPSULE ORAL at 09:37

## 2024-09-19 RX ADMIN — METOPROLOL TARTRATE 50 MG: 50 TABLET, FILM COATED ORAL at 17:10

## 2024-09-19 RX ADMIN — ENOXAPARIN SODIUM 40 MG: 40 INJECTION SUBCUTANEOUS at 09:36

## 2024-09-19 RX ADMIN — ATORVASTATIN CALCIUM 80 MG: 80 TABLET, FILM COATED ORAL at 17:10

## 2024-09-19 NOTE — PROGRESS NOTES
Progress Note -     Name: Santana Jacinto 79 y.o. male I MRN: [id number]  Unit/Bed#: [place] [address] -01 I Date of Admission: 9/17/2024   Date of Service: 9/19/2024 I Hospital Day: 1     This SmartSection is not supported in the current .     Pastoral Care Progress Note            Patient visited by volunteer , provided listening support and pastoral care presence.

## 2024-09-19 NOTE — PROGRESS NOTES
Progress Note -     Name: Santana Jcainto 79 y.o. male I MRN: [id number]  Unit/Bed#: [place] [address] -01 I Date of Admission: 9/17/2024   Date of Service: 9/19/2024 I Hospital Day: 1     This SmartSection is not supported in the current .       09/19/24 1300   Clinical Encounter Type   Visited With Patient   Routine Visit Introduction

## 2024-09-19 NOTE — PROGRESS NOTES
Progress Note - Hospitalist   Name: Santana Jacinto 79 y.o. male I MRN: [id number]  Unit/Bed#: [address] 220-01 I Date of Admission: 9/17/2024   Date of Service: 9/19/2024 I Hospital Day: 1    Assessment & Plan  Pyelonephritis  Presents with malaise, abdominal pain, flank pain, scrotal pain. Leukocytosis noted, CT abd/pelvis reviewed: concerning for multifocal right pyelonephritis, left upper pole pyelonephritis, suspicious for pyelitis/ureteritis/cystitis     Plan:  ABX: levaquin in ED, will continue with ceftriaxone  Follow blood and urine culture results.    In view of recurrent UTI, pyelonephritis, scrotal swelling and bladder wall thickening, concern for?  Prostatitis/epididymitis.  Will have urology evaluate.    Benign essential hypertension  Controlled  Outpatient regimen: amlodipine 10mg daily, metoprolol tartrate 50mg BID    Plan:  Continue outpatient regimen     Coronary artery disease involving native coronary artery of native heart without angina pectoris  CAD s/p two vessel stent ~10 years ago    Plan:  Continue GDMT    Polycythemia vera (HCC)  History of JAK2 mutation positive polycythemia vera, [facility]    Plan:  Continue hydroxyurea   Cigarette nicotine dependence without complication  1/2PPD >30 years    Plan:  Encourage cessation  Nicotine replacement PRN    Acute kidney injury superimposed on chronic kidney disease  (HCC)  Lab Results   Component Value Date    EGFR 47 09/19/2024    EGFR 59 09/18/2024    EGFR 47 09/17/2024    CREATININE 1.40 (H) 09/19/2024    CREATININE 1.17 09/18/2024    CREATININE 1.41 (H) 09/17/2024     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, 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   Baseline Cr: 0.9-1.3  Admit Cr: 1.41     Plan:  1L crystalloid in ED, hold further volume, encourage PO intake  Monitor renal function, renal dose medications, maintain normotension/euvolemia, avoid nephrotoxic agents, I&Os  History of cardiac pacemaker  S/p dual ventricular PPM  Left  epididymitis  Admitted ~9 months prior   U/S scrotum/testicles tonight reviewed: hypertrophy hyperemia of left epididymitis, small left hydrocele     Plan:  Elevate scrotum, ice PRN  Multimodal analgesia       VTE Pharmacologic Prophylaxis: VTE Score: 4 High Risk (Score >/= 5) - Pharmacological DVT Prophylaxis Ordered: enoxaparin (Lovenox). Sequential Compression Devices Ordered.    Mobility:   Basic Mobility Inpatient Raw Score: 24  JH-HLM Goal: 8: Walk 250 feet or more  JH-HLM Achieved: 3: Sit at edge of bed  JH-HLM Goal achieved. Continue to encourage appropriate mobility.    Patient Centered Rounds: I performed bedside rounds with nursing staff today.   Discussions with Specialists or Other Care Team Provider:   Discussed with nursing  Current Length of Stay: 1 day(s)  Current Patient Status: Inpatient   Certification Statement: The patient will continue to require additional inpatient hospital stay due to ongoing IV antibiotics and monitoring of culture results  Discharge Plan: Anticipate discharge tomorrow to home with home services.    Code Status: Level 1 - Full Code    Subjective   Seen and examined at bedside.  Tmax 99.6.  Denies any suprapubic or flank discomfort.  Still with some urinary frequency and urgency.    Objective     Vitals:   Temp (24hrs), Av.8 °F (37.1 °C), Min:98.4 °F (36.9 °C), Max:99.6 °F (37.6 °C)    Temp:  [98.4 °F (36.9 °C)-99.6 °F (37.6 °C)] 98.4 °F (36.9 °C)  HR:  [70-77] 75  Resp:  [18-20] 18  BP: (123-126)/(73-74) 123/74  SpO2:  [94 %-96 %] 96 %  Body mass index is 24.03 kg/m².     Input and Output Summary (last 24 hours):     Intake/Output Summary (Last 24 hours) at 2024 1058  Last data filed at 2024 0914  Gross per 24 hour   Intake 680 ml   Output 1250 ml   Net -570 ml       Physical Exam  Constitutional:       General: He is not in acute distress.     Appearance: He is ill-appearing.   HENT:      Head: Normocephalic.      Nose: Nose normal.      Mouth/Throat:       Mouth: Mucous membranes are moist.   Eyes:      Extraocular Movements: Extraocular movements intact.      Pupils: Pupils are equal, round, and reactive to light.   Cardiovascular:      Rate and Rhythm: Normal rate and regular rhythm.      Pulses: Normal pulses.   Pulmonary:      Effort: Pulmonary effort is normal.      Breath sounds: Normal breath sounds.   Abdominal:      General: Abdomen is flat. Bowel sounds are normal.      Palpations: Abdomen is soft.   Genitourinary:     Comments: Scrotal swelling.  Musculoskeletal:      Right lower leg: No edema.      Left lower leg: No edema.   Skin:     General: Skin is warm.   Neurological:      General: No focal deficit present.      Mental Status: He is alert and oriented to person, place, and time. Mental status is at baseline.          Lines/Drains:  Lines/Drains/Airways       Active Status       None                            Lab Results: I have reviewed the following results:    Results from last 7 days   Lab Units 09/19/24  0459   WBC Thousand/uL 13.50*   HEMOGLOBIN g/dL 13.5   HEMATOCRIT % 38.8   PLATELETS Thousands/uL 131*   SEGS PCT % 82*   LYMPHO PCT % 9*   MONO PCT % 8   EOS PCT % 0     Results from last 7 days   Lab Units 09/19/24  0459 09/18/24  0502 09/17/24  1906   SODIUM mmol/L 137   < > 132*   POTASSIUM mmol/L 3.6   < > 3.8   CHLORIDE mmol/L 103   < > 95*   CO2 mmol/L 27   < > 29   BUN mg/dL 18   < > 22   CREATININE mg/dL 1.40*   < > 1.41*   ANION GAP mmol/L 7   < > 8   CALCIUM mg/dL 8.3*   < > 8.8   ALBUMIN g/dL  --   --  3.5   TOTAL BILIRUBIN mg/dL  --   --  1.78*   ALK PHOS U/L  --   --  107*   ALT U/L  --   --  43   AST U/L  --   --  54*   GLUCOSE RANDOM mg/dL 102   < > 152*    < > = values in this interval not displayed.                 Results from last 7 days   Lab Units 09/18/24  0502   PROCALCITONIN ng/ml 0.34*       Recent Cultures (last 7 days):   Results from last 7 days   Lab Units 09/18/24  0503   BLOOD CULTURE  Received in Microbiology  Lab. Culture in Progress.  Received in Microbiology Lab. Culture in Progress.       Imaging Review: Reviewed radiology reports from this admission including: CT abdomen/pelvis.  Other Studies: No additional pertinent studies reviewed.    Last 24 Hours Medication List:     Current Facility-Administered Medications:     acetaminophen (TYLENOL) tablet 650 mg, Q6H PRN    aluminum-magnesium hydroxide-simethicone (MAALOX) oral suspension 30 mL, Q6H PRN    amLODIPine (NORVASC) tablet 10 mg, QPM    aspirin (ECOTRIN LOW STRENGTH) EC tablet 81 mg, Daily    atorvastatin (LIPITOR) tablet 80 mg, QPM    cefTRIAXone (ROCEPHIN) 2,000 mg in dextrose 5 % 50 mL IVPB, Q24H, Last Rate: 2,000 mg (09/19/24 4286)    enoxaparin (LOVENOX) subcutaneous injection 40 mg, Daily    hydroxyurea (HYDREA) capsule 500 mg, Daily    metoprolol tartrate (LOPRESSOR) tablet 50 mg, BID    ondansetron (ZOFRAN) injection 4 mg, Q6H PRN    oxyCODONE (ROXICODONE) IR tablet 5 mg, Q6H PRN    oxyCODONE (ROXICODONE) split tablet 2.5 mg, Q6H PRN    pantoprazole (PROTONIX) EC tablet 40 mg, Early Morning    polyethylene glycol (MIRALAX) packet 17 g, Daily PRN    Administrative Statements   Today, Patient Was Seen By: Codi Parker MD      **Please Note: This note may have been constructed using a voice recognition system.**

## 2024-09-19 NOTE — ASSESSMENT & PLAN NOTE
Presented to [facility] ED on 9/18/2024 with malaise, abdominal pain, flank pain, scrotal pain  Leukocytosis 16.12 on admission   CT abd/pelvis reviewed: concerning for multifocal right pyelonephritis, left upper pole pyelonephritis, suspicious for pyelitis/ureteritis/cystitis   Levaquin in ED transitioned to ceftriaxone  Urine culture returned positive for ecoli bacteria   Blood cultures pending   Symptoms improving overall   VSS, afebrile   WBC improving to 13.50   Emptying well -- no sign of retention   Bladder scan 9/18 -- 141 ml   Follow up in office for repeat cystoscopy and PVR

## 2024-09-19 NOTE — TELEPHONE ENCOUNTER
Patient has been admitted for left-sided epididymitis x 2 as well as recurrent UTI/pyelonephritis.  He most recently saw Kari NOEL in office January 2024.  He has undergone cystoscopy in the past which revealed urethral narrowing requiring dilation.      Recommend follow-up for repeat cystoscopy with MD to ensure no changes are visualized within the bladder with frequent infections/bladder wall thickening visualized on recent imaging.  Thank you!

## 2024-09-19 NOTE — ASSESSMENT & PLAN NOTE
History of JAK2 mutation positive polycythemia vera, [facility]    Plan:  Continue hydroxyurea

## 2024-09-19 NOTE — ASSESSMENT & PLAN NOTE
Prior episode requiring admission roughly 9 months ago   U/S scrotum/testicles tonight reviewed: hypertrophy hyperemia of left epididymitis, small left hydrocele   Physical exam - mild pain and swelling to left testicle, right testicle WNL  Continue conservative measures -- elevate scrotum, ice PRN, multimodal analgesia

## 2024-09-19 NOTE — ASSESSMENT & PLAN NOTE
Admitted ~9 months prior   U/S scrotum/testicles tonight reviewed: hypertrophy hyperemia of left epididymitis, small left hydrocele     Plan:  Elevate scrotum, ice PRN  Multimodal analgesia

## 2024-09-19 NOTE — CASE MANAGEMENT
Case Management Assessment & Discharge Planning Note    Patient name Santana Jacinto  Location South 2 /South 2 M* MRN [id number]  : 1945 Date 2024       Current Admission Date: 2024  Current Admission Diagnosis:Pyelonephritis   Patient Active Problem List    Diagnosis Date Noted Date Diagnosed    Pyelonephritis 2024     Left epididymitis 2023     Chronic obstructive pulmonary disease (HCC) 10/17/2022     History of cardiac pacemaker 2022     Acute kidney injury superimposed on chronic kidney disease  (HCC) 12/10/2021     JAK2 gene mutation 2021     Encounter for antineoplastic chemotherapy 2021     Cigarette nicotine dependence without complication 2019     Benign prostatic hyperplasia without lower urinary tract symptoms 2019     Polycythemia vera (HCC) 10/20/2017     Prediabetes 2017     Benign essential hypertension 2012     Coronary artery disease involving native coronary artery of native heart without angina pectoris 2012       LOS (days): 1  Geometric Mean LOS (GMLOS) (days): 2.9  Days to GMLOS:2     OBJECTIVE:    Risk of Unplanned Readmission Score: 13.08         Current admission status: Inpatient       Preferred Pharmacy:   [id number] [place] - [address]  [address]  [place] [address]  Phone: [phone] Fax: [phone]    Primary Care Provider: Mina Denny DO    Primary Insurance: [employer] REP  Secondary Insurance:     ASSESSMENT:  Active Health Care Proxies       Orville Julianne [facility] Representative - Spouse   Primary Phone: [phone] (Mobile)  Home Phone: [phone]                 Advance Directives  Does patient have a Health Care POA?: No  Was patient offered paperwork?: Yes (declined currently)  Does patient currently have a Health Care decision maker?: Yes, please see Health Care Proxy section  Does patient have Advance Directives?: No  Was patient offered paperwork?:  Yes (declined currently)  Primary Contact: Julianne Jacinto- wife         Readmission Root Cause  30 Day Readmission: No    Patient Information  Admitted from:: Home  Mental Status: Alert  During Assessment patient was accompanied by: Not accompanied during assessment  Assessment information provided by:: Patient  Primary Caregiver: Self  Support Systems: Spouse/significant other, Daughter, Family members, Friends/neighbors  County of Residence: [facility]  What city do you live in[place]  Home entry access options. Select all that apply.: Stairs  Number of steps to enter home.: 1 (from "[employer]  Do the steps have railings?: No  Type of Current Residence: [place]  Living Arrangements: Lives w/ Spouse/significant other  Is patient a ?: No    Activities of Daily Living Prior to Admission  Functional Status: Independent  Completes ADLs independently?: Yes  Ambulates independently?: Yes  Does patient use assisted devices?: No  Does patient currently own DME?: Yes  What DME does the patient currently own?: Straight Cane, Crutches  Does patient have a history of Outpatient Therapy (PT/OT)?: No  Does the patient have a history of Short-Term Rehab?: No  Does patient have a history of HHC?: Yes  Does patient currently have HHC?: No         Patient Information Continued  Income Source: Pension/senior care  Does patient have prescription coverage?: Yes ([facility] [employer])  Does patient have a history of substance abuse?: No  Does patient have a history of Mental Health Diagnosis?: No         Means of Transportation  Means of Transport to Appts:: Drives Self      Social Determinants of Health (SDOH)      Flowsheet Row Most Recent Value   Housing Stability    In the last 12 months, was there a time when you were not able to pay the mortgage or rent on time? N   In the past 12 months, how many times have you moved where you were living? 0   At any time in the past 12 months, were you homeless or living in a shelter (including now)? N    Transportation Needs    In the past 12 months, has lack of transportation kept you from medical appointments or from getting medications? no   In the past 12 months, has lack of transportation kept you from meetings, work, or from getting things needed for daily living? No   Food Insecurity    Within the past 12 months, you worried that your food would run out before you got the money to buy more. Never true   Within the past 12 months, the food you bought just didn't last and you didn't have money to get more. Never true   Utilities    In the past 12 months has the electric, gas, oil, or water company threatened to shut off services in your home? No            DISCHARGE DETAILS:    Discharge planning discussed with:: pt        CM contacted family/caregiver?: No- see comments (declined need for same)  Were Treatment Team discharge recommendations reviewed with patient/caregiver?: Yes  Did patient/caregiver verbalize understanding of patient care needs?: Yes       Contacts  Patient Contacts: Julianne Farleywife  Relationship to Patient:: Family  Contact Method: Phone  Phone Number: [phone]  Reason/Outcome: Continuity of Care, Emergency Contact, Discharge Planning    Requested Home Health Care         Is the patient interested in HHC at discharge?: No    DME Referral Provided  Referral made for DME?: No         Would you like to participate in our [facility] service program?  : No - Declined    Treatment Team Recommendation: Home  Discharge Destination Plan:: Home  Transport at Discharge : Auto with designated , Family        Transported by (Company and Unit #): Nicolás Jett's wife                    IMM Given (Date):: 09/19/24  IMM Given to:: Patient

## 2024-09-19 NOTE — ASSESSMENT & PLAN NOTE
Presents with malaise, abdominal pain, flank pain, scrotal pain. Leukocytosis noted, CT abd/pelvis reviewed: concerning for multifocal right pyelonephritis, left upper pole pyelonephritis, suspicious for pyelitis/ureteritis/cystitis     Plan:  ABX: levaquin in ED, will continue with ceftriaxone  Follow blood and urine culture results.    In view of recurrent UTI, pyelonephritis, scrotal swelling and bladder wall thickening, concern for?  Prostatitis/epididymitis.  Will have urology evaluate.

## 2024-09-19 NOTE — ASSESSMENT & PLAN NOTE
S/p simple prostatectomy in 2021 - negative for cancer  Continue proscar 5mg daily indefinitely   Begin taking flomax 0.4 mg daily to ensure complete bladder emptying

## 2024-09-19 NOTE — PLAN OF CARE
Problem: PAIN - ADULT  Goal: Verbalizes/displays adequate comfort level or baseline comfort level  Description: Interventions:  - Encourage patient to monitor pain and request assistance  - Assess pain using appropriate pain scale  - Administer analgesics based on type and severity of pain and evaluate response  - Implement non-pharmacological measures as appropriate and evaluate response  - Consider cultural and social influences on pain and pain management  - Notify physician/advanced practitioner if interventions unsuccessful or patient reports new pain  Outcome: Progressing     Problem: SAFETY ADULT  Goal: Patient will remain free of falls  Description: INTERVENTIONS:  - Educate patient/family on patient safety including physical limitations  - Instruct patient to call for assistance with activity   - Consult OT/PT to assist with strengthening/mobility   - Keep Call bell within reach  - Keep bed low and locked with side rails adjusted as appropriate  - Keep care items and personal belongings within reach  - Initiate and maintain comfort rounds  - Make Fall Risk Sign visible to staff  - Offer Toileting every 2 Hours, in advance of need  - Initiate/Maintain alarm  - Obtain necessary fall risk management equipment  - Apply yellow socks and bracelet for high fall risk patients  - Consider moving patient to room near nurses station  Outcome: Progressing     Problem: GENITOURINARY - ADULT  Goal: Maintains or returns to baseline urinary function  Description: INTERVENTIONS:  - Assess urinary function  - Encourage oral fluids to ensure adequate hydration if ordered  - Administer IV fluids as ordered to ensure adequate hydration  - Administer ordered medications as needed  - Offer frequent toileting  - Follow urinary retention protocol if ordered  Outcome: Progressing     Problem: METABOLIC, FLUID AND ELECTROLYTES - ADULT  Goal: Electrolytes maintained within normal limits  Description: INTERVENTIONS:  - Monitor labs  and assess patient for signs and symptoms of electrolyte imbalances  - Administer electrolyte replacement as ordered  - Monitor response to electrolyte replacements, including repeat lab results as appropriate  - Instruct patient on fluid and nutrition as appropriate  Outcome: Progressing

## 2024-09-19 NOTE — CONSULTS
Consult - Urology   Santana MARTINEZ [place] 1945, 79 y.o. male MRN: [id number]    Unit/Bed#: [place] [address] -01 Encounter: [id number]      Left epididymitis  Assessment & Plan  Prior episode requiring admission roughly 9 months ago   U/S scrotum/testicles tonight reviewed: hypertrophy hyperemia of left epididymitis, small left hydrocele   Physical exam - mild pain and swelling to left testicle, right testicle WNL  Continue conservative measures -- elevate scrotum, ice PRN, multimodal analgesia     Benign prostatic hyperplasia without lower urinary tract symptoms  Assessment & Plan  S/p simple prostatectomy in 2021 - negative for cancer  Continue proscar 5mg daily indefinitely   Begin taking flomax 0.4 mg daily to ensure complete bladder emptying     * Pyelonephritis  Assessment & Plan  Presented to [facility] ED on 9/18/2024 with malaise, abdominal pain, flank pain, scrotal pain  Leukocytosis 16.12 on admission   CT abd/pelvis reviewed: concerning for multifocal right pyelonephritis, left upper pole pyelonephritis, suspicious for pyelitis/ureteritis/cystitis   Levaquin in ED transitioned to ceftriaxone  Urine culture returned positive for ecoli bacteria   Blood cultures pending   Symptoms improving overall   VSS, afebrile   WBC improving to 13.50   Emptying well -- no sign of retention   Bladder scan 9/18 -- 141 ml   Follow up in office for repeat cystoscopy and PVR      Subjective:   Santana is a 79-year-old male who presented to [facility] ED on 9/18/2024 with malaise, abdominal pain, flank pain and scrotal pain.  Reports his left testicle is hard and mildly swollen.  WBC 16.12.  Creatinine 1.41.  Ultrasound scrotum/testicles revealed left-sided epididymitis.  Initiated on Levaquin in ED. CT A/P revealed--multifocal right pyelonephritis, left upper pole pyelonephritis, suspicious for pyelitis/uritis/cystitis.  Transitioned to IV ceftriaxone.  Following blood and urine cultures.    Today in consultation, patient states he is feeling much  "better since being initiated on antibiotics.  Mild urinary frequency/urgency persist.  He reports prior episodes of left epididymitis requiring admission roughly 9 months ago.  He has previously followed with our office for BPH and recurrent UTIs (most recently January 2024).  According to chart review, the patient is urinating well, bladder scan completed yesterday 141 mL.  Patient currently takes Proscar 5 mg daily for his enlarged prostate.  Recommend initiating the patient on Flomax 0.4 mg daily while inpatient and he should continue at discharge.  On physical exam, the left testicle is mildly swollen.  No erythema is noted.  He reports tenderness to palpation along the spermatic cord.  He is uncircumcised and phimosis is present. According to chart review he underwent cystoscopy in office by Dr. Vega (August 2022) and was noted to have a tight fossa navicularis which required manual dilation.    Urology will sign off but remain available for any further inpatient needs. Please feel free to contact the provider currently covering the Urology Epic Chat role for this campus with questions or concerns.      Review of Systems   Constitutional:  Negative for activity change, chills, fatigue and fever.   Respiratory:  Negative for apnea, cough and shortness of breath.    Cardiovascular:  Negative for chest pain and leg swelling.   Gastrointestinal:  Negative for abdominal distention, abdominal pain, constipation, diarrhea, nausea and vomiting.   Genitourinary:  Positive for frequency, scrotal swelling (left - mild), testicular pain (left) and urgency. Negative for decreased urine volume, difficulty urinating, dysuria, flank pain, hematuria and penile pain.   Psychiatric/Behavioral: Negative.         Objective:  Vitals: Blood pressure 123/74, pulse 75, temperature 98.4 °F (36.9 °C), temperature source Oral, resp. rate 18, height 5' 8\" (1.727 m), weight 71.7 kg (158 lb 1.1 oz), SpO2 96%.,Body mass index is 24.03 " kg/m².    Physical Exam  Vitals and nursing note reviewed.   Constitutional:       Appearance: Normal appearance. He is not ill-appearing.   HENT:      Head: Normocephalic and atraumatic.      Mouth/Throat:      Pharynx: Oropharynx is clear.   Eyes:      Extraocular Movements: Extraocular movements intact.      Conjunctiva/sclera: Conjunctivae normal.   Cardiovascular:      Rate and Rhythm: Normal rate.   Pulmonary:      Effort: Pulmonary effort is normal.   Abdominal:      General: Abdomen is flat. There is no distension.      Palpations: Abdomen is soft.      Tenderness: There is no abdominal tenderness. There is no right CVA tenderness or left CVA tenderness.   Genitourinary:     Comments: Uncircumcised penis, normal phallus, phimosis.  Testes smooth descended bilaterally into the scrotum - right side normal. Left testicle mildly swollen. No erythema or discharge noted. Mild tenderness to palpation along left spermatic cord.    Musculoskeletal:         General: Normal range of motion.      Cervical back: Normal range of motion.   Skin:     General: Skin is warm and dry.   Neurological:      General: No focal deficit present.      Mental Status: He is alert and oriented to person, place, and time.   Psychiatric:         Mood and Affect: Mood normal.         Behavior: Behavior normal.       Imaging:    CT ABDOMEN AND PELVIS WITH IV CONTRAST    INDICATION: elevated bili, abd pain. .    COMPARISON: 5/24/2023.    TECHNIQUE: CT examination of the abdomen and pelvis was performed. Multiplanar 2D reformatted images were created from the source data.    This examination, like all CT scans performed in the [facility], was performed utilizing techniques to minimize radiation dose exposure, including the use of iterative reconstruction and automated exposure control. Radiation dose length  product (DLP) for this visit: 551 mGy-cm    IV Contrast: 100 mL of iohexol (OMNIPAQUE)  Enteric Contrast: Not  administered.    FINDINGS:    ABDOMEN    LOWER CHEST: Emphysematous changes noted in the imaged lung bases with bibasilar dependent atelectasis, right greater than left. No pleural or pericardial effusions.    LIVER/BILIARY TREE: A too small to characterize but statistically likely benign hypodensity in segment 6 of the liver is again seen, which do not require follow-up (ACR White Paper 2017). No suspicious liver mass. Normal hepatic contours. No biliary  dilation.    GALLBLADDER: Contracted without wall thickening or calcified gallstones. Mucosal hyperenhancement of the gallbladder wall of uncertain etiology. No significant pericholecystic inflammatory change. If clinical concern for gallbladder pathology, consider  right upper quadrant ultrasound after adequate fasting for further evaluation.    SPLEEN: Unremarkable.    PANCREAS: Unremarkable.    ADRENAL GLANDS: Bilateral adrenal thickening is again seen, similar compared to the prior exam and may reflect underlying adrenal hyperplasia..    KIDNEYS/URETERS: The kidneys are normal in size and position. Mild symmetrical bilateral perinephric hazy inflammatory stranding is seen. A tiny cyst in the upper pole of the left kidney is again seen. Scattered areas of subtle loss of corticomedullary  differentiation in the right kidney and upper pole of the left kidney noted suspicious for pyelonephritis. There is mild wall thickening and mucosal hyperenhancement of the renal pelvis and bilateral ureters suspicious for pyelitis/ureteritis. Clinical  correlation with urinalysis recommended. No nephrolithiasis, hydronephrosis, or perinephric fluid collections bilaterally.    STOMACH AND BOWEL: Stomach is contracted limiting evaluation. No gross intraluminal mass. Small and large bowel loops appear normal in course and caliber without obstruction or inflammation. Terminal ileum appear grossly unremarkable. Appendix is not  seen but no pericecal inflammatory  changes.    APPENDIX: No findings to suggest appendicitis.    ABDOMINOPELVIC CAVITY: No ascites or loculated fluid collection. No pneumoperitoneum. No lymphadenopathy by size criteria.    VESSELS: Extensive calcific atherosclerosis without abdominal aortic aneurysm.    PELVIS    REPRODUCTIVE ORGANS: Dilated upper prostatic urethra. Prostate gland and seminal vesicles appear otherwise grossly unremarkable for patient's age. Bilateral hydrocele and left varicocele partially visualized.    URINARY BLADDER: Slightly distended bladder without intraluminal mass or calculi. Irregular bladder wall thickening concerning for cystitis, recommend correlation with urinalysis..    ABDOMINAL WALL/INGUINAL REGIONS: Fat-containing right inguinal hernia. No subcutaneous mass or fluid collections.    BONES: No acute fracture or suspicious osseous lesion.   Impression:       1.  Abnormal findings in the bilateral kidneys as detailed above concerning for multifocal right pyelonephritis and left upper pole pyelonephritis.  There is mild wall thickening and mucosal hyperenhancement of the renal pelvis and bilateral ureters  noted suspicious for pyelitis/ureteritis. Clinical correlation with urinalysis recommended.  2.  Irregular bladder wall thickening concerning for cystitis, recommend correlation with urinalysis.  3.  Contracted without wall thickening or calcified gallstones. Mucosal hyperenhancement of the gallbladder wall of uncertain etiology. No significant pericholecystic inflammatory change. If clinical concern for gallbladder pathology, consider right  upper quadrant ultrasound after adequate fasting for further evaluation.  4.  No findings of bowel obstruction, inflammation, free air, free fluid, or loculated fluid collections in the abdomen/pelvis.  5.  Bilateral adrenal thickening is again seen, similar compared to the prior exam and may reflect underlying adrenal hyperplasia.  6.  Fat-containing right inguinal hernia.  7.   Dilated upper prostatic urethra, bilateral hydrocele, and left varicocele incidentally seen.     SCROTAL ULTRASOUND    INDICATION: r/o torsion.    COMPARISON: Ultrasound 12/22/2023    TECHNIQUE: Ultrasound the scrotal contents was performed with a high frequency linear transducer utilizing volumetric sweep imaging as well as standard still image techniques. Imaging performed in longitudinal and transverse orientation. Color and  spectral Doppler evaluation also performed bilaterally.    FINDINGS:    TESTES:  Testes are symmetric and normal in size.    RIGHT testis = 3.8 x 2.0 x 2.7 cm. Volume 10.6 mL  Normal contour with homogeneous smooth echotexture.  No intratesticular mass lesion or calcifications.    LEFT testis = 2.8 x 1.9 x 2.3 cm. Volume 6.5 mL  Normal contour with homogeneous smooth echotexture.  No intratesticular mass lesion or calcifications.    Doppler flow within both testes is present and symmetric.    EPIDIDYMIDES:  Again noted is hypertrophy of the left epididymis. Bilateral epididymal cysts.  There is again asymmetric hyperemia in the left epididymis  No epididymal lesions.    HYDROCELE: Small, septated left hydrocele, decreased in size    VARICOCELE: None present.    SCROTUM: Scrotal thickness and appearance within normal limits. No evidence for extratesticular mass or hernia demonstrated.   Impression:       1.  Again noted is hypertrophy and hyperemia of the left epididymis, consistent with epididymitis  2.  Small left hydrocele, decreased in size, however now is mildly complex     Imaging reviewed - both report and images personally reviewed.     Labs:  Recent Labs     09/17/24 1906 09/18/24  0502 09/19/24  0459   WBC 16.12* 16.78* 13.50*     Recent Labs     09/17/24 1906 09/18/24  0502 09/19/24  0459   HGB 15.8 14.6 13.5       Recent Labs     09/17/24  1906 09/18/24  0502 09/19/24  0459   CREATININE 1.41* 1.17 1.40*       Microbiology:  09/17/2024 2135 09/19/2024 1101 Urine culture  [[id number]]   (Abnormal)   Urine, Clean Catch    Preliminary result [facility]  Component Value   Urine Culture >100,000 cfu/ml Gram Negative Seamus resembling Escherichia coli Abnormal  P      Blood cultures x2 pending    History:  Social History     Socioeconomic History    Marital status: /Civil Union     Spouse name: None    Number of children: None    Years of education: None    Highest education level: None   Occupational History    None   Tobacco Use    Smoking status: Every Day     Current packs/day: 0.50     Average packs/day: 0.5 packs/day for 52.7 years (26.4 ttl pk-yrs)     Types: Cigarettes     Start date: 1972     Passive exposure: Current    Smokeless tobacco: Never    Tobacco comments:     a little less than 1/2ppd, trying to cut back before surgery last smoked 5.25.23   Vaping Use    Vaping status: Never Used   Substance and Sexual Activity    Alcohol use: Never    Drug use: No    Sexual activity: Not Currently     Comment: defer   Other Topics Concern    None   Social History Narrative    Always uses seatbelt    Feels safe at home    Uses safety equipment     Social Determinants of Health     Financial Resource Strain: Not on file   Food Insecurity: No Food Insecurity (9/19/2024)    Hunger Vital Sign     Worried About Running Out of Food in the Last Year: Never true     Ran Out of Food in the Last Year: Never true   Transportation Needs: No Transportation Needs (9/19/2024)    PRAPARE - Transportation     Lack of Transportation (Medical): No     Lack of Transportation (Non-Medical): No   Physical Activity: Not on file   Stress: Not on file   Social Connections: Not on file   Intimate Partner Violence: Not on file   Housing Stability: Low Risk  (9/19/2024)    Housing Stability Vital Sign     Unable to Pay for Housing in the Last Year: No     Number of Times Moved in the Last Year: 0     Homeless in the Last Year: No       Past Medical History:   Diagnosis Date     "Arthritis     both shoulders    CAD (coronary artery disease)     Last Assessed:  11/4/13    Calculus of gallbladder with acute on chronic cholecystitis without obstruction 03/06/2024    Cigarette smoker     Colon polyp     Elevated prostate specific antigen (PSA)     Last Assessed:  12/21/17    Enlarged prostate     Exercise involving walking     in season hunt's and fish's/ 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\"    Full dentures     but doesnt wear them    Gastroesophageal reflux disease without esophagitis 04/03/2024    Heart block 08/08/2022    Hepatic cyst 05/02/2023    History of 2019 novel coronavirus disease (COVID-19) 02/2021    hospital for 2 days but not in ICU/\"mainly dehydrated\" \"also fever/oxygen below 90\"    History of coronary artery stent placement     x2 in 2008 or so; sees [facility]-- Dr Hutton cardio    [place] (hard of hearing)     no hearing aids yet    Hyperlipidemia     Last Assessed:  11/24/17    Hypertension     Benign essential, Last Assessed:  11/24/17    LBBB (left bundle branch block)     Left bundle branch block 04/03/2014    Mixed hyperlipidemia 11/04/2013    Nocturia     Polycythemia vera (HCC)     (per history in chart)    PVD (peripheral vascular disease) (HCC)     RBC abnormality     pt reports told has high Red blood cells/goes to infusion center regularly next appt 10/8/21 hematologist is Dr Charlton of [facility](had been Dr Ahn)    Seasickness     Shortness of breath     \"if goes up steps gets SOB, had for awhile\"    Slow urinary stream     \"sometimes feels like bladder isnt all the way empty\"    Snores     Wears glasses     reading     Past Surgical History:   Procedure Laterality Date    ANGIOPLASTY      APPENDECTOMY      CARDIAC CATHETERIZATION N/A 07/28/2022    Procedure: CARDIAC TEMPORARY PACEMAKER;  Surgeon: Fransico Morgan MD;  Location: [facility];  Service: Cardiology    CARDIAC ELECTROPHYSIOLOGY PROCEDURE N/A " 07/29/2022    Procedure: Cardiac pacer implant;  Surgeon: Anand Valero MD;  Location: [facility];  Service: Cardiology    CATARACT EXTRACTION Bilateral     CT CYSTOGRAM  11/04/2021    ELBOW SURGERY Right     FL CYSTOGRAM  10/27/2021    IR BIOPSY BONE MARROW  10/14/2022    KNEE ARTHROSCOPY Right     CO COLONOSCOPY FLX DX W/COLLJ SPEC WHEN PFRMD N/A 05/22/2017    Procedure: COLONOSCOPY;  Surgeon: Josseline Zhou DO;  Location: [facility];  Service: Gastroenterology    CO NEUROPLASTY &/TRANSPOS MEDIAN NRV CARPAL TUNNE Right 5/26/2023    Procedure: CTR, RIGHT;  Surgeon: Cosme Varela MD;  Location: [facility];  Service: Orthopedics    CO TENDON SHEATH INCISION Right 5/26/2023    Procedure: RELEASE TRIGGER FINGER, RIGHT RING FINGER;  Surgeon: Cosme Varela MD;  [facility];  Service: Orthopedics    PROSTATE BIOPSY      PROSTATECTOMY N/A 10/18/2021    Procedure: ROBOTIC SUBTOTAL/SUPRAPUBIC PROSTATECTOMY;  Surgeon: Kieran Vega MD;  [facility];  Service: Urology    SHOULDER ARTHROSCOPY Right     TONSILLECTOMY       Family History   Problem Relation Age of Onset    No Known Problems Mother     Heart disease Father     Colon cancer Father     Colon cancer Sister        Raven Seaman PA-C  Date: 9/19/2024 Time: 1:41 PM

## 2024-09-19 NOTE — UTILIZATION REVIEW
Initial Clinical Review    OBSERVATION 9/18 WITH SOC 9/17 @ 1856 CHANGED TO INPATIENT ON 9/18 @ 1358 - EPIDIDYMITIS, PYELONEPHRITIS, IV ANTIBIOTICS    Admission: Date/Time/Statement:   Admission Orders (From admission, onward)       Ordered        09/18/24 1358  INPATIENT ADMISSION  Once            09/18/24 0104  Place in Observation  Once                          Orders Placed This Encounter   Procedures    INPATIENT ADMISSION     Standing Status:   Standing     Number of Occurrences:   1     Order Specific Question:   Level of Care     Answer:   Med Surg [16]     Order Specific Question:   Estimated length of stay     Answer:   More than 2 Midnights     Order Specific Question:   Certification     Answer:   I certify that inpatient services are medically necessary for this patient for a duration of greater than two midnights. See H&P and MD Progress Notes for additional information about the patient's course of treatment.     ED Arrival Information       Expected   -    Arrival   9/17/2024 18:16    Acuity   Emergent              Means of arrival   Walk-In    Escorted by   Spouse    Service   Hospitalist    Admission type   Emergency              Arrival complaint   testicle pain             Chief Complaint   Patient presents with    Testicle Pain     Pt reports started feeling testicle pain today when laying down and reports left testicle appears swollen, hard and higher than the other testicle        Initial Presentation: 79 y.o. male presents to the ED from home with c/o testicular pain, swelling and feeling hard x several days.  Occurred several months ago as well, no trauma, abd and flank pain with chills. PMH: HTN, CAD, s/p PPM, CKD, polycythemia vera.  In the ED VS stable, pain rated 8/10.  Labs - leukocytosis, elevated procal, creat, alk phos, T bili, abnormal UA, low NA, K.  Imaging - L epididymitis, small L hydrocele, decreased in size, mildly complex; multifocal pyelonephritis, pyelitis/ureteritis,  adrenal  hyperplasia, fat containging R inguinal hernia, incidental finding - Dilated upper prostatic urethra, bilateral hydrocele, and left varicocel.  Treated with IV Fentanyl x 2, IV fluids, IV antibiotics.  On exam bilat testicles are swollen and tender.  Admitted to Observation Status then changed to INPATIENT status later in the day with Pyelonephritis, SHANTA on CKD, L epididymitis, hypokalemia - PLAN: IV antibiotics, urine & blood cultures, multimodal analgesia, home Amlodipine, Metoprolol, encourage PO Intake, replete and trend K.      Anticipated Length of Stay/Certification Statement: Patient will be admitted on an observation basis with an anticipated length of stay of less than 2 midnights secondary to pyelonephritis.     Date: 9/19   Day 2: Has surpassed a 2nd midnight with active treatments and services.  Pyelonephritis, SHANTA, L epididymitis, hypokalemia - continues on IV antibiotics.  VS stable, afebrile, downtrending WBCs, continue creat elevation, low Mag. Request Urology consult, chaning IV antibiotics.  On exam today no pain but has + urinary frequency and urgency.     9/19 Urology Consult - Pyelonephritis, L epididymitis, BPH - on exam mild pain and swelling in L testicle and scrotal swelling.  Elevate scrotum, PRN Ice, analgesia.  Start Flomax, urine culture + E coli, blood cultures pending, change IV antibiotics to Ceftriaxone. WBC improving today.     ED Triage Vitals   Temperature Pulse Respirations Blood Pressure SpO2 Pain Score   09/17/24 1821 09/17/24 1821 09/17/24 1821 09/17/24 1821 09/17/24 1821 09/17/24 2158   98.4 °F (36.9 °C) 80 20 130/75 99 % 8     Weight (last 2 days)       Date/Time Weight    09/18/24 02:29:40 71.7 (158.07)    09/17/24 1821 72.3 (159.39)            Vital Signs (last 3 days)       Date/Time Temp Pulse Resp BP MAP (mmHg) SpO2 O2 Device Patient Position - Orthostatic VS Pain    09/19/24 15:09:10 98.1 °F (36.7 °C) 69 -- 122/71 88 96 % -- -- --    09/19/24 0941 -- -- -- --  -- -- -- -- No Pain    09/19/24 07:55:26 98.4 °F (36.9 °C) 75 18 123/74 90 96 % None (Room air) Lying --    09/18/24 21:23:04 99.6 °F (37.6 °C) 77 20 126/73 91 95 % None (Room air) Lying --    09/18/24 1939 -- -- -- -- -- -- -- -- No Pain    09/18/24 15:20:19 98.5 °F (36.9 °C) 70 18 125/74 91 94 % None (Room air) Lying --    09/18/24 07:26:06 98.2 °F (36.8 °C) 82 17 139/76 97 95 % None (Room air) -- 6    09/18/24 02:41:11 97.6 °F (36.4 °C) 85 -- -- -- 94 % -- -- --    09/18/24 0230 -- -- -- -- -- -- -- -- 6    09/18/24 02:29:40 -- 82 -- 133/76 95 93 % None (Room air) Lying --    09/18/24 0041 -- 85 16 157/71 -- 91 % None (Room air) Lying 9    09/17/24 2158 -- -- -- -- -- -- -- -- 8    09/17/24 2138 -- 88 -- 156/84 113 95 % None (Room air) Lying --    09/17/24 1821 98.4 °F (36.9 °C) 80 20 130/75 -- 99 % None (Room air) Sitting --              Pertinent Labs/Diagnostic Test Results:   Radiology:  CT abdomen pelvis w contrast   Final Interpretation by Emigido Sams MD (09/17 5015)      1.  Abnormal findings in the bilateral kidneys as detailed above concerning for multifocal right pyelonephritis and left upper pole pyelonephritis.  There is mild wall thickening and mucosal hyperenhancement of the renal pelvis and bilateral ureters    noted suspicious for pyelitis/ureteritis. Clinical correlation with urinalysis recommended.   2.  Irregular bladder wall thickening concerning for cystitis, recommend correlation with urinalysis.   3.  Contracted without wall thickening or calcified gallstones. Mucosal hyperenhancement of the gallbladder wall of uncertain etiology. No significant pericholecystic inflammatory change. If clinical concern for gallbladder pathology, consider right    upper quadrant ultrasound after adequate fasting for further evaluation.   4.  No findings of bowel obstruction, inflammation, free air, free fluid, or loculated fluid collections in the abdomen/pelvis.   5.  Bilateral adrenal thickening is again  seen, similar compared to the prior exam and may reflect underlying adrenal hyperplasia.   6.  Fat-containing right inguinal hernia.   7.  Dilated upper prostatic urethra, bilateral hydrocele, and left varicocele incidentally seen.         Workstation performed: [id number]         US scrotum and testicles   Final Interpretation by Mauri Parker MD (09/17 2148)      1.  Again noted is hypertrophy and hyperemia of the left epididymis, consistent with epididymitis   2.  Small left hydrocele, decreased in size, however now is mildly complex      Workstation performed: [id number]           Cardiology:  No orders to display     GI:  No orders to display           Results from last 7 days   Lab Units 09/19/24 0459 09/18/24  0502 09/17/24  1906   WBC Thousand/uL 13.50* 16.78* 16.12*   HEMOGLOBIN g/dL 13.5 14.6 15.8   HEMATOCRIT % 38.8 41.7 46.0   PLATELETS Thousands/uL 131* 131* 152   TOTAL NEUT ABS Thousands/µL 11.02*  --  13.74*         Results from last 7 days   Lab Units 09/19/24 0459 09/18/24  0502 09/17/24  1906   SODIUM mmol/L 137 136 132*   POTASSIUM mmol/L 3.6 2.9* 3.8   CHLORIDE mmol/L 103 99 95*   CO2 mmol/L 27 27 29   ANION GAP mmol/L 7 10 8   BUN mg/dL 18 18 22   CREATININE mg/dL 1.40* 1.17 1.41*   EGFR ml/min/1.73sq m 47 59 47   CALCIUM mg/dL 8.3* 8.4 8.8   MAGNESIUM mg/dL 1.8*  --   --      Results from last 7 days   Lab Units 09/17/24 1906   AST U/L 54*   ALT U/L 43   ALK PHOS U/L 107*   TOTAL PROTEIN g/dL 6.9   ALBUMIN g/dL 3.5   TOTAL BILIRUBIN mg/dL 1.78*         Results from last 7 days   Lab Units 09/19/24 0459 09/18/24  0502 09/17/24  1906   GLUCOSE RANDOM mg/dL 102 112 152*       Results from last 7 days   Lab Units 09/18/24  0502   PROCALCITONIN ng/ml 0.34*     Results from last 7 days   Lab Units 09/17/24  2135   CLARITY UA  Turbid   COLOR UA  Yellow   SPEC GRAV UA  1.009   PH UA  6.0   GLUCOSE UA mg/dl Negative   KETONES UA mg/dl Negative   BLOOD UA  Small*   PROTEIN UA mg/dl 50 (1+)*   NITRITE  "UA  Negative   BILIRUBIN UA  Negative   UROBILINOGEN UA (BE) mg/dl 2.0*   LEUKOCYTES UA  Large*   WBC UA /hpf Innumerable*   RBC UA /hpf 10-20*   BACTERIA UA /hpf Innumerable*   EPITHELIAL CELLS WET PREP /hpf Occasional   MUCUS THREADS  Occasional*               Results from last 7 days   Lab Units 09/18/24  0503 09/17/24  2135   BLOOD CULTURE  No Growth at 24 hrs.  No Growth at 24 hrs.  --    URINE CULTURE   --  >100,000 cfu/ml Escherichia coli*     ED Treatment-Medication Administration from 09/17/2024 1816 to 09/18/2024 0217         Date/Time Order Dose Route Action     09/17/2024 2158 fentaNYL injection 25 mcg 25 mcg Intravenous Given     09/17/2024 2201 lactated ringers bolus 1,000 mL 1,000 mL Intravenous New Bag     09/17/2024 2209 levofloxacin (LEVAQUIN) tablet 500 mg 500 mg Oral Given     09/17/2024 2239 iohexol (OMNIPAQUE) 350 MG/ML injection (MULTI-DOSE) 100 mL 100 mL Intravenous Given     09/18/2024 0040 fentaNYL injection 50 mcg 50 mcg Intravenous Given During Downtime            Past Medical History:   Diagnosis Date    Arthritis     both shoulders    CAD (coronary artery disease)     Last Assessed:  11/4/13    Calculus of gallbladder with acute on chronic cholecystitis without obstruction 03/06/2024    Cigarette smoker     Colon polyp     Elevated prostate specific antigen (PSA)     Last Assessed:  12/21/17    Enlarged prostate     Exercise involving walking     in season hunt's and fish's/ 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\"    Full dentures     but doesnt wear them    Gastroesophageal reflux disease without esophagitis 04/03/2024    Heart block 08/08/2022    Hepatic cyst 05/02/2023    History of 2019 novel coronavirus disease (COVID-19) 02/2021    hospital for 2 days but not in ICU/\"mainly dehydrated\" \"also fever/oxygen below 90\"    History of coronary artery stent placement     x2 in 2008 or so; sees [facility]-- Dr Hutton cardio    [place] (hard of " "hearing)     no hearing aids yet    Hyperlipidemia     Last Assessed:  11/24/17    Hypertension     Benign essential, Last Assessed:  11/24/17    LBBB (left bundle branch block)     Left bundle branch block 04/03/2014    Mixed hyperlipidemia 11/04/2013    Nocturia     Polycythemia vera (HCC)     (per history in chart)    PVD (peripheral vascular disease) (HCC)     RBC abnormality     pt reports told has high Red blood cells/goes to infusion center regularly next appt 10/8/21 hematologist is Dr Charlton of [facility](had been Dr Ahn)    Seasickness     Shortness of breath     \"if goes up steps gets SOB, had for awhile\"    Slow urinary stream     \"sometimes feels like bladder isnt all the way empty\"    Snores     Wears glasses     reading     Present on Admission:   Benign essential hypertension   Coronary artery disease involving native coronary artery of native heart without angina pectoris   Acute kidney injury superimposed on chronic kidney disease  (HCC)   Left epididymitis   Cigarette nicotine dependence without complication   Polycythemia vera (HCC)   Pyelonephritis   Benign prostatic hyperplasia without lower urinary tract symptoms      Admitting Diagnosis: Epididymitis [N45.1]  Hyponatremia [E87.1]  Pyelonephritis [N12]  Testicle pain [N50.819]  Elevated bilirubin [R17]  Hydrocele [N43.3]  Age/Sex: 79 y.o. male  Admission Orders:  Scheduled Medications:  amLODIPine, 10 mg, Oral, QPM  aspirin, 81 mg, Oral, Daily  atorvastatin, 80 mg, Oral, QPM  cefTRIAXone, 2,000 mg, Intravenous, Q24H  enoxaparin, 40 mg, Subcutaneous, Daily  finasteride, 5 mg, Oral, Daily  hydroxyurea, 500 mg, Oral, Daily  metoprolol tartrate, 50 mg, Oral, BID  pantoprazole, 40 mg, Oral, Early Morning  tamsulosin, 0.4 mg, Oral, Daily With Dinner      Continuous IV Infusions:       PRN Meds:  acetaminophen, 650 mg, Oral, Q6H PRN  aluminum-magnesium hydroxide-simethicone, 30 mL, Oral, Q6H PRN  ondansetron, 4 mg, Intravenous, Q6H " PRN  oxyCODONE, 5 mg, Oral, Q6H PRN  oxyCODONE, 2.5 mg, Oral, Q6H PRN  polyethylene glycol, 17 g, Oral, Daily PRN    IV antibiotics  Analgesia  Monitor urinary retention    Network Utilization Review Department  ATTENTION: Please call with any questions or concerns to [phone] and carefully listen to the prompts so that you are directed to the right person. All voicemails are confidential.   For Discharge needs, contact Care Management DC Support Team at [phone] opt. 2  Send all requests for admission clinical reviews, approved or denied determinations and any other requests to dedicated fax number below belonging to the campus where the patient is receiving treatment. List of dedicated fax numbers for the Facilities:  FACILITY NAME UR FAX NUMBER   ADMISSION DENIALS (Administrative/Medical Necessity) [phone]   DISCHARGE SUPPORT TEAM (NETWORK) [phone]   PARENT CHILD HEALTH (Maternity/NICU/Pediatrics) [phone]   [facility] [phone]   General acute hospital [phone]   [facility] [phone]   [facility] [phone]   [facility] [phone]   [facility] [phone]   [facility] [phone]   [facility] [phone]   [facility] [phone]   [facility] [phone]   [facility] [phone]   [facility] [phone]

## 2024-09-19 NOTE — ASSESSMENT & PLAN NOTE
Lab Results   Component Value Date    EGFR 47 09/19/2024    EGFR 59 09/18/2024    EGFR 47 09/17/2024    CREATININE 1.40 (H) 09/19/2024    CREATININE 1.17 09/18/2024    CREATININE 1.41 (H) 09/17/2024     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, 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   Baseline Cr: 0.9-1.3  Admit Cr: 1.41     Plan:  1L crystalloid in ED, hold further volume, encourage PO intake  Monitor renal function, renal dose medications, maintain normotension/euvolemia, avoid nephrotoxic agents, I&Os

## 2024-09-20 ENCOUNTER — HOSPITAL ENCOUNTER (OUTPATIENT)
Dept: INFUSION CENTER | Facility: CLINIC | Age: 79
End: 2024-09-20

## 2024-09-20 VITALS
HEIGHT: 68 IN | HEART RATE: 64 BPM | TEMPERATURE: 98.3 F | WEIGHT: 158.07 LBS | OXYGEN SATURATION: 91 % | DIASTOLIC BLOOD PRESSURE: 77 MMHG | BODY MASS INDEX: 23.96 KG/M2 | SYSTOLIC BLOOD PRESSURE: 136 MMHG | RESPIRATION RATE: 20 BRPM

## 2024-09-20 PROCEDURE — 99239 HOSP IP/OBS DSCHRG MGMT >30: CPT | Performed by: PHYSICIAN ASSISTANT

## 2024-09-20 RX ORDER — FINASTERIDE 5 MG/1
5 TABLET, FILM COATED ORAL DAILY
Qty: 30 TABLET | Refills: 0 | Status: SHIPPED | OUTPATIENT
Start: 2024-09-21

## 2024-09-20 RX ORDER — TAMSULOSIN HYDROCHLORIDE 0.4 MG/1
0.4 CAPSULE ORAL
Qty: 30 CAPSULE | Refills: 0 | Status: SHIPPED | OUTPATIENT
Start: 2024-09-20

## 2024-09-20 RX ORDER — LEVOFLOXACIN 750 MG/1
750 TABLET, FILM COATED ORAL EVERY OTHER DAY
Status: DISCONTINUED | OUTPATIENT
Start: 2024-09-20 | End: 2024-09-20

## 2024-09-20 RX ADMIN — HYDROXYUREA 500 MG: 500 CAPSULE ORAL at 08:23

## 2024-09-20 RX ADMIN — FINASTERIDE 5 MG: 5 TABLET, FILM COATED ORAL at 08:23

## 2024-09-20 RX ADMIN — AMOXICILLIN AND CLAVULANATE POTASSIUM 1 TABLET: 875; 125 TABLET, FILM COATED ORAL at 13:47

## 2024-09-20 RX ADMIN — ENOXAPARIN SODIUM 40 MG: 40 INJECTION SUBCUTANEOUS at 08:23

## 2024-09-20 RX ADMIN — DEXTROSE 2000 MG: 50 INJECTION, SOLUTION INTRAVENOUS at 05:25

## 2024-09-20 RX ADMIN — METOPROLOL TARTRATE 50 MG: 50 TABLET, FILM COATED ORAL at 08:23

## 2024-09-20 RX ADMIN — ASPIRIN 81 MG: 81 TABLET, COATED ORAL at 08:23

## 2024-09-20 RX ADMIN — PANTOPRAZOLE SODIUM 40 MG: 40 TABLET, DELAYED RELEASE ORAL at 05:25

## 2024-09-20 NOTE — NURSING NOTE
Patient was discharged home. IV removed per protocol. AVS was reviewed in detail with patient and spouse at bedside. Patient agreed to follow up with Urology and GI within two weeks. All questions and concerns were addressed at this time.

## 2024-09-20 NOTE — PLAN OF CARE
Problem: PAIN - ADULT  Goal: Verbalizes/displays adequate comfort level or baseline comfort level  Description: Interventions:  - Encourage patient to monitor pain and request assistance  - Assess pain using appropriate pain scale  - Administer analgesics based on type and severity of pain and evaluate response  - Implement non-pharmacological measures as appropriate and evaluate response  - Consider cultural and social influences on pain and pain management  - Notify physician/advanced practitioner if interventions unsuccessful or patient reports new pain  Outcome: Progressing     Problem: INFECTION - ADULT  Goal: Absence or prevention of progression during hospitalization  Description: INTERVENTIONS:  - Assess and monitor for signs and symptoms of infection  - Monitor lab/diagnostic results  - Monitor all insertion sites, i.e. indwelling lines, tubes, and drains  - Monitor endotracheal if appropriate and nasal secretions for changes in amount and color  - [place] appropriate cooling/warming therapies per order  - Administer medications as ordered  - Instruct and encourage patient and family to use good hand hygiene technique  - Identify and instruct in appropriate isolation precautions for identified infection/condition  Outcome: Progressing  Goal: Absence of fever/infection during neutropenic period  Description: INTERVENTIONS:  - Monitor WBC    Outcome: Progressing     Problem: SAFETY ADULT  Goal: Patient will remain free of falls  Description: INTERVENTIONS:  - Educate patient/family on patient safety including physical limitations  - Instruct patient to call for assistance with activity   - Consult OT/PT to assist with strengthening/mobility   - Keep Call bell within reach  - Keep bed low and locked with side rails adjusted as appropriate  - Keep care items and personal belongings within reach  - Initiate and maintain comfort rounds  - Make Fall Risk Sign visible to staff  - Apply yellow socks and bracelet  for high fall risk patients  - Consider moving patient to room near nurses station  Outcome: Progressing  Goal: Maintain or return to baseline ADL function  Description: INTERVENTIONS:  -  Assess patient's ability to carry out ADLs; assess patient's baseline for ADL function and identify physical deficits which impact ability to perform ADLs (bathing, care of mouth/teeth, toileting, grooming, dressing, etc.)  - Assess/evaluate cause of self-care deficits   - Assess range of motion  - Assess patient's mobility; develop plan if impaired  - Assess patient's need for assistive devices and provide as appropriate  - Encourage maximum independence but intervene and supervise when necessary  - Involve family in performance of ADLs  - Assess for home care needs following discharge   - Consider OT consult to assist with ADL evaluation and planning for discharge  - Provide patient education as appropriate  Outcome: Progressing  Goal: Maintains/Returns to pre admission functional level  Description: INTERVENTIONS:  - Perform AM-PAC 6 Click Basic Mobility/ Daily Activity assessment daily.  - Set and communicate daily mobility goal to care team and patient/family/caregiver.   - Collaborate with rehabilitation services on mobility goals if consulted  - Perform Range of Motion 4 times a day.  - Reposition patient every 2 hours.  - Dangle patient 4 times a day  - Stand patient 4 times a day  - Ambulate patient 4 times a day  - Out of bed to chair 4 times a day   - Out of bed for meals 3 times a day  - Out of bed for toileting  - Record patient progress and toleration of activity level   Outcome: Progressing     Problem: DISCHARGE PLANNING  Goal: Discharge to home or other facility with appropriate resources  Description: INTERVENTIONS:  - Identify barriers to discharge w/patient and caregiver  - Arrange for needed discharge resources and transportation as appropriate  - Identify discharge learning needs (meds, wound care, etc.)  -  Arrange for interpretive services to assist at discharge as needed  - Refer to Case Management Department for coordinating discharge planning if the patient needs post-hospital services based on physician/advanced practitioner order or complex needs related to functional status, cognitive ability, or social support system  Outcome: Progressing     Problem: GENITOURINARY - ADULT  Goal: Maintains or returns to baseline urinary function  Description: INTERVENTIONS:  - Assess urinary function  - Encourage oral fluids to ensure adequate hydration if ordered  - Administer IV fluids as ordered to ensure adequate hydration  - Administer ordered medications as needed  - Offer frequent toileting  - Follow urinary retention protocol if ordered  Outcome: Progressing  Goal: Absence of urinary retention  Description: INTERVENTIONS:  - Assess patient’s ability to void and empty bladder  - Monitor I/O  - Bladder scan as needed  - Discuss with physician/AP medications to alleviate retention as needed  - Discuss catheterization for long term situations as appropriate  Outcome: Progressing  Goal: Urinary catheter remains patent  Description: INTERVENTIONS:  - Assess patency of urinary catheter  - If patient has a chronic lynn, consider changing catheter if non-functioning  - Follow guidelines for intermittent irrigation of non-functioning urinary catheter  Outcome: Progressing     Problem: METABOLIC, FLUID AND ELECTROLYTES - ADULT  Goal: Electrolytes maintained within normal limits  Description: INTERVENTIONS:  - Monitor labs and assess patient for signs and symptoms of electrolyte imbalances  - Administer electrolyte replacement as ordered  - Monitor response to electrolyte replacements, including repeat lab results as appropriate  - Instruct patient on fluid and nutrition as appropriate  Outcome: Progressing  Goal: Fluid balance maintained  Description: INTERVENTIONS:  - Monitor labs   - Monitor I/O and WT  - Instruct patient on  fluid and nutrition as appropriate  - Assess for signs & symptoms of volume excess or deficit  Outcome: Progressing  Goal: Glucose maintained within target range  Description: INTERVENTIONS:  - Monitor Blood Glucose as ordered  - Assess for signs and symptoms of hyperglycemia and hypoglycemia  - Administer ordered medications to maintain glucose within target range  - Assess nutritional intake and initiate nutrition service referral as needed  Outcome: Progressing

## 2024-09-20 NOTE — PLAN OF CARE
Problem: PAIN - ADULT  Goal: Verbalizes/displays adequate comfort level or baseline comfort level  Description: Interventions:  - Encourage patient to monitor pain and request assistance  - Assess pain using appropriate pain scale  - Administer analgesics based on type and severity of pain and evaluate response  - Implement non-pharmacological measures as appropriate and evaluate response  - Consider cultural and social influences on pain and pain management  - Notify physician/advanced practitioner if interventions unsuccessful or patient reports new pain  Outcome: Progressing     Problem: INFECTION - ADULT  Goal: Absence or prevention of progression during hospitalization  Description: INTERVENTIONS:  - Assess and monitor for signs and symptoms of infection  - Monitor lab/diagnostic results  - Monitor all insertion sites, i.e. indwelling lines, tubes, and drains  - Monitor endotracheal if appropriate and nasal secretions for changes in amount and color  - [place] appropriate cooling/warming therapies per order  - Administer medications as ordered  - Instruct and encourage patient and family to use good hand hygiene technique  - Identify and instruct in appropriate isolation precautions for identified infection/condition  Outcome: Progressing  Goal: Absence of fever/infection during neutropenic period  Description: INTERVENTIONS:  - Monitor WBC    Outcome: Progressing     Problem: SAFETY ADULT  Goal: Patient will remain free of falls  Description: INTERVENTIONS:  - Educate patient/family on patient safety including physical limitations  - Instruct patient to call for assistance with activity   - Consult OT/PT to assist with strengthening/mobility   - Keep Call bell within reach  - Keep bed low and locked with side rails adjusted as appropriate  - Keep care items and personal belongings within reach  - Initiate and maintain comfort rounds  - Make Fall Risk Sign visible to staff  - Offer Toileting every  Hours,  in advance of need  - Initiate/Maintainalarm  - Obtain necessary fall risk management equipment:   - Apply yellow socks and bracelet for high fall risk patients  - Consider moving patient to room near nurses station  Outcome: Progressing  Goal: Maintain or return to baseline ADL function  Description: INTERVENTIONS:  -  Assess patient's ability to carry out ADLs; assess patient's baseline for ADL function and identify physical deficits which impact ability to perform ADLs (bathing, care of mouth/teeth, toileting, grooming, dressing, etc.)  - Assess/evaluate cause of self-care deficits   - Assess range of motion  - Assess patient's mobility; develop plan if impaired  - Assess patient's need for assistive devices and provide as appropriate  - Encourage maximum independence but intervene and supervise when necessary  - Involve family in performance of ADLs  - Assess for home care needs following discharge   - Consider OT consult to assist with ADL evaluation and planning for discharge  - Provide patient education as appropriate  Outcome: Progressing  Goal: Maintains/Returns to pre admission functional level  Description: INTERVENTIONS:  - Perform AM-PAC 6 Click Basic Mobility/ Daily Activity assessment daily.  - Set and communicate daily mobility goal to care team and patient/family/caregiver.   - Collaborate with rehabilitation services on mobility goals if consulted  - Perform Range of Motion times a day.  - Reposition patient every  hours.  - Dangle patient  times a day  - Stand patient  times a day  - Ambulate patient times a day  - Out of bed to chair  times a day   - Out of bed for meals times a day  - Out of bed for toileting  - Record patient progress and toleration of activity level   Outcome: Progressing     Problem: DISCHARGE PLANNING  Goal: Discharge to home or other facility with appropriate resources  Description: INTERVENTIONS:  - Identify barriers to discharge w/patient and caregiver  - Arrange for  needed discharge resources and transportation as appropriate  - Identify discharge learning needs (meds, wound care, etc.)  - Arrange for interpretive services to assist at discharge as needed  - Refer to Case Management Department for coordinating discharge planning if the patient needs post-hospital services based on physician/advanced practitioner order or complex needs related to functional status, cognitive ability, or social support system  Outcome: Progressing     Problem: GENITOURINARY - ADULT  Goal: Maintains or returns to baseline urinary function  Description: INTERVENTIONS:  - Assess urinary function  - Encourage oral fluids to ensure adequate hydration if ordered  - Administer IV fluids as ordered to ensure adequate hydration  - Administer ordered medications as needed  - Offer frequent toileting  - Follow urinary retention protocol if ordered  Outcome: Progressing  Goal: Absence of urinary retention  Description: INTERVENTIONS:  - Assess patient’s ability to void and empty bladder  - Monitor I/O  - Bladder scan as needed  - Discuss with physician/AP medications to alleviate retention as needed  - Discuss catheterization for long term situations as appropriate  Outcome: Progressing  Goal: Urinary catheter remains patent  Description: INTERVENTIONS:  - Assess patency of urinary catheter  - If patient has a chronic lynn, consider changing catheter if non-functioning  - Follow guidelines for intermittent irrigation of non-functioning urinary catheter  Outcome: Progressing     Problem: METABOLIC, FLUID AND ELECTROLYTES - ADULT  Goal: Electrolytes maintained within normal limits  Description: INTERVENTIONS:  - Monitor labs and assess patient for signs and symptoms of electrolyte imbalances  - Administer electrolyte replacement as ordered  - Monitor response to electrolyte replacements, including repeat lab results as appropriate  - Instruct patient on fluid and nutrition as appropriate  Outcome:  Progressing  Goal: Fluid balance maintained  Description: INTERVENTIONS:  - Monitor labs   - Monitor I/O and WT  - Instruct patient on fluid and nutrition as appropriate  - Assess for signs & symptoms of volume excess or deficit  Outcome: Progressing  Goal: Glucose maintained within target range  Description: INTERVENTIONS:  - Monitor Blood Glucose as ordered  - Assess for signs and symptoms of hyperglycemia and hypoglycemia  - Administer ordered medications to maintain glucose within target range  - Assess nutritional intake and initiate nutrition service referral as needed  Outcome: Progressing

## 2024-09-20 NOTE — ASSESSMENT & PLAN NOTE
Admitted ~9 months prior   U/S scrotum/testicles reviewed: hypertrophy hyperemia of left epididymitis, small left hydrocele     Plan:  Elevate scrotum, ice PRN  Multimodal analgesia

## 2024-09-20 NOTE — ASSESSMENT & PLAN NOTE
Presents with malaise, abdominal pain, flank pain, scrotal pain. Leukocytosis noted, CT abd/pelvis reviewed: concerning for multifocal right pyelonephritis, left upper pole pyelonephritis, suspicious for pyelitis/ureteritis/cystitis     Plan:  ABX: levaquin in ED, then started on Rocephin  Blood cultures negative at 48 hours   Urine culture growing ESBL E coli  Sensitivities reviewed, switched to PO Augmentin to complete 10 day course   Seen in consult by urology, encourage follow up outpatient at time of discharge

## 2024-09-20 NOTE — DISCHARGE SUMMARY
Discharge Summary - Hospitalist   Name: Santana Jacinto 79 y.o. male I MRN: [id number]  Unit/Bed#: [address] 220-01 I Date of Admission: 9/17/2024   Date of Service: 9/20/2024 I Hospital Day: 2     Assessment & Plan  Pyelonephritis  Presents with malaise, abdominal pain, flank pain, scrotal pain. Leukocytosis noted, CT abd/pelvis reviewed: concerning for multifocal right pyelonephritis, left upper pole pyelonephritis, suspicious for pyelitis/ureteritis/cystitis     Plan:  ABX: levaquin in ED, then started on Rocephin  Blood cultures negative at 48 hours   Urine culture growing ESBL E coli  Sensitivities reviewed, switched to PO Augmentin to complete 10 day course   Seen in consult by urology, encourage follow up outpatient at time of discharge     Benign essential hypertension  Controlled  Outpatient regimen: amlodipine 10mg daily, metoprolol tartrate 50mg BID    Plan:  Continue outpatient regimen     Coronary artery disease involving native coronary artery of native heart without angina pectoris  CAD s/p two vessel stent ~10 years ago    Plan:  Continue GDMT    Polycythemia vera (HCC)  History of JAK2 mutation positive polycythemia vera, [facility]    Plan:  Continue hydroxyurea   Cigarette nicotine dependence without complication  1/2PPD >30 years    Plan:  Encourage cessation  Nicotine replacement PRN    Acute kidney injury superimposed on chronic kidney disease  (HCC)  Lab Results   Component Value Date    EGFR 47 09/19/2024    EGFR 59 09/18/2024    EGFR 47 09/17/2024    CREATININE 1.40 (H) 09/19/2024    CREATININE 1.17 09/18/2024    CREATININE 1.41 (H) 09/17/2024     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, 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   Baseline Cr: 0.9-1.3  Admit Cr: 1.41     Plan:  1L crystalloid in ED, hold further volume, encourage PO intake  Monitor renal function, renal dose medications, maintain normotension/euvolemia, avoid nephrotoxic agents, I&Os  History of cardiac pacemaker  S/p dual ventricular  PPM  Left epididymitis  Admitted ~9 months prior   U/S scrotum/testicles reviewed: hypertrophy hyperemia of left epididymitis, small left hydrocele     Plan:  Elevate scrotum, ice PRN  Multimodal analgesia     Benign prostatic hyperplasia without lower urinary tract symptoms       Medical Problems       Resolved Problems  Date Reviewed: 9/5/2024   None       Discharging Physician / Practitioner: Gilma Coughlin PA-C  PCP: Mina Denny DO  Admission Date:   Admission Orders (From admission, onward)       Ordered        09/18/24 1358  INPATIENT ADMISSION  Once            09/18/24 0104  Place in Observation  Once                          Discharge Date: 09/20/24    Consultations During Hospital Stay:  Urology    Procedures Performed:   CT abdomen pelvis w contrast    Result Date: 9/17/2024  Impression: 1.  Abnormal findings in the bilateral kidneys as detailed above concerning for multifocal right pyelonephritis and left upper pole pyelonephritis.  There is mild wall thickening and mucosal hyperenhancement of the renal pelvis and bilateral ureters noted suspicious for pyelitis/ureteritis. Clinical correlation with urinalysis recommended. 2.  Irregular bladder wall thickening concerning for cystitis, recommend correlation with urinalysis. 3.  Contracted without wall thickening or calcified gallstones. Mucosal hyperenhancement of the gallbladder wall of uncertain etiology. No significant pericholecystic inflammatory change. If clinical concern for gallbladder pathology, consider right upper quadrant ultrasound after adequate fasting for further evaluation. 4.  No findings of bowel obstruction, inflammation, free air, free fluid, or loculated fluid collections in the abdomen/pelvis. 5.  Bilateral adrenal thickening is again seen, similar compared to the prior exam and may reflect underlying adrenal hyperplasia. 6.  Fat-containing right inguinal hernia. 7.  Dilated upper prostatic urethra, bilateral hydrocele, and  left varicocele incidentally seen.     US scrotum and testicles    Result Date: 9/17/2024  Impression: 1.  Again noted is hypertrophy and hyperemia of the left epididymis, consistent with epididymitis 2.  Small left hydrocele, decreased in size, however now is mildly complex       Significant Findings / Test Results:   Pyelonephritis and epididymitis    Incidental Findings:   Bladder wall thickening, contracted gallbladder without evidence of gallstones, mucosal hyperenhancement of gallbladder of uncertain etiology, bilateral adrenal thickening similar to prior exam concerning for adrenal hyperplasia, right inguinal hernia      Test Results Pending at Discharge (will require follow up):   None     Outpatient Tests Requested:  Urology follow-up    Complications: None    Reason for Admission: Pyelonephritis    Hospital Course:   Santana Jacinto is a 79 y.o. male patient who originally presented to the hospital on 9/17/2024 due to testicular pain.  He was found to have epididymitis and pyelonephritis.  He was admitted and started on IV antibiotics.  Urine and blood cultures were obtained.  Urine culture returned positive for ESBL E. coli.  Antibiotics were switched to Augmentin to complete 10-day course. Blood cultures were negative at 48 hours at time of discharge.  He noted significant improvement in his symptoms during hospital stay.  Was seen in consult by urology and advised to follow-up as an outpatient.      Please see above list of diagnoses and related plan for additional information.     Condition at Discharge: stable    Discharge Day Visit / Exam:   Subjective: Patient seen and examined at bedside.  Notes he is feeling well today.  Denies any complaint.  Notes significant improvement in the symptoms since admission.  Feels comfort returning home today.  Vitals: Blood Pressure: 136/77 (09/20/24 0716)  Pulse: 64 (09/20/24 0716)  Temperature: 98.3 °F (36.8 °C) (09/20/24 0716)  Temp Source: Oral (09/20/24  "0716)  Respirations: 20 (09/20/24 0716)  Height: 5' 8\" (172.7 cm) (09/18/24 0229)  Weight - Scale: 71.7 kg (158 lb 1.1 oz) (09/18/24 0229)  SpO2: 91 % (09/20/24 0716)  Exam:   Physical Exam  Vitals reviewed.   Constitutional:       General: He is not in acute distress.  HENT:      Head: Normocephalic and atraumatic.      Mouth/Throat:      Mouth: Oropharynx is clear and moist.   Eyes:      General: No scleral icterus.     Extraocular Movements: EOM normal.      Conjunctiva/sclera: Conjunctivae normal.   Cardiovascular:      Rate and Rhythm: Normal rate and regular rhythm.      Heart sounds: Normal heart sounds. No murmur heard.  Pulmonary:      Effort: Pulmonary effort is normal. No respiratory distress.      Breath sounds: Normal breath sounds. No wheezing.   Abdominal:      General: Bowel sounds are normal. There is no distension.      Palpations: Abdomen is soft.      Tenderness: There is no abdominal tenderness.   Musculoskeletal:         General: No edema.      Cervical back: Neck supple.      Right lower leg: No edema.      Left lower leg: No edema.   Skin:     General: Skin is warm and dry.   Neurological:      Mental Status: He is alert and oriented to person, place, and time.   Psychiatric:         Mood and Affect: Mood and affect normal.         Behavior: Behavior normal.         Thought Content: Thought content normal.          Discussion with Family: Updated  (significant other) at bedside.    Discharge instructions/Information to patient and family:   See after visit summary for information provided to patient and family.      Provisions for Follow-Up Care:  See after visit summary for information related to follow-up care and any pertinent home health orders.      Mobility at time of Discharge:   Basic Mobility Inpatient Raw Score: 24  JH-HLM Goal: 8: Walk 250 feet or more  JH-HLM Achieved: 8: Walk 250 feet ot more  HLM Goal achieved. Continue to encourage appropriate mobility.   "   Disposition:   Home    Planned Readmission: None    Discharge Medications:  See after visit summary for reconciled discharge medications provided to patient and/or family.      Administrative Statements   Discharge Statement:  I have spent a total time of 45 minutes in caring for this patient on the day of the visit/encounter. .    **Please Note: This note may have been constructed using a voice recognition system**

## 2024-09-20 NOTE — TELEPHONE ENCOUNTER
Holding 10/10/24 @ 2:30 pm with Dr. Vega in the [facility] for cystoscopy. Will confirm with patient after hospital discharge.

## 2024-09-20 NOTE — ASSESSMENT & PLAN NOTE
History of JAK2 mutation positive polycythemia vera, [facility]    Plan:  Continue hydroxyurea

## 2024-09-20 NOTE — PLAN OF CARE
Problem: PAIN - ADULT  Goal: Verbalizes/displays adequate comfort level or baseline comfort level  Description: Interventions:  - Encourage patient to monitor pain and request assistance  - Assess pain using appropriate pain scale  - Administer analgesics based on type and severity of pain and evaluate response  - Implement non-pharmacological measures as appropriate and evaluate response  - Consider cultural and social influences on pain and pain management  - Notify physician/advanced practitioner if interventions unsuccessful or patient reports new pain  9/20/2024 1452 by Meena Gibson RN  Outcome: Adequate for Discharge  9/20/2024 0921 by Meena Gibson RN  Outcome: Progressing     Problem: INFECTION - ADULT  Goal: Absence or prevention of progression during hospitalization  Description: INTERVENTIONS:  - Assess and monitor for signs and symptoms of infection  - Monitor lab/diagnostic results  - Monitor all insertion sites, i.e. indwelling lines, tubes, and drains  - Monitor endotracheal if appropriate and nasal secretions for changes in amount and color  - [place] appropriate cooling/warming therapies per order  - Administer medications as ordered  - Instruct and encourage patient and family to use good hand hygiene technique  - Identify and instruct in appropriate isolation precautions for identified infection/condition  9/20/2024 1452 by Meena Gibson RN  Outcome: Adequate for Discharge  9/20/2024 0921 by Meena Gibson RN  Outcome: Progressing  Goal: Absence of fever/infection during neutropenic period  Description: INTERVENTIONS:  - Monitor WBC    9/20/2024 1452 by Meena Gibson RN  Outcome: Adequate for Discharge  9/20/2024 0921 by Meena Gibson RN  Outcome: Progressing     Problem: SAFETY ADULT  Goal: Patient will remain free of falls  Description: INTERVENTIONS:  - Educate patient/family on patient safety including physical limitations  - Instruct patient to call for  assistance with activity   - Consult OT/PT to assist with strengthening/mobility   - Keep Call bell within reach  - Keep bed low and locked with side rails adjusted as appropriate  - Keep care items and personal belongings within reach  - Initiate and maintain comfort rounds  - Make Fall Risk Sign visible to staff  - Offer Toileting every  Hours, in advance of need  - Initiate/Maintain alarm  - Obtain necessary fall risk management equipment:  - Apply yellow socks and bracelet for high fall risk patients  - Consider moving patient to room near nurses station  9/20/2024 1452 by Meena Gibson RN  Outcome: Adequate for Discharge  9/20/2024 0921 by Meena Gibson RN  Outcome: Progressing  Goal: Maintain or return to baseline ADL function  Description: INTERVENTIONS:  -  Assess patient's ability to carry out ADLs; assess patient's baseline for ADL function and identify physical deficits which impact ability to perform ADLs (bathing, care of mouth/teeth, toileting, grooming, dressing, etc.)  - Assess/evaluate cause of self-care deficits   - Assess range of motion  - Assess patient's mobility; develop plan if impaired  - Assess patient's need for assistive devices and provide as appropriate  - Encourage maximum independence but intervene and supervise when necessary  - Involve family in performance of ADLs  - Assess for home care needs following discharge   - Consider OT consult to assist with ADL evaluation and planning for discharge  - Provide patient education as appropriate  9/20/2024 1452 by Meena Gibson RN  Outcome: Adequate for Discharge  9/20/2024 0921 by Meena Gibson RN  Outcome: Progressing  Goal: Maintains/Returns to pre admission functional level  Description: INTERVENTIONS:  - Perform AM-PAC 6 Click Basic Mobility/ Daily Activity assessment daily.  - Set and communicate daily mobility goal to care team and patient/family/caregiver.   - Collaborate with rehabilitation services on  mobility goals if consulted  - Perform Range of Motion  times a day.  - Reposition patient every  hours.  - Dangle patient  times a day  - Stand patient  times a day  - Ambulate patient  times a day  - Out of bed to chair  times a day   - Out of bed for meals times a day  - Out of bed for toileting  - Record patient progress and toleration of activity level   9/20/2024 1452 by Meena Gibson RN  Outcome: Adequate for Discharge  9/20/2024 0921 by Meena Gibson RN  Outcome: Progressing     Problem: DISCHARGE PLANNING  Goal: Discharge to home or other facility with appropriate resources  Description: INTERVENTIONS:  - Identify barriers to discharge w/patient and caregiver  - Arrange for needed discharge resources and transportation as appropriate  - Identify discharge learning needs (meds, wound care, etc.)  - Arrange for interpretive services to assist at discharge as needed  - Refer to Case Management Department for coordinating discharge planning if the patient needs post-hospital services based on physician/advanced practitioner order or complex needs related to functional status, cognitive ability, or social support system  9/20/2024 1452 by Meena Gibson RN  Outcome: Adequate for Discharge  9/20/2024 0921 by Meena Gibson RN  Outcome: Progressing     Problem: GENITOURINARY - ADULT  Goal: Maintains or returns to baseline urinary function  Description: INTERVENTIONS:  - Assess urinary function  - Encourage oral fluids to ensure adequate hydration if ordered  - Administer IV fluids as ordered to ensure adequate hydration  - Administer ordered medications as needed  - Offer frequent toileting  - Follow urinary retention protocol if ordered  9/20/2024 1452 by Meena Gibson RN  Outcome: Adequate for Discharge  9/20/2024 0921 by Meena Gibson RN  Outcome: Progressing  Goal: Absence of urinary retention  Description: INTERVENTIONS:  - Assess patient’s ability to void and empty  bladder  - Monitor I/O  - Bladder scan as needed  - Discuss with physician/AP medications to alleviate retention as needed  - Discuss catheterization for long term situations as appropriate  9/20/2024 1452 by Meena Gibson RN  Outcome: Adequate for Discharge  9/20/2024 0921 by Meena Gibson RN  Outcome: Progressing  Goal: Urinary catheter remains patent  Description: INTERVENTIONS:  - Assess patency of urinary catheter  - If patient has a chronic lynn, consider changing catheter if non-functioning  - Follow guidelines for intermittent irrigation of non-functioning urinary catheter  9/20/2024 1452 by Meena Gibson RN  Outcome: Adequate for Discharge  9/20/2024 0921 by Meena Gibson RN  Outcome: Progressing     Problem: METABOLIC, FLUID AND ELECTROLYTES - ADULT  Goal: Electrolytes maintained within normal limits  Description: INTERVENTIONS:  - Monitor labs and assess patient for signs and symptoms of electrolyte imbalances  - Administer electrolyte replacement as ordered  - Monitor response to electrolyte replacements, including repeat lab results as appropriate  - Instruct patient on fluid and nutrition as appropriate  9/20/2024 1452 by Meena Gibson RN  Outcome: Adequate for Discharge  9/20/2024 0921 by Meena Gibson RN  Outcome: Progressing  Goal: Fluid balance maintained  Description: INTERVENTIONS:  - Monitor labs   - Monitor I/O and WT  - Instruct patient on fluid and nutrition as appropriate  - Assess for signs & symptoms of volume excess or deficit  9/20/2024 1452 by Meena Gibson RN  Outcome: Adequate for Discharge  9/20/2024 0921 by Meena Gibson RN  Outcome: Progressing  Goal: Glucose maintained within target range  Description: INTERVENTIONS:  - Monitor Blood Glucose as ordered  - Assess for signs and symptoms of hyperglycemia and hypoglycemia  - Administer ordered medications to maintain glucose within target range  - Assess nutritional intake and initiate  nutrition service referral as needed  9/20/2024 1452 by Meena Gibson, RN  Outcome: Adequate for Discharge  9/20/2024 0921 by Meena Gibson RN  Outcome: Progressing

## 2024-09-21 LAB — BACTERIA UR CULT: ABNORMAL

## 2024-09-23 ENCOUNTER — TRANSITIONAL CARE MANAGEMENT (OUTPATIENT)
Dept: FAMILY MEDICINE CLINIC | Facility: CLINIC | Age: 79
End: 2024-09-23

## 2024-09-23 LAB
BACTERIA BLD CULT: NORMAL
BACTERIA BLD CULT: NORMAL

## 2024-09-23 NOTE — TELEPHONE ENCOUNTER
Called and LMOM for patient to return call to the office to confirm cystoscopy with Dr. Vega on 10/10/24 @ 2:30 pm in the [facility].

## 2024-09-23 NOTE — UTILIZATION REVIEW
NOTIFICATION OF ADMISSION DISCHARGE   This is a Notification of Discharge from [facility]. Please be advised that this patient has been discharge from our facility. Below you will find the admission and discharge date and time including the patient’s disposition.   UTILIZATION REVIEW CONTACT:  Shruthi Jay  Utilization   Network Utilization Review Department  Phone: [phone] x carefully listen to the prompts. All voicemails are confidential.  Email: [email or web address]     ADMISSION INFORMATION  PRESENTATION DATE: 9/17/2024  6:22 PM  OBERVATION ADMISSION DATE: 09/18/2024 0104  INPATIENT ADMISSION DATE: 9/18/24  1:58 PM   DISCHARGE DATE: 9/20/2024  4:17 PM   DISPOSITION:Home/Self Care    Network Utilization Review Department  ATTENTION: Please call with any questions or concerns to [phone] and carefully listen to the prompts so that you are directed to the right person. All voicemails are confidential.   For Discharge needs, contact Care Management DC Support Team at [phone] opt. 2  Send all requests for admission clinical reviews, approved or denied determinations and any other requests to dedicated fax number below belonging to the campus where the patient is receiving treatment. List of dedicated fax numbers for the Facilities:  FACILITY NAME UR FAX NUMBER   ADMISSION DENIALS (Administrative/Medical Necessity) [phone]   DISCHARGE SUPPORT TEAM ([facility]) [phone]   PARENT CHILD HEALTH (Maternity/NICU/Pediatrics) [phone]   [facility] [phone]   [facility] [phone]   [facility] [phone]   [facility] [phone]   [facility] [phone]   [facility] [phone]   [facility] [phone]   [facility]  [phone]   [facility] [phone]   [facility] [phone]   [facility] [phone]   [facility] [phone]

## 2024-09-24 NOTE — TELEPHONE ENCOUNTER
Voicemail message left on patients and spouses number to contact the office to confirm cystoscopy with Dr. Vega on 10/10.

## 2024-09-25 NOTE — TELEPHONE ENCOUNTER
Called and spoke with patients spouse. Appointment for cystoscopy with Dr. Vega on 10/10 @ 2:30 pm in the [facility] was confirmed.

## 2024-10-02 ENCOUNTER — OFFICE VISIT (OUTPATIENT)
Dept: FAMILY MEDICINE CLINIC | Facility: CLINIC | Age: 79
End: 2024-10-02
Payer: COMMERCIAL

## 2024-10-02 VITALS
DIASTOLIC BLOOD PRESSURE: 68 MMHG | HEART RATE: 54 BPM | SYSTOLIC BLOOD PRESSURE: 120 MMHG | HEIGHT: 68 IN | TEMPERATURE: 97.9 F | WEIGHT: 162.4 LBS | OXYGEN SATURATION: 99 % | BODY MASS INDEX: 24.61 KG/M2

## 2024-10-02 DIAGNOSIS — N12 PYELONEPHRITIS: Primary | ICD-10-CM

## 2024-10-02 DIAGNOSIS — E83.42 HYPOMAGNESEMIA: ICD-10-CM

## 2024-10-02 DIAGNOSIS — D45 POLYCYTHEMIA VERA (HCC): ICD-10-CM

## 2024-10-02 DIAGNOSIS — N18.9 ACUTE KIDNEY INJURY SUPERIMPOSED ON CHRONIC KIDNEY DISEASE  (HCC): ICD-10-CM

## 2024-10-02 DIAGNOSIS — N17.9 ACUTE KIDNEY INJURY SUPERIMPOSED ON CHRONIC KIDNEY DISEASE  (HCC): ICD-10-CM

## 2024-10-02 DIAGNOSIS — N45.1 LEFT EPIDIDYMITIS: ICD-10-CM

## 2024-10-02 PROCEDURE — 99495 TRANSJ CARE MGMT MOD F2F 14D: CPT | Performed by: FAMILY MEDICINE

## 2024-10-02 NOTE — ASSESSMENT & PLAN NOTE
Lab Results   Component Value Date    EGFR 47 09/19/2024    EGFR 59 09/18/2024    EGFR 47 09/17/2024    CREATININE 1.40 (H) 09/19/2024    CREATININE 1.17 09/18/2024    CREATININE 1.41 (H) 09/17/2024   Repeat BMP

## 2024-10-02 NOTE — ASSESSMENT & PLAN NOTE
Mild tenderness noted on exam but per pt no change from hospital discharge; advised repeating urine, unable to give sample in office and f/u with urology; ER guidance given should any concerns sx occur

## 2024-10-02 NOTE — PROGRESS NOTES
"Assessment/Plan:     1. Pyelonephritis  Assessment & Plan:  Mild tenderness noted on exam but per pt no change from hospital discharge; advised repeating urine, unable to give sample in office and f/u with urology; ER guidance given should any concerns sx occur  Orders:  -     CBC and differential; Future  -     Basic metabolic panel; Future  -     UA (URINE) with reflex to Scope; Future  -     Urine culture; Future  2. Hypomagnesemia  -     Magnesium; Future  3. Left epididymitis  Assessment & Plan:  Completed course of abx; f/u with urology as scheduled  4. Acute kidney injury superimposed on chronic kidney disease  (HCC)  Assessment & Plan:  Lab Results   Component Value Date    EGFR 47 09/19/2024    EGFR 59 09/18/2024    EGFR 47 09/17/2024    CREATININE 1.40 (H) 09/19/2024    CREATININE 1.17 09/18/2024    CREATININE 1.41 (H) 09/17/2024   Repeat BMP  5. Polycythemia vera (HCC)        Subjective:      Patient ID: Santana Jacinto is a 79 y.o. male.    Patient is here for hospital follow up of epididymitis and pyelonephritis. Pt was admitted 9/17-9/20. Pt d/c'd on Augmentin. Pt advised to f/u with urology as outpatient. Has appointment scheduled 10/10. Completed antibiotics. Still having some abdominal discomfort since the hospital. No increase in symptoms since discharge. Normal appetite. No fevers/chills. Did have some diarrhea but resolved after finishing antibiotics.   Testicle is improved but still somewhat tender. Blood cultures were negative.     Hospital Course:   \"Santana Jacinto is a 79 y.o. male patient who originally presented to the hospital on 9/17/2024 due to testicular pain.  He was found to have epididymitis and pyelonephritis.  He was admitted and started on IV antibiotics.  Urine and blood cultures were obtained.  Urine culture returned positive for ESBL E. coli.  Antibiotics were switched to Augmentin to complete 10-day course. Blood cultures were negative at 48 hours at time of discharge.  He noted " "significant improvement in his symptoms during hospital stay.  Was seen in consult by urology and advised to follow-up as an outpatient.\"             Assessments/Plan:   \"Pyelonephritis  Presents with malaise, abdominal pain, flank pain, scrotal pain. Leukocytosis noted, CT abd/pelvis reviewed: concerning for multifocal right pyelonephritis, left upper pole pyelonephritis, suspicious for pyelitis/ureteritis/cystitis      Plan:  · ABX: levaquin in ED, then started on Rocephin  · Blood cultures negative at 48 hours   · Urine culture growing ESBL E coli  · Sensitivities reviewed, switched to PO Augmentin to complete 10 day course   · Seen in consult by urology, encourage follow up outpatient at time of discharge      Benign essential hypertension  Controlled  Outpatient regimen: amlodipine 10mg daily, metoprolol tartrate 50mg BID     Plan:  · Continue outpatient regimen      Coronary artery disease involving native coronary artery of native heart without angina pectoris  CAD s/p two vessel stent ~10 years ago     Plan:  · Continue GDMT     Polycythemia vera (HCC)  History of JAK2 mutation positive polycythemia vera, follows outpatient [facility]     Plan:  · Continue hydroxyurea   Cigarette nicotine dependence without complication  1/2PPD >30 years     Plan:  · Encourage cessation  · Nicotine replacement PRN     Acute kidney injury superimposed on chronic kidney disease  (HCC)  Lab Results  Component Value Date    EGFR 47 09/19/2024    EGFR 59 09/18/2024    EGFR 47 09/17/2024    CREATININE 1.40 (H) 09/19/2024    CREATININE 1.17 09/18/2024    CREATININE 1.41 (H) 09/17/2024     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, 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   Baseline Cr: 0.9-1.3  Admit Cr: 1.41      Plan:  1L crystalloid in ED, hold further volume, encourage PO intake  Monitor renal function, renal dose medications, maintain normotension/euvolemia, avoid nephrotoxic agents, I&Os  History of cardiac pacemaker  S/p dual ventricular PPM  Left " "epididymitis  Admitted ~9 months prior   U/S scrotum/testicles reviewed: hypertrophy hyperemia of left epididymitis, small left hydrocele      Plan:  · Elevate scrotum, ice PRN  · Multimodal analgesia      Benign prostatic hyperplasia without lower urinary tract symptoms\"     Lab Results       Component                Value               Date                       WBC                      13.50 (H)           09/19/2024                 HGB                      13.5                09/19/2024                 HCT                      38.8                09/19/2024                 MCV                      107 (H)             09/19/2024                 PLT                      131 (L)             09/19/2024            Lab Results       Component                Value               Date                       SODIUM                   137                 09/19/2024                 K                        3.6                 09/19/2024                 CL                       103                 09/19/2024                 CO2                      27                  09/19/2024                 BUN                      18                  09/19/2024                 CREATININE               1.40 (H)            09/19/2024                 GLUC                     102                 09/19/2024                 CALCIUM                  8.3 (L)             09/19/2024                      The following portions of the patient's history were reviewed and updated as appropriate: allergies, current medications, past family history, past medical history, past social history, past surgical history, and problem list.    Review of Systems   Constitutional:  Negative for chills and fever.   Respiratory:  Negative for shortness of breath.    Cardiovascular:  Negative for chest pain.   Gastrointestinal:  Positive for abdominal pain.   Genitourinary:  Negative for dysuria, flank pain, frequency and penile discharge.         Objective:      BP " "120/68 (BP Location: Left arm, Patient Position: Sitting, Cuff Size: Adult)   Pulse (!) 54   Temp 97.9 °F (36.6 °C) (Temporal)   Ht 5' 8\" (1.727 m)   Wt 73.7 kg (162 lb 6.4 oz)   SpO2 99%   BMI 24.69 kg/m²          Physical Exam  Vitals reviewed.   Constitutional:       General: He is not in acute distress.     Appearance: Normal appearance. He is not ill-appearing, toxic-appearing or diaphoretic.   HENT:      Head: Normocephalic and atraumatic.   Eyes:      General: No scleral icterus.        Right eye: No discharge.         Left eye: No discharge.      Conjunctiva/sclera: Conjunctivae normal.   Cardiovascular:      Rate and Rhythm: Normal rate and regular rhythm.      Pulses: Normal pulses.      Heart sounds: Normal heart sounds. No murmur heard.     No gallop.   Pulmonary:      Effort: Pulmonary effort is normal. No respiratory distress.      Breath sounds: Normal breath sounds. No stridor. No wheezing, rhonchi or rales.   Abdominal:      Palpations: Abdomen is soft.      Tenderness: There is no guarding or rebound.      Comments: +mild TTP over right and left side of abdomen   Musculoskeletal:      Right lower leg: No edema.      Left lower leg: No edema.   Neurological:      General: No focal deficit present.      Mental Status: He is alert and oriented to person, place, and time.   Psychiatric:         Mood and Affect: Mood normal.         Behavior: Behavior normal.         Thought Content: Thought content normal.         Judgment: Judgment normal.         "

## 2024-10-04 ENCOUNTER — APPOINTMENT (OUTPATIENT)
Dept: LAB | Facility: MEDICAL CENTER | Age: 79
End: 2024-10-04
Payer: COMMERCIAL

## 2024-10-04 DIAGNOSIS — N12 PYELONEPHRITIS: ICD-10-CM

## 2024-10-04 DIAGNOSIS — E83.42 HYPOMAGNESEMIA: ICD-10-CM

## 2024-10-04 LAB
ANION GAP SERPL CALCULATED.3IONS-SCNC: 9 MMOL/L (ref 4–13)
BACTERIA UR QL AUTO: ABNORMAL /HPF
BASOPHILS # BLD AUTO: 0.04 THOUSANDS/ΜL (ref 0–0.1)
BASOPHILS NFR BLD AUTO: 0 % (ref 0–1)
BILIRUB UR QL STRIP: NEGATIVE
BUN SERPL-MCNC: 13 MG/DL (ref 5–25)
CALCIUM SERPL-MCNC: 8.8 MG/DL (ref 8.4–10.2)
CHLORIDE SERPL-SCNC: 101 MMOL/L (ref 96–108)
CLARITY UR: CLEAR
CO2 SERPL-SCNC: 29 MMOL/L (ref 21–32)
COLOR UR: YELLOW
CREAT SERPL-MCNC: 1.33 MG/DL (ref 0.6–1.3)
EOSINOPHIL # BLD AUTO: 0.14 THOUSAND/ΜL (ref 0–0.61)
EOSINOPHIL NFR BLD AUTO: 1 % (ref 0–6)
ERYTHROCYTE [DISTWIDTH] IN BLOOD BY AUTOMATED COUNT: 15.4 % (ref 11.6–15.1)
GFR SERPL CREATININE-BSD FRML MDRD: 50 ML/MIN/1.73SQ M
GLUCOSE P FAST SERPL-MCNC: 99 MG/DL (ref 65–99)
GLUCOSE UR STRIP-MCNC: NEGATIVE MG/DL
HCT VFR BLD AUTO: 44.1 % (ref 36.5–49.3)
HGB BLD-MCNC: 14.6 G/DL (ref 12–17)
HGB UR QL STRIP.AUTO: NEGATIVE
IMM GRANULOCYTES # BLD AUTO: 0.1 THOUSAND/UL (ref 0–0.2)
IMM GRANULOCYTES NFR BLD AUTO: 1 % (ref 0–2)
KETONES UR STRIP-MCNC: NEGATIVE MG/DL
LEUKOCYTE ESTERASE UR QL STRIP: ABNORMAL
LYMPHOCYTES # BLD AUTO: 2.17 THOUSANDS/ΜL (ref 0.6–4.47)
LYMPHOCYTES NFR BLD AUTO: 21 % (ref 14–44)
MAGNESIUM SERPL-MCNC: 2.1 MG/DL (ref 1.9–2.7)
MCH RBC QN AUTO: 36.1 PG (ref 26.8–34.3)
MCHC RBC AUTO-ENTMCNC: 33.1 G/DL (ref 31.4–37.4)
MCV RBC AUTO: 109 FL (ref 82–98)
MONOCYTES # BLD AUTO: 0.58 THOUSAND/ΜL (ref 0.17–1.22)
MONOCYTES NFR BLD AUTO: 6 % (ref 4–12)
NEUTROPHILS # BLD AUTO: 7.41 THOUSANDS/ΜL (ref 1.85–7.62)
NEUTS SEG NFR BLD AUTO: 71 % (ref 43–75)
NITRITE UR QL STRIP: NEGATIVE
NON-SQ EPI CELLS URNS QL MICRO: ABNORMAL /HPF
NRBC BLD AUTO-RTO: 0 /100 WBCS
PH UR STRIP.AUTO: 7 [PH]
PLATELET # BLD AUTO: 228 THOUSANDS/UL (ref 149–390)
PMV BLD AUTO: 9.3 FL (ref 8.9–12.7)
POTASSIUM SERPL-SCNC: 3.6 MMOL/L (ref 3.5–5.3)
PROT UR STRIP-MCNC: ABNORMAL MG/DL
RBC # BLD AUTO: 4.04 MILLION/UL (ref 3.88–5.62)
RBC #/AREA URNS AUTO: ABNORMAL /HPF
SODIUM SERPL-SCNC: 139 MMOL/L (ref 135–147)
SP GR UR STRIP.AUTO: 1.01 (ref 1–1.03)
UROBILINOGEN UR STRIP-ACNC: <2 MG/DL
WBC # BLD AUTO: 10.44 THOUSAND/UL (ref 4.31–10.16)
WBC #/AREA URNS AUTO: ABNORMAL /HPF

## 2024-10-04 PROCEDURE — 87186 SC STD MICRODIL/AGAR DIL: CPT

## 2024-10-04 PROCEDURE — 80048 BASIC METABOLIC PNL TOTAL CA: CPT

## 2024-10-04 PROCEDURE — 36415 COLL VENOUS BLD VENIPUNCTURE: CPT

## 2024-10-04 PROCEDURE — 81001 URINALYSIS AUTO W/SCOPE: CPT

## 2024-10-04 PROCEDURE — 87181 SC STD AGAR DILUTION PER AGT: CPT

## 2024-10-04 PROCEDURE — 87086 URINE CULTURE/COLONY COUNT: CPT

## 2024-10-04 PROCEDURE — 85025 COMPLETE CBC W/AUTO DIFF WBC: CPT

## 2024-10-04 PROCEDURE — 87077 CULTURE AEROBIC IDENTIFY: CPT

## 2024-10-04 PROCEDURE — 83735 ASSAY OF MAGNESIUM: CPT

## 2024-10-06 LAB — BACTERIA UR CULT: ABNORMAL

## 2024-10-07 LAB — BACTERIA UR CULT: ABNORMAL

## 2024-10-10 ENCOUNTER — PROCEDURE VISIT (OUTPATIENT)
Dept: UROLOGY | Facility: CLINIC | Age: 79
End: 2024-10-10
Payer: COMMERCIAL

## 2024-10-10 VITALS
HEART RATE: 76 BPM | HEIGHT: 68 IN | DIASTOLIC BLOOD PRESSURE: 80 MMHG | OXYGEN SATURATION: 100 % | WEIGHT: 163 LBS | BODY MASS INDEX: 24.71 KG/M2 | SYSTOLIC BLOOD PRESSURE: 138 MMHG

## 2024-10-10 DIAGNOSIS — N40.1 BPH WITH OBSTRUCTION/LOWER URINARY TRACT SYMPTOMS: Primary | ICD-10-CM

## 2024-10-10 DIAGNOSIS — N99.115 POSTPROCEDURAL FOSSA NAVICULARIS URETHRAL STRICTURE: ICD-10-CM

## 2024-10-10 DIAGNOSIS — N13.8 BPH WITH OBSTRUCTION/LOWER URINARY TRACT SYMPTOMS: Primary | ICD-10-CM

## 2024-10-10 DIAGNOSIS — N45.1 EPIDIDYMITIS: ICD-10-CM

## 2024-10-10 DIAGNOSIS — N43.3 HYDROCELE: ICD-10-CM

## 2024-10-10 DIAGNOSIS — R97.20 ELEVATED PSA: ICD-10-CM

## 2024-10-10 LAB
SL AMB  POCT GLUCOSE, UA: ABNORMAL
SL AMB LEUKOCYTE ESTERASE,UA: ABNORMAL
SL AMB POCT BILIRUBIN,UA: ABNORMAL
SL AMB POCT BLOOD,UA: ABNORMAL
SL AMB POCT CLARITY,UA: ABNORMAL
SL AMB POCT COLOR,UA: YELLOW
SL AMB POCT KETONES,UA: ABNORMAL
SL AMB POCT NITRITE,UA: ABNORMAL
SL AMB POCT PH,UA: 6.5
SL AMB POCT SPECIFIC GRAVITY,UA: 1
SL AMB POCT URINE PROTEIN: ABNORMAL
SL AMB POCT UROBILINOGEN: 0.2

## 2024-10-10 PROCEDURE — 87086 URINE CULTURE/COLONY COUNT: CPT | Performed by: UROLOGY

## 2024-10-10 PROCEDURE — 87186 SC STD MICRODIL/AGAR DIL: CPT | Performed by: UROLOGY

## 2024-10-10 PROCEDURE — 87077 CULTURE AEROBIC IDENTIFY: CPT | Performed by: UROLOGY

## 2024-10-10 PROCEDURE — 81002 URINALYSIS NONAUTO W/O SCOPE: CPT | Performed by: UROLOGY

## 2024-10-10 PROCEDURE — 52000 CYSTOURETHROSCOPY: CPT | Performed by: UROLOGY

## 2024-10-10 NOTE — PROGRESS NOTES
Patient is status post robotic simple prostatectomy in October 2021.  Pathology revealed 121 g benign tissue.  He did well postoperatively.    However he developed worsening urinary stream and nocturia up to 3 times per night. 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  He discontinued a while ago when he felt he didn't need it.  He was admitted to hospital with UTI and epididymitis.       Cystoscopy     Date/Time  10/10/2024 2:30 PM     Performed by  Kieran Vega MD   Authorized by  Kieran Vega MD         Procedure Details:  Procedure type: cystoscopy    Additional Procedure Details: Patient was prepped with chlorhexidine and lidocaine jelly.  Flexible cystoscope was placed.  I difficulty placing the scope due to recurrent fossa navicularis stricture.  I then dilated the area using the lidocaine jelly applicator.  We heard and felt an audible pop.  Scope was then placed without difficulty.  No further urethral stricture.  Prostatic fossa is widely patent status post simple prostatectomy with mucosal advancement.  Evaluation the bladder reveals some cloudy urine but no specific abnormality.  Ureteral orifices are normal.  No foreign body, mass, suspicious lesion.    Plan  Will check urine culture, as patient has completed his antibiotic course.  Gave him another cone dilator and suggest to use this daily to keep the fossa open and optimize his bladder emptying.  Routine follow-up otherwise.

## 2024-10-13 LAB — BACTERIA UR CULT: ABNORMAL

## 2024-10-15 ENCOUNTER — OFFICE VISIT (OUTPATIENT)
Dept: GASTROENTEROLOGY | Facility: MEDICAL CENTER | Age: 79
End: 2024-10-15
Payer: COMMERCIAL

## 2024-10-15 ENCOUNTER — TELEPHONE (OUTPATIENT)
Dept: UROLOGY | Facility: AMBULATORY SURGERY CENTER | Age: 79
End: 2024-10-15

## 2024-10-15 VITALS
OXYGEN SATURATION: 98 % | DIASTOLIC BLOOD PRESSURE: 82 MMHG | BODY MASS INDEX: 24.71 KG/M2 | SYSTOLIC BLOOD PRESSURE: 133 MMHG | HEIGHT: 68 IN | WEIGHT: 163 LBS | TEMPERATURE: 98.3 F | HEART RATE: 60 BPM

## 2024-10-15 DIAGNOSIS — K21.9 GASTROESOPHAGEAL REFLUX DISEASE WITHOUT ESOPHAGITIS: ICD-10-CM

## 2024-10-15 DIAGNOSIS — R79.89 ELEVATED LFTS: ICD-10-CM

## 2024-10-15 DIAGNOSIS — K31.A0 GASTRIC INTESTINAL METAPLASIA: ICD-10-CM

## 2024-10-15 DIAGNOSIS — Z86.0101 PERSONAL HISTORY OF ADENOMATOUS AND SERRATED COLON POLYPS: Primary | ICD-10-CM

## 2024-10-15 DIAGNOSIS — N99.115 POSTPROCEDURAL FOSSA NAVICULARIS URETHRAL STRICTURE: Primary | ICD-10-CM

## 2024-10-15 PROCEDURE — 99214 OFFICE O/P EST MOD 30 MIN: CPT | Performed by: NURSE PRACTITIONER

## 2024-10-15 NOTE — TELEPHONE ENCOUNTER
Patient with persistent UTI.  Please inform of prescription Augmentin sent to pharmacy.  Please confirm he is doing cone dilations with good urinary stream.

## 2024-10-15 NOTE — TELEPHONE ENCOUNTER
Called and spoke with Santana. He was made aware of urine culture results and that a script for Augmentin was sent to [facility] to take twice a day for 7 days. Advised patient to ensure he is utilizing cone dilator daily which was given to him in office on 10/10/24. Patient verbalized understanding and denies issues urinating.

## 2024-10-15 NOTE — PROGRESS NOTES
Ambulatory Visit  Name: Santana Jacinto      : 1945      MRN: [id number]  Encounter Provider: THOMAS Holcomb  Encounter Date: 10/15/2024   Encounter department: [facility] [place]    Assessment & Plan  Personal history of adenomatous and serrated colon polyps  Recent colonoscopy  noted 3 subcentimeter polyps.  Small hemorrhoids.  Biopsies noted serrated polyp and tubular adenomas.  Repeat colonoscopy recommended in 3 years.  Reports BMs are brown and formed daily.  Denies any melena hematochezia.  No abdominal pain.    -Recall colonoscopy        Gastroesophageal reflux disease without esophagitis  Longstanding history of GERD.  Recent EGD  noted 2 cm hiatal hernia.  Biopsies were positive for intestinal metaplasia of the stomach.  Repeat EGD recommended in 1 year.  Currently taking omeprazole 20 mg daily which she reports is controlling his reflux.    -Continue omeprazole 20 mg daily  -Antireflux diet  -Recall EGD        Gastric intestinal metaplasia  Refer above     -Repeat EGD   Elevated LFTs  History of elevated total bilirubin.  Bilirubin has been as high as 2.23.  Consistent with Gilbert's disease.  Most recent LFTs were normal.  CT abdomen pelvis  noted a benign hypodensity in the liver that was seen in the past.  Does not require any follow-up.  No suspicious liver masses.  Normal hepatic contour and no biliary dilatation.  Gallbladder was found to be contracted without wall thickening or calcified gallstones.  Mucosal hyperenhancement of the gallbladder wall of some uncertain etiology.  No significant pericholecystic inflammatory change.  Denies any abdominal pain, nausea, vomiting, fevers, chills or weight loss.  Will continue to monitor for signs of gallbladder disease.    Orders:    Hepatic function panel; Future      History of Present Illness     Santana Jacinto is a 79 y.o. male who presents for follow-up.  She was last seen by   Jaiyeola 5/28/2024 for GERD, personal history of colon polyps and gallstones.    Recent colonoscopy 7/24 noted 3 subcentimeter polyps.  Small hemorrhoids.  Biopsies noted serrated polyp and tubular adenomas.  Repeat colonoscopy recommended in 3 years.  Reports BMs are brown and formed daily.  Denies any melena hematochezia.  No abdominal pain.    He reports diagnosis of colon cancer in his older sister diagnosed at age 84 and possible diagnosis of colon cancer in his father who was diagnosed in the 1960s when he was around age 70.     History of elevated total bilirubin.  Bilirubin has been as high as 2.23.  Consistent with Gilbert's disease.  Most recent LFTs were normal.  CT abdomen pelvis 9/24 noted a benign hypodensity in the liver that was seen in the past.  Does not require any follow-up.  No suspicious liver masses.  Normal hepatic contour and no biliary dilatation.  Gallbladder was found to be contracted without wall thickening or calcified gallstones.  Mucosal hyperenhancement of the gallbladder wall of some uncertain etiology.  No significant pericholecystic inflammatory change.  Denies any abdominal pain, nausea, vomiting, fevers, chills or weight loss.     Longstanding history of GERD.  Recent EGD 7/24 noted 2 cm hiatal hernia.  Biopsies were positive for intestinal metaplasia of the stomach.  Repeat EGD recommended in 1 year.  Currently taking omeprazole 20 mg daily which she reports is controlling his reflux.      CT abdomen pelvis 9/24-  1.  Abnormal findings in the bilateral kidneys as detailed above concerning for multifocal right pyelonephritis and left upper pole pyelonephritis.  There is mild wall thickening and mucosal hyperenhancement of the renal pelvis and bilateral ureters   noted suspicious for pyelitis/ureteritis. Clinical correlation with urinalysis recommended.  2.  Irregular bladder wall thickening concerning for cystitis, recommend correlation with urinalysis.  3.  Contracted without  wall thickening or calcified gallstones. Mucosal hyperenhancement of the gallbladder wall of uncertain etiology. No significant pericholecystic inflammatory change. If clinical concern for gallbladder pathology, consider right   upper quadrant ultrasound after adequate fasting for further evaluation.  4.  No findings of bowel obstruction, inflammation, free air, free fluid, or loculated fluid collections in the abdomen/pelvis.  5.  Bilateral adrenal thickening is again seen, similar compared to the prior exam and may reflect underlying adrenal hyperplasia.  6.  Fat-containing right inguinal hernia.  7.  Dilated upper prostatic urethra, bilateral hydrocele, and left varicocele incidentally seen.      Ultrasound right upper quadrant 2/24-  1. Cholelithiasis and biliary sludge with borderline gallbladder wall and no pericholecystic fluid. If there is a clinical concern for acute cholecystitis evaluation with gallbladder scan should be considered.  2. Hepatic cyst.    Labs 10/24-CMP normal other than creatinine 1.33, GFR 50, magnesium 2.1, CBC normal other than WBCs 10.44, , platelets 228.    Previous EGD/colonoscopy      EGD 7/24- irregular Z-line; performed cold forceps biopsy  The stomach and duodenum appeared normal.  2 cm hiatal hernia  The stomach appeared normal. Performed random biopsy to rule out H. pylori.  The duodenum appeared normal.  Repeat EGD 7/25.  Biopsies of chronic inactive gastritis.  Focus suggestive for intestinal metaplasia in the stomach.  Squamous eosinophils numbering less than 2 per hpf.  No intestinal plan aplasia noted in the GE junction.  Colonoscopy    Colonoscopy 7/24- 3 subcentimeter polyps were removed with cold snare  Small hemorrhoids  Otherwise normal colonic mucosa   Repeat colonoscopy in 3 years.  Biopsies noted serrated colon polyp and tubular adenomas.      2017 colonoscopy showed 3 small polyps with pathology noting tubular adenoma. He was recommend repeat in 3 years      History obtained from : patient  Review of Systems   All other systems reviewed and are negative.    Medical History Reviewed by provider this encounter:  Tobacco  Allergies  Meds  Problems  Med Hx  Surg Hx  Fam Hx       Current Outpatient Medications on File Prior to Visit   Medication Sig Dispense Refill    acetaminophen (TYLENOL) 500 mg tablet Take 500 mg by mouth every 6 (six) hours as needed for mild pain      amLODIPine (NORVASC) 10 mg tablet Take 10 mg by mouth every evening       aspirin (ECOTRIN LOW STRENGTH) 81 mg EC tablet Take 81 mg by mouth daily      atorvastatin (LIPITOR) 80 mg tablet TAKE 1 TABLET BY MOUTH EVERY DAY IN THE EVENING 90 tablet 1    finasteride (PROSCAR) 5 mg tablet Take 1 tablet (5 mg total) by mouth daily 30 tablet 0    hydroxyurea (HYDREA) 500 mg capsule TAKE 1 CAPSULE (500 MG TOTAL) BY MOUTH DAILY 90 capsule 2    metoprolol tartrate (LOPRESSOR) 50 mg tablet Take 50 mg by mouth 2 (two) times a day       Multiple Vitamins-Minerals (VITAMIN D3 COMPLETE PO) Take 1 capsule by mouth daily      nitroglycerin (NITROSTAT) 0.4 mg SL tablet Place 1 tablet (0.4 mg total) under the tongue every 5 (five) minutes as needed for chest pain 10 tablet 1    Omega-3 Fatty Acids (FISH OIL) 1200 MG CAPS Take 1,200 mg by mouth 2 (two) times a day        omeprazole (PriLOSEC) 20 mg delayed release capsule TAKE 1 CAPSULE BY MOUTH EVERY DAY 90 capsule 1    tamsulosin (FLOMAX) 0.4 mg Take 1 capsule (0.4 mg total) by mouth daily with dinner 30 capsule 0     No current facility-administered medications on file prior to visit.      Social History     Tobacco Use    Smoking status: Every Day     Current packs/day: 0.50     Average packs/day: 0.5 packs/day for 52.8 years (26.4 ttl pk-yrs)     Types: Cigarettes     Start date: 1972     Passive exposure: Current    Smokeless tobacco: Never    Tobacco comments:     a little less than 1/2ppd, trying to cut back before surgery last smoked 5.25.23   Vaping Use  "   Vaping status: Never Used   Substance and Sexual Activity    Alcohol use: Not Currently    Drug use: Never    Sexual activity: Not Currently     Comment: defer         Objective     /82 (BP Location: Left arm)   Pulse 60   Temp 98.3 °F (36.8 °C)   Ht 5' 8\" (1.727 m)   Wt 73.9 kg (163 lb)   SpO2 98%   BMI 24.78 kg/m²     Physical Exam  Constitutional:       Appearance: Normal appearance.   HENT:      Head: Normocephalic.   Cardiovascular:      Rate and Rhythm: Normal rate and regular rhythm.      Pulses: Normal pulses.      Heart sounds: Normal heart sounds.   Pulmonary:      Effort: Pulmonary effort is normal.      Breath sounds: Normal breath sounds.   Abdominal:      General: Bowel sounds are normal.      Palpations: Abdomen is soft.   Neurological:      Mental Status: He is alert.         "

## 2024-10-15 NOTE — PATIENT INSTRUCTIONS
Stool softener daily to 2 times per day  If that doesn't help can try Miralax 1 capful daily to twice per day

## 2024-10-18 PROBLEM — N12 PYELONEPHRITIS: Status: RESOLVED | Noted: 2024-09-18 | Resolved: 2024-10-18

## 2024-11-05 ENCOUNTER — OFFICE VISIT (OUTPATIENT)
Dept: NEPHROLOGY | Facility: CLINIC | Age: 79
End: 2024-11-05
Payer: COMMERCIAL

## 2024-11-05 VITALS
SYSTOLIC BLOOD PRESSURE: 124 MMHG | WEIGHT: 163 LBS | DIASTOLIC BLOOD PRESSURE: 72 MMHG | HEIGHT: 68 IN | BODY MASS INDEX: 24.71 KG/M2

## 2024-11-05 DIAGNOSIS — M89.9 CHRONIC KIDNEY DISEASE-MINERAL AND BONE DISORDER (CKD-MBD): ICD-10-CM

## 2024-11-05 DIAGNOSIS — R80.1 PERSISTENT PROTEINURIA: ICD-10-CM

## 2024-11-05 DIAGNOSIS — E83.42 HYPOMAGNESEMIA: ICD-10-CM

## 2024-11-05 DIAGNOSIS — E83.9 CHRONIC KIDNEY DISEASE-MINERAL AND BONE DISORDER (CKD-MBD): ICD-10-CM

## 2024-11-05 DIAGNOSIS — E78.2 MIXED HYPERLIPIDEMIA: ICD-10-CM

## 2024-11-05 DIAGNOSIS — D45 POLYCYTHEMIA VERA (HCC): ICD-10-CM

## 2024-11-05 DIAGNOSIS — I12.9 HYPERTENSIVE KIDNEY DISEASE WITH STAGE 3A CHRONIC KIDNEY DISEASE (HCC): Primary | ICD-10-CM

## 2024-11-05 DIAGNOSIS — N18.31 HYPERTENSIVE KIDNEY DISEASE WITH STAGE 3A CHRONIC KIDNEY DISEASE (HCC): Primary | ICD-10-CM

## 2024-11-05 DIAGNOSIS — N18.9 CHRONIC KIDNEY DISEASE-MINERAL AND BONE DISORDER (CKD-MBD): ICD-10-CM

## 2024-11-05 PROBLEM — N17.9 ACUTE KIDNEY INJURY SUPERIMPOSED ON CHRONIC KIDNEY DISEASE  (HCC): Status: RESOLVED | Noted: 2021-12-10 | Resolved: 2024-11-05

## 2024-11-05 PROCEDURE — 99213 OFFICE O/P EST LOW 20 MIN: CPT | Performed by: INTERNAL MEDICINE

## 2024-11-05 NOTE — PATIENT INSTRUCTIONS
"- attend ckd education session  - get blood work in 6months    Please call me in 10 days after having your blood work done to review the results if you do not hear back from me or my office, as I may have not received the results.  please remember to perform blood work prior to the next visit.  Please call if the blood pressure top number is greater than 140 or less than 110 consistently.  Please call if you are gaining more than 2lbs in 2 days for adjustment of water pills.  ~ Please AVOID the following pain medications.  LIST OF NSAIDS (NONSTEROIDAL ANTI-INFLAMMATORY DRUGS) AND DUQUE-2 INHIBITORS    DIFLUNISAL (DOLOBID)  IBUPROFEN (MOTRIN, ADVIL)  FLURBIPROFEN (ANSAID)  KETOPROFEN (ORUDIS, ORUVAIL)  FENOPROFEN (NALFON)  NABUMETONE (RELAFEN)  PIROXICAM (FELDENE)  NAPROXEN (ALEVE, NAPROSYN, NAPRELAN, ANAPROX)  DICLOFENAC (VOLTAREN, CATAFLAM)  INDOMETHACIN (INDOCIN)  SULINDAC (CLINORIL)  TOLMETIN (TOLETIN)  ETODOLAC (LODINE)  MELOXICAM (MOBIC)  KETOROLAC (TORADOL)  OXAPROZIN (DAYPRO)  CELECOXIB (CELEBREX)    Phosphorus diet  Follow a low phosphorus diet.    Avoid these higher phosphorus foods: Choose these lower phosphorus foods:   Milk, pudding or yogurt (from animals and from many soy varieties) Rice milk (unfortified), nondairy creamer (if it doesn't have terms in the ingredients list that contain the letters \"phos\")   Hard cheeses, ricotta or cottage cheese, fat-free cream cheese Regular and low-fat cream cheese   Ice cream or frozen yogurt Sherbet or frozen fruit pops   Soups made with higher phosphorus ingredients (milk, dried peas, beans, lentils) Soups made with lower phosphorus ingredients (broth- or water-based with other lower phosphorus ingredients)   Whole grains, including whole-grain breads, crackers, cereal, rice and pasta Refined grains, including white bread, crackers, cereals, rice and pasta   Quick breads, biscuits, cornbread, muffins, pancakes or waffles Homemade refined (white) dinner rolls, " "bagels or English muffins   Dried peas (split, black-eyed), beans (black, garbanzo, lima, kidney, navy, kennedy) or lentils Green peas (canned, frozen), green beans or wax beans   Organ meats, walleye, pollock or sardines Lean beef, pork, lamb, poultry or other fish   Nuts and seeds Popcorn   Peanut butter and other nut butters Jam, jelly or honey   Chocolate, including chocolate drinks Carob (chocolate-flavored) candy, hard candy or gumdrops   Waqas and pepper-type sodas, flavored gutierrez, bottled teas (if a term in the ingredients list contains the letters \"phos\") Lemon-lime soda, ginger ale or root beer, plain water   Follow a moderate potassium diet.        "

## 2024-11-05 NOTE — ASSESSMENT & PLAN NOTE
Last seen by hematology 7/17/2024 notes reviewed  Continue phlebotomy for hematocrit greater than 45%  On hydroxyurea

## 2024-11-05 NOTE — PROGRESS NOTES
Ambulatory Visit  Name: Santana Jacinto      : 1945      MRN: [id number]  Encounter Provider: Clara Ramos MD  Encounter Date: 2024   Encounter department: [facility] [place]  79 y.o.  male with pmh of multiple co-morbidities including HTN, pre diabetes, polycythemia vera, BPH, CAD and CKD stage 3 status post  robotic suprapubic prostatectomy on 10/18/2021 presents to the office for routine follow-up.     Assessment & Plan  Hypertensive kidney disease with stage 3a chronic kidney disease (HCC)  Lab Results   Component Value Date    EGFR 50 10/04/2024    EGFR 47 2024    EGFR 59 2024    CREATININE 1.33 (H) 10/04/2024    CREATININE 1.40 (H) 2024    CREATININE 1.17 2024   Current medications: Norvasc 10 mg p.o. nightly, metoprolol 50 mg p.o. twice daily  Changes made today: None blood pressure stable and at goal, encourage patient to check blood pressures occasionally at home if possible  Goal BP of < 130/80 based on age and comorbidities  Instructed to follow low sodium (2gm)diet.  after review of records In [facility] as well as Care everywhere patient has a baseline creatinine of 1.2-1.5 mg/dL.   Most recent labs show a Creatinine of 1.33 mg/dL on 10/4/24. Renal function remains stable.  Check blood work in 6 months and then again prior to next visit  Likely has underlying CKD due to hypertensive nephrosclerosis plus age-related nephron loss plus some component of prediabetes.  Imaging:   CT abdomen 2024 mild symmetrical bilateral perinephric inflammatory stranding subtle loss of corticomedullary differentiation in the right kidney and upper pole of left kidney suspicious for pyelonephritis.  No nephrolithiasis no hydronephrosis.  Renal ultrasound from 2022 bilateral kidneys approximately 11 cm  Recommend to avoid use of NSAIDs, nephrotoxins. Caution advised with regards to exposure to IV contrast dye.   Discussed with the patient in depth his renal  status, including the possible etiologies for CKD.   Advised the patient that when his GFR is close to 20mL/min then will start discussing about RRT(renal replacement therapy) options such as renal transplant, peritoneal dialysis and hemodialysis.   Informed the patient about the various options for Renal Replacement therapy.  Discussed with the patient how we need to work together to delay the progression of CKD with optimal BP control based on their age and co-morbidities, optimal BS control with HbA1c of <7% and trying to reduce proteinuria by the use of anti-proteinuric agents.   CKD education/Kidney smart: Referral placed 11/5/2024  Follow-up: Patient is to follow-up in 12 months, with lab work to be performed in 6 months and then again in a few days prior to the next visit. Advised patient to call me in 10 days to review the results if they do not hear back from me, as I may have not received the results.    Chronic kidney disease-mineral and bone disorder (CKD-MBD)  Lab Results   Component Value Date    EGFR 50 10/04/2024    EGFR 47 09/19/2024    EGFR 59 09/18/2024    CREATININE 1.33 (H) 10/04/2024    CREATININE 1.40 (H) 09/19/2024    CREATININE 1.17 09/18/2024   Based on patients CKD stage following is the goal of therapy.  Maintain calcium phosphorus product of < 55.  Stage 3 CKD - Goal Ca 8.5-10 mg/dL , goal Phos 2.7-4.6 mg/dL  , goal iPTH 30-70 pg/m  Currently on: on MVI otc, OTC vit D unknown mg  Changes made today: None  most recent ipth 79.1 and vit D 40.8 as of 6/24/2024  Check iPTH  vitamin D prior to next visit and in 6 months    Hypomagnesemia  Most recent magnesium 2.1 stable  Resolved  Check magnesium level prior to next visit    Persistent proteinuria  likely has underlying proteinuria due to prediabetes  most recent MACR is 93 mg/dL recheck prior to next visit  For proteinuria reduction continue on atorvastatin    Polycythemia vera (HCC)  Last seen by hematology 7/17/2024 notes  "reviewed  Continue phlebotomy for hematocrit greater than 45%  On hydroxyurea    Mixed hyperlipidemia  Goal LDL less than 70  Continue on atorvastatin, fish oil  Orders:    Ambulatory referral to ckd education program; Future    Magnesium; Future    Phosphorus; Future    Renal function panel; Future    PTH, intact; Future    Vitamin D 25 hydroxy; Future    Albumin / creatinine urine ratio; Future    Phosphorus; Future    Magnesium; Future    Vitamin D 25 hydroxy; Future    PTH, intact; Future    Renal function panel; Future      History of Present Illness     Santana Jacinto is a 79 y.o. male who presents to the office for routine follow-up feels well has no complaints since last visit was in the ER with UTI pyelonephritis was treated feels well currently no complaints happy renal parameters stable agreeable to attending CKD education kidney smart thankful to care information that is gone today continues to smoke.  Not really checking blood pressures at home.      Review of Systems   Constitutional:  Negative for chills and fever.   HENT:  Negative for congestion.    Respiratory:  Negative for shortness of breath and wheezing.    Cardiovascular:  Negative for leg swelling.   Gastrointestinal:  Negative for abdominal pain.   Genitourinary:  Negative for dysuria and hematuria.   Musculoskeletal:  Negative for back pain.   Neurological:  Negative for headaches.   Psychiatric/Behavioral:  Negative for agitation and confusion.    All other systems reviewed and are negative.          Objective     /72 (BP Location: Left arm, Patient Position: Sitting, Cuff Size: Large)   Ht 5' 8\" (1.727 m)   Wt 73.9 kg (163 lb)   BMI 24.78 kg/m²     Physical Exam  Vitals reviewed.   Constitutional:       General: He is not in acute distress.     Appearance: Normal appearance. He is normal weight. He is not ill-appearing, toxic-appearing or diaphoretic.   HENT:      Head: Normocephalic and atraumatic.      Mouth/Throat:      Pharynx: " No oropharyngeal exudate.   Eyes:      General: No scleral icterus.  Cardiovascular:      Rate and Rhythm: Normal rate.   Pulmonary:      Effort: No respiratory distress.      Breath sounds: Normal breath sounds. No stridor. No wheezing.   Abdominal:      General: There is no distension.      Palpations: There is no mass.      Tenderness: There is no right CVA tenderness or left CVA tenderness.   Musculoskeletal:         General: No swelling.      Cervical back: Normal range of motion. No rigidity.   Skin:     Coloration: Skin is not jaundiced.   Neurological:      General: No focal deficit present.      Mental Status: He is alert and oriented to person, place, and time. Mental status is at baseline.   Psychiatric:         Mood and Affect: Mood normal.         Behavior: Behavior normal.

## 2024-11-05 NOTE — ASSESSMENT & PLAN NOTE
Goal LDL less than 70  Continue on atorvastatin, fish oil  Orders:    Ambulatory referral to ckd education program; Future    Magnesium; Future    Phosphorus; Future    Renal function panel; Future    PTH, intact; Future    Vitamin D 25 hydroxy; Future    Albumin / creatinine urine ratio; Future    Phosphorus; Future    Magnesium; Future    Vitamin D 25 hydroxy; Future    PTH, intact; Future    Renal function panel; Future

## 2024-11-05 NOTE — ASSESSMENT & PLAN NOTE
likely has underlying proteinuria due to prediabetes  most recent MACR is 93 mg/dL recheck prior to next visit  For proteinuria reduction continue on atorvastatin

## 2024-11-05 NOTE — ASSESSMENT & PLAN NOTE
Lab Results   Component Value Date    EGFR 50 10/04/2024    EGFR 47 09/19/2024    EGFR 59 09/18/2024    CREATININE 1.33 (H) 10/04/2024    CREATININE 1.40 (H) 09/19/2024    CREATININE 1.17 09/18/2024   Based on patients CKD stage following is the goal of therapy.  Maintain calcium phosphorus product of < 55.  Stage 3 CKD - Goal Ca 8.5-10 mg/dL , goal Phos 2.7-4.6 mg/dL  , goal iPTH 30-70 pg/m  Currently on: on MVI otc, OTC vit D unknown mg  Changes made today: None  most recent ipth 79.1 and vit D 40.8 as of 6/24/2024  Check iPTH  vitamin D prior to next visit and in 6 months

## 2024-11-05 NOTE — ASSESSMENT & PLAN NOTE
Lab Results   Component Value Date    EGFR 50 10/04/2024    EGFR 47 09/19/2024    EGFR 59 09/18/2024    CREATININE 1.33 (H) 10/04/2024    CREATININE 1.40 (H) 09/19/2024    CREATININE 1.17 09/18/2024   Current medications: Norvasc 10 mg p.o. nightly, metoprolol 50 mg p.o. twice daily  Changes made today: None blood pressure stable and at goal, encourage patient to check blood pressures occasionally at home if possible  Goal BP of < 130/80 based on age and comorbidities  Instructed to follow low sodium (2gm)diet.  after review of records In [facility] as well as Care everywhere patient has a baseline creatinine of 1.2-1.5 mg/dL.   Most recent labs show a Creatinine of 1.33 mg/dL on 10/4/24. Renal function remains stable.  Check blood work in 6 months and then again prior to next visit  Likely has underlying CKD due to hypertensive nephrosclerosis plus age-related nephron loss plus some component of prediabetes.  Imaging:   CT abdomen 9/17/2024 mild symmetrical bilateral perinephric inflammatory stranding subtle loss of corticomedullary differentiation in the right kidney and upper pole of left kidney suspicious for pyelonephritis.  No nephrolithiasis no hydronephrosis.  Renal ultrasound from 5/12/2022 bilateral kidneys approximately 11 cm  Recommend to avoid use of NSAIDs, nephrotoxins. Caution advised with regards to exposure to IV contrast dye.   Discussed with the patient in depth his renal status, including the possible etiologies for CKD.   Advised the patient that when his GFR is close to 20mL/min then will start discussing about RRT(renal replacement therapy) options such as renal transplant, peritoneal dialysis and hemodialysis.   Informed the patient about the various options for Renal Replacement therapy.  Discussed with the patient how we need to work together to delay the progression of CKD with optimal BP control based on their age and co-morbidities, optimal BS control with HbA1c of <7% and trying to reduce  proteinuria by the use of anti-proteinuric agents.   CKD education/Kidney smart: Referral placed 11/5/2024  Follow-up: Patient is to follow-up in 12 months, with lab work to be performed in 6 months and then again in a few days prior to the next visit. Advised patient to call me in 10 days to review the results if they do not hear back from me, as I may have not received the results.

## 2024-11-06 NOTE — ED NOTES
LOS: 5 days   Patient Care Team:  Alecia Sifuentes DO as PCP - General (Family Medicine)  KILEY Cerda DPM as Consulting Physician (Podiatry)  Faisal Muir MD as Consulting Physician (Orthopedic Surgery)  Vega Rogers MD as Cardiologist (Cardiology)  Bradford Conklin MD as Consulting Physician (Cardiac Electrophysiology)  López Giles MD as Consulting Physician (Endocrinology)  Veronica El MD (Dermatology)  Sarah Del Toro MD as Consulting Physician (Cardiology)  Kevin Montiel MD (Pain Medicine)  Brynn Rangel SADIE at bedside     Princess Infante RN  09/17/24 1917     [facility]RA/BENEDICT/[facility]/CARLY In-House, NP 

## 2024-12-09 ENCOUNTER — APPOINTMENT (OUTPATIENT)
Dept: RADIOLOGY | Facility: CLINIC | Age: 79
End: 2024-12-09
Payer: COMMERCIAL

## 2024-12-09 ENCOUNTER — OFFICE VISIT (OUTPATIENT)
Dept: URGENT CARE | Facility: CLINIC | Age: 79
End: 2024-12-09
Payer: COMMERCIAL

## 2024-12-09 VITALS
TEMPERATURE: 96.8 F | HEART RATE: 62 BPM | RESPIRATION RATE: 18 BRPM | SYSTOLIC BLOOD PRESSURE: 116 MMHG | OXYGEN SATURATION: 98 % | DIASTOLIC BLOOD PRESSURE: 72 MMHG

## 2024-12-09 DIAGNOSIS — S93.491A SPRAIN OF ANTERIOR TALOFIBULAR LIGAMENT OF RIGHT ANKLE, INITIAL ENCOUNTER: ICD-10-CM

## 2024-12-09 DIAGNOSIS — S99.911A INJURY OF RIGHT ANKLE, INITIAL ENCOUNTER: ICD-10-CM

## 2024-12-09 DIAGNOSIS — S99.911A INJURY OF RIGHT ANKLE, INITIAL ENCOUNTER: Primary | ICD-10-CM

## 2024-12-09 PROCEDURE — 99213 OFFICE O/P EST LOW 20 MIN: CPT | Performed by: NURSE PRACTITIONER

## 2024-12-09 PROCEDURE — 73610 X-RAY EXAM OF ANKLE: CPT

## 2024-12-09 NOTE — PROGRESS NOTES
[facility]        NAME: Santana Jacinto is a 79 y.o. male  : 1945    MRN: [id number]  DATE: 2024  TIME: 11:12 AM    Assessment and Plan   Injury of right ankle, initial encounter [S99.911A]  1. Injury of right ankle, initial encounter  XR ankle 3+ vw right      2. Sprain of anterior talofibular ligament of right ankle, initial encounter        X-ray done in office.  Images reviewed by myself.  No evidence of fracture noted.  Discussed ankle sprain with patient.  Discussed ankle exercises and physical therapy to do at home.  Continue ice for the next few days.  Okay to weight-bear.  Patient agreement with plan.    Patient Instructions     Follow up with PCP in 3-5 days.  Proceed to  ER if symptoms worsen.    Chief Complaint     Chief Complaint   Patient presents with    Ankle Injury     Patient was hunting on Saturday when he injured his right ankle         History of Present Illness   Santana Jacinto presents to the clinic c/o    Ankle Injury (Patient was hunting on Saturday when he injured his right ankle)  Patient states yesterday he was using crutches to assist in getting around the house however he feels he does not need them today.  It is getting better and his wife made him come get checked.  Has been applying ice to it.  Does not note any swelling or bruising.        Review of Systems   Review of Systems   All other systems reviewed and are negative.        Current Medications     Long-Term Medications   Medication Sig Dispense Refill    amLODIPine (NORVASC) 10 mg tablet Take 10 mg by mouth every evening       aspirin (ECOTRIN LOW STRENGTH) 81 mg EC tablet Take 81 mg by mouth daily      atorvastatin (LIPITOR) 80 mg tablet TAKE 1 TABLET BY MOUTH EVERY DAY IN THE EVENING 90 tablet 1    finasteride (PROSCAR) 5 mg tablet Take 1 tablet (5 mg total) by mouth daily 30 tablet 0    hydroxyurea (HYDREA) 500 mg capsule TAKE 1 CAPSULE (500 MG TOTAL) BY MOUTH DAILY 90 capsule 2    metoprolol tartrate  (LOPRESSOR) 50 mg tablet Take 50 mg by mouth 2 (two) times a day       nitroglycerin (NITROSTAT) 0.4 mg SL tablet Place 1 tablet (0.4 mg total) under the tongue every 5 (five) minutes as needed for chest pain 10 tablet 1    Omega-3 Fatty Acids (FISH OIL) 1200 MG CAPS Take 1,200 mg by mouth 2 (two) times a day        omeprazole (PriLOSEC) 20 mg delayed release capsule TAKE 1 CAPSULE BY MOUTH EVERY DAY 90 capsule 1    tamsulosin (FLOMAX) 0.4 mg Take 1 capsule (0.4 mg total) by mouth daily with dinner 30 capsule 0       Current Allergies     Allergies as of 12/09/2024 - Reviewed 12/09/2024   Allergen Reaction Noted    Other Other (See Comments) 10/06/2021            The following portions of the patient's history were reviewed and updated as appropriate: allergies, current medications, past family history, past medical history, past social history, past surgical history and problem list.    Objective   /72   Pulse 62   Temp (!) 96.8 °F (36 °C) (Tympanic)   Resp 18   SpO2 98%        Physical Exam     Physical Exam  Vitals and nursing note reviewed.   Constitutional:       Appearance: Normal appearance. He is well-developed.   HENT:      Head: Normocephalic and atraumatic.   Eyes:      General: Lids are normal.      Conjunctiva/sclera: Conjunctivae normal.      Pupils: Pupils are equal, round, and reactive to light.   Cardiovascular:      Rate and Rhythm: Normal rate and regular rhythm.      Heart sounds: Normal heart sounds, S1 normal and S2 normal.   Pulmonary:      Effort: Pulmonary effort is normal.      Breath sounds: Normal breath sounds.   Musculoskeletal:      Right ankle: No swelling, deformity, ecchymosis or lacerations. Tenderness present over the ATF ligament. No lateral malleolus, medial malleolus, AITF ligament, CF ligament, posterior TF ligament, base of 5th metatarsal or proximal fibula tenderness. Normal range of motion. Anterior drawer test negative. Normal pulse.      Right Achilles Tendon:  Normal.   Skin:     General: Skin is warm and dry.   Neurological:      Mental Status: He is alert.   Psychiatric:         Speech: Speech normal.         Behavior: Behavior normal.         Thought Content: Thought content normal.         Judgment: Judgment normal.

## 2024-12-09 NOTE — PATIENT INSTRUCTIONS
Patient Education     Ankle Strengthening Exercises   About this topic   The ankle is a commonly injured joint in your body. It supports the full weight of your body. Your chance of hurting your ankle is less if the muscles in your ankle are strong. Doing exercises for the ankle can help with balance and keep some injuries from happening.  General   Before starting with a program, ask your doctor if you are healthy enough to do these exercises. Your doctor may have you work with a  or physical therapist to make a safe exercise program to meet your needs.  Strengthening Exercises   Strengthening exercises keep your muscles firm and strong. Stand or sit up tall on a chair without arms for your exercises. Start by repeating each exercise 2 to 3 times. Work up to doing each exercise 10 times. Try to do the exercises 2 to 3 times each day. Do all exercises slowly.  Ankle pumps seated ? Move each foot up and down like you are pressing down and lifting up on a gas pedal.  Ankle alphabet seated ? Act like you are writing the alphabet with each foot. Do not move your whole leg to do this, just move your ankle. Do all of the alphabet. Take short rests if you get tired.  Exercise band exercises ? Sit on the floor with your legs out in front of you for these. You can also sit in a chair.  Pulling foot up ? You will have to have someone hold the band for this exercise. Otherwise, you can tie a large knot into each end of the band and close the ends of the band in the bottom of a door. Have the band around the top of your foot. Pull your foot up towards your head. Bring your foot back to the starting position. Switch feet and repeat.  Pushing foot down ? Loop the band around the ball of the foot. Push down as if you were pressing on a gas pedal. Bring the foot back to the starting position. Switch feet and repeat.  Pulling foot in ? Loop the band around the ball of one foot. Cross your other leg over the leg with the band  around it. Put the top foot on top of the band as if you were stepping on it. Pull the bottom foot in. Bring the foot back to the starting position. Switch feet and repeat.  Pulling foot out ? Loop the band around the ball of one foot. Step on the band with your other foot and straighten the leg. Pull the bottom foot out. Bring the foot back to the starting position. Switch feet and repeat.  Heel raises ? Hold on to a counter. Lift your heels up and rise up onto your toes. Lower yourself back down.  Toe lifts ? Lift your toes up and put your weight onto your heels. Hold 3 to 5 seconds.  Single leg balance ? Lift one leg up and balance on the other leg for 10 to 30 seconds. Repeat 5 times. To make this exercise harder, try putting a thin pillow under your foot.               What will the results be?   Ankle is stronger and more stable  More range of motion  Better balance  Less chance of falling  Less chance of hurting your ankle  Easier to walk and do other activities  Helpful tips   Stay active and work out to keep your muscles strong and flexible.  Keep a healthy weight so there is not extra stress on your joints. Eat a healthy diet to keep your muscles healthy.  Be sure you do not hold your breath when exercising. This can raise your blood pressure. If you tend to hold your breath, try counting out loud when exercising. If any exercise bothers you, stop right away.  Try walking or cycling at an easy pace for a few minutes to warm up your muscles. Do this again after exercising.  If you have trouble with balance, be careful with the standing exercises. You may want to have someone with you when you are doing these. You may want to hold on to something stable while doing the exercises. If you don't feel safe, don't do them.  Exercise may be slightly uncomfortable, but you should not have sharp pains. If you do get sharp pains, stop what you are doing. If the sharp pains continue, call your doctor.  Wear shoes with  "good support. Do not go barefoot.  Use proper footwear when playing sports or exercising. This may include athletic supports, shoes, or shoe inserts that keep your foot stable.  Last Reviewed Date   2021-07-26  Consumer Information Use and Disclaimer   This generalized information is a limited summary of diagnosis, treatment, and/or medication information. It is not meant to be comprehensive and should be used as a tool to help the user understand and/or assess potential diagnostic and treatment options. It does NOT include all information about conditions, treatments, medications, side effects, or risks that may apply to a specific patient. It is not intended to be medical advice or a substitute for the medical advice, diagnosis, or treatment of a health care provider based on the health care provider's examination and assessment of a patient’s specific and unique circumstances. Patients must speak with a health care provider for complete information about their health, medical questions, and treatment options, including any risks or benefits regarding use of medications. This information does not endorse any treatments or medications as safe, effective, or approved for treating a specific patient. UpToDate, Inc. and its affiliates disclaim any warranty or liability relating to this information or the use thereof. The use of this information is governed by the Terms of Use, available at https://www.[employer].com/en/know/clinical-effectiveness-terms   Copyright   Copyright © 2024 UpToDate, Inc. and its affiliates and/or licensors. All rights reserved.  Patient Education     Ankle sprain   The Basics   Written by the doctors and editors at [employer]   What happens when a person sprains their ankle? -- When a person sprains their ankle, the ankle joint turns too far in 1 direction.  Inside the ankle are tough bands of tissue called \"ligaments.\" These hold the different bones together. During a sprain, 1 or more of " "those ligaments stretch too far or even tear (figure 1). This can cause pain and swelling, make the ankle unsteady, and make it hard to put weight on the leg with the injured ankle.  What are the symptoms of an ankle sprain? -- The symptoms can include pain, tenderness, swelling, and bruising at the ankle. Some people with an ankle sprain also find it hard to move the foot in certain directions. Plus, some people cannot put weight on the leg with the injured ankle.  Is there a test for an ankle sprain? -- A doctor or nurse should be able to tell if you have a sprain by doing an exam and learning about what happened to your ankle. They might move your foot in different directions to see what hurts and to check how loose your ankle feels.  In some cases, a doctor might order an X-ray to check for broken bones, but that is not always needed. Some doctors might use an ultrasound to look at the ligaments. Ultrasound is an imaging test that creates pictures of the inside of the body.  Should I see a doctor or nurse? -- See your doctor or nurse if:   You cannot put weight on the leg with the injured ankle.   Your ankle looks deformed or crooked.   Your ankle is unstable (for example, it gives out while you are climbing stairs).  It's also best to see a doctor or nurse if you are not sure how serious an injury is.  How is an ankle sprain treated? -- Treatment for a sprained ankle is easy to remember if you think of the word \"PRICE\":   Protect - To avoid making your injury worse, you can wrap it with an elastic bandage (figure 2). Depending on how bad your sprain is, you might also get a brace or splint.   Rest - To rest the ankle, you can use crutches and stay off of your feet. Avoid activities that cause pain.   Ice - Apply a cold gel pack, bag of ice, or bag of frozen vegetables on your ankle every 1 to 2 hours, for 15 minutes each time. Put a thin towel between the ice (or other cold object) and your skin. Use the ice " "(or other cold object) for at least 6 hours after your injury. Some people find it helpful to ice longer, even up to 2 days after their injury.   Compression - \"Compression\" basically means pressure. You want to have your ankle under slight pressure by having it wrapped in an elastic bandage. This helps reduce swelling and supports the ankle. Your doctor or nurse will show you how to wrap your ankle. Be careful not to wrap it too tight, as this could cut off the blood flow to your foot.   Elevation - \"Elevation\" means keeping your foot raised up above the level of your heart. To do this, you can put your foot on some pillows or blankets while you are lying down, or on a table or chair while you are sitting.  You can also take medicines to relieve pain, such as acetaminophen (sample brand name: Tylenol), ibuprofen (sample brand names: Advil, Motrin), or naproxen (sample brand name: Aleve).  People who have a mild sprain do not usually need to use a splint to keep their foot and ankle still. But people who have a more severe sprain sometimes do.  In rare cases, doctors suggest surgery to repair a torn ligament caused by an ankle sprain.  Can I do anything else to help my recovery? -- Most people heal more quickly if they do certain exercises. Usually, gentle exercises can be started after a few days, once swelling and pain have improved. The right exercises for you depend on what kind of sprain you have and how serious it is. Ask your doctor which exercises you should do. In some cases, they might recommend working with a physical therapist (exercise expert).  Over time, slowly build up the activities you do with your foot and ankle. It might be easier to do some activities if you wear a brace or splint on your ankle as it heals.  When should I call the doctor? -- Call for advice if:   Your pain or swelling is getting worse.   Your toes are blue or gray, and numb.   Your ankle feels more unstable or wobbly.   You have " new or worsening symptoms.  All topics are updated as new evidence becomes available and our peer review process is complete.  This topic retrieved from [employer] on: Feb 26, 2024.  Topic 71253 Version 11.0  Release: 32.2.4 - C32.56  © 2024 UpToDate, Inc. and/or its affiliates. All rights reserved.  figure 1: Ankle sprains     When a person sprains their ankle, the ankle joint turns too far in a particular direction. Inside the ankle are tough bands of tissue called ligaments, which hold the different bones together. During a sprain, 1 or more of those ligaments (shown in white) stretch too far or even tear.  Graphic 53766 Version 2.0  figure 2: How to wrap your ankle with an elastic bandage     To wrap your ankle with an elastic bandage:  Start with the rolled bandage at the top of your foot below your toes. Wrap toward the inside of your ankle.  Loop bandage around your foot and bring it up toward your ankle.  Loop bandage around the back of your ankle and bring it down to the opposite side of your foot.  Loop bandage under your foot again and repeat the process until the roll is done. Secure.  Graphic 499325 Version 2.0  Consumer Information Use and Disclaimer   Disclaimer: This generalized information is a limited summary of diagnosis, treatment, and/or medication information. It is not meant to be comprehensive and should be used as a tool to help the user understand and/or assess potential diagnostic and treatment options. It does NOT include all information about conditions, treatments, medications, side effects, or risks that may apply to a specific patient. It is not intended to be medical advice or a substitute for the medical advice, diagnosis, or treatment of a health care provider based on the health care provider's examination and assessment of a patient's specific and unique circumstances. Patients must speak with a health care provider for complete information about their health, medical questions,  and treatment options, including any risks or benefits regarding use of medications. This information does not endorse any treatments or medications as safe, effective, or approved for treating a specific patient. UpToDate, Inc. and its affiliates disclaim any warranty or liability relating to this information or the use thereof.The use of this information is governed by the Terms of Use, available at https://www.[employer].com/en/know/clinical-effectiveness-terms. 2024© UpToDate, Inc. and its affiliates and/or licensors. All rights reserved.  Copyright   © 2024 UpToDate, Inc. and/or its affiliates. All rights reserved.

## 2024-12-15 DIAGNOSIS — K21.9 GASTROESOPHAGEAL REFLUX DISEASE WITHOUT ESOPHAGITIS: ICD-10-CM

## 2024-12-26 ENCOUNTER — HOSPITAL ENCOUNTER (INPATIENT)
Facility: HOSPITAL | Age: 79
LOS: 5 days | Discharge: HOME | DRG: 690 | End: 2025-01-01
Attending: EMERGENCY MEDICINE | Admitting: INTERNAL MEDICINE
Payer: COMMERCIAL

## 2024-12-26 ENCOUNTER — APPOINTMENT (EMERGENCY)
Dept: CT IMAGING | Facility: HOSPITAL | Age: 79
DRG: 690 | End: 2024-12-26
Payer: COMMERCIAL

## 2024-12-26 DIAGNOSIS — R50.9 FEVER: Primary | ICD-10-CM

## 2024-12-26 DIAGNOSIS — N39.0 URINARY TRACT INFECTION: ICD-10-CM

## 2024-12-26 PROBLEM — J40 BRONCHITIS: Status: ACTIVE | Noted: 2024-12-26

## 2024-12-26 LAB
2HR DELTA HS TROPONIN: 15 NG/L
4HR DELTA HS TROPONIN: 15 NG/L
ALBUMIN SERPL BCG-MCNC: 3.7 G/DL (ref 3.5–5)
ALP SERPL-CCNC: 116 U/L (ref 34–104)
ALT SERPL W P-5'-P-CCNC: 14 U/L (ref 7–52)
ANION GAP SERPL CALCULATED.3IONS-SCNC: 7 MMOL/L (ref 4–13)
AST SERPL W P-5'-P-CCNC: 18 U/L (ref 13–39)
BACTERIA UR QL AUTO: ABNORMAL /HPF
BASOPHILS # BLD AUTO: 0.01 THOUSANDS/ÂΜL (ref 0–0.1)
BASOPHILS NFR BLD AUTO: 0 % (ref 0–1)
BILIRUB DIRECT SERPL-MCNC: 0.36 MG/DL (ref 0–0.2)
BILIRUB SERPL-MCNC: 1.71 MG/DL (ref 0.2–1)
BILIRUB UR QL STRIP: NEGATIVE
BUN SERPL-MCNC: 21 MG/DL (ref 5–25)
CALCIUM SERPL-MCNC: 8.6 MG/DL (ref 8.4–10.2)
CARDIAC TROPONIN I PNL SERPL HS: 11 NG/L (ref ?–50)
CARDIAC TROPONIN I PNL SERPL HS: 26 NG/L (ref ?–50)
CARDIAC TROPONIN I PNL SERPL HS: 26 NG/L (ref ?–50)
CHLORIDE SERPL-SCNC: 105 MMOL/L (ref 96–108)
CLARITY UR: CLEAR
CO2 SERPL-SCNC: 28 MMOL/L (ref 21–32)
COLOR UR: YELLOW
CREAT SERPL-MCNC: 1.77 MG/DL (ref 0.6–1.3)
EOSINOPHIL # BLD AUTO: 0.09 THOUSAND/ÂΜL (ref 0–0.61)
EOSINOPHIL NFR BLD AUTO: 1 % (ref 0–6)
ERYTHROCYTE [DISTWIDTH] IN BLOOD BY AUTOMATED COUNT: 14.4 % (ref 11.6–15.1)
FLUAV AG UPPER RESP QL IA.RAPID: NEGATIVE
FLUBV AG UPPER RESP QL IA.RAPID: NEGATIVE
GFR SERPL CREATININE-BSD FRML MDRD: 35 ML/MIN/1.73SQ M
GLUCOSE SERPL-MCNC: 106 MG/DL (ref 65–140)
GLUCOSE UR STRIP-MCNC: NEGATIVE MG/DL
HCT VFR BLD AUTO: 41.9 % (ref 36.5–49.3)
HGB BLD-MCNC: 14.7 G/DL (ref 12–17)
HGB UR QL STRIP.AUTO: ABNORMAL
IMM GRANULOCYTES # BLD AUTO: 0.04 THOUSAND/UL (ref 0–0.2)
IMM GRANULOCYTES NFR BLD AUTO: 0 % (ref 0–2)
KETONES UR STRIP-MCNC: NEGATIVE MG/DL
LACTATE SERPL-SCNC: 1.6 MMOL/L (ref 0.5–2)
LEUKOCYTE ESTERASE UR QL STRIP: ABNORMAL
LYMPHOCYTES # BLD AUTO: 0.71 THOUSANDS/ÂΜL (ref 0.6–4.47)
LYMPHOCYTES NFR BLD AUTO: 8 % (ref 14–44)
MCH RBC QN AUTO: 38.2 PG (ref 26.8–34.3)
MCHC RBC AUTO-ENTMCNC: 35.1 G/DL (ref 31.4–37.4)
MCV RBC AUTO: 109 FL (ref 82–98)
MONOCYTES # BLD AUTO: 0.23 THOUSAND/ÂΜL (ref 0.17–1.22)
MONOCYTES NFR BLD AUTO: 3 % (ref 4–12)
NEUTROPHILS # BLD AUTO: 8.13 THOUSANDS/ÂΜL (ref 1.85–7.62)
NEUTS SEG NFR BLD AUTO: 88 % (ref 43–75)
NITRITE UR QL STRIP: POSITIVE
NON-SQ EPI CELLS URNS QL MICRO: ABNORMAL /HPF
NRBC BLD AUTO-RTO: 0 /100 WBCS
PH UR STRIP.AUTO: 7 [PH] (ref 4.5–8)
PLATELET # BLD AUTO: 109 THOUSANDS/UL (ref 149–390)
PMV BLD AUTO: 10.2 FL (ref 8.9–12.7)
POTASSIUM SERPL-SCNC: 3.6 MMOL/L (ref 3.5–5.3)
PROCALCITONIN SERPL-MCNC: 0.11 NG/ML
PROT SERPL-MCNC: 7.2 G/DL (ref 6.4–8.4)
PROT UR STRIP-MCNC: ABNORMAL MG/DL
RBC # BLD AUTO: 3.85 MILLION/UL (ref 3.88–5.62)
RBC #/AREA URNS AUTO: ABNORMAL /HPF
SARS-COV+SARS-COV-2 AG RESP QL IA.RAPID: NEGATIVE
SODIUM SERPL-SCNC: 140 MMOL/L (ref 135–147)
SP GR UR STRIP.AUTO: >=1.03 (ref 1–1.03)
UROBILINOGEN UR QL STRIP.AUTO: 2 E.U./DL
WBC # BLD AUTO: 9.21 THOUSAND/UL (ref 4.31–10.16)
WBC #/AREA URNS AUTO: ABNORMAL /HPF

## 2024-12-26 PROCEDURE — 96361 HYDRATE IV INFUSION ADD-ON: CPT

## 2024-12-26 PROCEDURE — 74177 CT ABD & PELVIS W/CONTRAST: CPT

## 2024-12-26 PROCEDURE — 81001 URINALYSIS AUTO W/SCOPE: CPT

## 2024-12-26 PROCEDURE — 87086 URINE CULTURE/COLONY COUNT: CPT

## 2024-12-26 PROCEDURE — 84484 ASSAY OF TROPONIN QUANT: CPT | Performed by: EMERGENCY MEDICINE

## 2024-12-26 PROCEDURE — 96374 THER/PROPH/DIAG INJ IV PUSH: CPT

## 2024-12-26 PROCEDURE — 87811 SARS-COV-2 COVID19 W/OPTIC: CPT | Performed by: EMERGENCY MEDICINE

## 2024-12-26 PROCEDURE — 93005 ELECTROCARDIOGRAM TRACING: CPT

## 2024-12-26 PROCEDURE — 87040 BLOOD CULTURE FOR BACTERIA: CPT | Performed by: EMERGENCY MEDICINE

## 2024-12-26 PROCEDURE — 99285 EMERGENCY DEPT VISIT HI MDM: CPT | Performed by: EMERGENCY MEDICINE

## 2024-12-26 PROCEDURE — 99222 1ST HOSP IP/OBS MODERATE 55: CPT

## 2024-12-26 PROCEDURE — 71260 CT THORAX DX C+: CPT

## 2024-12-26 PROCEDURE — 80048 BASIC METABOLIC PNL TOTAL CA: CPT | Performed by: EMERGENCY MEDICINE

## 2024-12-26 PROCEDURE — 87154 CUL TYP ID BLD PTHGN 6+ TRGT: CPT | Performed by: EMERGENCY MEDICINE

## 2024-12-26 PROCEDURE — 96365 THER/PROPH/DIAG IV INF INIT: CPT

## 2024-12-26 PROCEDURE — 83605 ASSAY OF LACTIC ACID: CPT | Performed by: EMERGENCY MEDICINE

## 2024-12-26 PROCEDURE — 87077 CULTURE AEROBIC IDENTIFY: CPT

## 2024-12-26 PROCEDURE — 87186 SC STD MICRODIL/AGAR DIL: CPT | Performed by: EMERGENCY MEDICINE

## 2024-12-26 PROCEDURE — 99285 EMERGENCY DEPT VISIT HI MDM: CPT

## 2024-12-26 PROCEDURE — 84145 PROCALCITONIN (PCT): CPT | Performed by: EMERGENCY MEDICINE

## 2024-12-26 PROCEDURE — 85025 COMPLETE CBC W/AUTO DIFF WBC: CPT | Performed by: EMERGENCY MEDICINE

## 2024-12-26 PROCEDURE — 87804 INFLUENZA ASSAY W/OPTIC: CPT | Performed by: EMERGENCY MEDICINE

## 2024-12-26 PROCEDURE — 36415 COLL VENOUS BLD VENIPUNCTURE: CPT | Performed by: EMERGENCY MEDICINE

## 2024-12-26 PROCEDURE — 80076 HEPATIC FUNCTION PANEL: CPT | Performed by: EMERGENCY MEDICINE

## 2024-12-26 PROCEDURE — 87186 SC STD MICRODIL/AGAR DIL: CPT

## 2024-12-26 RX ORDER — BENZONATATE 100 MG/1
100 CAPSULE ORAL 3 TIMES DAILY PRN
Status: DISCONTINUED | OUTPATIENT
Start: 2024-12-26 | End: 2025-01-01 | Stop reason: HOSPADM

## 2024-12-26 RX ORDER — PANTOPRAZOLE SODIUM 40 MG/1
40 TABLET, DELAYED RELEASE ORAL
Status: DISCONTINUED | OUTPATIENT
Start: 2024-12-27 | End: 2025-01-01 | Stop reason: HOSPADM

## 2024-12-26 RX ORDER — POTASSIUM CHLORIDE 1500 MG/1
20 TABLET, EXTENDED RELEASE ORAL ONCE
Status: COMPLETED | OUTPATIENT
Start: 2024-12-26 | End: 2024-12-26

## 2024-12-26 RX ORDER — GUAIFENESIN/DEXTROMETHORPHAN 100-10MG/5
10 SYRUP ORAL EVERY 4 HOURS PRN
Status: DISCONTINUED | OUTPATIENT
Start: 2024-12-26 | End: 2025-01-01 | Stop reason: HOSPADM

## 2024-12-26 RX ORDER — HYDROXYUREA 500 MG/1
500 CAPSULE ORAL DAILY
Status: DISCONTINUED | OUTPATIENT
Start: 2024-12-27 | End: 2025-01-01 | Stop reason: HOSPADM

## 2024-12-26 RX ORDER — TAMSULOSIN HYDROCHLORIDE 0.4 MG/1
0.4 CAPSULE ORAL
Status: DISCONTINUED | OUTPATIENT
Start: 2024-12-27 | End: 2025-01-01 | Stop reason: HOSPADM

## 2024-12-26 RX ORDER — ALBUTEROL SULFATE 90 UG/1
2 INHALANT RESPIRATORY (INHALATION) EVERY 4 HOURS PRN
Status: DISCONTINUED | OUTPATIENT
Start: 2024-12-26 | End: 2025-01-01 | Stop reason: HOSPADM

## 2024-12-26 RX ORDER — GUAIFENESIN 600 MG/1
600 TABLET, EXTENDED RELEASE ORAL EVERY 12 HOURS SCHEDULED
Status: DISCONTINUED | OUTPATIENT
Start: 2024-12-26 | End: 2025-01-01 | Stop reason: HOSPADM

## 2024-12-26 RX ORDER — ACETAMINOPHEN 160 MG/5ML
1 SUSPENSION, ORAL (FINAL DOSE FORM) ORAL ONCE
Status: COMPLETED | OUTPATIENT
Start: 2024-12-26 | End: 2024-12-26

## 2024-12-26 RX ORDER — NICOTINE 21 MG/24HR
1 PATCH, TRANSDERMAL 24 HOURS TRANSDERMAL DAILY
Status: DISCONTINUED | OUTPATIENT
Start: 2024-12-27 | End: 2025-01-01 | Stop reason: HOSPADM

## 2024-12-26 RX ORDER — FINASTERIDE 5 MG/1
5 TABLET, FILM COATED ORAL DAILY
Status: DISCONTINUED | OUTPATIENT
Start: 2024-12-27 | End: 2025-01-01 | Stop reason: HOSPADM

## 2024-12-26 RX ORDER — AMLODIPINE BESYLATE 10 MG/1
10 TABLET ORAL EVERY EVENING
Status: DISCONTINUED | OUTPATIENT
Start: 2024-12-26 | End: 2025-01-01 | Stop reason: HOSPADM

## 2024-12-26 RX ORDER — ATORVASTATIN CALCIUM 80 MG/1
80 TABLET, FILM COATED ORAL EVERY EVENING
Status: DISCONTINUED | OUTPATIENT
Start: 2024-12-26 | End: 2025-01-01 | Stop reason: HOSPADM

## 2024-12-26 RX ORDER — HEPARIN SODIUM 5000 [USP'U]/ML
5000 INJECTION, SOLUTION INTRAVENOUS; SUBCUTANEOUS EVERY 8 HOURS SCHEDULED
Status: DISCONTINUED | OUTPATIENT
Start: 2024-12-26 | End: 2025-01-01 | Stop reason: HOSPADM

## 2024-12-26 RX ORDER — IPRATROPIUM BROMIDE AND ALBUTEROL SULFATE 2.5; .5 MG/3ML; MG/3ML
3 SOLUTION RESPIRATORY (INHALATION) EVERY 6 HOURS PRN
Status: DISCONTINUED | OUTPATIENT
Start: 2024-12-26 | End: 2025-01-01 | Stop reason: HOSPADM

## 2024-12-26 RX ORDER — ASPIRIN 81 MG/1
81 TABLET ORAL DAILY
Status: DISCONTINUED | OUTPATIENT
Start: 2024-12-27 | End: 2025-01-01 | Stop reason: HOSPADM

## 2024-12-26 RX ORDER — METOPROLOL TARTRATE 50 MG
50 TABLET ORAL 2 TIMES DAILY
Status: DISCONTINUED | OUTPATIENT
Start: 2024-12-26 | End: 2025-01-01 | Stop reason: HOSPADM

## 2024-12-26 RX ORDER — SODIUM CHLORIDE, SODIUM GLUCONATE, SODIUM ACETATE, POTASSIUM CHLORIDE, MAGNESIUM CHLORIDE, SODIUM PHOSPHATE, DIBASIC, AND POTASSIUM PHOSPHATE .53; .5; .37; .037; .03; .012; .00082 G/100ML; G/100ML; G/100ML; G/100ML; G/100ML; G/100ML; G/100ML
75 INJECTION, SOLUTION INTRAVENOUS CONTINUOUS
Status: DISPENSED | OUTPATIENT
Start: 2024-12-26 | End: 2024-12-27

## 2024-12-26 RX ORDER — ACETAMINOPHEN 325 MG/1
650 TABLET ORAL EVERY 6 HOURS PRN
Status: DISCONTINUED | OUTPATIENT
Start: 2024-12-26 | End: 2025-01-01 | Stop reason: HOSPADM

## 2024-12-26 RX ADMIN — SODIUM CHLORIDE, SODIUM GLUCONATE, SODIUM ACETATE, POTASSIUM CHLORIDE, MAGNESIUM CHLORIDE, SODIUM PHOSPHATE, DIBASIC, AND POTASSIUM PHOSPHATE 75 ML/HR: .53; .5; .37; .037; .03; .012; .00082 INJECTION, SOLUTION INTRAVENOUS at 22:34

## 2024-12-26 RX ADMIN — ACETAMINOPHEN 650 MG: 325 TABLET, FILM COATED ORAL at 23:12

## 2024-12-26 RX ADMIN — IOHEXOL 100 ML: 350 INJECTION, SOLUTION INTRAVENOUS at 18:22

## 2024-12-26 RX ADMIN — SODIUM CHLORIDE 1000 ML: 0.9 INJECTION, SOLUTION INTRAVENOUS at 15:41

## 2024-12-26 RX ADMIN — AMLODIPINE BESYLATE 10 MG: 10 TABLET ORAL at 22:33

## 2024-12-26 RX ADMIN — SODIUM CHLORIDE 1000 ML: 0.9 INJECTION, SOLUTION INTRAVENOUS at 17:21

## 2024-12-26 RX ADMIN — METOPROLOL TARTRATE 50 MG: 50 TABLET, FILM COATED ORAL at 22:33

## 2024-12-26 RX ADMIN — POTASSIUM CHLORIDE 20 MEQ: 1500 TABLET, EXTENDED RELEASE ORAL at 20:49

## 2024-12-26 RX ADMIN — PIPERACILLIN AND TAZOBACTAM 4.5 G: 36; 4.5 INJECTION, POWDER, LYOPHILIZED, FOR SOLUTION INTRAVENOUS at 20:50

## 2024-12-26 RX ADMIN — HEPARIN SODIUM 5000 UNITS: 5000 INJECTION INTRAVENOUS; SUBCUTANEOUS at 22:33

## 2024-12-26 RX ADMIN — ATORVASTATIN CALCIUM 80 MG: 80 TABLET, FILM COATED ORAL at 22:33

## 2024-12-26 RX ADMIN — IOHEXOL 50 ML: 240 INJECTION, SOLUTION INTRATHECAL; INTRAVASCULAR; INTRAVENOUS; ORAL at 18:22

## 2024-12-26 RX ADMIN — CEFTRIAXONE 1000 MG: 10 INJECTION, POWDER, FOR SOLUTION INTRAVENOUS at 16:28

## 2024-12-26 RX ADMIN — GUAIFENESIN 600 MG: 600 TABLET ORAL at 22:33

## 2024-12-26 NOTE — ED NOTES
Mixed and gave contrast to pt to drink over next hour and a half      Leonel Barr RN  12/26/24 2113

## 2024-12-27 LAB
ANION GAP SERPL CALCULATED.3IONS-SCNC: 7 MMOL/L (ref 4–13)
ATRIAL RATE: 76 BPM
ATRIAL RATE: 82 BPM
ATRIAL RATE: 83 BPM
BASOPHILS # BLD AUTO: 0.02 THOUSANDS/ÂΜL (ref 0–0.1)
BASOPHILS NFR BLD AUTO: 0 % (ref 0–1)
BUN SERPL-MCNC: 18 MG/DL (ref 5–25)
CALCIUM SERPL-MCNC: 8.1 MG/DL (ref 8.4–10.2)
CHLORIDE SERPL-SCNC: 107 MMOL/L (ref 96–108)
CO2 SERPL-SCNC: 24 MMOL/L (ref 21–32)
CREAT SERPL-MCNC: 1.47 MG/DL (ref 0.6–1.3)
EOSINOPHIL # BLD AUTO: 0.03 THOUSAND/ÂΜL (ref 0–0.61)
EOSINOPHIL NFR BLD AUTO: 0 % (ref 0–6)
ERYTHROCYTE [DISTWIDTH] IN BLOOD BY AUTOMATED COUNT: 14.4 % (ref 11.6–15.1)
GFR SERPL CREATININE-BSD FRML MDRD: 45 ML/MIN/1.73SQ M
GLUCOSE SERPL-MCNC: 111 MG/DL (ref 65–140)
HCT VFR BLD AUTO: 36.5 % (ref 36.5–49.3)
HGB BLD-MCNC: 12.9 G/DL (ref 12–17)
IMM GRANULOCYTES # BLD AUTO: 0.05 THOUSAND/UL (ref 0–0.2)
IMM GRANULOCYTES NFR BLD AUTO: 1 % (ref 0–2)
LYMPHOCYTES # BLD AUTO: 0.7 THOUSANDS/ÂΜL (ref 0.6–4.47)
LYMPHOCYTES NFR BLD AUTO: 8 % (ref 14–44)
MAGNESIUM SERPL-MCNC: 1.9 MG/DL (ref 1.9–2.7)
MCH RBC QN AUTO: 39.2 PG (ref 26.8–34.3)
MCHC RBC AUTO-ENTMCNC: 35.3 G/DL (ref 31.4–37.4)
MCV RBC AUTO: 111 FL (ref 82–98)
MONOCYTES # BLD AUTO: 0.62 THOUSAND/ÂΜL (ref 0.17–1.22)
MONOCYTES NFR BLD AUTO: 8 % (ref 4–12)
NEUTROPHILS # BLD AUTO: 6.9 THOUSANDS/ÂΜL (ref 1.85–7.62)
NEUTS SEG NFR BLD AUTO: 83 % (ref 43–75)
NRBC BLD AUTO-RTO: 0 /100 WBCS
P AXIS: 67 DEGREES
P AXIS: 70 DEGREES
P AXIS: 70 DEGREES
PLATELET # BLD AUTO: 94 THOUSANDS/UL (ref 149–390)
PMV BLD AUTO: 9.6 FL (ref 8.9–12.7)
POTASSIUM SERPL-SCNC: 3.5 MMOL/L (ref 3.5–5.3)
PR INTERVAL: 176 MS
PR INTERVAL: 184 MS
PR INTERVAL: 184 MS
QRS AXIS: 166 DEGREES
QRS AXIS: 170 DEGREES
QRS AXIS: 198 DEGREES
QRSD INTERVAL: 148 MS
QRSD INTERVAL: 154 MS
QRSD INTERVAL: 166 MS
QT INTERVAL: 378 MS
QT INTERVAL: 398 MS
QT INTERVAL: 418 MS
QTC INTERVAL: 444 MS
QTC INTERVAL: 464 MS
QTC INTERVAL: 470 MS
RBC # BLD AUTO: 3.29 MILLION/UL (ref 3.88–5.62)
SODIUM SERPL-SCNC: 138 MMOL/L (ref 135–147)
T WAVE AXIS: 77 DEGREES
T WAVE AXIS: 77 DEGREES
T WAVE AXIS: 80 DEGREES
VENTRICULAR RATE: 76 BPM
VENTRICULAR RATE: 82 BPM
VENTRICULAR RATE: 83 BPM
WBC # BLD AUTO: 8.32 THOUSAND/UL (ref 4.31–10.16)

## 2024-12-27 PROCEDURE — 83735 ASSAY OF MAGNESIUM: CPT

## 2024-12-27 PROCEDURE — 93010 ELECTROCARDIOGRAM REPORT: CPT | Performed by: INTERNAL MEDICINE

## 2024-12-27 PROCEDURE — 99232 SBSQ HOSP IP/OBS MODERATE 35: CPT | Performed by: HOSPITALIST

## 2024-12-27 PROCEDURE — 80048 BASIC METABOLIC PNL TOTAL CA: CPT

## 2024-12-27 PROCEDURE — 85025 COMPLETE CBC W/AUTO DIFF WBC: CPT

## 2024-12-27 RX ADMIN — GUAIFENESIN 600 MG: 600 TABLET ORAL at 22:02

## 2024-12-27 RX ADMIN — METOPROLOL TARTRATE 50 MG: 50 TABLET, FILM COATED ORAL at 22:02

## 2024-12-27 RX ADMIN — METOPROLOL TARTRATE 50 MG: 50 TABLET, FILM COATED ORAL at 09:20

## 2024-12-27 RX ADMIN — HEPARIN SODIUM 5000 UNITS: 5000 INJECTION INTRAVENOUS; SUBCUTANEOUS at 22:01

## 2024-12-27 RX ADMIN — AMLODIPINE BESYLATE 10 MG: 10 TABLET ORAL at 22:02

## 2024-12-27 RX ADMIN — TAMSULOSIN HYDROCHLORIDE 0.4 MG: 0.4 CAPSULE ORAL at 17:48

## 2024-12-27 RX ADMIN — HYDROXYUREA 500 MG: 500 CAPSULE ORAL at 09:20

## 2024-12-27 RX ADMIN — FINASTERIDE 5 MG: 5 TABLET, FILM COATED ORAL at 09:20

## 2024-12-27 RX ADMIN — PIPERACILLIN AND TAZOBACTAM 4.5 G: 36; 4.5 INJECTION, POWDER, LYOPHILIZED, FOR SOLUTION INTRAVENOUS at 01:57

## 2024-12-27 RX ADMIN — HEPARIN SODIUM 5000 UNITS: 5000 INJECTION INTRAVENOUS; SUBCUTANEOUS at 06:33

## 2024-12-27 RX ADMIN — ERTAPENEM SODIUM 1000 MG: 1 INJECTION INTRAMUSCULAR; INTRAVENOUS at 18:40

## 2024-12-27 RX ADMIN — ASPIRIN 81 MG: 81 TABLET, COATED ORAL at 09:20

## 2024-12-27 RX ADMIN — HEPARIN SODIUM 5000 UNITS: 5000 INJECTION INTRAVENOUS; SUBCUTANEOUS at 15:15

## 2024-12-27 RX ADMIN — GUAIFENESIN 600 MG: 600 TABLET ORAL at 09:20

## 2024-12-27 RX ADMIN — PANTOPRAZOLE SODIUM 40 MG: 40 TABLET, DELAYED RELEASE ORAL at 06:33

## 2024-12-27 RX ADMIN — ATORVASTATIN CALCIUM 80 MG: 80 TABLET, FILM COATED ORAL at 22:02

## 2024-12-27 RX ADMIN — PIPERACILLIN AND TAZOBACTAM 4.5 G: 36; 4.5 INJECTION, POWDER, LYOPHILIZED, FOR SOLUTION INTRAVENOUS at 09:20

## 2024-12-27 NOTE — CASE MANAGEMENT
Case Management Assessment & Discharge Planning Note    Patient name Santana Jacinto  Location [facility] /E5 -* MRN [id number]  : 1945 Date 2024       Current Admission Date: 2024  Current Admission Diagnosis:Urinary tract infection   Patient Active Problem List    Diagnosis Date Noted Date Diagnosed    Urinary tract infection 2024     Bronchitis 2024     CKD (chronic kidney disease) 2024     Hypertensive kidney disease with stage 3a chronic kidney disease (HCC) 2024     Persistent proteinuria 2024     Hypomagnesemia 10/02/2024     Left epididymitis 2023     Chronic obstructive pulmonary disease (HCC) 10/17/2022     History of cardiac pacemaker 2022     JAK2 gene mutation 2021     Encounter for antineoplastic chemotherapy 2021     Cigarette nicotine dependence without complication 2019     Benign prostatic hyperplasia without lower urinary tract symptoms 2019     Polycythemia vera (HCC) 10/20/2017     Prediabetes 2017     Mixed hyperlipidemia 2013     Benign essential hypertension 2012     Coronary artery disease involving native coronary artery of native heart without angina pectoris 2012       LOS (days): 0  Geometric Mean LOS (GMLOS) (days):   Days to GMLOS:     OBJECTIVE:              Current admission status: Observation       Preferred Pharmacy:   [facility]/pharm[facility] #[id number] - [place] - [address]  [address]  [place] [address]  Phone: [phone] Fax: [phone]    Primary Care Provider: Mina Denny DO    Primary Insurance: [employer] [facility] [employer]  Secondary Insurance:     ASSESSMENT:  Active Health Care Proxies       Julianne Jacinto [facility] Care Representative - Spouse   Primary Phone: [phone] (Mobile)  Home Phone: [phone]                 Advance Directives  Does patient have a Health Care POA?: No  Does patient have Advance Directives?: Yes  Advance Directives: Living  will  Primary Contact: Wife kushal         Readmission Root Cause  30 Day Readmission: No    Patient Information  Admitted from:: Home  Mental Status: Alert  During Assessment patient was accompanied by: Not accompanied during assessment  Assessment information provided by:: Patient  Primary Caregiver: Self  Support Systems: Spouse/significant other, Self, Daughter  County of Residence: [place]  What city do you live in?: [place]  Home entry access options. Select all that apply.: No steps to enter home  Type of Current Residence: [facility]  Living Arrangements: Lives w/ Spouse/significant other  Is patient a ?: No    Activities of Daily Living Prior to Admission  Completes ADLs independently?: Yes  Ambulates independently?: Yes  Does patient use assisted devices?: No  Does patient currently own DME?: No  Does patient have a history of Outpatient Therapy (PT/OT)?: No  Does the patient have a history of Short-Term Rehab?: Yes  Does patient have a history of HHC?: No  Does patient currently have HHC?: No         Patient Information Continued  Does patient have prescription coverage?: Yes  Does patient receive dialysis treatments?: No  Does patient have a history of substance abuse?: No  Does patient have a history of Mental Health Diagnosis?: No         Means of Transportation  Means of Transport to Appts:: Drives Self          DISCHARGE DETAILS:    Discharge planning discussed with:: Pt        CM contacted family/caregiver?: No- see comments (Aao)  Were Treatment Team discharge recommendations reviewed with patient/caregiver?: Yes  Did patient/caregiver verbalize understanding of patient care needs?: Yes            Requested Home Health Care         Is the patient interested in HHC at discharge?: No    DME Referral Provided  Referral made for DME?: No    Other Referral/Resources/Interventions Provided:  Interventions: None Indicated         Treatment Team Recommendation: Home  Discharge Destination Plan:: Home                                          Additional Comments: Assessment completed with pt at bedside. No needs identified at this time. CM continues following through dc.

## 2024-12-27 NOTE — ASSESSMENT & PLAN NOTE
Pt reports smoking 0.5 packs a day  Continue to encourage cessation  Nicotine patch supplementation

## 2024-12-27 NOTE — PROGRESS NOTES
"Progress Note - Hospitalist   Name: Santana Jacinto 79 y.o. male I MRN: [id number]  Unit/Bed#: E5 -01 I Date of Admission: 12/26/2024   Date of Service: 12/27/2024 I Hospital Day: 0    Assessment & Plan  Urinary tract infection  Blood cultures growing gram-negative rods.  Continue empiric broad spectrum antibiotics    He has a history of resistant UTIs  Bronchitis  CT chest demonstrating \"Right lower lobe bronchial wall thickening with air trapping compatible with bronchitis/bronchiolitis.\"  Pt does endorse increased productive cough, but reports he thought this was related to his smoking. He denies any congestion, sore throat, SOB, or wheezing. Maintaining oxygen on RA.  Viral panel negative  Continue supportive care, home inhaler therapy  Chronic obstructive pulmonary disease (HCC)  No acute exacerbation  Continue home inhaler regimen  CKD (chronic kidney disease)  Lab Results   Component Value Date    EGFR 45 12/27/2024    EGFR 35 12/26/2024    EGFR 50 10/04/2024    CREATININE 1.47 (H) 12/27/2024    CREATININE 1.77 (H) 12/26/2024    CREATININE 1.33 (H) 10/04/2024   POA creatinine elevated from baseline at 1.77, but not yet fulfilling SHANTA  Baseline creatinine 1.2-1.4  Creatinine is improving with IV fluids.  Benign essential hypertension  Continue amlodipine and metoprolol  Mixed hyperlipidemia  Continue statin  Polycythemia vera (HCC)  History of JAK2 mutation positive polycythemia vera  Followed outpatient by [facility] hematology  Continue hydroxyurea   Cigarette nicotine dependence without complication  Pt reports smoking 0.5 packs a day  Continue to encourage cessation  Nicotine patch supplementation     VTE Pharmacologic Prophylaxis:                 Current Length of Stay: 0 day(s)  Current Patient Status: Inpatient   Certification Statement:     Discharge Plan:         Subjective   Does feel better.  Yesterday had shaking chills.  That is resolved.  No dysuria.  No back pain.    Objective   Vitals:   Temp " (24hrs), Av.6 °F (37 °C), Min:98.1 °F (36.7 °C), Max:99.7 °F (37.6 °C)    Temp:  [98.1 °F (36.7 °C)-99.7 °F (37.6 °C)] 98.1 °F (36.7 °C)  HR:  [67-82] 67  Resp:  [16-22] 19  BP: (116-144)/(58-95) 144/78  SpO2:  [93 %-98 %] 97 %  Body mass index is 24.91 kg/m².         Physical Exam  Vitals and nursing note reviewed.   Constitutional:       General: He is not in acute distress.     Appearance: He is well-developed.   HENT:      Head: Normocephalic and atraumatic.   Eyes:      Conjunctiva/sclera: Conjunctivae normal.   Cardiovascular:      Rate and Rhythm: Normal rate and regular rhythm.      Heart sounds: No murmur heard.  Pulmonary:      Effort: Pulmonary effort is normal. No respiratory distress.      Breath sounds: Normal breath sounds.   Abdominal:      Palpations: Abdomen is soft.      Tenderness: There is no abdominal tenderness.   Musculoskeletal:         General: No swelling.      Cervical back: Neck supple.      Right lower leg: No edema.      Left lower leg: No edema.   Skin:     General: Skin is warm and dry.      Capillary Refill: Capillary refill takes less than 2 seconds.   Neurological:      Mental Status: He is alert.   Psychiatric:         Mood and Affect: Mood normal.           Lab results  Results from last 7 days   Lab Units 24  0509 24  1509   WBC Thousand/uL 8.32 9.21   HEMOGLOBIN g/dL 12.9 14.7   PLATELETS Thousands/uL 94* 109*   MCV fL 111* 109*     Results from last 7 days   Lab Units 24  0509 24  1509   SODIUM mmol/L 138 140   POTASSIUM mmol/L 3.5 3.6   CHLORIDE mmol/L 107 105   CO2 mmol/L 24 28   ANION GAP mmol/L 7 7   BUN mg/dL 18 21   CREATININE mg/dL 1.47* 1.77*   CALCIUM mg/dL 8.1* 8.6   ALBUMIN g/dL  --  3.7   TOTAL BILIRUBIN mg/dL  --  1.71*   ALK PHOS U/L  --  116*   ALT U/L  --  14   AST U/L  --  18   EGFR ml/min/1.73sq m 45 35   GLUCOSE RANDOM mg/dL 111 106     Results from last 7 days   Lab Units 24  0509   MAGNESIUM mg/dL 1.9             Last 24  Hours Medication List:     Current Facility-Administered Medications:     acetaminophen (TYLENOL) tablet 650 mg, Q6H PRN    albuterol (PROVENTIL HFA,VENTOLIN HFA) inhaler 2 puff, Q4H PRN    amLODIPine (NORVASC) tablet 10 mg, QPM    aspirin (ECOTRIN LOW STRENGTH) EC tablet 81 mg, Daily    atorvastatin (LIPITOR) tablet 80 mg, QPM    benzonatate (TESSALON PERLES) capsule 100 mg, TID PRN    dextromethorphan-guaiFENesin (ROBITUSSIN DM) oral syrup 10 mL, Q4H PRN    finasteride (PROSCAR) tablet 5 mg, Daily    guaiFENesin (MUCINEX) 12 hr tablet 600 mg, Q12H TALIB    heparin (porcine) subcutaneous injection 5,000 Units, Q8H TALIB    hydroxyurea (HYDREA) capsule 500 mg, Daily    ipratropium-albuterol (DUO-NEB) 0.5-2.5 mg/3 mL inhalation solution 3 mL, Q6H PRN    metoprolol tartrate (LOPRESSOR) tablet 50 mg, BID    nicotine (NICODERM CQ) 14 mg/24hr TD 24 hr patch 1 patch, Daily    pantoprazole (PROTONIX) EC tablet 40 mg, Daily Before Breakfast    piperacillin-tazobactam (ZOSYN) IVPB (EXTENDED INFUSION) 4.5 g, Q8H    tamsulosin (FLOMAX) capsule 0.4 mg, Daily With Dinner

## 2024-12-27 NOTE — ASSESSMENT & PLAN NOTE
Blood cultures growing gram-negative rods.  Continue empiric broad spectrum antibiotics    He has a history of resistant UTIs

## 2024-12-27 NOTE — H&P
"H&P - Hospitalist   Name: Santana Jacinto 79 y.o. male I MRN: [id number]  Unit/Bed#: E5 -01 I Date of Admission: 12/26/2024   Date of Service: 12/26/2024 I Hospital Day: 0     Assessment & Plan  Urinary tract infection  Pt presented to the ED with complaints of fever, chills, rigors, and general malaise x 2 days  WBC 9.21, not fulfilling SIRS criteria  UA with evidence of infection  Urine culture pending   Blood cultures pending  IV fluids, supportive care  Pt initially given ceftriaxone in the ED, then transitioned to zosyn following review of previous cultures  Previous urine cultures growing ESBL resistant to cephalosporins, but susceptible to zosyn  Will continue with Zosyn for now   Bronchitis  CT chest demonstrating \"Right lower lobe bronchial wall thickening with air trapping compatible with bronchitis/bronchiolitis.\"  Pt does endorse increased productive cough, but reports he thought this was related to his smoking. He denies any congestion, sore throat, SOB, or wheezing. Maintaining oxygen on RA.  Viral panel negative  Continue supportive care, home inhaler therapy  Chronic obstructive pulmonary disease (HCC)  No acute exacerbation  Continue home inhaler regimen  CKD (chronic kidney disease)  Lab Results   Component Value Date    EGFR 35 12/26/2024    EGFR 50 10/04/2024    EGFR 47 09/19/2024    CREATININE 1.77 (H) 12/26/2024    CREATININE 1.33 (H) 10/04/2024    CREATININE 1.40 (H) 09/19/2024   POA creatinine elevated from baseline at 1.77, but not yet fulfilling SHANTA  Baseline creatinine 1.2-1.4  Continue IV fluids  Continue to monitor BMP  Benign essential hypertension  Continue amlodipine and metoprolol  Mixed hyperlipidemia  Continue statin  Polycythemia vera (HCC)  History of JAK2 mutation positive polycythemia vera  Followed outpatient by [facility] hematology  Continue hydroxyurea   Cigarette nicotine dependence without complication  Pt reports smoking 0.5 packs a day  Continue to encourage " "cessation  Nicotine patch supplementation       VTE Pharmacologic Prophylaxis: VTE Score: 5 High Risk (Score >/= 5) - Pharmacological DVT Prophylaxis Ordered: heparin. Sequential Compression Devices Ordered.  Code Status: Level 1 - Full Code   Discussion with family: Patient declined call to .     Anticipated Length of Stay: Patient will be admitted on an observation basis with an anticipated length of stay of less than 2 midnights secondary to UTI.    History of Present Illness   Chief Complaint: \"I couldn't stop shaking earlier\"    Santana Jacinto is a 79 y.o. male who presents with UTI, bronchitis.  Patient reports yesterday he began feeling unwell with general malaise, fatigue, fever, and chills.  He reports that today he began to feel even worse and had episode of rigors where he was unable to stop shaking/shivering prompting him to come to the ED for evaluation.  Patient denies any urinary symptoms such as dysuria, hematuria, urgency, frequency, flank pain, or suprapubic pain.  Patient does endorse productive cough, however he states that it is about baseline for him and he has a chronic productive cough secondary to his smoking.  He reports that it is possibly mildly increased compared to normal.  He denies any congestion, shortness of breath, or wheezing.    Review of Systems   Constitutional:  Positive for chills, fatigue and fever. Negative for appetite change and diaphoresis.   HENT:  Negative for congestion, rhinorrhea and sore throat.    Eyes:  Negative for photophobia and visual disturbance.   Respiratory:  Positive for cough (productive). Negative for shortness of breath and wheezing.    Cardiovascular:  Negative for chest pain, palpitations and leg swelling.   Gastrointestinal:  Negative for abdominal distention, abdominal pain, blood in stool, constipation, diarrhea, nausea and vomiting.   Genitourinary:  Negative for dysuria and hematuria.   Musculoskeletal:  Negative for arthralgias, " "back pain, myalgias and neck stiffness.   Skin:  Negative for color change and rash.   Neurological:  Negative for dizziness, seizures, syncope, weakness, light-headedness and headaches.   Psychiatric/Behavioral:  Negative for agitation, behavioral problems and confusion. The patient is not nervous/anxious.    All other systems reviewed and are negative.      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\"    Full dentures     but doesnt wear them    Gastroesophageal reflux disease without esophagitis 04/03/2024    Heart block 08/08/2022    Hepatic cyst 05/02/2023    History of 2019 novel coronavirus disease (COVID-19) 02/2021    hospital for 2 days but not in ICU/\"mainly dehydrated\" \"also fever/oxygen below 90\"    History of coronary artery stent placement     x2 in 2008 or so; sees [facility]-- Dr Hutton cardio    [place] (hard of hearing)     no hearing aids yet    Hyperlipidemia     Last Assessed:  11/24/17    Hypertension     Benign essential, Last Assessed:  11/24/17    LBBB (left bundle branch block)     Left bundle branch block 04/03/2014    Mixed hyperlipidemia 11/04/2013    Nocturia     Polycythemia vera (HCC)     (per history in chart)    PVD (peripheral vascular disease) (HCC)     RBC abnormality     pt reports told has high Red blood cells/goes to infusion center regularly next appt 10/8/21 hematologist is Dr Charlton of [facility](had been Dr Ahn)    Seasickness     Shortness of breath     \"if goes up steps gets SOB, had for " "awhile\"    Slow urinary stream     \"sometimes feels like bladder isnt all the way empty\"    Snores     Wears glasses     reading     Past Surgical History:   Procedure Laterality Date    ANGIOPLASTY      APPENDECTOMY      CARDIAC CATHETERIZATION N/A 07/28/2022    Procedure: CARDIAC TEMPORARY PACEMAKER;  Surgeon: Fransico Morgan MD;  [facility];  Service: Cardiology    CARDIAC ELECTROPHYSIOLOGY PROCEDURE N/A 07/29/2022    Procedure: Cardiac pacer implant;  Surgeon: Anand Valero MD;  Location: [facility];  Service: Cardiology    CATARACT EXTRACTION Bilateral     CT CYSTOGRAM  11/04/2021    ELBOW SURGERY Right     FL CYSTOGRAM  10/27/2021    IR BIOPSY BONE MARROW  10/14/2022    KNEE ARTHROSCOPY Right     NV COLONOSCOPY FLX DX W/COLLJ SPEC WHEN PFRMD N/A 05/22/2017    Procedure: COLONOSCOPY;  Surgeon: Josseline Zhou DO;  Location: [facility];  Service: Gastroenterology    NV NEUROPLASTY &/TRANSPOS MEDIAN NRV CARPAL TUNNE Right 5/26/2023    Procedure: CTR, RIGHT;  Surgeon: Cosme Varela MD;  Location[facility];  Service: Orthopedics    NV TENDON SHEATH INCISION Right 5/26/2023    Procedure: RELEASE TRIGGER FINGER, RIGHT RING FINGER;  Surgeon: Cosme Varela MD;  [facility];  Service: Orthopedics    PROSTATE BIOPSY      PROSTATECTOMY N/A 10/18/2021    Procedure: ROBOTIC SUBTOTAL/SUPRAPUBIC PROSTATECTOMY;  Surgeon: Kieran Vega MD;  [facility];  Service: Urology    SHOULDER ARTHROSCOPY Right     TONSILLECTOMY       Social History     Tobacco Use    Smoking status: Every Day     Current packs/day: 0.50     Average packs/day: 0.5 packs/day for 53.0 years (26.5 ttl pk-yrs)     Types: Cigarettes     Start date: 1972     Passive exposure: Current    Smokeless tobacco: Never    Tobacco comments:     a little less than 1/2ppd, trying to cut back before surgery last smoked 5.25.23   Vaping Use    Vaping status: Never Used   Substance and Sexual Activity    Alcohol use: Not " Currently    Drug use: Never    Sexual activity: Not Currently     Comment: defer     E-Cigarette/Vaping    E-Cigarette Use Never User      E-Cigarette/Vaping Substances    Nicotine No     THC No     CBD No     Flavoring No     Other No     Unknown No      Family History   Problem Relation Age of Onset    No Known Problems Mother     Heart disease Father     Colon cancer Father     Colon cancer Sister      Social History:  Marital Status: /Civil Union   Patient Pre-hospital Living Situation: Home  Patient Pre-hospital Level of Mobility: walks  Patient Pre-hospital Diet Restrictions: none    Meds/Allergies   I have reviewed home medications using recent Epic encounter.  Prior to Admission medications    Medication Sig Start Date End Date Taking? Authorizing Provider   acetaminophen (TYLENOL) 500 mg tablet Take 500 mg by mouth every 6 (six) hours as needed for mild pain    Historical Provider, MD   amLODIPine (NORVASC) 10 mg tablet Take 10 mg by mouth every evening     Historical Provider, MD   aspirin (ECOTRIN LOW STRENGTH) 81 mg EC tablet Take 81 mg by mouth daily    Historical Provider, MD   atorvastatin (LIPITOR) 80 mg tablet TAKE 1 TABLET BY MOUTH EVERY DAY IN THE EVENING 9/10/24   Mina Denny DO   finasteride (PROSCAR) 5 mg tablet Take 1 tablet (5 mg total) by mouth daily 9/21/24   Gilma Coughlin PA-C   hydroxyurea (HYDREA) 500 mg capsule TAKE 1 CAPSULE (500 MG TOTAL) BY MOUTH DAILY 9/11/24   Mira Rodríguez PA-C   metoprolol tartrate (LOPRESSOR) 50 mg tablet Take 50 mg by mouth 2 (two) times a day     Historical Provider, MD   Multiple Vitamins-Minerals (VITAMIN D3 COMPLETE PO) Take 1 capsule by mouth daily    Historical Provider, MD   nitroglycerin (NITROSTAT) 0.4 mg SL tablet Place 1 tablet (0.4 mg total) under the tongue every 5 (five) minutes as needed for chest pain 8/8/22   Lilia Pearce DO   Omega-3 Fatty Acids (FISH OIL) 1200 MG CAPS Take 1,200 mg by mouth 2 (two) times a day      " Historical Provider, MD   omeprazole (PriLOSEC) 20 mg delayed release capsule TAKE 1 CAPSULE BY MOUTH EVERY DAY 12/16/24   Diana M Jaiyeola, MD   tamsulosin (FLOMAX) 0.4 mg Take 1 capsule (0.4 mg total) by mouth daily with dinner 9/20/24   Gilma Coughlin PA-C     Allergies   Allergen Reactions    Other Other (See Comments)     Pt and wife reports took a medication years ago at work cant recall the name or what it was for\"     \"couldn't talk and cheeks swelled and also right hand\"       Objective :  Temp:  [98.1 °F (36.7 °C)-100.2 °F (37.9 °C)] 98.1 °F (36.7 °C)  HR:  [68-85] 70  BP: (116-161)/(58-95) 131/74  Resp:  [16-22] 16  SpO2:  [92 %-96 %] 95 %  O2 Device: None (Room air)    Physical Exam  Vitals and nursing note reviewed.   Constitutional:       General: He is not in acute distress.     Appearance: He is well-developed.   HENT:      Head: Normocephalic and atraumatic.      Nose: Nose normal. No congestion.      Mouth/Throat:      Mouth: Mucous membranes are dry.      Pharynx: Oropharynx is clear.   Eyes:      Conjunctiva/sclera: Conjunctivae normal.   Cardiovascular:      Rate and Rhythm: Normal rate and regular rhythm.      Heart sounds: Normal heart sounds. No murmur heard.     No friction rub. No gallop.   Pulmonary:      Effort: Pulmonary effort is normal. No respiratory distress.      Breath sounds: Wheezing (mild expiratory wheezing right upper lung field) and rhonchi present. No rales.   Abdominal:      General: Bowel sounds are normal. There is no distension.      Palpations: Abdomen is soft.      Tenderness: There is no abdominal tenderness.   Musculoskeletal:      Cervical back: Neck supple.      Right lower leg: No edema.      Left lower leg: No edema.   Skin:     General: Skin is warm and dry.      Capillary Refill: Capillary refill takes less than 2 seconds.   Neurological:      General: No focal deficit present.      Mental Status: He is alert and oriented to person, place, and time. Mental " status is at baseline.   Psychiatric:         Mood and Affect: Mood normal.         Behavior: Behavior normal.          Lines/Drains:            Lab Results: I have reviewed the following results:  Results from last 7 days   Lab Units 12/26/24  1509   WBC Thousand/uL 9.21   HEMOGLOBIN g/dL 14.7   HEMATOCRIT % 41.9   PLATELETS Thousands/uL 109*   SEGS PCT % 88*   LYMPHO PCT % 8*   MONO PCT % 3*   EOS PCT % 1     Results from last 7 days   Lab Units 12/26/24  1509   SODIUM mmol/L 140   POTASSIUM mmol/L 3.6   CHLORIDE mmol/L 105   CO2 mmol/L 28   BUN mg/dL 21   CREATININE mg/dL 1.77*   ANION GAP mmol/L 7   CALCIUM mg/dL 8.6   ALBUMIN g/dL 3.7   TOTAL BILIRUBIN mg/dL 1.71*   ALK PHOS U/L 116*   ALT U/L 14   AST U/L 18   GLUCOSE RANDOM mg/dL 106             Lab Results   Component Value Date    HGBA1C 4.7 06/24/2024    HGBA1C 5.1 01/10/2024    HGBA1C 4.8 02/08/2023     Results from last 7 days   Lab Units 12/26/24  1509   LACTIC ACID mmol/L 1.6   PROCALCITONIN ng/ml 0.11       Imaging Results Review: I reviewed radiology reports from this admission including: CT chest and CT abdomen/pelvis.  Other Study Results Review: EKG was reviewed.     Administrative Statements   I have spent a total time of 65 minutes in caring for this patient on the day of the visit/encounter including Diagnostic results, Impressions, Counseling / Coordination of care, Documenting in the medical record, Reviewing / ordering tests, medicine, procedures  , Obtaining or reviewing history  , and Communicating with other healthcare professionals .    ** Please Note: This note has been constructed using a voice recognition system. **

## 2024-12-27 NOTE — ASSESSMENT & PLAN NOTE
Lab Results   Component Value Date    EGFR 35 12/26/2024    EGFR 50 10/04/2024    EGFR 47 09/19/2024    CREATININE 1.77 (H) 12/26/2024    CREATININE 1.33 (H) 10/04/2024    CREATININE 1.40 (H) 09/19/2024   POA creatinine elevated from baseline at 1.77, but not yet fulfilling SHANTA  Baseline creatinine 1.2-1.4  Continue IV fluids  Continue to monitor BMP

## 2024-12-27 NOTE — ASSESSMENT & PLAN NOTE
Pt presented to the ED with complaints of fever, chills, rigors, and general malaise x 2 days  WBC 9.21, not fulfilling SIRS criteria  UA with evidence of infection  Urine culture pending   Blood cultures pending  IV fluids, supportive care  Pt initially given ceftriaxone in the ED, then transitioned to zosyn following review of previous cultures  Previous urine cultures growing ESBL resistant to cephalosporins, but susceptible to zosyn  Will continue with Zosyn for now

## 2024-12-27 NOTE — ASSESSMENT & PLAN NOTE
History of JAK2 mutation positive polycythemia vera  Followed outpatient by [facility] hematology  Continue hydroxyurea

## 2024-12-27 NOTE — ASSESSMENT & PLAN NOTE
Lab Results   Component Value Date    EGFR 45 12/27/2024    EGFR 35 12/26/2024    EGFR 50 10/04/2024    CREATININE 1.47 (H) 12/27/2024    CREATININE 1.77 (H) 12/26/2024    CREATININE 1.33 (H) 10/04/2024   POA creatinine elevated from baseline at 1.77, but not yet fulfilling SHANTA  Baseline creatinine 1.2-1.4  Creatinine is improving with IV fluids.

## 2024-12-27 NOTE — ASSESSMENT & PLAN NOTE
History of JAK2 mutation positive polycythemia vera  Followed outpatient by [facility]  Continue hydroxyurea    INPATIENT PULMONARY CRITICAL CARE CONSULT NOTE      Chief Complaint/Referring Provider:  Patient is being seen at the request of Dr. Jeana Raman for a consultation for s/p Intubation      Presenting HPI: Patient was brought to the [facility] [address] with increasing shortness of breath     As per admitting physician -77 y.o. male who presented to [facility] as a transfer from [facility] ( Pt family called 911 after  Pt had  difficulty breathing , getting progressively worse during the day, He just being discharged from our  hospital after having a pacemaker placed, he was  overnight in the hospital the night prior to admission,      The patient was not complaining of any pain, however, the patient started to become more and more short of breath and finally 911 was called.        IN ER ABG showed severe HyperCapnia, and pt was intubated due to being obtunded and nonresponsive.    Per family request, he was transferred to our facility.      Patient when seen congested be on mechanical vent support, patient was on 50% oxygen when seen, patient has increased history secretions in the endotracheal tube, patient has a T-max of 99.4°F, patient was on IV sedation to maintain patient ventilator synchrony, patient had normal sinus rhythm on the monitor, patient has improving urine output, patient's glycemic control was acceptable when seen, no other pertinent review of system couldn't be obtained         Patient Active Problem List    Diagnosis Date Noted    Coronary artery disease involving native coronary artery of native heart without angina pectoris 07/29/2015     Priority: Medium    RLS (restless legs syndrome) 07/23/2015     Priority: Medium    Insomnia 07/23/2015     Priority: Medium    Diabetic neuropathy ([facility] [address] 05/14/2015     Priority: Medium    DM II (diabetes mellitus, type II), controlled ([address] 04/26/2015     Priority: Medium    HLD (hyperlipidemia) 04/26/2015     Priority: Medium    Seasonal allergic rhinitis 09/25/2015     Priority: Low    Respiratory arrest ([facility] [address] 06/02/2019    Acute respiratory distress 06/02/2019    Aspiration into airway 06/02/2019    S/P cardiac pacemaker procedure     Pacemaker 05/31/2019    Sinus node dysfunction (HCC) 05/29/2019    Elevated PSA 04/29/2019    Community acquired pneumonia     Tracheobronchitis     Chronic diastolic congestive heart failure (HCC) 11/20/2018    Symptomatic anemia     PAF (paroxysmal atrial fibrillation) ([address] 11/01/2018    Obesity 11/01/2018    Anemia 11/01/2018    BPH (benign prostatic hyperplasia) 09/21/2018    Nocturia 11/28/2017    Short-term memory loss 07/12/2017    Gastroesophageal reflux disease without esophagitis 06/02/2017    Typical atrial flutter ([address]     Former smoker 04/18/2017    Fall at home 04/17/2017    T12 vertebral burst fracture 04/17/2017    Depression 02/16/2017    Restless legs syndrome (RLS) 02/15/2017    Mixed hyperlipidemia 02/15/2017    Chronic bilateral low back pain without sciatica 10/06/2016    COPD, severe ([address] 08/17/2016    Essential hypertension 08/10/2016    Controlled type 2 diabetes mellitus without complication, without long-term current use of insulin ([address] 06/09/2016    HOLLAND (obstructive sleep apnea) 06/09/2016    Colitis 05/08/2015       Past Medical History:   Diagnosis Date    Cancer ([address]     skin    CH[facility]congestive heart failure) ([facility]     Chronic pancreatitis ([address]     Colitis 5/08/2015    COPD (chronic obstructive pulmonary disease) ([address]     Diabetes mellitus (HCC)     Esophagitis     Gastritis     Hyperlipidemia     Hypertension         Past Surgical History:   Procedure Laterality Date    CARDIAC PACEMAKER PLACEMENT  05/31/2019    Dr Neva Cushing, [employer] Model: Livia    CATARACT REMOVAL WITH IMPLANT Right 05/2016    COLONOSCOPY  5/08/2015    colitis-mild gastritis    COLONOSCOPY N/A 10/25/2018    COLONOSCOPY POLYPECTOMY SNARE/COLD BIOPSY performed by Praveen Vasques Chelsey Antoine MD at [address] [place] [address]  2018    CYSTOSCOPY, DIRECT VISION INTERAL URETHROTOMY     HERNIA REPAIR Right 2012    inguinal    PACEMAKER INSERTION  2019    PACEMAKER PLACEMENT Left 2019    GA OFFICE/OUTPT VISIT,PROCEDURE ONLY N/A 2018    CYSTOSCOPY, DIRECT VISION INTERAL URETHROTOMY performed by Sara Delvalle MD at [facility] Left     Rotator cuff    SKIN CANCER EXCISION Left 2016    melanoma    TESTICLE REMOVAL Left     UPPER GASTROINTESTINAL ENDOSCOPY  2015    Gastritis    UPPER GASTROINTESTINAL ENDOSCOPY N/A 2016    gastritis-Bx pending    UPPER GASTROINTESTINAL ENDOSCOPY  2018    UPPER GASTROINTESTINAL ENDOSCOPY N/A 2018    EGD BIOPSY performed by Alysia Cabrera MD at [address]        Family History   Problem Relation Age of Onset    Diabetes Mother     Early Death Mother     Cancer Mother         stomach    Other Father     Diabetes Son         Social History     Tobacco Use    Smoking status: Former Smoker     Packs/day: 1.50     Years: 20.00     Pack years: 30.00     Types: Cigarettes     Last attempt to quit: 1/10/2000     Years since quittin.4    Smokeless tobacco: Never Used   Substance Use Topics    Alcohol use: No     Alcohol/week: 0.0 oz        No Known Allergies            Physical Exam:  Blood pressure 101/66, pulse 66, temperature 98.8 °F (37.1 °C), temperature source Oral, resp. rate 20, height 5' 10\" (1.778 m), weight 240 lb 8.4 oz (109.1 kg), SpO2 95 %.'   Constitutional:  No acute distress. on ventilatory support   HENT:  ETT(+) . No thyromegaly. Eyes:  Conjunctivae are normal. Pupils equal, round, and reactive to light. No scleral icterus. Neck: . No tracheal deviation present. No obvious thyroid mass. Cardiovascular: Normal rate, regular rhythm, normal heart sounds. No right ventricular heave. (+)  lower extremity edema.   Pulmonary/Chest: No wheezes. Bibasilar rales. Chest wall is not dull to percussion. No accessory muscle usage or stridor. Abdominal: Soft. Bowel sounds present. No distension or hernia. No tenderness. Musculoskeletal: No cyanosis. No clubbing. No obvious joint deformity. Lymphadenopathy: No cervical or supraclavicular adenopathy. Skin: Skin is warm and dry. No rash or nodules on the exposed extremities. Neurologic: Intubated and sedated         Results:  CBC:   Recent Labs     05/31/19  0810 06/01/19 2118 06/02/19 0623   WBC 5.4 12.7* 11.0   HGB 11.9* 13.1* 12.0*   HCT 36.0* 41.5 38.3*   MCV 89.8 93.5 93.0    220 178     BMP:   Recent Labs     06/01/19  0947 06/01/19 2118 06/02/19 0624   * 138 137   K 4.9 5.9* 5.7*    100 100   CO2 26 30 29   BUN 10 14 20   CREATININE 0.6* 0.9 1.1     LIVER PROFILE:   Recent Labs     06/01/19 2118   AST 75*   ALT 26   BILITOT 0.6   ALKPHOS 131*     PT/INR:   Recent Labs     05/31/19  0810 06/01/19 2118   PROTIME 13.2* 13.0   INR 1.16* 1.14     APTT:   Recent Labs     06/01/19 2118   APTT 33.7       Imaging:  I have reviewed radiology images personally. XR CHEST PORTABLE   Final Result   Interval improvement of interstitial opacities. Xr Chest Standard (2 Vw)    Result Date: 6/1/2019  EXAMINATION: TWO XRAY VIEWS OF THE CHEST 6/1/2019 8:50 am COMPARISON: 04/09/2019 HISTORY: ORDERING SYSTEM PROVIDED HISTORY: post device implant TECHNOLOGIST PROVIDED HISTORY: Reason for exam:->post device implant Ordering Physician Provided Reason for Exam: post device implant Acuity: Unknown Type of Exam: Unknown FINDINGS: Left-sided bipolar cardiac pacemaker has been placed with intact leads in expected positions. There is no pneumothorax or effusion. Heart size and vascularity are normal.  The lungs are clear. Pacemaker placement without complicating feature.      Ct Head Wo Contrast    Result Date: 6/2/2019  EXAMINATION: CT OF THE HEAD WITHOUT CONTRAST  6/1/2019 9:03 pm TECHNIQUE: CT of the head was performed without the administration of intravenous contrast. Dose modulation, iterative reconstruction, and/or weight based adjustment of the mA/kV was utilized to reduce the radiation dose to as low as reasonably achievable. COMPARISON: 01/19/2018, 04/17/2017 HISTORY: ORDERING SYSTEM PROVIDED HISTORY: HEAD TRAUMA, CLOSED, MILD, GCS >= 13, NO RISK FACTORS, NEURO EXAM NORMAL TECHNOLOGIST PROVIDED HISTORY: Has a \"code stroke\" or \"stroke alert\" been called? ->No Ordering Physician Provided Reason for Exam: Respiratory Arrest (pt arrives to room 01 via ems c/o resp arrest, ems given bvm upon arrival, ems states pt had pacemaker placed yesterday ) Acuity: Acute Type of Exam: Initial FINDINGS: BRAIN/VENTRICLES: There is no acute intracranial hemorrhage, mass effect or midline shift. No abnormal extra-axial fluid collection. The gray-white differentiation is maintained without evidence of an acute infarct. There is no evidence of hydrocephalus. There is stable age-appropriate atrophy. There are multiple stable dilated perivascular spaces adjacent to the bilateral basal ganglia, unchanged. There is stable chronic small vessel ischemic white matter disease. No focus of acute abnormal brain attenuation is identified. ORBITS: The visualized portion of the orbits demonstrate no acute abnormality. SINUSES: There are mucous retention cysts within the right maxillary sinus. There is mild mucosal thickening within the bilateral ethmoid sinus cellules. The remaining paranasal sinuses are clear. The mastoids are clear bilaterally. SOFT TISSUES/SKULL:  No acute abnormality of the visualized skull or soft tissues. 1. No acute intracranial abnormality. 2. Stable age-appropriate atrophy and chronic small vessel ischemic white matter disease.      Xr Chest Portable    Result Date: 6/2/2019  EXAMINATION: ONE XRAY VIEW OF THE CHEST 6/2/2019 5:48 am COMPARISON: 06/01/2019 HISTORY: ORDERING aspiration sequela given tracheobronchial secretions. No consolidative airspace disease. Gas surrounding the left chest pacemaker generator correlates with recent history of pacemaker implantation. Leads are intact and terminate in the right heart chambers. Enlarged main pulmonary artery may be seen with underlying pulmonary hypertension. Echocardiogram: Summary   Normal left ventricular systolic function with ejection fraction of 55-60%.   No regional wall motion abnormalites are seen.   Mild concentric left ventricular hypertrophy.   Grade I diastolic dysfunction with normal filling pressure.   Mild mitral regurgitation. PFT: Moderate OAD         Assessment:  Active Problems:    DM II (diabetes mellitus, type II), controlled ([facility])    HOLLAND (obstructive sleep apnea)    Essential hypertension    COPD, severe ([facility])    Former smoker    PAF (paroxysmal atrial fibrillation) ([facility])    Chronic diastolic congestive heart failure ([address]    Respiratory arrest ([address]    Acute respiratory distress    S/P cardiac pacemaker procedure    Aspiration into airway  Resolved Problems:    * No resolved hospital problems.  *          Plan:   · Ventilatory support to keep saturation between 90-94%  · Ventilatory waveforms and settings reviewed  · Ventilator changes made  · Pulmonary toilet  · Will benefit from bronchoscopy for diagnostic and therapeutic purposes-patient's family was told about the procedure along with the pros and cons and arranged today  · IV sedation to maintain patient ventilator synchrony  · Will give empiric Unasyn-to be reassessed as per clinical status and cultures  · Bronchodilators  · IV steroids to continue  · Cardiac medications with parameters  · Eliquis  was to continue  · PUD prophylaxis    Case discussed with family and ICU team    Critical care time spent on the patient was 35 minutes exclusive of any procedures            Electronically signed by:  Janneth Garcia MD    6/2/2019    8:03 PM.

## 2024-12-27 NOTE — ED PROVIDER NOTES
"  ED Disposition       None          Assessment & Plan   {Hyperlinks  Risk Stratification - NIHSS - HEART SCORE - Fill out sepsis note and make sure you call [id number] if severe or septic shock:[id number]}    Medical Decision Making  Amount and/or Complexity of Data Reviewed  Labs: ordered.  Radiology: ordered.    Risk  OTC drugs.  Prescription drug management.           Medications   acetaminophen (FOR EMS ONLY) (TYLENOL) oral suspension 650 mg (0 mg Does not apply Given to EMS 12/26/24 1434)   sodium chloride 0.9 % bolus 1,000 mL (0 mL Intravenous Stopped 12/26/24 1627)   ceftriaxone (ROCEPHIN) 1 g/50 mL in dextrose IVPB (0 mg Intravenous Stopped 12/26/24 1720)   sodium chloride 0.9 % bolus 1,000 mL (1,000 mL Intravenous New Bag 12/26/24 1721)   iohexol (OMNIPAQUE) 350 MG/ML injection (MULTI-DOSE) 100 mL (100 mL Intravenous Given 12/26/24 1822)   iohexol (OMNIPAQUE) 240 MG/ML solution 50 mL (50 mL Oral Given 12/26/24 1822)       ED Risk Strat Scores                                              History of Present Illness   {Hyperlinks  History (Med, Surg, Fam, Social) - Current Medications - Allergies  :[id number]}    Chief Complaint   Patient presents with   • Abdominal Pain     Abdominal pain started 30 min ago, Chills started 1 hr ago. EMS temp 102.7 F gave 650mg tylenol. Fluids 400ml. SOB when had chills, no chills after fluids. Pacemaker. Takes baby aspirin.       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\"   • Full dentures     but doesnt wear them   • " "Gastroesophageal reflux disease without esophagitis 04/03/2024   • Heart block 08/08/2022   • Hepatic cyst 05/02/2023   • History of 2019 novel coronavirus disease (COVID-19) 02/2021    hospital for 2 days but not in ICU/\"mainly dehydrated\" \"also fever/oxygen below 90\"   • History of coronary artery stent placement     x2 in 2008 or so; sees [facility]-- Dr Hutton cardio   • [place] (hard of hearing)     no hearing aids yet   • Hyperlipidemia     Last Assessed:  11/24/17   • Hypertension     Benign essential, Last Assessed:  11/24/17   • LBBB (left bundle branch block)    • Left bundle branch block 04/03/2014   • Mixed hyperlipidemia 11/04/2013   • Nocturia    • Polycythemia vera (HCC)     (per history in chart)   • PVD (peripheral vascular disease) (HCC)    • RBC abnormality     pt reports told has high Red blood cells/goes to infusion center regularly next appt 10/8/21 hematologist is Dr Charlton of [facility](had been Dr Ahn)   • Seasickness    • Shortness of breath     \"if goes up steps gets SOB, had for awhile\"   • Slow urinary stream     \"sometimes feels like bladder isnt all the way empty\"   • Snores    • Wears glasses     reading      Past Surgical History:   Procedure Laterality Date   • ANGIOPLASTY     • APPENDECTOMY     • CARDIAC CATHETERIZATION N/A 07/28/2022    Procedure: CARDIAC TEMPORARY PACEMAKER;  Surgeon: Fransico Morgan MD;  [facility];  Service: Cardiology   • CARDIAC ELECTROPHYSIOLOGY PROCEDURE N/A 07/29/2022    Procedure: Cardiac pacer implant;  Surgeon: Anand Valero MD;  [facility];  Service: Cardiology   • CATARACT EXTRACTION Bilateral    • CT CYSTOGRAM  11/04/2021   • ELBOW SURGERY Right    • FL CYSTOGRAM  10/27/2021   • IR BIOPSY BONE MARROW  10/14/2022   • KNEE ARTHROSCOPY Right    • NV COLONOSCOPY FLX DX W/COLLJ SPEC WHEN PFRMD N/A 05/22/2017    Procedure: COLONOSCOPY;  Surgeon: Josseline Zhou DO;  Location: [facility];  Service: " Gastroenterology   • SD NEUROPLASTY &/TRANSPOS MEDIAN NRV CARPAL TUNNE Right 5/26/2023    Procedure: CTR, RIGHT;  Surgeon: Cosme Varela MD;  Location[facility];  Service: Orthopedics   • SD TENDON SHEATH INCISION Right 5/26/2023    Procedure: RELEASE TRIGGER FINGER, RIGHT RING FINGER;  Surgeon: Cosme Varela MD;  [facility];  Service: Orthopedics   • PROSTATE BIOPSY     • PROSTATECTOMY N/A 10/18/2021    Procedure: ROBOTIC SUBTOTAL/SUPRAPUBIC PROSTATECTOMY;  Surgeon: Kieran Vega MD;  [facility];  Service: Urology   • SHOULDER ARTHROSCOPY Right    • TONSILLECTOMY        Family History   Problem Relation Age of Onset   • No Known Problems Mother    • Heart disease Father    • Colon cancer Father    • Colon cancer Sister       Social History     Tobacco Use   • Smoking status: Every Day     Current packs/day: 0.50     Average packs/day: 0.5 packs/day for 53.0 years (26.5 ttl pk-yrs)     Types: Cigarettes     Start date: 1972     Passive exposure: Current   • Smokeless tobacco: Never   • Tobacco comments:     a little less than 1/2ppd, trying to cut back before surgery last smoked 5.25.23   Vaping Use   • Vaping status: Never Used   Substance Use Topics   • Alcohol use: Not Currently   • Drug use: Never      E-Cigarette/Vaping   • E-Cigarette Use Never User       E-Cigarette/Vaping Substances   • Nicotine No    • THC No    • CBD No    • Flavoring No    • Other No    • Unknown No       I have reviewed and agree with the history as documented.     HPI    Review of Systems        Objective   {Hyperlinks  Historical Vitals - Historical Labs - Chart Review/Microbiology - Last Echo - Code Status  :[id number]}    ED Triage Vitals [12/26/24 1435]   Temperature Pulse Blood Pressure Respirations SpO2 Patient Position - Orthostatic VS   98.5 °F (36.9 °C) 85 161/74 16 92 % Lying      Temp Source Heart Rate Source BP Location FiO2 (%) Pain Score    Oral -- Left arm -- 9      Vitals      Date and Time Temp  Pulse SpO2 Resp BP Pain Score FACES Pain Rating User   12/26/24 1745 98.6 °F (37 °C) -- -- -- -- -- -- SL   12/26/24 1715 -- 82 93 % 22 131/95 -- -- SL   12/26/24 1439 100.2 °F (37.9 °C) -- -- -- -- -- -- EG   12/26/24 1435 98.5 °F (36.9 °C) 85 92 % 16 161/74 9 -- EG            Physical Exam    Results Reviewed       Procedure Component Value Units Date/Time    Procalcitonin [[id number]]  (Normal) Collected: 12/26/24 1509    Lab Status: Final result Specimen: Blood from Arm, Right Updated: 12/26/24 1759     Procalcitonin 0.11 ng/ml     HS Troponin I 2hr [[id number]]  (Normal) Collected: 12/26/24 1722    Lab Status: Final result Specimen: Blood from Arm, Right Updated: 12/26/24 1755     hs TnI 2hr 26 ng/L      Delta 2hr hsTnI 15 ng/L     HS Troponin I 4hr [[id number]]     Lab Status: No result Specimen: Blood     Urine Microscopic [[id number]]  (Abnormal) Collected: 12/26/24 1543    Lab Status: Final result Specimen: Urine, Clean Catch Updated: 12/26/24 1559     RBC, UA 1-2 /hpf      WBC, UA 4-10 /hpf      Epithelial Cells Occasional /hpf      Bacteria, UA Moderate /hpf     Urine Macroscopic, POC [[id number]]  (Abnormal) Collected: 12/26/24 1543    Lab Status: Final result Specimen: Urine Updated: 12/26/24 1545     Color, UA Yellow     Clarity, UA Clear     pH, UA 7.0     Leukocytes, UA Trace     Nitrite, UA Positive     Protein, UA 30 (1+) mg/dl      Glucose, UA Negative mg/dl      Ketones, UA Negative mg/dl      Urobilinogen, UA 2.0 E.U./dl      Bilirubin, UA Negative     Occult Blood, UA Trace     Specific Gravity, UA >=1.030    Narrative:      CLINITEK RESULT    HS Troponin 0hr (reflex protocol) [[id number]]  (Normal) Collected: 12/26/24 1509    Lab Status: Final result Specimen: Blood from Hand, Right Updated: 12/26/24 1541     hs TnI 0hr 11 ng/L     Blood culture #1 [[id number]] Collected: 12/26/24 1509    Lab Status: In process Specimen: Blood from Arm, Left Updated: 12/26/24 1539    FLU/COVID Rapid Antigen (30 min.  TAT) - Preferred screening test in ED [[id number]]  (Normal) Collected: 12/26/24 1509    Lab Status: Final result Specimen: Nares from Nose Updated: 12/26/24 1539     SARS COV Rapid Antigen Negative     Influenza A Rapid Antigen Negative     Influenza B Rapid Antigen Negative    Narrative:      This test has been performed using the [place] [place] Harper 2 FLU+SARS Antigen test under the Emergency Use Authorization (EUA). This test has been validated by the  and verified by the performing laboratory. The Harper uses lateral flow immunofluorescent sandwich assay to detect SARS-COV, Influenza A and Influenza B Antigen.     The [place] Harper 2 SARS Antigen test does not differentiate between SARS-CoV and SARS-CoV-2.     Negative results are presumptive and may be confirmed with a molecular assay, if necessary, for patient management. Negative results do not rule out SARS-CoV-2 or influenza infection and should not be used as the sole basis for treatment or patient management decisions. A negative test result may occur if the level of antigen in a sample is below the limit of detection of this test.     Positive results are indicative of the presence of viral antigens, but do not rule out bacterial infection or co-infection with other viruses.     All test results should be used as an adjunct to clinical observations and other information available to the provider.    FOR PEDIATRIC PATIENTS - copy/paste COVID Guidelines URL to browser: [email or web address]    Lactic acid, plasma (w/reflex if result > 2.0) [[id number]]  (Normal) Collected: 12/26/24 1509    Lab Status: Final result Specimen: Blood from Arm, Right Updated: 12/26/24 1538     LACTIC ACID 1.6 mmol/L     Narrative:      Result may be elevated if tourniquet was used during collection.    Basic metabolic panel [[id number]]  (Abnormal) Collected: 12/26/24 1509    Lab Status: Final result Specimen: Blood from  Arm, Right Updated: 12/26/24 1537     Sodium 140 mmol/L      Potassium 3.6 mmol/L      Chloride 105 mmol/L      CO2 28 mmol/L      ANION GAP 7 mmol/L      BUN 21 mg/dL      Creatinine 1.77 mg/dL      Glucose 106 mg/dL      Calcium 8.6 mg/dL      eGFR 35 ml/min/1.73sq m     Narrative:      National Kidney Disease Foundation guidelines for Chronic Kidney Disease (CKD):   •  Stage 1 with normal or high GFR (GFR > 90 mL/min/1.73 square meters)  •  Stage 2 Mild CKD (GFR = 60-89 mL/min/1.73 square meters)  •  Stage 3A Moderate CKD (GFR = 45-59 mL/min/1.73 square meters)  •  Stage 3B Moderate CKD (GFR = 30-44 mL/min/1.73 square meters)  •  Stage 4 Severe CKD (GFR = 15-29 mL/min/1.73 square meters)  •  Stage 5 End Stage CKD (GFR <15 mL/min/1.73 square meters)  Note: GFR calculation is accurate only with a steady state creatinine    Hepatic function panel [[id number]]  (Abnormal) Collected: 12/26/24 1509    Lab Status: Final result Specimen: Blood from Arm, Right Updated: 12/26/24 1537     Total Bilirubin 1.71 mg/dL      Bilirubin, Direct 0.36 mg/dL      Alkaline Phosphatase 116 U/L      AST 18 U/L      ALT 14 U/L      Total Protein 7.2 g/dL      Albumin 3.7 g/dL     CBC and differential [[id number]]  (Abnormal) Collected: 12/26/24 1509    Lab Status: Final result Specimen: Blood from Arm, Right Updated: 12/26/24 1518     WBC 9.21 Thousand/uL      RBC 3.85 Million/uL      Hemoglobin 14.7 g/dL      Hematocrit 41.9 %       fL      MCH 38.2 pg      MCHC 35.1 g/dL      RDW 14.4 %      MPV 10.2 fL      Platelets 109 Thousands/uL      nRBC 0 /100 WBCs      Segmented % 88 %      Immature Grans % 0 %      Lymphocytes % 8 %      Monocytes % 3 %      Eosinophils Relative 1 %      Basophils Relative 0 %      Absolute Neutrophils 8.13 Thousands/µL      Absolute Immature Grans 0.04 Thousand/uL      Absolute Lymphocytes 0.71 Thousands/µL      Absolute Monocytes 0.23 Thousand/µL      Eosinophils Absolute 0.09 Thousand/µL       Basophils Absolute 0.01 Thousands/µL     Blood culture #2 [[id number]] Collected: 12/26/24 1509    Lab Status: In process Specimen: Blood from Arm, Right Updated: 12/26/24 4301            CT chest abdomen pelvis w contrast    (Results Pending)       Procedures    ED Medication and Procedure Management   Prior to Admission Medications   Prescriptions Last Dose Informant Patient Reported? Taking?   Multiple Vitamins-Minerals (VITAMIN D3 COMPLETE PO)  Self Yes No   Sig: Take 1 capsule by mouth daily   Omega-3 Fatty Acids (FISH OIL) 1200 MG CAPS  Self Yes No   Sig: Take 1,200 mg by mouth 2 (two) times a day     acetaminophen (TYLENOL) 500 mg tablet  Self Yes No   Sig: Take 500 mg by mouth every 6 (six) hours as needed for mild pain   amLODIPine (NORVASC) 10 mg tablet  Self Yes No   Sig: Take 10 mg by mouth every evening    aspirin (ECOTRIN LOW STRENGTH) 81 mg EC tablet  Self Yes No   Sig: Take 81 mg by mouth daily   atorvastatin (LIPITOR) 80 mg tablet  Self No No   Sig: TAKE 1 TABLET BY MOUTH EVERY DAY IN THE EVENING   finasteride (PROSCAR) 5 mg tablet  Self No No   Sig: Take 1 tablet (5 mg total) by mouth daily   hydroxyurea (HYDREA) 500 mg capsule  Self No No   Sig: TAKE 1 CAPSULE (500 MG TOTAL) BY MOUTH DAILY   metoprolol tartrate (LOPRESSOR) 50 mg tablet  Self Yes No   Sig: Take 50 mg by mouth 2 (two) times a day    nitroglycerin (NITROSTAT) 0.4 mg SL tablet  Self No No   Sig: Place 1 tablet (0.4 mg total) under the tongue every 5 (five) minutes as needed for chest pain   omeprazole (PriLOSEC) 20 mg delayed release capsule   No No   Sig: TAKE 1 CAPSULE BY MOUTH EVERY DAY   tamsulosin (FLOMAX) 0.4 mg  Self No No   Sig: Take 1 capsule (0.4 mg total) by mouth daily with dinner      Facility-Administered Medications: None     Patient's Medications   Discharge Prescriptions    No medications on file     No discharge procedures on file.  ED SEPSIS DOCUMENTATION          General: No focal deficit present.      Mental Status: He is alert and oriented to person, place, and time.   Psychiatric:         Mood and Affect: Mood normal.         Results Reviewed       Procedure Component Value Units Date/Time    Blood culture #1 [[id number]] Collected: 12/26/24 1509    Lab Status: Final result Specimen: Blood from Arm, Left Updated: 12/31/24 2301     Blood Culture No Growth After 5 Days.    Urine culture [[id number]]  (Abnormal)  (Susceptibility) Collected: 12/26/24 2039    Lab Status: Final result Specimen: Urine, Clean Catch Updated: 12/30/24 1404     Urine Culture >100,000 cfu/ml Escherichia coli ESBL      50,000-59,000 cfu/ml Pseudomonas aeruginosa    Susceptibility       Escherichia coli ESBL (1)       Antibiotic Interpretation Microscan   Method Status    ZID Performed  Yes  NI Final    Amoxicillin + Clavulanate Susceptible <=8/4 ug/ml NI Final    Ampicillin ($$) Resistant >16.00 ug/ml NI Final    Ampicillin + Sulbactam ($) Susceptible <=4/2 ug/ml NI Final    Aztreonam ($$$)  Resistant 16 ug/ml NI Final    Cefazolin ($) Resistant >16.00 ug/ml NI Final    Cefepime ($) Resistant >16.00 ug/ml NI Final    Ceftazidime ($$) Resistant 8 ug/ml NI Final    Ceftriaxone ($$) Resistant >32.00 ug/ml NI Final    Cefuroxime ($$) Resistant >16 ug/ml NI Final    Ciprofloxacin ($)  Resistant 1.00 ug/ml NI Final    Ertapenem ($$$) Susceptible <=0.5 ug/ml NI Final    Gentamicin ($$) Susceptible <=2 ug/ml NI Final    Levofloxacin ($) Intermediate 1.00 ug/ml NI Final    Minocycline Susceptible <=4 ug/ml NI Final    Nitrofurantoin Susceptible <=32 ug/ml NI Final    Piperacillin + Tazobactam ($$$) Susceptible <=8 ug/ml NI Final    Tetracycline Resistant >8 ug/ml NI Final    Trimethoprim + Sulfamethoxazole ($$$) Resistant >2/38 ug/ml NI Final    Fosfomycin   Susceptible 0.125 ug/ml NI Final              Pseudomonas aeruginosa (2)       Antibiotic Interpretation Microscan   Method Status     ZID Performed  Yes  NI Final    Aztreonam ($$$)  Susceptible <=4 ug/ml NI Final    Cefepime ($) Susceptible <=2.00 ug/ml NI Final    Ceftazidime ($$) Susceptible 4 ug/ml NI Final    Ciprofloxacin ($)  Susceptible <=0.25 ug/ml NI Final    Levofloxacin ($) Susceptible <=0.50 ug/ml NI Final    Meropenem ($$) Susceptible <=1.00 ug/ml NI Final    Piperacillin + Tazobactam ($$$) Susceptible <=8 ug/ml NI Final                       Blood Culture Identification Panel [[id number]]  (Abnormal) Collected: 12/26/24 1509    Lab Status: Final result Specimen: Blood from Arm, Right Updated: 12/29/24 1055     Escherichia coli Detected     CTX-M (ESBL RESISTANCE GENE) Detected    Narrative:      Routine culture and susceptiblity to follow for confirmation.    Film Array panel tests for 11 gram positive organisms, 15 gram negative organisms, 7 yeast species and 10 resistance genes.     Blood culture #2 [[id number]]  (Abnormal)  (Susceptibility) Collected: 12/26/24 1509    Lab Status: Final result Specimen: Blood from Arm, Right Updated: 12/29/24 1055     Blood Culture Escherichia coli ESBL     Gram Stain Result Gram negative rods    Susceptibility       Escherichia coli ESBL (1)       Antibiotic Interpretation Microscan   Method Status    Ampicillin ($$) Resistant >16.00 ug/ml NI Final    Aztreonam ($$$)  Resistant 16 ug/ml NI Final    Cefazolin ($) Resistant >16.00 ug/ml NI Final    Cefepime ($) Resistant >16.00 ug/ml NI Final    Ceftazidime ($$) Resistant 4 ug/ml NI Final    Ceftriaxone ($$) Resistant >32.00 ug/ml NI Final    Cefuroxime ($$) Resistant >16 ug/ml NI Final    Ciprofloxacin ($)  Resistant 1.00 ug/ml NI Final    Ertapenem ($$$) Susceptible <=0.5 ug/ml NI Final    Gentamicin ($$) Susceptible <=2 ug/ml NI Final    Levofloxacin ($) Intermediate 1.00 ug/ml NI Final    Minocycline Susceptible <=4 ug/ml NI Final    Piperacillin + Tazobactam ($$$) Susceptible <=8 ug/ml NI Final    Tetracycline Resistant >8  ug/ml NI Final    Trimethoprim + Sulfamethoxazole ($$$) Resistant >2/38 ug/ml NI Final                       CBC and differential [[id number]]  (Abnormal) Collected: 12/27/24 0509    Lab Status: Final result Specimen: Blood from Arm, Left Updated: 12/27/24 0641     WBC 8.32 Thousand/uL      RBC 3.29 Million/uL      Hemoglobin 12.9 g/dL      Hematocrit 36.5 %       fL      MCH 39.2 pg      MCHC 35.3 g/dL      RDW 14.4 %      MPV 9.6 fL      Platelets 94 Thousands/uL      nRBC 0 /100 WBCs      Segmented % 83 %      Immature Grans % 1 %      Lymphocytes % 8 %      Monocytes % 8 %      Eosinophils Relative 0 %      Basophils Relative 0 %      Absolute Neutrophils 6.90 Thousands/µL      Absolute Immature Grans 0.05 Thousand/uL      Absolute Lymphocytes 0.70 Thousands/µL      Absolute Monocytes 0.62 Thousand/µL      Eosinophils Absolute 0.03 Thousand/µL      Basophils Absolute 0.02 Thousands/µL     Basic metabolic panel [[id number]]  (Abnormal) Collected: 12/27/24 0509    Lab Status: Final result Specimen: Blood from Arm, Left Updated: 12/27/24 0544     Sodium 138 mmol/L      Potassium 3.5 mmol/L      Chloride 107 mmol/L      CO2 24 mmol/L      ANION GAP 7 mmol/L      BUN 18 mg/dL      Creatinine 1.47 mg/dL      Glucose 111 mg/dL      Calcium 8.1 mg/dL      eGFR 45 ml/min/1.73sq m     Narrative:      National Kidney Disease Foundation guidelines for Chronic Kidney Disease (CKD):     Stage 1 with normal or high GFR (GFR > 90 mL/min/1.73 square meters)    Stage 2 Mild CKD (GFR = 60-89 mL/min/1.73 square meters)    Stage 3A Moderate CKD (GFR = 45-59 mL/min/1.73 square meters)    Stage 3B Moderate CKD (GFR = 30-44 mL/min/1.73 square meters)    Stage 4 Severe CKD (GFR = 15-29 mL/min/1.73 square meters)    Stage 5 End Stage CKD (GFR <15 mL/min/1.73 square meters)  Note: GFR calculation is accurate only with a steady state creatinine    Magnesium [[id number]]  (Normal) Collected: 12/27/24 0509    Lab Status: Final result  Specimen: Blood from Arm, Left Updated: 12/27/24 0544     Magnesium 1.9 mg/dL     HS Troponin I 4hr [[id number]]  (Normal) Collected: 12/26/24 1919    Lab Status: Final result Specimen: Blood from Arm, Right Updated: 12/26/24 2009     hs TnI 4hr 26 ng/L      Delta 4hr hsTnI 15 ng/L     Procalcitonin [[id number]]  (Normal) Collected: 12/26/24 1509    Lab Status: Final result Specimen: Blood from Arm, Right Updated: 12/26/24 1759     Procalcitonin 0.11 ng/ml     HS Troponin I 2hr [[id number]]  (Normal) Collected: 12/26/24 1722    Lab Status: Final result Specimen: Blood from Arm, Right Updated: 12/26/24 1755     hs TnI 2hr 26 ng/L      Delta 2hr hsTnI 15 ng/L     Urine Microscopic [[id number]]  (Abnormal) Collected: 12/26/24 1543    Lab Status: Final result Specimen: Urine, Clean Catch Updated: 12/26/24 1559     RBC, UA 1-2 /hpf      WBC, UA 4-10 /hpf      Epithelial Cells Occasional /hpf      Bacteria, UA Moderate /hpf     Urine Macroscopic, POC [[id number]]  (Abnormal) Collected: 12/26/24 1543    Lab Status: Final result Specimen: Urine Updated: 12/26/24 1545     Color, UA Yellow     Clarity, UA Clear     pH, UA 7.0     Leukocytes, UA Trace     Nitrite, UA Positive     Protein, UA 30 (1+) mg/dl      Glucose, UA Negative mg/dl      Ketones, UA Negative mg/dl      Urobilinogen, UA 2.0 E.U./dl      Bilirubin, UA Negative     Occult Blood, UA Trace     Specific Gravity, UA >=1.030    Narrative:      CLINITEK RESULT    HS Troponin 0hr (reflex protocol) [[id number]]  (Normal) Collected: 12/26/24 1509    Lab Status: Final result Specimen: Blood from Hand, Right Updated: 12/26/24 1541     hs TnI 0hr 11 ng/L     FLU/COVID Rapid Antigen (30 min. TAT) - Preferred screening test in ED [[id number]]  (Normal) Collected: 12/26/24 1509    Lab Status: Final result Specimen: Nares from Nose Updated: 12/26/24 1539     SARS COV Rapid Antigen Negative     Influenza A Rapid Antigen Negative     Influenza B Rapid Antigen Negative    Narrative:       This test has been performed using the [facility] 2 FLU+SARS Antigen test under the Emergency Use Authorization (EUA). This test has been validated by the  and verified by the performing laboratory. The Harper uses lateral flow immunofluorescent sandwich assay to detect SARS-COV, Influenza A and Influenza B Antigen.     The [facility] 2 SARS Antigen test does not differentiate between SARS-CoV and SARS-CoV-2.     Negative results are presumptive and may be confirmed with a molecular assay, if necessary, for patient management. Negative results do not rule out SARS-CoV-2 or influenza infection and should not be used as the sole basis for treatment or patient management decisions. A negative test result may occur if the level of antigen in a sample is below the limit of detection of this test.     Positive results are indicative of the presence of viral antigens, but do not rule out bacterial infection or co-infection with other viruses.     All test results should be used as an adjunct to clinical observations and other information available to the provider.    FOR PEDIATRIC PATIENTS - copy/paste COVID Guidelines URL to browser: [email or web address]    Lactic acid, plasma (w/reflex if result > 2.0) [[id number]]  (Normal) Collected: 12/26/24 1509    Lab Status: Final result Specimen: Blood from Arm, Right Updated: 12/26/24 1538     LACTIC ACID 1.6 mmol/L     Narrative:      Result may be elevated if tourniquet was used during collection.    Basic metabolic panel [[id number]]  (Abnormal) Collected: 12/26/24 1509    Lab Status: Final result Specimen: Blood from Arm, Right Updated: 12/26/24 1537     Sodium 140 mmol/L      Potassium 3.6 mmol/L      Chloride 105 mmol/L      CO2 28 mmol/L      ANION GAP 7 mmol/L      BUN 21 mg/dL      Creatinine 1.77 mg/dL      Glucose 106 mg/dL      Calcium 8.6 mg/dL      eGFR 35 ml/min/1.73sq m     Narrative:       National Kidney Disease Foundation guidelines for Chronic Kidney Disease (CKD):     Stage 1 with normal or high GFR (GFR > 90 mL/min/1.73 square meters)    Stage 2 Mild CKD (GFR = 60-89 mL/min/1.73 square meters)    Stage 3A Moderate CKD (GFR = 45-59 mL/min/1.73 square meters)    Stage 3B Moderate CKD (GFR = 30-44 mL/min/1.73 square meters)    Stage 4 Severe CKD (GFR = 15-29 mL/min/1.73 square meters)    Stage 5 End Stage CKD (GFR <15 mL/min/1.73 square meters)  Note: GFR calculation is accurate only with a steady state creatinine    Hepatic function panel [[id number]]  (Abnormal) Collected: 12/26/24 1509    Lab Status: Final result Specimen: Blood from Arm, Right Updated: 12/26/24 1537     Total Bilirubin 1.71 mg/dL      Bilirubin, Direct 0.36 mg/dL      Alkaline Phosphatase 116 U/L      AST 18 U/L      ALT 14 U/L      Total Protein 7.2 g/dL      Albumin 3.7 g/dL     CBC and differential [[id number]]  (Abnormal) Collected: 12/26/24 1509    Lab Status: Final result Specimen: Blood from Arm, Right Updated: 12/26/24 1518     WBC 9.21 Thousand/uL      RBC 3.85 Million/uL      Hemoglobin 14.7 g/dL      Hematocrit 41.9 %       fL      MCH 38.2 pg      MCHC 35.1 g/dL      RDW 14.4 %      MPV 10.2 fL      Platelets 109 Thousands/uL      nRBC 0 /100 WBCs      Segmented % 88 %      Immature Grans % 0 %      Lymphocytes % 8 %      Monocytes % 3 %      Eosinophils Relative 1 %      Basophils Relative 0 %      Absolute Neutrophils 8.13 Thousands/µL      Absolute Immature Grans 0.04 Thousand/uL      Absolute Lymphocytes 0.71 Thousands/µL      Absolute Monocytes 0.23 Thousand/µL      Eosinophils Absolute 0.09 Thousand/µL      Basophils Absolute 0.01 Thousands/µL             CT chest abdomen pelvis w contrast   Final Interpretation by Murray Matute MD (12/26 1921)      Right lower lobe bronchial wall thickening with air trapping compatible with bronchitis/bronchiolitis.      No evidence of acute intra-abdominal or pelvic  pathology               Workstation performed: [id number]             Procedures    ED Medication and Procedure Management   Prior to Admission Medications   Prescriptions Last Dose Informant Patient Reported? Taking?   Multiple Vitamins-Minerals (VITAMIN D3 COMPLETE PO)  Self Yes No   Sig: Take 1 capsule by mouth daily   Omega-3 Fatty Acids (FISH OIL) 1200 MG CAPS  Self Yes No   Sig: Take 1,200 mg by mouth 2 (two) times a day     acetaminophen (TYLENOL) 500 mg tablet  Self Yes No   Sig: Take 500 mg by mouth every 6 (six) hours as needed for mild pain   amLODIPine (NORVASC) 10 mg tablet  Self Yes No   Sig: Take 10 mg by mouth every evening    aspirin (ECOTRIN LOW STRENGTH) 81 mg EC tablet  Self Yes No   Sig: Take 81 mg by mouth daily   atorvastatin (LIPITOR) 80 mg tablet  Self No No   Sig: TAKE 1 TABLET BY MOUTH EVERY DAY IN THE EVENING   finasteride (PROSCAR) 5 mg tablet  Self No No   Sig: Take 1 tablet (5 mg total) by mouth daily   hydroxyurea (HYDREA) 500 mg capsule  Self No No   Sig: TAKE 1 CAPSULE (500 MG TOTAL) BY MOUTH DAILY   metoprolol tartrate (LOPRESSOR) 50 mg tablet  Self Yes No   Sig: Take 50 mg by mouth 2 (two) times a day    omeprazole (PriLOSEC) 20 mg delayed release capsule   No No   Sig: TAKE 1 CAPSULE BY MOUTH EVERY DAY   tamsulosin (FLOMAX) 0.4 mg  Self No No   Sig: Take 1 capsule (0.4 mg total) by mouth daily with dinner      Facility-Administered Medications: None     Discharge Medication List as of 1/1/2025  3:03 PM        CONTINUE these medications which have NOT CHANGED    Details   acetaminophen (TYLENOL) 500 mg tablet Take 500 mg by mouth every 6 (six) hours as needed for mild pain, Historical Med      amLODIPine (NORVASC) 10 mg tablet Take 10 mg by mouth every evening , Historical Med      aspirin (ECOTRIN LOW STRENGTH) 81 mg EC tablet Take 81 mg by mouth daily, Historical Med      atorvastatin (LIPITOR) 80 mg tablet TAKE 1 TABLET BY MOUTH EVERY DAY IN THE EVENING, Normal      finasteride  (PROSCAR) 5 mg tablet Take 1 tablet (5 mg total) by mouth daily, Starting Sat 9/21/2024, Normal      hydroxyurea (HYDREA) 500 mg capsule TAKE 1 CAPSULE (500 MG TOTAL) BY MOUTH DAILY, Starting Wed 9/11/2024, Normal      metoprolol tartrate (LOPRESSOR) 50 mg tablet Take 50 mg by mouth 2 (two) times a day , Historical Med      Multiple Vitamins-Minerals (VITAMIN D3 COMPLETE PO) Take 1 capsule by mouth daily, Historical Med      Omega-3 Fatty Acids (FISH OIL) 1200 MG CAPS Take 1,200 mg by mouth 2 (two) times a day  , Historical Med      omeprazole (PriLOSEC) 20 mg delayed release capsule TAKE 1 CAPSULE BY MOUTH EVERY DAY, Starting Mon 12/16/2024, Normal      tamsulosin (FLOMAX) 0.4 mg Take 1 capsule (0.4 mg total) by mouth daily with dinner, Starting Fri 9/20/2024, Normal      nitroglycerin (NITROSTAT) 0.4 mg SL tablet Place 1 tablet (0.4 mg total) under the tongue every 5 (five) minutes as needed for chest pain, Starting Mon 8/8/2022, Normal           No discharge procedures on file.  ED SEPSIS DOCUMENTATION   Time reflects when diagnosis was documented in both MDM as applicable and the Disposition within this note       Time User Action Codes Description Comment    12/26/2024  7:05 PM Christ Godinez [R50.9] Fever     12/26/2024  8:51 PM Christ Godinez [N39.0] Urinary tract infection                  Christ Godinez MD  01/11/25 6035

## 2024-12-27 NOTE — UTILIZATION REVIEW
Initial Clinical Review    OBSERVATION    12/26 CHANGED TO IP ADMISSION  12/27 @   1521    Admission: Date/Time/Statement:   Admission Orders (From admission, onward)       Ordered        12/26/24 2054  Place in Observation  Once                          12/27/24 1521  INPATIENT ADMISSION  Once        Transfer Service: Hospitalist   Question Answer Comment   Level of Care Med Surg    Estimated length of stay More than 2 Midnights    Certification I certify that inpatient services are medically necessary for this patient for a duration of greater than two midnights. See H&P and MD Progress Notes for additional information about the patient's course of treatment.        12/27/24 1520     ED Arrival Information       Expected   -    Arrival   12/26/2024 14:30    Acuity   Urgent              Means of arrival   Ambulance    Escorted by   [facility]    Service   Hospitalist    Admission type   Emergency              Arrival complaint   Abdominal Pain             Chief Complaint   Patient presents with    Abdominal Pain     Abdominal pain started 30 min ago, Chills started 1 hr ago. EMS temp 102.7 F gave 650mg tylenol. Fluids 400ml. SOB when had chills, no chills after fluids. Pacemaker. Takes baby aspirin.       Initial Presentation: 79 y.o. male presents to ED via  EMS  from home with general malaise, fatigue, fever and chills for past  1 day. Felt worse the day of admission, had an episode of rigors, unable to stop shaking/shivering.  Denies  any  urinary  symptoms.    Has a  chronic  productive cough due to smoking.  Additional PMH  is  PCV,  benign essential hypertension,  CKD,  COPD.  UA  sow  sign of infection. Ct chest shows bronchitis/bronchiolitis.   Not meeting SIRS  criteria.  Admit    Observation with  UTI, Bronchitis  and plan is  monitor labs, blood/urine cultures,  DONA,  IVF  and continue home meds.      12/27   IP ADMISSION  Remains on   DONA.  IVF  d/c this am.    ED Treatment-Medication  Administration from 12/26/2024 1430 to 12/26/2024 2149         Date/Time Order Dose Route Action     12/26/2024 1434 acetaminophen (FOR EMS ONLY) (TYLENOL) oral suspension 650 mg 0 mg Does not apply Given to EMS     12/26/2024 1541 sodium chloride 0.9 % bolus 1,000 mL 1,000 mL Intravenous New Bag     12/26/2024 1628 ceftriaxone (ROCEPHIN) 1 g/50 mL in dextrose IVPB 1,000 mg Intravenous New Bag     12/26/2024 1721 sodium chloride 0.9 % bolus 1,000 mL 1,000 mL Intravenous New Bag     12/26/2024 1822 iohexol (OMNIPAQUE) 350 MG/ML injection (MULTI-DOSE) 100 mL 100 mL Intravenous Given     12/26/2024 1822 iohexol (OMNIPAQUE) 240 MG/ML solution 50 mL 50 mL Oral Given     12/26/2024 2049 potassium chloride (Klor-Con M20) CR tablet 20 mEq 20 mEq Oral Given     12/26/2024 2050 piperacillin-tazobactam (ZOSYN) IVPB 4.5 g 4.5 g Intravenous New Bag            Scheduled Medications:  amLODIPine, 10 mg, Oral, QPM  aspirin, 81 mg, Oral, Daily  atorvastatin, 80 mg, Oral, QPM  finasteride, 5 mg, Oral, Daily  guaiFENesin, 600 mg, Oral, Q12H TALIB  heparin (porcine), 5,000 Units, Subcutaneous, Q8H TALIB  hydroxyurea, 500 mg, Oral, Daily  metoprolol tartrate, 50 mg, Oral, BID  nicotine, 1 patch, Transdermal, Daily  pantoprazole, 40 mg, Oral, Daily Before Breakfast  piperacillin-tazobactam, 4.5 g, Intravenous, Q8H  tamsulosin, 0.4 mg, Oral, Daily With Dinner      Continuous IV Infusions:  IVF   75/hr - d/c   12/27     PRN Meds:  acetaminophen, 650 mg, Oral, Q6H PRN  albuterol, 2 puff, Inhalation, Q4H PRN  benzonatate, 100 mg, Oral, TID PRN  dextromethorphan-guaiFENesin, 10 mL, Oral, Q4H PRN  ipratropium-albuterol, 3 mL, Nebulization, Q6H PRN      ED Triage Vitals [12/26/24 1435]   Temperature Pulse Respirations Blood Pressure SpO2 Pain Score   98.5 °F (36.9 °C) 85 16 161/74 92 % 9     Weight (last 2 days)       Date/Time Weight    12/27/24 0535 74.3 (163.8)    12/26/24 1435 76.5 (168.65)            Vital Signs (last 3 days)       Date/Time  Temp Pulse Resp BP MAP (mmHg) SpO2 Calculated FIO2 (%) - Nasal Cannula Nasal Cannula O2 Flow Rate (L/min) O2 Device Patient Position - Orthostatic VS Pain    12/27/24 07:04:15 99.7 °F (37.6 °C) 72 20 118/70 86 98 % 24 1 L/min Nasal cannula Lying --    12/26/24 2312 -- -- -- -- -- -- -- -- -- -- Med Not Given for Pain - for MAR use only    12/26/24 23:07:12 98.5 °F (36.9 °C) 78 19 125/85 98 96 % -- -- None (Room air) Lying --    12/26/24 2213 -- -- -- -- -- -- -- -- -- -- No Pain    12/26/24 2209 -- -- -- -- -- -- -- -- -- -- No Pain    12/26/24 21:49:41 98.1 °F (36.7 °C) 70 16 131/74 93 95 % -- -- -- -- --    12/26/24 2130 -- 68 21 122/65 89 96 % -- -- None (Room air) Lying --    12/26/24 2030 -- 70 20 116/58 82 95 % -- -- None (Room air) Lying --    12/26/24 1927 98.6 °F (37 °C) -- -- -- -- -- -- -- -- -- --    12/26/24 1921 -- 70 22 127/68 -- 96 % -- -- None (Room air) Lying --    12/26/24 1745 98.6 °F (37 °C) -- -- -- -- -- -- -- -- -- --    12/26/24 1715 -- 82 22 131/95 109 93 % -- -- None (Room air) -- --    12/26/24 1503 -- -- -- -- -- -- -- -- None (Room air) -- --    12/26/24 1439 100.2 °F (37.9 °C) -- -- -- -- -- -- -- -- -- --    12/26/24 1435 98.5 °F (36.9 °C) 85 16 161/74 -- 92 % -- -- None (Room air) Lying 9              Pertinent Labs/Diagnostic Test Results:   Radiology:  CT chest abdomen pelvis w contrast   Final Interpretation by Murray Matute MD (12/26 1921)      Right lower lobe bronchial wall thickening with air trapping compatible with bronchitis/bronchiolitis.      No evidence of acute intra-abdominal or pelvic pathology               Workstation performed: [id number]           Cardiology:  ECG 12 lead    by Interface, Ris Results In (12/26 1919)      ECG 12 lead    by Interface, Ris Results In (12/26 1718)      ECG 12 lead    by Interface, Ris Results In (12/26 1437)              Results from last 7 days   Lab Units 12/27/24  0509 12/26/24  1509   WBC Thousand/uL 8.32 9.21   HEMOGLOBIN g/dL  12.9 14.7   HEMATOCRIT % 36.5 41.9   PLATELETS Thousands/uL 94* 109*   TOTAL NEUT ABS Thousands/µL 6.90 8.13*         Results from last 7 days   Lab Units 12/27/24  0509 12/26/24  1509   SODIUM mmol/L 138 140   POTASSIUM mmol/L 3.5 3.6   CHLORIDE mmol/L 107 105   CO2 mmol/L 24 28   ANION GAP mmol/L 7 7   BUN mg/dL 18 21   CREATININE mg/dL 1.47* 1.77*   EGFR ml/min/1.73sq m 45 35   CALCIUM mg/dL 8.1* 8.6   MAGNESIUM mg/dL 1.9  --      Results from last 7 days   Lab Units 12/26/24  1509   AST U/L 18   ALT U/L 14   ALK PHOS U/L 116*   TOTAL PROTEIN g/dL 7.2   ALBUMIN g/dL 3.7   TOTAL BILIRUBIN mg/dL 1.71*   BILIRUBIN DIRECT mg/dL 0.36*         Results from last 7 days   Lab Units 12/27/24  0509 12/26/24  1509   GLUCOSE RANDOM mg/dL 111 106               Results from last 7 days   Lab Units 12/26/24  1919 12/26/24  1722 12/26/24  1509   HS TNI 0HR ng/L  --   --  11   HS TNI 2HR ng/L  --  26  --    HSTNI D2 ng/L  --  15  --    HS TNI 4HR ng/L 26  --   --    HSTNI D4 ng/L 15  --   --                  Results from last 7 days   Lab Units 12/26/24  1509   PROCALCITONIN ng/ml 0.11     Results from last 7 days   Lab Units 12/26/24  1509   LACTIC ACID mmol/L 1.6               Results from last 7 days   Lab Units 12/26/24  1543   CLARITY UA  Clear   COLOR UA  Yellow   SPEC GRAV UA  >=1.030   PH UA  7.0   GLUCOSE UA mg/dl Negative   KETONES UA mg/dl Negative   BLOOD UA  Trace*   PROTEIN UA mg/dl 30 (1+)*   NITRITE UA  Positive*   BILIRUBIN UA  Negative   UROBILINOGEN UA E.U./dl 2.0*   LEUKOCYTES UA  Trace*   WBC UA /hpf 4-10*   RBC UA /hpf 1-2   BACTERIA UA /hpf Moderate*   EPITHELIAL CELLS WET PREP /hpf Occasional                                 Results from last 7 days   Lab Units 12/26/24  1509   BLOOD CULTURE  Received in Microbiology Lab. Culture in Progress.  Received in Microbiology Lab. Culture in Progress.               Present on Admission:   CKD (chronic kidney disease)   Benign essential hypertension   Mixed  hyperlipidemia   Chronic obstructive pulmonary disease (HCC)   Polycythemia vera (HCC)   Cigarette nicotine dependence without complication      Admitting Diagnosis: Urinary tract infection [N39.0]  Abdominal pain [R10.9]  Fever [R50.9]  Age/Sex: 79 y.o. male    Network Utilization Review Department  ATTENTION: Please call with any questions or concerns to [phone] and carefully listen to the prompts so that you are directed to the right person. All voicemails are confidential.   For Discharge needs, contact Care Management DC Support Team at [phone] opt. [phone]  Send all requests for admission clinical reviews, approved or denied determinations and any other requests to dedicated fax number below belonging to the campus where the patient is receiving treatment. List of dedicated fax numbers for the Facilities:  FACILITY NAME UR FAX NUMBER   ADMISSION DENIALS (Administrative/Medical Necessity) [phone]   DISCHARGE SUPPORT TEAM (NETWORK) [phone]   PARENT CHILD HEALTH (Maternity/NICU/Pediatrics) [phone]   [facility] [phone]   [facility] [phone]   [facility] [phone]   [facility] [phone]   [facility] [phone]   [facility] [phone]   [facility] [phone]   [facility] [phone]   [facility] [phone]   [facility] [phone]   [facility] [phone]   [facility] [phone]

## 2024-12-27 NOTE — ASSESSMENT & PLAN NOTE
"CT chest demonstrating \"Right lower lobe bronchial wall thickening with air trapping compatible with bronchitis/bronchiolitis.\"  Pt does endorse increased productive cough, but reports he thought this was related to his smoking. He denies any congestion, sore throat, SOB, or wheezing. Maintaining oxygen on RA.  Viral panel negative  Continue supportive care, home inhaler therapy  "

## 2024-12-28 LAB
ANION GAP SERPL CALCULATED.3IONS-SCNC: 7 MMOL/L (ref 4–13)
BACTERIA UR CULT: ABNORMAL
BASOPHILS # BLD AUTO: 0.01 THOUSANDS/ÂΜL (ref 0–0.1)
BASOPHILS NFR BLD AUTO: 0 % (ref 0–1)
BUN SERPL-MCNC: 12 MG/DL (ref 5–25)
CALCIUM SERPL-MCNC: 8.4 MG/DL (ref 8.4–10.2)
CHLORIDE SERPL-SCNC: 104 MMOL/L (ref 96–108)
CO2 SERPL-SCNC: 25 MMOL/L (ref 21–32)
CREAT SERPL-MCNC: 1.25 MG/DL (ref 0.6–1.3)
EOSINOPHIL # BLD AUTO: 0.07 THOUSAND/ÂΜL (ref 0–0.61)
EOSINOPHIL NFR BLD AUTO: 1 % (ref 0–6)
ERYTHROCYTE [DISTWIDTH] IN BLOOD BY AUTOMATED COUNT: 14 % (ref 11.6–15.1)
GFR SERPL CREATININE-BSD FRML MDRD: 54 ML/MIN/1.73SQ M
GLUCOSE SERPL-MCNC: 87 MG/DL (ref 65–140)
HCT VFR BLD AUTO: 38.1 % (ref 36.5–49.3)
HGB BLD-MCNC: 13.3 G/DL (ref 12–17)
IMM GRANULOCYTES # BLD AUTO: 0.03 THOUSAND/UL (ref 0–0.2)
IMM GRANULOCYTES NFR BLD AUTO: 1 % (ref 0–2)
LYMPHOCYTES # BLD AUTO: 0.85 THOUSANDS/ÂΜL (ref 0.6–4.47)
LYMPHOCYTES NFR BLD AUTO: 13 % (ref 14–44)
MAGNESIUM SERPL-MCNC: 2 MG/DL (ref 1.9–2.7)
MCH RBC QN AUTO: 38.2 PG (ref 26.8–34.3)
MCHC RBC AUTO-ENTMCNC: 34.9 G/DL (ref 31.4–37.4)
MCV RBC AUTO: 110 FL (ref 82–98)
MONOCYTES # BLD AUTO: 0.71 THOUSAND/ÂΜL (ref 0.17–1.22)
MONOCYTES NFR BLD AUTO: 11 % (ref 4–12)
NEUTROPHILS # BLD AUTO: 4.76 THOUSANDS/ÂΜL (ref 1.85–7.62)
NEUTS SEG NFR BLD AUTO: 74 % (ref 43–75)
NRBC BLD AUTO-RTO: 0 /100 WBCS
PLATELET # BLD AUTO: 117 THOUSANDS/UL (ref 149–390)
PMV BLD AUTO: 10.3 FL (ref 8.9–12.7)
POTASSIUM SERPL-SCNC: 3.2 MMOL/L (ref 3.5–5.3)
RBC # BLD AUTO: 3.48 MILLION/UL (ref 3.88–5.62)
SODIUM SERPL-SCNC: 136 MMOL/L (ref 135–147)
WBC # BLD AUTO: 6.43 THOUSAND/UL (ref 4.31–10.16)

## 2024-12-28 PROCEDURE — 85025 COMPLETE CBC W/AUTO DIFF WBC: CPT | Performed by: HOSPITALIST

## 2024-12-28 PROCEDURE — 83735 ASSAY OF MAGNESIUM: CPT | Performed by: HOSPITALIST

## 2024-12-28 PROCEDURE — 99232 SBSQ HOSP IP/OBS MODERATE 35: CPT | Performed by: HOSPITALIST

## 2024-12-28 PROCEDURE — 80048 BASIC METABOLIC PNL TOTAL CA: CPT | Performed by: HOSPITALIST

## 2024-12-28 RX ORDER — POTASSIUM CHLORIDE 1500 MG/1
40 TABLET, EXTENDED RELEASE ORAL 2 TIMES DAILY
Status: COMPLETED | OUTPATIENT
Start: 2024-12-28 | End: 2024-12-29

## 2024-12-28 RX ADMIN — HYDROXYUREA 500 MG: 500 CAPSULE ORAL at 10:14

## 2024-12-28 RX ADMIN — POTASSIUM CHLORIDE 40 MEQ: 1500 TABLET, EXTENDED RELEASE ORAL at 17:22

## 2024-12-28 RX ADMIN — PANTOPRAZOLE SODIUM 40 MG: 40 TABLET, DELAYED RELEASE ORAL at 06:26

## 2024-12-28 RX ADMIN — HEPARIN SODIUM 5000 UNITS: 5000 INJECTION INTRAVENOUS; SUBCUTANEOUS at 06:26

## 2024-12-28 RX ADMIN — HEPARIN SODIUM 5000 UNITS: 5000 INJECTION INTRAVENOUS; SUBCUTANEOUS at 14:38

## 2024-12-28 RX ADMIN — GUAIFENESIN 600 MG: 600 TABLET ORAL at 21:49

## 2024-12-28 RX ADMIN — GUAIFENESIN 600 MG: 600 TABLET ORAL at 10:14

## 2024-12-28 RX ADMIN — TAMSULOSIN HYDROCHLORIDE 0.4 MG: 0.4 CAPSULE ORAL at 17:22

## 2024-12-28 RX ADMIN — ATORVASTATIN CALCIUM 80 MG: 80 TABLET, FILM COATED ORAL at 21:49

## 2024-12-28 RX ADMIN — ASPIRIN 81 MG: 81 TABLET, COATED ORAL at 10:14

## 2024-12-28 RX ADMIN — ERTAPENEM SODIUM 1000 MG: 1 INJECTION INTRAMUSCULAR; INTRAVENOUS at 17:22

## 2024-12-28 RX ADMIN — HEPARIN SODIUM 5000 UNITS: 5000 INJECTION INTRAVENOUS; SUBCUTANEOUS at 21:49

## 2024-12-28 RX ADMIN — FINASTERIDE 5 MG: 5 TABLET, FILM COATED ORAL at 10:14

## 2024-12-28 RX ADMIN — METOPROLOL TARTRATE 50 MG: 50 TABLET, FILM COATED ORAL at 10:14

## 2024-12-28 RX ADMIN — AMLODIPINE BESYLATE 10 MG: 10 TABLET ORAL at 21:49

## 2024-12-28 RX ADMIN — METOPROLOL TARTRATE 50 MG: 50 TABLET, FILM COATED ORAL at 21:49

## 2024-12-28 NOTE — ASSESSMENT & PLAN NOTE
History of JAK2 mutation positive polycythemia vera  Followed outpatient by [facility] hematology  Continue hydroxyurea

## 2024-12-28 NOTE — PROGRESS NOTES
"Progress Note - Hospitalist   Name: Santana Jacinto 79 y.o. male I MRN: [id number]  Unit/Bed#: E5 -01 I Date of Admission: 2024   Date of Service: 2024 I Hospital Day: 1    Assessment & Plan  Urinary tract infection  UTI with bacteremia.    Growing E. coli but he has a history of drug-resistant bacteria    Awaiting sensitivities    Antibiotic stewardship recommended Invanz  Bronchitis  CT chest demonstrating \"Right lower lobe bronchial wall thickening with air trapping compatible with bronchitis/bronchiolitis.\"  Pt does endorse increased productive cough, but reports he thought this was related to his smoking. He denies any congestion, sore throat, SOB, or wheezing. Maintaining oxygen on RA.  Viral panel negative  Continue supportive care, home inhaler therapy  Chronic obstructive pulmonary disease (HCC)  No acute exacerbation  Continue home inhaler regimen  CKD (chronic kidney disease)  Lab Results   Component Value Date    EGFR 54 2024    EGFR 45 2024    EGFR 35 2024    CREATININE 1.25 2024    CREATININE 1.47 (H) 2024    CREATININE 1.77 (H) 2024   POA creatinine elevated from baseline at 1.77, but not yet fulfilling SHANTA  Baseline creatinine 1.2-1.4  Creatinine is actually at baseline  Benign essential hypertension  Continue amlodipine and metoprolol  Mixed hyperlipidemia  Continue statin  Polycythemia vera (HCC)  History of JAK2 mutation positive polycythemia vera  Followed outpatient by [facility] hematology  Continue hydroxyurea   Cigarette nicotine dependence without complication  Pt reports smoking 0.5 packs a day  Continue to encourage cessation  Nicotine patch supplementation     VTE Pharmacologic Prophylaxis:                 Current Length of Stay: 1 day(s)  Current Patient Status: Inpatient   Certification Statement:     Discharge Plan:         Subjective    Feels well  No flank pain  No fever nor chills..    Objective   Vitals:   Temp (24hrs), Av.4 °F (36.9 " °C), Min:98.1 °F (36.7 °C), Max:98.6 °F (37 °C)    Temp:  [98.1 °F (36.7 °C)-98.6 °F (37 °C)] 98.5 °F (36.9 °C)  HR:  [67-79] 72  Resp:  [16-19] 16  BP: (123-144)/(76-87) 123/87  SpO2:  [95 %-98 %] 98 %  Body mass index is 24.84 kg/m².         Physical Exam  Vitals and nursing note reviewed.   Constitutional:       General: He is not in acute distress.     Appearance: He is well-developed.   HENT:      Head: Normocephalic and atraumatic.   Eyes:      Conjunctiva/sclera: Conjunctivae normal.   Cardiovascular:      Rate and Rhythm: Normal rate and regular rhythm.      Heart sounds: No murmur heard.  Pulmonary:      Effort: Pulmonary effort is normal. No respiratory distress.      Breath sounds: Normal breath sounds.   Abdominal:      Palpations: Abdomen is soft.      Tenderness: There is no abdominal tenderness.   Musculoskeletal:         General: No swelling.      Cervical back: Neck supple.      Right lower leg: No edema.      Left lower leg: No edema.   Skin:     General: Skin is warm and dry.      Capillary Refill: Capillary refill takes less than 2 seconds.   Neurological:      Mental Status: He is alert.   Psychiatric:         Mood and Affect: Mood normal.           Lab results  Results from last 7 days   Lab Units 12/28/24  0449 12/27/24  0509 12/26/24  1509   WBC Thousand/uL 6.43 8.32 9.21   HEMOGLOBIN g/dL 13.3 12.9 14.7   PLATELETS Thousands/uL 117* 94* 109*   MCV fL 110* 111* 109*     Results from last 7 days   Lab Units 12/28/24  0449 12/27/24  0509 12/26/24  1509   SODIUM mmol/L 136 138 140   POTASSIUM mmol/L 3.2* 3.5 3.6   CHLORIDE mmol/L 104 107 105   CO2 mmol/L 25 24 28   ANION GAP mmol/L 7 7 7   BUN mg/dL 12 18 21   CREATININE mg/dL 1.25 1.47* 1.77*   CALCIUM mg/dL 8.4 8.1* 8.6   ALBUMIN g/dL  --   --  3.7   TOTAL BILIRUBIN mg/dL  --   --  1.71*   ALK PHOS U/L  --   --  116*   ALT U/L  --   --  14   AST U/L  --   --  18   EGFR ml/min/1.73sq m 54 45 35   GLUCOSE RANDOM mg/dL 87 111 106     Results from  last 7 days   Lab Units 12/28/24  0449 12/27/24  0509   MAGNESIUM mg/dL 2.0 1.9             Last 24 Hours Medication List:     Current Facility-Administered Medications:     acetaminophen (TYLENOL) tablet 650 mg, Q6H PRN    albuterol (PROVENTIL HFA,VENTOLIN HFA) inhaler 2 puff, Q4H PRN    amLODIPine (NORVASC) tablet 10 mg, QPM    aspirin (ECOTRIN LOW STRENGTH) EC tablet 81 mg, Daily    atorvastatin (LIPITOR) tablet 80 mg, QPM    benzonatate (TESSALON PERLES) capsule 100 mg, TID PRN    dextromethorphan-guaiFENesin (ROBITUSSIN DM) oral syrup 10 mL, Q4H PRN    ertapenem (INVanz) 1,000 mg in sodium chloride 0.9 % 50 mL IVPB, Q24H, Last Rate: 1,000 mg (12/27/24 1840)    finasteride (PROSCAR) tablet 5 mg, Daily    guaiFENesin (MUCINEX) 12 hr tablet 600 mg, Q12H TALIB    heparin (porcine) subcutaneous injection 5,000 Units, Q8H TALIB    hydroxyurea (HYDREA) capsule 500 mg, Daily    ipratropium-albuterol (DUO-NEB) 0.5-2.5 mg/3 mL inhalation solution 3 mL, Q6H PRN    metoprolol tartrate (LOPRESSOR) tablet 50 mg, BID    nicotine (NICODERM CQ) 14 mg/24hr TD 24 hr patch 1 patch, Daily    pantoprazole (PROTONIX) EC tablet 40 mg, Daily Before Breakfast    tamsulosin (FLOMAX) capsule 0.4 mg, Daily With Dinner

## 2024-12-28 NOTE — PLAN OF CARE
Problem: PAIN - ADULT  Goal: Verbalizes/displays adequate comfort level or baseline comfort level  Description: Interventions:  - Encourage patient to monitor pain and request assistance  - Assess pain using appropriate pain scale  - Administer analgesics based on type and severity of pain and evaluate response  - Implement non-pharmacological measures as appropriate and evaluate response  - Consider cultural and social influences on pain and pain management  - Notify physician/advanced practitioner if interventions unsuccessful or patient reports new pain  Outcome: Progressing     Problem: INFECTION - ADULT  Goal: Absence or prevention of progression during hospitalization  Description: INTERVENTIONS:  - Assess and monitor for signs and symptoms of infection  - Monitor lab/diagnostic results  - Monitor all insertion sites, i.e. indwelling lines, tubes, and drains  - Monitor endotracheal if appropriate and nasal secretions for changes in amount and color  - [place] appropriate cooling/warming therapies per order  - Administer medications as ordered  - Instruct and encourage patient and family to use good hand hygiene technique  - Identify and instruct in appropriate isolation precautions for identified infection/condition  Outcome: Progressing  Goal: Absence of fever/infection during neutropenic period  Description: INTERVENTIONS:  - Monitor WBC    Outcome: Progressing     Problem: SAFETY ADULT  Goal: Patient will remain free of falls  Description: INTERVENTIONS:  - Educate patient/family on patient safety including physical limitations  - Instruct patient to call for assistance with activity   - Consult OT/PT to assist with strengthening/mobility   - Keep Call bell within reach  - Keep bed low and locked with side rails adjusted as appropriate  - Keep care items and personal belongings within reach  - Initiate and maintain comfort rounds  - Make Fall Risk Sign visible to staff  - Apply yellow socks and bracelet  for high fall risk patients  - Consider moving patient to room near nurses station  Outcome: Progressing  Goal: Maintain or return to baseline ADL function  Description: INTERVENTIONS:  -  Assess patient's ability to carry out ADLs; assess patient's baseline for ADL function and identify physical deficits which impact ability to perform ADLs (bathing, care of mouth/teeth, toileting, grooming, dressing, etc.)  - Assess/evaluate cause of self-care deficits   - Assess range of motion  - Assess patient's mobility; develop plan if impaired  - Assess patient's need for assistive devices and provide as appropriate  - Encourage maximum independence but intervene and supervise when necessary  - Involve family in performance of ADLs  - Assess for home care needs following discharge   - Consider OT consult to assist with ADL evaluation and planning for discharge  - Provide patient education as appropriate  Outcome: Progressing  Goal: Maintains/Returns to pre admission functional level  Description: INTERVENTIONS:  - Perform AM-PAC 6 Click Basic Mobility/ Daily Activity assessment daily.  - Set and communicate daily mobility goal to care team and patient/family/caregiver.   - Collaborate with rehabilitation services on mobility goals if consulted  - Out of bed for toileting  - Record patient progress and toleration of activity level   Outcome: Progressing     Problem: DISCHARGE PLANNING  Goal: Discharge to home or other facility with appropriate resources  Description: INTERVENTIONS:  - Identify barriers to discharge w/patient and caregiver  - Arrange for needed discharge resources and transportation as appropriate  - Identify discharge learning needs (meds, wound care, etc.)  - Arrange for interpretive services to assist at discharge as needed  - Refer to Case Management Department for coordinating discharge planning if the patient needs post-hospital services based on physician/advanced practitioner order or complex needs  related to functional status, cognitive ability, or social support system  Outcome: Progressing     Problem: Knowledge Deficit  Goal: Patient/family/caregiver demonstrates understanding of disease process, treatment plan, medications, and discharge instructions  Description: Complete learning assessment and assess knowledge base.  Interventions:  - Provide teaching at level of understanding  - Provide teaching via preferred learning methods  Outcome: Progressing     Problem: GENITOURINARY - ADULT  Goal: Maintains or returns to baseline urinary function  Description: INTERVENTIONS:  - Assess urinary function  - Encourage oral fluids to ensure adequate hydration if ordered  - Administer IV fluids as ordered to ensure adequate hydration  - Administer ordered medications as needed  - Offer frequent toileting  - Follow urinary retention protocol if ordered  Outcome: Progressing  Goal: Absence of urinary retention  Description: INTERVENTIONS:  - Assess patient’s ability to void and empty bladder  - Monitor I/O  - Bladder scan as needed  - Discuss with physician/AP medications to alleviate retention as needed  - Discuss catheterization for long term situations as appropriate  Outcome: Progressing  Goal: Urinary catheter remains patent  Description: INTERVENTIONS:  - Assess patency of urinary catheter  - If patient has a chronic lynn, consider changing catheter if non-functioning  - Follow guidelines for intermittent irrigation of non-functioning urinary catheter  Outcome: Progressing

## 2024-12-28 NOTE — PLAN OF CARE
Problem: PAIN - ADULT  Goal: Verbalizes/displays adequate comfort level or baseline comfort level  Description: Interventions:  - Encourage patient to monitor pain and request assistance  - Assess pain using appropriate pain scale  - Administer analgesics based on type and severity of pain and evaluate response  - Implement non-pharmacological measures as appropriate and evaluate response  - Consider cultural and social influences on pain and pain management  - Notify physician/advanced practitioner if interventions unsuccessful or patient reports new pain  Outcome: Progressing     Problem: INFECTION - ADULT  Goal: Absence or prevention of progression during hospitalization  Description: INTERVENTIONS:  - Assess and monitor for signs and symptoms of infection  - Monitor lab/diagnostic results  - Monitor all insertion sites, i.e. indwelling lines, tubes, and drains  - Monitor endotracheal if appropriate and nasal secretions for changes in amount and color  - [place] appropriate cooling/warming therapies per order  - Administer medications as ordered  - Instruct and encourage patient and family to use good hand hygiene technique  - Identify and instruct in appropriate isolation precautions for identified infection/condition  Outcome: Progressing  Goal: Absence of fever/infection during neutropenic period  Description: INTERVENTIONS:  - Monitor WBC    Outcome: Progressing     Problem: SAFETY ADULT  Goal: Patient will remain free of falls  Description: INTERVENTIONS:  - Educate patient/family on patient safety including physical limitations  - Instruct patient to call for assistance with activity   - Consult OT/PT to assist with strengthening/mobility   - Keep Call bell within reach  - Keep bed low and locked with side rails adjusted as appropriate  - Keep care items and personal belongings within reach  - Initiate and maintain comfort rounds  - Make Fall Risk Sign visible to staff  - Offer Toileting every 2 Hours,  in advance of need  - Initiate/Maintain bed alarm  - Obtain necessary fall risk management equipment: bed alarm  - Apply yellow socks and bracelet for high fall risk patients  - Consider moving patient to room near nurses station  Outcome: Progressing  Goal: Maintain or return to baseline ADL function  Description: INTERVENTIONS:  -  Assess patient's ability to carry out ADLs; assess patient's baseline for ADL function and identify physical deficits which impact ability to perform ADLs (bathing, care of mouth/teeth, toileting, grooming, dressing, etc.)  - Assess/evaluate cause of self-care deficits   - Assess range of motion  - Assess patient's mobility; develop plan if impaired  - Assess patient's need for assistive devices and provide as appropriate  - Encourage maximum independence but intervene and supervise when necessary  - Involve family in performance of ADLs  - Assess for home care needs following discharge   - Consider OT consult to assist with ADL evaluation and planning for discharge  - Provide patient education as appropriate  Outcome: Progressing  Goal: Maintains/Returns to pre admission functional level  Description: INTERVENTIONS:  - Perform AM-PAC 6 Click Basic Mobility/ Daily Activity assessment daily.  - Set and communicate daily mobility goal to care team and patient/family/caregiver.   - Collaborate with rehabilitation services on mobility goals if consulted  - Perform Range of Motion 3 times a day.  - Reposition patient every 2 hours.  - Dangle patient 3 times a day  - Stand patient 3 times a day  - Ambulate patient 3 times a day  - Out of bed to chair 3 times a day   - Out of bed for meals 3 times a day  - Out of bed for toileting  - Record patient progress and toleration of activity level   Outcome: Progressing     Problem: DISCHARGE PLANNING  Goal: Discharge to home or other facility with appropriate resources  Description: INTERVENTIONS:  - Identify barriers to discharge w/patient and  caregiver  - Arrange for needed discharge resources and transportation as appropriate  - Identify discharge learning needs (meds, wound care, etc.)  - Arrange for interpretive services to assist at discharge as needed  - Refer to Case Management Department for coordinating discharge planning if the patient needs post-hospital services based on physician/advanced practitioner order or complex needs related to functional status, cognitive ability, or social support system  Outcome: Progressing     Problem: Knowledge Deficit  Goal: Patient/family/caregiver demonstrates understanding of disease process, treatment plan, medications, and discharge instructions  Description: Complete learning assessment and assess knowledge base.  Interventions:  - Provide teaching at level of understanding  - Provide teaching via preferred learning methods  Outcome: Progressing     Problem: GENITOURINARY - ADULT  Goal: Maintains or returns to baseline urinary function  Description: INTERVENTIONS:  - Assess urinary function  - Encourage oral fluids to ensure adequate hydration if ordered  - Administer IV fluids as ordered to ensure adequate hydration  - Administer ordered medications as needed  - Offer frequent toileting  - Follow urinary retention protocol if ordered  Outcome: Progressing  Goal: Absence of urinary retention  Description: INTERVENTIONS:  - Assess patient’s ability to void and empty bladder  - Monitor I/O  - Bladder scan as needed  - Discuss with physician/AP medications to alleviate retention as needed  - Discuss catheterization for long term situations as appropriate  Outcome: Progressing  Goal: Urinary catheter remains patent  Description: INTERVENTIONS:  - Assess patency of urinary catheter  - If patient has a chronic lynn, consider changing catheter if non-functioning  - Follow guidelines for intermittent irrigation of non-functioning urinary catheter  Outcome: Progressing

## 2024-12-28 NOTE — ASSESSMENT & PLAN NOTE
ADULT ANNUAL [facility] PRIMARY CARE    NAME: Ricardo Bolaños  AGE: 33 y o  SEX: male  : 1991     DATE: 2019     Assessment and Plan:     Problem List Items Addressed This Visit     None      Visit Diagnoses     Annual physical exam    -  Primary    Relevant Orders    Comprehensive metabolic panel    Lipid panel    BMI 26 0-26 9,adult        Anxiety        Relevant Medications    escitalopram (LEXAPRO) 10 mg tablet    Other Relevant Orders    Ambulatory referral to Psychiatry    Panic attack        Relevant Medications    escitalopram (LEXAPRO) 10 mg tablet    Other Relevant Orders    Ambulatory referral to Psychiatry    Right upper quadrant pain              Immunizations and preventive care screenings were discussed with patient today  Appropriate education was printed on patient's after visit summary  Counseling:   · Dental Health: discussed importance of regular tooth brushing, flossing, and dental visits  Will have CMP and Lipid panel drawn to assess liver function and cholesterol due to RUQ pain  Will increase Lexapro to 10 mg  Referral placed for psychiatry  Return in about 3 months (around 2020)  Chief Complaint:     Chief Complaint   Patient presents with    Annual Exam    Follow-up     medication helping a little, still does not want to leave the house or do anything       History of Present Illness:     Adult Annual Physical   Patient here for a comprehensive physical exam  The patient reports problems - reports improvement in his anxiety to some extent with the medication but still feels very anxious when he goes out of the house  Would also like to see psychiatry for cognitive-behavioral therapy  Also with right upper quadrant pain, has been intermittent since he had his gall bladder removed  Reports it worsens when eating unhealthy such as fast food and he has been trying to decrease his consumption of that  Lab Results   Component Value Date    EGFR 54 12/28/2024    EGFR 45 12/27/2024    EGFR 35 12/26/2024    CREATININE 1.25 12/28/2024    CREATININE 1.47 (H) 12/27/2024    CREATININE 1.77 (H) 12/26/2024   POA creatinine elevated from baseline at 1.77, but not yet fulfilling SHANTA  Baseline creatinine 1.2-1.4  Creatinine is actually at baseline   Had a work-up done several years ago for the RUQ pain with no finding  Diet and Physical Activity  · Diet/Nutrition: poor diet and frequent junk food  · Exercise: no formal exercise  Depression Screening  PHQ-9 Depression Screening    PHQ-9:    Frequency of the following problems over the past two weeks:            General Health  · Sleep: sleeps well  · Hearing: normal - bilateral   · Vision: no vision problems  · Dental: regular dental visits and needs to have several teeth pulled, needs to find a new dentist since [facility]  · History of STDs?: no      Review of Systems:     Review of Systems   Constitutional: Negative  Respiratory: Negative  Cardiovascular: Negative  Gastrointestinal: Positive for abdominal pain  Psychiatric/Behavioral: The patient is nervous/anxious         Past Medical History:     Past Medical History:   Diagnosis Date    Known health problems: none       Past Surgical History:     Past Surgical History:   Procedure Laterality Date    APPENDECTOMY      CYST REMOVAL      GALLBLADDER SURGERY      TYMPANOSTOMY TUBE PLACEMENT        Social History:     Social History     Socioeconomic History    Marital status: /Civil Union     Spouse name: None    Number of children: None    Years of education: None    Highest education level: None   Occupational History    None   Social Needs    Financial resource strain: None    Food insecurity:     Worry: None     Inability: None    Transportation needs:     Medical: None     Non-medical: None   Tobacco Use    Smoking status: Never Smoker    Smokeless tobacco: Current User   Substance and Sexual Activity    Alcohol use: Never     Frequency: Never    Drug use: Never    Sexual activity: None   Lifestyle    Physical activity:     Days per week: None     Minutes per session: None    Stress: None   Relationships    Social connections:     Talks on phone: None     Gets together: None     Attends [employer] service: None     Active member of club or organization: None     Attends meetings of clubs or organizations: None     Relationship status: None    Intimate partner violence:     Fear of current or ex partner: None     Emotionally abused: None     Physically abused: None     Forced sexual activity: None   Other Topics Concern    None   Social History Narrative    None      Family History:     Family History   Problem Relation Age of Onset    No Known Problems Mother     No Known Problems Father       Current Medications:     Current Outpatient Medications   Medication Sig Dispense Refill    escitalopram (LEXAPRO) 10 mg tablet Take 1 tablet (10 mg total) by mouth daily 30 tablet 2    hydrOXYzine HCL (ATARAX) 50 mg tablet Take 1 tablet (50 mg total) by mouth 3 (three) times a day as needed for itching (Patient not taking: Reported on 10/22/2019) 20 tablet 0     No current facility-administered medications for this visit  Allergies: Allergies   Allergen Reactions    Amoxicillin       Physical Exam:     /86 (BP Location: Left arm, Patient Position: Sitting, Cuff Size: Standard)   Pulse 96   Ht 5' 7" (1 702 m)   Wt 75 7 kg (166 lb 14 2 oz)   SpO2 99%   BMI 26 14 kg/m²     Physical Exam   Constitutional: He is oriented to person, place, and time  He appears well-developed and well-nourished  HENT:   Head: Normocephalic and atraumatic  Eyes: Pupils are equal, round, and reactive to light  Conjunctivae and EOM are normal    Neck: Normal range of motion  Neck supple  No JVD present  No tracheal deviation present  No thyromegaly present  Cardiovascular: Normal rate, regular rhythm and normal heart sounds  Exam reveals no gallop and no friction rub  No murmur heard  Pulmonary/Chest: Effort normal and breath sounds normal  No stridor  No respiratory distress  He has no wheezes  He has no rales  Abdominal: Soft  Bowel sounds are normal  He exhibits no distension   There is no tenderness  There is no guarding  Lymphadenopathy:        Head (right side): No submandibular, no tonsillar, no preauricular and no posterior auricular adenopathy present  Head (left side): No submandibular, no tonsillar, no preauricular and no posterior auricular adenopathy present  He has no cervical adenopathy  Right: No supraclavicular adenopathy present  Left: No supraclavicular adenopathy present  Neurological: He is alert and oriented to person, place, and time  Psychiatric: His speech is normal and behavior is normal  Judgment and thought content normal  His mood appears anxious  Vitals reviewed  ARI Funk   [facility] PRIMARY CARE  BMI Counseling: Body mass index is 26 14 kg/m²  The BMI is above normal  Nutrition recommendations include reducing fast food intake and consuming healthier snacks

## 2024-12-28 NOTE — ASSESSMENT & PLAN NOTE
UTI with bacteremia.    Growing E. coli but he has a history of drug-resistant bacteria    Awaiting sensitivities    Antibiotic stewardship recommended Invanz

## 2024-12-28 NOTE — UTILIZATION REVIEW
Continued Stay Review    SEE INITIAL REVIEW AT BOTTOM    Date: 12/28/24                          Current Patient Class: Inpatient    Current Level of Care: med surg  HPI:79 y.o. male initially admitted on 12/27/24     Assessment/Plan:   IVABT continued for UTI, bacteremia. Supportive care continued for bronchitis. O2 requirements @ 1ltr nc, wean as able to tolerate. Continue to monitor respiratory status, O2 needs, VS, follow labs.       Medications:   Scheduled Medications:  amLODIPine, 10 mg, Oral, QPM  aspirin, 81 mg, Oral, Daily  atorvastatin, 80 mg, Oral, QPM  ertapenem, 1,000 mg, Intravenous, Q24H  finasteride, 5 mg, Oral, Daily  guaiFENesin, 600 mg, Oral, Q12H TALIB  heparin (porcine), 5,000 Units, Subcutaneous, Q8H TALIB  hydroxyurea, 500 mg, Oral, Daily  metoprolol tartrate, 50 mg, Oral, BID  nicotine, 1 patch, Transdermal, Daily  pantoprazole, 40 mg, Oral, Daily Before Breakfast  tamsulosin, 0.4 mg, Oral, Daily With Dinner    PRN Meds:  acetaminophen, 650 mg, Oral, Q6H PRN  albuterol, 2 puff, Inhalation, Q4H PRN  benzonatate, 100 mg, Oral, TID PRN  dextromethorphan-guaiFENesin, 10 mL, Oral, Q4H PRN  ipratropium-albuterol, 3 mL, Nebulization, Q6H PRN      Discharge Plan: tbd    Vital Signs (last 3 days)       Date/Time Temp Pulse Resp BP MAP (mmHg) SpO2 Calculated FIO2 (%) - Nasal Cannula Nasal Cannula O2 Flow Rate (L/min) O2 Device Patient Position - Orthostatic VS Pain    12/28/24 1014 -- 72 -- 123/87 -- -- -- -- -- -- --    12/28/24 1000 -- -- -- -- -- -- -- -- -- -- No Pain    12/28/24 07:08:26 98.5 °F (36.9 °C) 79 16 130/83 99 98 % 24 1 L/min Nasal cannula Lying --    12/27/24 23:35:42 98.2 °F (36.8 °C) 75 -- 125/76 92 95 % -- -- -- -- --    12/27/24 2200 -- -- -- -- -- -- -- -- -- -- No Pain    12/27/24 21:57:08 98.6 °F (37 °C) 78 18 130/83 99 96 % 24 1 L/min Nasal cannula Lying --    12/27/24 16:03:46 98.1 °F (36.7 °C) 67 19 144/78 100 97 % 24 1 L/min Nasal cannula Lying --    12/27/24 0900 -- -- -- --  -- -- -- -- -- -- No Pain    12/27/24 07:04:15 99.7 °F (37.6 °C) 72 20 118/70 86 98 % 24 1 L/min Nasal cannula Lying --    12/26/24 2312 -- -- -- -- -- -- -- -- -- -- Med Not Given for Pain - for MAR use only    12/26/24 23:07:12 98.5 °F (36.9 °C) 78 19 125/85 98 96 % -- -- None (Room air) Lying --    12/26/24 2213 -- -- -- -- -- -- -- -- -- -- No Pain    12/26/24 2209 -- -- -- -- -- -- -- -- -- -- No Pain    12/26/24 21:49:41 98.1 °F (36.7 °C) 70 16 131/74 93 95 % -- -- -- -- --    12/26/24 2130 -- 68 21 122/65 89 96 % -- -- None (Room air) Lying --    12/26/24 2030 -- 70 20 116/58 82 95 % -- -- None (Room air) Lying --    12/26/24 1927 98.6 °F (37 °C) -- -- -- -- -- -- -- -- -- --    12/26/24 1921 -- 70 22 127/68 -- 96 % -- -- None (Room air) Lying --    12/26/24 1745 98.6 °F (37 °C) -- -- -- -- -- -- -- -- -- --    12/26/24 1715 -- 82 22 131/95 109 93 % -- -- None (Room air) -- --    12/26/24 1503 -- -- -- -- -- -- -- -- None (Room air) -- --    12/26/24 1439 100.2 °F (37.9 °C) -- -- -- -- -- -- -- -- -- --    12/26/24 1435 98.5 °F (36.9 °C) 85 16 161/74 -- 92 % -- -- None (Room air) Lying 9          Weight (last 2 days)       Date/Time Weight    12/28/24 0600 74.1 (163.36)    12/27/24 0535 74.3 (163.8)    12/26/24 1435 76.5 (168.65)            Pertinent Labs/Diagnostic Results:   Radiology:  CT chest abdomen pelvis w contrast   Final Interpretation by Murray Matute MD (12/26 1921)      Right lower lobe bronchial wall thickening with air trapping compatible with bronchitis/bronchiolitis.      No evidence of acute intra-abdominal or pelvic pathology               Workstation performed: [id number]           Cardiology:  ECG 12 lead   Final Result by Ventura Lazo DO (12/27 9998)    Age and gender specific ECG analysis    Ventricular-paced rhythm   Abnormal ECG   When compared with ECG of 26-Dec-2024 17:17, (unconfirmed)   Vent. rate has decreased by   6 bpm   Confirmed by Ventura Lazo ([id number]) on 12/27/2024  "10:44:41 AM      ECG 12 lead   Final Result by Ventura Lazo DO (12/27 1044)    Age and gender specific ECG analysis    Ventricular-paced rhythm   When compared with ECG of 26-Dec-2024 14:36, (unconfirmed)   No significant change was found   Confirmed by Ventura Lazo ([id number]) on 12/27/2024 10:44:15 AM      ECG 12 lead   Final Result by Ventura Lazo DO (12/27 1041)    Age and gender specific ECG analysis   Ventricular-paced rhythm   Abnormal ECG   When compared with ECG of 12-Oct-2022 19:24,   Vent. rate has increased by  19 bpm   Confirmed by Ventura Lazo ([id number]) on 12/27/2024 10:41:10 AM        GI:  No orders to display           Results from last 7 days   Lab Units 12/28/24 0449 12/27/24  0509 12/26/24  1509   WBC Thousand/uL 6.43 8.32 9.21   HEMOGLOBIN g/dL 13.3 12.9 14.7   HEMATOCRIT % 38.1 36.5 41.9   PLATELETS Thousands/uL 117* 94* 109*   TOTAL NEUT ABS Thousands/µL 4.76 6.90 8.13*         Results from last 7 days   Lab Units 12/28/24  0449 12/27/24  0509 12/26/24  1509   SODIUM mmol/L 136 138 140   POTASSIUM mmol/L 3.2* 3.5 3.6   CHLORIDE mmol/L 104 107 105   CO2 mmol/L 25 24 28   ANION GAP mmol/L 7 7 7   BUN mg/dL 12 18 21   CREATININE mg/dL 1.25 1.47* 1.77*   EGFR ml/min/1.73sq m 54 45 35   CALCIUM mg/dL 8.4 8.1* 8.6   MAGNESIUM mg/dL 2.0 1.9  --      Results from last 7 days   Lab Units 12/26/24  1509   AST U/L 18   ALT U/L 14   ALK PHOS U/L 116*   TOTAL PROTEIN g/dL 7.2   ALBUMIN g/dL 3.7   TOTAL BILIRUBIN mg/dL 1.71*   BILIRUBIN DIRECT mg/dL 0.36*         Results from last 7 days   Lab Units 12/28/24  0449 12/27/24  0509 12/26/24  1509   GLUCOSE RANDOM mg/dL 87 111 106             No results found for: \"BETA-HYDROXYBUTYRATE\"                   Results from last 7 days   Lab Units 12/26/24  1919 12/26/24  1722 12/26/24  1509   HS TNI 0HR ng/L  --   --  11   HS TNI 2HR ng/L  --  26  --    HSTNI D2 ng/L  --  15  --    HS TNI 4HR ng/L 26  --   --    HSTNI D4 ng/L 15  --   --                "   Results from last 7 days   Lab Units 12/26/24  1509   PROCALCITONIN ng/ml 0.11     Results from last 7 days   Lab Units 12/26/24  1509   LACTIC ACID mmol/L 1.6                                                 Results from last 7 days   Lab Units 12/26/24  1543   CLARITY UA  Clear   COLOR UA  Yellow   SPEC GRAV UA  >=1.030   PH UA  7.0   GLUCOSE UA mg/dl Negative   KETONES UA mg/dl Negative   BLOOD UA  Trace*   PROTEIN UA mg/dl 30 (1+)*   NITRITE UA  Positive*   BILIRUBIN UA  Negative   UROBILINOGEN UA E.U./dl 2.0*   LEUKOCYTES UA  Trace*   WBC UA /hpf 4-10*   RBC UA /hpf 1-2   BACTERIA UA /hpf Moderate*   EPITHELIAL CELLS WET PREP /hpf Occasional                                 Results from last 7 days   Lab Units 12/26/24  2039 12/26/24  1509   BLOOD CULTURE   --  Escherichia coli*  No Growth at 24 hrs.   GRAM STAIN RESULT   --  Gram negative rods*   URINE CULTURE  >100,000 cfu/ml Gram Negative [facility] Enteric Like*  --                    Network Utilization Review Department  ATTENTION: Please call with any questions or concerns to [phone] and carefully listen to the prompts so that you are directed to the right person. All voicemails are confidential.   For Discharge needs, contact Care Management DC Support Team at [phone] opt. [phone]  Send all requests for admission clinical reviews, approved or denied determinations and any other requests to dedicated fax number below belonging to the campus where the patient is receiving treatment. List of dedicated fax numbers for the Facilities:  FACILITY NAME UR FAX NUMBER   ADMISSION DENIALS (Administrative/Medical Necessity) [phone]   DISCHARGE SUPPORT TEAM (NETWORK) [phone]   PARENT CHILD HEALTH (Maternity/NICU/Pediatrics) [phone]   [facility] [phone]   General acute hospital [phone]   [facility] [phone]   [facility] [phone]   [facility]  [facility] [phone]   [facility] [phone]   [facility] [phone]   [facility] [phone]   [facility] [phone]   [facility] [phone]   [facility] [phone]   [facility] [phone]

## 2024-12-29 LAB
ANION GAP SERPL CALCULATED.3IONS-SCNC: 7 MMOL/L (ref 4–13)
BACTERIA BLD CULT: ABNORMAL
BASOPHILS # BLD AUTO: 0.01 THOUSANDS/ΜL (ref 0–0.1)
BASOPHILS NFR BLD AUTO: 0 % (ref 0–1)
BLACTX-M ISLT/SPM QL: DETECTED
BUN SERPL-MCNC: 17 MG/DL (ref 5–25)
CALCIUM SERPL-MCNC: 8.6 MG/DL (ref 8.4–10.2)
CHLORIDE SERPL-SCNC: 107 MMOL/L (ref 96–108)
CO2 SERPL-SCNC: 24 MMOL/L (ref 21–32)
CREAT SERPL-MCNC: 1.32 MG/DL (ref 0.6–1.3)
E COLI DNA BLD POS QL NAA+NON-PROBE: DETECTED
EOSINOPHIL # BLD AUTO: 0.15 THOUSAND/ΜL (ref 0–0.61)
EOSINOPHIL NFR BLD AUTO: 2 % (ref 0–6)
ERYTHROCYTE [DISTWIDTH] IN BLOOD BY AUTOMATED COUNT: 14 % (ref 11.6–15.1)
GFR SERPL CREATININE-BSD FRML MDRD: 51 ML/MIN/1.73SQ M
GLUCOSE SERPL-MCNC: 83 MG/DL (ref 65–140)
GRAM STN SPEC: ABNORMAL
HCT VFR BLD AUTO: 38.5 % (ref 36.5–49.3)
HGB BLD-MCNC: 13.6 G/DL (ref 12–17)
IMM GRANULOCYTES # BLD AUTO: 0.03 THOUSAND/UL (ref 0–0.2)
IMM GRANULOCYTES NFR BLD AUTO: 0 % (ref 0–2)
LYMPHOCYTES # BLD AUTO: 0.97 THOUSANDS/ΜL (ref 0.6–4.47)
LYMPHOCYTES NFR BLD AUTO: 14 % (ref 14–44)
MAGNESIUM SERPL-MCNC: 2.1 MG/DL (ref 1.9–2.7)
MCH RBC QN AUTO: 38.5 PG (ref 26.8–34.3)
MCHC RBC AUTO-ENTMCNC: 35.3 G/DL (ref 31.4–37.4)
MCV RBC AUTO: 109 FL (ref 82–98)
MONOCYTES # BLD AUTO: 0.65 THOUSAND/ΜL (ref 0.17–1.22)
MONOCYTES NFR BLD AUTO: 9 % (ref 4–12)
NEUTROPHILS # BLD AUTO: 5.21 THOUSANDS/ΜL (ref 1.85–7.62)
NEUTS SEG NFR BLD AUTO: 75 % (ref 43–75)
NRBC BLD AUTO-RTO: 0 /100 WBCS
PLATELET # BLD AUTO: 123 THOUSANDS/UL (ref 149–390)
PMV BLD AUTO: 10.4 FL (ref 8.9–12.7)
POTASSIUM SERPL-SCNC: 3.6 MMOL/L (ref 3.5–5.3)
RBC # BLD AUTO: 3.53 MILLION/UL (ref 3.88–5.62)
SODIUM SERPL-SCNC: 138 MMOL/L (ref 135–147)
WBC # BLD AUTO: 7.02 THOUSAND/UL (ref 4.31–10.16)

## 2024-12-29 PROCEDURE — 80048 BASIC METABOLIC PNL TOTAL CA: CPT | Performed by: HOSPITALIST

## 2024-12-29 PROCEDURE — 85025 COMPLETE CBC W/AUTO DIFF WBC: CPT | Performed by: HOSPITALIST

## 2024-12-29 PROCEDURE — 83735 ASSAY OF MAGNESIUM: CPT | Performed by: HOSPITALIST

## 2024-12-29 PROCEDURE — 99232 SBSQ HOSP IP/OBS MODERATE 35: CPT | Performed by: HOSPITALIST

## 2024-12-29 RX ADMIN — GUAIFENESIN 600 MG: 600 TABLET ORAL at 10:04

## 2024-12-29 RX ADMIN — HEPARIN SODIUM 5000 UNITS: 5000 INJECTION INTRAVENOUS; SUBCUTANEOUS at 05:04

## 2024-12-29 RX ADMIN — TAMSULOSIN HYDROCHLORIDE 0.4 MG: 0.4 CAPSULE ORAL at 17:12

## 2024-12-29 RX ADMIN — HYDROXYUREA 500 MG: 500 CAPSULE ORAL at 10:04

## 2024-12-29 RX ADMIN — METOPROLOL TARTRATE 50 MG: 50 TABLET, FILM COATED ORAL at 22:55

## 2024-12-29 RX ADMIN — ASPIRIN 81 MG: 81 TABLET, COATED ORAL at 10:04

## 2024-12-29 RX ADMIN — AMLODIPINE BESYLATE 10 MG: 10 TABLET ORAL at 22:55

## 2024-12-29 RX ADMIN — PANTOPRAZOLE SODIUM 40 MG: 40 TABLET, DELAYED RELEASE ORAL at 05:04

## 2024-12-29 RX ADMIN — METOPROLOL TARTRATE 50 MG: 50 TABLET, FILM COATED ORAL at 10:04

## 2024-12-29 RX ADMIN — ATORVASTATIN CALCIUM 80 MG: 80 TABLET, FILM COATED ORAL at 22:55

## 2024-12-29 RX ADMIN — POTASSIUM CHLORIDE 40 MEQ: 1500 TABLET, EXTENDED RELEASE ORAL at 17:11

## 2024-12-29 RX ADMIN — GUAIFENESIN 600 MG: 600 TABLET ORAL at 22:55

## 2024-12-29 RX ADMIN — ERTAPENEM SODIUM 1000 MG: 1 INJECTION INTRAMUSCULAR; INTRAVENOUS at 17:11

## 2024-12-29 RX ADMIN — HEPARIN SODIUM 5000 UNITS: 5000 INJECTION INTRAVENOUS; SUBCUTANEOUS at 22:55

## 2024-12-29 RX ADMIN — HEPARIN SODIUM 5000 UNITS: 5000 INJECTION INTRAVENOUS; SUBCUTANEOUS at 14:55

## 2024-12-29 RX ADMIN — POTASSIUM CHLORIDE 40 MEQ: 1500 TABLET, EXTENDED RELEASE ORAL at 10:04

## 2024-12-29 RX ADMIN — FINASTERIDE 5 MG: 5 TABLET, FILM COATED ORAL at 10:04

## 2024-12-29 NOTE — ASSESSMENT & PLAN NOTE
History of JAK2 mutation positive polycythemia vera  Followed outpatient by [facility] hematology  Continue hydroxyurea

## 2024-12-29 NOTE — ASSESSMENT & PLAN NOTE
Lab Results   Component Value Date    EGFR 51 12/29/2024    EGFR 54 12/28/2024    EGFR 45 12/27/2024    CREATININE 1.32 (H) 12/29/2024    CREATININE 1.25 12/28/2024    CREATININE 1.47 (H) 12/27/2024   POA creatinine elevated from baseline at 1.77, but not yet fulfilling SHANTA  Baseline creatinine 1.2-1.4  Creatinine is actually at baseline

## 2024-12-29 NOTE — PLAN OF CARE
Problem: PAIN - ADULT  Goal: Verbalizes/displays adequate comfort level or baseline comfort level  Description: Interventions:  - Encourage patient to monitor pain and request assistance  - Assess pain using appropriate pain scale  - Administer analgesics based on type and severity of pain and evaluate response  - Implement non-pharmacological measures as appropriate and evaluate response  - Consider cultural and social influences on pain and pain management  - Notify physician/advanced practitioner if interventions unsuccessful or patient reports new pain  Outcome: Progressing     Problem: INFECTION - ADULT  Goal: Absence or prevention of progression during hospitalization  Description: INTERVENTIONS:  - Assess and monitor for signs and symptoms of infection  - Monitor lab/diagnostic results  - Monitor all insertion sites, i.e. indwelling lines, tubes, and drains  - Monitor endotracheal if appropriate and nasal secretions for changes in amount and color  - [place] appropriate cooling/warming therapies per order  - Administer medications as ordered  - Instruct and encourage patient and family to use good hand hygiene technique  - Identify and instruct in appropriate isolation precautions for identified infection/condition  Outcome: Progressing  Goal: Absence of fever/infection during neutropenic period  Description: INTERVENTIONS:  - Monitor WBC    Outcome: Progressing     Problem: SAFETY ADULT  Goal: Patient will remain free of falls  Description: INTERVENTIONS:  - Educate patient/family on patient safety including physical limitations  - Instruct patient to call for assistance with activity   - Consult OT/PT to assist with strengthening/mobility   - Keep Call bell within reach  - Keep bed low and locked with side rails adjusted as appropriate  - Keep care items and personal belongings within reach  - Initiate and maintain comfort rounds  - Make Fall Risk Sign visible to staff  - Offer Toileting every 2 Hours,  in advance of need  - Initiate/Maintain bed alarm  - Obtain necessary fall risk management equipment: bed alarm  - Apply yellow socks and bracelet for high fall risk patients  - Consider moving patient to room near nurses station  Outcome: Progressing  Goal: Maintain or return to baseline ADL function  Description: INTERVENTIONS:  -  Assess patient's ability to carry out ADLs; assess patient's baseline for ADL function and identify physical deficits which impact ability to perform ADLs (bathing, care of mouth/teeth, toileting, grooming, dressing, etc.)  - Assess/evaluate cause of self-care deficits   - Assess range of motion  - Assess patient's mobility; develop plan if impaired  - Assess patient's need for assistive devices and provide as appropriate  - Encourage maximum independence but intervene and supervise when necessary  - Involve family in performance of ADLs  - Assess for home care needs following discharge   - Consider OT consult to assist with ADL evaluation and planning for discharge  - Provide patient education as appropriate  Outcome: Progressing  Goal: Maintains/Returns to pre admission functional level  Description: INTERVENTIONS:  - Perform AM-PAC 6 Click Basic Mobility/ Daily Activity assessment daily.  - Set and communicate daily mobility goal to care team and patient/family/caregiver.   - Collaborate with rehabilitation services on mobility goals if consulted  - Perform Range of Motion 3 times a day.  - Reposition patient every 2 hours.  - Dangle patient 3 times a day  - Stand patient 3 times a day  - Ambulate patient 3 times a day  - Out of bed to chair 3 times a day   - Out of bed for meals 3 times a day  - Out of bed for toileting  - Record patient progress and toleration of activity level   Outcome: Progressing     Problem: DISCHARGE PLANNING  Goal: Discharge to home or other facility with appropriate resources  Description: INTERVENTIONS:  - Identify barriers to discharge w/patient and  caregiver  - Arrange for needed discharge resources and transportation as appropriate  - Identify discharge learning needs (meds, wound care, etc.)  - Arrange for interpretive services to assist at discharge as needed  - Refer to Case Management Department for coordinating discharge planning if the patient needs post-hospital services based on physician/advanced practitioner order or complex needs related to functional status, cognitive ability, or social support system  Outcome: Progressing     Problem: Knowledge Deficit  Goal: Patient/family/caregiver demonstrates understanding of disease process, treatment plan, medications, and discharge instructions  Description: Complete learning assessment and assess knowledge base.  Interventions:  - Provide teaching at level of understanding  - Provide teaching via preferred learning methods  Outcome: Progressing     Problem: GENITOURINARY - ADULT  Goal: Maintains or returns to baseline urinary function  Description: INTERVENTIONS:  - Assess urinary function  - Encourage oral fluids to ensure adequate hydration if ordered  - Administer IV fluids as ordered to ensure adequate hydration  - Administer ordered medications as needed  - Offer frequent toileting  - Follow urinary retention protocol if ordered  Outcome: Progressing  Goal: Absence of urinary retention  Description: INTERVENTIONS:  - Assess patient’s ability to void and empty bladder  - Monitor I/O  - Bladder scan as needed  - Discuss with physician/AP medications to alleviate retention as needed  - Discuss catheterization for long term situations as appropriate  Outcome: Progressing  Goal: Urinary catheter remains patent  Description: INTERVENTIONS:  - Assess patency of urinary catheter  - If patient has a chronic lynn, consider changing catheter if non-functioning  - Follow guidelines for intermittent irrigation of non-functioning urinary catheter  Outcome: Progressing

## 2024-12-29 NOTE — ASSESSMENT & PLAN NOTE
UTI with bacteremia.    Patient has a multidrug-resistant E. Coli    I spoke with infectious disease, they recommend 7 days of IV antibiotics and the patient cannot go home yet.    He still needs 4 more days of IV antibiotics

## 2024-12-29 NOTE — PLAN OF CARE
Problem: PAIN - ADULT  Goal: Verbalizes/displays adequate comfort level or baseline comfort level  Description: Interventions:  - Encourage patient to monitor pain and request assistance  - Assess pain using appropriate pain scale  - Administer analgesics based on type and severity of pain and evaluate response  - Implement non-pharmacological measures as appropriate and evaluate response  - Consider cultural and social influences on pain and pain management  - Notify physician/advanced practitioner if interventions unsuccessful or patient reports new pain  Outcome: Progressing     Problem: INFECTION - ADULT  Goal: Absence or prevention of progression during hospitalization  Description: INTERVENTIONS:  - Assess and monitor for signs and symptoms of infection  - Monitor lab/diagnostic results  - Monitor all insertion sites, i.e. indwelling lines, tubes, and drains  - Monitor endotracheal if appropriate and nasal secretions for changes in amount and color  - [place] appropriate cooling/warming therapies per order  - Administer medications as ordered  - Instruct and encourage patient and family to use good hand hygiene technique  - Identify and instruct in appropriate isolation precautions for identified infection/condition  Outcome: Progressing  Goal: Absence of fever/infection during neutropenic period  Description: INTERVENTIONS:  - Monitor WBC    Outcome: Progressing     Problem: SAFETY ADULT  Goal: Patient will remain free of falls  Description: INTERVENTIONS:  - Educate patient/family on patient safety including physical limitations  - Instruct patient to call for assistance with activity   - Consult OT/PT to assist with strengthening/mobility   - Keep Call bell within reach  - Keep bed low and locked with side rails adjusted as appropriate  - Keep care items and personal belongings within reach  - Initiate and maintain comfort rounds  - Make Fall Risk Sign visible to staff  - Apply yellow socks and bracelet  for high fall risk patients  - Consider moving patient to room near nurses station  Outcome: Progressing  Goal: Maintain or return to baseline ADL function  Description: INTERVENTIONS:  -  Assess patient's ability to carry out ADLs; assess patient's baseline for ADL function and identify physical deficits which impact ability to perform ADLs (bathing, care of mouth/teeth, toileting, grooming, dressing, etc.)  - Assess/evaluate cause of self-care deficits   - Assess range of motion  - Assess patient's mobility; develop plan if impaired  - Assess patient's need for assistive devices and provide as appropriate  - Encourage maximum independence but intervene and supervise when necessary  - Involve family in performance of ADLs  - Assess for home care needs following discharge   - Consider OT consult to assist with ADL evaluation and planning for discharge  - Provide patient education as appropriate  Outcome: Progressing  Goal: Maintains/Returns to pre admission functional level  Description: INTERVENTIONS:  - Perform AM-PAC 6 Click Basic Mobility/ Daily Activity assessment daily.  - Set and communicate daily mobility goal to care team and patient/family/caregiver.   - Collaborate with rehabilitation services on mobility goals if consulted  - Out of bed for toileting  - Record patient progress and toleration of activity level   Outcome: Progressing     Problem: DISCHARGE PLANNING  Goal: Discharge to home or other facility with appropriate resources  Description: INTERVENTIONS:  - Identify barriers to discharge w/patient and caregiver  - Arrange for needed discharge resources and transportation as appropriate  - Identify discharge learning needs (meds, wound care, etc.)  - Arrange for interpretive services to assist at discharge as needed  - Refer to Case Management Department for coordinating discharge planning if the patient needs post-hospital services based on physician/advanced practitioner order or complex needs  related to functional status, cognitive ability, or social support system  Outcome: Progressing     Problem: Knowledge Deficit  Goal: Patient/family/caregiver demonstrates understanding of disease process, treatment plan, medications, and discharge instructions  Description: Complete learning assessment and assess knowledge base.  Interventions:  - Provide teaching at level of understanding  - Provide teaching via preferred learning methods  Outcome: Progressing     Problem: GENITOURINARY - ADULT  Goal: Maintains or returns to baseline urinary function  Description: INTERVENTIONS:  - Assess urinary function  - Encourage oral fluids to ensure adequate hydration if ordered  - Administer IV fluids as ordered to ensure adequate hydration  - Administer ordered medications as needed  - Offer frequent toileting  - Follow urinary retention protocol if ordered  Outcome: Progressing  Goal: Absence of urinary retention  Description: INTERVENTIONS:  - Assess patient’s ability to void and empty bladder  - Monitor I/O  - Bladder scan as needed  - Discuss with physician/AP medications to alleviate retention as needed  - Discuss catheterization for long term situations as appropriate  Outcome: Progressing  Goal: Urinary catheter remains patent  Description: INTERVENTIONS:  - Assess patency of urinary catheter  - If patient has a chronic lynn, consider changing catheter if non-functioning  - Follow guidelines for intermittent irrigation of non-functioning urinary catheter  Outcome: Progressing

## 2024-12-29 NOTE — PROGRESS NOTES
"Progress Note - Hospitalist   Name: Santana Jacinto 79 y.o. male I MRN: [id number]  Unit/Bed#: E5 -01 I Date of Admission: 12/26/2024   Date of Service: 12/29/2024 I Hospital Day: 2    Assessment & Plan  Urinary tract infection  UTI with bacteremia.    Patient has a multidrug-resistant E. Coli    I spoke with infectious disease, they recommend 7 days of IV antibiotics and the patient cannot go home yet.    He still needs 4 more days of IV antibiotics  Bronchitis  CT chest demonstrating \"Right lower lobe bronchial wall thickening with air trapping compatible with bronchitis/bronchiolitis.\"  Pt does endorse increased productive cough, but reports he thought this was related to his smoking. He denies any congestion, sore throat, SOB, or wheezing. Maintaining oxygen on RA.  Viral panel negative  Continue supportive care, home inhaler therapy  Chronic obstructive pulmonary disease (HCC)  No acute exacerbation.  He is breathing comfortably.  CKD (chronic kidney disease)  Lab Results   Component Value Date    EGFR 51 12/29/2024    EGFR 54 12/28/2024    EGFR 45 12/27/2024    CREATININE 1.32 (H) 12/29/2024    CREATININE 1.25 12/28/2024    CREATININE 1.47 (H) 12/27/2024   POA creatinine elevated from baseline at 1.77, but not yet fulfilling SHANTA  Baseline creatinine 1.2-1.4  Creatinine is actually at baseline  Benign essential hypertension  Continue amlodipine and metoprolol  Mixed hyperlipidemia  Continue statin  Polycythemia vera (HCC)  History of JAK2 mutation positive polycythemia vera  Followed outpatient by [facility] hematology  Continue hydroxyurea   Cigarette nicotine dependence without complication  Pt reports smoking 0.5 packs a day  Continue to encourage cessation  Nicotine patch supplementation     VTE Pharmacologic Prophylaxis:                 Current Length of Stay: 2 day(s)  Current Patient Status: Inpatient   Certification Statement:     Discharge Plan:         Subjective   Wants to go home.  But he understands he " needs IV antibiotics.    No fever no chills.    No back pain.  No dysuria    Objective   Vitals:   Temp (24hrs), Av.7 °F (36.5 °C), Min:97.7 °F (36.5 °C), Max:97.7 °F (36.5 °C)    Temp:  [97.7 °F (36.5 °C)] 97.7 °F (36.5 °C)  HR:  [60] 60  Resp:  [18] 18  BP: (120-123)/(71-75) 120/71  SpO2:  [93 %-95 %] 93 %  Body mass index is 24.77 kg/m².         Physical Exam  Vitals and nursing note reviewed.   Constitutional:       General: He is not in acute distress.     Appearance: He is well-developed.   HENT:      Head: Normocephalic and atraumatic.   Eyes:      Conjunctiva/sclera: Conjunctivae normal.   Cardiovascular:      Rate and Rhythm: Normal rate and regular rhythm.      Heart sounds: No murmur heard.  Pulmonary:      Effort: Pulmonary effort is normal. No respiratory distress.      Breath sounds: Normal breath sounds.   Abdominal:      Palpations: Abdomen is soft.      Tenderness: There is no abdominal tenderness.   Musculoskeletal:         General: No swelling.      Cervical back: Neck supple.      Right lower leg: No edema.      Left lower leg: No edema.   Skin:     General: Skin is warm and dry.      Capillary Refill: Capillary refill takes less than 2 seconds.   Neurological:      Mental Status: He is alert.   Psychiatric:         Mood and Affect: Mood normal.           Lab results  Results from last 7 days   Lab Units 24  0502 24  0509   WBC Thousand/uL 7.02 6.43 8.32   HEMOGLOBIN g/dL 13.6 13.3 12.9   PLATELETS Thousands/uL 123* 117* 94*   MCV fL 109* 110* 111*     Results from last 7 days   Lab Units 24  0502 24  0449 24  0509 24  1509   SODIUM mmol/L 138 136 138 140   POTASSIUM mmol/L 3.6 3.2* 3.5 3.6   CHLORIDE mmol/L 107 104 107 105   CO2 mmol/L    ANION GAP mmol/L 7 7 7 7   BUN mg/dL 17 12 18 21   CREATININE mg/dL 1.32* 1.25 1.47* 1.77*   CALCIUM mg/dL 8.6 8.4 8.1* 8.6   ALBUMIN g/dL  --   --   --  3.7   TOTAL BILIRUBIN mg/dL  --   --   --   1.71*   ALK PHOS U/L  --   --   --  116*   ALT U/L  --   --   --  14   AST U/L  --   --   --  18   EGFR ml/min/1.73sq m 51 54 45 35   GLUCOSE RANDOM mg/dL 83 87 111 106     Results from last 7 days   Lab Units 12/29/24  0502 12/28/24  0449 12/27/24  0509   MAGNESIUM mg/dL 2.1 2.0 1.9             Last 24 Hours Medication List:     Current Facility-Administered Medications:     acetaminophen (TYLENOL) tablet 650 mg, Q6H PRN    albuterol (PROVENTIL HFA,VENTOLIN HFA) inhaler 2 puff, Q4H PRN    amLODIPine (NORVASC) tablet 10 mg, QPM    aspirin (ECOTRIN LOW STRENGTH) EC tablet 81 mg, Daily    atorvastatin (LIPITOR) tablet 80 mg, QPM    benzonatate (TESSALON PERLES) capsule 100 mg, TID PRN    dextromethorphan-guaiFENesin (ROBITUSSIN DM) oral syrup 10 mL, Q4H PRN    ertapenem (INVanz) 1,000 mg in sodium chloride 0.9 % 50 mL IVPB, Q24H, Last Rate: 1,000 mg (12/28/24 1722)    finasteride (PROSCAR) tablet 5 mg, Daily    guaiFENesin (MUCINEX) 12 hr tablet 600 mg, Q12H TALIB    heparin (porcine) subcutaneous injection 5,000 Units, Q8H TALIB    hydroxyurea (HYDREA) capsule 500 mg, Daily    ipratropium-albuterol (DUO-NEB) 0.5-2.5 mg/3 mL inhalation solution 3 mL, Q6H PRN    metoprolol tartrate (LOPRESSOR) tablet 50 mg, BID    nicotine (NICODERM CQ) 14 mg/24hr TD 24 hr patch 1 patch, Daily    pantoprazole (PROTONIX) EC tablet 40 mg, Daily Before Breakfast    potassium chloride (Klor-Con M20) CR tablet 40 mEq, BID    tamsulosin (FLOMAX) capsule 0.4 mg, Daily With Dinner

## 2024-12-30 PROBLEM — B96.20 E COLI BACTEREMIA: Status: ACTIVE | Noted: 2024-12-26

## 2024-12-30 PROBLEM — N39.0 E. COLI UTI: Status: ACTIVE | Noted: 2024-12-30

## 2024-12-30 PROBLEM — B96.20 E. COLI UTI: Status: ACTIVE | Noted: 2024-12-30

## 2024-12-30 PROBLEM — R78.81 E COLI BACTEREMIA: Status: ACTIVE | Noted: 2024-12-26

## 2024-12-30 LAB
ANION GAP SERPL CALCULATED.3IONS-SCNC: 5 MMOL/L (ref 4–13)
BACTERIA UR CULT: ABNORMAL
BACTERIA UR CULT: ABNORMAL
BASOPHILS # BLD AUTO: 0.01 THOUSANDS/ΜL (ref 0–0.1)
BASOPHILS NFR BLD AUTO: 0 % (ref 0–1)
BUN SERPL-MCNC: 19 MG/DL (ref 5–25)
CALCIUM SERPL-MCNC: 8.6 MG/DL (ref 8.4–10.2)
CHLORIDE SERPL-SCNC: 110 MMOL/L (ref 96–108)
CO2 SERPL-SCNC: 25 MMOL/L (ref 21–32)
CREAT SERPL-MCNC: 1.37 MG/DL (ref 0.6–1.3)
EOSINOPHIL # BLD AUTO: 0.14 THOUSAND/ΜL (ref 0–0.61)
EOSINOPHIL NFR BLD AUTO: 2 % (ref 0–6)
ERYTHROCYTE [DISTWIDTH] IN BLOOD BY AUTOMATED COUNT: 14.1 % (ref 11.6–15.1)
GFR SERPL CREATININE-BSD FRML MDRD: 49 ML/MIN/1.73SQ M
GLUCOSE SERPL-MCNC: 103 MG/DL (ref 65–140)
HCT VFR BLD AUTO: 38.7 % (ref 36.5–49.3)
HGB BLD-MCNC: 13.6 G/DL (ref 12–17)
IMM GRANULOCYTES # BLD AUTO: 0.02 THOUSAND/UL (ref 0–0.2)
IMM GRANULOCYTES NFR BLD AUTO: 0 % (ref 0–2)
LYMPHOCYTES # BLD AUTO: 1.14 THOUSANDS/ΜL (ref 0.6–4.47)
LYMPHOCYTES NFR BLD AUTO: 18 % (ref 14–44)
MAGNESIUM SERPL-MCNC: 2.1 MG/DL (ref 1.9–2.7)
MCH RBC QN AUTO: 38.6 PG (ref 26.8–34.3)
MCHC RBC AUTO-ENTMCNC: 35.1 G/DL (ref 31.4–37.4)
MCV RBC AUTO: 110 FL (ref 82–98)
MONOCYTES # BLD AUTO: 0.51 THOUSAND/ΜL (ref 0.17–1.22)
MONOCYTES NFR BLD AUTO: 8 % (ref 4–12)
NEUTROPHILS # BLD AUTO: 4.39 THOUSANDS/ΜL (ref 1.85–7.62)
NEUTS SEG NFR BLD AUTO: 72 % (ref 43–75)
NRBC BLD AUTO-RTO: 0 /100 WBCS
PLATELET # BLD AUTO: 139 THOUSANDS/UL (ref 149–390)
PMV BLD AUTO: 10.2 FL (ref 8.9–12.7)
POTASSIUM SERPL-SCNC: 4.3 MMOL/L (ref 3.5–5.3)
RBC # BLD AUTO: 3.52 MILLION/UL (ref 3.88–5.62)
SODIUM SERPL-SCNC: 140 MMOL/L (ref 135–147)
WBC # BLD AUTO: 6.21 THOUSAND/UL (ref 4.31–10.16)

## 2024-12-30 PROCEDURE — 83735 ASSAY OF MAGNESIUM: CPT | Performed by: HOSPITALIST

## 2024-12-30 PROCEDURE — 80048 BASIC METABOLIC PNL TOTAL CA: CPT | Performed by: HOSPITALIST

## 2024-12-30 PROCEDURE — 99232 SBSQ HOSP IP/OBS MODERATE 35: CPT | Performed by: INTERNAL MEDICINE

## 2024-12-30 PROCEDURE — 85025 COMPLETE CBC W/AUTO DIFF WBC: CPT | Performed by: HOSPITALIST

## 2024-12-30 RX ADMIN — ASPIRIN 81 MG: 81 TABLET, COATED ORAL at 08:32

## 2024-12-30 RX ADMIN — TAMSULOSIN HYDROCHLORIDE 0.4 MG: 0.4 CAPSULE ORAL at 18:05

## 2024-12-30 RX ADMIN — FINASTERIDE 5 MG: 5 TABLET, FILM COATED ORAL at 08:32

## 2024-12-30 RX ADMIN — METOPROLOL TARTRATE 50 MG: 50 TABLET, FILM COATED ORAL at 20:46

## 2024-12-30 RX ADMIN — AMLODIPINE BESYLATE 10 MG: 10 TABLET ORAL at 20:46

## 2024-12-30 RX ADMIN — HEPARIN SODIUM 5000 UNITS: 5000 INJECTION INTRAVENOUS; SUBCUTANEOUS at 13:58

## 2024-12-30 RX ADMIN — GUAIFENESIN 600 MG: 600 TABLET ORAL at 20:46

## 2024-12-30 RX ADMIN — PANTOPRAZOLE SODIUM 40 MG: 40 TABLET, DELAYED RELEASE ORAL at 05:29

## 2024-12-30 RX ADMIN — HEPARIN SODIUM 5000 UNITS: 5000 INJECTION INTRAVENOUS; SUBCUTANEOUS at 05:29

## 2024-12-30 RX ADMIN — METOPROLOL TARTRATE 50 MG: 50 TABLET, FILM COATED ORAL at 08:32

## 2024-12-30 RX ADMIN — ERTAPENEM SODIUM 1000 MG: 1 INJECTION INTRAMUSCULAR; INTRAVENOUS at 18:05

## 2024-12-30 RX ADMIN — HYDROXYUREA 500 MG: 500 CAPSULE ORAL at 08:32

## 2024-12-30 RX ADMIN — HEPARIN SODIUM 5000 UNITS: 5000 INJECTION INTRAVENOUS; SUBCUTANEOUS at 20:46

## 2024-12-30 RX ADMIN — GUAIFENESIN 600 MG: 600 TABLET ORAL at 08:32

## 2024-12-30 RX ADMIN — ATORVASTATIN CALCIUM 80 MG: 80 TABLET, FILM COATED ORAL at 20:46

## 2024-12-30 NOTE — ASSESSMENT & PLAN NOTE
"CT chest demonstrating \"Right lower lobe bronchial wall thickening with air trapping compatible with bronchitis/bronchiolitis.\"  Pt does endorse increased productive cough  Viral panel negative  Procalcitonin unremarkable  Most likely viral bronchitis  Continue supportive care, home inhaler therapy  "

## 2024-12-30 NOTE — PROGRESS NOTES
"Progress Note - Hospitalist   Name: Santana Jacinto 79 y.o. male I MRN: [id number]  Unit/Bed#: E5 -01 I Date of Admission: 12/26/2024   Date of Service: 12/30/2024 I Hospital Day: 3    Assessment & Plan  E coli bacteremia  Patient is a 79-year-old male with past medical history significant for COPD, hypertension, polycythemia vera who presented to the ER with weakness    Blood cultures positive for ESBL E. coli x 2 on admission  Urine culture positive for greater than 100,000 E. coli ESBL, and 50,000 Pseudomonas  Case was discussed by previous provider with infectious disease team: Recommend 7-day course of IV Invanz  Today is day 5/7 of antibiotics  Afebrile with normal white blood cell count  E. coli UTI  Patient presented with an acute UTI  Cultures positive for ESBL E. coli, with subsequent bacteremia  CT scan abdomen/pelvis without hydronephrosis, or obstruction  Continue antibiotics as described above  Bronchitis  CT chest demonstrating \"Right lower lobe bronchial wall thickening with air trapping compatible with bronchitis/bronchiolitis.\"  Pt does endorse increased productive cough  Viral panel negative  Procalcitonin unremarkable  Most likely viral bronchitis  Continue supportive care, home inhaler therapy  Chronic obstructive pulmonary disease (HCC)  No acute exacerbation.  Continue metered-dose inhaler  CKD (chronic kidney disease)  Lab Results   Component Value Date    EGFR 49 12/30/2024    EGFR 51 12/29/2024    EGFR 54 12/28/2024    CREATININE 1.37 (H) 12/30/2024    CREATININE 1.32 (H) 12/29/2024    CREATININE 1.25 12/28/2024   POA creatinine elevated from baseline at 1.77, but not yet fulfilling SHANTA  Baseline creatinine 1.2-1.4  Creatinine has returned to baseline  Benign essential hypertension  Continue amlodipine and metoprolol  Mixed hyperlipidemia  Continue statin  Polycythemia vera (HCC)  History of JAK2 mutation positive polycythemia vera  Followed outpatient by [facility] hematology  Continue " hydroxyurea   Cigarette nicotine dependence without complication  Pt reports smoking 0.5 packs a day  Continue to encourage cessation  Nicotine patch supplementation     VTE Pharmacologic Prophylaxis: VTE Score: 5 High Risk (Score >/= 5) - Pharmacological DVT Prophylaxis Ordered: heparin. Sequential Compression Devices Ordered.    Mobility:   Basic Mobility Inpatient Raw Score: 24  JH-HLM Goal: 8: Walk 250 feet or more  JH-HLM Achieved: 7: Walk 25 feet or more  JH-HLM Goal NOT achieved. Continue with multidisciplinary rounding and encourage appropriate mobility to improve upon JH-HLM goals.    Patient Centered Rounds: I performed bedside rounds with nursing staff today.   Discussions with Specialists or Other Care Team Provider: CAMRYN    Education and Discussions with Family / Patient: Updated patient, and his nephew at the bedside    Current Length of Stay: 3 day(s)  Current Patient Status: Inpatient   Certification Statement: The patient will continue to require additional inpatient hospital stay due to IV antibiotics for ESBL E. coli bacteremia  Discharge Plan: Anticipate discharge in 48 hrs to home.    Code Status: Level 1 - Full Code    Subjective   Patient notes he feels well.  He denies any complaints.  She denies any pain anywhere.  Denies any chest pain.  Denies any shortness of breath or cough.  Denies any abdominal pain.  Denies any nausea, vomiting, diarrhea or constipation.  Notes he is tolerating p.o.  Denies any dizziness or lightheadedness    Objective :  Temp:  [97.4 °F (36.3 °C)-98.2 °F (36.8 °C)] 97.4 °F (36.3 °C)  HR:  [60-61] 61  BP: (125-146)/(71-81) 146/81  Resp:  [18-20] 18  SpO2:  [93 %-95 %] 95 %  O2 Device: None (Room air)    Body mass index is 23.96 kg/m².     Input and Output Summary (last 24 hours):     Intake/Output Summary (Last 24 hours) at 12/30/2024 2124  Last data filed at 12/30/2024 1340  Gross per 24 hour   Intake 780 ml   Output 400 ml   Net 380 ml       Physical Exam  General:  Very pleasant male.  No acute distress.  Nontachypneic and nondyspneic  Heart: Regular rate and rhythm.  S1-S2 present.  No murmur, rub, gallop  Lungs: Clear to auscultation bilaterally.  Decreased air movement.  No wheezes, crackles, rhonchi.  No accessory muscle use or respiratory distress  Abdomen: Soft, nontender, nondistended, normal active bowel sounds present.  No guarding or rebound.  No peritoneal signs or mass  Extremities: No clubbing, cyanosis, edema.  2+ pedal pulses  Neurologic: Awake alert.  Fluent speech.  Interactive.  Moving all 4 extremities    Lines/Drains:              Lab Results: I have reviewed the following results:   Results from last 7 days   Lab Units 12/30/24  0531   WBC Thousand/uL 6.21   HEMOGLOBIN g/dL 13.6   HEMATOCRIT % 38.7   PLATELETS Thousands/uL 139*   SEGS PCT % 72   LYMPHO PCT % 18   MONO PCT % 8   EOS PCT % 2     Results from last 7 days   Lab Units 12/30/24  0531 12/27/24  0509 12/26/24  1509   SODIUM mmol/L 140   < > 140   POTASSIUM mmol/L 4.3   < > 3.6   CHLORIDE mmol/L 110*   < > 105   CO2 mmol/L 25   < > 28   BUN mg/dL 19   < > 21   CREATININE mg/dL 1.37*   < > 1.77*   ANION GAP mmol/L 5   < > 7   CALCIUM mg/dL 8.6   < > 8.6   ALBUMIN g/dL  --   --  3.7   TOTAL BILIRUBIN mg/dL  --   --  1.71*   ALK PHOS U/L  --   --  116*   ALT U/L  --   --  14   AST U/L  --   --  18   GLUCOSE RANDOM mg/dL 103   < > 106    < > = values in this interval not displayed.                 Results from last 7 days   Lab Units 12/26/24  1509   LACTIC ACID mmol/L 1.6   PROCALCITONIN ng/ml 0.11       Recent Cultures (last 7 days):   Results from last 7 days   Lab Units 12/26/24  2039 12/26/24  1509   BLOOD CULTURE   --  No Growth at 72 hrs.  Escherichia coli ESBL*   GRAM STAIN RESULT   --  Gram negative rods*   URINE CULTURE  >100,000 cfu/ml Escherichia coli ESBL*  50,000-59,000 cfu/ml Pseudomonas aeruginosa*  --        =================================================    Imaging  12/26: CT  chest/abdomen/pelvis  Right lower lobe bronchial wall thickening with air trapping compatible with bronchitis/bronchiolitis.  No evidence of acute intra-abdominal or pelvic pathology    Microbiology  12/26: Urine culture: Greater than 100,000 ESBL E. coli.  50,000 Pseudomonas  12/26: Blood culture ESBL E. coli x 2  12/26: Negative COVID/influenza    =================================================    Last 24 Hours Medication List:     Current Facility-Administered Medications:     acetaminophen (TYLENOL) tablet 650 mg, Q6H PRN    albuterol (PROVENTIL HFA,VENTOLIN HFA) inhaler 2 puff, Q4H PRN    amLODIPine (NORVASC) tablet 10 mg, QPM    aspirin (ECOTRIN LOW STRENGTH) EC tablet 81 mg, Daily    atorvastatin (LIPITOR) tablet 80 mg, QPM    benzonatate (TESSALON PERLES) capsule 100 mg, TID PRN    dextromethorphan-guaiFENesin (ROBITUSSIN DM) oral syrup 10 mL, Q4H PRN    ertapenem (INVanz) 1,000 mg in sodium chloride 0.9 % 50 mL IVPB, Q24H, Last Rate: 1,000 mg (12/30/24 1805)    finasteride (PROSCAR) tablet 5 mg, Daily    guaiFENesin (MUCINEX) 12 hr tablet 600 mg, Q12H TALIB    heparin (porcine) subcutaneous injection 5,000 Units, Q8H TALIB    hydroxyurea (HYDREA) capsule 500 mg, Daily    ipratropium-albuterol (DUO-NEB) 0.5-2.5 mg/3 mL inhalation solution 3 mL, Q6H PRN    metoprolol tartrate (LOPRESSOR) tablet 50 mg, BID    nicotine (NICODERM CQ) 14 mg/24hr TD 24 hr patch 1 patch, Daily    pantoprazole (PROTONIX) EC tablet 40 mg, Daily Before Breakfast    tamsulosin (FLOMAX) capsule 0.4 mg, Daily With Dinner    Administrative Statements   Today, Patient Was Seen By: Deidre Fields MD      **Please Note: This note may have been constructed using a voice recognition system.**

## 2024-12-30 NOTE — PLAN OF CARE
Problem: PAIN - ADULT  Goal: Verbalizes/displays adequate comfort level or baseline comfort level  Description: Interventions:  - Encourage patient to monitor pain and request assistance  - Assess pain using appropriate pain scale  - Administer analgesics based on type and severity of pain and evaluate response  - Implement non-pharmacological measures as appropriate and evaluate response  - Consider cultural and social influences on pain and pain management  - Notify physician/advanced practitioner if interventions unsuccessful or patient reports new pain  Outcome: Progressing     Problem: INFECTION - ADULT  Goal: Absence or prevention of progression during hospitalization  Description: INTERVENTIONS:  - Assess and monitor for signs and symptoms of infection  - Monitor lab/diagnostic results  - Monitor all insertion sites, i.e. indwelling lines, tubes, and drains  - Monitor endotracheal if appropriate and nasal secretions for changes in amount and color  - [place] appropriate cooling/warming therapies per order  - Administer medications as ordered  - Instruct and encourage patient and family to use good hand hygiene technique  - Identify and instruct in appropriate isolation precautions for identified infection/condition  Outcome: Progressing  Goal: Absence of fever/infection during neutropenic period  Description: INTERVENTIONS:  - Monitor WBC    Outcome: Progressing     Problem: SAFETY ADULT  Goal: Patient will remain free of falls  Description: INTERVENTIONS:  - Educate patient/family on patient safety including physical limitations  - Instruct patient to call for assistance with activity   - Consult OT/PT to assist with strengthening/mobility   - Keep Call bell within reach  - Keep bed low and locked with side rails adjusted as appropriate  - Keep care items and personal belongings within reach  - Initiate and maintain comfort rounds  - Make Fall Risk Sign visible to staff  - Apply yellow socks and bracelet  for high fall risk patients  - Consider moving patient to room near nurses station  Outcome: Progressing  Goal: Maintain or return to baseline ADL function  Description: INTERVENTIONS:  -  Assess patient's ability to carry out ADLs; assess patient's baseline for ADL function and identify physical deficits which impact ability to perform ADLs (bathing, care of mouth/teeth, toileting, grooming, dressing, etc.)  - Assess/evaluate cause of self-care deficits   - Assess range of motion  - Assess patient's mobility; develop plan if impaired  - Assess patient's need for assistive devices and provide as appropriate  - Encourage maximum independence but intervene and supervise when necessary  - Involve family in performance of ADLs  - Assess for home care needs following discharge   - Consider OT consult to assist with ADL evaluation and planning for discharge  - Provide patient education as appropriate  Outcome: Progressing  Goal: Maintains/Returns to pre admission functional level  Description: INTERVENTIONS:  - Perform AM-PAC 6 Click Basic Mobility/ Daily Activity assessment daily.  - Set and communicate daily mobility goal to care team and patient/family/caregiver.   - Collaborate with rehabilitation services on mobility goals if consulted  - Out of bed for toileting  - Record patient progress and toleration of activity level   Outcome: Progressing     Problem: DISCHARGE PLANNING  Goal: Discharge to home or other facility with appropriate resources  Description: INTERVENTIONS:  - Identify barriers to discharge w/patient and caregiver  - Arrange for needed discharge resources and transportation as appropriate  - Identify discharge learning needs (meds, wound care, etc.)  - Arrange for interpretive services to assist at discharge as needed  - Refer to Case Management Department for coordinating discharge planning if the patient needs post-hospital services based on physician/advanced practitioner order or complex needs  related to functional status, cognitive ability, or social support system  Outcome: Progressing     Problem: Knowledge Deficit  Goal: Patient/family/caregiver demonstrates understanding of disease process, treatment plan, medications, and discharge instructions  Description: Complete learning assessment and assess knowledge base.  Interventions:  - Provide teaching at level of understanding  - Provide teaching via preferred learning methods  Outcome: Progressing     Problem: GENITOURINARY - ADULT  Goal: Maintains or returns to baseline urinary function  Description: INTERVENTIONS:  - Assess urinary function  - Encourage oral fluids to ensure adequate hydration if ordered  - Administer IV fluids as ordered to ensure adequate hydration  - Administer ordered medications as needed  - Offer frequent toileting  - Follow urinary retention protocol if ordered  Outcome: Progressing  Goal: Absence of urinary retention  Description: INTERVENTIONS:  - Assess patient’s ability to void and empty bladder  - Monitor I/O  - Bladder scan as needed  - Discuss with physician/AP medications to alleviate retention as needed  - Discuss catheterization for long term situations as appropriate  Outcome: Progressing  Goal: Urinary catheter remains patent  Description: INTERVENTIONS:  - Assess patency of urinary catheter  - If patient has a chronic lynn, consider changing catheter if non-functioning  - Follow guidelines for intermittent irrigation of non-functioning urinary catheter  Outcome: Progressing

## 2024-12-30 NOTE — ASSESSMENT & PLAN NOTE
History of JAK2 mutation positive polycythemia vera  Followed outpatient by [facility] hematology  Continue hydroxyurea

## 2024-12-30 NOTE — ASSESSMENT & PLAN NOTE
Lab Results   Component Value Date    EGFR 49 12/30/2024    EGFR 51 12/29/2024    EGFR 54 12/28/2024    CREATININE 1.37 (H) 12/30/2024    CREATININE 1.32 (H) 12/29/2024    CREATININE 1.25 12/28/2024   POA creatinine elevated from baseline at 1.77, but not yet fulfilling SHANTA  Baseline creatinine 1.2-1.4  Creatinine has returned to baseline

## 2024-12-30 NOTE — ASSESSMENT & PLAN NOTE
Patient is a 79-year-old male with past medical history significant for COPD, hypertension, polycythemia vera who presented to the ER with weakness    Blood cultures positive for ESBL E. coli x 2 on admission  Urine culture positive for greater than 100,000 E. coli ESBL, and 50,000 Pseudomonas  Case was discussed by previous provider with infectious disease team: Recommend 7-day course of IV Invanz  Today is day 5/7 of antibiotics  Afebrile with normal white blood cell count

## 2024-12-30 NOTE — PLAN OF CARE
Problem: PAIN - ADULT  Goal: Verbalizes/displays adequate comfort level or baseline comfort level  Description: Interventions:  - Encourage patient to monitor pain and request assistance  - Assess pain using appropriate pain scale  - Administer analgesics based on type and severity of pain and evaluate response  - Implement non-pharmacological measures as appropriate and evaluate response  - Consider cultural and social influences on pain and pain management  - Notify physician/advanced practitioner if interventions unsuccessful or patient reports new pain  Outcome: Progressing     Problem: INFECTION - ADULT  Goal: Absence or prevention of progression during hospitalization  Description: INTERVENTIONS:  - Assess and monitor for signs and symptoms of infection  - Monitor lab/diagnostic results  - Monitor all insertion sites, i.e. indwelling lines, tubes, and drains  - Monitor endotracheal if appropriate and nasal secretions for changes in amount and color  - [place] appropriate cooling/warming therapies per order  - Administer medications as ordered  - Instruct and encourage patient and family to use good hand hygiene technique  - Identify and instruct in appropriate isolation precautions for identified infection/condition  Outcome: Progressing  Goal: Absence of fever/infection during neutropenic period  Description: INTERVENTIONS:  - Monitor WBC    Outcome: Progressing     Problem: SAFETY ADULT  Goal: Patient will remain free of falls  Description: INTERVENTIONS:  - Educate patient/family on patient safety including physical limitations  - Instruct patient to call for assistance with activity   - Consult OT/PT to assist with strengthening/mobility   - Keep Call bell within reach  - Keep bed low and locked with side rails adjusted as appropriate  - Keep care items and personal belongings within reach  - Initiate and maintain comfort rounds  - Make Fall Risk Sign visible to staff  - Offer Toileting every 2 Hours,  in advance of need  - Initiate/Maintain bed alarm  - Obtain necessary fall risk management equipment: bed alarm  - Apply yellow socks and bracelet for high fall risk patients  - Consider moving patient to room near nurses station  Outcome: Progressing  Goal: Maintain or return to baseline ADL function  Description: INTERVENTIONS:  -  Assess patient's ability to carry out ADLs; assess patient's baseline for ADL function and identify physical deficits which impact ability to perform ADLs (bathing, care of mouth/teeth, toileting, grooming, dressing, etc.)  - Assess/evaluate cause of self-care deficits   - Assess range of motion  - Assess patient's mobility; develop plan if impaired  - Assess patient's need for assistive devices and provide as appropriate  - Encourage maximum independence but intervene and supervise when necessary  - Involve family in performance of ADLs  - Assess for home care needs following discharge   - Consider OT consult to assist with ADL evaluation and planning for discharge  - Provide patient education as appropriate  Outcome: Progressing  Goal: Maintains/Returns to pre admission functional level  Description: INTERVENTIONS:  - Perform AM-PAC 6 Click Basic Mobility/ Daily Activity assessment daily.  - Set and communicate daily mobility goal to care team and patient/family/caregiver.   - Collaborate with rehabilitation services on mobility goals if consulted  - Perform Range of Motion 3 times a day.  - Reposition patient every 2 hours.  - Dangle patient 3 times a day  - Stand patient 3 times a day  - Ambulate patient 3 times a day  - Out of bed to chair 3 times a day   - Out of bed for meals 3 times a day  - Out of bed for toileting  - Record patient progress and toleration of activity level   Outcome: Progressing     Problem: DISCHARGE PLANNING  Goal: Discharge to home or other facility with appropriate resources  Description: INTERVENTIONS:  - Identify barriers to discharge w/patient and  caregiver  - Arrange for needed discharge resources and transportation as appropriate  - Identify discharge learning needs (meds, wound care, etc.)  - Arrange for interpretive services to assist at discharge as needed  - Refer to Case Management Department for coordinating discharge planning if the patient needs post-hospital services based on physician/advanced practitioner order or complex needs related to functional status, cognitive ability, or social support system  Outcome: Progressing     Problem: Knowledge Deficit  Goal: Patient/family/caregiver demonstrates understanding of disease process, treatment plan, medications, and discharge instructions  Description: Complete learning assessment and assess knowledge base.  Interventions:  - Provide teaching at level of understanding  - Provide teaching via preferred learning methods  Outcome: Progressing     Problem: GENITOURINARY - ADULT  Goal: Maintains or returns to baseline urinary function  Description: INTERVENTIONS:  - Assess urinary function  - Encourage oral fluids to ensure adequate hydration if ordered  - Administer IV fluids as ordered to ensure adequate hydration  - Administer ordered medications as needed  - Offer frequent toileting  - Follow urinary retention protocol if ordered  Outcome: Progressing  Goal: Absence of urinary retention  Description: INTERVENTIONS:  - Assess patient’s ability to void and empty bladder  - Monitor I/O  - Bladder scan as needed  - Discuss with physician/AP medications to alleviate retention as needed  - Discuss catheterization for long term situations as appropriate  Outcome: Progressing  Goal: Urinary catheter remains patent  Description: INTERVENTIONS:  - Assess patency of urinary catheter  - If patient has a chronic lynn, consider changing catheter if non-functioning  - Follow guidelines for intermittent irrigation of non-functioning urinary catheter  Outcome: Progressing

## 2024-12-31 LAB — BACTERIA BLD CULT: NORMAL

## 2024-12-31 PROCEDURE — 99232 SBSQ HOSP IP/OBS MODERATE 35: CPT | Performed by: INTERNAL MEDICINE

## 2024-12-31 RX ORDER — ONDANSETRON 2 MG/ML
4 INJECTION INTRAMUSCULAR; INTRAVENOUS EVERY 4 HOURS PRN
Status: DISCONTINUED | OUTPATIENT
Start: 2024-12-31 | End: 2025-01-01 | Stop reason: HOSPADM

## 2024-12-31 RX ADMIN — PANTOPRAZOLE SODIUM 40 MG: 40 TABLET, DELAYED RELEASE ORAL at 06:21

## 2024-12-31 RX ADMIN — HEPARIN SODIUM 5000 UNITS: 5000 INJECTION INTRAVENOUS; SUBCUTANEOUS at 21:11

## 2024-12-31 RX ADMIN — ASPIRIN 81 MG: 81 TABLET, COATED ORAL at 08:35

## 2024-12-31 RX ADMIN — ERTAPENEM SODIUM 1000 MG: 1 INJECTION INTRAMUSCULAR; INTRAVENOUS at 15:25

## 2024-12-31 RX ADMIN — HEPARIN SODIUM 5000 UNITS: 5000 INJECTION INTRAVENOUS; SUBCUTANEOUS at 13:42

## 2024-12-31 RX ADMIN — GUAIFENESIN 600 MG: 600 TABLET ORAL at 08:35

## 2024-12-31 RX ADMIN — AMLODIPINE BESYLATE 10 MG: 10 TABLET ORAL at 21:11

## 2024-12-31 RX ADMIN — METOPROLOL TARTRATE 50 MG: 50 TABLET, FILM COATED ORAL at 21:11

## 2024-12-31 RX ADMIN — GUAIFENESIN 600 MG: 600 TABLET ORAL at 21:11

## 2024-12-31 RX ADMIN — FINASTERIDE 5 MG: 5 TABLET, FILM COATED ORAL at 08:35

## 2024-12-31 RX ADMIN — HYDROXYUREA 500 MG: 500 CAPSULE ORAL at 08:34

## 2024-12-31 RX ADMIN — HEPARIN SODIUM 5000 UNITS: 5000 INJECTION INTRAVENOUS; SUBCUTANEOUS at 06:21

## 2024-12-31 RX ADMIN — ONDANSETRON 4 MG: 2 INJECTION INTRAMUSCULAR; INTRAVENOUS at 15:56

## 2024-12-31 RX ADMIN — ATORVASTATIN CALCIUM 80 MG: 80 TABLET, FILM COATED ORAL at 21:11

## 2024-12-31 RX ADMIN — METOPROLOL TARTRATE 50 MG: 50 TABLET, FILM COATED ORAL at 08:35

## 2024-12-31 RX ADMIN — TAMSULOSIN HYDROCHLORIDE 0.4 MG: 0.4 CAPSULE ORAL at 17:08

## 2024-12-31 NOTE — ASSESSMENT & PLAN NOTE
Patient presented with an acute UTI  Cultures positive for ESBL E. coli, with subsequent bacteremia  CT scan abdomen/pelvis without hydronephrosis, or obstruction  Continue antibiotics as described above

## 2024-12-31 NOTE — PROGRESS NOTES
"Progress Note - Hospitalist   Name: Santana Jacinto 79 y.o. male I MRN: [id number]  Unit/Bed#: E5 -01 I Date of Admission: 12/26/2024   Date of Service: 12/31/2024 I Hospital Day: 4    Assessment & Plan  E coli bacteremia  Patient is a 79-year-old male with past medical history significant for COPD, hypertension, polycythemia vera who presented to the ER with weakness    Blood cultures positive for ESBL E. coli x 2 on admission  Urine culture positive for greater than 100,000 E. coli ESBL, and 50,000 Pseudomonas  Case was discussed by previous provider with infectious disease team: Recommend 7-day course of IV Invanz  Today is day 6/7 of antibiotics  Afebrile with normal white blood cell count  E. coli UTI  Patient presented with an acute UTI  Cultures positive for ESBL E. coli, with subsequent bacteremia  CT scan abdomen/pelvis without hydronephrosis, or obstruction  Continue antibiotics as described above  Bronchitis  CT chest demonstrating \"Right lower lobe bronchial wall thickening with air trapping compatible with bronchitis/bronchiolitis.\"  Pt does endorse increased productive cough  Viral panel negative  Procalcitonin unremarkable  Most likely viral bronchitis  Continue supportive care, home inhaler therapy  Chronic obstructive pulmonary disease (HCC)  No acute exacerbation.  Continue metered-dose inhaler  CKD (chronic kidney disease)  Lab Results   Component Value Date    EGFR 49 12/30/2024    EGFR 51 12/29/2024    EGFR 54 12/28/2024    CREATININE 1.37 (H) 12/30/2024    CREATININE 1.32 (H) 12/29/2024    CREATININE 1.25 12/28/2024   POA creatinine elevated from baseline at 1.77, but not fulfilling SHANTA  Baseline creatinine 1.2-1.4  Creatinine has returned to baseline  Benign essential hypertension  Continue amlodipine and metoprolol  Mixed hyperlipidemia  Continue statin  Polycythemia vera (HCC)  History of JAK2 mutation positive polycythemia vera  Followed outpatient by [facility] hematology  Continue hydroxyurea "   Cigarette nicotine dependence without complication  Pt reports smoking 0.5 packs a day  Continue to encourage cessation  Nicotine patch supplementation     VTE Pharmacologic Prophylaxis: VTE Score: 5 High Risk (Score >/= 5) - Pharmacological DVT Prophylaxis Ordered: heparin. Sequential Compression Devices Ordered.    Mobility:   Basic Mobility Inpatient Raw Score: 24  JH-HLM Goal: 8: Walk 250 feet or more  JH-HLM Achieved: 8: Walk 250 feet ot more  JH-HLM Goal achieved. Continue to encourage appropriate mobility.    Patient Centered Rounds: I performed bedside rounds with nursing staff today.   Discussions with Specialists or Other Care Team Provider: CAMRYN    Education and Discussions with Family / Patient: updated daughter at the bedside.  Reviewed test results, treatment plan, and discharge plan    Current Length of Stay: 4 day(s)  Current Patient Status: Inpatient   Certification Statement: The patient will continue to require additional inpatient hospital stay due to IV antibiotics  Discharge Plan: Anticipate discharge tomorrow to home.    Code Status: Level 1 - Full Code    Subjective   Patient notes that he feels well.  He relates this afternoon he had an episode of nausea.  He relates yesterday and earlier today he was eating well without any difficulties.  He received antiemetic with improvement.  He notes he developed diarrhea after admission.  Notes he passed approximately 3 watery bowel movements yesterday.  Denies any abdominal pain or cramping.  Declines probiotic.  Denies any pain anywhere else.  Denies any shortness of breath or cough.  Notes he is ambulating and denies any dizziness or lightheadedness.    Objective :  Temp:  [97.5 °F (36.4 °C)-97.7 °F (36.5 °C)] 97.5 °F (36.4 °C)  HR:  [60-61] 60  BP: (125-146)/(74-84) 126/74  Resp:  [16-18] 18  SpO2:  [93 %-95 %] 93 %  O2 Device: None (Room air)    Body mass index is 23.96 kg/m².     Input and Output Summary (last 24 hours):     Intake/Output  Summary (Last 24 hours) at 12/31/2024 1622  Last data filed at 12/31/2024 1300  Gross per 24 hour   Intake 60 ml   Output 600 ml   Net -540 ml       Physical Exam  General: Very pleasant male.  No acute distress.  Nontachypneic and nondyspneic  Heart: Regular rate and rhythm.  S1-S2 present.  No murmur, rub, gallop  Lungs: Clear to auscultation bilaterally.  No wheezes, crackles, rhonchi.  No accessory muscle use or respiratory distress  Abdomen: Soft, nontender, nondistended, normal active bowel sounds present.  No guarding or rebound.  No peritoneal signs or mass  Extremities: No clubbing, cyanosis, edema.  2+ pedal pulses bilaterally  Neurologic: Awake and alert.  Fluent speech.  Interactive.  Moving all 4 extremities    Lines/Drains:              Lab Results: I have reviewed the following results:   Results from last 7 days   Lab Units 12/30/24  0531   WBC Thousand/uL 6.21   HEMOGLOBIN g/dL 13.6   HEMATOCRIT % 38.7   PLATELETS Thousands/uL 139*   SEGS PCT % 72   LYMPHO PCT % 18   MONO PCT % 8   EOS PCT % 2     Results from last 7 days   Lab Units 12/30/24  0531 12/27/24  0509 12/26/24  1509   SODIUM mmol/L 140   < > 140   POTASSIUM mmol/L 4.3   < > 3.6   CHLORIDE mmol/L 110*   < > 105   CO2 mmol/L 25   < > 28   BUN mg/dL 19   < > 21   CREATININE mg/dL 1.37*   < > 1.77*   ANION GAP mmol/L 5   < > 7   CALCIUM mg/dL 8.6   < > 8.6   ALBUMIN g/dL  --   --  3.7   TOTAL BILIRUBIN mg/dL  --   --  1.71*   ALK PHOS U/L  --   --  116*   ALT U/L  --   --  14   AST U/L  --   --  18   GLUCOSE RANDOM mg/dL 103   < > 106    < > = values in this interval not displayed.                 Results from last 7 days   Lab Units 12/26/24  1509   LACTIC ACID mmol/L 1.6   PROCALCITONIN ng/ml 0.11       Recent Cultures (last 7 days):   Results from last 7 days   Lab Units 12/26/24  2039 12/26/24  1509   BLOOD CULTURE   --  No Growth After 4 Days.  Escherichia coli ESBL*   GRAM STAIN RESULT   --  Gram negative rods*   URINE CULTURE   >100,000 cfu/ml Escherichia coli ESBL*  50,000-59,000 cfu/ml Pseudomonas aeruginosa*  --        =================================================     Imaging  12/26: CT chest/abdomen/pelvis  Right lower lobe bronchial wall thickening with air trapping compatible with bronchitis/bronchiolitis.  No evidence of acute intra-abdominal or pelvic pathology     Microbiology  12/26: Urine culture: Greater than 100,000 ESBL E. coli.  50,000 Pseudomonas  12/26: Blood culture ESBL E. coli x 2  12/26: Negative COVID/influenza     =================================================    Last 24 Hours Medication List:     Current Facility-Administered Medications:     acetaminophen (TYLENOL) tablet 650 mg, Q6H PRN    albuterol (PROVENTIL HFA,VENTOLIN HFA) inhaler 2 puff, Q4H PRN    amLODIPine (NORVASC) tablet 10 mg, QPM    aspirin (ECOTRIN LOW STRENGTH) EC tablet 81 mg, Daily    atorvastatin (LIPITOR) tablet 80 mg, QPM    benzonatate (TESSALON PERLES) capsule 100 mg, TID PRN    dextromethorphan-guaiFENesin (ROBITUSSIN DM) oral syrup 10 mL, Q4H PRN    ertapenem (INVanz) 1,000 mg in sodium chloride 0.9 % 50 mL IVPB, Q24H, Last Rate: 1,000 mg (12/31/24 1525)    finasteride (PROSCAR) tablet 5 mg, Daily    guaiFENesin (MUCINEX) 12 hr tablet 600 mg, Q12H TALIB    heparin (porcine) subcutaneous injection 5,000 Units, Q8H TALIB    hydroxyurea (HYDREA) capsule 500 mg, Daily    ipratropium-albuterol (DUO-NEB) 0.5-2.5 mg/3 mL inhalation solution 3 mL, Q6H PRN    metoprolol tartrate (LOPRESSOR) tablet 50 mg, BID    nicotine (NICODERM CQ) 14 mg/24hr TD 24 hr patch 1 patch, Daily    ondansetron (ZOFRAN) injection 4 mg, Q4H PRN    pantoprazole (PROTONIX) EC tablet 40 mg, Daily Before Breakfast    tamsulosin (FLOMAX) capsule 0.4 mg, Daily With Dinner    Administrative Statements   Today, Patient Was Seen By: Deidre Fields MD      **Please Note: This note may have been constructed using a voice recognition system.**

## 2024-12-31 NOTE — PLAN OF CARE
Problem: PAIN - ADULT  Goal: Verbalizes/displays adequate comfort level or baseline comfort level  Description: Interventions:  - Encourage patient to monitor pain and request assistance  - Assess pain using appropriate pain scale  - Administer analgesics based on type and severity of pain and evaluate response  - Implement non-pharmacological measures as appropriate and evaluate response  - Consider cultural and social influences on pain and pain management  - Notify physician/advanced practitioner if interventions unsuccessful or patient reports new pain  Outcome: Progressing     Problem: INFECTION - ADULT  Goal: Absence or prevention of progression during hospitalization  Description: INTERVENTIONS:  - Assess and monitor for signs and symptoms of infection  - Monitor lab/diagnostic results  - Monitor all insertion sites, i.e. indwelling lines, tubes, and drains  - Monitor endotracheal if appropriate and nasal secretions for changes in amount and color  - [place] appropriate cooling/warming therapies per order  - Administer medications as ordered  - Instruct and encourage patient and family to use good hand hygiene technique  - Identify and instruct in appropriate isolation precautions for identified infection/condition  Outcome: Progressing  Goal: Absence of fever/infection during neutropenic period  Description: INTERVENTIONS:  - Monitor WBC    Outcome: Progressing     Problem: SAFETY ADULT  Goal: Patient will remain free of falls  Description: INTERVENTIONS:  - Educate patient/family on patient safety including physical limitations  - Instruct patient to call for assistance with activity   - Consult OT/PT to assist with strengthening/mobility   - Keep Call bell within reach  - Keep bed low and locked with side rails adjusted as appropriate  - Keep care items and personal belongings within reach  - Initiate and maintain comfort rounds  - Make Fall Risk Sign visible to staff  - Apply yellow socks and bracelet  for high fall risk patients  - Consider moving patient to room near nurses station  Outcome: Progressing  Goal: Maintain or return to baseline ADL function  Description: INTERVENTIONS:  -  Assess patient's ability to carry out ADLs; assess patient's baseline for ADL function and identify physical deficits which impact ability to perform ADLs (bathing, care of mouth/teeth, toileting, grooming, dressing, etc.)  - Assess/evaluate cause of self-care deficits   - Assess range of motion  - Assess patient's mobility; develop plan if impaired  - Assess patient's need for assistive devices and provide as appropriate  - Encourage maximum independence but intervene and supervise when necessary  - Involve family in performance of ADLs  - Assess for home care needs following discharge   - Consider OT consult to assist with ADL evaluation and planning for discharge  - Provide patient education as appropriate  Outcome: Progressing  Goal: Maintains/Returns to pre admission functional level  Description: INTERVENTIONS:  - Perform AM-PAC 6 Click Basic Mobility/ Daily Activity assessment daily.  - Set and communicate daily mobility goal to care team and patient/family/caregiver.   - Collaborate with rehabilitation services on mobility goals if consulted  - Out of bed for toileting  - Record patient progress and toleration of activity level   Outcome: Progressing     Problem: DISCHARGE PLANNING  Goal: Discharge to home or other facility with appropriate resources  Description: INTERVENTIONS:  - Identify barriers to discharge w/patient and caregiver  - Arrange for needed discharge resources and transportation as appropriate  - Identify discharge learning needs (meds, wound care, etc.)  - Arrange for interpretive services to assist at discharge as needed  - Refer to Case Management Department for coordinating discharge planning if the patient needs post-hospital services based on physician/advanced practitioner order or complex needs  related to functional status, cognitive ability, or social support system  Outcome: Progressing     Problem: Knowledge Deficit  Goal: Patient/family/caregiver demonstrates understanding of disease process, treatment plan, medications, and discharge instructions  Description: Complete learning assessment and assess knowledge base.  Interventions:  - Provide teaching at level of understanding  - Provide teaching via preferred learning methods  Outcome: Progressing     Problem: GENITOURINARY - ADULT  Goal: Maintains or returns to baseline urinary function  Description: INTERVENTIONS:  - Assess urinary function  - Encourage oral fluids to ensure adequate hydration if ordered  - Administer IV fluids as ordered to ensure adequate hydration  - Administer ordered medications as needed  - Offer frequent toileting  - Follow urinary retention protocol if ordered  Outcome: Progressing  Goal: Absence of urinary retention  Description: INTERVENTIONS:  - Assess patient’s ability to void and empty bladder  - Monitor I/O  - Bladder scan as needed  - Discuss with physician/AP medications to alleviate retention as needed  - Discuss catheterization for long term situations as appropriate  Outcome: Progressing  Goal: Urinary catheter remains patent  Description: INTERVENTIONS:  - Assess patency of urinary catheter  - If patient has a chronic lynn, consider changing catheter if non-functioning  - Follow guidelines for intermittent irrigation of non-functioning urinary catheter  Outcome: Progressing

## 2024-12-31 NOTE — PLAN OF CARE
Problem: PAIN - ADULT  Goal: Verbalizes/displays adequate comfort level or baseline comfort level  Description: Interventions:  - Encourage patient to monitor pain and request assistance  - Assess pain using appropriate pain scale  - Administer analgesics based on type and severity of pain and evaluate response  - Implement non-pharmacological measures as appropriate and evaluate response  - Consider cultural and social influences on pain and pain management  - Notify physician/advanced practitioner if interventions unsuccessful or patient reports new pain  Outcome: Progressing     Problem: INFECTION - ADULT  Goal: Absence or prevention of progression during hospitalization  Description: INTERVENTIONS:  - Assess and monitor for signs and symptoms of infection  - Monitor lab/diagnostic results  - Monitor all insertion sites, i.e. indwelling lines, tubes, and drains  - Monitor endotracheal if appropriate and nasal secretions for changes in amount and color  - [place] appropriate cooling/warming therapies per order  - Administer medications as ordered  - Instruct and encourage patient and family to use good hand hygiene technique  - Identify and instruct in appropriate isolation precautions for identified infection/condition  Outcome: Progressing  Goal: Absence of fever/infection during neutropenic period  Description: INTERVENTIONS:  - Monitor WBC    Outcome: Progressing     Problem: SAFETY ADULT  Goal: Patient will remain free of falls  Description: INTERVENTIONS:  - Educate patient/family on patient safety including physical limitations  - Instruct patient to call for assistance with activity   - Consult OT/PT to assist with strengthening/mobility   - Keep Call bell within reach  - Keep bed low and locked with side rails adjusted as appropriate  - Keep care items and personal belongings within reach  - Initiate and maintain comfort rounds  - Make Fall Risk Sign visible to staff  - Apply yellow socks and bracelet  for high fall risk patients  - Consider moving patient to room near nurses station  Outcome: Progressing  Goal: Maintain or return to baseline ADL function  Description: INTERVENTIONS:  -  Assess patient's ability to carry out ADLs; assess patient's baseline for ADL function and identify physical deficits which impact ability to perform ADLs (bathing, care of mouth/teeth, toileting, grooming, dressing, etc.)  - Assess/evaluate cause of self-care deficits   - Assess range of motion  - Assess patient's mobility; develop plan if impaired  - Assess patient's need for assistive devices and provide as appropriate  - Encourage maximum independence but intervene and supervise when necessary  - Involve family in performance of ADLs  - Assess for home care needs following discharge   - Consider OT consult to assist with ADL evaluation and planning for discharge  - Provide patient education as appropriate  Outcome: Progressing  Goal: Maintains/Returns to pre admission functional level  Description: INTERVENTIONS:  - Perform AM-PAC 6 Click Basic Mobility/ Daily Activity assessment daily.  - Set and communicate daily mobility goal to care team and patient/family/caregiver.   - Collaborate with rehabilitation services on mobility goals if consulted  - Out of bed for toileting  - Record patient progress and toleration of activity level   Outcome: Progressing     Problem: DISCHARGE PLANNING  Goal: Discharge to home or other facility with appropriate resources  Description: INTERVENTIONS:  - Identify barriers to discharge w/patient and caregiver  - Arrange for needed discharge resources and transportation as appropriate  - Identify discharge learning needs (meds, wound care, etc.)  - Arrange for interpretive services to assist at discharge as needed  - Refer to Case Management Department for coordinating discharge planning if the patient needs post-hospital services based on physician/advanced practitioner order or complex needs  related to functional status, cognitive ability, or social support system  Outcome: Progressing     Problem: Knowledge Deficit  Goal: Patient/family/caregiver demonstrates understanding of disease process, treatment plan, medications, and discharge instructions  Description: Complete learning assessment and assess knowledge base.  Interventions:  - Provide teaching at level of understanding  - Provide teaching via preferred learning methods  Outcome: Progressing     Problem: GENITOURINARY - ADULT  Goal: Maintains or returns to baseline urinary function  Description: INTERVENTIONS:  - Assess urinary function  - Encourage oral fluids to ensure adequate hydration if ordered  - Administer IV fluids as ordered to ensure adequate hydration  - Administer ordered medications as needed  - Offer frequent toileting  - Follow urinary retention protocol if ordered  Outcome: Progressing  Goal: Absence of urinary retention  Description: INTERVENTIONS:  - Assess patient’s ability to void and empty bladder  - Monitor I/O  - Bladder scan as needed  - Discuss with physician/AP medications to alleviate retention as needed  - Discuss catheterization for long term situations as appropriate  Outcome: Progressing  Goal: Urinary catheter remains patent  Description: INTERVENTIONS:  - Assess patency of urinary catheter  - If patient has a chronic lynn, consider changing catheter if non-functioning  - Follow guidelines for intermittent irrigation of non-functioning urinary catheter  Outcome: Progressing

## 2024-12-31 NOTE — ASSESSMENT & PLAN NOTE
History of JAK2 mutation positive polycythemia vera  Followed outpatient by [facility] hematology  Continue hydroxyurea

## 2024-12-31 NOTE — ASSESSMENT & PLAN NOTE
Lab Results   Component Value Date    EGFR 49 12/30/2024    EGFR 51 12/29/2024    EGFR 54 12/28/2024    CREATININE 1.37 (H) 12/30/2024    CREATININE 1.32 (H) 12/29/2024    CREATININE 1.25 12/28/2024   POA creatinine elevated from baseline at 1.77, but not fulfilling SHANTA  Baseline creatinine 1.2-1.4  Creatinine has returned to baseline

## 2025-01-01 VITALS
OXYGEN SATURATION: 94 % | WEIGHT: 156.11 LBS | RESPIRATION RATE: 18 BRPM | HEART RATE: 60 BPM | HEIGHT: 68 IN | TEMPERATURE: 97.7 F | BODY MASS INDEX: 23.66 KG/M2 | SYSTOLIC BLOOD PRESSURE: 140 MMHG | DIASTOLIC BLOOD PRESSURE: 80 MMHG

## 2025-01-01 LAB
ANION GAP SERPL CALCULATED.3IONS-SCNC: 7 MMOL/L (ref 4–13)
BASOPHILS # BLD AUTO: 0.02 THOUSANDS/ΜL (ref 0–0.1)
BASOPHILS NFR BLD AUTO: 0 % (ref 0–1)
BUN SERPL-MCNC: 21 MG/DL (ref 5–25)
CALCIUM SERPL-MCNC: 8.8 MG/DL (ref 8.4–10.2)
CHLORIDE SERPL-SCNC: 108 MMOL/L (ref 96–108)
CO2 SERPL-SCNC: 25 MMOL/L (ref 21–32)
CREAT SERPL-MCNC: 1.31 MG/DL (ref 0.6–1.3)
EOSINOPHIL # BLD AUTO: 0.08 THOUSAND/ΜL (ref 0–0.61)
EOSINOPHIL NFR BLD AUTO: 1 % (ref 0–6)
ERYTHROCYTE [DISTWIDTH] IN BLOOD BY AUTOMATED COUNT: 13.6 % (ref 11.6–15.1)
GFR SERPL CREATININE-BSD FRML MDRD: 51 ML/MIN/1.73SQ M
GLUCOSE SERPL-MCNC: 75 MG/DL (ref 65–140)
HCT VFR BLD AUTO: 39.2 % (ref 36.5–49.3)
HGB BLD-MCNC: 13.6 G/DL (ref 12–17)
IMM GRANULOCYTES # BLD AUTO: 0.03 THOUSAND/UL (ref 0–0.2)
IMM GRANULOCYTES NFR BLD AUTO: 0 % (ref 0–2)
LYMPHOCYTES # BLD AUTO: 1.37 THOUSANDS/ΜL (ref 0.6–4.47)
LYMPHOCYTES NFR BLD AUTO: 20 % (ref 14–44)
MCH RBC QN AUTO: 37.7 PG (ref 26.8–34.3)
MCHC RBC AUTO-ENTMCNC: 34.7 G/DL (ref 31.4–37.4)
MCV RBC AUTO: 109 FL (ref 82–98)
MONOCYTES # BLD AUTO: 0.56 THOUSAND/ΜL (ref 0.17–1.22)
MONOCYTES NFR BLD AUTO: 8 % (ref 4–12)
NEUTROPHILS # BLD AUTO: 4.71 THOUSANDS/ΜL (ref 1.85–7.62)
NEUTS SEG NFR BLD AUTO: 71 % (ref 43–75)
NRBC BLD AUTO-RTO: 0 /100 WBCS
PLATELET # BLD AUTO: 155 THOUSANDS/UL (ref 149–390)
PMV BLD AUTO: 10.1 FL (ref 8.9–12.7)
POTASSIUM SERPL-SCNC: 3.6 MMOL/L (ref 3.5–5.3)
RBC # BLD AUTO: 3.61 MILLION/UL (ref 3.88–5.62)
SODIUM SERPL-SCNC: 140 MMOL/L (ref 135–147)
WBC # BLD AUTO: 6.77 THOUSAND/UL (ref 4.31–10.16)

## 2025-01-01 PROCEDURE — 97165 OT EVAL LOW COMPLEX 30 MIN: CPT | Performed by: OCCUPATIONAL THERAPIST

## 2025-01-01 PROCEDURE — 85025 COMPLETE CBC W/AUTO DIFF WBC: CPT | Performed by: INTERNAL MEDICINE

## 2025-01-01 PROCEDURE — 99239 HOSP IP/OBS DSCHRG MGMT >30: CPT | Performed by: INTERNAL MEDICINE

## 2025-01-01 PROCEDURE — 80048 BASIC METABOLIC PNL TOTAL CA: CPT | Performed by: INTERNAL MEDICINE

## 2025-01-01 RX ADMIN — ASPIRIN 81 MG: 81 TABLET, COATED ORAL at 08:09

## 2025-01-01 RX ADMIN — METOPROLOL TARTRATE 50 MG: 50 TABLET, FILM COATED ORAL at 08:08

## 2025-01-01 RX ADMIN — HYDROXYUREA 500 MG: 500 CAPSULE ORAL at 08:09

## 2025-01-01 RX ADMIN — HEPARIN SODIUM 5000 UNITS: 5000 INJECTION INTRAVENOUS; SUBCUTANEOUS at 06:01

## 2025-01-01 RX ADMIN — GUAIFENESIN 600 MG: 600 TABLET ORAL at 08:08

## 2025-01-01 RX ADMIN — PANTOPRAZOLE SODIUM 40 MG: 40 TABLET, DELAYED RELEASE ORAL at 06:02

## 2025-01-01 RX ADMIN — FINASTERIDE 5 MG: 5 TABLET, FILM COATED ORAL at 08:08

## 2025-01-01 RX ADMIN — ERTAPENEM SODIUM 1000 MG: 1 INJECTION INTRAMUSCULAR; INTRAVENOUS at 13:45

## 2025-01-01 NOTE — ASSESSMENT & PLAN NOTE
Patient is a 79-year-old male with past medical history significant for COPD, hypertension, polycythemia vera who presented to the ER with weakness    Blood cultures positive for ESBL E. coli x 2 on admission  Urine culture positive for greater than 100,000 E. coli ESBL, and 50,000 Pseudomonas  Case was discussed with infectious disease team: Recommend 7-day course   Patient completed 7-day course of antibiotics, first day with Zosyn and remaining days with ertapenem.  Case was discussed with infectious disease team yesterday anticipating discharge home today as he has clinically and symptomatically improved.  Afebrile with normal white blood cell count, and no current symptoms  Will be discharged home to follow-up with PCP and urologist as an outpatient  E. coli bacteremia attributed to E. coli UTI

## 2025-01-01 NOTE — OCCUPATIONAL THERAPY NOTE
"    Occupational Therapy Evaluation     Patient Name: Santana Jacinto  Today's Date: 1/1/2025  Problem List  Principal Problem:    E coli bacteremia  Active Problems:    Benign essential hypertension    Mixed hyperlipidemia    Polycythemia vera (HCC)    Benign prostatic hyperplasia without lower urinary tract symptoms    Cigarette nicotine dependence without complication    Chronic obstructive pulmonary disease (HCC)    CKD (chronic kidney disease)    Bronchitis    E. 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\"    Full dentures     but doesnt wear them    Gastroesophageal reflux disease without esophagitis 04/03/2024    Heart block 08/08/2022    Hepatic cyst 05/02/2023    History of 2019 novel coronavirus disease (COVID-19) 02/2021    hospital for 2 days but not in ICU/\"mainly dehydrated\" \"also fever/oxygen below 90\"    History of coronary artery stent placement     x2 in 2008 or so; sees [facility]-- Dr Hutton cardio    [place] (hard of hearing)     no hearing aids yet    Hyperlipidemia     Last Assessed:  11/24/17    Hypertension     Benign essential, Last Assessed:  11/24/17    LBBB (left bundle branch block)     Left bundle branch block 04/03/2014    Mixed hyperlipidemia 11/04/2013    Nocturia     Polycythemia vera (HCC)     (per history in chart)    PVD (peripheral vascular disease) (HCC)     RBC abnormality     pt reports told has high Red blood cells/goes to infusion center regularly next appt 10/8/21 hematologist is Dr Charlton of " "[facility](had been Dr Ahn)    Seasickness     Shortness of breath     \"if goes up steps gets SOB, had for awhile\"    Slow urinary stream     \"sometimes feels like bladder isnt all the way empty\"    Snores     Wears glasses     reading     Past Surgical History  Past Surgical History:   Procedure Laterality Date    ANGIOPLASTY      APPENDECTOMY      CARDIAC CATHETERIZATION N/A 07/28/2022    Procedure: CARDIAC TEMPORARY PACEMAKER;  Surgeon: Fransico Morgan MD;  [facility];  Service: Cardiology    CARDIAC ELECTROPHYSIOLOGY PROCEDURE N/A 07/29/2022    Procedure: Cardiac pacer implant;  Surgeon: Anand Valero MD;  [facility]: [facility];  Service: Cardiology    CATARACT EXTRACTION Bilateral     CT CYSTOGRAM  11/04/2021    ELBOW SURGERY Right     FL CYSTOGRAM  10/27/2021    IR BIOPSY BONE MARROW  10/14/2022    KNEE ARTHROSCOPY Right     DE COLONOSCOPY FLX DX W/COLLJ SPEC WHEN PFRMD N/A 05/22/2017    Procedure: COLONOSCOPY;  Surgeon: Josseline Zhou DO;  Location: [facility];  Service: Gastroenterology    DE NEUROPLASTY &/TRANSPOS MEDIAN NRV CARPAL TUNNE Right 5/26/2023    Procedure: CTR, RIGHT;  Surgeon: Cosme Varela MD;  Location: [facility];  Service: Orthopedics    DE TENDON SHEATH INCISION Right 5/26/2023    Procedure: RELEASE TRIGGER FINGER, RIGHT RING FINGER;  Surgeon: Cosme Varela MD;  [facility];  Service: Orthopedics    PROSTATE BIOPSY      PROSTATECTOMY N/A 10/18/2021    Procedure: ROBOTIC SUBTOTAL/SUPRAPUBIC PROSTATECTOMY;  Surgeon: Kieran Vega MD;  [facility];  Service: Urology    SHOULDER ARTHROSCOPY Right     TONSILLECTOMY           01/01/25 1120   OT Last Visit   OT Visit Date 01/01/25   Note Type   Note type Evaluation   Pain Assessment   Pain Assessment Tool 0-10   Pain Score No Pain   Restrictions/Precautions   Weight Bearing Precautions Per Order No   Other Precautions Contact/isolation   Home Living   Type of Home House   Home Layout One " "level;Performs ADLs on one level;Stairs to enter without rails  (1 MING without rail from garage)   Bathroom Shower/Tub Tub/shower unit   Bathroom Toilet Standard   Bathroom Equipment Built-in shower seat  (reports \"corner seat\")   Bathroom Accessibility Accessible   Home Equipment Cane   Prior Function   Level of [place] Independent with ADLs;Independent with functional mobility;Independent with IADLS   Lives With Spouse   Receives Help From Family  (spouse if needed)   IADLs Independent with driving;Independent with meal prep;Independent with medication management  (reports he perfs all the cooking)   Falls in the last 6 months 0   Comments no use of DME, has cane in home but doesnt use.   General   Additional Pertinent History Patient is a 79-year-old male with past medical history significant for COPD, hypertension, polycythemia vera who presented to the ER with weakness, + E.coli and UTI.   Subjective   Subjective \"I am ready to go home.\"   ADL   Where Assessed Edge of bed   Eating Assistance 7  Independent   Grooming Assistance 7  Independent   UB Bathing Assistance 7  Independent   LB Bathing Assistance 7  Independent   UB Dressing Assistance 7  Independent   LB Dressing Assistance 7  Independent   LB Dressing Deficit Don/doff R sock;Don/doff L sock   Toileting Assistance  6  Modified independent   Toileting Deficit Setup;Steadying;Grab bar use   Additional Comments no DME used   Bed Mobility   Supine to Sit 6  Modified independent   Additional items Bedrails   Sit to Supine 6  Modified independent   Additional items Bedrails   Transfers   Sit to Stand 7  Independent   Stand to Sit 7  Independent   Toilet transfer 6  Modified independent   Additional items Standard toilet   Functional Mobility   Functional Mobility 7  Independent   Additional Comments no DME used   Balance   Static Sitting Good   Dynamic Sitting Fair +   Static Standing Fair +   Dynamic Standing Fair   Activity Tolerance   Activity " Tolerance Patient tolerated treatment well   Nurse Made Aware RN approved pt for eval and no use of bed/chair alarm   RUE Assessment   RUE Assessment WFL   LUE Assessment   LUE Assessment WFL   Hand Function   Gross Motor Coordination Functional   Fine Motor Coordination Functional   Psychosocial   Psychosocial (WDL) WDL   Cognition   Overall Cognitive Status WFL   Arousal/Participation Alert;Responsive;Cooperative   Attention Within functional limits   Orientation Level Oriented X4   Memory Within functional limits   Following Commands Follows one step commands without difficulty   Assessment   Prognosis Good   Assessment Patient is a 79 y.o. year old male seen for OT eval s/p admit to [facility] on 12/26/2024 with  COPD, hypertension, polycythemia vera who presented to the ER with weakness, + E.coli and UTI. Patient with active OT orders and activity orders for Activity as tolerated, OOB to chair. OT consulted to assess ADLs/IADLs/functional mobility and assist w/ D/C planning. PTA, was (I) with ADLs, was (I) with IADLs, and completed functional mobility/transfers with [place] utilizing no DME . Patient lives in a ranch home with spouse, 1 MING from [employer].  Pt's CLOF as follows: eating/grooming: Independent, UB ADLs: Independent, LB ADLs: Independent, toileting: Independent, bed mobility: Independent, functional transfers: Independent, functional mobility: Independent, sitting/standing tolerance: G/F. Pt currently functioning at baseline of PLOF. Pt demo no LOB, weakness, difficulty or dec safety awareness during assessment of ADLs. No further acute OT needs identified at this time. Recommend continued mobilization with hospital staff while in the hospital to increase pt’s endurance and strength upon D/C. From OT standpoint, recommend D/C to home with family support when medically cleared. D/C pt from OT caseload at this time. Based on the aforementioned evaluation, functional performance deficits, and assessments, pt has  been identified as a low complexity evaluation.   Plan   OT Frequency Eval only   Discharge Recommendation   Rehab Resource Intensity Level, OT No post-acute rehabilitation needs   AM-PAC Daily Activity Inpatient   Lower Body Dressing 4   Bathing 4   Toileting 4   Upper Body Dressing 4   Grooming 4   Eating 4   Daily Activity Raw Score 24   Daily Activity Standardized Score (Calc for Raw Score >=11) 57.54     Nicky Waterman MS OTR/L CLT

## 2025-01-01 NOTE — PLAN OF CARE
Problem: OCCUPATIONAL THERAPY ADULT  Goal: Performs self-care activities at highest level of function for planned discharge setting.  See evaluation for individualized goals.  Description:            See flowsheet documentation for full assessment, interventions and recommendations.   Outcome: Adequate for Discharge  Note:    Prognosis: Good  Assessment: Patient is a 79 y.o. year old male seen for OT eval s/p admit to [facility] on 12/26/2024 with  COPD, hypertension, polycythemia vera who presented to the ER with weakness, + E.coli and UTI. Patient with active OT orders and activity orders for Activity as tolerated, OOB to chair. OT consulted to assess ADLs/IADLs/functional mobility and assist w/ D/C planning. PTA, was (I) with ADLs, was (I) with IADLs, and completed functional mobility/transfers with [place] utilizing no DME . Patient lives in a ranch home with spouse, 1 MING from [facility].  Pt's CLOF as follows: eating/grooming: Independent, UB ADLs: Independent, LB ADLs: Independent, toileting: Independent, bed mobility: Independent, functional transfers: Independent, functional mobility: Independent, sitting/standing tolerance: G/F. Pt currently functioning at baseline of PLOF. Pt demo no LOB, weakness, difficulty or dec safety awareness during assessment of ADLs. No further acute OT needs identified at this time. Recommend continued mobilization with hospital staff while in the hospital to increase pt’s endurance and strength upon D/C. From OT standpoint, recommend D/C to home with family support when medically cleared. D/C pt from OT caseload at this time. Based on the aforementioned evaluation, functional performance deficits, and assessments, pt has been identified as a low complexity evaluation.     Rehab Resource Intensity Level, OT: No post-acute rehabilitation needs

## 2025-01-01 NOTE — ASSESSMENT & PLAN NOTE
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

## 2025-01-01 NOTE — PLAN OF CARE
Problem: PAIN - ADULT  Goal: Verbalizes/displays adequate comfort level or baseline comfort level  Description: Interventions:  - Encourage patient to monitor pain and request assistance  - Assess pain using appropriate pain scale  - Administer analgesics based on type and severity of pain and evaluate response  - Implement non-pharmacological measures as appropriate and evaluate response  - Consider cultural and social influences on pain and pain management  - Notify physician/advanced practitioner if interventions unsuccessful or patient reports new pain  Outcome: Adequate for Discharge     Problem: INFECTION - ADULT  Goal: Absence or prevention of progression during hospitalization  Description: INTERVENTIONS:  - Assess and monitor for signs and symptoms of infection  - Monitor lab/diagnostic results  - Monitor all insertion sites, i.e. indwelling lines, tubes, and drains  - Monitor endotracheal if appropriate and nasal secretions for changes in amount and color  - [place] appropriate cooling/warming therapies per order  - Administer medications as ordered  - Instruct and encourage patient and family to use good hand hygiene technique  - Identify and instruct in appropriate isolation precautions for identified infection/condition  Outcome: Adequate for Discharge  Goal: Absence of fever/infection during neutropenic period  Description: INTERVENTIONS:  - Monitor WBC    Outcome: Adequate for Discharge     Problem: SAFETY ADULT  Goal: Patient will remain free of falls  Description: INTERVENTIONS:  - Educate patient/family on patient safety including physical limitations  - Instruct patient to call for assistance with activity   - Consult OT/PT to assist with strengthening/mobility   - Keep Call bell within reach  - Keep bed low and locked with side rails adjusted as appropriate  - Keep care items and personal belongings within reach  - Initiate and maintain comfort rounds  - Make Fall Risk Sign visible to  staff  - Apply yellow socks and bracelet for high fall risk patients  - Consider moving patient to room near nurses station  Outcome: Adequate for Discharge  Goal: Maintain or return to baseline ADL function  Description: INTERVENTIONS:  -  Assess patient's ability to carry out ADLs; assess patient's baseline for ADL function and identify physical deficits which impact ability to perform ADLs (bathing, care of mouth/teeth, toileting, grooming, dressing, etc.)  - Assess/evaluate cause of self-care deficits   - Assess range of motion  - Assess patient's mobility; develop plan if impaired  - Assess patient's need for assistive devices and provide as appropriate  - Encourage maximum independence but intervene and supervise when necessary  - Involve family in performance of ADLs  - Assess for home care needs following discharge   - Consider OT consult to assist with ADL evaluation and planning for discharge  - Provide patient education as appropriate  Outcome: Adequate for Discharge  Goal: Maintains/Returns to pre admission functional level  Description: INTERVENTIONS:  - Perform AM-PAC 6 Click Basic Mobility/ Daily Activity assessment daily.  - Set and communicate daily mobility goal to care team and patient/family/caregiver.   - Collaborate with rehabilitation services on mobility goals if consulted  - Out of bed for toileting  - Record patient progress and toleration of activity level   Outcome: Adequate for Discharge     Problem: DISCHARGE PLANNING  Goal: Discharge to home or other facility with appropriate resources  Description: INTERVENTIONS:  - Identify barriers to discharge w/patient and caregiver  - Arrange for needed discharge resources and transportation as appropriate  - Identify discharge learning needs (meds, wound care, etc.)  - Arrange for interpretive services to assist at discharge as needed  - Refer to Case Management Department for coordinating discharge planning if the patient needs post-hospital  services based on physician/advanced practitioner order or complex needs related to functional status, cognitive ability, or social support system  Outcome: Adequate for Discharge     Problem: Knowledge Deficit  Goal: Patient/family/caregiver demonstrates understanding of disease process, treatment plan, medications, and discharge instructions  Description: Complete learning assessment and assess knowledge base.  Interventions:  - Provide teaching at level of understanding  - Provide teaching via preferred learning methods  Outcome: Adequate for Discharge     Problem: GENITOURINARY - ADULT  Goal: Maintains or returns to baseline urinary function  Description: INTERVENTIONS:  - Assess urinary function  - Encourage oral fluids to ensure adequate hydration if ordered  - Administer IV fluids as ordered to ensure adequate hydration  - Administer ordered medications as needed  - Offer frequent toileting  - Follow urinary retention protocol if ordered  Outcome: Adequate for Discharge  Goal: Absence of urinary retention  Description: INTERVENTIONS:  - Assess patient’s ability to void and empty bladder  - Monitor I/O  - Bladder scan as needed  - Discuss with physician/AP medications to alleviate retention as needed  - Discuss catheterization for long term situations as appropriate  Outcome: Adequate for Discharge  Goal: Urinary catheter remains patent  Description: INTERVENTIONS:  - Assess patency of urinary catheter  - If patient has a chronic lynn, consider changing catheter if non-functioning  - Follow guidelines for intermittent irrigation of non-functioning urinary catheter  Outcome: Adequate for Discharge

## 2025-01-01 NOTE — DISCHARGE INSTR - AVS FIRST PAGE
=============================================================================  Dear Santana JUAN Jacinto,     It was our pleasure to care for you here at [facility].  You were hospitalized due to e coli resistant bacteria infection in the urine and blood.  You were cared for on the 5th floor by Deidre Fields MD with the [facility] who covers for your primary care physician (PCP), Mina Denny DO, while you were hospitalized.  If you have any questions or concerns related to this hospitalization, you may contact us at .     For follow up as well as any medication refills, we recommend that you follow up with your primary care physician.  A registered nurse will reach out to you by phone within a few days after your discharge to answer any additional questions that you may have after going home.  However, at this time we provide for you here, the most important instructions / recommendations at discharge:     Notable Medication Adjustments -   Continue your home medications.  No changes  Testing Required after Discharge -   Please see your urologist as scheduled on January 23  ** Please contact your PCP to request testing orders for any of the testing recommended here **  Important follow up information -   Please return to the ER with any fever, chills, dizziness, lightheadedness, vomiting, diarrhea, or any other problems at all  Please also return with any chest pain, difficulty breathing or any other concern  Other Instructions -   Be sure to stay well-hydrated and drink at least 6-8 large glasses of water a day  Please continue to monitor your urinary stream and output: Call your urologist if you experience any difficulty urinating, as a blockage in your urine flow can lead to urine infections  Please review this entire after visit summary as additional general instructions including medication list, appointments, activity, diet, any pertinent  wound care, and other additional recommendations from your care team that may be provided for you.      Sincerely,     Deidre Fields MD

## 2025-01-01 NOTE — ASSESSMENT & PLAN NOTE
Patient presented with an acute UTI  Cultures positive for ESBL E. coli, with subsequent bacteremia  CT scan abdomen/pelvis without hydronephrosis, or obstruction  Completed a 7-day course of antibiotics  Patient's E. coli UTI most likely secondary to his history of BPH with obstruction  Patient follows with [facility], Dr. 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 Vega at discharge: He notes he has an appointment already scheduled for the 23rd

## 2025-01-01 NOTE — ASSESSMENT & PLAN NOTE
Lab Results   Component Value Date    EGFR 51 01/01/2025    EGFR 49 12/30/2024    EGFR 51 12/29/2024    CREATININE 1.31 (H) 01/01/2025    CREATININE 1.37 (H) 12/30/2024    CREATININE 1.32 (H) 12/29/2024   POA creatinine elevated from baseline at 1.77, but not fulfilling SHANTA  Baseline creatinine 1.2-1.4  Creatinine has returned to baseline

## 2025-01-01 NOTE — ASSESSMENT & PLAN NOTE
History of JAK2 mutation positive polycythemia vera  Followed outpatient by [facility] hematology  Continue hydroxyurea

## 2025-01-01 NOTE — PLAN OF CARE
Problem: PAIN - ADULT  Goal: Verbalizes/displays adequate comfort level or baseline comfort level  Description: Interventions:  - Encourage patient to monitor pain and request assistance  - Assess pain using appropriate pain scale  - Administer analgesics based on type and severity of pain and evaluate response  - Implement non-pharmacological measures as appropriate and evaluate response  - Consider cultural and social influences on pain and pain management  - Notify physician/advanced practitioner if interventions unsuccessful or patient reports new pain  Outcome: Progressing     Problem: INFECTION - ADULT  Goal: Absence or prevention of progression during hospitalization  Description: INTERVENTIONS:  - Assess and monitor for signs and symptoms of infection  - Monitor lab/diagnostic results  - Monitor all insertion sites, i.e. indwelling lines, tubes, and drains  - Monitor endotracheal if appropriate and nasal secretions for changes in amount and color  - Wiggins appropriate cooling/warming therapies per order  - Administer medications as ordered  - Instruct and encourage patient and family to use good hand hygiene technique  - Identify and instruct in appropriate isolation precautions for identified infection/condition  Outcome: Progressing  Goal: Absence of fever/infection during neutropenic period  Description: INTERVENTIONS:  - Monitor WBC    Outcome: Progressing     Problem: SAFETY ADULT  Goal: Patient will remain free of falls  Description: INTERVENTIONS:  - Educate patient/family on patient safety including physical limitations  - Instruct patient to call for assistance with activity   - Consult OT/PT to assist with strengthening/mobility   - Keep Call bell within reach  - Keep bed low and locked with side rails adjusted as appropriate  - Keep care items and personal belongings within reach  - Initiate and maintain comfort rounds  - Make Fall Risk Sign visible to staff  - Apply yellow socks and bracelet  for high fall risk patients  - Consider moving patient to room near nurses station  Outcome: Progressing  Goal: Maintain or return to baseline ADL function  Description: INTERVENTIONS:  -  Assess patient's ability to carry out ADLs; assess patient's baseline for ADL function and identify physical deficits which impact ability to perform ADLs (bathing, care of mouth/teeth, toileting, grooming, dressing, etc.)  - Assess/evaluate cause of self-care deficits   - Assess range of motion  - Assess patient's mobility; develop plan if impaired  - Assess patient's need for assistive devices and provide as appropriate  - Encourage maximum independence but intervene and supervise when necessary  - Involve family in performance of ADLs  - Assess for home care needs following discharge   - Consider OT consult to assist with ADL evaluation and planning for discharge  - Provide patient education as appropriate  Outcome: Progressing  Goal: Maintains/Returns to pre admission functional level  Description: INTERVENTIONS:  - Perform AM-PAC 6 Click Basic Mobility/ Daily Activity assessment daily.  - Set and communicate daily mobility goal to care team and patient/family/caregiver.   - Collaborate with rehabilitation services on mobility goals if consulted  - Out of bed for toileting  - Record patient progress and toleration of activity level   Outcome: Progressing     Problem: DISCHARGE PLANNING  Goal: Discharge to home or other facility with appropriate resources  Description: INTERVENTIONS:  - Identify barriers to discharge w/patient and caregiver  - Arrange for needed discharge resources and transportation as appropriate  - Identify discharge learning needs (meds, wound care, etc.)  - Arrange for interpretive services to assist at discharge as needed  - Refer to Case Management Department for coordinating discharge planning if the patient needs post-hospital services based on physician/advanced practitioner order or complex needs  related to functional status, cognitive ability, or social support system  Outcome: Progressing     Problem: Knowledge Deficit  Goal: Patient/family/caregiver demonstrates understanding of disease process, treatment plan, medications, and discharge instructions  Description: Complete learning assessment and assess knowledge base.  Interventions:  - Provide teaching at level of understanding  - Provide teaching via preferred learning methods  Outcome: Progressing     Problem: GENITOURINARY - ADULT  Goal: Maintains or returns to baseline urinary function  Description: INTERVENTIONS:  - Assess urinary function  - Encourage oral fluids to ensure adequate hydration if ordered  - Administer IV fluids as ordered to ensure adequate hydration  - Administer ordered medications as needed  - Offer frequent toileting  - Follow urinary retention protocol if ordered  Outcome: Progressing  Goal: Absence of urinary retention  Description: INTERVENTIONS:  - Assess patient’s ability to void and empty bladder  - Monitor I/O  - Bladder scan as needed  - Discuss with physician/AP medications to alleviate retention as needed  - Discuss catheterization for long term situations as appropriate  Outcome: Progressing  Goal: Urinary catheter remains patent  Description: INTERVENTIONS:  - Assess patency of urinary catheter  - If patient has a chronic lynn, consider changing catheter if non-functioning  - Follow guidelines for intermittent irrigation of non-functioning urinary catheter  Outcome: Progressing

## 2025-01-01 NOTE — DISCHARGE SUMMARY
Discharge Summary - Hospitalist   Name: Santana Jacinto 79 y.o. male I MRN: [id number]  Unit/Bed#: E5 -01 I Date of Admission: 12/26/2024   Date of Service: 1/1/2025 I Hospital Day: 5       Discharging Physician / Practitioner: Deidre Fields MD  PCP: Mina Denny DO  Admission Date:   Admission Orders (From admission, onward)       Ordered        12/27/24 1520  INPATIENT ADMISSION  Once            12/26/24 2054  Place in Observation  Once                          Discharge Date: 01/01/25  Assessment & Plan  E coli bacteremia  Patient is a 79-year-old male with past medical history significant for COPD, hypertension, polycythemia vera who presented to the ER with weakness    Blood cultures positive for ESBL E. coli x 2 on admission  Urine culture positive for greater than 100,000 E. coli ESBL, and 50,000 Pseudomonas  Case was discussed with infectious disease team: Recommend 7-day course   Patient completed 7-day course of antibiotics, first day with Zosyn and remaining days with ertapenem.  Case was discussed with infectious disease team yesterday anticipating discharge home today as he has clinically and symptomatically improved.  Afebrile with normal white blood cell count, and no current symptoms  Will be discharged home to follow-up with PCP and urologist as an outpatient  E. coli bacteremia attributed to E. coli UTI  E. coli UTI  Patient presented with an acute UTI  Cultures positive for ESBL E. coli, with subsequent bacteremia  CT scan abdomen/pelvis without hydronephrosis, or obstruction  Completed a 7-day course of antibiotics  Patient's E. coli UTI most likely secondary to his history of BPH with obstruction  Patient follows with [facility] urology, Dr. 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 Vega at  "discharge: He notes he has an appointment already scheduled for the 23rd  Benign prostatic hyperplasia without lower urinary tract symptoms  As described above, patient follows with [facility] urology  Most recent cystoscopy 10/2024: Notable for fossa navicularis stricture that was dilated: At that discharge patient was instructed to continue to use: Dilator  Patient did not have any significant urinary retention during this hospitalization  Will continue his Flomax and outpatient follow-up  Bronchitis  CT chest demonstrating \"Right lower lobe bronchial wall thickening with air trapping compatible with bronchitis/bronchiolitis.\"  Pt does endorse increased productive cough  Viral panel negative  Procalcitonin unremarkable  Most likely viral bronchitis  Continue supportive care, home inhaler therapy  Chronic obstructive pulmonary disease (HCC)  No acute exacerbation.  Continue metered-dose inhaler  CKD (chronic kidney disease)  Lab Results   Component Value Date    EGFR 51 01/01/2025    EGFR 49 12/30/2024    EGFR 51 12/29/2024    CREATININE 1.31 (H) 01/01/2025    CREATININE 1.37 (H) 12/30/2024    CREATININE 1.32 (H) 12/29/2024   POA creatinine elevated from baseline at 1.77, but not fulfilling SHANTA  Baseline creatinine 1.2-1.4  Creatinine has returned to baseline  Benign essential hypertension  Continue amlodipine and metoprolol  Blood pressure adequately controlled  Follow-up with PCP  Mixed hyperlipidemia  Continue statin  Polycythemia vera (HCC)  History of JAK2 mutation positive polycythemia vera  Followed outpatient by [facility] hematology  Continue hydroxyurea   Cigarette nicotine dependence without complication  Pt reports smoking 0.5 packs a day  Continue to encourage cessation  Nicotine patch supplementation      Medical Problems       Resolved Problems  Date Reviewed: 11/5/2024   None           Consultations During Hospital Stay:  Case D/W on call ID team    Procedures Performed:   none    Significant Findings / " "Test Results:   Imaging  12/26: CT chest/abdomen/pelvis  Right lower lobe bronchial wall thickening with air trapping compatible with bronchitis/bronchiolitis.  No evidence of acute intra-abdominal or pelvic pathology     Microbiology  12/26: Urine culture: Greater than 100,000 ESBL E. coli.  50,000 Pseudomonas  12/26: Blood culture ESBL E. coli x 2  12/26: Negative COVID/influenza    Incidental Findings:   none    Test Results Pending at Discharge (will require follow up):   none     Outpatient Tests Requested:  Office fu with Urology     Complications:  none    Reason for Admission: uti/bacteremia    Hospital Course:   Santana Jacinto is a 79 y.o. male patient who originally presented to the hospital on 12/26/2024 due to general malaise, fever, shaking chills.  Patient was diagnosed with ESBL E. coli UTI and subsequent bacteremia and underwent workup and treatment as described above.  Patient clinically improved and will be discharged home to continue outpatient follow-up      Please see above list of diagnoses and related plan for additional information.     Condition at Discharge: good    Discharge Day Visit / Exam:   Subjective:    Patient notes he feels well.  Denies any GI upset.  Had some mild nausea yesterday which has resolved.  Notes he tolerated p.o. today without any difficulties.  He denies any pain anywhere.  Denies any shortness of breath or new cough.  Notes he is ambulating and denies any dizziness or lightheadedness.  He relates he feels well, back to his baseline and is agreeable for discharge home today    Vitals: Blood Pressure: 140/80 (01/01/25 0809)  Pulse: 60 (01/01/25 0809)  Temperature: 97.7 °F (36.5 °C) (01/01/25 0809)  Temp Source: Oral (01/01/25 0809)  Respirations: 18 (01/01/25 0809)  Height: 5' 8\" (172.7 cm) (12/30/24 2300)  Weight - Scale: 70.8 kg (156 lb 1.7 oz) (01/01/25 0543)  SpO2: 94 % (01/01/25 0809)    Physical Exam   General: Very pleasant male.  No acute distress.  Nontachypneic " and nondyspneic  Heart: Regular rate and rhythm.  S1-S2 present.  No murmur, rub, gallop  Lungs: Coarse breath sounds.  No wheezes, crackles, rhonchi.  No accessory muscle use or respiratory distress  Abdomen: Soft, nontender, nondistended, normal active bowel sounds present.  No guarding or rebound.  No peritoneal signs or mass  Extremities: No clubbing, cyanosis, edema.  2+ pedal pulses bilaterally  Neurologic: Awake and alert.  Fluent speech.  Interactive.  Moving all 4 extremities  Skin: Warm and dry.  No petechia, purpura, rash    Discussion with Family: updated daughter Sol Jacinto via phone    Discharge instructions/Information to patient and family:   See after visit summary for information provided to patient and family.      Provisions for Follow-Up Care:  See after visit summary for information related to follow-up care and any pertinent home health orders.      Mobility at time of Discharge:   Basic Mobility Inpatient Raw Score: 24  JH-HLM Goal: 8: Walk 250 feet or more  JH-HLM Achieved: 8: Walk 250 feet ot more  HLM Goal achieved. Continue to encourage appropriate mobility.     Disposition:   Home    Planned Readmission: no    Discharge Medications:  See after visit summary for reconciled discharge medications provided to patient and/or family.      Administrative Statements   Discharge Statement:  I have spent a total time of 50 minutes in caring for this patient on the day of the visit/encounter. .    **Please Note: This note may have been constructed using a voice recognition system**

## 2025-01-02 ENCOUNTER — TRANSITIONAL CARE MANAGEMENT (OUTPATIENT)
Dept: FAMILY MEDICINE CLINIC | Facility: CLINIC | Age: 80
End: 2025-01-02

## 2025-01-02 NOTE — UTILIZATION REVIEW
NOTIFICATION OF ADMISSION DISCHARGE   This is a Notification of Discharge from [facility]. Please be advised that this patient has been discharge from our facility. Below you will find the admission and discharge date and time including the patient’s disposition.   UTILIZATION REVIEW CONTACT:  Shruthi Jay  Utilization   Network Utilization Review Department  Phone: [phone] x carefully listen to the prompts. All voicemails are confidential.  Email: [email or web address]     ADMISSION INFORMATION  PRESENTATION DATE: 12/26/2024  2:30 PM  OBERVATION ADMISSION DATE: 12/26/2024 2054  INPATIENT ADMISSION DATE: 12/27/24  3:20 PM   DISCHARGE DATE: 1/1/2025  3:27 PM   DISPOSITION:Home/Self Care    Network Utilization Review Department  ATTENTION: Please call with any questions or concerns to [phone] and carefully listen to the prompts so that you are directed to the right person. All voicemails are confidential.   For Discharge needs, contact Care Management DC Support Team at [phone] opt. 2  Send all requests for admission clinical reviews, approved or denied determinations and any other requests to dedicated fax number below belonging to the campus where the patient is receiving treatment. List of dedicated fax numbers for the Facilities:  FACILITY NAME UR FAX NUMBER   ADMISSION DENIALS (Administrative/Medical Necessity) [phone]   DISCHARGE SUPPORT TEAM ([facility]) [phone]   PARENT CHILD HEALTH (Maternity/NICU/Pediatrics) [phone]   [facility] [phone]   [facility] [phone]   [facility] [phone]   [facility] [phone]   [facility] [phone]   [facility] [phone]   [facility] [phone]   [facility]  [phone]   [facility] [phone]   [facility] [phone]   [facility] [phone]   [facility] [phone]

## 2025-01-08 ENCOUNTER — OFFICE VISIT (OUTPATIENT)
Dept: FAMILY MEDICINE CLINIC | Facility: CLINIC | Age: 80
End: 2025-01-08
Payer: COMMERCIAL

## 2025-01-08 VITALS
DIASTOLIC BLOOD PRESSURE: 68 MMHG | BODY MASS INDEX: 25.54 KG/M2 | HEART RATE: 60 BPM | SYSTOLIC BLOOD PRESSURE: 142 MMHG | TEMPERATURE: 97.5 F | WEIGHT: 168 LBS | OXYGEN SATURATION: 100 %

## 2025-01-08 DIAGNOSIS — I25.10 CORONARY ARTERY DISEASE WITHOUT ANGINA PECTORIS, UNSPECIFIED VESSEL OR LESION TYPE, UNSPECIFIED WHETHER NATIVE OR TRANSPLANTED HEART: ICD-10-CM

## 2025-01-08 DIAGNOSIS — A41.50 SEPSIS DUE TO GRAM-NEGATIVE UTI  (HCC): Primary | ICD-10-CM

## 2025-01-08 DIAGNOSIS — J42 CHRONIC BRONCHITIS, UNSPECIFIED CHRONIC BRONCHITIS TYPE (HCC): ICD-10-CM

## 2025-01-08 DIAGNOSIS — D45 POLYCYTHEMIA VERA (HCC): ICD-10-CM

## 2025-01-08 DIAGNOSIS — N18.31 HYPERTENSIVE KIDNEY DISEASE WITH STAGE 3A CHRONIC KIDNEY DISEASE (HCC): ICD-10-CM

## 2025-01-08 DIAGNOSIS — N18.32 CHRONIC KIDNEY DISEASE, STAGE 3B (HCC): ICD-10-CM

## 2025-01-08 DIAGNOSIS — N39.0 SEPSIS DUE TO GRAM-NEGATIVE UTI  (HCC): Primary | ICD-10-CM

## 2025-01-08 DIAGNOSIS — I12.9 HYPERTENSIVE KIDNEY DISEASE WITH STAGE 3A CHRONIC KIDNEY DISEASE (HCC): ICD-10-CM

## 2025-01-08 PROCEDURE — 99496 TRANSJ CARE MGMT HIGH F2F 7D: CPT | Performed by: FAMILY MEDICINE

## 2025-01-08 RX ORDER — NITROGLYCERIN 0.4 MG/1
0.4 TABLET SUBLINGUAL
Qty: 10 TABLET | Refills: 1 | Status: SHIPPED | OUTPATIENT
Start: 2025-01-08

## 2025-01-08 NOTE — PROGRESS NOTES
"Assessment/Plan:    1. Sepsis due to gram-negative UTI  (HCC) (Primary)  -resolved with abx; pt stable and no return of fevers or weakness; f/u guidance given; has f/u scheduled with urology    2. Coronary artery disease without angina pectoris, unspecified vessel or lesion type, unspecified whether native or transplanted heart  -has not required nitro in several years but would like new script as previous is ; reviewed use and f/u guidance given; asymptomatic; f/u with cardiology as scheduled; c/w current medications  - nitroglycerin (NITROSTAT) 0.4 mg SL tablet; Place 1 tablet (0.4 mg total) under the tongue every 5 (five) minutes as needed for chest pain  Dispense: 10 tablet; Refill: 1    3. Chronic kidney disease, stage 3b (HCC)  -stable    4. Chronic bronchitis, unspecified chronic bronchitis type (HCC)  -encouraged rsv, covid, and flu vaccine; declines fat this time; UTD on PCV20    5. Polycythemia vera (HCC)      6. Hypertensive kidney disease with stage 3a chronic kidney disease (HCC)            Subjective:      Patient ID: Santana Jacinto is a 79 y.o. male.    Patient is here for hospital follow up on ESBL UTI 24-25.  Pt is doing well since discharge. No fevers. Appetite is back to normal. Some fatigue but not worsening since discharge. NO UTI symptoms. Has f/u scheduled with urology. Completed course of iv abx in hospital. Labs returned to baseline at discharge.         Hospital Course:   \"Santana Jacinto is a 79 y.o. male patient who originally presented to the hospital on 2024 due to general malaise, fever, shaking chills.  Patient was diagnosed with ESBL E. coli UTI and subsequent bacteremia and underwent workup and treatment as described above.  Patient clinically improved and will be discharged home to continue outpatient follow-up        Please see above list of diagnoses and related plan for additional information.\"     Lab Results       Component                Value               " Date                       WBC                      6.77                01/01/2025                 HGB                      13.6                01/01/2025                 HCT                      39.2                01/01/2025                 MCV                      109 (H)             01/01/2025                 PLT                      155                 01/01/2025            Lab Results       Component                Value               Date                       NA                       142                 11/13/2017                 SODIUM                   140                 01/01/2025                 K                        3.6                 01/01/2025                 CL                       108                 01/01/2025                 CO2                      25                  01/01/2025                 AGAP                     7                   01/01/2025                 BUN                      21                  01/01/2025                 CREATININE               1.31 (H)            01/01/2025                 GLUC                     75                  01/01/2025                 GLUF                     99                  10/04/2024                 CALCIUM                  8.8                 01/01/2025                 AST                      18                  12/26/2024                 ALT                      14                  12/26/2024                 ALKPHOS                  116 (H)             12/26/2024                 PROT                     7.1                 11/13/2017                 TP                       7.2                 12/26/2024                 BILITOT                  1.1                 11/13/2017                 TBILI                    1.71 (H)            12/26/2024                 EGFR                     51                  01/01/2025                      The following portions of the patient's history were reviewed and updated as appropriate: allergies,  current medications, past family history, past medical history, past social history, past surgical history, and problem list.    Review of Systems   Constitutional:  Negative for chills and fever.   Respiratory:  Negative for shortness of breath.          Objective:      /68 (BP Location: Left arm, Patient Position: Sitting, Cuff Size: Standard)   Pulse 60   Temp 97.5 °F (36.4 °C) (Temporal)   Wt 76.2 kg (168 lb)   SpO2 100%   BMI 25.54 kg/m²          Physical Exam  Vitals reviewed.   Constitutional:       General: He is not in acute distress.     Appearance: Normal appearance. He is not ill-appearing, toxic-appearing or diaphoretic.   HENT:      Head: Normocephalic and atraumatic.   Eyes:      General: No scleral icterus.        Right eye: No discharge.         Left eye: No discharge.      Conjunctiva/sclera: Conjunctivae normal.   Cardiovascular:      Rate and Rhythm: Normal rate and regular rhythm.      Pulses: Normal pulses.      Heart sounds: Normal heart sounds. No murmur heard.     No gallop.   Pulmonary:      Effort: Pulmonary effort is normal. No respiratory distress.      Breath sounds: Normal breath sounds. No stridor. No wheezing, rhonchi or rales.   Musculoskeletal:      Right lower leg: No edema.      Left lower leg: No edema.   Neurological:      General: No focal deficit present.      Mental Status: He is alert and oriented to person, place, and time.   Psychiatric:         Mood and Affect: Mood normal.         Behavior: Behavior normal.         Thought Content: Thought content normal.         Judgment: Judgment normal.

## 2025-01-15 ENCOUNTER — RESULTS FOLLOW-UP (OUTPATIENT)
Dept: FAMILY MEDICINE CLINIC | Facility: CLINIC | Age: 80
End: 2025-01-15

## 2025-01-15 ENCOUNTER — APPOINTMENT (OUTPATIENT)
Dept: LAB | Facility: MEDICAL CENTER | Age: 80
End: 2025-01-15
Payer: COMMERCIAL

## 2025-01-15 DIAGNOSIS — Z12.5 SCREENING FOR PROSTATE CANCER: ICD-10-CM

## 2025-01-15 DIAGNOSIS — R73.03 PREDIABETES: ICD-10-CM

## 2025-01-15 DIAGNOSIS — R79.89 ELEVATED LFTS: ICD-10-CM

## 2025-01-15 DIAGNOSIS — I10 BENIGN ESSENTIAL HYPERTENSION: ICD-10-CM

## 2025-01-15 DIAGNOSIS — D45 POLYCYTHEMIA VERA (HCC): ICD-10-CM

## 2025-01-15 DIAGNOSIS — E78.2 MIXED HYPERLIPIDEMIA: ICD-10-CM

## 2025-01-15 LAB
ALBUMIN SERPL BCG-MCNC: 4.1 G/DL (ref 3.5–5)
ALBUMIN SERPL BCG-MCNC: 4.1 G/DL (ref 3.5–5)
ALP SERPL-CCNC: 118 U/L (ref 34–104)
ALP SERPL-CCNC: 119 U/L (ref 34–104)
ALT SERPL W P-5'-P-CCNC: 67 U/L (ref 7–52)
ALT SERPL W P-5'-P-CCNC: 68 U/L (ref 7–52)
ANION GAP SERPL CALCULATED.3IONS-SCNC: 8 MMOL/L (ref 4–13)
AST SERPL W P-5'-P-CCNC: 74 U/L (ref 13–39)
AST SERPL W P-5'-P-CCNC: 75 U/L (ref 13–39)
BASOPHILS # BLD AUTO: 0.02 THOUSANDS/ΜL (ref 0–0.1)
BASOPHILS NFR BLD AUTO: 0 % (ref 0–1)
BILIRUB DIRECT SERPL-MCNC: 0.23 MG/DL (ref 0–0.2)
BILIRUB SERPL-MCNC: 0.91 MG/DL (ref 0.2–1)
BILIRUB SERPL-MCNC: 0.92 MG/DL (ref 0.2–1)
BUN SERPL-MCNC: 23 MG/DL (ref 5–25)
CALCIUM SERPL-MCNC: 8.9 MG/DL (ref 8.4–10.2)
CHLORIDE SERPL-SCNC: 102 MMOL/L (ref 96–108)
CHOLEST SERPL-MCNC: 112 MG/DL (ref ?–200)
CO2 SERPL-SCNC: 30 MMOL/L (ref 21–32)
CREAT SERPL-MCNC: 1.3 MG/DL (ref 0.6–1.3)
EOSINOPHIL # BLD AUTO: 0.15 THOUSAND/ΜL (ref 0–0.61)
EOSINOPHIL NFR BLD AUTO: 2 % (ref 0–6)
ERYTHROCYTE [DISTWIDTH] IN BLOOD BY AUTOMATED COUNT: 15.5 % (ref 11.6–15.1)
EST. AVERAGE GLUCOSE BLD GHB EST-MCNC: 88 MG/DL
GFR SERPL CREATININE-BSD FRML MDRD: 52 ML/MIN/1.73SQ M
GLUCOSE P FAST SERPL-MCNC: 82 MG/DL (ref 65–99)
HBA1C MFR BLD: 4.7 %
HCT VFR BLD AUTO: 45.7 % (ref 36.5–49.3)
HDLC SERPL-MCNC: 29 MG/DL
HGB BLD-MCNC: 15.5 G/DL (ref 12–17)
IMM GRANULOCYTES # BLD AUTO: 0.02 THOUSAND/UL (ref 0–0.2)
IMM GRANULOCYTES NFR BLD AUTO: 0 % (ref 0–2)
LDLC SERPL CALC-MCNC: 58 MG/DL (ref 0–100)
LYMPHOCYTES # BLD AUTO: 1.47 THOUSANDS/ΜL (ref 0.6–4.47)
LYMPHOCYTES NFR BLD AUTO: 22 % (ref 14–44)
MCH RBC QN AUTO: 39.2 PG (ref 26.8–34.3)
MCHC RBC AUTO-ENTMCNC: 33.9 G/DL (ref 31.4–37.4)
MCV RBC AUTO: 116 FL (ref 82–98)
MONOCYTES # BLD AUTO: 0.54 THOUSAND/ΜL (ref 0.17–1.22)
MONOCYTES NFR BLD AUTO: 8 % (ref 4–12)
NEUTROPHILS # BLD AUTO: 4.64 THOUSANDS/ΜL (ref 1.85–7.62)
NEUTS SEG NFR BLD AUTO: 68 % (ref 43–75)
NRBC BLD AUTO-RTO: 0 /100 WBCS
PLATELET # BLD AUTO: 157 THOUSANDS/UL (ref 149–390)
PMV BLD AUTO: 9.8 FL (ref 8.9–12.7)
POTASSIUM SERPL-SCNC: 3.8 MMOL/L (ref 3.5–5.3)
PROT SERPL-MCNC: 7.1 G/DL (ref 6.4–8.4)
PROT SERPL-MCNC: 7.2 G/DL (ref 6.4–8.4)
PSA SERPL-MCNC: 0.28 NG/ML (ref 0–4)
RBC # BLD AUTO: 3.95 MILLION/UL (ref 3.88–5.62)
SODIUM SERPL-SCNC: 140 MMOL/L (ref 135–147)
TRIGL SERPL-MCNC: 125 MG/DL (ref ?–150)
TSH SERPL DL<=0.05 MIU/L-ACNC: 1.2 UIU/ML (ref 0.45–4.5)
WBC # BLD AUTO: 6.84 THOUSAND/UL (ref 4.31–10.16)

## 2025-01-15 PROCEDURE — 36415 COLL VENOUS BLD VENIPUNCTURE: CPT

## 2025-01-15 PROCEDURE — 85025 COMPLETE CBC W/AUTO DIFF WBC: CPT

## 2025-01-15 PROCEDURE — 84443 ASSAY THYROID STIM HORMONE: CPT

## 2025-01-15 PROCEDURE — 80061 LIPID PANEL: CPT

## 2025-01-15 PROCEDURE — 80076 HEPATIC FUNCTION PANEL: CPT

## 2025-01-15 PROCEDURE — G0103 PSA SCREENING: HCPCS

## 2025-01-15 PROCEDURE — 80053 COMPREHEN METABOLIC PANEL: CPT

## 2025-01-15 PROCEDURE — 83036 HEMOGLOBIN GLYCOSYLATED A1C: CPT

## 2025-01-21 PROBLEM — R74.01 TRANSAMINITIS: Status: ACTIVE | Noted: 2025-01-21

## 2025-01-23 ENCOUNTER — OFFICE VISIT (OUTPATIENT)
Dept: UROLOGY | Facility: CLINIC | Age: 80
End: 2025-01-23
Payer: COMMERCIAL

## 2025-01-23 VITALS
HEART RATE: 61 BPM | HEIGHT: 68 IN | BODY MASS INDEX: 25.46 KG/M2 | DIASTOLIC BLOOD PRESSURE: 80 MMHG | WEIGHT: 168 LBS | OXYGEN SATURATION: 98 % | SYSTOLIC BLOOD PRESSURE: 158 MMHG

## 2025-01-23 DIAGNOSIS — N40.1 BPH WITH OBSTRUCTION/LOWER URINARY TRACT SYMPTOMS: Primary | ICD-10-CM

## 2025-01-23 DIAGNOSIS — N13.8 BPH WITH OBSTRUCTION/LOWER URINARY TRACT SYMPTOMS: Primary | ICD-10-CM

## 2025-01-23 LAB — POST-VOID RESIDUAL VOLUME, ML POC: 14 ML

## 2025-01-23 PROCEDURE — 51798 US URINE CAPACITY MEASURE: CPT | Performed by: UROLOGY

## 2025-01-23 PROCEDURE — 99213 OFFICE O/P EST LOW 20 MIN: CPT | Performed by: UROLOGY

## 2025-01-23 NOTE — PROGRESS NOTES
"Name: Santana Jacinto      : 1945      MRN: [id number]  Encounter Provider: Kieran Vega MD  Encounter Date: 2025   Encounter department: [facility] UROLOGY [place] END  :  Assessment & Plan  BPH with obstruction/lower urinary tract symptoms  We discussed the likely source of his urinary tract infections.  He does have colonization with ESBL E. coli.  He also likely has some degree of recurrent/persistent fossa navicularis stricture.  He is not interested in using cone dilator.  He feels he is voiding well.  He will be on the look out for symptoms.  I did offer Augmentin prescription to keep at home in case his symptoms.  He is more comfortable observing and calling if needed.  Plan follow-up 6 months with office PVR.  He understands to call if needed sooner as he does tend to develop severe infections.  Orders:  •  POCT Measure PVR        History of Present Illness   Santana Jacinto is a 79 y.o. male  status post robotic simple prostatectomy in 2021.  Pathology revealed 121 g benign tissue.  He did well postoperatively.    However he developed worsening urinary stream and nocturia up to 3 times per night. 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  He discontinued a while ago when he felt he didn't need it.  He was admitted to hospital with UTI and epididymitis on multiple occasions, most recently 2024.  He had ESBL E. coli infection.  CT did not reveal urinary tract pathology.    He reports good urinary stream, strong stream, and feeling of complete emptying, no further nocturia since his simple prostatectomy.  He has no current complaints.    Bladder scan PVR 24 mL.    Review of Systems       Objective   /80 (BP Location: Left arm, Patient Position: Sitting, Cuff Size: Adult)   Pulse 61   Ht 5' 8\" (1.727 m)   Wt 76.2 kg (168 lb)   SpO2 98%   BMI 25.54 kg/m²     Physical Exam     Results  Lab Results   Component Value " Date    PSA 0.275 01/15/2025    PSA 0.36 01/10/2024    PSA 0.2 02/08/2023     Lab Results   Component Value Date    CALCIUM 8.9 01/15/2025     11/13/2017    K 3.8 01/15/2025    CO2 30 01/15/2025     01/15/2025    BUN 23 01/15/2025    CREATININE 1.30 01/15/2025     Lab Results   Component Value Date    WBC 6.84 01/15/2025    HGB 15.5 01/15/2025    HCT 45.7 01/15/2025     (H) 01/15/2025     01/15/2025       Office Urine Dip  Recent Results (from the past hour)   POCT Measure PVR    Collection Time: 01/23/25  3:08 PM   Result Value Ref Range    POST-VOID RESIDUAL VOLUME, ML POC 14 mL   ]

## 2025-01-28 ENCOUNTER — OFFICE VISIT (OUTPATIENT)
Dept: HEMATOLOGY ONCOLOGY | Facility: CLINIC | Age: 80
End: 2025-01-28
Payer: COMMERCIAL

## 2025-01-28 VITALS
SYSTOLIC BLOOD PRESSURE: 128 MMHG | WEIGHT: 173 LBS | HEIGHT: 68 IN | TEMPERATURE: 98.3 F | OXYGEN SATURATION: 98 % | DIASTOLIC BLOOD PRESSURE: 78 MMHG | RESPIRATION RATE: 17 BRPM | HEART RATE: 63 BPM | BODY MASS INDEX: 26.22 KG/M2

## 2025-01-28 DIAGNOSIS — D45 POLYCYTHEMIA VERA (HCC): ICD-10-CM

## 2025-01-28 DIAGNOSIS — Z15.89 JAK2 GENE MUTATION: Primary | ICD-10-CM

## 2025-01-28 PROCEDURE — G2211 COMPLEX E/M VISIT ADD ON: HCPCS | Performed by: PHYSICIAN ASSISTANT

## 2025-01-28 PROCEDURE — 99214 OFFICE O/P EST MOD 30 MIN: CPT | Performed by: PHYSICIAN ASSISTANT

## 2025-01-28 NOTE — PROGRESS NOTES
Name: Santana Jacinto      : 1945      MRN: [id number]  Encounter Provider: Mira Rodríguez PA-C  Encounter Date: 2025   Encounter department: [facility]ATOLOG[facility]  :  Assessment & Plan  Polycythemia vera (HCC)  Last phlebotomy was in 2024   HCT is at goal with 45.7 %  Defer phlebotomy at this time   Repeat labs in 3 months, 2025   Continue Hydrea 500 mg PO daily       Orders:    CBC and differential; Standing    JAK2 gene mutation  See above     Orders:    CBC and differential; Standing        History of Present Illness   Chief Complaint   Patient presents with    Follow-up        Pertinent Medical History   25: follow-up visit regarding history of JAK2 mutation positive polycythemia vera diagnosed in 2017.  He was initially receiving phlebotomy as well as baby aspirin.  Since  he has been on Hydrea.  Previously he was on 1000 mg daily at 1 time and developed significant anemia resulting in hospitalization.  Hydrea was then temporarily held and restarted at 500 mg 3 times per week.  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     Most recently he has been taking Hydrea 500 mg 1 tablet daily.     He is a daily smoker smoking approximately half pack per day. he does not ever plan to quit smoking.     Review of Systems   Constitutional:  Negative for appetite change, chills, fever and unexpected weight change.   HENT:  Negative for mouth sores and nosebleeds.    Respiratory:  Negative for cough and shortness of breath.    Cardiovascular:  Negative for chest pain, palpitations and leg swelling.   Gastrointestinal:  Negative for abdominal pain, blood in stool, constipation, diarrhea, nausea and vomiting.   Genitourinary:  Negative for difficulty urinating, dysuria and hematuria.   Musculoskeletal:  Negative for arthralgias.   Skin:  "Negative.    Neurological:  Negative for dizziness, weakness, light-headedness, numbness and headaches.   Hematological: Negative.    Psychiatric/Behavioral: Negative.             Objective   /78 (BP Location: Right arm, Patient Position: Sitting, Cuff Size: Standard)   Pulse 63   Temp 98.3 °F (36.8 °C) (Temporal)   Resp 17   Ht 5' 8\" (1.727 m)   Wt 78.5 kg (173 lb)   SpO2 98%   BMI 26.30 kg/m²     Pain Screening:  Pain Score: 0-No pain  ECOG   1  Physical Exam  Vitals reviewed.   Constitutional:       General: He is not in acute distress.     Appearance: He is well-developed. He is not ill-appearing.   HENT:      Head: Normocephalic and atraumatic.   Eyes:      General: No scleral icterus.     Conjunctiva/sclera: Conjunctivae normal.   Cardiovascular:      Rate and Rhythm: Normal rate and regular rhythm.      Heart sounds: Normal heart sounds. No murmur heard.  Pulmonary:      Effort: Pulmonary effort is normal. No respiratory distress.      Breath sounds: Normal breath sounds.   Abdominal:      Palpations: Abdomen is soft.      Tenderness: There is no abdominal tenderness.   Musculoskeletal:         General: No tenderness. Normal range of motion.      Cervical back: Normal range of motion and neck supple.      Right lower leg: No edema.      Left lower leg: No edema.   Lymphadenopathy:      Cervical: No cervical adenopathy.   Skin:     General: Skin is warm and dry.   Neurological:      Mental Status: He is alert and oriented to person, place, and time.      Cranial Nerves: No cranial nerve deficit.   Psychiatric:         Mood and Affect: Mood normal.         Behavior: Behavior normal.         Labs: I have reviewed the following labs:  Lab Results   Component Value Date/Time    WBC 6.84 01/15/2025 10:01 AM    RBC 3.95 01/15/2025 10:01 AM    Hemoglobin 15.5 01/15/2025 10:01 AM    Hematocrit 45.7 01/15/2025 10:01 AM     (H) 01/15/2025 10:01 AM    MCH 39.2 (H) 01/15/2025 10:01 AM    RDW 15.5 (H) " 01/15/2025 10:01 AM    Platelets 157 01/15/2025 10:01 AM    Segmented % 68 01/15/2025 10:01 AM    Lymphocytes % 22 01/15/2025 10:01 AM    Monocytes % 8 01/15/2025 10:01 AM    Eosinophils Relative 2 01/15/2025 10:01 AM    Basophils Relative 0 01/15/2025 10:01 AM    Immature Grans % 0 01/15/2025 10:01 AM    Absolute Neutrophils 4.64 01/15/2025 10:01 AM     Lab Results   Component Value Date/Time    Potassium 3.8 01/15/2025 10:01 AM    Chloride 102 01/15/2025 10:01 AM    CO2 30 01/15/2025 10:01 AM    BUN 23 01/15/2025 10:01 AM    Creatinine 1.30 01/15/2025 10:01 AM    Glucose, Fasting 82 01/15/2025 10:01 AM    Calcium 8.9 01/15/2025 10:01 AM    AST 75 (H) 01/15/2025 10:06 AM    ALT 68 (H) 01/15/2025 10:06 AM    Alkaline Phosphatase 119 (H) 01/15/2025 10:06 AM    Total Protein 7.1 01/15/2025 10:06 AM    Albumin 4.1 01/15/2025 10:06 AM    Total Bilirubin 0.91 01/15/2025 10:06 AM    eGFR 52 01/15/2025 10:01 AM

## 2025-01-28 NOTE — ASSESSMENT & PLAN NOTE
Last phlebotomy was in June 2024   HCT is at goal with 45.7 %  Defer phlebotomy at this time   Repeat labs in 3 months, April 2025   Continue Hydrea 500 mg PO daily       Orders:    CBC and differential; Standing

## 2025-01-29 PROBLEM — B96.20 E. COLI UTI: Status: RESOLVED | Noted: 2024-12-30 | Resolved: 2025-01-29

## 2025-01-29 PROBLEM — N39.0 E. COLI UTI: Status: RESOLVED | Noted: 2024-12-30 | Resolved: 2025-01-29

## 2025-01-30 ENCOUNTER — OFFICE VISIT (OUTPATIENT)
Dept: FAMILY MEDICINE CLINIC | Facility: CLINIC | Age: 80
End: 2025-01-30
Payer: COMMERCIAL

## 2025-01-30 VITALS
HEART RATE: 60 BPM | WEIGHT: 172.2 LBS | TEMPERATURE: 97.5 F | OXYGEN SATURATION: 96 % | SYSTOLIC BLOOD PRESSURE: 130 MMHG | DIASTOLIC BLOOD PRESSURE: 70 MMHG | HEIGHT: 68 IN | BODY MASS INDEX: 26.1 KG/M2

## 2025-01-30 DIAGNOSIS — I25.10 CORONARY ARTERY DISEASE INVOLVING NATIVE CORONARY ARTERY OF NATIVE HEART WITHOUT ANGINA PECTORIS: ICD-10-CM

## 2025-01-30 DIAGNOSIS — N40.0 BENIGN PROSTATIC HYPERPLASIA WITHOUT LOWER URINARY TRACT SYMPTOMS: ICD-10-CM

## 2025-01-30 DIAGNOSIS — E78.2 MIXED HYPERLIPIDEMIA: ICD-10-CM

## 2025-01-30 DIAGNOSIS — D45 POLYCYTHEMIA VERA (HCC): ICD-10-CM

## 2025-01-30 DIAGNOSIS — N18.30 STAGE 3 CHRONIC KIDNEY DISEASE, UNSPECIFIED WHETHER STAGE 3A OR 3B CKD (HCC): ICD-10-CM

## 2025-01-30 DIAGNOSIS — R74.01 TRANSAMINITIS: Primary | ICD-10-CM

## 2025-01-30 DIAGNOSIS — F17.210 CIGARETTE NICOTINE DEPENDENCE WITHOUT COMPLICATION: ICD-10-CM

## 2025-01-30 DIAGNOSIS — I10 BENIGN ESSENTIAL HYPERTENSION: ICD-10-CM

## 2025-01-30 DIAGNOSIS — R73.03 PREDIABETES: ICD-10-CM

## 2025-01-30 PROCEDURE — 99214 OFFICE O/P EST MOD 30 MIN: CPT | Performed by: FAMILY MEDICINE

## 2025-01-30 PROCEDURE — G2211 COMPLEX E/M VISIT ADD ON: HCPCS | Performed by: FAMILY MEDICINE

## 2025-01-30 NOTE — ASSESSMENT & PLAN NOTE
Still smoking 1/2 pack/day.  Counseled again.  Greater than 30-pack-year history.  Patient had CT chest December 26, 2024 which showed emphysema, otherwise negative

## 2025-01-30 NOTE — PROGRESS NOTES
Name: Santana Jacinto      : 1945      MRN: [id number]  Encounter Provider: Mina Denny DO  Encounter Date: 2025   Encounter department: [facility]    Assessment & Plan  Transaminitis  Recent mild transaminitis.  AST 74, ALT 67.  Patient does not drink alcohol.  Will repeat LFTs in 1 to 2 months.  Consider imaging study if still elevated  Orders:  •  Hepatic function panel; Future    Prediabetes  A1c stable at 4.7  Orders:  •  Comprehensive metabolic panel; Future  •  Hemoglobin A1C; Future    Benign essential hypertension  Blood pressure 130/70.  Continue amlodipine 10 and metoprolol 50 twice daily  Orders:  •  CBC and differential; Future    Mixed hyperlipidemia  Cholesterol 112, LDL 58.  Continue atorvastatin 80       Coronary artery disease involving native coronary artery of native heart without angina pectoris  Status post two-vessel stent 10 years ago.  Status post pacemaker implant .  Doing well without chest pain or shortness of breath.  Continue follow-up with cardiology as directed       Polycythemia vera (HCC)  Longstanding history of polycythemia.  Continue follow-up with hematology       Cigarette nicotine dependence without complication  Still smoking 1/2 pack/day.  Counseled again.  Greater than 30-pack-year history.  Patient had CT chest 2024 which showed emphysema, otherwise negative       Benign prostatic hyperplasia without lower urinary tract symptoms         Stage 3 chronic kidney disease, unspecified whether stage 3a or 3b CKD (HCC)  Lab Results   Component Value Date    EGFR 52 01/15/2025    EGFR 51 2025    EGFR 49 2024    CREATININE 1.30 01/15/2025    CREATININE 1.31 (H) 2025    CREATININE 1.37 (H) 2024                 Patient had pneumonia vaccination   Patient continues to refuse all other age-appropriate vaccinations    Lab results from January 15 reviewed with patient    Recheck LFTs in 1 to 2 months    6  "months, fasting blood work prior      History of Present Illness     Patient presents for recheck chronic medical problems.  He was hospitalized over the holidays for UTI.  Symptoms have resolved.  Overall he is doing well.  Still smoking 1/2 pack/day.  Otherwise feels well.  He had labs done January 15      Review of Systems   Respiratory: Negative.     Cardiovascular: Negative.    Gastrointestinal: Negative.    Genitourinary: Negative.      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\"   • Full dentures     but doesnt wear them   • Gastroesophageal reflux disease without esophagitis 04/03/2024   • Heart block 08/08/2022   • Hepatic cyst 05/02/2023   • History of 2019 novel coronavirus disease (COVID-19) 02/2021    hospital for 2 days but not in ICU/\"mainly dehydrated\" \"also fever/oxygen below 90\"   • History of coronary artery stent placement     x2 in 2008 or so; sees [facility]-- Dr Hutton cardio   • [place] (hard of hearing)     no hearing aids yet   • Hyperlipidemia     Last Assessed:  11/24/17   • Hypertension     Benign essential, Last Assessed:  11/24/17   • LBBB (left bundle branch block)    • Left bundle branch block 04/03/2014   • Mixed hyperlipidemia 11/04/2013   • Nocturia    • Polycythemia vera (HCC)     (per history in chart)   • PVD (peripheral vascular disease) ([facility])    • RBC abnormality     pt reports told has high Red blood cells/goes to infusion center regularly next appt 10/8/21 hematologist is Dr Charlton of [facility](had been Dr" "Giangiulla)   • Seasickness    • Shortness of breath     \"if goes up steps gets SOB, had for awhile\"   • Slow urinary stream     \"sometimes feels like bladder isnt all the way empty\"   • Snores    • Wears glasses     reading     Past Surgical History:   Procedure Laterality Date   • ANGIOPLASTY     • APPENDECTOMY     • CARDIAC CATHETERIZATION N/A 07/28/2022    Procedure: CARDIAC TEMPORARY PACEMAKER;  Surgeon: Fransico Morgan MD;  Location: [facility];  Service: Cardiology   • CARDIAC ELECTROPHYSIOLOGY PROCEDURE N/A 07/29/2022    Procedure: Cardiac pacer implant;  Surgeon: Anand Valero MD;  Location: [facility];  Service: Cardiology   • CATARACT EXTRACTION Bilateral    • CT CYSTOGRAM  11/04/2021   • ELBOW SURGERY Right    • FL CYSTOGRAM  10/27/2021   • IR BIOPSY BONE MARROW  10/14/2022   • KNEE ARTHROSCOPY Right    • AK COLONOSCOPY FLX DX W/COLLJ SPEC WHEN PFRMD N/A 05/22/2017    Procedure: COLONOSCOPY;  Surgeon: Josseline Zhou DO;  Location: [facility];  Service: Gastroenterology   • AK NEUROPLASTY &/TRANSPOS MEDIAN NRV CARPAL TUNNE Right 5/26/2023    Procedure: CTR, RIGHT;  Surgeon: Cosme Varela MD;  Location: [facility];  Service: Orthopedics   • AK TENDON SHEATH INCISION Right 5/26/2023    Procedure: RELEASE TRIGGER FINGER, RIGHT RING FINGER;  Surgeon: Cosme Varela MD;  Location: [facility];  Service: Orthopedics   • PROSTATE BIOPSY     • PROSTATECTOMY N/A 10/18/2021    Procedure: ROBOTIC SUBTOTAL/SUPRAPUBIC PROSTATECTOMY;  Surgeon: Kieran Vega MD;  Location: [facility];  Service: Urology   • SHOULDER ARTHROSCOPY Right    • TONSILLECTOMY       Family History   Problem Relation Age of Onset   • No Known Problems Mother    • Heart disease Father    • Colon cancer Father    • Colon cancer Sister      Social History     Tobacco Use   • Smoking status: Every Day     Current packs/day: 0.50     Average packs/day: 0.5 packs/day for 53.1 years (26.5 ttl pk-yrs)     Types: Cigarettes     Start date: " "1972     Passive exposure: Current   • Smokeless tobacco: Never   • Tobacco comments:     a little less than 1/2ppd, trying to cut back before surgery last smoked 5.25.23   Vaping Use   • Vaping status: Never Used   Substance and Sexual Activity   • Alcohol use: Not Currently   • Drug use: Never   • Sexual activity: Not Currently     Comment: defer     Current Outpatient Medications on File Prior to Visit   Medication Sig   • acetaminophen (TYLENOL) 500 mg tablet Take 500 mg by mouth every 6 (six) hours as needed for mild pain   • amLODIPine (NORVASC) 10 mg tablet Take 10 mg by mouth every evening    • aspirin (ECOTRIN LOW STRENGTH) 81 mg EC tablet Take 81 mg by mouth daily   • atorvastatin (LIPITOR) 80 mg tablet TAKE 1 TABLET BY MOUTH EVERY DAY IN THE EVENING   • finasteride (PROSCAR) 5 mg tablet Take 1 tablet (5 mg total) by mouth daily   • hydroxyurea (HYDREA) 500 mg capsule TAKE 1 CAPSULE (500 MG TOTAL) BY MOUTH DAILY   • metoprolol tartrate (LOPRESSOR) 50 mg tablet Take 50 mg by mouth 2 (two) times a day    • Multiple Vitamins-Minerals (VITAMIN D3 COMPLETE PO) Take 1 capsule by mouth daily   • nitroglycerin (NITROSTAT) 0.4 mg SL tablet Place 1 tablet (0.4 mg total) under the tongue every 5 (five) minutes as needed for chest pain   • Omega-3 Fatty Acids (FISH OIL) 1200 MG CAPS Take 1,200 mg by mouth 2 (two) times a day     • omeprazole (PriLOSEC) 20 mg delayed release capsule TAKE 1 CAPSULE BY MOUTH EVERY DAY   • tamsulosin (FLOMAX) 0.4 mg Take 1 capsule (0.4 mg total) by mouth daily with dinner     Allergies   Allergen Reactions   • Other Other (See Comments)     Pt and wife reports took a medication years ago at work cant recall the name or what it was for\"     \"couldn't talk and cheeks swelled and also right hand\"     Immunization History   Administered Date(s) Administered   • Pneumococcal Conjugate Vaccine 20-valent (Pcv20), Polysace 01/22/2024   • Pneumococcal Polysaccharide PPV23 1945     Objective " "  /70 (BP Location: Left arm, Patient Position: Sitting, Cuff Size: Large)   Pulse 60   Temp 97.5 °F (36.4 °C) (Temporal)   Ht 5' 8\" (1.727 m)   Wt 78.1 kg (172 lb 3.2 oz)   SpO2 96%   BMI 26.18 kg/m²     Physical Exam  Constitutional:       Appearance: Normal appearance.   Eyes:      Pupils: Pupils are equal, round, and reactive to light.   Neck:      Vascular: No carotid bruit.   Cardiovascular:      Rate and Rhythm: Normal rate and regular rhythm.      Pulses: Normal pulses.      Heart sounds: Normal heart sounds. No murmur heard.     No gallop.   Pulmonary:      Effort: Pulmonary effort is normal.      Breath sounds: Normal breath sounds.   Neurological:      Mental Status: He is alert.   Psychiatric:         Mood and Affect: Mood normal.         Behavior: Behavior normal.         "

## 2025-01-30 NOTE — ASSESSMENT & PLAN NOTE
Blood pressure 130/70.  Continue amlodipine 10 and metoprolol 50 twice daily  Orders:  •  CBC and differential; Future

## 2025-01-30 NOTE — ASSESSMENT & PLAN NOTE
Lab Results   Component Value Date    EGFR 52 01/15/2025    EGFR 51 01/01/2025    EGFR 49 12/30/2024    CREATININE 1.30 01/15/2025    CREATININE 1.31 (H) 01/01/2025    CREATININE 1.37 (H) 12/30/2024

## 2025-01-30 NOTE — ASSESSMENT & PLAN NOTE
Recent mild transaminitis.  AST 74, ALT 67.  Patient does not drink alcohol.  Will repeat LFTs in 1 to 2 months.  Consider imaging study if still elevated  Orders:  •  Hepatic function panel; Future

## 2025-01-30 NOTE — ASSESSMENT & PLAN NOTE
Status post two-vessel stent 10 years ago.  Status post pacemaker implant 2022.  Doing well without chest pain or shortness of breath.  Continue follow-up with cardiology as directed

## 2025-02-04 ENCOUNTER — OFFICE VISIT (OUTPATIENT)
Dept: URGENT CARE | Facility: CLINIC | Age: 80
End: 2025-02-04
Payer: COMMERCIAL

## 2025-02-04 VITALS
RESPIRATION RATE: 22 BRPM | OXYGEN SATURATION: 94 % | TEMPERATURE: 98.8 F | SYSTOLIC BLOOD PRESSURE: 112 MMHG | DIASTOLIC BLOOD PRESSURE: 68 MMHG | HEART RATE: 60 BPM

## 2025-02-04 DIAGNOSIS — R05.9 COUGH, UNSPECIFIED TYPE: Primary | ICD-10-CM

## 2025-02-04 DIAGNOSIS — R68.89 FLU-LIKE SYMPTOMS: ICD-10-CM

## 2025-02-04 LAB
SARS-COV-2 AG UPPER RESP QL IA: NEGATIVE
VALID CONTROL: NORMAL

## 2025-02-04 PROCEDURE — 87636 SARSCOV2 & INF A&B AMP PRB: CPT | Performed by: NURSE PRACTITIONER

## 2025-02-04 PROCEDURE — 87811 SARS-COV-2 COVID19 W/OPTIC: CPT | Performed by: NURSE PRACTITIONER

## 2025-02-04 PROCEDURE — 99213 OFFICE O/P EST LOW 20 MIN: CPT | Performed by: NURSE PRACTITIONER

## 2025-02-04 RX ORDER — DEXTROMETHORPHAN HYDROBROMIDE AND PROMETHAZINE HYDROCHLORIDE 15; 6.25 MG/5ML; MG/5ML
5 SYRUP ORAL 4 TIMES DAILY PRN
Qty: 118 ML | Refills: 0 | Status: SHIPPED | OUTPATIENT
Start: 2025-02-04

## 2025-02-04 RX ORDER — OSELTAMIVIR PHOSPHATE 75 MG/1
75 CAPSULE ORAL 2 TIMES DAILY
Qty: 10 CAPSULE | Refills: 0 | Status: SHIPPED | OUTPATIENT
Start: 2025-02-04 | End: 2025-02-09

## 2025-02-04 NOTE — PATIENT INSTRUCTIONS
"Patient Education     Flu in adults - Discharge instructions   The Basics   Written by the doctors and editors at [facility]   What are discharge instructions? -- Discharge instructions are information about how to take care of yourself after getting medical care for a health problem.  What is the flu? -- This is an infection that can cause fever, cough, body aches, and other symptoms. The most common type of flu is the \"seasonal\" flu. There are different forms of seasonal flu, for example, \"type A\" and \"type B.\" The medical term for the flu is \"influenza.\"  All forms of the flu are caused by viruses. Antibiotics do not work to treat the flu. Doctors might prescribe you an \"antiviral\" medicine. If so, follow your doctor's instructions. The flu can be dangerous because it can cause a serious lung infection called pneumonia.  How do I care for myself at home? -- Ask the doctor or nurse what you should do when you go home. Make sure that you understand exactly what you need to do to care for yourself. Ask questions if there is anything you do not understand.  You should also:   Take all of your medicines as instructed, even if you are feeling better.   Get lots of rest. Sleep when you feel tired. Avoid doing tiring activities.   Take warm, steamy showers to help soothe your cough.   Use hard candy or cough drops to soothe a sore throat and cough.   Try to thin mucus:   Drink lots of liquids.   Use a cool mist humidifier, if your doctor told you to. Keep the humidifier clean.   Use saline nose drops to relieve stuffiness.   Take a medicine like acetaminophen (sample brand name: Tylenol) or ibuprofen (sample brand names: Advil, Motrin) to help bring down your fever.   Dress in lightweight clothes if you have a fever. Cover with a light sheet or blanket if needed. This will help keep you from getting too warm.   Lower the chance of passing the infection to others:   Stay home while you are feeling sick or have a fever.   At " home, try to limit close contact with other people. You can also help protect others by wearing a face mask.   Wash your hands often (figure 1). Alcohol-based hand sanitizers also work to kill germs.   Cover your mouth and nose with the inside of your elbow when you cough or sneeze.   Avoid touching your eyes, nose, and mouth.   Do not share cups, food, towels, bedding, or other personal items with others.   Clean items and surfaces you often touch. Examples include sinks, counters, tables, door handles, remotes, and phones. Germs can often live on surfaces for a few hours. Use a bleach and water mixture or a cleaning product that gets rid of viruses.   Do not go to work or school until your symptoms are improving and your fever has been gone for at least 24 hours without taking medicine such as acetaminophen.  It's also important to get a flu vaccine each year. Some years, the flu vaccine is more effective than others. But even in years when it is less effective, it still helps prevent some cases of the flu. It can also help keep you from getting severely ill if you do get the flu.  What follow-up care do I need? -- Your doctor or nurse will tell you if you need to make a follow-up appointment. If so, make sure that you know when and where to go.  When should I call the doctor? -- Call for emergency help right away (in the [place] and [place], call 9-1-1) if you:   Are having so much trouble breathing that you can only say 1 or 2 words at a time   Need to sit upright at all times to be able to breathe, or cannot lie down   Are very tired from working to catch your breath, or are sweating from trying to breathe  Call for advice if you:   Have trouble breathing when talking or sitting still   Have severe chest discomfort   Feel confused or disoriented   Are vomiting and can't keep liquids down   Have early signs of fluid loss, such as:   Dark-colored urine   Dry mouth   Muscle cramps   Lack of energy   Feeling lightheaded  when you stand up  All topics are updated as new evidence becomes available and our peer review process is complete.  This topic retrieved from [employer] on: Apr 27, 2024.  Topic 976015 Version 2.0  Release: 32.4.2 - C32.116  © 2024 UpToDate, Inc. and/or its affiliates. All rights reserved.  figure 1: How to wash your hands     Wet your hands with clean water, and apply a small amount of soap. Lather and rub hands together for at least 20 seconds. Clean your wrists, palms, backs of your hands, between your fingers, tips of your fingers, thumbs, and under and around your nails. Rinse well, and dry your hands using a clean towel.  Graphic 162021 Version 7.0  Consumer Information Use and Disclaimer   Disclaimer: This generalized information is a limited summary of diagnosis, treatment, and/or medication information. It is not meant to be comprehensive and should be used as a tool to help the user understand and/or assess potential diagnostic and treatment options. It does NOT include all information about conditions, treatments, medications, side effects, or risks that may apply to a specific patient. It is not intended to be medical advice or a substitute for the medical advice, diagnosis, or treatment of a health care provider based on the health care provider's examination and assessment of a patient's specific and unique circumstances. Patients must speak with a health care provider for complete information about their health, medical questions, and treatment options, including any risks or benefits regarding use of medications. This information does not endorse any treatments or medications as safe, effective, or approved for treating a specific patient. UpToDate, Inc. and its affiliates disclaim any warranty or liability relating to this information or the use thereof.The use of this information is governed by the Terms of Use, available at https://www.w[employer]tersMake Meaninguwer.com/en/know/clinical-effectiveness-terms. 2024©  [employer] and its affiliates and/or licensors. All rights reserved.  Copyright   © 2024 [employer] and/or its affiliates. All rights reserved.

## 2025-02-04 NOTE — PROGRESS NOTES
[facility]        NAME: Santana Jacinto is a 79 y.o. male  : 1945    MRN: [id number]  DATE: 2025  TIME: 11:22 AM    Assessment and Plan   Cough, unspecified type [R05.9]  1. Cough, unspecified type  Covid/Flu- Office Collect Normal    Poct Covid 19 Rapid Antigen Test      2. Flu-like symptoms  oseltamivir (TAMIFLU) 75 mg capsule    promethazine-dextromethorphan (PHENERGAN-DM) 6.25-15 mg/5 mL oral syrup        Rapid covid negative   Will send flu to lab   Start tamiflu   Phenergan dm for cough   F/u with pcp   Er with new or worsening symptoms   Pt and daughter in agreement with plan     Patient Instructions     Follow up with PCP in 3-5 days.  Proceed to  ER if symptoms worsen.    Chief Complaint     Chief Complaint   Patient presents with    Cold Like Symptoms     Cold like s/s. Headache, fatigue, vomiting, coughing, nasal congestion, mucus in chest. Pna and fluA exposure. S/s started yesterday. No fevers noted.          History of Present Illness   Santana Jacinto presents to the clinic c/o    Cold Like Symptoms (Cold like s/s. Headache, fatigue, vomiting, coughing, nasal congestion, mucus in chest. Pna and fluA exposure. S/s started yesterday. No fevers noted. )  Not taking anything for symptoms   No flu vaccine this season.        Review of Systems   Review of Systems   All other systems reviewed and are negative.        Current Medications     Long-Term Medications   Medication Sig Dispense Refill    amLODIPine (NORVASC) 10 mg tablet Take 10 mg by mouth every evening       aspirin (ECOTRIN LOW STRENGTH) 81 mg EC tablet Take 81 mg by mouth daily      atorvastatin (LIPITOR) 80 mg tablet TAKE 1 TABLET BY MOUTH EVERY DAY IN THE EVENING 90 tablet 1    finasteride (PROSCAR) 5 mg tablet Take 1 tablet (5 mg total) by mouth daily 30 tablet 0    hydroxyurea (HYDREA) 500 mg capsule TAKE 1 CAPSULE (500 MG TOTAL) BY MOUTH DAILY 90 capsule 2    metoprolol tartrate (LOPRESSOR) 50 mg tablet Take 50 mg by  mouth 2 (two) times a day       nitroglycerin (NITROSTAT) 0.4 mg SL tablet Place 1 tablet (0.4 mg total) under the tongue every 5 (five) minutes as needed for chest pain 10 tablet 1    Omega-3 Fatty Acids (FISH OIL) 1200 MG CAPS Take 1,200 mg by mouth 2 (two) times a day        omeprazole (PriLOSEC) 20 mg delayed release capsule TAKE 1 CAPSULE BY MOUTH EVERY DAY 90 capsule 1    tamsulosin (FLOMAX) 0.4 mg Take 1 capsule (0.4 mg total) by mouth daily with dinner 30 capsule 0       Current Allergies     Allergies as of 02/04/2025 - Reviewed 02/04/2025   Allergen Reaction Noted    Other Other (See Comments) 10/06/2021            The following portions of the patient's history were reviewed and updated as appropriate: allergies, current medications, past family history, past medical history, past social history, past surgical history and problem list.    Objective   /68   Pulse 60   Temp 98.8 °F (37.1 °C) (Tympanic)   Resp 22   SpO2 94%        Physical Exam     Physical Exam  Vitals and nursing note reviewed.   Constitutional:       Appearance: Normal appearance. He is well-developed. He is ill-appearing.   HENT:      Head: Normocephalic and atraumatic.      Right Ear: Tympanic membrane, ear canal and external ear normal.      Left Ear: Tympanic membrane, ear canal and external ear normal.      Nose: Nose normal.      Mouth/Throat:      Mouth: Mucous membranes are moist.   Eyes:      General: Lids are normal.      Conjunctiva/sclera: Conjunctivae normal.      Pupils: Pupils are equal, round, and reactive to light.   Cardiovascular:      Rate and Rhythm: Normal rate and regular rhythm.      Heart sounds: Normal heart sounds, S1 normal and S2 normal.   Pulmonary:      Effort: Pulmonary effort is normal.      Breath sounds: Normal breath sounds.   Musculoskeletal:      Cervical back: Normal range of motion.   Skin:     General: Skin is warm and dry.   Neurological:      Mental Status: He is alert.   Psychiatric:          Speech: Speech normal.         Behavior: Behavior normal.         Thought Content: Thought content normal.         Judgment: Judgment normal.

## 2025-02-05 LAB
FLUAV RNA RESP QL NAA+PROBE: POSITIVE
FLUBV RNA RESP QL NAA+PROBE: NEGATIVE
SARS-COV-2 RNA RESP QL NAA+PROBE: NEGATIVE

## 2025-03-07 DIAGNOSIS — E78.2 MIXED HYPERLIPIDEMIA: ICD-10-CM

## 2025-03-07 DIAGNOSIS — I25.10 CORONARY ARTERY DISEASE WITHOUT ANGINA PECTORIS, UNSPECIFIED VESSEL OR LESION TYPE, UNSPECIFIED WHETHER NATIVE OR TRANSPLANTED HEART: ICD-10-CM

## 2025-03-07 RX ORDER — ATORVASTATIN CALCIUM 80 MG/1
80 TABLET, FILM COATED ORAL EVERY EVENING
Qty: 90 TABLET | Refills: 1 | Status: SHIPPED | OUTPATIENT
Start: 2025-03-07

## 2025-04-22 ENCOUNTER — APPOINTMENT (OUTPATIENT)
Dept: LAB | Facility: MEDICAL CENTER | Age: 80
End: 2025-04-22
Attending: INTERNAL MEDICINE
Payer: COMMERCIAL

## 2025-04-22 ENCOUNTER — OFFICE VISIT (OUTPATIENT)
Dept: GASTROENTEROLOGY | Facility: MEDICAL CENTER | Age: 80
End: 2025-04-22
Payer: COMMERCIAL

## 2025-04-22 VITALS
OXYGEN SATURATION: 99 % | BODY MASS INDEX: 26.07 KG/M2 | SYSTOLIC BLOOD PRESSURE: 138 MMHG | DIASTOLIC BLOOD PRESSURE: 82 MMHG | HEART RATE: 60 BPM | HEIGHT: 68 IN | WEIGHT: 172 LBS | TEMPERATURE: 97.9 F

## 2025-04-22 DIAGNOSIS — Z72.89 OTHER PROBLEMS RELATED TO LIFESTYLE: ICD-10-CM

## 2025-04-22 DIAGNOSIS — E83.42 HYPOMAGNESEMIA: ICD-10-CM

## 2025-04-22 DIAGNOSIS — Z11.59 ENCOUNTER FOR SCREENING FOR OTHER VIRAL DISEASES: ICD-10-CM

## 2025-04-22 DIAGNOSIS — E83.9 CHRONIC KIDNEY DISEASE-MINERAL AND BONE DISORDER (CKD-MBD): ICD-10-CM

## 2025-04-22 DIAGNOSIS — M89.9 CHRONIC KIDNEY DISEASE-MINERAL AND BONE DISORDER (CKD-MBD): ICD-10-CM

## 2025-04-22 DIAGNOSIS — N18.31 HYPERTENSIVE KIDNEY DISEASE WITH STAGE 3A CHRONIC KIDNEY DISEASE (HCC): ICD-10-CM

## 2025-04-22 DIAGNOSIS — R74.01 TRANSAMINITIS: ICD-10-CM

## 2025-04-22 DIAGNOSIS — Z86.0100 PERSONAL HISTORY OF COLON POLYPS, UNSPECIFIED: ICD-10-CM

## 2025-04-22 DIAGNOSIS — R80.1 PERSISTENT PROTEINURIA: ICD-10-CM

## 2025-04-22 DIAGNOSIS — D45 POLYCYTHEMIA VERA (HCC): ICD-10-CM

## 2025-04-22 DIAGNOSIS — E78.2 MIXED HYPERLIPIDEMIA: ICD-10-CM

## 2025-04-22 DIAGNOSIS — R79.89 ELEVATED LFTS: ICD-10-CM

## 2025-04-22 DIAGNOSIS — Z15.89 JAK2 GENE MUTATION: ICD-10-CM

## 2025-04-22 DIAGNOSIS — N18.9 CHRONIC KIDNEY DISEASE-MINERAL AND BONE DISORDER (CKD-MBD): ICD-10-CM

## 2025-04-22 DIAGNOSIS — K21.9 GASTROESOPHAGEAL REFLUX DISEASE WITHOUT ESOPHAGITIS: Primary | ICD-10-CM

## 2025-04-22 DIAGNOSIS — K31.A0 INTESTINAL METAPLASIA OF STOMACH: ICD-10-CM

## 2025-04-22 DIAGNOSIS — I12.9 HYPERTENSIVE KIDNEY DISEASE WITH STAGE 3A CHRONIC KIDNEY DISEASE (HCC): ICD-10-CM

## 2025-04-22 LAB
25(OH)D3 SERPL-MCNC: 79.9 NG/ML (ref 30–100)
ALBUMIN SERPL BCG-MCNC: 4.4 G/DL (ref 3.5–5)
ALP SERPL-CCNC: 117 U/L (ref 34–104)
ALT SERPL W P-5'-P-CCNC: 18 U/L (ref 7–52)
ANION GAP SERPL CALCULATED.3IONS-SCNC: 10 MMOL/L (ref 4–13)
AST SERPL W P-5'-P-CCNC: 22 U/L (ref 13–39)
BASOPHILS # BLD AUTO: 0.03 THOUSANDS/ÂΜL (ref 0–0.1)
BASOPHILS NFR BLD AUTO: 0 % (ref 0–1)
BILIRUB DIRECT SERPL-MCNC: 0.17 MG/DL (ref 0–0.2)
BILIRUB SERPL-MCNC: 1.04 MG/DL (ref 0.2–1)
BUN SERPL-MCNC: 25 MG/DL (ref 5–25)
CALCIUM SERPL-MCNC: 9 MG/DL (ref 8.4–10.2)
CHLORIDE SERPL-SCNC: 102 MMOL/L (ref 96–108)
CO2 SERPL-SCNC: 28 MMOL/L (ref 21–32)
CREAT SERPL-MCNC: 1.95 MG/DL (ref 0.6–1.3)
EOSINOPHIL # BLD AUTO: 0.38 THOUSAND/ÂΜL (ref 0–0.61)
EOSINOPHIL NFR BLD AUTO: 4 % (ref 0–6)
ERYTHROCYTE [DISTWIDTH] IN BLOOD BY AUTOMATED COUNT: 14.6 % (ref 11.6–15.1)
GFR SERPL CREATININE-BSD FRML MDRD: 32 ML/MIN/1.73SQ M
GLUCOSE SERPL-MCNC: 90 MG/DL (ref 65–140)
HCT VFR BLD AUTO: 46.6 % (ref 36.5–49.3)
HGB BLD-MCNC: 16.5 G/DL (ref 12–17)
IMM GRANULOCYTES # BLD AUTO: 0.02 THOUSAND/UL (ref 0–0.2)
IMM GRANULOCYTES NFR BLD AUTO: 0 % (ref 0–2)
LYMPHOCYTES # BLD AUTO: 1.76 THOUSANDS/ÂΜL (ref 0.6–4.47)
LYMPHOCYTES NFR BLD AUTO: 20 % (ref 14–44)
MAGNESIUM SERPL-MCNC: 2.1 MG/DL (ref 1.9–2.7)
MCH RBC QN AUTO: 40.2 PG (ref 26.8–34.3)
MCHC RBC AUTO-ENTMCNC: 35.4 G/DL (ref 31.4–37.4)
MCV RBC AUTO: 114 FL (ref 82–98)
MONOCYTES # BLD AUTO: 0.65 THOUSAND/ÂΜL (ref 0.17–1.22)
MONOCYTES NFR BLD AUTO: 7 % (ref 4–12)
NEUTROPHILS # BLD AUTO: 6.04 THOUSANDS/ÂΜL (ref 1.85–7.62)
NEUTS SEG NFR BLD AUTO: 69 % (ref 43–75)
NRBC BLD AUTO-RTO: 0 /100 WBCS
PHOSPHATE SERPL-MCNC: 3.5 MG/DL (ref 2.3–4.1)
PLATELET # BLD AUTO: 129 THOUSANDS/UL (ref 149–390)
PMV BLD AUTO: 10.2 FL (ref 8.9–12.7)
POTASSIUM SERPL-SCNC: 3.9 MMOL/L (ref 3.5–5.3)
PROT SERPL-MCNC: 7.4 G/DL (ref 6.4–8.4)
PTH-INTACT SERPL-MCNC: 93.1 PG/ML (ref 12–88)
RBC # BLD AUTO: 4.1 MILLION/UL (ref 3.88–5.62)
SODIUM SERPL-SCNC: 140 MMOL/L (ref 135–147)
WBC # BLD AUTO: 8.88 THOUSAND/UL (ref 4.31–10.16)

## 2025-04-22 PROCEDURE — 86015 ACTIN ANTIBODY EACH: CPT

## 2025-04-22 PROCEDURE — 86381 MITOCHONDRIAL ANTIBODY EACH: CPT

## 2025-04-22 PROCEDURE — 86038 ANTINUCLEAR ANTIBODIES: CPT

## 2025-04-22 PROCEDURE — 82306 VITAMIN D 25 HYDROXY: CPT

## 2025-04-22 PROCEDURE — 87340 HEPATITIS B SURFACE AG IA: CPT

## 2025-04-22 PROCEDURE — 85025 COMPLETE CBC W/AUTO DIFF WBC: CPT

## 2025-04-22 PROCEDURE — 99214 OFFICE O/P EST MOD 30 MIN: CPT | Performed by: NURSE PRACTITIONER

## 2025-04-22 PROCEDURE — 82103 ALPHA-1-ANTITRYPSIN TOTAL: CPT

## 2025-04-22 PROCEDURE — 80076 HEPATIC FUNCTION PANEL: CPT

## 2025-04-22 PROCEDURE — 84100 ASSAY OF PHOSPHORUS: CPT

## 2025-04-22 PROCEDURE — 83735 ASSAY OF MAGNESIUM: CPT

## 2025-04-22 PROCEDURE — 82390 ASSAY OF CERULOPLASMIN: CPT

## 2025-04-22 PROCEDURE — 86708 HEPATITIS A ANTIBODY: CPT

## 2025-04-22 PROCEDURE — 83970 ASSAY OF PARATHORMONE: CPT

## 2025-04-22 PROCEDURE — 86706 HEP B SURFACE ANTIBODY: CPT

## 2025-04-22 PROCEDURE — 80048 BASIC METABOLIC PNL TOTAL CA: CPT

## 2025-04-22 PROCEDURE — 36415 COLL VENOUS BLD VENIPUNCTURE: CPT

## 2025-04-22 PROCEDURE — 86225 DNA ANTIBODY NATIVE: CPT

## 2025-04-22 RX ORDER — SODIUM CHLORIDE, SODIUM LACTATE, POTASSIUM CHLORIDE, CALCIUM CHLORIDE 600; 310; 30; 20 MG/100ML; MG/100ML; MG/100ML; MG/100ML
125 INJECTION, SOLUTION INTRAVENOUS CONTINUOUS
OUTPATIENT
Start: 2025-04-22

## 2025-04-22 NOTE — PROGRESS NOTES
Name: Santana Jacinto      : 1945      MRN: [id number]  Encounter Provider: THOMAS Holcomb  Encounter Date: 2025   Encounter department: [facility]  :  Assessment & Plan  Gastroesophageal reflux disease without esophagitis  Reports reflux currently controlled with omeprazole 20 mg daily.Longstanding history of GERD. Recent EGD  noted 2 cm hiatal hernia. Biopsies were positive for intestinal metaplasia of the stomach. Repeat EGD   Orders:    EGD; Future    Intestinal metaplasia of stomach  Refer above       Personal history of colon polyps, unspecified  Recent colonoscopy  noted 3 subcentimeter polyps.  Small hemorrhoids.  Biopsies noted serrated polyp and tubular adenomas.  Repeat colonoscopy recommended in 3 years.  Reports BMs are brown and formed daily.  Denies any melena hematochezia.  No abdominal pain.     -Recall colonoscopy        Elevated LFTs  History of elevated total bilirubin.  Consistent with Gilbert's disease.  Most recent LFTs slightly elevated with AST 75, ALT 68, alk phos 119, total bili 0.91 direct bili 0.23.  Reports no alcohol use.  Will obtain a full liver serology workup and ultrasound of the abdomen.    Orders:    US abdomen complete; Future    Alpha-1-antitrypsin; Future    Antimitochondrial antibody; Future    Anti-smooth muscle antibody, IgG; Future    Ceruloplasmin; Future    Hepatitis A antibody, total; Future    Hepatitis B surface antibody; Future    Hepatitis B surface antigen; Future    KACI Screen w/Reflex Cascade; Future    I reviewed with patient/family potential risks of endoscopic evaluation including possible infection, bleeding or perforation.  Patient/family verbalized understanding of potential risks and agreed to procedure(s).    History of Present Illness   Santana Jacinto is a 79 y.o. male who presents for follow-up.  He was last seen by myself 10/24 for personal history of colon polyps, GERD,  gastric intestinal metaplasia and elevated LFTs.    HPI    History of elevated total bilirubin. Bilirubin has been as high as 2.23. Consistent with Gilbert's disease. Most recent LFTs were normal. CT abdomen pelvis 9/24 noted a benign hypodensity in the liver that was seen in the past. Does not require any follow-up. No suspicious liver masses. Normal hepatic contour and no biliary dilatation. Gallbladder was found to be contracted without wall thickening or calcified gallstones. Mucosal hyperenhancement of the gallbladder wall of some uncertain etiology. No significant pericholecystic inflammatory change. Denies any abdominal pain, nausea, vomiting, fevers, chills or weight loss.     Recent colonoscopy 7/24 noted 3 subcentimeter polyps.  Small hemorrhoids.  Biopsies noted serrated polyp and tubular adenomas.  Repeat colonoscopy recommended in 3 years.  Reports BMs are brown and formed daily.  Denies any melena hematochezia.  No abdominal pain.    Longstanding history of GERD. Recent EGD 7/24 noted 2 cm hiatal hernia. Biopsies were positive for intestinal metaplasia of the stomach. Repeat EGD recommended in 1 year. Currently taking omeprazole 20 mg daily which she reports is controlling his reflux.     CT chest abdomen pelvis 12/24-right lower lobe bronchial wall thickening with air trapping compatible with bronchitis/bronchiolitis.  No evidence of acute intra-abdominal or pelvic pathology.    Labs 1/25-AST 75, ALT 68, alk phos 119, total bili 0.91 direct bili 0.23, hemoglobin 15.5, , platelets 157, hemoglobin A1c 4.7, TSH 1.19.    Interval history:     Previous EGD/colonoscopy        EGD 7/24- irregular Z-line; performed cold forceps biopsy  The stomach and duodenum appeared normal.  2 cm hiatal hernia  The stomach appeared normal. Performed random biopsy to rule out H. pylori.  The duodenum appeared normal.  Repeat EGD 7/25.  Biopsies of chronic inactive gastritis.  Focus suggestive for intestinal  metaplasia in the stomach.  Squamous eosinophils numbering less than 2 per hpf.  No intestinal plan aplasia noted in the GE junction.  Colonoscopy     Colonoscopy 7/24- 3 subcentimeter polyps were removed with cold snare  Small hemorrhoids  Otherwise normal colonic mucosa   Repeat colonoscopy in 3 years.  Biopsies noted serrated colon polyp and tubular adenomas.        2017 colonoscopy showed 3 small polyps with pathology noting tubular adenoma. He was recommend repeat in 3 years       Review of Systems   Constitutional:  Negative for chills and fever.   HENT:  Negative for ear pain and sore throat.    Eyes:  Negative for pain and visual disturbance.   Respiratory:  Negative for cough and shortness of breath.    Cardiovascular:  Negative for chest pain and palpitations.   Gastrointestinal:  Negative for abdominal pain and vomiting.   Genitourinary:  Negative for dysuria and hematuria.   Musculoskeletal:  Negative for arthralgias and back pain.   Skin:  Negative for color change and rash.   Neurological:  Negative for seizures and syncope.   All other systems reviewed and are negative.   A complete review of systems is negative other than that noted above in the HPI.      Current Outpatient Medications   Medication Sig Dispense Refill    acetaminophen (TYLENOL) 500 mg tablet Take 500 mg by mouth every 6 (six) hours as needed for mild pain      amLODIPine (NORVASC) 10 mg tablet Take 10 mg by mouth every evening       aspirin (ECOTRIN LOW STRENGTH) 81 mg EC tablet Take 81 mg by mouth daily      atorvastatin (LIPITOR) 80 mg tablet TAKE 1 TABLET BY MOUTH EVERY DAY IN THE EVENING 90 tablet 1    finasteride (PROSCAR) 5 mg tablet Take 1 tablet (5 mg total) by mouth daily 30 tablet 0    hydroxyurea (HYDREA) 500 mg capsule TAKE 1 CAPSULE (500 MG TOTAL) BY MOUTH DAILY 90 capsule 2    metoprolol tartrate (LOPRESSOR) 50 mg tablet Take 50 mg by mouth 2 (two) times a day       Multiple Vitamins-Minerals (VITAMIN D3 COMPLETE PO)  "Take 1 capsule by mouth daily      nitroglycerin (NITROSTAT) 0.4 mg SL tablet Place 1 tablet (0.4 mg total) under the tongue every 5 (five) minutes as needed for chest pain 10 tablet 1    Omega-3 Fatty Acids (FISH OIL) 1200 MG CAPS Take 1,200 mg by mouth 2 (two) times a day        omeprazole (PriLOSEC) 20 mg delayed release capsule TAKE 1 CAPSULE BY MOUTH EVERY DAY 90 capsule 1    promethazine-dextromethorphan (PHENERGAN-DM) 6.25-15 mg/5 mL oral syrup Take 5 mL by mouth 4 (four) times a day as needed for cough 118 mL 0    tamsulosin (FLOMAX) 0.4 mg Take 1 capsule (0.4 mg total) by mouth daily with dinner 30 capsule 0     No current facility-administered medications for this visit.     Objective   /82 (BP Location: Left arm)   Pulse 60   Temp 97.9 °F (36.6 °C)   Ht 5' 8\" (1.727 m)   Wt 78 kg (172 lb)   SpO2 99%   BMI 26.15 kg/m²     Physical Exam  Vitals and nursing note reviewed.   Constitutional:       General: He is not in acute distress.     Appearance: He is well-developed.   HENT:      Head: Normocephalic and atraumatic.   Eyes:      Conjunctiva/sclera: Conjunctivae normal.   Cardiovascular:      Rate and Rhythm: Normal rate and regular rhythm.      Heart sounds: No murmur heard.  Pulmonary:      Effort: Pulmonary effort is normal. No respiratory distress.      Breath sounds: Normal breath sounds.   Abdominal:      General: Bowel sounds are normal.      Palpations: Abdomen is soft.      Tenderness: There is no abdominal tenderness.   Musculoskeletal:         General: No swelling.      Cervical back: Neck supple.   Skin:     General: Skin is warm and dry.      Capillary Refill: Capillary refill takes less than 2 seconds.   Neurological:      Mental Status: He is alert and oriented to person, place, and time.   Psychiatric:         Mood and Affect: Mood normal.            Lab Results: I personally reviewed relevant lab results.       Results for orders placed during the hospital encounter of " 07/29/24    EGD    Narrative  Table formatting from the original result was not included.  [facility]  [address]  [facility] [address] [phone]      DATE OF SERVICE:  7/29/24    PHYSICIAN(S):  Attending:  Diana M Jaiyeola, MD    Fellow:  Maria Isabel Win MD      INDICATION:  Gastroesophageal reflux disease without esophagitis    POST-OP DIAGNOSIS:  See the impression below.    PREPROCEDURE:  Informed consent was obtained for the procedure, including sedation.  Risks of perforation, hemorrhage, adverse drug reaction and aspiration were discussed. The patient was placed in the left lateral decubitus position.    Patient was explained about the risks and benefits of the procedure. Risks including but not limited to bleeding, infection, and perforation were explained in detail. Also explained about less than 100% sensitivity with the exam and other alternatives.    PROCEDURE: EGD    DETAILS OF PROCEDURE:  Patient was taken to the procedure room where a time out was performed to confirm correct patient and correct procedure. The patient underwent monitored anesthesia care, which was administered by an anesthesia professional. The patient's blood pressure, heart rate, level of consciousness, respirations, oxygen, ECG and ETCO2 were monitored throughout the procedure. The scope was introduced through the mouth and advanced to the second part of the duodenum. Retroflexion was performed in the fundus. The patient experienced no blood loss. The procedure was not difficult. The patient tolerated the procedure well. There were no apparent adverse events.    ANESTHESIA INFORMATION:  ASA: III  Anesthesia Type: IV Sedation with Anesthesia    MEDICATIONS:  No administrations occurring from 1057 to 1126 on 07/29/24      FINDINGS:  Irregular Z-line 39 cm from the incisors; performed cold forceps biopsy  The stomach and duodenum appeared normal.  2 cm hiatal hernia - GE  junction 39 cm from the incisors, diaphragmatic impression 41 cm from the incisors:  Hill classification: Grade IV  The stomach appeared normal. Performed random biopsy using biopsy forceps to rule out H. pylori.  The duodenum appeared normal.      SPECIMENS:  ID Type Source Tests Collected by Time Destination  1 : cold bx, r/o h pylori Tissue Stomach TISSUE EXAM Diana M Jaiyeola, MD 7/29/2024 11:18 AM  2 : cold bx, r/o barretts esophagus Tissue Esophagogastric junction TISSUE EXAM Diana M Jaiyeola, MD 7/29/2024 11:23 AM          Impression  Irregular Z-line; performed cold forceps biopsy  The stomach and duodenum appeared normal.  2 cm hiatal hernia  The stomach appeared normal. Performed random biopsy to rule out H. pylori.  The duodenum appeared normal.      RECOMMENDATION:  Await pathology results  Proceed with colonoscopy  Schedule repeat EGD, due: 7/25/2025  Abnormal pathology    No etiology for abdominal pain on this exam  Continue daily ppi  If GE junction biopsies are consistent with Go's esophagus repeat EGD in 5 years for surveillance  Follow up with general surgery to discuss cholecystectomy            Diana M Jaiyeola, MD

## 2025-04-23 ENCOUNTER — RESULTS FOLLOW-UP (OUTPATIENT)
Dept: FAMILY MEDICINE CLINIC | Facility: CLINIC | Age: 80
End: 2025-04-23

## 2025-04-23 ENCOUNTER — RESULTS FOLLOW-UP (OUTPATIENT)
Dept: NEPHROLOGY | Facility: CLINIC | Age: 80
End: 2025-04-23

## 2025-04-23 ENCOUNTER — TELEPHONE (OUTPATIENT)
Dept: NEPHROLOGY | Facility: CLINIC | Age: 80
End: 2025-04-23

## 2025-04-23 DIAGNOSIS — N17.9 AKI (ACUTE KIDNEY INJURY) (HCC): Primary | ICD-10-CM

## 2025-04-23 LAB
HAV AB SER QL IA: NORMAL
HBV SURFACE AB SER-ACNC: <3 MIU/ML
HBV SURFACE AG SER QL: NORMAL

## 2025-04-23 NOTE — TELEPHONE ENCOUNTER
Called patient left a detailed message with regards to most recent lab work from April 22, 2025 creatinine elevated from baseline consistent with acute kidney injury advised patient to increase fluid hydration get repeat renal function panel in 2 weeks orders placed in the system advised patient to hold vitamin D over-the-counter if he is taking any due to elevated vitamin D levels for now.  Advised patient PTH levels are elevated however we will discuss if they are continue to be elevated at next lab draw.  Advised patient to reach out if any questions or concerns

## 2025-04-24 LAB
A1AT SERPL-MCNC: 159 MG/DL (ref 101–187)
ACTIN IGG SERPL-ACNC: 7 UNITS (ref 0–19)
CERULOPLASMIN SERPL-MCNC: 27.7 MG/DL (ref 16–31)
DSDNA IGG SERPL IA-ACNC: 18 IU/ML (ref ?–15)
MITOCHONDRIA M2 IGG SER-ACNC: <20 UNITS (ref 0–20)
NUCLEAR IGG SER IA-RTO: 0.2 RATIO (ref ?–1)

## 2025-05-01 ENCOUNTER — HOSPITAL ENCOUNTER (OUTPATIENT)
Dept: ULTRASOUND IMAGING | Facility: HOSPITAL | Age: 80
Discharge: HOME | End: 2025-05-01
Attending: NURSE PRACTITIONER
Payer: COMMERCIAL

## 2025-05-01 DIAGNOSIS — R79.89 ELEVATED LFTS: ICD-10-CM

## 2025-05-01 LAB — ANA SER QL IF: NEGATIVE

## 2025-05-01 PROCEDURE — 76700 US EXAM ABDOM COMPLETE: CPT

## 2025-05-06 ENCOUNTER — RESULTS FOLLOW-UP (OUTPATIENT)
Dept: GASTROENTEROLOGY | Facility: MEDICAL CENTER | Age: 80
End: 2025-05-06

## 2025-05-06 DIAGNOSIS — K21.9 GASTROESOPHAGEAL REFLUX DISEASE WITHOUT ESOPHAGITIS: ICD-10-CM

## 2025-05-06 RX ORDER — OMEPRAZOLE 20 MG/1
20 CAPSULE, DELAYED RELEASE ORAL DAILY
Qty: 90 CAPSULE | Refills: 1 | Status: SHIPPED | OUTPATIENT
Start: 2025-05-06

## 2025-05-08 ENCOUNTER — APPOINTMENT (OUTPATIENT)
Dept: LAB | Facility: MEDICAL CENTER | Age: 80
End: 2025-05-08
Attending: INTERNAL MEDICINE
Payer: COMMERCIAL

## 2025-05-08 ENCOUNTER — RESULTS FOLLOW-UP (OUTPATIENT)
Dept: OTHER | Facility: HOSPITAL | Age: 80
End: 2025-05-08

## 2025-05-08 DIAGNOSIS — N17.9 AKI (ACUTE KIDNEY INJURY) (HCC): ICD-10-CM

## 2025-05-08 LAB
ALBUMIN SERPL BCG-MCNC: 4 G/DL (ref 3.5–5)
ANION GAP SERPL CALCULATED.3IONS-SCNC: 8 MMOL/L (ref 4–13)
BUN SERPL-MCNC: 24 MG/DL (ref 5–25)
CALCIUM SERPL-MCNC: 8.9 MG/DL (ref 8.4–10.2)
CHLORIDE SERPL-SCNC: 104 MMOL/L (ref 96–108)
CO2 SERPL-SCNC: 27 MMOL/L (ref 21–32)
CREAT SERPL-MCNC: 1.49 MG/DL (ref 0.6–1.3)
GFR SERPL CREATININE-BSD FRML MDRD: 44 ML/MIN/1.73SQ M
GLUCOSE P FAST SERPL-MCNC: 96 MG/DL (ref 65–99)
PHOSPHATE SERPL-MCNC: 3 MG/DL (ref 2.3–4.1)
POTASSIUM SERPL-SCNC: 3.8 MMOL/L (ref 3.5–5.3)
SODIUM SERPL-SCNC: 139 MMOL/L (ref 135–147)

## 2025-05-08 PROCEDURE — 80069 RENAL FUNCTION PANEL: CPT

## 2025-05-08 PROCEDURE — 36415 COLL VENOUS BLD VENIPUNCTURE: CPT

## 2025-06-16 ENCOUNTER — APPOINTMENT (OUTPATIENT)
Dept: LAB | Facility: MEDICAL CENTER | Age: 80
End: 2025-06-16
Attending: PHYSICIAN ASSISTANT
Payer: COMMERCIAL

## 2025-06-16 DIAGNOSIS — D45 POLYCYTHEMIA VERA (HCC): ICD-10-CM

## 2025-06-16 DIAGNOSIS — Z15.89 JAK2 GENE MUTATION: ICD-10-CM

## 2025-06-16 LAB
BASOPHILS # BLD AUTO: 0.05 THOUSANDS/ÂΜL (ref 0–0.1)
BASOPHILS NFR BLD AUTO: 1 % (ref 0–1)
EOSINOPHIL # BLD AUTO: 0.42 THOUSAND/ÂΜL (ref 0–0.61)
EOSINOPHIL NFR BLD AUTO: 4 % (ref 0–6)
ERYTHROCYTE [DISTWIDTH] IN BLOOD BY AUTOMATED COUNT: 14.3 % (ref 11.6–15.1)
HCT VFR BLD AUTO: 46.8 % (ref 36.5–49.3)
HGB BLD-MCNC: 16.5 G/DL (ref 12–17)
IMM GRANULOCYTES # BLD AUTO: 0.05 THOUSAND/UL (ref 0–0.2)
IMM GRANULOCYTES NFR BLD AUTO: 1 % (ref 0–2)
LYMPHOCYTES # BLD AUTO: 1.79 THOUSANDS/ÂΜL (ref 0.6–4.47)
LYMPHOCYTES NFR BLD AUTO: 17 % (ref 14–44)
MCH RBC QN AUTO: 40 PG (ref 26.8–34.3)
MCHC RBC AUTO-ENTMCNC: 35.3 G/DL (ref 31.4–37.4)
MCV RBC AUTO: 113 FL (ref 82–98)
MONOCYTES # BLD AUTO: 0.79 THOUSAND/ÂΜL (ref 0.17–1.22)
MONOCYTES NFR BLD AUTO: 8 % (ref 4–12)
NEUTROPHILS # BLD AUTO: 7.34 THOUSANDS/ÂΜL (ref 1.85–7.62)
NEUTS SEG NFR BLD AUTO: 69 % (ref 43–75)
NRBC BLD AUTO-RTO: 0 /100 WBCS
PLATELET # BLD AUTO: 129 THOUSANDS/UL (ref 149–390)
PMV BLD AUTO: 10.5 FL (ref 8.9–12.7)
RBC # BLD AUTO: 4.13 MILLION/UL (ref 3.88–5.62)
WBC # BLD AUTO: 10.44 THOUSAND/UL (ref 4.31–10.16)

## 2025-06-18 ENCOUNTER — TELEPHONE (OUTPATIENT)
Age: 80
End: 2025-06-18

## 2025-06-18 NOTE — TELEPHONE ENCOUNTER
Pt is calling to ask about his Phlebotomys.    Treatment plan is currently on hold.    Last note states that : HCT is at goal with 45.7 %.  Santana's recent HCT is 46.8.    Therapeutic Phlebotomy orders state to remove 500 ml if Hematocrit is greater than or equal to 45.    Does Santana need to re-start his phlebotomy?

## 2025-06-26 ENCOUNTER — ANESTHESIA (OUTPATIENT)
Dept: ANESTHESIOLOGY | Facility: HOSPITAL | Age: 80
End: 2025-06-26

## 2025-06-26 ENCOUNTER — ANESTHESIA EVENT (OUTPATIENT)
Dept: ANESTHESIOLOGY | Facility: HOSPITAL | Age: 80
End: 2025-06-26

## 2025-06-27 ENCOUNTER — TELEPHONE (OUTPATIENT)
Dept: GASTROENTEROLOGY | Facility: MEDICAL CENTER | Age: 80
End: 2025-06-27

## 2025-06-27 NOTE — TELEPHONE ENCOUNTER
Confirming Upcoming Procedure  Procedure: EGD   Date: July 10  Physician performing: Dr. Allison  Location of procedure:  [facility]  Prep: EGD  Diabetic: No    Clearances and status: N/A

## 2025-06-28 DIAGNOSIS — Z51.11 ENCOUNTER FOR ANTINEOPLASTIC CHEMOTHERAPY: ICD-10-CM

## 2025-06-28 DIAGNOSIS — Z15.89 JAK2 GENE MUTATION: ICD-10-CM

## 2025-06-28 DIAGNOSIS — D45 POLYCYTHEMIA VERA (HCC): ICD-10-CM

## 2025-06-30 RX ORDER — HYDROXYUREA 500 MG/1
500 CAPSULE ORAL DAILY
Qty: 90 CAPSULE | Refills: 2 | Status: SHIPPED | OUTPATIENT
Start: 2025-06-30

## 2025-07-01 NOTE — TELEPHONE ENCOUNTER
Left voicemail and requested call back to confirm/with any questions regarding prep/or need to reschedule

## 2025-07-10 ENCOUNTER — ANESTHESIA EVENT (OUTPATIENT)
Dept: GASTROENTEROLOGY | Facility: MEDICAL CENTER | Age: 80
End: 2025-07-10
Payer: COMMERCIAL

## 2025-07-10 ENCOUNTER — ANESTHESIA (OUTPATIENT)
Dept: GASTROENTEROLOGY | Facility: MEDICAL CENTER | Age: 80
End: 2025-07-10
Payer: COMMERCIAL

## 2025-07-10 ENCOUNTER — HOSPITAL ENCOUNTER (OUTPATIENT)
Dept: GASTROENTEROLOGY | Facility: MEDICAL CENTER | Age: 80
Setting detail: OUTPATIENT SURGERY
End: 2025-07-10
Attending: NURSE PRACTITIONER
Payer: COMMERCIAL

## 2025-07-10 VITALS
SYSTOLIC BLOOD PRESSURE: 121 MMHG | RESPIRATION RATE: 20 BRPM | BODY MASS INDEX: 26.07 KG/M2 | DIASTOLIC BLOOD PRESSURE: 59 MMHG | HEART RATE: 60 BPM | WEIGHT: 172 LBS | OXYGEN SATURATION: 93 % | HEIGHT: 68 IN | TEMPERATURE: 97 F

## 2025-07-10 DIAGNOSIS — K21.9 GASTROESOPHAGEAL REFLUX DISEASE WITHOUT ESOPHAGITIS: ICD-10-CM

## 2025-07-10 DIAGNOSIS — K31.A0 INTESTINAL METAPLASIA OF STOMACH: ICD-10-CM

## 2025-07-10 PROCEDURE — 43239 EGD BIOPSY SINGLE/MULTIPLE: CPT | Performed by: STUDENT IN AN ORGANIZED HEALTH CARE EDUCATION/TRAINING PROGRAM

## 2025-07-10 PROCEDURE — 88305 TISSUE EXAM BY PATHOLOGIST: CPT | Performed by: PATHOLOGY

## 2025-07-10 RX ORDER — ONDANSETRON 2 MG/ML
4 INJECTION INTRAMUSCULAR; INTRAVENOUS ONCE AS NEEDED
Status: DISCONTINUED | OUTPATIENT
Start: 2025-07-10 | End: 2025-07-14 | Stop reason: HOSPADM

## 2025-07-10 RX ORDER — SODIUM CHLORIDE, SODIUM LACTATE, POTASSIUM CHLORIDE, CALCIUM CHLORIDE 600; 310; 30; 20 MG/100ML; MG/100ML; MG/100ML; MG/100ML
20 INJECTION, SOLUTION INTRAVENOUS CONTINUOUS
Status: DISCONTINUED | OUTPATIENT
Start: 2025-07-10 | End: 2025-07-14 | Stop reason: HOSPADM

## 2025-07-10 RX ORDER — SODIUM CHLORIDE, SODIUM LACTATE, POTASSIUM CHLORIDE, CALCIUM CHLORIDE 600; 310; 30; 20 MG/100ML; MG/100ML; MG/100ML; MG/100ML
125 INJECTION, SOLUTION INTRAVENOUS CONTINUOUS
Status: DISCONTINUED | OUTPATIENT
Start: 2025-07-10 | End: 2025-07-14 | Stop reason: HOSPADM

## 2025-07-10 RX ORDER — PROPOFOL 10 MG/ML
INJECTION, EMULSION INTRAVENOUS AS NEEDED
Status: DISCONTINUED | OUTPATIENT
Start: 2025-07-10 | End: 2025-07-10

## 2025-07-10 RX ORDER — LIDOCAINE HYDROCHLORIDE 20 MG/ML
INJECTION, SOLUTION EPIDURAL; INFILTRATION; INTRACAUDAL; PERINEURAL AS NEEDED
Status: DISCONTINUED | OUTPATIENT
Start: 2025-07-10 | End: 2025-07-10

## 2025-07-10 RX ADMIN — PROPOFOL 30 MG: 10 INJECTION, EMULSION INTRAVENOUS at 08:12

## 2025-07-10 RX ADMIN — SODIUM CHLORIDE, SODIUM LACTATE, POTASSIUM CHLORIDE, AND CALCIUM CHLORIDE 20 ML/HR: .6; .31; .03; .02 INJECTION, SOLUTION INTRAVENOUS at 07:35

## 2025-07-10 RX ADMIN — PROPOFOL 30 MG: 10 INJECTION, EMULSION INTRAVENOUS at 08:18

## 2025-07-10 RX ADMIN — PROPOFOL 20 MG: 10 INJECTION, EMULSION INTRAVENOUS at 08:16

## 2025-07-10 RX ADMIN — LIDOCAINE HYDROCHLORIDE 100 MG: 20 INJECTION, SOLUTION EPIDURAL; INFILTRATION; INTRACAUDAL at 08:10

## 2025-07-10 RX ADMIN — PROPOFOL 20 MG: 10 INJECTION, EMULSION INTRAVENOUS at 08:14

## 2025-07-10 RX ADMIN — PROPOFOL 100 MG: 10 INJECTION, EMULSION INTRAVENOUS at 08:10

## 2025-07-10 NOTE — ANESTHESIA PREPROCEDURE EVALUATION
Procedure:  EGD    Relevant Problems   CARDIO   (+) Benign essential hypertension   (+) Coronary artery disease involving native coronary artery of native heart without angina pectoris   (+) Mixed hyperlipidemia      /RENAL   (+) Benign prostatic hyperplasia without lower urinary tract symptoms   (+) CKD (chronic kidney disease)   (+) Chronic kidney disease, stage 3b (HCC)   (+) Hypertensive kidney disease with stage 3a chronic kidney disease (HCC)      PULMONARY   (+) Chronic obstructive pulmonary disease (HCC)      Other   (+) Polycythemia vera (HCC)        Left Ventricle: Left ventricular cavity size is normal. Wall thickness is mildly increased. There is concentric remodeling. The left ventricular ejection fraction is grossly within normal >55%. Wall motion cannot be accurately assessed.    IVS: There is abnormal septal motion consistent with right ventricular pacing.    Right Ventricle: Right ventricular cavity size is dilated.     Physical Exam    Airway     Mallampati score: II  TM Distance: >3 FB  Neck ROM: full  Mouth opening: >= 4 cm      Cardiovascular  Rhythm: regular, Rate: normalNo peripheral edema    Dental   No notable dental hx     Pulmonary   No stridor    Neurological    He appears awake, alert and oriented x3.      Other Findings        Anesthesia Plan  ASA Score- 3     Anesthesia Type- MAC with ASA Monitors.         Additional Monitors:     Airway Plan: natural airway.           Plan Factors-Exercise tolerance (METS): >4 METS.    Chart reviewed.   Existing labs reviewed. Patient summary reviewed.                  Induction- intravenous.    Postoperative Plan- .   Monitoring Plan - Monitoring plan - standard ASA monitoring  Post Operative Pain Plan - non-opiod analgesics        Informed Consent- Anesthetic plan and risks discussed with patient.  I personally reviewed this patient with the CRNA. Discussed and agreed on the Anesthesia Plan with the CRNA..      NPO Status:  Vitals Value Taken Time    Date of last liquid 07/10/25 07/10/25 07:18   Time of last liquid 0630 07/10/25 07:18   Date of last solid 07/09/25 07/10/25 07:18   Time of last solid 1800 07/10/25 07:18

## 2025-07-10 NOTE — ANESTHESIA POSTPROCEDURE EVALUATION
Post-Op Assessment Note    CV Status:  Stable  Pain Score: 0    Pain management: adequate       Mental Status:  Lethargic   Hydration Status:  Stable and euvolemic   PONV Controlled:  None   Airway Patency:  Patent and adequate     Post Op Vitals Reviewed: Yes    No anethesia notable event occurred.    Staff: CRNA           Last Filed PACU Vitals:  Vitals Value Taken Time   Temp     Pulse     BP     Resp     SpO2

## 2025-07-10 NOTE — H&P
"History and Physical -  [facility]  Santana Jacinto 79 y.o. male MRN: [id number]          HPI: Santana Jacinto is a 79 y.o. year old male who presents for EGD for gastric intestinal metaplasia found in 2024.      REVIEW OF SYSTEMS: Per the HPI, and otherwise unremarkable.    Historical Information   Past Medical History[1]  Past Surgical History[2]  Social History   Social History     Substance and Sexual Activity   Alcohol Use Not Currently     Social History     Substance and Sexual Activity   Drug Use Never     Tobacco Use History[3]  Family History[4]    Meds/Allergies     Current Medications[5]    Allergies[6]    Objective     /77   Pulse 60   Temp (!) 97 °F (36.1 °C) (Temporal)   Resp 18   Ht 5' 8\" (1.727 m)   Wt 78 kg (172 lb)   SpO2 97%   BMI 26.15 kg/m²       PHYSICAL EXAM    GEN: NAD  CARDIO: RRR  PULM: CTA bilaterally  ABD: soft, non-tender, non-distended  EXT: no lower extremity edema  NEURO: AAOx3      ASSESSMENT/PLAN:  79 y.o. year old male here for EGD; he is stable and optimized for his procedure.             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\"    Full dentures     but doesnt wear them    Gastroesophageal reflux disease without esophagitis 04/03/2024    Heart block 08/08/2022    Hepatic cyst 05/02/2023    History of 2019 novel coronavirus disease (COVID-19) 02/2021    hospital for 2 days but not in ICU/\"mainly dehydrated\" \"also fever/oxygen below 90\"    History of coronary artery stent placement     x2 in 2008 or so; " "sees [facility]-- Dr Hutton cardio    [place] (hard of hearing)     no hearing aids yet    Hyperlipidemia     Last Assessed:  11/24/17    Hypertension     Benign essential, Last Assessed:  11/24/17    LBBB (left bundle branch block)     Left bundle branch block 04/03/2014    Mixed hyperlipidemia 11/04/2013    Nocturia     Polycythemia vera (HCC)     (per history in chart)    PVD (peripheral vascular disease) (HCC)     RBC abnormality     pt reports told has high Red blood cells/goes to infusion center regularly next appt 10/8/21 [facility] Dr Ahn)    Seasickness     Shortness of breath     \"if goes up steps gets SOB, had for awhile\"    Slow urinary stream     \"sometimes feels like bladder isnt all the way empty\"    Snores     Wears glasses     reading   [2]   Past Surgical History:  Procedure Laterality Date    ANGIOPLASTY      APPENDECTOMY      CARDIAC CATHETERIZATION N/A 07/28/2022    Procedure: CARDIAC TEMPORARY PACEMAKER;  Surgeon: Fransico Morgan MD;  Location: [facility];  Service: Cardiology    CARDIAC ELECTROPHYSIOLOGY PROCEDURE N/A 07/29/2022    Procedure: Cardiac pacer implant;  Surgeon: Anand Valero MD;  Location: [facility];  Service: Cardiology    CATARACT EXTRACTION Bilateral     CT CYSTOGRAM  11/04/2021    ELBOW SURGERY Right     FL CYSTOGRAM  10/27/2021    IR BIOPSY BONE MARROW  10/14/2022    KNEE ARTHROSCOPY Right     OH COLONOSCOPY FLX DX W/COLLJ SPEC WHEN PFRMD N/A 05/22/2017    Procedure: COLONOSCOPY;  Surgeon: Josseline Zhou DO;  Location: [facility];  Service: Gastroenterology    OH NEUROPLASTY &/TRANSPOS MEDIAN NRV CARPAL TUNNE Right 5/26/2023    Procedure: CTR, RIGHT;  Surgeon: Cosme Varela MD;  Location[facility];  Service: Orthopedics    OH TENDON SHEATH INCISION Right 5/26/2023    Procedure: RELEASE TRIGGER FINGER, RIGHT RING FINGER;  Surgeon: Cosme Varela MD;  Location[facility];  Service: Orthopedics    PROSTATE " "BIOPSY      PROSTATECTOMY N/A 10/18/2021    Procedure: ROBOTIC SUBTOTAL/SUPRAPUBIC PROSTATECTOMY;  Surgeon: Kieran Vega MD;  Location[facility];  Service: Urology    SHOULDER ARTHROSCOPY Right     TONSILLECTOMY     [3]   Social History  Tobacco Use   Smoking Status Every Day    Current packs/day: 0.50    Average packs/day: 0.5 packs/day for 53.5 years (26.8 ttl pk-yrs)    Types: Cigarettes    Start date: 1972    Passive exposure: Current   Smokeless Tobacco Never   Tobacco Comments    a little less than 1/2ppd, trying to cut back before surgery last smoked 5.25.23   [4]   Family History  Problem Relation Name Age of Onset    No Known Problems Mother      Heart disease Father      Colon cancer Father      Colon cancer Sister     [5]   Current Outpatient Medications:     amLODIPine (NORVASC) 10 mg tablet    aspirin (ECOTRIN LOW STRENGTH) 81 mg EC tablet    atorvastatin (LIPITOR) 80 mg tablet    finasteride (PROSCAR) 5 mg tablet    hydroxyurea (HYDREA) 500 mg capsule    metoprolol tartrate (LOPRESSOR) 50 mg tablet    Multiple Vitamins-Minerals (VITAMIN D3 COMPLETE PO)    Omega-3 Fatty Acids (FISH OIL) 1200 MG CAPS    omeprazole (PriLOSEC) 20 mg delayed release capsule    tamsulosin (FLOMAX) 0.4 mg    acetaminophen (TYLENOL) 500 mg tablet    nitroglycerin (NITROSTAT) 0.4 mg SL tablet    promethazine-dextromethorphan (PHENERGAN-DM) 6.25-15 mg/5 mL oral syrup    Current Facility-Administered Medications:     lactated ringers infusion, 125 mL/hr, Intravenous, Continuous    lactated ringers infusion, 20 mL/hr, Intravenous, Continuous, 20 mL/hr at 07/10/25 0735    lactated ringers infusion, 125 mL/hr, Intravenous, Continuous  [6]   Allergies  Allergen Reactions    Other Other (See Comments)     Pt and wife reports took a medication years ago at work cant recall the name or what it was for\"     \"couldn't talk and cheeks swelled and also right hand\"     "

## 2025-07-16 PROCEDURE — 88305 TISSUE EXAM BY PATHOLOGIST: CPT | Performed by: PATHOLOGY

## 2025-07-23 ENCOUNTER — APPOINTMENT (OUTPATIENT)
Dept: LAB | Facility: MEDICAL CENTER | Age: 80
End: 2025-07-23
Payer: COMMERCIAL

## 2025-07-23 DIAGNOSIS — D45 POLYCYTHEMIA VERA (HCC): ICD-10-CM

## 2025-07-23 DIAGNOSIS — Z15.89 JAK2 GENE MUTATION: ICD-10-CM

## 2025-07-23 LAB
BASOPHILS # BLD AUTO: 0.04 THOUSANDS/ÂΜL (ref 0–0.1)
BASOPHILS NFR BLD AUTO: 0 % (ref 0–1)
EOSINOPHIL # BLD AUTO: 0.38 THOUSAND/ÂΜL (ref 0–0.61)
EOSINOPHIL NFR BLD AUTO: 4 % (ref 0–6)
ERYTHROCYTE [DISTWIDTH] IN BLOOD BY AUTOMATED COUNT: 13.7 % (ref 11.6–15.1)
HCT VFR BLD AUTO: 44 % (ref 36.5–49.3)
HGB BLD-MCNC: 15.7 G/DL (ref 12–17)
IMM GRANULOCYTES # BLD AUTO: 0.05 THOUSAND/UL (ref 0–0.2)
IMM GRANULOCYTES NFR BLD AUTO: 1 % (ref 0–2)
LYMPHOCYTES # BLD AUTO: 1.7 THOUSANDS/ÂΜL (ref 0.6–4.47)
LYMPHOCYTES NFR BLD AUTO: 16 % (ref 14–44)
MCH RBC QN AUTO: 39.1 PG (ref 26.8–34.3)
MCHC RBC AUTO-ENTMCNC: 35.7 G/DL (ref 31.4–37.4)
MCV RBC AUTO: 110 FL (ref 82–98)
MONOCYTES # BLD AUTO: 0.67 THOUSAND/ÂΜL (ref 0.17–1.22)
MONOCYTES NFR BLD AUTO: 6 % (ref 4–12)
NEUTROPHILS # BLD AUTO: 7.93 THOUSANDS/ÂΜL (ref 1.85–7.62)
NEUTS SEG NFR BLD AUTO: 73 % (ref 43–75)
NRBC BLD AUTO-RTO: 0 /100 WBCS
PLATELET # BLD AUTO: 144 THOUSANDS/UL (ref 149–390)
PMV BLD AUTO: 9.9 FL (ref 8.9–12.7)
RBC # BLD AUTO: 4.02 MILLION/UL (ref 3.88–5.62)
WBC # BLD AUTO: 10.77 THOUSAND/UL (ref 4.31–10.16)

## 2025-07-23 PROCEDURE — 36415 COLL VENOUS BLD VENIPUNCTURE: CPT

## 2025-07-23 PROCEDURE — 85025 COMPLETE CBC W/AUTO DIFF WBC: CPT

## 2025-07-30 ENCOUNTER — OFFICE VISIT (OUTPATIENT)
Dept: HEMATOLOGY ONCOLOGY | Facility: CLINIC | Age: 80
End: 2025-07-30
Payer: COMMERCIAL

## 2025-07-30 VITALS
RESPIRATION RATE: 16 BRPM | HEIGHT: 68 IN | HEART RATE: 60 BPM | TEMPERATURE: 97.6 F | BODY MASS INDEX: 25.61 KG/M2 | SYSTOLIC BLOOD PRESSURE: 120 MMHG | WEIGHT: 169 LBS | DIASTOLIC BLOOD PRESSURE: 76 MMHG | OXYGEN SATURATION: 96 %

## 2025-07-30 DIAGNOSIS — D45 POLYCYTHEMIA VERA (HCC): Primary | ICD-10-CM

## 2025-07-30 DIAGNOSIS — Z15.89 JAK2 GENE MUTATION: ICD-10-CM

## 2025-07-30 PROCEDURE — 99214 OFFICE O/P EST MOD 30 MIN: CPT | Performed by: PHYSICIAN ASSISTANT

## 2025-07-30 PROCEDURE — G2211 COMPLEX E/M VISIT ADD ON: HCPCS | Performed by: PHYSICIAN ASSISTANT

## 2025-07-31 ENCOUNTER — OFFICE VISIT (OUTPATIENT)
Dept: UROLOGY | Facility: CLINIC | Age: 80
End: 2025-07-31
Payer: COMMERCIAL

## 2025-07-31 VITALS
SYSTOLIC BLOOD PRESSURE: 136 MMHG | HEIGHT: 68 IN | OXYGEN SATURATION: 99 % | DIASTOLIC BLOOD PRESSURE: 88 MMHG | WEIGHT: 168 LBS | HEART RATE: 60 BPM | BODY MASS INDEX: 25.46 KG/M2

## 2025-07-31 DIAGNOSIS — N99.115 POSTPROCEDURAL FOSSA NAVICULARIS URETHRAL STRICTURE: Primary | ICD-10-CM

## 2025-07-31 LAB — POST-VOID RESIDUAL VOLUME, ML POC: 89 ML

## 2025-07-31 PROCEDURE — 99213 OFFICE O/P EST LOW 20 MIN: CPT

## 2025-07-31 PROCEDURE — 51798 US URINE CAPACITY MEASURE: CPT

## 2025-08-13 ENCOUNTER — OFFICE VISIT (OUTPATIENT)
Dept: FAMILY MEDICINE CLINIC | Facility: CLINIC | Age: 80
End: 2025-08-13
Payer: COMMERCIAL

## (undated) DEVICE — CANNULA SEAL

## (undated) DEVICE — INTENDED FOR TISSUE SEPARATION, AND OTHER PROCEDURES THAT REQUIRE A SHARP SURGICAL BLADE TO PUNCTURE OR CUT.: Brand: BARD-PARKER SAFETY BLADES SIZE 11, STERILE

## (undated) DEVICE — TIP COVER ACCESSORY

## (undated) DEVICE — SUT ETHILON 4-0 PS-2 18 IN 1667H

## (undated) DEVICE — LARGE NEEDLE DRIVER: Brand: ENDOWRIST

## (undated) DEVICE — SWAN-GANZ BIPOLAR PACING CATHETER: Brand: SWAN-GANZ

## (undated) DEVICE — HEM-O-LOK CLIP CARTRIDGE LARGE DA VINCI SI/XI

## (undated) DEVICE — SINGLE-USE BIOPSY FORCEPS: Brand: RADIAL JAW 4

## (undated) DEVICE — ELECTRODE BLADE E-Z CLEAN 6.5IN -0014

## (undated) DEVICE — DRAIN SPONGES,6 PLY: Brand: EXCILON

## (undated) DEVICE — MONOPOLAR CURVED SCISSORS: Brand: ENDOWRIST

## (undated) DEVICE — DRAPE EQUIPMENT RF WAND

## (undated) DEVICE — 3000CC GUARDIAN II: Brand: GUARDIAN

## (undated) DEVICE — PINNACLE INTRODUCER SHEATH: Brand: PINNACLE

## (undated) DEVICE — TROCAR: Brand: KII FIOS FIRST ENTRY

## (undated) DEVICE — NEEDLE 18 G X 1 1/2

## (undated) DEVICE — GLOVE SRG BIOGEL ECLIPSE 7.5

## (undated) DEVICE — VISUALIZATION SYSTEM: Brand: CLEARIFY

## (undated) DEVICE — COLUMN DRAPE

## (undated) DEVICE — INTRO SHEATH PEEL AWAY 7FR

## (undated) DEVICE — NEEDLE COUNTER LG W/RULER

## (undated) DEVICE — CHLORAPREP HI-LITE 26ML ORANGE

## (undated) DEVICE — SURGIFLO ENDOSCOPIC APPICATOR: Brand: ETHICON

## (undated) DEVICE — DGW .035 FC J3MM 150CM TEF: Brand: EMERALD

## (undated) DEVICE — CATH GUIDING FIXED SHAPE 43CM -NC

## (undated) DEVICE — Device

## (undated) DEVICE — BALLOON EUPHORA RX 2 X 20MM

## (undated) DEVICE — JP PERF DRN SIL FLT 10MM FULL: Brand: CARDINAL HEALTH

## (undated) DEVICE — ENDOPATH PNEUMONEEDLE INSUFFLATION NEEDLES WITH LUER LOCK CONNECTORS 120MM: Brand: ENDOPATH

## (undated) DEVICE — MARYLAND BIPOLAR FORCEPS: Brand: ENDOWRIST

## (undated) DEVICE — CATH FOLEY 24FR 30ML 2 WAY SILICONE ELASTIMER

## (undated) DEVICE — GLOVE INDICATOR PI UNDERGLOVE SZ 8 BLUE

## (undated) DEVICE — LUBRICANT INST ELECTROLUBE ANTISTK WO PAD

## (undated) DEVICE — TROCAR PORT ACCESS 12 X120MM W/BLDLS OPTICAL TIP AIRSEAL

## (undated) DEVICE — INTENDED FOR TISSUE SEPARATION, AND OTHER PROCEDURES THAT REQUIRE A SHARP SURGICAL BLADE TO PUNCTURE OR CUT.: Brand: BARD-PARKER ® CARBON RIB-BACK BLADES

## (undated) DEVICE — ACE WRAP 3 IN UNSTERILE

## (undated) DEVICE — CYSTO TUBING TUR Y IRRIGATION

## (undated) DEVICE — BETHLEHEM UNIVERSAL LAPAROTOMY: Brand: CARDINAL HEALTH

## (undated) DEVICE — [HIGH FLOW INSUFFLATOR,  DO NOT USE IF PACKAGE IS DAMAGED,  KEEP DRY,  KEEP AWAY FROM SUNLIGHT,  PROTECT FROM HEAT AND RADIOACTIVE SOURCES.]: Brand: PNEUMOSURE

## (undated) DEVICE — DRAPE LAPAROTOMY W/POUCHES

## (undated) DEVICE — LIGACLIP APPLIER MEDIUM

## (undated) DEVICE — STERILE SURGICAL LUBRICANT,  TUBE: Brand: SURGILUBE

## (undated) DEVICE — AIRSEAL TUBE SMOKE EVAC LUMENX3 FILTERED

## (undated) DEVICE — ASTOUND STANDARD SURGICAL GOWN, XL: Brand: CONVERTORS

## (undated) DEVICE — LEAD 383069 MRI US
Type: IMPLANTABLE DEVICE | Site: HEART | Status: NON-FUNCTIONAL
Brand: SELECTSECURE™ MRI SURESCAN™
Removed: 2022-07-29

## (undated) DEVICE — SUT VLOC 90 3-0 90 CV-23 L9 IN VLOCM1944

## (undated) DEVICE — KERLIX BANDAGE ROLL: Brand: KERLIX

## (undated) DEVICE — SUT VLOC 90 3-0 V-20 9IN VLOCM0644

## (undated) DEVICE — SUT VLOC 90 3-0 CV-23 9 IN VLOCM0844

## (undated) DEVICE — GLOVE SRG BIOGEL 7.5

## (undated) DEVICE — SUT ETHILON 3-0 FS-1 18 IN 663G

## (undated) DEVICE — IRRIG ENDO FLO TUBING

## (undated) DEVICE — SUT PDS II 0 CT-1 27 IN Z340H

## (undated) DEVICE — SYRINGE 10ML LL

## (undated) DEVICE — JP CHANNEL DRAIN 19FR, FULL FLUTES: Brand: JACKSON-PRATT

## (undated) DEVICE — GLOVE INDICATOR PI UNDERGLOVE SZ 7.5 BLUE

## (undated) DEVICE — ARM DRAPE

## (undated) DEVICE — SUT VICRYL 0 CT-1 36 IN J946H

## (undated) DEVICE — SLITTER ADJUSTABLE

## (undated) DEVICE — BAG URINE DRAINAGE 4000ML CONTINUOUS IRR

## (undated) DEVICE — TISSUE RETRIEVAL SYSTEM: Brand: INZII RETRIEVAL SYSTEM

## (undated) DEVICE — ACE WRAP 3 IN VELCRO LATEX FREE

## (undated) DEVICE — 3M™ IOBAN™ 2 ANTIMICROBIAL INCISE DRAPE 6650EZ: Brand: IOBAN™ 2

## (undated) DEVICE — GAUZE SPONGES,16 PLY: Brand: CURITY

## (undated) DEVICE — SCD SEQUENTIAL COMPRESSION COMFORT SLEEVE MEDIUM KNEE LENGTH: Brand: KENDALL SCD

## (undated) DEVICE — SUT PDS II 1 CTX 36 IN Z371T

## (undated) DEVICE — OCCLUSIVE GAUZE STRIP,3% BISMUTH TRIBROMOPHENATE IN PETROLATUM BLEND: Brand: XEROFORM

## (undated) DEVICE — ADHESIVE SKIN HIGH VISCOSITY EXOFIN 1ML

## (undated) DEVICE — SUT VICRYL 2-0 SH 27 IN UNDYED J417H

## (undated) DEVICE — JACKSON-PRATT 100CC BULB RESERVOIR: Brand: CARDINAL HEALTH

## (undated) DEVICE — CATH FOLEY 20FR 5ML 2 WAY SILICONE ELASTIMER

## (undated) DEVICE — COBRA GRASPER: Brand: ENDOWRIST

## (undated) DEVICE — NEEDLE HYPO 22G X 1-1/2 IN

## (undated) DEVICE — DRAPE SHEET X-LG

## (undated) DEVICE — HEMOSTATIC MATRIX SURGIFLO 8ML W/THROMBIN

## (undated) DEVICE — STERILE BETHLEHEM PLASTIC HAND: Brand: CARDINAL HEALTH

## (undated) DEVICE — KIT, BETHLEHEM ROBOTIC PROST: Brand: CARDINAL HEALTH

## (undated) DEVICE — 3M™ DURAPORE™ SURGICAL TAPE 1538-3, 3 INCH X 10 YARD (7,5CM X 9,1M), 4 ROLLS/BOX: Brand: 3M™ DURAPORE™

## (undated) DEVICE — SUT MONOCRYL 4-0 PS-2 27 IN Y426H

## (undated) DEVICE — ABDOMINAL PAD: Brand: DERMACEA